# Patient Record
Sex: FEMALE | Race: WHITE | Employment: OTHER
[De-identification: names, ages, dates, MRNs, and addresses within clinical notes are randomized per-mention and may not be internally consistent; named-entity substitution may affect disease eponyms.]

---

## 2017-01-13 ENCOUNTER — TELEPHONE (OUTPATIENT)
Dept: ORTHOPEDIC SURGERY | Facility: CLINIC | Age: 42
End: 2017-01-13

## 2017-02-09 ENCOUNTER — OFFICE VISIT (OUTPATIENT)
Dept: ORTHOPEDIC SURGERY | Facility: CLINIC | Age: 42
End: 2017-02-09

## 2017-02-09 VITALS — BODY MASS INDEX: 41.48 KG/M2 | HEIGHT: 65 IN | WEIGHT: 249 LBS

## 2017-02-09 DIAGNOSIS — M25.511 CHRONIC RIGHT SHOULDER PAIN: Primary | ICD-10-CM

## 2017-02-09 DIAGNOSIS — M75.01 ADHESIVE CAPSULITIS OF RIGHT SHOULDER: ICD-10-CM

## 2017-02-09 DIAGNOSIS — G89.29 CHRONIC RIGHT SHOULDER PAIN: Primary | ICD-10-CM

## 2017-02-09 PROCEDURE — 99203 OFFICE O/P NEW LOW 30 MIN: CPT | Performed by: ORTHOPAEDIC SURGERY

## 2017-02-24 ENCOUNTER — HOSPITAL ENCOUNTER (OUTPATIENT)
Dept: PREADMISSION TESTING | Age: 42
Discharge: HOME | End: 2017-02-24
Payer: COMMERCIAL

## 2017-02-24 VITALS
HEART RATE: 63 BPM | SYSTOLIC BLOOD PRESSURE: 108 MMHG | BODY MASS INDEX: 41.48 KG/M2 | WEIGHT: 249 LBS | RESPIRATION RATE: 16 BRPM | DIASTOLIC BLOOD PRESSURE: 76 MMHG | TEMPERATURE: 97.3 F | OXYGEN SATURATION: 96 % | HEIGHT: 65 IN

## 2017-02-24 RX ORDER — IBUPROFEN 800 MG/1
800 TABLET ORAL EVERY 6 HOURS PRN
COMMUNITY
End: 2017-08-16 | Stop reason: SDUPTHER

## 2017-02-24 RX ORDER — SOTALOL HYDROCHLORIDE 120 MG/1
120 TABLET ORAL EVERY 12 HOURS
COMMUNITY
End: 2017-05-15

## 2017-02-25 ENCOUNTER — ANESTHESIA EVENT (OUTPATIENT)
Dept: OPERATING ROOM | Age: 42
End: 2017-02-25
Payer: COMMERCIAL

## 2017-02-25 RX ORDER — SODIUM CHLORIDE 0.9 % (FLUSH) 0.9 %
10 SYRINGE (ML) INJECTION EVERY 12 HOURS SCHEDULED
Status: CANCELLED | OUTPATIENT
Start: 2017-02-25

## 2017-02-25 RX ORDER — SODIUM CHLORIDE 0.9 % (FLUSH) 0.9 %
10 SYRINGE (ML) INJECTION PRN
Status: CANCELLED | OUTPATIENT
Start: 2017-02-25

## 2017-02-25 RX ORDER — LIDOCAINE HYDROCHLORIDE 10 MG/ML
1 INJECTION, SOLUTION EPIDURAL; INFILTRATION; INTRACAUDAL; PERINEURAL
Status: CANCELLED | OUTPATIENT
Start: 2017-02-25 | End: 2017-02-25

## 2017-02-25 RX ORDER — SODIUM CHLORIDE 9 MG/ML
INJECTION, SOLUTION INTRAVENOUS CONTINUOUS
Status: CANCELLED | OUTPATIENT
Start: 2017-02-25

## 2017-03-01 ENCOUNTER — ANESTHESIA (OUTPATIENT)
Dept: OPERATING ROOM | Age: 42
End: 2017-03-01
Payer: COMMERCIAL

## 2017-03-01 ENCOUNTER — HOSPITAL ENCOUNTER (OUTPATIENT)
Age: 42
Setting detail: OUTPATIENT SURGERY
Discharge: HOME | End: 2017-03-01
Attending: ORTHOPAEDIC SURGERY | Admitting: ORTHOPAEDIC SURGERY
Payer: COMMERCIAL

## 2017-03-01 VITALS
DIASTOLIC BLOOD PRESSURE: 84 MMHG | SYSTOLIC BLOOD PRESSURE: 136 MMHG | TEMPERATURE: 95.9 F | HEART RATE: 68 BPM | RESPIRATION RATE: 16 BRPM | OXYGEN SATURATION: 93 % | HEIGHT: 65 IN | WEIGHT: 249 LBS | BODY MASS INDEX: 41.48 KG/M2

## 2017-03-01 VITALS — SYSTOLIC BLOOD PRESSURE: 140 MMHG | OXYGEN SATURATION: 94 % | DIASTOLIC BLOOD PRESSURE: 78 MMHG

## 2017-03-01 LAB — GLUCOSE BLD-MCNC: 95 MG/DL (ref 65–105)

## 2017-03-01 PROCEDURE — 3700000000 HC ANESTHESIA ATTENDED CARE: Performed by: ORTHOPAEDIC SURGERY

## 2017-03-01 PROCEDURE — 3700000001 HC ADD 15 MINUTES (ANESTHESIA): Performed by: ORTHOPAEDIC SURGERY

## 2017-03-01 PROCEDURE — 3600000011 HC SURGERY LEVEL 1  ADDTL 15MIN: Performed by: ORTHOPAEDIC SURGERY

## 2017-03-01 PROCEDURE — 7100000000 HC PACU RECOVERY - FIRST 15 MIN: Performed by: ORTHOPAEDIC SURGERY

## 2017-03-01 PROCEDURE — 7100000001 HC PACU RECOVERY - ADDTL 15 MIN: Performed by: ORTHOPAEDIC SURGERY

## 2017-03-01 PROCEDURE — 6360000002 HC RX W HCPCS: Performed by: ANESTHESIOLOGY

## 2017-03-01 PROCEDURE — 6360000002 HC RX W HCPCS: Performed by: NURSE ANESTHETIST, CERTIFIED REGISTERED

## 2017-03-01 PROCEDURE — 2580000003 HC RX 258: Performed by: ANESTHESIOLOGY

## 2017-03-01 PROCEDURE — 82947 ASSAY GLUCOSE BLOOD QUANT: CPT

## 2017-03-01 PROCEDURE — 7100000030 HC ASPR PHASE II RECOVERY - FIRST 15 MIN: Performed by: ORTHOPAEDIC SURGERY

## 2017-03-01 PROCEDURE — 3600000001 HC SURGERY LEVEL 1  BASE: Performed by: ORTHOPAEDIC SURGERY

## 2017-03-01 RX ORDER — SODIUM CHLORIDE 9 MG/ML
INJECTION, SOLUTION INTRAVENOUS CONTINUOUS
Status: DISCONTINUED | OUTPATIENT
Start: 2017-03-01 | End: 2017-03-01 | Stop reason: HOSPADM

## 2017-03-01 RX ORDER — LIDOCAINE HYDROCHLORIDE 10 MG/ML
1 INJECTION, SOLUTION EPIDURAL; INFILTRATION; INTRACAUDAL; PERINEURAL
Status: DISCONTINUED | OUTPATIENT
Start: 2017-03-01 | End: 2017-03-01 | Stop reason: HOSPADM

## 2017-03-01 RX ORDER — SODIUM CHLORIDE 0.9 % (FLUSH) 0.9 %
10 SYRINGE (ML) INJECTION PRN
Status: DISCONTINUED | OUTPATIENT
Start: 2017-03-01 | End: 2017-03-01 | Stop reason: HOSPADM

## 2017-03-01 RX ORDER — OXYCODONE HYDROCHLORIDE AND ACETAMINOPHEN 5; 325 MG/1; MG/1
2 TABLET ORAL PRN
Status: DISCONTINUED | OUTPATIENT
Start: 2017-03-01 | End: 2017-03-01 | Stop reason: HOSPADM

## 2017-03-01 RX ORDER — PROPOFOL 10 MG/ML
INJECTION, EMULSION INTRAVENOUS PRN
Status: DISCONTINUED | OUTPATIENT
Start: 2017-03-01 | End: 2017-03-01 | Stop reason: SDUPTHER

## 2017-03-01 RX ORDER — HYDROCODONE BITARTRATE AND ACETAMINOPHEN 5; 325 MG/1; MG/1
1-2 TABLET ORAL EVERY 4 HOURS PRN
Qty: 40 TABLET | Refills: 0 | Status: SHIPPED | OUTPATIENT
Start: 2017-03-01 | End: 2017-03-08

## 2017-03-01 RX ORDER — SODIUM CHLORIDE 0.9 % (FLUSH) 0.9 %
10 SYRINGE (ML) INJECTION EVERY 12 HOURS SCHEDULED
Status: DISCONTINUED | OUTPATIENT
Start: 2017-03-01 | End: 2017-03-01 | Stop reason: HOSPADM

## 2017-03-01 RX ORDER — DIPHENHYDRAMINE HYDROCHLORIDE 50 MG/ML
12.5 INJECTION INTRAMUSCULAR; INTRAVENOUS
Status: DISCONTINUED | OUTPATIENT
Start: 2017-03-01 | End: 2017-03-01 | Stop reason: HOSPADM

## 2017-03-01 RX ORDER — OXYCODONE HYDROCHLORIDE AND ACETAMINOPHEN 5; 325 MG/1; MG/1
1 TABLET ORAL PRN
Status: DISCONTINUED | OUTPATIENT
Start: 2017-03-01 | End: 2017-03-01 | Stop reason: HOSPADM

## 2017-03-01 RX ORDER — LABETALOL HYDROCHLORIDE 5 MG/ML
5 INJECTION, SOLUTION INTRAVENOUS EVERY 10 MIN PRN
Status: DISCONTINUED | OUTPATIENT
Start: 2017-03-01 | End: 2017-03-01 | Stop reason: HOSPADM

## 2017-03-01 RX ORDER — DEXAMETHASONE SODIUM PHOSPHATE 10 MG/ML
10 INJECTION INTRAMUSCULAR; INTRAVENOUS ONCE
Status: COMPLETED | OUTPATIENT
Start: 2017-03-01 | End: 2017-03-01

## 2017-03-01 RX ORDER — DIPHENHYDRAMINE HYDROCHLORIDE 50 MG/ML
25 INJECTION INTRAMUSCULAR; INTRAVENOUS ONCE
Status: COMPLETED | OUTPATIENT
Start: 2017-03-01 | End: 2017-03-01

## 2017-03-01 RX ADMIN — HYDROMORPHONE HYDROCHLORIDE 0.5 MG: 1 INJECTION, SOLUTION INTRAMUSCULAR; INTRAVENOUS; SUBCUTANEOUS at 12:34

## 2017-03-01 RX ADMIN — DEXAMETHASONE SODIUM PHOSPHATE 10 MG: 10 INJECTION INTRAMUSCULAR; INTRAVENOUS at 09:42

## 2017-03-01 RX ADMIN — HYDROMORPHONE HYDROCHLORIDE 0.5 MG: 1 INJECTION, SOLUTION INTRAMUSCULAR; INTRAVENOUS; SUBCUTANEOUS at 12:43

## 2017-03-01 RX ADMIN — PROPOFOL 180 MG: 10 INJECTION, EMULSION INTRAVENOUS at 12:06

## 2017-03-01 RX ADMIN — SODIUM CHLORIDE: 9 INJECTION, SOLUTION INTRAVENOUS at 09:35

## 2017-03-01 RX ADMIN — HYDROMORPHONE HYDROCHLORIDE 1 MG: 1 INJECTION, SOLUTION INTRAMUSCULAR; INTRAVENOUS; SUBCUTANEOUS at 12:20

## 2017-03-01 RX ADMIN — HYDROMORPHONE HYDROCHLORIDE 1 MG: 1 INJECTION, SOLUTION INTRAMUSCULAR; INTRAVENOUS; SUBCUTANEOUS at 12:06

## 2017-03-01 RX ADMIN — DIPHENHYDRAMINE HYDROCHLORIDE 25 MG: 50 INJECTION, SOLUTION INTRAMUSCULAR; INTRAVENOUS at 09:39

## 2017-03-01 ASSESSMENT — PAIN DESCRIPTION - ORIENTATION
ORIENTATION: RIGHT
ORIENTATION: RIGHT

## 2017-03-01 ASSESSMENT — PAIN SCALES - GENERAL
PAINLEVEL_OUTOF10: 9
PAINLEVEL_OUTOF10: 10
PAINLEVEL_OUTOF10: 5
PAINLEVEL_OUTOF10: 10

## 2017-03-01 ASSESSMENT — PAIN DESCRIPTION - DESCRIPTORS
DESCRIPTORS: ACHING;CONSTANT
DESCRIPTORS: ACHING;CONSTANT
DESCRIPTORS: ACHING;BURNING;THROBBING

## 2017-03-01 ASSESSMENT — PAIN DESCRIPTION - PROGRESSION: CLINICAL_PROGRESSION: NOT CHANGED

## 2017-03-01 ASSESSMENT — PAIN DESCRIPTION - ONSET
ONSET: AWAKENED FROM SLEEP
ONSET: AWAKENED FROM SLEEP

## 2017-03-01 ASSESSMENT — PAIN DESCRIPTION - LOCATION
LOCATION: SHOULDER
LOCATION: SHOULDER

## 2017-03-01 ASSESSMENT — PAIN DESCRIPTION - PAIN TYPE
TYPE: SURGICAL PAIN
TYPE: SURGICAL PAIN

## 2017-03-01 ASSESSMENT — PAIN DESCRIPTION - FREQUENCY
FREQUENCY: CONTINUOUS
FREQUENCY: CONTINUOUS

## 2017-03-01 ASSESSMENT — PAIN - FUNCTIONAL ASSESSMENT: PAIN_FUNCTIONAL_ASSESSMENT: 0-10

## 2017-03-02 ENCOUNTER — HOSPITAL ENCOUNTER (OUTPATIENT)
Dept: PHYSICAL THERAPY | Age: 42
Setting detail: THERAPIES SERIES
Discharge: HOME | End: 2017-03-02
Payer: COMMERCIAL

## 2017-03-02 PROCEDURE — G8984 CARRY CURRENT STATUS: HCPCS

## 2017-03-02 PROCEDURE — 97162 PT EVAL MOD COMPLEX 30 MIN: CPT

## 2017-03-02 PROCEDURE — G8985 CARRY GOAL STATUS: HCPCS

## 2017-03-02 ASSESSMENT — PAIN DESCRIPTION - FREQUENCY: FREQUENCY: CONTINUOUS

## 2017-03-02 ASSESSMENT — PAIN DESCRIPTION - DIRECTION: RADIATING_TOWARDS: UPPER ARM

## 2017-03-02 ASSESSMENT — PAIN DESCRIPTION - LOCATION: LOCATION: SHOULDER

## 2017-03-02 ASSESSMENT — PAIN DESCRIPTION - ORIENTATION: ORIENTATION: RIGHT;ANTERIOR

## 2017-03-02 ASSESSMENT — PAIN DESCRIPTION - PAIN TYPE: TYPE: CHRONIC PAIN

## 2017-03-02 ASSESSMENT — PAIN SCALES - GENERAL: PAINLEVEL_OUTOF10: 9

## 2017-03-02 ASSESSMENT — PAIN DESCRIPTION - PROGRESSION: CLINICAL_PROGRESSION: GRADUALLY IMPROVING

## 2017-03-02 ASSESSMENT — PAIN DESCRIPTION - DESCRIPTORS: DESCRIPTORS: ACHING;CONSTANT

## 2017-03-13 ENCOUNTER — HOSPITAL ENCOUNTER (OUTPATIENT)
Dept: PHYSICAL THERAPY | Age: 42
Setting detail: THERAPIES SERIES
Discharge: HOME | End: 2017-03-13
Payer: COMMERCIAL

## 2017-03-13 PROCEDURE — 97110 THERAPEUTIC EXERCISES: CPT

## 2017-03-13 ASSESSMENT — PAIN DESCRIPTION - FREQUENCY: FREQUENCY: CONTINUOUS

## 2017-03-13 ASSESSMENT — PAIN DESCRIPTION - PAIN TYPE: TYPE: CHRONIC PAIN

## 2017-03-13 ASSESSMENT — PAIN DESCRIPTION - LOCATION: LOCATION: SHOULDER

## 2017-03-13 ASSESSMENT — PAIN DESCRIPTION - PROGRESSION: CLINICAL_PROGRESSION: GRADUALLY IMPROVING

## 2017-03-13 ASSESSMENT — PAIN SCALES - GENERAL: PAINLEVEL_OUTOF10: 8

## 2017-03-13 ASSESSMENT — PAIN DESCRIPTION - DESCRIPTORS: DESCRIPTORS: ACHING;CONSTANT

## 2017-03-13 ASSESSMENT — PAIN DESCRIPTION - ORIENTATION: ORIENTATION: RIGHT;ANTERIOR

## 2017-03-13 ASSESSMENT — PAIN DESCRIPTION - DIRECTION: RADIATING_TOWARDS: UPPER ARM

## 2017-03-14 ENCOUNTER — OFFICE VISIT (OUTPATIENT)
Dept: ORTHOPEDIC SURGERY | Age: 42
End: 2017-03-14
Payer: COMMERCIAL

## 2017-03-14 DIAGNOSIS — G89.29 CHRONIC RIGHT SHOULDER PAIN: Primary | ICD-10-CM

## 2017-03-14 DIAGNOSIS — M75.01 ADHESIVE CAPSULITIS OF RIGHT SHOULDER: ICD-10-CM

## 2017-03-14 DIAGNOSIS — M25.511 CHRONIC RIGHT SHOULDER PAIN: Primary | ICD-10-CM

## 2017-03-14 PROCEDURE — 20610 DRAIN/INJ JOINT/BURSA W/O US: CPT | Performed by: ORTHOPAEDIC SURGERY

## 2017-03-14 PROCEDURE — 99213 OFFICE O/P EST LOW 20 MIN: CPT | Performed by: ORTHOPAEDIC SURGERY

## 2017-03-14 RX ORDER — BUPIVACAINE HYDROCHLORIDE 5 MG/ML
3 INJECTION, SOLUTION PERINEURAL ONCE
Status: COMPLETED | OUTPATIENT
Start: 2017-03-14 | End: 2017-03-14

## 2017-03-14 RX ORDER — TRAMADOL HYDROCHLORIDE 50 MG/1
50-100 TABLET ORAL EVERY 6 HOURS PRN
Qty: 40 TABLET | Refills: 0 | Status: SHIPPED | OUTPATIENT
Start: 2017-03-14 | End: 2017-03-14 | Stop reason: CLARIF

## 2017-03-14 RX ORDER — LIDOCAINE HYDROCHLORIDE 10 MG/ML
3 INJECTION, SOLUTION EPIDURAL; INFILTRATION; INTRACAUDAL; PERINEURAL ONCE
Status: COMPLETED | OUTPATIENT
Start: 2017-03-14 | End: 2017-03-14

## 2017-03-14 RX ORDER — TRAMADOL HYDROCHLORIDE 50 MG/1
50-100 TABLET ORAL EVERY 6 HOURS PRN
Qty: 40 TABLET | Refills: 0 | Status: SHIPPED | OUTPATIENT
Start: 2017-03-14 | End: 2017-05-15

## 2017-03-14 RX ADMIN — LIDOCAINE HYDROCHLORIDE 3 ML: 10 INJECTION, SOLUTION EPIDURAL; INFILTRATION; INTRACAUDAL; PERINEURAL at 11:14

## 2017-03-14 RX ADMIN — BUPIVACAINE HYDROCHLORIDE 15 MG: 5 INJECTION, SOLUTION PERINEURAL at 11:13

## 2017-03-15 ENCOUNTER — APPOINTMENT (OUTPATIENT)
Dept: PHYSICAL THERAPY | Age: 42
End: 2017-03-15
Payer: COMMERCIAL

## 2017-03-17 ENCOUNTER — APPOINTMENT (OUTPATIENT)
Dept: PHYSICAL THERAPY | Age: 42
End: 2017-03-17
Payer: COMMERCIAL

## 2017-03-20 ENCOUNTER — TELEPHONE (OUTPATIENT)
Dept: ORTHOPEDIC SURGERY | Age: 42
End: 2017-03-20

## 2017-03-21 ASSESSMENT — PAIN DESCRIPTION - FREQUENCY: FREQUENCY: CONTINUOUS

## 2017-03-21 ASSESSMENT — PAIN DESCRIPTION - DIRECTION: RADIATING_TOWARDS: UPPER ARM

## 2017-03-21 ASSESSMENT — PAIN DESCRIPTION - LOCATION: LOCATION: SHOULDER

## 2017-03-21 ASSESSMENT — PAIN DESCRIPTION - DESCRIPTORS: DESCRIPTORS: ACHING;CONSTANT

## 2017-03-21 ASSESSMENT — PAIN SCALES - GENERAL: PAINLEVEL_OUTOF10: 8

## 2017-03-21 ASSESSMENT — PAIN DESCRIPTION - ORIENTATION: ORIENTATION: RIGHT;ANTERIOR

## 2017-03-21 ASSESSMENT — PAIN DESCRIPTION - PROGRESSION: CLINICAL_PROGRESSION: GRADUALLY IMPROVING

## 2017-03-21 ASSESSMENT — PAIN DESCRIPTION - PAIN TYPE: TYPE: CHRONIC PAIN

## 2017-03-22 ENCOUNTER — APPOINTMENT (OUTPATIENT)
Dept: PHYSICAL THERAPY | Age: 42
End: 2017-03-22
Payer: COMMERCIAL

## 2017-04-06 ENCOUNTER — OFFICE VISIT (OUTPATIENT)
Dept: ORTHOPEDIC SURGERY | Age: 42
End: 2017-04-06
Payer: COMMERCIAL

## 2017-04-06 DIAGNOSIS — M75.01 ADHESIVE CAPSULITIS OF RIGHT SHOULDER: Primary | ICD-10-CM

## 2017-04-06 DIAGNOSIS — G89.29 CHRONIC RIGHT SHOULDER PAIN: ICD-10-CM

## 2017-04-06 DIAGNOSIS — M25.511 CHRONIC RIGHT SHOULDER PAIN: ICD-10-CM

## 2017-04-06 DIAGNOSIS — M79.2 RADICULAR PAIN IN RIGHT ARM: ICD-10-CM

## 2017-04-06 DIAGNOSIS — M54.2 NECK PAIN: ICD-10-CM

## 2017-04-06 PROCEDURE — 99213 OFFICE O/P EST LOW 20 MIN: CPT | Performed by: ORTHOPAEDIC SURGERY

## 2017-04-17 ENCOUNTER — TELEPHONE (OUTPATIENT)
Dept: ORTHOPEDIC SURGERY | Age: 42
End: 2017-04-17

## 2017-04-20 DIAGNOSIS — F40.240 CLAUSTROPHOBIA: Primary | ICD-10-CM

## 2017-04-20 RX ORDER — DIAZEPAM 10 MG/1
10 TABLET ORAL SEE ADMIN INSTRUCTIONS
Qty: 1 TABLET | Refills: 0 | Status: SHIPPED | OUTPATIENT
Start: 2017-04-20 | End: 2017-05-15

## 2017-04-26 ENCOUNTER — HOSPITAL ENCOUNTER (OUTPATIENT)
Dept: MRI IMAGING | Age: 42
Discharge: HOME | End: 2017-04-26
Payer: COMMERCIAL

## 2017-04-26 DIAGNOSIS — G89.29 CHRONIC RIGHT SHOULDER PAIN: ICD-10-CM

## 2017-04-26 DIAGNOSIS — M25.511 CHRONIC RIGHT SHOULDER PAIN: ICD-10-CM

## 2017-04-26 DIAGNOSIS — M54.2 NECK PAIN: ICD-10-CM

## 2017-04-26 DIAGNOSIS — M79.2 RADICULAR PAIN IN RIGHT ARM: ICD-10-CM

## 2017-04-26 PROCEDURE — 72141 MRI NECK SPINE W/O DYE: CPT

## 2017-04-27 ENCOUNTER — OFFICE VISIT (OUTPATIENT)
Dept: ORTHOPEDIC SURGERY | Age: 42
End: 2017-04-27
Payer: COMMERCIAL

## 2017-04-27 DIAGNOSIS — M75.01 ADHESIVE CAPSULITIS OF RIGHT SHOULDER: ICD-10-CM

## 2017-04-27 DIAGNOSIS — M79.2 RADICULAR PAIN IN RIGHT ARM: Primary | ICD-10-CM

## 2017-04-27 DIAGNOSIS — M54.2 NECK PAIN: ICD-10-CM

## 2017-04-27 DIAGNOSIS — M25.511 CHRONIC RIGHT SHOULDER PAIN: ICD-10-CM

## 2017-04-27 DIAGNOSIS — G89.29 CHRONIC RIGHT SHOULDER PAIN: ICD-10-CM

## 2017-04-27 PROCEDURE — 99213 OFFICE O/P EST LOW 20 MIN: CPT | Performed by: ORTHOPAEDIC SURGERY

## 2017-05-03 ENCOUNTER — TELEPHONE (OUTPATIENT)
Dept: ORTHOPEDIC SURGERY | Age: 42
End: 2017-05-03

## 2017-05-03 DIAGNOSIS — M54.2 NECK PAIN: ICD-10-CM

## 2017-05-03 DIAGNOSIS — G89.29 CHRONIC RIGHT SHOULDER PAIN: ICD-10-CM

## 2017-05-03 DIAGNOSIS — M75.01 ADHESIVE CAPSULITIS OF RIGHT SHOULDER: ICD-10-CM

## 2017-05-03 DIAGNOSIS — M79.2 RADICULAR PAIN IN RIGHT ARM: Primary | ICD-10-CM

## 2017-05-03 DIAGNOSIS — M25.511 CHRONIC RIGHT SHOULDER PAIN: ICD-10-CM

## 2017-05-15 ENCOUNTER — HOSPITAL ENCOUNTER (OUTPATIENT)
Age: 42
Setting detail: OBSERVATION
LOS: 1 days | Discharge: HOME | End: 2017-05-17
Attending: EMERGENCY MEDICINE | Admitting: FAMILY MEDICINE
Payer: COMMERCIAL

## 2017-05-15 ENCOUNTER — APPOINTMENT (OUTPATIENT)
Dept: GENERAL RADIOLOGY | Age: 42
End: 2017-05-15
Payer: COMMERCIAL

## 2017-05-15 DIAGNOSIS — M79.605 PAIN IN BOTH LOWER EXTREMITIES: ICD-10-CM

## 2017-05-15 DIAGNOSIS — M79.89 LEG SWELLING: ICD-10-CM

## 2017-05-15 DIAGNOSIS — J44.1 COPD EXACERBATION (HCC): Primary | ICD-10-CM

## 2017-05-15 DIAGNOSIS — M79.604 PAIN IN BOTH LOWER EXTREMITIES: ICD-10-CM

## 2017-05-15 LAB
ABSOLUTE EOS #: 0.6 K/UL (ref 0–0.4)
ABSOLUTE LYMPH #: 3.8 K/UL (ref 1–4.8)
ABSOLUTE MONO #: 0.8 K/UL (ref 0.1–1.3)
ANION GAP SERPL CALCULATED.3IONS-SCNC: 14 MMOL/L (ref 9–17)
BASOPHILS # BLD: 1 %
BASOPHILS ABSOLUTE: 0.1 K/UL (ref 0–0.2)
BNP INTERPRETATION: ABNORMAL
BUN BLDV-MCNC: 13 MG/DL (ref 6–20)
BUN/CREAT BLD: ABNORMAL (ref 9–20)
CALCIUM SERPL-MCNC: 9.5 MG/DL (ref 8.6–10.4)
CHLORIDE BLD-SCNC: 99 MMOL/L (ref 98–107)
CO2: 26 MMOL/L (ref 20–31)
CREAT SERPL-MCNC: 0.55 MG/DL (ref 0.5–0.9)
DIFFERENTIAL TYPE: ABNORMAL
EOSINOPHILS RELATIVE PERCENT: 5 %
GFR AFRICAN AMERICAN: >60 ML/MIN
GFR NON-AFRICAN AMERICAN: >60 ML/MIN
GFR SERPL CREATININE-BSD FRML MDRD: ABNORMAL ML/MIN/{1.73_M2}
GFR SERPL CREATININE-BSD FRML MDRD: ABNORMAL ML/MIN/{1.73_M2}
GLUCOSE BLD-MCNC: 246 MG/DL (ref 70–99)
HCT VFR BLD CALC: 45.5 % (ref 36–46)
HEMOGLOBIN: 15.1 G/DL (ref 12–16)
LYMPHOCYTES # BLD: 33 %
MCH RBC QN AUTO: 33.4 PG (ref 26–34)
MCHC RBC AUTO-ENTMCNC: 33.3 G/DL (ref 31–37)
MCV RBC AUTO: 100.3 FL (ref 80–100)
MONOCYTES # BLD: 7 %
PDW BLD-RTO: 13.1 % (ref 11.5–14.9)
PLATELET # BLD: 208 K/UL (ref 150–450)
PLATELET ESTIMATE: ABNORMAL
PMV BLD AUTO: 9.8 FL (ref 6–12)
POTASSIUM SERPL-SCNC: 4.5 MMOL/L (ref 3.7–5.3)
PRO-BNP: 1093 PG/ML
RBC # BLD: 4.53 M/UL (ref 4–5.2)
RBC # BLD: ABNORMAL 10*6/UL
SEG NEUTROPHILS: 54 %
SEGMENTED NEUTROPHILS ABSOLUTE COUNT: 6.1 K/UL (ref 1.3–9.1)
SODIUM BLD-SCNC: 139 MMOL/L (ref 135–144)
TROPONIN INTERP: NORMAL
TROPONIN T: <0.03 NG/ML
WBC # BLD: 11.4 K/UL (ref 3.5–11)
WBC # BLD: ABNORMAL 10*3/UL

## 2017-05-15 PROCEDURE — 71020 XR CHEST STANDARD TWO VW: CPT

## 2017-05-15 PROCEDURE — 94664 DEMO&/EVAL PT USE INHALER: CPT

## 2017-05-15 PROCEDURE — 6360000002 HC RX W HCPCS: Performed by: EMERGENCY MEDICINE

## 2017-05-15 PROCEDURE — 6370000000 HC RX 637 (ALT 250 FOR IP): Performed by: EMERGENCY MEDICINE

## 2017-05-15 PROCEDURE — 94640 AIRWAY INHALATION TREATMENT: CPT

## 2017-05-15 PROCEDURE — 80048 BASIC METABOLIC PNL TOTAL CA: CPT

## 2017-05-15 PROCEDURE — 83880 ASSAY OF NATRIURETIC PEPTIDE: CPT

## 2017-05-15 PROCEDURE — 84484 ASSAY OF TROPONIN QUANT: CPT

## 2017-05-15 PROCEDURE — 36415 COLL VENOUS BLD VENIPUNCTURE: CPT

## 2017-05-15 PROCEDURE — 85025 COMPLETE CBC W/AUTO DIFF WBC: CPT

## 2017-05-15 PROCEDURE — 99285 EMERGENCY DEPT VISIT HI MDM: CPT

## 2017-05-15 PROCEDURE — 96375 TX/PRO/DX INJ NEW DRUG ADDON: CPT

## 2017-05-15 PROCEDURE — 96374 THER/PROPH/DIAG INJ IV PUSH: CPT

## 2017-05-15 RX ORDER — IPRATROPIUM BROMIDE AND ALBUTEROL SULFATE 2.5; .5 MG/3ML; MG/3ML
1 SOLUTION RESPIRATORY (INHALATION)
Status: DISCONTINUED | OUTPATIENT
Start: 2017-05-15 | End: 2017-05-17 | Stop reason: HOSPADM

## 2017-05-15 RX ORDER — OXYCODONE HYDROCHLORIDE AND ACETAMINOPHEN 5; 325 MG/1; MG/1
1 TABLET ORAL ONCE
Status: COMPLETED | OUTPATIENT
Start: 2017-05-15 | End: 2017-05-15

## 2017-05-15 RX ORDER — METHYLPREDNISOLONE SODIUM SUCCINATE 125 MG/2ML
125 INJECTION, POWDER, LYOPHILIZED, FOR SOLUTION INTRAMUSCULAR; INTRAVENOUS ONCE
Status: COMPLETED | OUTPATIENT
Start: 2017-05-15 | End: 2017-05-15

## 2017-05-15 RX ORDER — BENZONATATE 100 MG/1
200 CAPSULE ORAL ONCE
Status: COMPLETED | OUTPATIENT
Start: 2017-05-15 | End: 2017-05-15

## 2017-05-15 RX ORDER — ALBUTEROL SULFATE 90 UG/1
2 AEROSOL, METERED RESPIRATORY (INHALATION)
Status: DISCONTINUED | OUTPATIENT
Start: 2017-05-15 | End: 2017-05-15

## 2017-05-15 RX ORDER — ALBUTEROL SULFATE 2.5 MG/3ML
5 SOLUTION RESPIRATORY (INHALATION)
Status: DISCONTINUED | OUTPATIENT
Start: 2017-05-15 | End: 2017-05-15

## 2017-05-15 RX ORDER — PROMETHAZINE HYDROCHLORIDE 25 MG/ML
12.5 INJECTION, SOLUTION INTRAMUSCULAR; INTRAVENOUS ONCE
Status: DISCONTINUED | OUTPATIENT
Start: 2017-05-15 | End: 2017-05-15

## 2017-05-15 RX ADMIN — METHYLPREDNISOLONE SODIUM SUCCINATE 125 MG: 125 INJECTION, POWDER, FOR SOLUTION INTRAMUSCULAR; INTRAVENOUS at 21:49

## 2017-05-15 RX ADMIN — HYDROMORPHONE HYDROCHLORIDE 0.5 MG: 1 INJECTION, SOLUTION INTRAMUSCULAR; INTRAVENOUS; SUBCUTANEOUS at 23:57

## 2017-05-15 RX ADMIN — IPRATROPIUM BROMIDE AND ALBUTEROL SULFATE 1 AMPULE: .5; 3 SOLUTION RESPIRATORY (INHALATION) at 21:32

## 2017-05-15 RX ADMIN — BENZONATATE 200 MG: 100 CAPSULE ORAL at 21:49

## 2017-05-15 RX ADMIN — OXYCODONE HYDROCHLORIDE AND ACETAMINOPHEN 1 TABLET: 5; 325 TABLET ORAL at 21:49

## 2017-05-15 ASSESSMENT — PAIN DESCRIPTION - FREQUENCY
FREQUENCY: CONTINUOUS
FREQUENCY: CONTINUOUS

## 2017-05-15 ASSESSMENT — PAIN DESCRIPTION - DESCRIPTORS: DESCRIPTORS: SHARP;BURNING

## 2017-05-15 ASSESSMENT — PAIN DESCRIPTION - LOCATION
LOCATION: LEG;FOOT
LOCATION: LEG;FOOT

## 2017-05-15 ASSESSMENT — PAIN DESCRIPTION - PAIN TYPE
TYPE: CHRONIC PAIN
TYPE: ACUTE PAIN

## 2017-05-15 ASSESSMENT — PAIN SCALES - GENERAL
PAINLEVEL_OUTOF10: 10
PAINLEVEL_OUTOF10: 9
PAINLEVEL_OUTOF10: 9
PAINLEVEL_OUTOF10: 10

## 2017-05-15 ASSESSMENT — PAIN DESCRIPTION - ORIENTATION: ORIENTATION: LEFT

## 2017-05-16 ENCOUNTER — APPOINTMENT (OUTPATIENT)
Dept: MRI IMAGING | Age: 42
End: 2017-05-16
Payer: COMMERCIAL

## 2017-05-16 PROBLEM — Z91.199 NONCOMPLIANCE WITH THERAPEUTIC PLAN: Status: ACTIVE | Noted: 2017-05-16

## 2017-05-16 PROBLEM — R07.2 PRECORDIAL PAIN: Status: ACTIVE | Noted: 2017-05-16

## 2017-05-16 PROBLEM — M79.605 LEFT LEG PAIN: Status: ACTIVE | Noted: 2017-05-16

## 2017-05-16 PROBLEM — Z72.0 TOBACCO ABUSE: Status: ACTIVE | Noted: 2017-05-16

## 2017-05-16 LAB
ANION GAP SERPL CALCULATED.3IONS-SCNC: 17 MMOL/L (ref 9–17)
BUN BLDV-MCNC: 15 MG/DL (ref 6–20)
BUN/CREAT BLD: ABNORMAL (ref 9–20)
CALCIUM SERPL-MCNC: 9.1 MG/DL (ref 8.6–10.4)
CHLORIDE BLD-SCNC: 97 MMOL/L (ref 98–107)
CO2: 22 MMOL/L (ref 20–31)
CREAT SERPL-MCNC: 0.61 MG/DL (ref 0.5–0.9)
ESTIMATED AVERAGE GLUCOSE: 246 MG/DL
GFR AFRICAN AMERICAN: >60 ML/MIN
GFR NON-AFRICAN AMERICAN: >60 ML/MIN
GFR SERPL CREATININE-BSD FRML MDRD: ABNORMAL ML/MIN/{1.73_M2}
GFR SERPL CREATININE-BSD FRML MDRD: ABNORMAL ML/MIN/{1.73_M2}
GLUCOSE BLD-MCNC: 162 MG/DL (ref 65–105)
GLUCOSE BLD-MCNC: 305 MG/DL (ref 65–105)
GLUCOSE BLD-MCNC: 339 MG/DL (ref 65–105)
GLUCOSE BLD-MCNC: 351 MG/DL (ref 65–105)
GLUCOSE BLD-MCNC: 393 MG/DL (ref 65–105)
GLUCOSE BLD-MCNC: 430 MG/DL (ref 70–99)
GLUCOSE BLD-MCNC: 470 MG/DL (ref 65–105)
HBA1C MFR BLD: 10.2 % (ref 4–6)
POTASSIUM SERPL-SCNC: 4.7 MMOL/L (ref 3.7–5.3)
SODIUM BLD-SCNC: 136 MMOL/L (ref 135–144)
TROPONIN INTERP: NORMAL
TROPONIN T: <0.03 NG/ML

## 2017-05-16 PROCEDURE — 94640 AIRWAY INHALATION TREATMENT: CPT

## 2017-05-16 PROCEDURE — 6370000000 HC RX 637 (ALT 250 FOR IP): Performed by: FAMILY MEDICINE

## 2017-05-16 PROCEDURE — 36415 COLL VENOUS BLD VENIPUNCTURE: CPT

## 2017-05-16 PROCEDURE — G0378 HOSPITAL OBSERVATION PER HR: HCPCS

## 2017-05-16 PROCEDURE — 96375 TX/PRO/DX INJ NEW DRUG ADDON: CPT

## 2017-05-16 PROCEDURE — 6370000000 HC RX 637 (ALT 250 FOR IP): Performed by: EMERGENCY MEDICINE

## 2017-05-16 PROCEDURE — 96372 THER/PROPH/DIAG INJ SC/IM: CPT

## 2017-05-16 PROCEDURE — 93970 EXTREMITY STUDY: CPT

## 2017-05-16 PROCEDURE — 6360000002 HC RX W HCPCS: Performed by: FAMILY MEDICINE

## 2017-05-16 PROCEDURE — 96376 TX/PRO/DX INJ SAME DRUG ADON: CPT

## 2017-05-16 PROCEDURE — 82947 ASSAY GLUCOSE BLOOD QUANT: CPT

## 2017-05-16 PROCEDURE — 80048 BASIC METABOLIC PNL TOTAL CA: CPT

## 2017-05-16 PROCEDURE — 94761 N-INVAS EAR/PLS OXIMETRY MLT: CPT

## 2017-05-16 PROCEDURE — 72148 MRI LUMBAR SPINE W/O DYE: CPT

## 2017-05-16 PROCEDURE — 2580000003 HC RX 258: Performed by: FAMILY MEDICINE

## 2017-05-16 PROCEDURE — 93005 ELECTROCARDIOGRAM TRACING: CPT

## 2017-05-16 PROCEDURE — 83036 HEMOGLOBIN GLYCOSYLATED A1C: CPT

## 2017-05-16 PROCEDURE — 99222 1ST HOSP IP/OBS MODERATE 55: CPT | Performed by: FAMILY MEDICINE

## 2017-05-16 PROCEDURE — 93923 UPR/LXTR ART STDY 3+ LVLS: CPT

## 2017-05-16 RX ORDER — NICOTINE 21 MG/24HR
1 PATCH, TRANSDERMAL 24 HOURS TRANSDERMAL DAILY
Status: DISCONTINUED | OUTPATIENT
Start: 2017-05-16 | End: 2017-05-17 | Stop reason: HOSPADM

## 2017-05-16 RX ORDER — INSULIN GLARGINE 100 [IU]/ML
80 INJECTION, SOLUTION SUBCUTANEOUS 2 TIMES DAILY
Status: DISCONTINUED | OUTPATIENT
Start: 2017-05-16 | End: 2017-05-17 | Stop reason: HOSPADM

## 2017-05-16 RX ORDER — ATORVASTATIN CALCIUM 40 MG/1
40 TABLET, FILM COATED ORAL DAILY
Status: DISCONTINUED | OUTPATIENT
Start: 2017-05-16 | End: 2017-05-17 | Stop reason: HOSPADM

## 2017-05-16 RX ORDER — ACETAMINOPHEN 325 MG/1
650 TABLET ORAL EVERY 4 HOURS PRN
Status: DISCONTINUED | OUTPATIENT
Start: 2017-05-16 | End: 2017-05-17 | Stop reason: HOSPADM

## 2017-05-16 RX ORDER — MONTELUKAST SODIUM 10 MG/1
10 TABLET ORAL NIGHTLY
Status: DISCONTINUED | OUTPATIENT
Start: 2017-05-16 | End: 2017-05-17 | Stop reason: HOSPADM

## 2017-05-16 RX ORDER — SODIUM CHLORIDE 0.9 % (FLUSH) 0.9 %
10 SYRINGE (ML) INJECTION PRN
Status: DISCONTINUED | OUTPATIENT
Start: 2017-05-16 | End: 2017-05-17 | Stop reason: HOSPADM

## 2017-05-16 RX ORDER — SODIUM CHLORIDE 0.9 % (FLUSH) 0.9 %
10 SYRINGE (ML) INJECTION EVERY 12 HOURS SCHEDULED
Status: DISCONTINUED | OUTPATIENT
Start: 2017-05-16 | End: 2017-05-17 | Stop reason: HOSPADM

## 2017-05-16 RX ORDER — ALBUTEROL SULFATE 2.5 MG/3ML
2.5 SOLUTION RESPIRATORY (INHALATION) 4 TIMES DAILY
Status: DISCONTINUED | OUTPATIENT
Start: 2017-05-16 | End: 2017-05-17 | Stop reason: HOSPADM

## 2017-05-16 RX ORDER — DULOXETIN HYDROCHLORIDE 30 MG/1
60 CAPSULE, DELAYED RELEASE ORAL 2 TIMES DAILY
Status: DISCONTINUED | OUTPATIENT
Start: 2017-05-16 | End: 2017-05-17 | Stop reason: HOSPADM

## 2017-05-16 RX ORDER — DEXTROSE MONOHYDRATE 25 G/50ML
12.5 INJECTION, SOLUTION INTRAVENOUS PRN
Status: DISCONTINUED | OUTPATIENT
Start: 2017-05-16 | End: 2017-05-17 | Stop reason: HOSPADM

## 2017-05-16 RX ORDER — FLUTICASONE PROPIONATE 50 MCG
2 SPRAY, SUSPENSION (ML) NASAL DAILY
Status: DISCONTINUED | OUTPATIENT
Start: 2017-05-16 | End: 2017-05-17 | Stop reason: HOSPADM

## 2017-05-16 RX ORDER — NICOTINE POLACRILEX 4 MG
15 LOZENGE BUCCAL PRN
Status: DISCONTINUED | OUTPATIENT
Start: 2017-05-16 | End: 2017-05-17 | Stop reason: HOSPADM

## 2017-05-16 RX ORDER — CARVEDILOL 12.5 MG/1
12.5 TABLET ORAL 2 TIMES DAILY WITH MEALS
Status: DISCONTINUED | OUTPATIENT
Start: 2017-05-16 | End: 2017-05-17 | Stop reason: HOSPADM

## 2017-05-16 RX ORDER — DIAZEPAM 5 MG/ML
5 INJECTION, SOLUTION INTRAMUSCULAR; INTRAVENOUS ONCE
Status: COMPLETED | OUTPATIENT
Start: 2017-05-16 | End: 2017-05-16

## 2017-05-16 RX ORDER — DEXTROSE MONOHYDRATE 50 MG/ML
100 INJECTION, SOLUTION INTRAVENOUS PRN
Status: DISCONTINUED | OUTPATIENT
Start: 2017-05-16 | End: 2017-05-17 | Stop reason: HOSPADM

## 2017-05-16 RX ADMIN — INSULIN LISPRO 12 UNITS: 100 INJECTION, SOLUTION INTRAVENOUS; SUBCUTANEOUS at 12:08

## 2017-05-16 RX ADMIN — HYDROMORPHONE HYDROCHLORIDE 0.5 MG: 1 INJECTION, SOLUTION INTRAMUSCULAR; INTRAVENOUS; SUBCUTANEOUS at 02:07

## 2017-05-16 RX ADMIN — Medication 10 ML: at 02:07

## 2017-05-16 RX ADMIN — Medication 10 ML: at 08:15

## 2017-05-16 RX ADMIN — ENOXAPARIN SODIUM 120 MG: 120 INJECTION SUBCUTANEOUS at 02:13

## 2017-05-16 RX ADMIN — Medication 10 ML: at 20:53

## 2017-05-16 RX ADMIN — Medication 80 UNITS: at 09:59

## 2017-05-16 RX ADMIN — HYDROMORPHONE HYDROCHLORIDE 0.5 MG: 1 INJECTION, SOLUTION INTRAMUSCULAR; INTRAVENOUS; SUBCUTANEOUS at 08:14

## 2017-05-16 RX ADMIN — DULOXETINE HYDROCHLORIDE 60 MG: 30 CAPSULE, DELAYED RELEASE ORAL at 22:00

## 2017-05-16 RX ADMIN — Medication 80 UNITS: at 22:05

## 2017-05-16 RX ADMIN — INSULIN LISPRO 12 UNITS: 100 INJECTION, SOLUTION INTRAVENOUS; SUBCUTANEOUS at 17:05

## 2017-05-16 RX ADMIN — DIAZEPAM 5 MG: 5 INJECTION, SOLUTION INTRAMUSCULAR; INTRAVENOUS at 13:41

## 2017-05-16 RX ADMIN — Medication 10 ML: at 05:11

## 2017-05-16 RX ADMIN — CARVEDILOL 12.5 MG: 12.5 TABLET, FILM COATED ORAL at 22:04

## 2017-05-16 RX ADMIN — ALBUTEROL SULFATE 2.5 MG: 2.5 SOLUTION RESPIRATORY (INHALATION) at 07:47

## 2017-05-16 RX ADMIN — INSULIN LISPRO 2 UNITS: 100 INJECTION, SOLUTION INTRAVENOUS; SUBCUTANEOUS at 22:05

## 2017-05-16 RX ADMIN — MONTELUKAST SODIUM 10 MG: 10 TABLET, FILM COATED ORAL at 22:00

## 2017-05-16 RX ADMIN — Medication 10 ML: at 17:47

## 2017-05-16 RX ADMIN — IPRATROPIUM BROMIDE AND ALBUTEROL SULFATE 1 AMPULE: .5; 3 SOLUTION RESPIRATORY (INHALATION) at 17:52

## 2017-05-16 RX ADMIN — ENOXAPARIN SODIUM 120 MG: 120 INJECTION SUBCUTANEOUS at 08:17

## 2017-05-16 RX ADMIN — MONTELUKAST SODIUM 10 MG: 10 TABLET, FILM COATED ORAL at 02:21

## 2017-05-16 RX ADMIN — HYDROMORPHONE HYDROCHLORIDE 0.5 MG: 1 INJECTION, SOLUTION INTRAMUSCULAR; INTRAVENOUS; SUBCUTANEOUS at 11:14

## 2017-05-16 RX ADMIN — DULOXETINE HYDROCHLORIDE 60 MG: 30 CAPSULE, DELAYED RELEASE ORAL at 08:17

## 2017-05-16 RX ADMIN — HYDROMORPHONE HYDROCHLORIDE 0.5 MG: 1 INJECTION, SOLUTION INTRAMUSCULAR; INTRAVENOUS; SUBCUTANEOUS at 14:39

## 2017-05-16 RX ADMIN — Medication 80 UNITS: at 02:11

## 2017-05-16 RX ADMIN — ATORVASTATIN CALCIUM 40 MG: 40 TABLET, FILM COATED ORAL at 22:00

## 2017-05-16 RX ADMIN — HYDROMORPHONE HYDROCHLORIDE 0.5 MG: 1 INJECTION, SOLUTION INTRAMUSCULAR; INTRAVENOUS; SUBCUTANEOUS at 05:11

## 2017-05-16 RX ADMIN — CARVEDILOL 12.5 MG: 12.5 TABLET, FILM COATED ORAL at 09:57

## 2017-05-16 RX ADMIN — INSULIN LISPRO 18 UNITS: 100 INJECTION, SOLUTION INTRAVENOUS; SUBCUTANEOUS at 08:20

## 2017-05-16 RX ADMIN — HYDROMORPHONE HYDROCHLORIDE 0.5 MG: 1 INJECTION, SOLUTION INTRAMUSCULAR; INTRAVENOUS; SUBCUTANEOUS at 17:47

## 2017-05-16 RX ADMIN — ENOXAPARIN SODIUM 120 MG: 120 INJECTION SUBCUTANEOUS at 21:59

## 2017-05-16 RX ADMIN — INSULIN LISPRO 7 UNITS: 100 INJECTION, SOLUTION INTRAVENOUS; SUBCUTANEOUS at 02:12

## 2017-05-16 RX ADMIN — DULOXETINE HYDROCHLORIDE 60 MG: 30 CAPSULE, DELAYED RELEASE ORAL at 02:21

## 2017-05-16 RX ADMIN — Medication 10 ML: at 11:14

## 2017-05-16 RX ADMIN — HYDROMORPHONE HYDROCHLORIDE 0.5 MG: 1 INJECTION, SOLUTION INTRAMUSCULAR; INTRAVENOUS; SUBCUTANEOUS at 20:53

## 2017-05-16 RX ADMIN — Medication 10 ML: at 13:41

## 2017-05-16 RX ADMIN — Medication 10 ML: at 14:39

## 2017-05-16 ASSESSMENT — PAIN SCALES - GENERAL
PAINLEVEL_OUTOF10: 9
PAINLEVEL_OUTOF10: 10
PAINLEVEL_OUTOF10: 7
PAINLEVEL_OUTOF10: 10
PAINLEVEL_OUTOF10: 8
PAINLEVEL_OUTOF10: 10
PAINLEVEL_OUTOF10: 8
PAINLEVEL_OUTOF10: 10

## 2017-05-16 ASSESSMENT — ENCOUNTER SYMPTOMS
DIARRHEA: 0
APNEA: 0
NAUSEA: 0
BACK PAIN: 0
CHEST TIGHTNESS: 0
VOMITING: 0
BLOOD IN STOOL: 0
ABDOMINAL PAIN: 0
SHORTNESS OF BREATH: 1
COUGH: 1
RHINORRHEA: 0
SORE THROAT: 0

## 2017-05-16 ASSESSMENT — PAIN DESCRIPTION - ORIENTATION
ORIENTATION: LEFT

## 2017-05-16 ASSESSMENT — PAIN DESCRIPTION - PAIN TYPE
TYPE: CHRONIC PAIN

## 2017-05-16 ASSESSMENT — PAIN DESCRIPTION - FREQUENCY
FREQUENCY: INTERMITTENT
FREQUENCY: INTERMITTENT
FREQUENCY: CONTINUOUS
FREQUENCY: CONTINUOUS
FREQUENCY: INTERMITTENT

## 2017-05-16 ASSESSMENT — PAIN DESCRIPTION - ONSET
ONSET: ON-GOING

## 2017-05-16 ASSESSMENT — PAIN DESCRIPTION - LOCATION
LOCATION: LEG
LOCATION: FOOT;LEG
LOCATION: LEG;FOOT
LOCATION: FOOT;LEG

## 2017-05-16 ASSESSMENT — PAIN DESCRIPTION - DESCRIPTORS
DESCRIPTORS: BURNING;SHARP;SHOOTING
DESCRIPTORS: BURNING;SHARP;SHOOTING
DESCRIPTORS: BURNING;SHARP
DESCRIPTORS: BURNING;SHARP;SHOOTING
DESCRIPTORS: BURNING;SHARP
DESCRIPTORS: BURNING;SHARP;SHOOTING
DESCRIPTORS: BURNING;SHARP
DESCRIPTORS: BURNING;SHARP;SHOOTING
DESCRIPTORS: BURNING;SHARP

## 2017-05-16 ASSESSMENT — PAIN SCALES - WONG BAKER
WONGBAKER_NUMERICALRESPONSE: 0
WONGBAKER_NUMERICALRESPONSE: 0

## 2017-05-17 VITALS
BODY MASS INDEX: 42.5 KG/M2 | RESPIRATION RATE: 18 BRPM | TEMPERATURE: 98 F | DIASTOLIC BLOOD PRESSURE: 59 MMHG | WEIGHT: 255.07 LBS | HEART RATE: 62 BPM | SYSTOLIC BLOOD PRESSURE: 107 MMHG | HEIGHT: 65 IN | OXYGEN SATURATION: 91 %

## 2017-05-17 LAB
ANION GAP SERPL CALCULATED.3IONS-SCNC: 11 MMOL/L (ref 9–17)
BUN BLDV-MCNC: 17 MG/DL (ref 6–20)
BUN/CREAT BLD: ABNORMAL (ref 9–20)
CALCIUM SERPL-MCNC: 9.2 MG/DL (ref 8.6–10.4)
CHLORIDE BLD-SCNC: 99 MMOL/L (ref 98–107)
CO2: 29 MMOL/L (ref 20–31)
CREAT SERPL-MCNC: 0.58 MG/DL (ref 0.5–0.9)
EKG ATRIAL RATE: 84 BPM
EKG P AXIS: 55 DEGREES
EKG P-R INTERVAL: 144 MS
EKG Q-T INTERVAL: 408 MS
EKG QRS DURATION: 88 MS
EKG QTC CALCULATION (BAZETT): 482 MS
EKG R AXIS: 46 DEGREES
EKG T AXIS: 61 DEGREES
EKG VENTRICULAR RATE: 84 BPM
GFR AFRICAN AMERICAN: >60 ML/MIN
GFR NON-AFRICAN AMERICAN: >60 ML/MIN
GFR SERPL CREATININE-BSD FRML MDRD: ABNORMAL ML/MIN/{1.73_M2}
GFR SERPL CREATININE-BSD FRML MDRD: ABNORMAL ML/MIN/{1.73_M2}
GLUCOSE BLD-MCNC: 150 MG/DL (ref 70–99)
GLUCOSE BLD-MCNC: 81 MG/DL (ref 65–105)
GLUCOSE BLD-MCNC: 94 MG/DL (ref 65–105)
MAGNESIUM: 1.9 MG/DL (ref 1.6–2.6)
POTASSIUM SERPL-SCNC: 4.3 MMOL/L (ref 3.7–5.3)
SODIUM BLD-SCNC: 139 MMOL/L (ref 135–144)
TSH SERPL DL<=0.05 MIU/L-ACNC: 0.72 MIU/L (ref 0.3–5)

## 2017-05-17 PROCEDURE — 6370000000 HC RX 637 (ALT 250 FOR IP): Performed by: INTERNAL MEDICINE

## 2017-05-17 PROCEDURE — 6360000002 HC RX W HCPCS: Performed by: FAMILY MEDICINE

## 2017-05-17 PROCEDURE — 99217 PR OBSERVATION CARE DISCHARGE MANAGEMENT: CPT | Performed by: FAMILY MEDICINE

## 2017-05-17 PROCEDURE — 96376 TX/PRO/DX INJ SAME DRUG ADON: CPT

## 2017-05-17 PROCEDURE — 6370000000 HC RX 637 (ALT 250 FOR IP): Performed by: EMERGENCY MEDICINE

## 2017-05-17 PROCEDURE — 83735 ASSAY OF MAGNESIUM: CPT

## 2017-05-17 PROCEDURE — 82947 ASSAY GLUCOSE BLOOD QUANT: CPT

## 2017-05-17 PROCEDURE — 36415 COLL VENOUS BLD VENIPUNCTURE: CPT

## 2017-05-17 PROCEDURE — 6370000000 HC RX 637 (ALT 250 FOR IP): Performed by: FAMILY MEDICINE

## 2017-05-17 PROCEDURE — 80048 BASIC METABOLIC PNL TOTAL CA: CPT

## 2017-05-17 PROCEDURE — G0378 HOSPITAL OBSERVATION PER HR: HCPCS

## 2017-05-17 PROCEDURE — 84443 ASSAY THYROID STIM HORMONE: CPT

## 2017-05-17 PROCEDURE — 2580000003 HC RX 258: Performed by: FAMILY MEDICINE

## 2017-05-17 PROCEDURE — 94761 N-INVAS EAR/PLS OXIMETRY MLT: CPT

## 2017-05-17 PROCEDURE — 96372 THER/PROPH/DIAG INJ SC/IM: CPT

## 2017-05-17 PROCEDURE — 94640 AIRWAY INHALATION TREATMENT: CPT

## 2017-05-17 PROCEDURE — 93005 ELECTROCARDIOGRAM TRACING: CPT

## 2017-05-17 RX ADMIN — IPRATROPIUM BROMIDE AND ALBUTEROL SULFATE 1 AMPULE: .5; 3 SOLUTION RESPIRATORY (INHALATION) at 00:32

## 2017-05-17 RX ADMIN — Medication 10 ML: at 07:26

## 2017-05-17 RX ADMIN — Medication 10 ML: at 03:27

## 2017-05-17 RX ADMIN — HYDROMORPHONE HYDROCHLORIDE 0.5 MG: 1 INJECTION, SOLUTION INTRAMUSCULAR; INTRAVENOUS; SUBCUTANEOUS at 09:57

## 2017-05-17 RX ADMIN — ALBUTEROL SULFATE 2.5 MG: 2.5 SOLUTION RESPIRATORY (INHALATION) at 07:00

## 2017-05-17 RX ADMIN — HYDROMORPHONE HYDROCHLORIDE 0.5 MG: 1 INJECTION, SOLUTION INTRAMUSCULAR; INTRAVENOUS; SUBCUTANEOUS at 03:27

## 2017-05-17 RX ADMIN — ENOXAPARIN SODIUM 120 MG: 120 INJECTION SUBCUTANEOUS at 07:26

## 2017-05-17 RX ADMIN — Medication 10 ML: at 12:52

## 2017-05-17 RX ADMIN — CARVEDILOL 12.5 MG: 12.5 TABLET, FILM COATED ORAL at 09:57

## 2017-05-17 RX ADMIN — Medication 10 ML: at 00:22

## 2017-05-17 RX ADMIN — HYDROMORPHONE HYDROCHLORIDE 0.5 MG: 1 INJECTION, SOLUTION INTRAMUSCULAR; INTRAVENOUS; SUBCUTANEOUS at 06:39

## 2017-05-17 RX ADMIN — HYDROMORPHONE HYDROCHLORIDE 0.5 MG: 1 INJECTION, SOLUTION INTRAMUSCULAR; INTRAVENOUS; SUBCUTANEOUS at 00:22

## 2017-05-17 RX ADMIN — DULOXETINE HYDROCHLORIDE 60 MG: 30 CAPSULE, DELAYED RELEASE ORAL at 07:26

## 2017-05-17 RX ADMIN — Medication 10 ML: at 09:58

## 2017-05-17 RX ADMIN — Medication 10 ML: at 06:40

## 2017-05-17 RX ADMIN — HYDROMORPHONE HYDROCHLORIDE 0.5 MG: 1 INJECTION, SOLUTION INTRAMUSCULAR; INTRAVENOUS; SUBCUTANEOUS at 12:52

## 2017-05-17 ASSESSMENT — PAIN DESCRIPTION - DESCRIPTORS
DESCRIPTORS: BURNING;SHARP

## 2017-05-17 ASSESSMENT — PAIN DESCRIPTION - ORIENTATION
ORIENTATION: LEFT

## 2017-05-17 ASSESSMENT — PAIN SCALES - GENERAL
PAINLEVEL_OUTOF10: 9
PAINLEVEL_OUTOF10: 10
PAINLEVEL_OUTOF10: 9
PAINLEVEL_OUTOF10: 7
PAINLEVEL_OUTOF10: 9
PAINLEVEL_OUTOF10: 8
PAINLEVEL_OUTOF10: 10
PAINLEVEL_OUTOF10: 7
PAINLEVEL_OUTOF10: 9
PAINLEVEL_OUTOF10: 4

## 2017-05-17 ASSESSMENT — PAIN DESCRIPTION - FREQUENCY
FREQUENCY: INTERMITTENT

## 2017-05-17 ASSESSMENT — PAIN DESCRIPTION - PAIN TYPE
TYPE: CHRONIC PAIN

## 2017-05-17 ASSESSMENT — PAIN DESCRIPTION - LOCATION
LOCATION: FOOT;LEG

## 2017-05-17 ASSESSMENT — PAIN DESCRIPTION - ONSET
ONSET: ON-GOING

## 2017-05-18 ENCOUNTER — TELEPHONE (OUTPATIENT)
Dept: ORTHOPEDIC SURGERY | Age: 42
End: 2017-05-18

## 2017-05-18 LAB
EKG ATRIAL RATE: 97 BPM
EKG P AXIS: 59 DEGREES
EKG P-R INTERVAL: 142 MS
EKG Q-T INTERVAL: 380 MS
EKG QRS DURATION: 86 MS
EKG QTC CALCULATION (BAZETT): 482 MS
EKG R AXIS: 58 DEGREES
EKG T AXIS: 50 DEGREES
EKG VENTRICULAR RATE: 97 BPM

## 2017-05-27 ENCOUNTER — HOSPITAL ENCOUNTER (EMERGENCY)
Age: 42
Discharge: HOME | End: 2017-05-27
Attending: EMERGENCY MEDICINE
Payer: COMMERCIAL

## 2017-05-27 VITALS
SYSTOLIC BLOOD PRESSURE: 165 MMHG | WEIGHT: 255 LBS | DIASTOLIC BLOOD PRESSURE: 85 MMHG | BODY MASS INDEX: 43.54 KG/M2 | TEMPERATURE: 98.9 F | HEART RATE: 52 BPM | OXYGEN SATURATION: 97 % | HEIGHT: 64 IN | RESPIRATION RATE: 16 BRPM

## 2017-05-27 DIAGNOSIS — M79.605 PAIN OF LEFT LOWER EXTREMITY: Primary | ICD-10-CM

## 2017-05-27 PROCEDURE — 6370000000 HC RX 637 (ALT 250 FOR IP): Performed by: EMERGENCY MEDICINE

## 2017-05-27 PROCEDURE — 99283 EMERGENCY DEPT VISIT LOW MDM: CPT

## 2017-05-27 RX ORDER — HYDROCODONE BITARTRATE AND ACETAMINOPHEN 5; 325 MG/1; MG/1
1 TABLET ORAL ONCE
Status: COMPLETED | OUTPATIENT
Start: 2017-05-27 | End: 2017-05-27

## 2017-05-27 RX ADMIN — HYDROCODONE BITARTRATE AND ACETAMINOPHEN 1 TABLET: 5; 325 TABLET ORAL at 04:09

## 2017-05-27 ASSESSMENT — ENCOUNTER SYMPTOMS
GASTROINTESTINAL NEGATIVE: 1
COLOR CHANGE: 1
SHORTNESS OF BREATH: 0
EYES NEGATIVE: 1
ABDOMINAL PAIN: 0
COUGH: 0
RESPIRATORY NEGATIVE: 1

## 2017-05-27 ASSESSMENT — PAIN SCALES - GENERAL: PAINLEVEL_OUTOF10: 10

## 2017-10-01 ENCOUNTER — HOSPITAL ENCOUNTER (EMERGENCY)
Age: 42
Discharge: HOME | End: 2017-10-01
Attending: EMERGENCY MEDICINE
Payer: COMMERCIAL

## 2017-10-01 ENCOUNTER — APPOINTMENT (OUTPATIENT)
Dept: GENERAL RADIOLOGY | Age: 42
End: 2017-10-01
Payer: COMMERCIAL

## 2017-10-01 VITALS
BODY MASS INDEX: 40.97 KG/M2 | HEART RATE: 71 BPM | TEMPERATURE: 98.8 F | DIASTOLIC BLOOD PRESSURE: 82 MMHG | OXYGEN SATURATION: 92 % | WEIGHT: 240 LBS | SYSTOLIC BLOOD PRESSURE: 99 MMHG | RESPIRATION RATE: 16 BRPM | HEIGHT: 64 IN

## 2017-10-01 DIAGNOSIS — R51.9 HEADACHE, UNSPECIFIED HEADACHE TYPE: ICD-10-CM

## 2017-10-01 DIAGNOSIS — F17.200 SMOKING ADDICTION: ICD-10-CM

## 2017-10-01 DIAGNOSIS — J06.9 URI WITH COUGH AND CONGESTION: Primary | ICD-10-CM

## 2017-10-01 DIAGNOSIS — R06.2 WHEEZING: ICD-10-CM

## 2017-10-01 LAB
ABSOLUTE EOS #: 0.4 K/UL (ref 0–0.4)
ABSOLUTE LYMPH #: 2.5 K/UL (ref 1–4.8)
ABSOLUTE MONO #: 0.7 K/UL (ref 0.1–1.3)
ANION GAP SERPL CALCULATED.3IONS-SCNC: 12 MMOL/L (ref 9–17)
BASOPHILS # BLD: 1 %
BASOPHILS ABSOLUTE: 0.1 K/UL (ref 0–0.2)
BUN BLDV-MCNC: 9 MG/DL (ref 6–20)
BUN/CREAT BLD: ABNORMAL (ref 9–20)
CALCIUM SERPL-MCNC: 9.3 MG/DL (ref 8.6–10.4)
CHLORIDE BLD-SCNC: 101 MMOL/L (ref 98–107)
CO2: 28 MMOL/L (ref 20–31)
CREAT SERPL-MCNC: 0.41 MG/DL (ref 0.5–0.9)
D-DIMER QUANTITATIVE: 0.33 MG/L FEU
DIFFERENTIAL TYPE: NORMAL
EOSINOPHILS RELATIVE PERCENT: 6 %
GFR AFRICAN AMERICAN: >60 ML/MIN
GFR NON-AFRICAN AMERICAN: >60 ML/MIN
GFR SERPL CREATININE-BSD FRML MDRD: ABNORMAL ML/MIN/{1.73_M2}
GFR SERPL CREATININE-BSD FRML MDRD: ABNORMAL ML/MIN/{1.73_M2}
GLUCOSE BLD-MCNC: 134 MG/DL (ref 70–99)
HCT VFR BLD CALC: 46 % (ref 36–46)
HEMOGLOBIN: 15.4 G/DL (ref 12–16)
LYMPHOCYTES # BLD: 39 %
MCH RBC QN AUTO: 32.8 PG (ref 26–34)
MCHC RBC AUTO-ENTMCNC: 33.5 G/DL (ref 31–37)
MCV RBC AUTO: 97.9 FL (ref 80–100)
MONOCYTES # BLD: 11 %
PDW BLD-RTO: 12.8 % (ref 11.5–14.9)
PLATELET # BLD: 269 K/UL (ref 150–450)
PLATELET ESTIMATE: NORMAL
PMV BLD AUTO: 9.7 FL (ref 6–12)
POTASSIUM SERPL-SCNC: 4.1 MMOL/L (ref 3.7–5.3)
RBC # BLD: 4.7 M/UL (ref 4–5.2)
RBC # BLD: NORMAL 10*6/UL
SEG NEUTROPHILS: 43 %
SEGMENTED NEUTROPHILS ABSOLUTE COUNT: 2.9 K/UL (ref 1.3–9.1)
SODIUM BLD-SCNC: 141 MMOL/L (ref 135–144)
TROPONIN INTERP: NORMAL
TROPONIN INTERP: NORMAL
TROPONIN T: <0.03 NG/ML
TROPONIN T: <0.03 NG/ML
WBC # BLD: 6.6 K/UL (ref 3.5–11)
WBC # BLD: NORMAL 10*3/UL

## 2017-10-01 PROCEDURE — 85025 COMPLETE CBC W/AUTO DIFF WBC: CPT

## 2017-10-01 PROCEDURE — 71020 XR CHEST STANDARD TWO VW: CPT

## 2017-10-01 PROCEDURE — 85379 FIBRIN DEGRADATION QUANT: CPT

## 2017-10-01 PROCEDURE — 6360000002 HC RX W HCPCS: Performed by: EMERGENCY MEDICINE

## 2017-10-01 PROCEDURE — 94664 DEMO&/EVAL PT USE INHALER: CPT

## 2017-10-01 PROCEDURE — 93005 ELECTROCARDIOGRAM TRACING: CPT

## 2017-10-01 PROCEDURE — 96375 TX/PRO/DX INJ NEW DRUG ADDON: CPT

## 2017-10-01 PROCEDURE — 94640 AIRWAY INHALATION TREATMENT: CPT

## 2017-10-01 PROCEDURE — 94762 N-INVAS EAR/PLS OXIMTRY CONT: CPT

## 2017-10-01 PROCEDURE — 2580000003 HC RX 258: Performed by: EMERGENCY MEDICINE

## 2017-10-01 PROCEDURE — 96374 THER/PROPH/DIAG INJ IV PUSH: CPT

## 2017-10-01 PROCEDURE — 99285 EMERGENCY DEPT VISIT HI MDM: CPT

## 2017-10-01 PROCEDURE — 36415 COLL VENOUS BLD VENIPUNCTURE: CPT

## 2017-10-01 PROCEDURE — 84484 ASSAY OF TROPONIN QUANT: CPT

## 2017-10-01 PROCEDURE — 80048 BASIC METABOLIC PNL TOTAL CA: CPT

## 2017-10-01 RX ORDER — IPRATROPIUM BROMIDE AND ALBUTEROL SULFATE 2.5; .5 MG/3ML; MG/3ML
1 SOLUTION RESPIRATORY (INHALATION)
Status: DISCONTINUED | OUTPATIENT
Start: 2017-10-01 | End: 2017-10-01 | Stop reason: HOSPADM

## 2017-10-01 RX ORDER — ACETAMINOPHEN 325 MG/1
650 TABLET ORAL ONCE
Status: DISCONTINUED | OUTPATIENT
Start: 2017-10-01 | End: 2017-10-01 | Stop reason: HOSPADM

## 2017-10-01 RX ORDER — ALBUTEROL SULFATE 90 UG/1
2 AEROSOL, METERED RESPIRATORY (INHALATION)
Status: DISCONTINUED | OUTPATIENT
Start: 2017-10-01 | End: 2017-10-01

## 2017-10-01 RX ORDER — DIPHENHYDRAMINE HYDROCHLORIDE 50 MG/ML
25 INJECTION INTRAMUSCULAR; INTRAVENOUS ONCE
Status: COMPLETED | OUTPATIENT
Start: 2017-10-01 | End: 2017-10-01

## 2017-10-01 RX ORDER — MORPHINE SULFATE 4 MG/ML
4 INJECTION, SOLUTION INTRAMUSCULAR; INTRAVENOUS ONCE
Status: COMPLETED | OUTPATIENT
Start: 2017-10-01 | End: 2017-10-01

## 2017-10-01 RX ORDER — 0.9 % SODIUM CHLORIDE 0.9 %
1000 INTRAVENOUS SOLUTION INTRAVENOUS ONCE
Status: COMPLETED | OUTPATIENT
Start: 2017-10-01 | End: 2017-10-01

## 2017-10-01 RX ORDER — DEXAMETHASONE SODIUM PHOSPHATE 4 MG/ML
10 INJECTION, SOLUTION INTRA-ARTICULAR; INTRALESIONAL; INTRAMUSCULAR; INTRAVENOUS; SOFT TISSUE ONCE
Status: COMPLETED | OUTPATIENT
Start: 2017-10-01 | End: 2017-10-01

## 2017-10-01 RX ORDER — CLOPIDOGREL BISULFATE 75 MG/1
75 TABLET ORAL DAILY
COMMUNITY
End: 2018-07-06

## 2017-10-01 RX ORDER — PROMETHAZINE HYDROCHLORIDE 25 MG/1
25 TABLET ORAL ONCE
Status: COMPLETED | OUTPATIENT
Start: 2017-10-01 | End: 2017-10-01

## 2017-10-01 RX ORDER — CETIRIZINE HYDROCHLORIDE 10 MG/1
10 TABLET ORAL DAILY
Qty: 30 TABLET | Refills: 0 | Status: SHIPPED | OUTPATIENT
Start: 2017-10-01 | End: 2017-10-31

## 2017-10-01 RX ORDER — PREDNISONE 10 MG/1
TABLET ORAL
Qty: 20 TABLET | Refills: 0 | Status: SHIPPED | OUTPATIENT
Start: 2017-10-01 | End: 2017-10-11

## 2017-10-01 RX ORDER — ALBUTEROL SULFATE 2.5 MG/3ML
5 SOLUTION RESPIRATORY (INHALATION)
Status: DISCONTINUED | OUTPATIENT
Start: 2017-10-01 | End: 2017-10-01 | Stop reason: HOSPADM

## 2017-10-01 RX ADMIN — DEXAMETHASONE SODIUM PHOSPHATE 10 MG: 4 INJECTION, SOLUTION INTRAMUSCULAR; INTRAVENOUS at 15:41

## 2017-10-01 RX ADMIN — SODIUM CHLORIDE 1000 ML: 9 INJECTION, SOLUTION INTRAVENOUS at 15:33

## 2017-10-01 RX ADMIN — ALBUTEROL SULFATE 5 MG: 2.5 SOLUTION RESPIRATORY (INHALATION) at 15:16

## 2017-10-01 RX ADMIN — MORPHINE SULFATE 4 MG: 4 INJECTION, SOLUTION INTRAMUSCULAR; INTRAVENOUS at 16:45

## 2017-10-01 RX ADMIN — DIPHENHYDRAMINE HYDROCHLORIDE 25 MG: 50 INJECTION, SOLUTION INTRAMUSCULAR; INTRAVENOUS at 15:40

## 2017-10-01 RX ADMIN — PROMETHAZINE HYDROCHLORIDE 25 MG: 25 TABLET ORAL at 16:43

## 2017-10-01 ASSESSMENT — ENCOUNTER SYMPTOMS
DIARRHEA: 0
SHORTNESS OF BREATH: 1
EYE PAIN: 0
COUGH: 0
VOMITING: 0
BACK PAIN: 0
NAUSEA: 0
WHEEZING: 1
ABDOMINAL PAIN: 0
SORE THROAT: 0

## 2017-10-01 ASSESSMENT — PAIN DESCRIPTION - LOCATION
LOCATION_2: HEAD
LOCATION: CHEST
LOCATION_2: HEAD
LOCATION: CHEST

## 2017-10-01 ASSESSMENT — PAIN DESCRIPTION - PROGRESSION: CLINICAL_PROGRESSION_2: GRADUALLY WORSENING

## 2017-10-01 ASSESSMENT — PAIN DESCRIPTION - FREQUENCY: FREQUENCY: INTERMITTENT

## 2017-10-01 ASSESSMENT — PAIN DESCRIPTION - INTENSITY
RATING_2: 10
RATING_2: 10

## 2017-10-01 ASSESSMENT — PAIN DESCRIPTION - PAIN TYPE: TYPE: ACUTE PAIN

## 2017-10-01 ASSESSMENT — PAIN DESCRIPTION - ORIENTATION: ORIENTATION: LEFT

## 2017-10-01 ASSESSMENT — PAIN DESCRIPTION - DESCRIPTORS
DESCRIPTORS_2: ACHING;HEADACHE;POUNDING
DESCRIPTORS: SHARP;BURNING;PRESSURE

## 2017-10-01 ASSESSMENT — PAIN SCALES - GENERAL
PAINLEVEL_OUTOF10: 3
PAINLEVEL_OUTOF10: 10
PAINLEVEL_OUTOF10: 9

## 2017-10-01 ASSESSMENT — PAIN DESCRIPTION - ONSET: ONSET: AWAKENED FROM SLEEP

## 2017-10-01 NOTE — ED AVS SNAPSHOT
After Visit Summary  (Discharge Instructions)    Medication List for Home    Based on the information you provided to us as well as any changes during this visit, the following is your updated medication list.  Compare this with your prescription bottles at home. If you have any questions or concerns, contact your primary care physician's office. Daily Medication List (This medication list can be shared with any Healthcare provider who is helping you manage your medications)      There are NEW medications for you. START taking them after you leave the hospital     predniSONE 10 MG tablet   Commonly known as:  DELTASONE   Take 4 tablets by mouth once daily for 5 days         You told us you were taking these medications at home, but the amount or how often you take this medication has CHANGED     cetirizine 10 MG tablet   Commonly known as:  ZYRTEC   Take 1 tablet by mouth daily   What changed:    - medication strength  - how much to take  - when to take this         ASK your doctor about these medications if you have questions     atorvastatin 40 MG tablet   Commonly known as:  LIPITOR   Take 40 mg by mouth daily. bumetanide 1 MG tablet   Commonly known as:  BUMEX   Take 1 tablet by mouth daily Indications: if 3 lb wt gain       clopidogrel 75 MG tablet   Commonly known as:  PLAVIX   Take 75 mg by mouth daily Indications: Placed on it for 45 days since procedure on 9/8/17. COREG 12.5 MG tablet   Generic drug:  carvedilol   Take 12.5 mg by mouth 2 times daily (with meals).        DULoxetine 60 MG extended release capsule   Commonly known as:  CYMBALTA   Take 1 capsule by mouth daily       fluticasone 27.5 MCG/SPRAY nasal spray   Commonly known as:  VERAMYST   2 sprays by Nasal route daily as needed       glucose blood VI test strips strip   Commonly known as:  ASCENSIA AUTODISC VI;ONE TOUCH ULTRA TEST VI   Use with One Touch ultra meter; check TID, Dx I47.46 ibuprofen 800 MG tablet   Commonly known as:  ADVIL;MOTRIN   take 1 tablet by mouth three times a day       insulin lispro 100 UNIT/ML pen   Commonly known as:  HUMALOG KWIKPEN   FOR GLUCOSE 150-200 GIVE 3 UNITS, FOR GLUCOSE 201-250 6 UNITS, GLUCOSE 251-300 9 UNITS, GLUCOSE 301 OR OVER 12 UNITS. Insulin Pen Needle 31G X 8 MM Misc   Commonly known as:  B-D ULTRAFINE III SHORT PEN   1 each by Does not apply route 4 times daily       LANTUS SOLOSTAR 100 UNIT/ML injection pen   Generic drug:  insulin glargine   inject 60 units subcutaneously every morning and every evening       montelukast 10 MG tablet   Commonly known as:  SINGULAIR   Take 1 tablet by mouth nightly       MULTIVITAMIN GUMMIES WOMENS Chew   Take 2 each by mouth daily       OXYGEN   Inhale into the lungs Indications: On 3 liters per n/c during the night. * VENTOLIN  (90 Base) MCG/ACT inhaler   Generic drug:  albuterol sulfate HFA   inhale 2 puffs every 6 hours if needed for wheezing       * albuterol (2.5 MG/3ML) 0.083% nebulizer solution   Commonly known as:  PROVENTIL   inhale contents of 1 vial in nebulizer four times a day if needed       * Notice: This list has 2 medication(s) that are the same as other medications prescribed for you. Read the directions carefully, and ask your doctor or other care provider to review them with you. Where to Get Your Medications      You can get these medications from any pharmacy     Bring a paper prescription for each of these medications     cetirizine 10 MG tablet    predniSONE 10 MG tablet               Allergies as of 10/1/2017        Reactions    Latex Hives    Aspirin Anaphylaxis    Chantix [Varenicline] Anaphylaxis    Doxycycline Anaphylaxis    Dye [Iodides] Other (See Comments)    Seizures    Flexeril [Cyclobenzaprine] Anaphylaxis    Gabapentin Anaphylaxis    Losartan Shortness Of Breath    Morphine Itching    Makes patient want to \"scratch out her eyes\".   Can take if given with Patient Information     Patient Name AMOL Barillas 1975      Care Provided at:     Name Address Phone       [address] [facility] [place]  [address] [phone]            Your Visit    Here you will find information about your visit, including the reason for your visit. Please take this sheet with you when you visit your doctor or other health care provider in the future. It will help determine the best possible medical care for you at that time. If you have any questions once you leave the hospital, please call the department phone number listed below. Diagnoses this visit     Your diagnoses were URI WITH COUGH AND CONGESTION, SMOKING ADDICTION, HEADACHE, UNSPECIFIED HEADACHE TYPE, and WHEEZING. Visit Information     Date of Visit Department Dept Phone    10/1/2017 [facility] [phone]      You were seen by     You were seen by Jaleel Hills MD.       Follow-up Appointments    Below is a list of your follow-up and future appointments. This may not be a complete list as you may have made appointments directly with providers that we are not aware of or your providers may have made some for you. Please call your providers to confirm appointments. It is important to keep your appointments. Please bring your current insurance card, photo ID, co-pay, and all medication bottles to your appointment. If self-pay, payment is expected at the time of service. Follow-up Information     Follow up with Anamika Mcgowan MD. Call in 1 day. Specialty:  Family Medicine    Why:  Re-Evaluation    Contact information:    [address]  [address] [place] [address]  [phone]        Preventive Care        Date Due    Diabetic Foot Exam 1983    Cholesterol Screening 1983    HIV screening is recommended for all people regardless of risk factors  aged 15-65 years at least once (lifetime) who have never been HIV tested.  1988 Call the [address] [place] [address] at [place] NOW ([phone])    Smoking harms nonsmokers. When nonsmokers are around people who smoke, they absorb nicotine, carbon monoxide, and other ingredients of tobacco smoke. DO NOT SMOKE AROUND CHILDREN     Children exposed to secondhand smoke are at an increased risk of:  Sudden Infant Death Syndrome (SIDS), acute respiratory infections, inflammation of the middle ear, and severe asthma. Over a longer time, it causes heart disease and lung cancer. There is no safe level of exposure to secondhand smoke. MyChart Signup     Our records indicate that you have declined MyChart signup. View your information online  ? Review your current list of  medications, immunization, and allergies. ? Review your future test results online . ? Review your discharge instructions provided by your caregivers at discharge    Certain functionality such as prescription refills, scheduling appointments or sending messages to your provider are not activated if your provider does not use [employer] in his/her office    For questions regarding your [employer] account call [phone]. If you have a clinical question, please call your doctor's office. The information on all pages of the After Visit Summary has been reviewed with me, the patient and/or responsible adult, by my health care provider(s). I had the opportunity to ask questions regarding this information. I understand I should dispose of my armband safely at home to protect my health information. A complete copy of the After Visit Summary has been given to me, the patient and/or responsible adult.          Patient Signature/Responsible Adult: ___________________________________    Nurse Signature: ___________________________________  Resident/MLP Signature: ___________________________________  Attending Signature: ___________________________________    Date:____________Time:____________ you can treat most infections with home care. This may include drinking lots of fluids and taking over-the-counter pain medicine. You will probably feel better in 4 to 10 days. The doctor has checked you carefully, but problems can develop later. If you notice any problems or new symptoms, get medical treatment right away. Follow-up care is a key part of your treatment and safety. Be sure to make and go to all appointments, and call your doctor if you are having problems. It's also a good idea to know your test results and keep a list of the medicines you take. How can you care for yourself at home? · To prevent dehydration, drink plenty of fluids, enough so that your urine is light yellow or clear like water. Choose water and other caffeine-free clear liquids until you feel better. If you have kidney, heart, or liver disease and have to limit fluids, talk with your doctor before you increase the amount of fluids you drink. · Take an over-the-counter pain medicine, such as acetaminophen (Tylenol), ibuprofen (Advil, Motrin), or naproxen (Aleve). Read and follow all instructions on the label. · Before you use cough and cold medicines, check the label. These medicines may not be safe for young children or for people with certain health problems. · Be careful when taking over-the-counter cold or flu medicines and Tylenol at the same time. Many of these medicines have acetaminophen, which is Tylenol. Read the labels to make sure that you are not taking more than the recommended dose. Too much acetaminophen (Tylenol) can be harmful. · Get plenty of rest.  · Do not smoke or allow others to smoke around you. If you need help quitting, talk to your doctor about stop-smoking programs and medicines. These can increase your chances of quitting for good. When should you call for help? Call 911 anytime you think you may need emergency care. For example, call if:  · You have severe trouble breathing. Call your doctor now or seek immediate medical care if:  · You seem to be getting much sicker. · You have new or worse trouble breathing. · You have a new or higher fever. · You have a new rash. Watch closely for changes in your health, and be sure to contact your doctor if:  · You have a new symptom, such as a sore throat, an earache, or sinus pain. · You cough more deeply or more often, especially if you notice more mucus or a change in the color of your mucus. · You do not get better as expected. Where can you learn more? Go to https://[employer]ink[employer]iceweb.[email or web address] org and sign in to your in2nite account. Enter U291 in the [employer] box to learn more about \"Upper Respiratory Infection (Cold): Care Instructions. \"     If you do not have an account, please click on the \"Sign Up Now\" link. Current as of: March 25, 2017  Content Version: 11.3  © 3035-2638 [employer]. Care instructions adapted under license by [facility] ([facility]). If you have questions about a medical condition or this instruction, always ask your healthcare professional. [place] [address] any warranty or liability for your use of this information.

## 2017-10-01 NOTE — PROGRESS NOTES
· Bronchodilator assessment   [x]    Bronchodilator Assessment        Bronchodilator assessment at level  2  BRONCHODILATOR ASSESSMENT SCORE  Score 1 2 3 4   Breath Sounds   []  Clear [x]  Mild Wheezing with good aeration []  Moderate I/E wheezing with adequate aeration []  Poor Aeration or diffuse wheezing   Respiratory Rate [x]  Less than 20 []  20-25 []  Greater than 25  []  Greater than 35    Dyspnea []  No SOB  [x]  SOB with minimal activity []  Speaking in partial sentences []  Acute/ At rest   Peakflow (asthma) []  80 % or greater predicted/PB  []  Unable []  70% or greater predicted/PB  []  Unable []  51%-70% predicted/PB  []  Unable []  Less than 50% predicted/PB  []  Unable due to distress   FEV1 % Predicted []  Greater than 69%  []  Unable  []  Less than 50%-69%  []  Unable  []  Less than 35%-49%  []  Unable  []  Less than 35%  []  Unable due to distress

## 2017-10-01 NOTE — ED NOTES
Spoke with Dr. Leela Wyatt re: Pt's \"20\" for her headache and \"10\" for her chest pain and is nauseated.       Carlos Alvarado RN  10/01/17 9085

## 2017-10-01 NOTE — ED PROVIDER NOTES
vaginal discharge. Musculoskeletal: Negative for arthralgias and back pain. Skin: Negative for rash. Allergic/Immunologic: Negative for food allergies. Neurological: Positive for headaches. Negative for weakness and numbness. Hematological: Does not bruise/bleed easily. Psychiatric/Behavioral: Negative for self-injury and suicidal ideas. The patient is not nervous/anxious. PAST MEDICAL HISTORY   PMH:  has a past medical history of Asthma; Atrial fibrillation ([facility] [address] Bipolar 1 disorder ([facility] [address] Bulging disc; CAD (coronary artery disease); Cardiomyopathy ([facility] [address] Cardiomyopathy [facility]); CHF (congestive heart failure) ([facility] [address] Chronic otitis media of both ears; COPD (chronic obstructive pulmonary disease) ([facility] [address] Depression; Diarrhea; Dizziness; DVT (deep venous thrombosis) ([facility] [address] Endometriosis; Fainting; GERD (gastroesophageal reflux disease); GI bleeding; Helicobacter pylori (H. pylori); [place] (hard of hearing); Hyperlipidemia; Hypertension; Mastoiditis; Migraines; Morbid obesity ([facility] [address] Myocardial infarction ([facility] [address] Nausea; On home oxygen therapy; Ovarian cyst; Pulmonary hypertension; Pulmonary insufficiency; PVC (premature ventricular contraction); Tricuspid insufficiency; and Type II or unspecified type diabetes mellitus without mention of complication, not stated as uncontrolled. Surgical History:  has a past surgical history that includes Hysterectomy; Cholecystectomy; Appendectomy; Colonoscopy; Upper gastrointestinal endoscopy; Abdomen surgery; Abdominal adhesion surgery; Abdominal exploration surgery; Tympanostomy tube placement; Tonsillectomy; Foot surgery (Left); Abdominal hernia repair; hysteroscopy; Gastric fundoplication (0962); Abscess Drainage; Scaphoid fracture surgery (Left); other surgical history (Right, 5/29/14); Myringotomy Tympanostomy Tube Placement (Right, 06/05/2014); myringotomy (Right, 11/13/15); Hysterectomy; Cardiac catheterization (2014 & 2000); other surgical history (2009);  Breast surgery (Left, 2007); fracture surgery (Right); other surgical history (Right, 08/11/2016); ablation of dysrhythmic focus (2016); other surgical history; other surgical history; marvinulatn bobbyannalisa herron w anesthesia (Right, 3/1/2017); and ablation of dysrhythmic focus (09/08/2017). Social History:  reports that she has been smoking Cigarettes. She has a 11.50 pack-year smoking history. She has never used smokeless tobacco. She reports that she does not drink alcohol or use illicit drugs. Family History: Noncontributory at this time  Psychiatric History: Noncontributory at this time    Allergies:is allergic to latex; aspirin; chantix [varenicline]; doxycycline; dye [iodides]; flexeril [cyclobenzaprine]; gabapentin; losartan; morphine; nsaids; reglan [metoclopramide hcl]; shellfish-derived products; vancomycin; zofran; zyvox [linezolid]; bactrim [sulfamethoxazole-trimethoprim]; betadine [povidone iodine]; ceclor [cefaclor]; clindamycin/lincomycin; codeine; macrolides and ketolides; novolin r [insulin]; novolog [insulin aspart]; pcn [penicillins]; phenothiazines; tape [adhesive tape]; fentanyl; and sotalol. PHYSICAL EXAM     INITIAL VITALS: /72  Pulse 66  Temp 98.5 °F (36.9 °C) (Oral)   Resp 16  Ht 5' 4\" (1.626 m)  Wt 240 lb (108.9 kg)  SpO2 92%  BMI 41.2 kg/m2    Physical Exam   Constitutional: She is oriented to person, place, and time. She appears well-developed and well-nourished. HENT:   Head: Normocephalic and atraumatic. Right Ear: External ear normal.   Left Ear: External ear normal.   Nose: Nose normal.   Mouth/Throat: Oropharynx is clear and moist.   Eyes: Conjunctivae and EOM are normal. Pupils are equal, round, and reactive to light. Right eye exhibits no discharge. Left eye exhibits no discharge. Neck: Normal range of motion. Neck supple. No tracheal deviation present. Cardiovascular: Normal rate, regular rhythm, normal heart sounds and intact distal pulses.   Exam reveals no gallop and no friction rub. No murmur heard. Pulmonary/Chest: Effort normal and breath sounds normal. No respiratory distress. She has no wheezes. She has no rales. She exhibits no tenderness. No wheezing identified although patient has very poor effort for inspiration. Abdominal: Soft. Bowel sounds are normal. She exhibits no distension and no mass. There is no tenderness. There is no rebound and no guarding. Musculoskeletal: Normal range of motion. She exhibits no edema or tenderness. Neurological: She is alert and oriented to person, place, and time. She has normal reflexes. No cranial nerve deficit. Skin: No rash noted. She is not diaphoretic. Psychiatric: She has a normal mood and affect. Her behavior is normal. Thought content normal.   Nursing note and vitals reviewed. DIAGNOSTIC RESULTS     EKG: All EKG's are interpreted by the Emergency Department Physician who either signs or Co-signs this chart in the absence of a cardiologist.    EKG Interpretation    Interpreted by me    Rhythm: normal sinus   Rate: normal, 79  Axis: normal  Ectopy: none  Conduction: normal  ST Segments: no acute change  T Waves: no acute change  Q Waves: none    Clinical Impression: no acute changes and normal EKG    RADIOLOGY:   I directly visualized the following  images and reviewed the radiologist interpretations:  XR CHEST STANDARD (2 VW)   Final Result   Stable negative chest.                  LABS:  Labs Reviewed   BASIC METABOLIC PANEL - Abnormal; Notable for the following:        Result Value    Glucose 134 (*)     CREATININE 0.41 (*)     All other components within normal limits   CBC WITH AUTO DIFFERENTIAL   TROPONIN   TROPONIN   D-DIMER, QUANTITATIVE   PREVIOUS SPECIMEN         EMERGENCY DEPARTMENT COURSE:   Medical Decision Making:  Chest pain shortness of breath, likely upper respiratory infection, exacerbated by her smoking.   Although due to her complaint, we'll evaluate cardiac etiology was CBC BMP troponins EKG chest x-ray. The symptoms have been going on for several days, troponin is more reliable. Due to her symptoms also obtain d-dimer to stress 5 risk for pulmonary embolism. Patient in the department has frequent episodes of coughing that seem to exacerbate her complaints of chest pain shortness of breath and headache. We'll treat headache with migraine cocktail patient has a very extensive and challenging allergy profile, although states that she can tolerate Phenergan, will also add Decadron and Benadryl. This is all likely related to upper respiratory type infection that is likely viral in nature that may be exacerbating patient'sDeveloping COPD due to her smoking. Low suspicion for pulmonary embolism and his d-dimer is negative, ACS as EKG is normal, troponin is negative, or dissection. Discussed case with PCP who knows patient's well, agrees with assessment and plan for outpatient follow-up. She does recommend a course of oral steroids for likely mild COPD exacerbation. Recommends against antibiotics at this time. Second troponin is negative evaluations shows no evidence of any ACS, dissection or pulmonary embolism. Low suspicion for these conditions. Okay to discharge home, patient reports feeling much better, we'll give her dose of steroids and a decongestant, follow-up with PCP as requested. Patient instructed her for fevers vomiting worsening shortness of breath, hemoptysis, persistent wheezing or any other concerns. Patient reports having a refill for inhaler at home and will pick one up at the pharmacy. Patient acknowledges plan, voices her understanding of that and in agreement with it. CRITICAL CARE:   The patient admits to smoking. 3 minutes of time was spent discussing how this can worsen underlying breathing problems, COPD hypertension and heart disease or cause them to develop.       CONSULTS:  IP CONSULT TO PRIMARY CARE PROVIDER      FINAL IMPRESSION      1.

## 2017-10-04 LAB
EKG ATRIAL RATE: 79 BPM
EKG P AXIS: 4 DEGREES
EKG P-R INTERVAL: 136 MS
EKG Q-T INTERVAL: 400 MS
EKG QRS DURATION: 84 MS
EKG QTC CALCULATION (BAZETT): 458 MS
EKG R AXIS: 43 DEGREES
EKG T AXIS: 35 DEGREES
EKG VENTRICULAR RATE: 79 BPM

## 2017-10-20 ENCOUNTER — APPOINTMENT (OUTPATIENT)
Dept: GENERAL RADIOLOGY | Age: 42
End: 2017-10-20
Payer: COMMERCIAL

## 2017-10-20 ENCOUNTER — HOSPITAL ENCOUNTER (EMERGENCY)
Age: 42
Discharge: LEFT BEFORE DISCHARGE | End: 2017-10-20
Attending: EMERGENCY MEDICINE
Payer: COMMERCIAL

## 2017-10-20 VITALS
DIASTOLIC BLOOD PRESSURE: 103 MMHG | HEART RATE: 104 BPM | OXYGEN SATURATION: 97 % | BODY MASS INDEX: 39.99 KG/M2 | HEIGHT: 65 IN | RESPIRATION RATE: 16 BRPM | SYSTOLIC BLOOD PRESSURE: 157 MMHG | WEIGHT: 240 LBS | TEMPERATURE: 99 F

## 2017-10-20 DIAGNOSIS — Z53.20 LEFT BEFORE TREATMENT COMPLETED: ICD-10-CM

## 2017-10-20 DIAGNOSIS — Z76.5 DRUG-SEEKING BEHAVIOR: Primary | ICD-10-CM

## 2017-10-20 PROCEDURE — 99283 EMERGENCY DEPT VISIT LOW MDM: CPT

## 2017-10-20 RX ORDER — DIPHENHYDRAMINE HCL 25 MG
50 TABLET ORAL EVERY 6 HOURS PRN
Status: DISCONTINUED | OUTPATIENT
Start: 2017-10-20 | End: 2017-10-20 | Stop reason: HOSPADM

## 2017-10-20 RX ORDER — PROMETHAZINE HYDROCHLORIDE 25 MG/1
25 TABLET ORAL ONCE
Status: DISCONTINUED | OUTPATIENT
Start: 2017-10-20 | End: 2017-10-20 | Stop reason: HOSPADM

## 2017-10-20 ASSESSMENT — ENCOUNTER SYMPTOMS
ABDOMINAL PAIN: 0
SHORTNESS OF BREATH: 0
NAUSEA: 1
VOMITING: 0

## 2017-10-20 ASSESSMENT — PAIN DESCRIPTION - DESCRIPTORS: DESCRIPTORS: ACHING

## 2017-10-20 ASSESSMENT — PAIN DESCRIPTION - LOCATION: LOCATION: EAR;HEAD

## 2017-10-20 ASSESSMENT — PAIN DESCRIPTION - FREQUENCY: FREQUENCY: CONTINUOUS

## 2017-10-20 ASSESSMENT — PAIN SCALES - GENERAL: PAINLEVEL_OUTOF10: 10

## 2017-10-20 ASSESSMENT — PAIN DESCRIPTION - ORIENTATION: ORIENTATION: RIGHT;LEFT

## 2017-10-20 NOTE — ED NOTES
Pulled medications to administer to pt.  Pt was leaving the ED after a conversation with Dr. Chasidy Ambriz, RN  10/20/17 1010

## 2017-10-20 NOTE — ED NOTES
Pt to ED with c/o a HA and bilateral ear pain for two days. Pt rates the pain as 10/10. Pt has tried Tylenol, heating pads and ice packs with no relief.       Lyndsey Nicholson RN  10/20/17 8362

## 2017-10-21 NOTE — ED PROVIDER NOTES
[address] ED  eMERGENCY dEPARTMENT eNCOUnter   Attending Attestation     Pt Name: Leana Douglas  MRN: [id number]  Panchogfumm 1975  Date of evaluation: 10/21/17       Leana Douglas is a 38 y.o. female who presents with Headache; Otalgia; and Nausea      History:       Exam: Vitals:   Vitals:    10/20/17 1824   BP: (!) 157/103   Pulse: 104   Resp: 16   Temp: 99 °F (37.2 °C)   TempSrc: Oral   SpO2: 97%   Weight: 240 lb (108.9 kg)   Height: 5' 5\" (1.651 m)         I performed a history and physical examination of the patient and discussed management with the resident. I reviewed the residents note and agree with the documented findings and plan of care. Any areas of disagreement are noted on the chart. I was personally present for the key portions of any procedures. I have documented in the chart those procedures where I was not present during the key portions. I have personally reviewed all images and agree with the resident's interpretation. I have reviewed the emergency nurses triage note. I agree with the chief complaint, past medical history, past surgical history, allergies, medications, social and family history as documented unless otherwise noted below. Documentation of the HPI, Physical Exam and Medical Decision Making performed by medical students or scribes is based on my personal performance of the HPI, PE and MDM. For Phys Assistant/ Nurse Practitioner cases/documentation I have had a face to face evaluation of this patient and have completed at least one if not all key elements of the E/M (history, physical exam, and MDM). Additional findings are as noted. On my evaluation. Pt complaining of pain to her ear and recent uri. Pt is very agitated. She is demanding pain medications and antibiotics. I informed the pt that I would have to examine her first prior to medications. Pt's agitation became worse.   Difficult to exam because pt would not hold still, but tm's do not appear erythematous or bulging. No mastoid erythema or swelling. She did have tenderness, but was acutely tender to the entire right side of her head without visible deformity. I discussed my findings with the pt. She expressed appropriate understanding. Overall, I do not suspect bacterial source at this time. Pt appears well, no distress, nontoxic. Pt did request specific narcotic medications by name. Given her history and benign clinical exam I did not feel narcotic type pain medications were indicated. When I discuss the treatment plan with her and  told her I would like to treat her pain alternatively, she became very upset. She started shouting and directed multiple profanities at myself and staff. She stated that she needed a \"shot\" and if she wasn't going to get one she was leaving. Pt stood and walked out of the ED with a comfortable and easy gait in no apparent distress.           Gabbi Palomino MD  Attending Emergency  Physician              Gabbi Palomino MD  10/21/17 3456

## 2017-10-23 ENCOUNTER — HOSPITAL ENCOUNTER (OUTPATIENT)
Dept: GENERAL RADIOLOGY | Age: 42
Discharge: HOME | End: 2017-10-23
Payer: COMMERCIAL

## 2017-10-23 ENCOUNTER — HOSPITAL ENCOUNTER (OUTPATIENT)
Age: 42
Discharge: HOME | End: 2017-10-23
Payer: COMMERCIAL

## 2017-10-23 DIAGNOSIS — R05.9 COUGH: ICD-10-CM

## 2017-10-23 PROCEDURE — 70220 X-RAY EXAM OF SINUSES: CPT

## 2017-10-23 PROCEDURE — 71020 XR CHEST STANDARD TWO VW: CPT

## 2018-01-31 ENCOUNTER — HOSPITAL ENCOUNTER (OUTPATIENT)
Dept: MRI IMAGING | Age: 43
Discharge: HOME | End: 2018-02-02
Payer: COMMERCIAL

## 2018-01-31 DIAGNOSIS — M75.102 ROTATOR CUFF SYNDROME OF LEFT SHOULDER: ICD-10-CM

## 2018-01-31 DIAGNOSIS — M75.101 ROTATOR CUFF SYNDROME OF RIGHT SHOULDER: ICD-10-CM

## 2018-01-31 PROCEDURE — 73221 MRI JOINT UPR EXTREM W/O DYE: CPT

## 2018-02-16 ENCOUNTER — HOSPITAL ENCOUNTER (OUTPATIENT)
Age: 43
Discharge: HOME | End: 2018-02-16
Payer: COMMERCIAL

## 2018-02-24 ENCOUNTER — HOSPITAL ENCOUNTER (OUTPATIENT)
Dept: GENERAL RADIOLOGY | Age: 43
Discharge: HOME | End: 2018-02-26
Payer: COMMERCIAL

## 2018-02-24 ENCOUNTER — HOSPITAL ENCOUNTER (OUTPATIENT)
Age: 43
Discharge: HOME | End: 2018-02-24
Payer: COMMERCIAL

## 2018-02-24 ENCOUNTER — HOSPITAL ENCOUNTER (OUTPATIENT)
Age: 43
Discharge: HOME | End: 2018-02-26
Payer: COMMERCIAL

## 2018-02-24 DIAGNOSIS — M54.2 NECK PAIN: ICD-10-CM

## 2018-02-24 DIAGNOSIS — R06.02 SOB (SHORTNESS OF BREATH): ICD-10-CM

## 2018-02-24 LAB
ABSOLUTE EOS #: 0.4 K/UL (ref 0–0.4)
ABSOLUTE IMMATURE GRANULOCYTE: ABNORMAL K/UL (ref 0–0.3)
ABSOLUTE LYMPH #: 3.7 K/UL (ref 1–4.8)
ABSOLUTE MONO #: 0.9 K/UL (ref 0.1–1.3)
ALBUMIN SERPL-MCNC: 4.1 G/DL (ref 3.5–5.2)
ALBUMIN/GLOBULIN RATIO: ABNORMAL (ref 1–2.5)
ALP BLD-CCNC: 109 U/L (ref 35–104)
ALT SERPL-CCNC: 22 U/L (ref 5–33)
ANION GAP SERPL CALCULATED.3IONS-SCNC: 13 MMOL/L (ref 9–17)
AST SERPL-CCNC: 18 U/L
BASOPHILS # BLD: 1 % (ref 0–2)
BASOPHILS ABSOLUTE: 0.1 K/UL (ref 0–0.2)
BILIRUB SERPL-MCNC: 0.28 MG/DL (ref 0.3–1.2)
BUN BLDV-MCNC: 11 MG/DL (ref 6–20)
BUN/CREAT BLD: ABNORMAL (ref 9–20)
CALCIUM SERPL-MCNC: 9.8 MG/DL (ref 8.6–10.4)
CHLORIDE BLD-SCNC: 95 MMOL/L (ref 98–107)
CO2: 30 MMOL/L (ref 20–31)
CREAT SERPL-MCNC: 0.59 MG/DL (ref 0.5–0.9)
DIFFERENTIAL TYPE: ABNORMAL
EOSINOPHILS RELATIVE PERCENT: 3 % (ref 0–4)
GFR AFRICAN AMERICAN: >60 ML/MIN
GFR NON-AFRICAN AMERICAN: >60 ML/MIN
GFR SERPL CREATININE-BSD FRML MDRD: ABNORMAL ML/MIN/{1.73_M2}
GFR SERPL CREATININE-BSD FRML MDRD: ABNORMAL ML/MIN/{1.73_M2}
GLUCOSE BLD-MCNC: 226 MG/DL (ref 70–99)
HCT VFR BLD CALC: 44.2 % (ref 36–46)
HEMOGLOBIN: 15.2 G/DL (ref 12–16)
IMMATURE GRANULOCYTES: ABNORMAL %
LYMPHOCYTES # BLD: 31 % (ref 24–44)
MAGNESIUM, IONIZED: 0.4 MMOL/L (ref 0.45–0.6)
MCH RBC QN AUTO: 34.7 PG (ref 26–34)
MCHC RBC AUTO-ENTMCNC: 34.4 G/DL (ref 31–37)
MCV RBC AUTO: 100.8 FL (ref 80–100)
MONOCYTES # BLD: 7 % (ref 1–7)
NRBC AUTOMATED: ABNORMAL PER 100 WBC
PDW BLD-RTO: 12.6 % (ref 11.5–14.9)
PLATELET # BLD: 264 K/UL (ref 150–450)
PLATELET ESTIMATE: ABNORMAL
PMV BLD AUTO: 9 FL (ref 6–12)
POTASSIUM SERPL-SCNC: 4.1 MMOL/L (ref 3.7–5.3)
RBC # BLD: 4.38 M/UL (ref 4–5.2)
RBC # BLD: ABNORMAL 10*6/UL
SEG NEUTROPHILS: 58 % (ref 36–66)
SEGMENTED NEUTROPHILS ABSOLUTE COUNT: 6.9 K/UL (ref 1.3–9.1)
SODIUM BLD-SCNC: 138 MMOL/L (ref 135–144)
TOTAL PROTEIN: 7.5 G/DL (ref 6.4–8.3)
VITAMIN B-12: 464 PG/ML (ref 232–1245)
VITAMIN D 25-HYDROXY: 9 NG/ML (ref 30–100)
WBC # BLD: 11.9 K/UL (ref 3.5–11)
WBC # BLD: ABNORMAL 10*3/UL

## 2018-02-24 PROCEDURE — 82306 VITAMIN D 25 HYDROXY: CPT

## 2018-02-24 PROCEDURE — 36415 COLL VENOUS BLD VENIPUNCTURE: CPT

## 2018-02-24 PROCEDURE — 80053 COMPREHEN METABOLIC PANEL: CPT

## 2018-02-24 PROCEDURE — 72040 X-RAY EXAM NECK SPINE 2-3 VW: CPT

## 2018-02-24 PROCEDURE — 82607 VITAMIN B-12: CPT

## 2018-02-24 PROCEDURE — 85025 COMPLETE CBC W/AUTO DIFF WBC: CPT

## 2018-02-24 PROCEDURE — 83735 ASSAY OF MAGNESIUM: CPT

## 2018-02-24 PROCEDURE — 71046 X-RAY EXAM CHEST 2 VIEWS: CPT

## 2018-02-24 PROCEDURE — 83036 HEMOGLOBIN GLYCOSYLATED A1C: CPT

## 2018-02-25 LAB
ESTIMATED AVERAGE GLUCOSE: 255 MG/DL
HBA1C MFR BLD: 10.5 % (ref 4–6)

## 2018-03-29 ENCOUNTER — HOSPITAL ENCOUNTER (OUTPATIENT)
Dept: MAMMOGRAPHY | Age: 43
Discharge: HOME | End: 2018-03-31
Payer: COMMERCIAL

## 2018-03-29 ENCOUNTER — HOSPITAL ENCOUNTER (OUTPATIENT)
Dept: MRI IMAGING | Age: 43
Discharge: HOME | End: 2018-03-31
Payer: COMMERCIAL

## 2018-03-29 DIAGNOSIS — Z12.31 VISIT FOR SCREENING MAMMOGRAM: ICD-10-CM

## 2018-03-29 DIAGNOSIS — M54.16 RADICULOPATHY OF LUMBAR REGION: ICD-10-CM

## 2018-03-29 DIAGNOSIS — Z12.39 BREAST CANCER SCREENING: ICD-10-CM

## 2018-03-29 PROCEDURE — 77063 BREAST TOMOSYNTHESIS BI: CPT

## 2018-03-29 PROCEDURE — 72148 MRI LUMBAR SPINE W/O DYE: CPT

## 2018-08-20 PROBLEM — F32.1 CURRENT MODERATE EPISODE OF MAJOR DEPRESSIVE DISORDER WITHOUT PRIOR EPISODE (HCC): Chronic | Status: ACTIVE | Noted: 2018-08-20

## 2018-08-22 ENCOUNTER — APPOINTMENT (OUTPATIENT)
Dept: GENERAL RADIOLOGY | Age: 43
End: 2018-08-22
Payer: COMMERCIAL

## 2018-08-22 ENCOUNTER — HOSPITAL ENCOUNTER (EMERGENCY)
Age: 43
Discharge: HOME | End: 2018-08-22
Attending: EMERGENCY MEDICINE
Payer: COMMERCIAL

## 2018-08-22 ENCOUNTER — APPOINTMENT (OUTPATIENT)
Dept: CT IMAGING | Age: 43
End: 2018-08-22
Payer: COMMERCIAL

## 2018-08-22 VITALS
RESPIRATION RATE: 18 BRPM | WEIGHT: 242 LBS | DIASTOLIC BLOOD PRESSURE: 66 MMHG | SYSTOLIC BLOOD PRESSURE: 115 MMHG | BODY MASS INDEX: 44.53 KG/M2 | TEMPERATURE: 99.1 F | HEIGHT: 62 IN | OXYGEN SATURATION: 97 % | HEART RATE: 75 BPM

## 2018-08-22 DIAGNOSIS — R10.84 GENERALIZED ABDOMINAL PAIN: ICD-10-CM

## 2018-08-22 DIAGNOSIS — J40 BRONCHITIS: Primary | ICD-10-CM

## 2018-08-22 LAB
-: ABNORMAL
ABSOLUTE EOS #: 0.3 K/UL (ref 0–0.4)
ABSOLUTE IMMATURE GRANULOCYTE: ABNORMAL K/UL (ref 0–0.3)
ABSOLUTE LYMPH #: 4.7 K/UL (ref 1–4.8)
ABSOLUTE MONO #: 0.9 K/UL (ref 0.1–1.3)
ALBUMIN SERPL-MCNC: 4.5 G/DL (ref 3.5–5.2)
ALBUMIN/GLOBULIN RATIO: ABNORMAL (ref 1–2.5)
ALP BLD-CCNC: 101 U/L (ref 35–104)
ALT SERPL-CCNC: 17 U/L (ref 5–33)
AMORPHOUS: ABNORMAL
AMPHETAMINE SCREEN URINE: NEGATIVE
ANION GAP SERPL CALCULATED.3IONS-SCNC: 13 MMOL/L (ref 9–17)
AST SERPL-CCNC: 18 U/L
BACTERIA: ABNORMAL
BARBITURATE SCREEN URINE: NEGATIVE
BASOPHILS # BLD: 1 % (ref 0–2)
BASOPHILS ABSOLUTE: 0.1 K/UL (ref 0–0.2)
BENZODIAZEPINE SCREEN, URINE: POSITIVE
BILIRUB SERPL-MCNC: 0.36 MG/DL (ref 0.3–1.2)
BILIRUBIN URINE: ABNORMAL
BNP INTERPRETATION: NORMAL
BUN BLDV-MCNC: 12 MG/DL (ref 6–20)
BUN/CREAT BLD: ABNORMAL (ref 9–20)
BUPRENORPHINE URINE: ABNORMAL
CALCIUM SERPL-MCNC: 10.1 MG/DL (ref 8.6–10.4)
CANNABINOID SCREEN URINE: NEGATIVE
CASTS UA: ABNORMAL /LPF
CHLORIDE BLD-SCNC: 101 MMOL/L (ref 98–107)
CO2: 27 MMOL/L (ref 20–31)
COCAINE METABOLITE, URINE: NEGATIVE
COLOR: ABNORMAL
COMMENT UA: ABNORMAL
CREAT SERPL-MCNC: 0.63 MG/DL (ref 0.5–0.9)
CRYSTALS, UA: ABNORMAL /HPF
DIFFERENTIAL TYPE: ABNORMAL
EKG ATRIAL RATE: 88 BPM
EKG P AXIS: 50 DEGREES
EKG P-R INTERVAL: 154 MS
EKG Q-T INTERVAL: 360 MS
EKG QRS DURATION: 84 MS
EKG QTC CALCULATION (BAZETT): 435 MS
EKG R AXIS: 49 DEGREES
EKG T AXIS: 40 DEGREES
EKG VENTRICULAR RATE: 88 BPM
EOSINOPHILS RELATIVE PERCENT: 2 % (ref 0–4)
EPITHELIAL CELLS UA: ABNORMAL /HPF
GFR AFRICAN AMERICAN: >60 ML/MIN
GFR NON-AFRICAN AMERICAN: >60 ML/MIN
GFR SERPL CREATININE-BSD FRML MDRD: ABNORMAL ML/MIN/{1.73_M2}
GFR SERPL CREATININE-BSD FRML MDRD: ABNORMAL ML/MIN/{1.73_M2}
GLUCOSE BLD-MCNC: 169 MG/DL (ref 70–99)
GLUCOSE URINE: ABNORMAL
HCT VFR BLD CALC: 50.7 % (ref 36–46)
HEMOGLOBIN: 17.1 G/DL (ref 12–16)
IMMATURE GRANULOCYTES: ABNORMAL %
KETONES, URINE: ABNORMAL
LEUKOCYTE ESTERASE, URINE: NEGATIVE
LIPASE: 19 U/L (ref 13–60)
LYMPHOCYTES # BLD: 35 % (ref 24–44)
MCH RBC QN AUTO: 33.1 PG (ref 26–34)
MCHC RBC AUTO-ENTMCNC: 33.7 G/DL (ref 31–37)
MCV RBC AUTO: 98.1 FL (ref 80–100)
MDMA URINE: ABNORMAL
METHADONE SCREEN, URINE: NEGATIVE
METHAMPHETAMINE, URINE: ABNORMAL
MONOCYTES # BLD: 7 % (ref 1–7)
MUCUS: ABNORMAL
NITRITE, URINE: NEGATIVE
NRBC AUTOMATED: ABNORMAL PER 100 WBC
OPIATES, URINE: NEGATIVE
OTHER OBSERVATIONS UA: ABNORMAL
OXYCODONE SCREEN URINE: NEGATIVE
PDW BLD-RTO: 12.9 % (ref 11.5–14.9)
PH UA: 5.5 (ref 5–8)
PHENCYCLIDINE, URINE: NEGATIVE
PLATELET # BLD: 301 K/UL (ref 150–450)
PLATELET ESTIMATE: ABNORMAL
PMV BLD AUTO: 8.7 FL (ref 6–12)
POTASSIUM SERPL-SCNC: 3.9 MMOL/L (ref 3.7–5.3)
PRO-BNP: 151 PG/ML
PROPOXYPHENE, URINE: ABNORMAL
PROTEIN UA: ABNORMAL
RBC # BLD: 5.16 M/UL (ref 4–5.2)
RBC # BLD: ABNORMAL 10*6/UL
RBC UA: ABNORMAL /HPF
RENAL EPITHELIAL, UA: ABNORMAL /HPF
SEG NEUTROPHILS: 55 % (ref 36–66)
SEGMENTED NEUTROPHILS ABSOLUTE COUNT: 7.5 K/UL (ref 1.3–9.1)
SODIUM BLD-SCNC: 141 MMOL/L (ref 135–144)
SPECIFIC GRAVITY UA: 1.05 (ref 1–1.03)
TEST INFORMATION: ABNORMAL
TOTAL PROTEIN: 8.3 G/DL (ref 6.4–8.3)
TRICHOMONAS: ABNORMAL
TRICYCLIC ANTIDEPRESSANTS, UR: ABNORMAL
TROPONIN INTERP: NORMAL
TROPONIN INTERP: NORMAL
TROPONIN T: <0.03 NG/ML
TROPONIN T: <0.03 NG/ML
TURBIDITY: ABNORMAL
URINE HGB: NEGATIVE
UROBILINOGEN, URINE: NORMAL
WBC # BLD: 13.5 K/UL (ref 3.5–11)
WBC # BLD: ABNORMAL 10*3/UL
WBC UA: ABNORMAL /HPF
YEAST: ABNORMAL

## 2018-08-22 PROCEDURE — 36415 COLL VENOUS BLD VENIPUNCTURE: CPT

## 2018-08-22 PROCEDURE — 2580000003 HC RX 258: Performed by: EMERGENCY MEDICINE

## 2018-08-22 PROCEDURE — 80053 COMPREHEN METABOLIC PANEL: CPT

## 2018-08-22 PROCEDURE — 85025 COMPLETE CBC W/AUTO DIFF WBC: CPT

## 2018-08-22 PROCEDURE — 74022 RADEX COMPL AQT ABD SERIES: CPT

## 2018-08-22 PROCEDURE — 6370000000 HC RX 637 (ALT 250 FOR IP): Performed by: EMERGENCY MEDICINE

## 2018-08-22 PROCEDURE — 96375 TX/PRO/DX INJ NEW DRUG ADDON: CPT

## 2018-08-22 PROCEDURE — 96374 THER/PROPH/DIAG INJ IV PUSH: CPT

## 2018-08-22 PROCEDURE — 83880 ASSAY OF NATRIURETIC PEPTIDE: CPT

## 2018-08-22 PROCEDURE — 94640 AIRWAY INHALATION TREATMENT: CPT

## 2018-08-22 PROCEDURE — 93005 ELECTROCARDIOGRAM TRACING: CPT

## 2018-08-22 PROCEDURE — 94664 DEMO&/EVAL PT USE INHALER: CPT

## 2018-08-22 PROCEDURE — 99285 EMERGENCY DEPT VISIT HI MDM: CPT

## 2018-08-22 PROCEDURE — 87086 URINE CULTURE/COLONY COUNT: CPT

## 2018-08-22 PROCEDURE — 80307 DRUG TEST PRSMV CHEM ANLYZR: CPT

## 2018-08-22 PROCEDURE — 84484 ASSAY OF TROPONIN QUANT: CPT

## 2018-08-22 PROCEDURE — 6360000002 HC RX W HCPCS: Performed by: EMERGENCY MEDICINE

## 2018-08-22 PROCEDURE — 81001 URINALYSIS AUTO W/SCOPE: CPT

## 2018-08-22 PROCEDURE — 83690 ASSAY OF LIPASE: CPT

## 2018-08-22 PROCEDURE — 74176 CT ABD & PELVIS W/O CONTRAST: CPT

## 2018-08-22 RX ORDER — ALBUTEROL SULFATE 90 UG/1
2 AEROSOL, METERED RESPIRATORY (INHALATION)
Status: DISCONTINUED | OUTPATIENT
Start: 2018-08-22 | End: 2018-08-22 | Stop reason: HOSPADM

## 2018-08-22 RX ORDER — MORPHINE SULFATE 4 MG/ML
4 INJECTION, SOLUTION INTRAMUSCULAR; INTRAVENOUS ONCE
Status: COMPLETED | OUTPATIENT
Start: 2018-08-22 | End: 2018-08-22

## 2018-08-22 RX ORDER — BENZONATATE 100 MG/1
100 CAPSULE ORAL 3 TIMES DAILY PRN
Qty: 30 CAPSULE | Refills: 0 | Status: SHIPPED | OUTPATIENT
Start: 2018-08-22 | End: 2018-08-24 | Stop reason: SDUPTHER

## 2018-08-22 RX ORDER — PROMETHAZINE HYDROCHLORIDE 25 MG/ML
12.5 INJECTION, SOLUTION INTRAMUSCULAR; INTRAVENOUS ONCE
Status: COMPLETED | OUTPATIENT
Start: 2018-08-22 | End: 2018-08-22

## 2018-08-22 RX ORDER — HYDROCODONE BITARTRATE AND ACETAMINOPHEN 5; 325 MG/1; MG/1
1 TABLET ORAL ONCE
Status: COMPLETED | OUTPATIENT
Start: 2018-08-22 | End: 2018-08-22

## 2018-08-22 RX ORDER — IPRATROPIUM BROMIDE AND ALBUTEROL SULFATE 2.5; .5 MG/3ML; MG/3ML
1 SOLUTION RESPIRATORY (INHALATION)
Status: DISCONTINUED | OUTPATIENT
Start: 2018-08-22 | End: 2018-08-22 | Stop reason: HOSPADM

## 2018-08-22 RX ORDER — 0.9 % SODIUM CHLORIDE 0.9 %
1000 INTRAVENOUS SOLUTION INTRAVENOUS ONCE
Status: COMPLETED | OUTPATIENT
Start: 2018-08-22 | End: 2018-08-22

## 2018-08-22 RX ORDER — DIPHENHYDRAMINE HCL 25 MG
25 TABLET ORAL ONCE
Status: COMPLETED | OUTPATIENT
Start: 2018-08-22 | End: 2018-08-22

## 2018-08-22 RX ORDER — ALBUTEROL SULFATE 2.5 MG/3ML
5 SOLUTION RESPIRATORY (INHALATION)
Status: DISCONTINUED | OUTPATIENT
Start: 2018-08-22 | End: 2018-08-22 | Stop reason: HOSPADM

## 2018-08-22 RX ORDER — HYDROCODONE BITARTRATE AND ACETAMINOPHEN 5; 325 MG/1; MG/1
1 TABLET ORAL ONCE
Status: DISCONTINUED | OUTPATIENT
Start: 2018-08-22 | End: 2018-08-22

## 2018-08-22 RX ADMIN — SODIUM CHLORIDE 1000 ML: 9 INJECTION, SOLUTION INTRAVENOUS at 18:43

## 2018-08-22 RX ADMIN — PROMETHAZINE HYDROCHLORIDE 12.5 MG: 25 INJECTION, SOLUTION INTRAMUSCULAR; INTRAVENOUS at 18:09

## 2018-08-22 RX ADMIN — IPRATROPIUM BROMIDE AND ALBUTEROL SULFATE 1 AMPULE: .5; 3 SOLUTION RESPIRATORY (INHALATION) at 18:06

## 2018-08-22 RX ADMIN — HYDROCODONE BITARTRATE AND ACETAMINOPHEN 1 TABLET: 5; 325 TABLET ORAL at 20:42

## 2018-08-22 RX ADMIN — MORPHINE SULFATE 4 MG: 4 INJECTION INTRAVENOUS at 18:35

## 2018-08-22 RX ADMIN — DIPHENHYDRAMINE HCL 25 MG: 25 TABLET ORAL at 18:29

## 2018-08-22 RX ADMIN — IPRATROPIUM BROMIDE AND ALBUTEROL SULFATE 1 AMPULE: .5; 3 SOLUTION RESPIRATORY (INHALATION) at 21:06

## 2018-08-22 ASSESSMENT — ENCOUNTER SYMPTOMS
WHEEZING: 1
COUGH: 1
SORE THROAT: 0
NAUSEA: 0
EYE PAIN: 0
VOMITING: 0
DIARRHEA: 1
BACK PAIN: 0
ABDOMINAL PAIN: 1
SHORTNESS OF BREATH: 1

## 2018-08-22 ASSESSMENT — PAIN DESCRIPTION - PAIN TYPE
TYPE: ACUTE PAIN
TYPE: ACUTE PAIN

## 2018-08-22 ASSESSMENT — PAIN DESCRIPTION - LOCATION
LOCATION: ABDOMEN
LOCATION: CHEST;ABDOMEN

## 2018-08-22 ASSESSMENT — PAIN SCALES - GENERAL
PAINLEVEL_OUTOF10: 9
PAINLEVEL_OUTOF10: 9
PAINLEVEL_OUTOF10: 10

## 2018-08-22 NOTE — ED NOTES
Pt arrived in ED with c/o increased wheezing, left sided chest pain, and upper left side abdominal pain. Pt states she has had a cough and chest cold for about 1 week now and was placed on ATB, pt states they ATB is making her symptoms worse. Pt states she has also had the abdominal pain since the beginning of the month. Pt states she has chronic diarrhea, but it has worsened. Pt is alert and oriented x4.       Cheikh Magaña RN  08/22/18 0283

## 2018-08-22 NOTE — ED PROVIDER NOTES
history of Asthma; Atrial fibrillation ([facility] [address] Bipolar 1 disorder ([address] Bulging disc; CAD (coronary artery disease); Cardiomyopathy ([facility] [address] Cardiomyopathy [facility]); CHF (congestive heart failure) ([address] Chronic otitis media of both ears; COPD (chronic obstructive pulmonary disease) ([address] Depression; Diarrhea; Dizziness; DVT (deep venous thrombosis) ([address] Endometriosis; Fainting; GERD (gastroesophageal reflux disease); GI bleeding; Helicobacter pylori (H. pylori); [place] (hard of hearing); Hyperlipidemia; Hypertension; Mastoiditis; Migraines; Morbid obesity ([facility] [address] Myocardial infarction ([facility] [address] Nausea; On home oxygen therapy; Ovarian cyst; Pulmonary hypertension ([facility] [address] Pulmonary insufficiency; PVC (premature ventricular contraction); Tricuspid insufficiency; and Type II or unspecified type diabetes mellitus without mention of complication, not stated as uncontrolled. Surgical History:  has a past surgical history that includes Hysterectomy; Cholecystectomy; Appendectomy; Colonoscopy; Upper gastrointestinal endoscopy; Abdomen surgery; Abdominal adhesion surgery; Abdominal exploration surgery; Tympanostomy tube placement; Tonsillectomy; Foot surgery (Left); Abdominal hernia repair; hysteroscopy; Gastric fundoplication (8630); Abscess Drainage; Scaphoid fracture surgery (Left); other surgical history (Right, 5/29/14); Myringotomy Tympanostomy Tube Placement (Right, 06/05/2014); myringotomy (Right, 11/13/15); Hysterectomy; Cardiac catheterization (2014 & 2000); other surgical history (2009); Breast surgery (Left, 2007); fracture surgery (Right); other surgical history (Right, 08/11/2016); ablation of dysrhythmic focus (2016); other surgical history; other surgical history; pr manipulatn razr jt w anesthesia (Right, 3/1/2017); and ablation of dysrhythmic focus (09/08/2017). Social History:  reports that she has been smoking Cigarettes. She has a 11.50 pack-year smoking history.  She has never used smokeless tobacco. She is alert and oriented to person, place, and time. She has normal reflexes. No cranial nerve deficit. Skin: No rash noted. She is not diaphoretic. Psychiatric: She has a normal mood and affect. Her behavior is normal. Thought content normal.   Nursing note and vitals reviewed. DIAGNOSTIC RESULTS     EKG: All EKG's are interpreted by the Emergency Department Physician who either signs or Co-signs this chart in the absence of a cardiologist.    EKG Interpretation    Interpreted by emergency department physician    Rhythm: normal sinus   Rate: normal, 88  Axis: normal  Ectopy: none  Conduction: normal  ST Segments: normal  T Waves: normal  Q Waves: nonspecific    EKG  Impression: non-specific EKG        RADIOLOGY:   All plain film, CT, MRI, and formal ultrasound images (except ED bedside ultrasound) are read by the radiologist, see reports below, unless otherwise noted in the medical decision-making or here. CT ABDOMEN PELVIS WO CONTRAST   Preliminary Result   1. No acute findings within the abdomen or pelvis. No evidence of   obstructive uropathy. 2. Previous cholecystectomy, appendectomy and hysterectomy. XR Acute Abd Series Chest 1 VW   Final Result   No acute cardiopulmonary disease. Nonspecific abdominal bowel gas pattern. LABS:  Labs Reviewed   CBC WITH AUTO DIFFERENTIAL - Abnormal; Notable for the following:        Result Value    WBC 13.5 (*)     Hemoglobin 17.1 (*)     Hematocrit 50.7 (*)     All other components within normal limits   COMPREHENSIVE METABOLIC PANEL - Abnormal; Notable for the following:     Glucose 169 (*)     All other components within normal limits   URINE RT REFLEX TO CULTURE - Abnormal; Notable for the following:     Color, UA DARK YELLOW (*)     Turbidity UA CLOUDY (*)     Glucose, Ur 3+ (*)     Bilirubin Urine   (*)     Value: Presumptive positive. Unable to confirm due to unavailability of reagent.     Ketones, Urine TRACE (*)     Specific Gravity, UA 1.049 (*)     Protein, UA 2+ (*)     All other components within normal limits   URINE DRUG SCREEN - Abnormal; Notable for the following:     Benzodiazepine Screen, Urine POSITIVE (*)     All other components within normal limits   MICROSCOPIC URINALYSIS - Abnormal; Notable for the following:     Bacteria, UA FEW (*)     Mucus, UA 1+ (*)     Yeast, UA FEW (*)     All other components within normal limits   URINE CULTURE CLEAN CATCH   TROPONIN   TROPONIN   BRAIN NATRIURETIC PEPTIDE   LIPASE   ICTOTEST, URINE         EMERGENCY DEPARTMENT COURSE:   Medical Decision Making:  Bronchitis, on steroids, on azithromycin, states that she hasn't gotten better the last 2 days. She states cough is causing her to have abdominal pain which is epigastric, also having chest pain. Patient's is still smoking. On physical exam patient does have minor bibasilar wheezing, She appears well, she is not hypoxic. Patient has mild leukocytosis likely related to patient'sSteroid. Troponin is negative, low suspicion for ACS dissection or pulmonary embolism. No evidence of pneumonia on x-ray. Patient has no elevation of lipase, low suspicion for pancreatitis, murmurs no dysfunction of the kidneys or liver. Awaiting second troponin. Patient is now complaining of abdominal pain and reexamination the pain has moved to the right lower quadrant she is status post appendectomy however due to inconsistent symptoms, Evidence of elevated white cell count, we'll get CT scan of abdomen pelvis for acute processes for small bowel obstruction perforation. CT scan is nondiagnostic, low suspicion for urinary tract infection,Obstruction perforation masses. Patient is still symptomatic and believe that of bowel pain chest pain related to her persistent coughing which is being exacerbated by her smoking. She is counseled regarding smoking cessation. Also provide Tessalon Perles will avoid narcotics.     Okay discharge home, low suspicion for sepsis pneumonia. Patient instructed to follow-up with PCP or return to the nearest emergency department for uncontrolled fevers, vomiting, shortness of breath, chest pain, or any other concerns. Patient acknowledges plan, voices understanding of it and is in agreement with it. Pre-hypertension/Hypertension: Patient has been informed thatthey may have pre-hypertension or Hypertension based on a blood pressure reading in the Emergency Department. It was recommended patient calls the primary care provider listed on discharge instructions or a physician of patient's choice this week to arrange follow up for further evaluation of possible pre-hypertension or Hypertension. CRITICAL CARE:   The patient admits to smoking. 3 minutes of time was spent discussing how this can worsen underlying breathing problems, COPD hypertension and heart disease or cause them to develop. CONSULTS:  None      FINAL IMPRESSION      1. Bronchitis    2.  Generalized abdominal pain          DISPOSITION/PLAN:  DISPOSITION Decision To Discharge 08/22/2018 08:45:10 PM        PATIENT REFERRED TO:  Adalgisa Nesbitt, [address]  [address] [facility] [address]  [phone]    Call in 1 day  Re-Evaluation    [facility] ED  [facility] [address]  [phone]  Go to   If symptoms worsen, As needed      DISCHARGE MEDICATIONS:  New Prescriptions    BENZONATATE (TESSALON PERLES) 100 MG CAPSULE    Take 1 capsule by mouth 3 times daily as needed for Cough       (Please note that portions of this note were completed with a voice recognition program.  Efforts were made to edit the dictations but occasionally words are mis-transcribed.)    Janet Dong MD  Attending Emergency Physician            Janet Dong MD  08/22/18 [id number]

## 2018-08-24 LAB
CULTURE: NORMAL
Lab: NORMAL
SPECIMEN DESCRIPTION: NORMAL
STATUS: NORMAL

## 2018-09-06 ENCOUNTER — TELEPHONE (OUTPATIENT)
Dept: ORTHOPEDIC SURGERY | Age: 43
End: 2018-09-06

## 2018-09-06 NOTE — TELEPHONE ENCOUNTER
You have seen [facility] in the past for her right shoulder. She now is having problems with her left. Physical therapy (x4 weeks) is not helping that much with ROM or pain.  She would like to be seen to discuss possible a left shoulder surgery

## 2018-09-25 ENCOUNTER — OFFICE VISIT (OUTPATIENT)
Dept: ORTHOPEDIC SURGERY | Age: 43
End: 2018-09-25
Payer: COMMERCIAL

## 2018-09-25 VITALS — HEIGHT: 65 IN | WEIGHT: 240 LBS | BODY MASS INDEX: 39.99 KG/M2

## 2018-09-25 DIAGNOSIS — M75.02 ADHESIVE CAPSULITIS OF LEFT SHOULDER: Primary | ICD-10-CM

## 2018-09-25 PROBLEM — M75.01 ADHESIVE CAPSULITIS OF RIGHT SHOULDER: Status: RESOLVED | Noted: 2017-03-14 | Resolved: 2018-09-25

## 2018-09-25 PROCEDURE — G8427 DOCREV CUR MEDS BY ELIG CLIN: HCPCS | Performed by: ORTHOPAEDIC SURGERY

## 2018-09-25 PROCEDURE — 99213 OFFICE O/P EST LOW 20 MIN: CPT | Performed by: ORTHOPAEDIC SURGERY

## 2018-09-25 PROCEDURE — G8417 CALC BMI ABV UP PARAM F/U: HCPCS | Performed by: ORTHOPAEDIC SURGERY

## 2018-09-25 PROCEDURE — 4004F PT TOBACCO SCREEN RCVD TLK: CPT | Performed by: ORTHOPAEDIC SURGERY

## 2018-09-25 NOTE — PROGRESS NOTES
Orthopedic Shoulder Encounter Note     Chief complaint: Left shoulder pain    Trauma:  No; Date of Injury: Not applicable    HPI: Twin Oneal is a 37 y.o. old right-hand dominant female who presents for evaluation of left shoulder pain that's been ongoing for about 2 years now. Delon and worse over the course of the past 8-9 months. Pain is associated with a lack of mobility. She indicates that she has a difficult time brushing her hair reaching behind her back or moving her shoulder to extremes. She had a similar problem on the right side was diagnosed that time with a frozen shoulder by Dr. Jazmine Agee. She had a manipulation and physical therapy afterward and got better. At this time her pain in the left shoulder is primarily localized to the lateral aspect of the shoulder. It is constant but worse with movement. Its associated with weakness and the aforementioned stiffness. She has nighttime pain. She has completed 12 weeks of physical therapy without any change. She denies having any neck pain but does report occasional numbness and tingling in the ulnar 3 digits of the hand in the morning. Previous treatment:    NSAIDs: Ibuprofen    Physical Therapy:  Yes. Just completed 12 weeks of physical therapy about a week ago    Injections: None. She indicates that her primary care provider does not want her receiving cortisone injections due to her poorly controlled diabetes    Surgeries: None    Review of Systems:     Constitution: no fever or chills   Pain level: 8/10  Musculoskeletal: As noted in the HPI   Neurologic: numbness    Past Medical History  [facility]  has a past medical history of Asthma; Atrial fibrillation ([facility] [address] Bipolar 1 disorder ([address] Bulging disc; CAD (coronary artery disease); Cardiomyopathy ([facility] [address] Cardiomyopathy [facility]); CHF (congestive heart failure) ([facility] [address] Chronic otitis media of both ears; COPD (chronic obstructive pulmonary disease) ([address]  Depression; Diarrhea; Dizziness; DVT (deep venous thrombosis) ([facility] [address] Endometriosis; Fainting; GERD (gastroesophageal reflux disease); GI bleeding; Helicobacter pylori (H. pylori); [place] (hard of hearing); Hyperlipidemia; Hypertension; Mastoiditis; Migraines; Morbid obesity ([facility] [address] Myocardial infarction ([facility] [address] Nausea; On home oxygen therapy; Ovarian cyst; Pulmonary hypertension ([facility] [address] Pulmonary insufficiency; PVC (premature ventricular contraction); Tricuspid insufficiency; and Type II or unspecified type diabetes mellitus without mention of complication, not stated as uncontrolled. Past Surgical History  Alison Ray  has a past surgical history that includes Hysterectomy; Cholecystectomy; Appendectomy; Colonoscopy; Upper gastrointestinal endoscopy; Abdomen surgery; Abdominal adhesion surgery; Abdominal exploration surgery; Tympanostomy tube placement; Tonsillectomy; Foot surgery (Left); Abdominal hernia repair; hysteroscopy; Gastric fundoplication (4079); Abscess Drainage; Scaphoid fracture surgery (Left); other surgical history (Right, 5/29/14); Myringotomy Tympanostomy Tube Placement (Right, 06/05/2014); myringotomy (Right, 11/13/15); Hysterectomy; Cardiac catheterization (2014 & 2000); other surgical history (2009); Breast surgery (Left, 2007); fracture surgery (Right); other surgical history (Right, 08/11/2016); ablation of dysrhythmic focus (2016); other surgical history; other surgical history; pr manipulatn shldr jt w anesthesia (Right, 3/1/2017); and ablation of dysrhythmic focus (09/08/2017). Current Medications  Current Outpatient Prescriptions   Medication Sig Dispense Refill    blood glucose monitor strips Test blood sugars 5 times a day due to low blood sugars. Uncontrolled diabetes.  200 strip 11    albuterol (PROVENTIL) (2.5 MG/3ML) 0.083% nebulizer solution INHALE CONTENTS OF 1 VIAL IN NEBULIZER 4 TIMES DAILY AS NEEDED 360 vial 11    ipratropium-albuterol (DUONEB) 0.5-2.5 (3) MG/3ML SOLN nebulizer solution Inhale 3 mLs into the lungs every 6 hours as needed for Shortness of Breath 360 mL 3    insulin glargine (LANTUS SOLOSTAR) 100 UNIT/ML injection pen Inject 80 Units into the skin 3 times daily 45 mL 5    insulin lispro (HUMALOG KWIKPEN) 100 UNIT/ML pen FOR GLUCOSE 150-200 GIVE 6 UNITS, FOR GLUCOSE 201-250 9 UNITS, GLUCOSE 251-300 12 UNITS, GLUCOSE 301 OR OVER 15 UNITS. 15 pen 3    albuterol sulfate  (90 Base) MCG/ACT inhaler Inhale 2 puffs into the lungs every 6 hours as needed for Wheezing      promethazine (PHENERGAN) 25 MG tablet Take 25 mg by mouth every 6 hours as needed for Nausea      ibuprofen (ADVIL;MOTRIN) 800 MG tablet TAKE ONE TABLET BY MOUTH 3 TIMES DAILY 90 tablet 5    OXYGEN Inhale into the lungs Indications: On 3 liters per n/c during the night.  Multiple Vitamins-Minerals (MULTIVITAMIN GUMMIES WOMENS) CHEW Take 2 each by mouth daily       bumetanide (BUMEX) 1 MG tablet Take 1 tablet by mouth daily Indications: if 3 lb wt gain (Patient taking differently: Take 1 mg by mouth daily as needed (Takes for 3 days on and then 3 days off as directed.) ) 30 tablet 3    DULoxetine (CYMBALTA) 60 MG capsule Take 1 capsule by mouth daily (Patient taking differently: Take 60 mg by mouth 2 times daily ) 30 capsule 11    carvedilol (COREG) 12.5 MG tablet Take 12.5 mg by mouth 2 times daily (with meals).  atorvastatin (LIPITOR) 40 MG tablet Take 40 mg by mouth daily. No current facility-administered medications for this visit. Allergies  Allergies have been reviewed.   Andry Judd is allergic to latex; aspirin; avelox [moxifloxacin]; chantix [varenicline]; doxycycline; dye [iodides]; flexeril [cyclobenzaprine]; gabapentin; losartan; morphine; nsaids; reglan [metoclopramide hcl]; shellfish-derived products; sulfa antibiotics; vancomycin; zofran; zyvox [linezolid]; bactrim [sulfamethoxazole-trimethoprim]; betadine [povidone iodine]; ceclor [cefaclor]; clindamycin/lincomycin; codeine; macrolides and ketolides; novolin r [insulin]; novolog [insulin aspart]; pcn [penicillins]; phenothiazines; tape [adhesive tape]; fentanyl; and sotalol. Social History  Jessica Moreland  reports that she has been smoking Cigarettes. She has a 11.50 pack-year smoking history. She has never used smokeless tobacco. She reports that she does not drink alcohol or use drugs. Family History  Leila's family history includes Asthma in her father, maternal grandfather, maternal grandmother, and mother; Breast Cancer in her maternal aunt, maternal grandmother, and paternal aunt; Cancer in her maternal aunt, maternal grandmother, paternal grandmother, and sister; Cervical Cancer in her maternal grandmother and paternal grandmother; Diabetes in her father, maternal aunt, maternal grandfather, maternal grandmother, mother, paternal grandfather, and paternal grandmother; Heart Disease in her father, maternal aunt, maternal grandfather, maternal grandmother, maternal uncle, mother, paternal aunt, paternal grandfather, paternal grandmother, and paternal uncle; High Blood Pressure in her father, maternal aunt, maternal grandfather, maternal grandmother, maternal uncle, mother, paternal aunt, paternal grandfather, paternal grandmother, and paternal uncle; Liver Disease (age of onset: 61) in her father; Alexandra Bake in her maternal grandfather; Ulcerative Colitis in her father. Physical Exam:     Ht 5' 5\" (1.651 m)   Wt 240 lb (108.9 kg)   BMI 39.94 kg/m²    General Appearance: alert, well appearing, and in no distress  Mental Status: alert, oriented to person, place, and time  Gait: normal    Shoulder:    Skin: warm and dry, no rash or erythema; no swelling or obvious muscular atrophy  Vasculature: 2+ radial pulses bilaterally  Neuro: Sensation grossly intact to light touch diffusely  Tenderness: Tender to palpation over the anterior aspect of the left shoulder.     ROM:

## 2018-09-25 NOTE — LETTER
9/25/2018    Osito Mock, [address]  [address] [facility], [address] [place] [address]    RE: Concepcion Oscar    Dear Dr. Claudetta App,    Thank you for allowing me to participate in the care of Ms. Unger. I had the opportunity to evaluate the patient on 9/25/2018. Attached you will find my evaluation and recommendations. Thanks again for the confidence you have expressed in me by allowing my participation in the care of your patient. I will keep you apprised of further developments in the patients treatment course as it progresses. If I can be of further assistance in any fashion, please feel free to contact me at your convenience.     Sincerely,        Whit Krause  Shoulder and Elbow Surgery

## 2018-10-03 ENCOUNTER — HOSPITAL ENCOUNTER (OUTPATIENT)
Dept: PREADMISSION TESTING | Age: 43
Discharge: HOME | End: 2018-10-07
Payer: COMMERCIAL

## 2018-10-03 VITALS
BODY MASS INDEX: 44.9 KG/M2 | SYSTOLIC BLOOD PRESSURE: 115 MMHG | TEMPERATURE: 98.8 F | HEART RATE: 82 BPM | WEIGHT: 263 LBS | OXYGEN SATURATION: 97 % | RESPIRATION RATE: 16 BRPM | HEIGHT: 64 IN | DIASTOLIC BLOOD PRESSURE: 80 MMHG

## 2018-10-03 LAB
ABSOLUTE EOS #: 0.4 K/UL (ref 0–0.4)
ABSOLUTE IMMATURE GRANULOCYTE: ABNORMAL K/UL (ref 0–0.3)
ABSOLUTE LYMPH #: 3.2 K/UL (ref 1–4.8)
ABSOLUTE MONO #: 0.8 K/UL (ref 0.1–1.3)
ANION GAP SERPL CALCULATED.3IONS-SCNC: 12 MMOL/L (ref 9–17)
BASOPHILS # BLD: 1 % (ref 0–2)
BASOPHILS ABSOLUTE: 0.1 K/UL (ref 0–0.2)
BUN BLDV-MCNC: 11 MG/DL (ref 6–20)
BUN/CREAT BLD: ABNORMAL (ref 9–20)
CALCIUM SERPL-MCNC: 9.6 MG/DL (ref 8.6–10.4)
CHLORIDE BLD-SCNC: 99 MMOL/L (ref 98–107)
CO2: 28 MMOL/L (ref 20–31)
CREAT SERPL-MCNC: 0.57 MG/DL (ref 0.5–0.9)
DIFFERENTIAL TYPE: ABNORMAL
EOSINOPHILS RELATIVE PERCENT: 3 % (ref 0–4)
GFR AFRICAN AMERICAN: >60 ML/MIN
GFR NON-AFRICAN AMERICAN: >60 ML/MIN
GFR SERPL CREATININE-BSD FRML MDRD: ABNORMAL ML/MIN/{1.73_M2}
GFR SERPL CREATININE-BSD FRML MDRD: ABNORMAL ML/MIN/{1.73_M2}
GLUCOSE BLD-MCNC: 112 MG/DL (ref 70–99)
HCT VFR BLD CALC: 45.8 % (ref 36–46)
HEMOGLOBIN: 15.4 G/DL (ref 12–16)
IMMATURE GRANULOCYTES: ABNORMAL %
LYMPHOCYTES # BLD: 27 % (ref 24–44)
MCH RBC QN AUTO: 33.4 PG (ref 26–34)
MCHC RBC AUTO-ENTMCNC: 33.6 G/DL (ref 31–37)
MCV RBC AUTO: 99.6 FL (ref 80–100)
MONOCYTES # BLD: 7 % (ref 1–7)
NRBC AUTOMATED: ABNORMAL PER 100 WBC
PDW BLD-RTO: 12.8 % (ref 11.5–14.9)
PLATELET # BLD: 307 K/UL (ref 150–450)
PLATELET ESTIMATE: ABNORMAL
PMV BLD AUTO: 8.6 FL (ref 6–12)
POTASSIUM SERPL-SCNC: 4.2 MMOL/L (ref 3.7–5.3)
RBC # BLD: 4.6 M/UL (ref 4–5.2)
RBC # BLD: ABNORMAL 10*6/UL
SEG NEUTROPHILS: 62 % (ref 36–66)
SEGMENTED NEUTROPHILS ABSOLUTE COUNT: 7.3 K/UL (ref 1.3–9.1)
SODIUM BLD-SCNC: 139 MMOL/L (ref 135–144)
WBC # BLD: 11.7 K/UL (ref 3.5–11)
WBC # BLD: ABNORMAL 10*3/UL

## 2018-10-03 PROCEDURE — 36415 COLL VENOUS BLD VENIPUNCTURE: CPT

## 2018-10-03 PROCEDURE — 85025 COMPLETE CBC W/AUTO DIFF WBC: CPT

## 2018-10-03 PROCEDURE — 80048 BASIC METABOLIC PNL TOTAL CA: CPT

## 2018-10-03 RX ORDER — FOLIC ACID 0.8 MG
500 TABLET ORAL DAILY
COMMUNITY
End: 2019-08-05 | Stop reason: ALTCHOICE

## 2018-10-03 RX ORDER — TOPIRAMATE 25 MG/1
25 TABLET ORAL DAILY
COMMUNITY
End: 2020-03-24

## 2018-10-03 NOTE — H&P
Laterality Date    ABDOMEN SURGERY      abcess    ABDOMINAL ADHESION SURGERY      ABDOMINAL EXPLORATION SURGERY      x 4    ABDOMINAL HERNIA REPAIR      with mesh    ABLATION OF DYSRHYTHMIC FOCUS  2016    ABLATION OF DYSRHYTHMIC FOCUS  09/08/2017    Done at the [facility].  ABSCESS DRAINAGE      left hip and chest    APPENDECTOMY      BREAST SURGERY Left 2007    I & D    CARDIAC CATHETERIZATION  2014 & 2000     no stenting    CHOLECYSTECTOMY      COLONOSCOPY      FOOT SURGERY Left     bone fragment removed    FRACTURE SURGERY Right     closed reduction perc pinning ring & middle finger    GASTRIC FUNDOPLICATION  1460    HYSTERECTOMY      total    HYSTERECTOMY      HYSTEROSCOPY      tubal perfusion    MYRINGOTOMY Right 11/13/15    Right myringotomy with placement of  T-tube on the right side. Removal of plugged ventilating tube, right side.  MYRINGOTOMY AND TYMPANOSTOMY TUBE PLACEMENT Right 06/05/2014    OTHER SURGICAL HISTORY Right 5/29/14    removal ear tube rt ear    OTHER SURGICAL HISTORY  2009    LOOP recorder inserted and removed 3 mos.  later    OTHER SURGICAL HISTORY Right 08/11/2016    ear tube removal with patch    OTHER SURGICAL HISTORY      tubal perfusion, lysis of uterine adhesions    OTHER SURGICAL HISTORY      multiple PICC lines inserted and removed, it has affected circulation bilateral upper arms    NH MANIPULATN PAULO JT W ANESTHESIA Right 3/1/2017    SHOULDER MANIPULATION RIGHT [facility]ik [facility]din [facility] Left     foot    TONSILLECTOMY      TYMPANOSTOMY TUBE PLACEMENT      UPPER GASTROINTESTINAL ENDOSCOPY         FAMILY HISTORY       Family History   Problem Relation Age of Onset    Ulcerative Colitis Father     Liver Disease Father 61        hep c and b    Diabetes Father     Asthma Father     Heart Disease Father     High Blood Pressure Father     Breast Cancer Maternal Aunt     Cancer Maternal Aunt     Gastrointestinal tract: No abdominal pain, nausea, vomiting, diarrhea or constipation. Genitourinary:  No burning on micturition. No hesitancy, urgency, frequency or discoloration of urine. Locomotor:  See HPI. Neuropsychiatric:  No referable complaints. GENERAL PHYSICAL EXAM:     Vitals: /80   Pulse 82   Temp 98.8 °F (37.1 °C) (Oral)   Resp 16   Ht 5' 4\" (1.626 m)   Wt 263 lb (119.3 kg)   SpO2 97%   BMI 45.14 kg/m²  Body mass index is 45.14 kg/m². GENERAL APPEARANCE:   Shiv Stanley is 37 y.o.,  female, moderately obese, nourished, conscious, alert. Does not appear to be distress or pain at this time. SKIN:  Warm, dry, no cyanosis or jaundice. HEAD:  Normocephalic, atraumatic, no swelling or tenderness. EYES:  Pupils equal, reactive to light. EARS:  No discharge, no marked hearing loss. NOSE:  No rhinorrhea, epistaxis or septal deformity. THROAT:  Not congested. No ulceration bleeding or discharge. NECK:  No stiffness, trachea central.  No palpable masses or L.N.                 CHEST:  Symmetrical and equal on expansion. HEART:  At Fib, RRR at this time S1 > S2. No audible murmurs or gallops. LUNGS:  Equal on expansion, normal breath sounds. No adventitious sounds. ABDOMEN:  Obese. Soft on palpation. No localized tenderness. No guarding or rigidity. No palpable hepatosplenomegaly. LYMPHATICS:  No palpable cervical lymphadenopathy. LOCOMOTOR, BACK AND SPINE:  No tenderness or deformities. EXTREMITIES:  Symmetrical,+1 pretibial edema. left shoulder tenderness, limitation in the range of motion(Abduction to 40 Degrees). Santiagos sign negative. No discoloration or ulcerations.     NEUROLOGIC:  The patient is conscious, alert, oriented, No

## 2018-10-03 NOTE — PROGRESS NOTES
Patient here for pre-admission testing with 30 day event cardiac monitor in place. Patient relates that she has worn since 9/25/18 due to increased PVC's. Patient relates that she saw Dr. Damaris Rob (cardiologist) on 9/27/18 for cardiac clearance for upcoming surgery. Patient relates that Dr. Damaris Rob gave cardiac clearance and told patient that she could  take 30 day event monitor off before surgery and put back on the day after surgery. Copy of cardiac clearance requested for chart.

## 2018-10-04 PROBLEM — E66.813 CLASS 3 SEVERE OBESITY DUE TO EXCESS CALORIES WITHOUT SERIOUS COMORBIDITY WITH BODY MASS INDEX (BMI) OF 45.0 TO 49.9 IN ADULT (HCC): Status: ACTIVE | Noted: 2018-10-04

## 2018-10-04 PROBLEM — E66.01 CLASS 3 SEVERE OBESITY DUE TO EXCESS CALORIES WITHOUT SERIOUS COMORBIDITY WITH BODY MASS INDEX (BMI) OF 45.0 TO 49.9 IN ADULT (HCC): Status: ACTIVE | Noted: 2018-10-04

## 2018-10-12 ENCOUNTER — OFFICE VISIT (OUTPATIENT)
Dept: ORTHOPEDIC SURGERY | Age: 43
End: 2018-10-12
Payer: COMMERCIAL

## 2018-10-12 VITALS — HEIGHT: 64 IN | BODY MASS INDEX: 45.07 KG/M2 | WEIGHT: 264 LBS

## 2018-10-12 DIAGNOSIS — M75.02 ADHESIVE CAPSULITIS OF LEFT SHOULDER: Primary | ICD-10-CM

## 2018-10-12 PROCEDURE — 4004F PT TOBACCO SCREEN RCVD TLK: CPT | Performed by: ORTHOPAEDIC SURGERY

## 2018-10-12 PROCEDURE — G8427 DOCREV CUR MEDS BY ELIG CLIN: HCPCS | Performed by: ORTHOPAEDIC SURGERY

## 2018-10-12 PROCEDURE — G8417 CALC BMI ABV UP PARAM F/U: HCPCS | Performed by: ORTHOPAEDIC SURGERY

## 2018-10-12 PROCEDURE — G8484 FLU IMMUNIZE NO ADMIN: HCPCS | Performed by: ORTHOPAEDIC SURGERY

## 2018-10-12 PROCEDURE — 99212 OFFICE O/P EST SF 10 MIN: CPT | Performed by: ORTHOPAEDIC SURGERY

## 2018-10-12 RX ORDER — HYDROCODONE BITARTRATE AND ACETAMINOPHEN 5; 325 MG/1; MG/1
1 TABLET ORAL EVERY 4 HOURS
Qty: 42 TABLET | Refills: 0 | Status: SHIPPED | OUTPATIENT
Start: 2018-10-12 | End: 2018-11-12 | Stop reason: SDUPTHER

## 2018-10-16 ENCOUNTER — ANESTHESIA EVENT (OUTPATIENT)
Dept: OPERATING ROOM | Age: 43
End: 2018-10-16
Payer: COMMERCIAL

## 2018-10-16 ENCOUNTER — PREP FOR PROCEDURE (OUTPATIENT)
Dept: ORTHOPEDIC SURGERY | Age: 43
End: 2018-10-16

## 2018-10-16 RX ORDER — SODIUM CHLORIDE 0.9 % (FLUSH) 0.9 %
10 SYRINGE (ML) INJECTION PRN
Status: CANCELLED | OUTPATIENT
Start: 2018-10-16

## 2018-10-16 RX ORDER — SODIUM CHLORIDE 0.9 % (FLUSH) 0.9 %
10 SYRINGE (ML) INJECTION EVERY 12 HOURS SCHEDULED
Status: CANCELLED | OUTPATIENT
Start: 2018-10-16

## 2018-10-17 ENCOUNTER — ANESTHESIA (OUTPATIENT)
Dept: OPERATING ROOM | Age: 43
End: 2018-10-17
Payer: COMMERCIAL

## 2018-10-17 ENCOUNTER — HOSPITAL ENCOUNTER (OUTPATIENT)
Age: 43
Setting detail: OUTPATIENT SURGERY
Discharge: HOME | End: 2018-10-17
Attending: ORTHOPAEDIC SURGERY | Admitting: ORTHOPAEDIC SURGERY
Payer: COMMERCIAL

## 2018-10-17 VITALS — SYSTOLIC BLOOD PRESSURE: 107 MMHG | OXYGEN SATURATION: 89 % | DIASTOLIC BLOOD PRESSURE: 54 MMHG

## 2018-10-17 VITALS
BODY MASS INDEX: 44.9 KG/M2 | HEIGHT: 64 IN | DIASTOLIC BLOOD PRESSURE: 73 MMHG | SYSTOLIC BLOOD PRESSURE: 115 MMHG | TEMPERATURE: 97.9 F | RESPIRATION RATE: 20 BRPM | HEART RATE: 92 BPM | OXYGEN SATURATION: 93 % | WEIGHT: 263 LBS

## 2018-10-17 DIAGNOSIS — M75.02 ADHESIVE CAPSULITIS OF LEFT SHOULDER: ICD-10-CM

## 2018-10-17 DIAGNOSIS — M75.22 LEFT BICIPITAL TENOSYNOVITIS: Primary | ICD-10-CM

## 2018-10-17 LAB
GLUCOSE BLD-MCNC: 102 MG/DL (ref 65–105)
GLUCOSE BLD-MCNC: 105 MG/DL (ref 65–105)

## 2018-10-17 PROCEDURE — 29828 SHO ARTHRS SRG BICP TENODSIS: CPT | Performed by: ORTHOPAEDIC SURGERY

## 2018-10-17 PROCEDURE — 6370000000 HC RX 637 (ALT 250 FOR IP): Performed by: ANESTHESIOLOGY

## 2018-10-17 PROCEDURE — 2580000003 HC RX 258: Performed by: ANESTHESIOLOGY

## 2018-10-17 PROCEDURE — 2580000003 HC RX 258: Performed by: ORTHOPAEDIC SURGERY

## 2018-10-17 PROCEDURE — 6360000002 HC RX W HCPCS: Performed by: ORTHOPAEDIC SURGERY

## 2018-10-17 PROCEDURE — C1713 ANCHOR/SCREW BN/BN,TIS/BN: HCPCS | Performed by: ORTHOPAEDIC SURGERY

## 2018-10-17 PROCEDURE — 3600000004 HC SURGERY LEVEL 4 BASE: Performed by: ORTHOPAEDIC SURGERY

## 2018-10-17 PROCEDURE — 7100000000 HC PACU RECOVERY - FIRST 15 MIN: Performed by: ORTHOPAEDIC SURGERY

## 2018-10-17 PROCEDURE — 3700000000 HC ANESTHESIA ATTENDED CARE: Performed by: ORTHOPAEDIC SURGERY

## 2018-10-17 PROCEDURE — 6360000002 HC RX W HCPCS: Performed by: ANESTHESIOLOGY

## 2018-10-17 PROCEDURE — 2500000003 HC RX 250 WO HCPCS

## 2018-10-17 PROCEDURE — 6360000002 HC RX W HCPCS

## 2018-10-17 PROCEDURE — 94664 DEMO&/EVAL PT USE INHALER: CPT

## 2018-10-17 PROCEDURE — 82947 ASSAY GLUCOSE BLOOD QUANT: CPT

## 2018-10-17 PROCEDURE — L3650 SO 8 ABD RESTRAINT PRE OTS: HCPCS | Performed by: ORTHOPAEDIC SURGERY

## 2018-10-17 PROCEDURE — 3600000014 HC SURGERY LEVEL 4 ADDTL 15MIN: Performed by: ORTHOPAEDIC SURGERY

## 2018-10-17 PROCEDURE — 7100000030 HC ASPR PHASE II RECOVERY - FIRST 15 MIN: Performed by: ORTHOPAEDIC SURGERY

## 2018-10-17 PROCEDURE — 7100000031 HC ASPR PHASE II RECOVERY - ADDTL 15 MIN: Performed by: ORTHOPAEDIC SURGERY

## 2018-10-17 PROCEDURE — 7100000010 HC PHASE II RECOVERY - FIRST 15 MIN: Performed by: ORTHOPAEDIC SURGERY

## 2018-10-17 PROCEDURE — 2709999900 HC NON-CHARGEABLE SUPPLY: Performed by: ORTHOPAEDIC SURGERY

## 2018-10-17 PROCEDURE — 2720000010 HC SURG SUPPLY STERILE: Performed by: ORTHOPAEDIC SURGERY

## 2018-10-17 PROCEDURE — 3700000001 HC ADD 15 MINUTES (ANESTHESIA): Performed by: ORTHOPAEDIC SURGERY

## 2018-10-17 PROCEDURE — 2700000000 HC OXYGEN THERAPY PER DAY

## 2018-10-17 PROCEDURE — 7100000001 HC PACU RECOVERY - ADDTL 15 MIN: Performed by: ORTHOPAEDIC SURGERY

## 2018-10-17 PROCEDURE — 7100000011 HC PHASE II RECOVERY - ADDTL 15 MIN: Performed by: ORTHOPAEDIC SURGERY

## 2018-10-17 PROCEDURE — 94640 AIRWAY INHALATION TREATMENT: CPT

## 2018-10-17 DEVICE — ANCHOR SUT L24.5MM DIA4.75MM BIOCOMPOSITE SELF PUNCHING: Type: IMPLANTABLE DEVICE | Site: SHOULDER | Status: FUNCTIONAL

## 2018-10-17 RX ORDER — GLYCOPYRROLATE 1 MG/5 ML
SYRINGE (ML) INTRAVENOUS PRN
Status: DISCONTINUED | OUTPATIENT
Start: 2018-10-17 | End: 2018-10-17 | Stop reason: SDUPTHER

## 2018-10-17 RX ORDER — ROCURONIUM BROMIDE 10 MG/ML
INJECTION, SOLUTION INTRAVENOUS PRN
Status: DISCONTINUED | OUTPATIENT
Start: 2018-10-17 | End: 2018-10-17 | Stop reason: SDUPTHER

## 2018-10-17 RX ORDER — PROMETHAZINE HYDROCHLORIDE 25 MG/ML
6.25 INJECTION, SOLUTION INTRAMUSCULAR; INTRAVENOUS
Status: COMPLETED | OUTPATIENT
Start: 2018-10-17 | End: 2018-10-17

## 2018-10-17 RX ORDER — DIPHENHYDRAMINE HYDROCHLORIDE 50 MG/ML
12.5 INJECTION INTRAMUSCULAR; INTRAVENOUS
Status: DISCONTINUED | OUTPATIENT
Start: 2018-10-17 | End: 2018-10-17 | Stop reason: HOSPADM

## 2018-10-17 RX ORDER — CEFAZOLIN SODIUM 1 G/3ML
INJECTION, POWDER, FOR SOLUTION INTRAMUSCULAR; INTRAVENOUS
Status: DISCONTINUED
Start: 2018-10-17 | End: 2018-10-17 | Stop reason: HOSPADM

## 2018-10-17 RX ORDER — PROPOFOL 10 MG/ML
INJECTION, EMULSION INTRAVENOUS PRN
Status: DISCONTINUED | OUTPATIENT
Start: 2018-10-17 | End: 2018-10-17 | Stop reason: SDUPTHER

## 2018-10-17 RX ORDER — NEOSTIGMINE METHYLSULFATE 1 MG/ML
INJECTION, SOLUTION INTRAVENOUS PRN
Status: DISCONTINUED | OUTPATIENT
Start: 2018-10-17 | End: 2018-10-17 | Stop reason: SDUPTHER

## 2018-10-17 RX ORDER — SODIUM CHLORIDE 0.9 % (FLUSH) 0.9 %
10 SYRINGE (ML) INJECTION EVERY 12 HOURS SCHEDULED
Status: DISCONTINUED | OUTPATIENT
Start: 2018-10-17 | End: 2018-10-17 | Stop reason: HOSPADM

## 2018-10-17 RX ORDER — IPRATROPIUM BROMIDE AND ALBUTEROL SULFATE 2.5; .5 MG/3ML; MG/3ML
1 SOLUTION RESPIRATORY (INHALATION) ONCE
Status: COMPLETED | OUTPATIENT
Start: 2018-10-17 | End: 2018-10-17

## 2018-10-17 RX ORDER — MIDAZOLAM HYDROCHLORIDE 1 MG/ML
INJECTION INTRAMUSCULAR; INTRAVENOUS PRN
Status: DISCONTINUED | OUTPATIENT
Start: 2018-10-17 | End: 2018-10-17 | Stop reason: SDUPTHER

## 2018-10-17 RX ORDER — SUCCINYLCHOLINE CHLORIDE 20 MG/ML
INJECTION INTRAMUSCULAR; INTRAVENOUS PRN
Status: DISCONTINUED | OUTPATIENT
Start: 2018-10-17 | End: 2018-10-17 | Stop reason: SDUPTHER

## 2018-10-17 RX ORDER — SODIUM CHLORIDE, SODIUM LACTATE, POTASSIUM CHLORIDE, CALCIUM CHLORIDE 600; 310; 30; 20 MG/100ML; MG/100ML; MG/100ML; MG/100ML
INJECTION, SOLUTION INTRAVENOUS CONTINUOUS
Status: DISCONTINUED | OUTPATIENT
Start: 2018-10-17 | End: 2018-10-17 | Stop reason: HOSPADM

## 2018-10-17 RX ORDER — SODIUM CHLORIDE 0.9 % (FLUSH) 0.9 %
10 SYRINGE (ML) INJECTION PRN
Status: DISCONTINUED | OUTPATIENT
Start: 2018-10-17 | End: 2018-10-17 | Stop reason: HOSPADM

## 2018-10-17 RX ORDER — DIPHENHYDRAMINE HYDROCHLORIDE 50 MG/ML
INJECTION INTRAMUSCULAR; INTRAVENOUS PRN
Status: DISCONTINUED | OUTPATIENT
Start: 2018-10-17 | End: 2018-10-17 | Stop reason: SDUPTHER

## 2018-10-17 RX ORDER — FENTANYL CITRATE 50 UG/ML
25 INJECTION, SOLUTION INTRAMUSCULAR; INTRAVENOUS EVERY 5 MIN PRN
Status: DISCONTINUED | OUTPATIENT
Start: 2018-10-17 | End: 2018-10-17 | Stop reason: HOSPADM

## 2018-10-17 RX ORDER — LIDOCAINE HYDROCHLORIDE 10 MG/ML
INJECTION, SOLUTION EPIDURAL; INFILTRATION; INTRACAUDAL; PERINEURAL PRN
Status: DISCONTINUED | OUTPATIENT
Start: 2018-10-17 | End: 2018-10-17 | Stop reason: SDUPTHER

## 2018-10-17 RX ADMIN — SODIUM CHLORIDE, POTASSIUM CHLORIDE, SODIUM LACTATE AND CALCIUM CHLORIDE: 600; 310; 30; 20 INJECTION, SOLUTION INTRAVENOUS at 10:03

## 2018-10-17 RX ADMIN — PHENYLEPHRINE HYDROCHLORIDE 100 MCG: 10 INJECTION INTRAVENOUS at 12:11

## 2018-10-17 RX ADMIN — PROPOFOL 200 MG: 10 INJECTION, EMULSION INTRAVENOUS at 11:39

## 2018-10-17 RX ADMIN — HYDROMORPHONE HYDROCHLORIDE 0.5 MG: 1 INJECTION, SOLUTION INTRAMUSCULAR; INTRAVENOUS; SUBCUTANEOUS at 13:30

## 2018-10-17 RX ADMIN — NEOSTIGMINE METHYLSULFATE 2 MG: 1 INJECTION, SOLUTION INTRAVENOUS at 12:58

## 2018-10-17 RX ADMIN — Medication 0.2 MG: at 12:10

## 2018-10-17 RX ADMIN — SUCCINYLCHOLINE CHLORIDE 140 MG: 20 INJECTION INTRAMUSCULAR; INTRAVENOUS at 11:39

## 2018-10-17 RX ADMIN — Medication 2 G: at 11:50

## 2018-10-17 RX ADMIN — ROCURONIUM BROMIDE 20 MG: 10 INJECTION INTRAVENOUS at 11:56

## 2018-10-17 RX ADMIN — HYDROMORPHONE HYDROCHLORIDE 0.25 MG: 1 INJECTION, SOLUTION INTRAMUSCULAR; INTRAVENOUS; SUBCUTANEOUS at 13:12

## 2018-10-17 RX ADMIN — PHENYLEPHRINE HYDROCHLORIDE 100 MCG: 10 INJECTION INTRAVENOUS at 12:19

## 2018-10-17 RX ADMIN — Medication 0.4 MG: at 12:58

## 2018-10-17 RX ADMIN — HYDROMORPHONE HYDROCHLORIDE 0.25 MG: 1 INJECTION, SOLUTION INTRAMUSCULAR; INTRAVENOUS; SUBCUTANEOUS at 13:01

## 2018-10-17 RX ADMIN — HYDROMORPHONE HYDROCHLORIDE 0.5 MG: 1 INJECTION, SOLUTION INTRAMUSCULAR; INTRAVENOUS; SUBCUTANEOUS at 13:54

## 2018-10-17 RX ADMIN — HYDROMORPHONE HYDROCHLORIDE 0.25 MG: 1 INJECTION, SOLUTION INTRAMUSCULAR; INTRAVENOUS; SUBCUTANEOUS at 13:08

## 2018-10-17 RX ADMIN — MIDAZOLAM 2 MG: 1 INJECTION INTRAMUSCULAR; INTRAVENOUS at 11:33

## 2018-10-17 RX ADMIN — PHENYLEPHRINE HYDROCHLORIDE 100 MCG: 10 INJECTION INTRAVENOUS at 12:06

## 2018-10-17 RX ADMIN — PHENYLEPHRINE HYDROCHLORIDE 200 MCG: 10 INJECTION INTRAVENOUS at 12:36

## 2018-10-17 RX ADMIN — PHENYLEPHRINE HYDROCHLORIDE 200 MCG: 10 INJECTION INTRAVENOUS at 12:26

## 2018-10-17 RX ADMIN — PHENYLEPHRINE HYDROCHLORIDE 100 MCG: 10 INJECTION INTRAVENOUS at 12:09

## 2018-10-17 RX ADMIN — HYDROMORPHONE HYDROCHLORIDE 0.5 MG: 1 INJECTION, SOLUTION INTRAMUSCULAR; INTRAVENOUS; SUBCUTANEOUS at 13:36

## 2018-10-17 RX ADMIN — LIDOCAINE HYDROCHLORIDE 50 MG: 10 INJECTION, SOLUTION EPIDURAL; INFILTRATION; INTRACAUDAL; PERINEURAL at 11:39

## 2018-10-17 RX ADMIN — PROMETHAZINE HYDROCHLORIDE 6.25 MG: 25 INJECTION INTRAMUSCULAR; INTRAVENOUS at 14:37

## 2018-10-17 RX ADMIN — IPRATROPIUM BROMIDE AND ALBUTEROL SULFATE 1 AMPULE: .5; 3 SOLUTION RESPIRATORY (INHALATION) at 13:18

## 2018-10-17 RX ADMIN — DIPHENHYDRAMINE HYDROCHLORIDE 12.5 MG: 50 INJECTION, SOLUTION INTRAMUSCULAR; INTRAVENOUS at 12:15

## 2018-10-17 RX ADMIN — HYDROMORPHONE HYDROCHLORIDE 1 MG: 1 INJECTION, SOLUTION INTRAMUSCULAR; INTRAVENOUS; SUBCUTANEOUS at 11:59

## 2018-10-17 ASSESSMENT — PULMONARY FUNCTION TESTS
PIF_VALUE: 22
PIF_VALUE: 1
PIF_VALUE: 23
PIF_VALUE: 22
PIF_VALUE: 23
PIF_VALUE: 22
PIF_VALUE: 1
PIF_VALUE: 5
PIF_VALUE: 15
PIF_VALUE: 21
PIF_VALUE: 22
PIF_VALUE: 24
PIF_VALUE: 22
PIF_VALUE: 24
PIF_VALUE: 20
PIF_VALUE: 24
PIF_VALUE: 21
PIF_VALUE: 2
PIF_VALUE: 24
PIF_VALUE: 23
PIF_VALUE: 24
PIF_VALUE: 22
PIF_VALUE: 1
PIF_VALUE: 1
PIF_VALUE: 22
PIF_VALUE: 0
PIF_VALUE: 22
PIF_VALUE: 24
PIF_VALUE: 20
PIF_VALUE: 22
PIF_VALUE: 22
PIF_VALUE: 17
PIF_VALUE: 1
PIF_VALUE: 23
PIF_VALUE: 2
PIF_VALUE: 35
PIF_VALUE: 24
PIF_VALUE: 21
PIF_VALUE: 11
PIF_VALUE: 2
PIF_VALUE: 22
PIF_VALUE: 1
PIF_VALUE: 22
PIF_VALUE: 0
PIF_VALUE: 22
PIF_VALUE: 24
PIF_VALUE: 22
PIF_VALUE: 25
PIF_VALUE: 22
PIF_VALUE: 22
PIF_VALUE: 23
PIF_VALUE: 22
PIF_VALUE: 0
PIF_VALUE: 23
PIF_VALUE: 1
PIF_VALUE: 22
PIF_VALUE: 24
PIF_VALUE: 24
PIF_VALUE: 22
PIF_VALUE: 16
PIF_VALUE: 33
PIF_VALUE: 23
PIF_VALUE: 1
PIF_VALUE: 1
PIF_VALUE: 22
PIF_VALUE: 2
PIF_VALUE: 4
PIF_VALUE: 19
PIF_VALUE: 27
PIF_VALUE: 24
PIF_VALUE: 22
PIF_VALUE: 23
PIF_VALUE: 15
PIF_VALUE: 34
PIF_VALUE: 24
PIF_VALUE: 23
PIF_VALUE: 2
PIF_VALUE: 22
PIF_VALUE: 23
PIF_VALUE: 21
PIF_VALUE: 16
PIF_VALUE: 23
PIF_VALUE: 24
PIF_VALUE: 33
PIF_VALUE: 22
PIF_VALUE: 4
PIF_VALUE: 19
PIF_VALUE: 1
PIF_VALUE: 24
PIF_VALUE: 22
PIF_VALUE: 22
PIF_VALUE: 17
PIF_VALUE: 15
PIF_VALUE: 1

## 2018-10-17 ASSESSMENT — PAIN SCALES - GENERAL
PAINLEVEL_OUTOF10: 8
PAINLEVEL_OUTOF10: 9
PAINLEVEL_OUTOF10: 10
PAINLEVEL_OUTOF10: 8

## 2018-10-17 ASSESSMENT — PAIN DESCRIPTION - LOCATION
LOCATION: SHOULDER
LOCATION: SHOULDER

## 2018-10-17 ASSESSMENT — PAIN - FUNCTIONAL ASSESSMENT: PAIN_FUNCTIONAL_ASSESSMENT: 0-10

## 2018-10-17 ASSESSMENT — PAIN DESCRIPTION - ORIENTATION
ORIENTATION: LEFT
ORIENTATION: LEFT

## 2018-10-17 ASSESSMENT — PAIN DESCRIPTION - PAIN TYPE: TYPE: SURGICAL PAIN

## 2018-10-17 NOTE — H&P
Swelling    Vancomycin Anaphylaxis    Zofran Anaphylaxis    Zyvox [Linezolid] Anaphylaxis    Bactrim [Sulfamethoxazole-Trimethoprim] Hives    Betadine [Povidone Iodine] Hives    Ceclor [Cefaclor] Hives    Clindamycin/Lincomycin Hives    Codeine Itching and Rash    Macrolides And Ketolides Hives       Pt states she can take Azithromycin    Novolin R [Insulin] Hives    Novolog [Insulin Aspart] Hives    Phenothiazines Swelling    Tape Melissa Brule Tape] Rash       OK to use paper tape and tegaderm per patient    Compazine [Prochlorperazine] Other (See Comments)       Agitation, anger, \"makes me jerk\"    Fentanyl Itching       Pt states doesn't work and makes patient itch    Tamiflu [Oseltamivir Phosphate] Hives    Sotalol Other (See Comments)       Pt verbalized that she developed a prolonged QT and had an asthma attack with medication.                 Current Outpatient Prescriptions on File Prior to Encounter   Medication Sig Dispense Refill    ipratropium-albuterol (DUONEB) 0.5-2.5 (3) MG/3ML SOLN nebulizer solution Inhale 3 mLs into the lungs every 6 hours as needed for Shortness of Breath (Patient taking differently: Inhale 1 vial into the lungs every 6 hours as needed for Shortness of Breath Patient takes three times a day) 360 mL 3    insulin glargine (LANTUS SOLOSTAR) 100 UNIT/ML injection pen Inject 80 Units into the skin 3 times daily (Patient taking differently: Inject 60 Units into the skin 2 times daily ) 45 mL 5    insulin lispro (HUMALOG KWIKPEN) 100 UNIT/ML pen FOR GLUCOSE 150-200 GIVE 6 UNITS, FOR GLUCOSE 201-250 9 UNITS, GLUCOSE 251-300 12 UNITS, GLUCOSE 301 OR OVER 15 UNITS.  15 pen 3    albuterol sulfate  (90 Base) MCG/ACT inhaler Inhale 2 puffs into the lungs every 6 hours as needed for Wheezing        promethazine (PHENERGAN) 25 MG tablet Take 25 mg by mouth every 6 hours as needed for Nausea        ibuprofen (ADVIL;MOTRIN) 800 MG tablet TAKE ONE TABLET BY MOUTH 3 TIMES       GENERAL APPEARANCE:   Caroline Johnson is 37 y.o.,  female, moderately obese, nourished, conscious, alert. Does not appear to be distress or pain at this time. SKIN:  Warm, dry, no cyanosis or jaundice. HEAD:  Normocephalic, atraumatic, no swelling or tenderness. EYES:  Pupils equal, reactive to light. EARS:  No discharge, no marked hearing loss. NOSE:  No rhinorrhea, epistaxis or septal deformity. THROAT:  Not congested. No ulceration bleeding or discharge. NECK:  No stiffness, trachea central.  No palpable masses or L.N.                 CHEST:  Symmetrical and equal on expansion. HEART:  At Fib, RRR at this time S1 > S2. No audible murmurs or gallops. LUNGS:  Equal on expansion, normal breath sounds. No adventitious sounds. ABDOMEN:  Obese. Soft on palpation. No localized tenderness. No guarding or rigidity. No palpable hepatosplenomegaly. LYMPHATICS:  No palpable cervical lymphadenopathy.      LOCOMOTOR, BACK AND SPINE:  No tenderness or deformities. EXTREMITIES:  Symmetrical,+1 pretibial edema. left shoulder tenderness, limitation in the range of motion(Abduction to 40 Degrees). Santiagos sign negative. No discoloration or ulcerations.     NEUROLOGIC:  The patient is conscious, alert, oriented, No apparent focal sensory or motor deficits.                                                                                      PROVISIONAL DIAGNOSES / SURGERY:       Left shoulder adhesive capsulitis,   capsular release.          Patient Active Problem List     Diagnosis Date Noted    Adhesive capsulitis of left shoulder 09/25/2018    Current moderate episode of major depressive disorder without prior episode ([address] 08/20/2018    Current moderate episode of major depressive disorder without prior episode ([address]

## 2018-10-17 NOTE — ANESTHESIA PRE PROCEDURE
Noncompliance with therapeutic plan Z91.11    Precordial pain R07.2    Tobacco abuse Z72.0    Leg swelling M79.89    Current moderate episode of major depressive disorder without prior episode (HCC) F32.1    Current moderate episode of major depressive disorder without prior episode (HCC) F32.1    Adhesive capsulitis of left shoulder M75.02    Class 3 severe obesity due to excess calories without serious comorbidity with body mass index (BMI) of 45.0 to 49.9 in adult ([address] E66.01, Z68.42       Past Medical History:        Diagnosis Date    Asthma     Atrial fibrillation ([address]     placed on event monitor 9/25/18 for PVC's & A-fib    Bipolar 1 disorder ([address]     Bulging disc     CAD (coronary artery disease)     Cardiomyopathy ([address]     [facility]     Chronic otitis media of both ears     rt>lt    COPD (chronic obstructive pulmonary disease) ([facility]     Depression     Diarrhea     Diarrhea     Dizziness     DVT (deep venous thrombosis) ([facility])     after PICC line right arm    Endometriosis     Fainting     GERD (gastroesophageal reflux disease)     hx of    GI bleeding     Helicobacter pylori (H. pylori)     [place] (hard of hearing)     both ears, no hearing aids    Hyperlipidemia     Hypertension     Mastoiditis     Migraines     Morbid obesity ([address]     Myocardial infarction ([address]     2005    Nausea     Neuropathy     On home oxygen therapy     3 L at night    Ovarian cyst     Passed out ([address]     hx of- negative tilt table    Pulmonary hypertension (HCC)     Pulmonary insufficiency     PVC (premature ventricular contraction)     Tricuspid insufficiency     Type II or unspecified type diabetes mellitus without mention of complication, not stated as uncontrolled        Past Surgical History:        Procedure Laterality Date    ABDOMEN SURGERY      abcess    ABDOMINAL ADHESION SURGERY      ABDOMINAL EXPLORATION SURGERY      x 4    ABDOMINAL HERNIA

## 2018-10-30 ENCOUNTER — OFFICE VISIT (OUTPATIENT)
Dept: ORTHOPEDIC SURGERY | Age: 43
End: 2018-10-30

## 2018-10-30 VITALS — HEIGHT: 64 IN | WEIGHT: 240 LBS | BODY MASS INDEX: 40.97 KG/M2

## 2018-10-30 DIAGNOSIS — M75.22 LEFT BICIPITAL TENOSYNOVITIS: Primary | ICD-10-CM

## 2018-10-30 PROCEDURE — 99024 POSTOP FOLLOW-UP VISIT: CPT | Performed by: ORTHOPAEDIC SURGERY

## 2018-11-02 ENCOUNTER — TELEPHONE (OUTPATIENT)
Dept: ORTHOPEDIC SURGERY | Age: 43
End: 2018-11-02

## 2018-11-02 NOTE — TELEPHONE ENCOUNTER
L shoulder scope, biceps surgery 2 weeks ago. Seen 10-30-18. Did something positional during night that woke her up, extreme pain and soreness, stiffness. Nothing red or warm. Circulation good. Sling is on, used ice and Tylenol. Just \"scared\" her and is worried now. Not looking for pain meds, just advice. Supposed to start outpatient P.T. today 2:30pm.    Advice?

## 2018-11-12 ENCOUNTER — HOSPITAL ENCOUNTER (EMERGENCY)
Age: 43
Discharge: HOME | End: 2018-11-13
Attending: EMERGENCY MEDICINE
Payer: COMMERCIAL

## 2018-11-12 ENCOUNTER — APPOINTMENT (OUTPATIENT)
Dept: GENERAL RADIOLOGY | Age: 43
End: 2018-11-12
Payer: COMMERCIAL

## 2018-11-12 VITALS
RESPIRATION RATE: 16 BRPM | BODY MASS INDEX: 40.97 KG/M2 | SYSTOLIC BLOOD PRESSURE: 157 MMHG | TEMPERATURE: 99 F | DIASTOLIC BLOOD PRESSURE: 102 MMHG | WEIGHT: 240 LBS | HEART RATE: 100 BPM | HEIGHT: 64 IN | OXYGEN SATURATION: 95 %

## 2018-11-12 DIAGNOSIS — M25.512 ACUTE PAIN OF LEFT SHOULDER: Primary | ICD-10-CM

## 2018-11-12 DIAGNOSIS — M75.02 ADHESIVE CAPSULITIS OF LEFT SHOULDER: ICD-10-CM

## 2018-11-12 PROCEDURE — 6370000000 HC RX 637 (ALT 250 FOR IP): Performed by: PHYSICIAN ASSISTANT

## 2018-11-12 PROCEDURE — 99283 EMERGENCY DEPT VISIT LOW MDM: CPT

## 2018-11-12 PROCEDURE — 73030 X-RAY EXAM OF SHOULDER: CPT

## 2018-11-12 RX ORDER — ACETAMINOPHEN AND CODEINE PHOSPHATE 300; 30 MG/1; MG/1
1 TABLET ORAL 3 TIMES DAILY PRN
Qty: 6 TABLET | Refills: 0 | Status: SHIPPED | OUTPATIENT
Start: 2018-11-12 | End: 2018-11-14

## 2018-11-12 RX ORDER — ACETAMINOPHEN AND CODEINE PHOSPHATE 300; 30 MG/1; MG/1
2 TABLET ORAL ONCE
Status: COMPLETED | OUTPATIENT
Start: 2018-11-12 | End: 2018-11-12

## 2018-11-12 RX ADMIN — ACETAMINOPHEN AND CODEINE PHOSPHATE 2 TABLET: 300; 30 TABLET ORAL at 23:51

## 2018-11-12 ASSESSMENT — PAIN DESCRIPTION - LOCATION: LOCATION: SHOULDER

## 2018-11-12 ASSESSMENT — PAIN SCALES - GENERAL
PAINLEVEL_OUTOF10: 10
PAINLEVEL_OUTOF10: 10

## 2018-11-12 ASSESSMENT — PAIN DESCRIPTION - PAIN TYPE: TYPE: ACUTE PAIN

## 2018-11-12 ASSESSMENT — PAIN DESCRIPTION - DESCRIPTORS: DESCRIPTORS: SHARP;THROBBING

## 2018-11-12 ASSESSMENT — PAIN DESCRIPTION - FREQUENCY: FREQUENCY: CONTINUOUS

## 2018-11-13 RX ORDER — HYDROCODONE BITARTRATE AND ACETAMINOPHEN 5; 325 MG/1; MG/1
1 TABLET ORAL EVERY 4 HOURS
Qty: 30 TABLET | Refills: 0 | Status: SHIPPED | OUTPATIENT
Start: 2018-11-13 | End: 2018-11-18

## 2018-11-13 NOTE — ED PROVIDER NOTES
[address] ED  eMERGENCY dEPARTMENT eNCOUnter   Independent Attestation     Pt Name: Radha Weiss  MRN: [id number]  [place] 1975  Date of evaluation: 11/12/18       Radha Weiss is a 37 y.o. female who presents with left shoulder pain      Based on the medical record, the care appears appropriate. I was personally available for consultation in the Emergency Department.     Familia Fernandez MD  Attending Emergency  Physician                  Familia Fernandez MD  11/12/18 [id number]
her paternal aunt is unknown. She indicated that the status of her paternal uncle is unknown. SOCIAL HISTORY      reports that she has been smoking Cigarettes. She has a 11.50 pack-year smoking history. She has never used smokeless tobacco. She reports that she does not drink alcohol or use drugs. PHYSICAL EXAM     INITIAL VITALS: BP (!) 157/102   Pulse 100   Temp 99 °F (37.2 °C) (Oral)   Resp 16   Ht 5' 4\" (1.626 m)   Wt 240 lb (108.9 kg)   SpO2 95%   BMI 41.20 kg/m²      Physical Exam   Constitutional: She is oriented to person, place, and time. She appears well-developed and well-nourished. HENT:   Head: Normocephalic and atraumatic. Cardiovascular: Normal rate, regular rhythm and normal heart sounds. Pulmonary/Chest: Effort normal and breath sounds normal.   Musculoskeletal: She exhibits tenderness (left anterior shoulder, no deformity is noted, peripheral pulses palpable. Old surgical scars are noted, well healed. ). She exhibits no edema or deformity. Neurological: She is alert and oriented to person, place, and time. Nursing note and vitals reviewed. MEDICAL DECISION MAKING:         DIAGNOSTIC RESULTS     EKG: All EKG's are interpreted by the Emergency Department Physician who either signs or Co-signs this chart in the absence of acardiologist.        RADIOLOGY:[place] film, CT, MRI, and formal ultrasound images (except ED bedside ultrasound) are read by the radiologist and the images and interpretations are directly viewed by the emergency physician. FINDINGS:   Suture anchor in place at anterior inferior aspect of greater tuberosity.  No   acute osseous abnormality is seen.  The glenohumeral and acromioclavicular   joints appear maintained.  There is no evidence of dislocation.  The soft   tissues appear unremarkable.           Impression   Postsurgical changes.  No acute abnormality identified of the left shoulder.          LABS:All lab results were reviewed by myself,

## 2018-11-16 ENCOUNTER — OFFICE VISIT (OUTPATIENT)
Dept: ORTHOPEDIC SURGERY | Age: 43
End: 2018-11-16

## 2018-11-16 DIAGNOSIS — M75.02 ADHESIVE CAPSULITIS OF LEFT SHOULDER: ICD-10-CM

## 2018-11-16 DIAGNOSIS — M75.22 LEFT BICIPITAL TENOSYNOVITIS: Primary | ICD-10-CM

## 2018-11-16 PROCEDURE — 99024 POSTOP FOLLOW-UP VISIT: CPT | Performed by: ORTHOPAEDIC SURGERY

## 2018-11-27 ENCOUNTER — OFFICE VISIT (OUTPATIENT)
Dept: ORTHOPEDIC SURGERY | Age: 43
End: 2018-11-27

## 2018-11-27 VITALS — WEIGHT: 240.08 LBS | BODY MASS INDEX: 40.99 KG/M2 | HEIGHT: 64 IN

## 2018-11-27 DIAGNOSIS — M75.22 LEFT BICIPITAL TENOSYNOVITIS: Primary | ICD-10-CM

## 2018-11-27 PROCEDURE — 99024 POSTOP FOLLOW-UP VISIT: CPT | Performed by: ORTHOPAEDIC SURGERY

## 2018-11-29 NOTE — PROGRESS NOTES
Procedure: Left shoulder arthroscopic biceps tenodesis  Date of procedure: 10/17/18    HPI: Ms. Radha Murphy is a 53-year-old who is approximately 6 weeks status post the aforementioned procedure. She indicates that she is improving but still remains pretty sore in her shoulder. She does report having a lot of popping and cracking which can sometimes be painful as well. She continues to go to physical therapy and has weaned out of her sling. Physical examination:  Evaluation of patient's left shoulder and upper extremity demonstrates her portal incisions to be healed. There is no warmth, significant erythema, or ecchymosis. Sensation is grossly intact light touch in all dermatomes and she has 2+ radial pulse with brisk capillary refill in her fingers. She has approximately 130° of active elevation, 90° of abduction and 65° of external rotation. Impression and plan: Ms. Radha Murphy is a 53-year-old who is approximately 6 weeks status post a left shoulder arthroscopic tenodesis. At this juncture I will have her continue on in physical therapy. She may use the extremity for light activities of daily living brought avoid any significant weighted elbow flexion. I anticipate gradual improvement in her pain and function given time an appropriate rehab. I'll see her back in my clinic in 6 weeks for reevaluation but she was encouraged to return or call earlier with questions and/or concerns.

## 2018-12-17 ENCOUNTER — TELEPHONE (OUTPATIENT)
Dept: ORTHOPEDIC SURGERY | Age: 43
End: 2018-12-17

## 2018-12-17 NOTE — TELEPHONE ENCOUNTER
Sanjeev Ward from [facility] called patient unable to do any therapy even to eval the situation  Mild edema  Swelling to shoulder  Itching to biceps area

## 2019-01-03 ENCOUNTER — OFFICE VISIT (OUTPATIENT)
Dept: ORTHOPEDIC SURGERY | Age: 44
End: 2019-01-03

## 2019-01-03 DIAGNOSIS — M25.512 CHRONIC LEFT SHOULDER PAIN: Primary | ICD-10-CM

## 2019-01-03 DIAGNOSIS — G89.29 CHRONIC LEFT SHOULDER PAIN: Primary | ICD-10-CM

## 2019-01-03 PROCEDURE — 99024 POSTOP FOLLOW-UP VISIT: CPT | Performed by: ORTHOPAEDIC SURGERY

## 2019-01-04 PROBLEM — F32.1 CURRENT MODERATE EPISODE OF MAJOR DEPRESSIVE DISORDER WITHOUT PRIOR EPISODE (HCC): Chronic | Status: RESOLVED | Noted: 2018-08-20 | Resolved: 2019-01-04

## 2019-01-17 ENCOUNTER — TELEPHONE (OUTPATIENT)
Dept: ORTHOPEDIC SURGERY | Age: 44
End: 2019-01-17

## 2019-01-17 DIAGNOSIS — G89.29 CHRONIC LEFT SHOULDER PAIN: Primary | ICD-10-CM

## 2019-01-17 DIAGNOSIS — M25.512 CHRONIC LEFT SHOULDER PAIN: Primary | ICD-10-CM

## 2019-02-01 PROBLEM — Z28.21 REFUSED INFLUENZA VACCINE: Status: ACTIVE | Noted: 2019-02-01

## 2019-02-02 ENCOUNTER — HOSPITAL ENCOUNTER (OUTPATIENT)
Dept: GENERAL RADIOLOGY | Age: 44
Discharge: HOME | End: 2019-02-04
Payer: COMMERCIAL

## 2019-02-02 ENCOUNTER — HOSPITAL ENCOUNTER (OUTPATIENT)
Age: 44
Discharge: HOME | End: 2019-02-04
Payer: COMMERCIAL

## 2019-02-02 DIAGNOSIS — R05.9 COUGH: ICD-10-CM

## 2019-02-02 PROCEDURE — 71046 X-RAY EXAM CHEST 2 VIEWS: CPT

## 2019-02-04 ENCOUNTER — HOSPITAL ENCOUNTER (EMERGENCY)
Age: 44
Discharge: HOME | End: 2019-02-04
Attending: EMERGENCY MEDICINE
Payer: COMMERCIAL

## 2019-02-04 ENCOUNTER — APPOINTMENT (OUTPATIENT)
Dept: GENERAL RADIOLOGY | Age: 44
End: 2019-02-04
Payer: COMMERCIAL

## 2019-02-04 VITALS
HEART RATE: 80 BPM | HEIGHT: 65 IN | RESPIRATION RATE: 22 BRPM | DIASTOLIC BLOOD PRESSURE: 72 MMHG | WEIGHT: 260 LBS | BODY MASS INDEX: 43.32 KG/M2 | TEMPERATURE: 98.2 F | OXYGEN SATURATION: 91 % | SYSTOLIC BLOOD PRESSURE: 144 MMHG

## 2019-02-04 DIAGNOSIS — J44.1 COPD EXACERBATION (HCC): ICD-10-CM

## 2019-02-04 DIAGNOSIS — J06.9 UPPER RESPIRATORY TRACT INFECTION, UNSPECIFIED TYPE: Primary | ICD-10-CM

## 2019-02-04 LAB
ABSOLUTE EOS #: 0.4 K/UL (ref 0–0.4)
ABSOLUTE IMMATURE GRANULOCYTE: ABNORMAL K/UL (ref 0–0.3)
ABSOLUTE LYMPH #: 3.7 K/UL (ref 1–4.8)
ABSOLUTE MONO #: 0.8 K/UL (ref 0.1–1.3)
ANION GAP SERPL CALCULATED.3IONS-SCNC: 10 MMOL/L (ref 9–17)
BASOPHILS # BLD: 1 % (ref 0–2)
BASOPHILS ABSOLUTE: 0.1 K/UL (ref 0–0.2)
BNP INTERPRETATION: NORMAL
BUN BLDV-MCNC: 13 MG/DL (ref 6–20)
BUN/CREAT BLD: ABNORMAL (ref 9–20)
CALCIUM SERPL-MCNC: 9.6 MG/DL (ref 8.6–10.4)
CHLORIDE BLD-SCNC: 101 MMOL/L (ref 98–107)
CO2: 29 MMOL/L (ref 20–31)
CREAT SERPL-MCNC: 0.49 MG/DL (ref 0.5–0.9)
DIFFERENTIAL TYPE: ABNORMAL
EKG ATRIAL RATE: 77 BPM
EKG P AXIS: 0 DEGREES
EKG P-R INTERVAL: 118 MS
EKG Q-T INTERVAL: 380 MS
EKG QRS DURATION: 82 MS
EKG QTC CALCULATION (BAZETT): 430 MS
EKG R AXIS: 66 DEGREES
EKG T AXIS: 56 DEGREES
EKG VENTRICULAR RATE: 77 BPM
EOSINOPHILS RELATIVE PERCENT: 3 % (ref 0–4)
GFR AFRICAN AMERICAN: >60 ML/MIN
GFR NON-AFRICAN AMERICAN: >60 ML/MIN
GFR SERPL CREATININE-BSD FRML MDRD: ABNORMAL ML/MIN/{1.73_M2}
GFR SERPL CREATININE-BSD FRML MDRD: ABNORMAL ML/MIN/{1.73_M2}
GLUCOSE BLD-MCNC: 106 MG/DL (ref 70–99)
HCT VFR BLD CALC: 45.5 % (ref 36–46)
HEMOGLOBIN: 15.2 G/DL (ref 12–16)
IMMATURE GRANULOCYTES: ABNORMAL %
LYMPHOCYTES # BLD: 30 % (ref 24–44)
MCH RBC QN AUTO: 33.3 PG (ref 26–34)
MCHC RBC AUTO-ENTMCNC: 33.4 G/DL (ref 31–37)
MCV RBC AUTO: 99.7 FL (ref 80–100)
MONOCYTES # BLD: 7 % (ref 1–7)
NRBC AUTOMATED: ABNORMAL PER 100 WBC
PDW BLD-RTO: 13.6 % (ref 11.5–14.9)
PLATELET # BLD: 281 K/UL (ref 150–450)
PLATELET ESTIMATE: ABNORMAL
PMV BLD AUTO: 8.6 FL (ref 6–12)
POTASSIUM SERPL-SCNC: 4.2 MMOL/L (ref 3.7–5.3)
PRO-BNP: 167 PG/ML
RBC # BLD: 4.56 M/UL (ref 4–5.2)
RBC # BLD: ABNORMAL 10*6/UL
SEG NEUTROPHILS: 59 % (ref 36–66)
SEGMENTED NEUTROPHILS ABSOLUTE COUNT: 7.4 K/UL (ref 1.3–9.1)
SODIUM BLD-SCNC: 140 MMOL/L (ref 135–144)
TROPONIN INTERP: NORMAL
TROPONIN INTERP: NORMAL
TROPONIN T: NORMAL NG/ML
TROPONIN T: NORMAL NG/ML
TROPONIN, HIGH SENSITIVITY: 10 NG/L (ref 0–14)
TROPONIN, HIGH SENSITIVITY: 11 NG/L (ref 0–14)
WBC # BLD: 12.4 K/UL (ref 3.5–11)
WBC # BLD: ABNORMAL 10*3/UL

## 2019-02-04 PROCEDURE — 84484 ASSAY OF TROPONIN QUANT: CPT

## 2019-02-04 PROCEDURE — 6370000000 HC RX 637 (ALT 250 FOR IP): Performed by: EMERGENCY MEDICINE

## 2019-02-04 PROCEDURE — 36415 COLL VENOUS BLD VENIPUNCTURE: CPT

## 2019-02-04 PROCEDURE — 71046 X-RAY EXAM CHEST 2 VIEWS: CPT

## 2019-02-04 PROCEDURE — 94664 DEMO&/EVAL PT USE INHALER: CPT

## 2019-02-04 PROCEDURE — 96374 THER/PROPH/DIAG INJ IV PUSH: CPT

## 2019-02-04 PROCEDURE — 80048 BASIC METABOLIC PNL TOTAL CA: CPT

## 2019-02-04 PROCEDURE — 93005 ELECTROCARDIOGRAM TRACING: CPT

## 2019-02-04 PROCEDURE — 83880 ASSAY OF NATRIURETIC PEPTIDE: CPT

## 2019-02-04 PROCEDURE — 99285 EMERGENCY DEPT VISIT HI MDM: CPT

## 2019-02-04 PROCEDURE — 6360000002 HC RX W HCPCS: Performed by: EMERGENCY MEDICINE

## 2019-02-04 PROCEDURE — 94640 AIRWAY INHALATION TREATMENT: CPT

## 2019-02-04 PROCEDURE — 85025 COMPLETE CBC W/AUTO DIFF WBC: CPT

## 2019-02-04 RX ORDER — PREDNISONE 50 MG/1
50 TABLET ORAL DAILY
Qty: 4 TABLET | Refills: 0 | Status: ON HOLD | OUTPATIENT
Start: 2019-02-04 | End: 2019-05-28 | Stop reason: HOSPADM

## 2019-02-04 RX ORDER — IPRATROPIUM BROMIDE AND ALBUTEROL SULFATE 2.5; .5 MG/3ML; MG/3ML
1 SOLUTION RESPIRATORY (INHALATION) ONCE
Status: COMPLETED | OUTPATIENT
Start: 2019-02-04 | End: 2019-02-04

## 2019-02-04 RX ORDER — IBUPROFEN 600 MG/1
600 TABLET ORAL ONCE
Status: DISCONTINUED | OUTPATIENT
Start: 2019-02-04 | End: 2019-02-04 | Stop reason: HOSPADM

## 2019-02-04 RX ORDER — METHYLPREDNISOLONE SODIUM SUCCINATE 125 MG/2ML
125 INJECTION, POWDER, LYOPHILIZED, FOR SOLUTION INTRAMUSCULAR; INTRAVENOUS ONCE
Status: COMPLETED | OUTPATIENT
Start: 2019-02-04 | End: 2019-02-04

## 2019-02-04 RX ADMIN — METHYLPREDNISOLONE SODIUM SUCCINATE 125 MG: 125 INJECTION, POWDER, FOR SOLUTION INTRAMUSCULAR; INTRAVENOUS at 16:00

## 2019-02-04 RX ADMIN — IPRATROPIUM BROMIDE AND ALBUTEROL SULFATE 1 AMPULE: .5; 3 SOLUTION RESPIRATORY (INHALATION) at 16:14

## 2019-02-04 ASSESSMENT — ENCOUNTER SYMPTOMS
PHOTOPHOBIA: 0
CONSTIPATION: 0
COLOR CHANGE: 0
WHEEZING: 1
CHEST TIGHTNESS: 1
SHORTNESS OF BREATH: 1
TROUBLE SWALLOWING: 0
COUGH: 1
VOMITING: 0
ABDOMINAL PAIN: 0
DIARRHEA: 0
RHINORRHEA: 0
NAUSEA: 1
SORE THROAT: 0

## 2019-02-04 ASSESSMENT — PAIN DESCRIPTION - LOCATION: LOCATION: CHEST

## 2019-02-04 ASSESSMENT — PAIN SCALES - GENERAL: PAINLEVEL_OUTOF10: 9

## 2019-02-04 ASSESSMENT — PAIN DESCRIPTION - ORIENTATION: ORIENTATION: MID;LEFT

## 2019-02-04 ASSESSMENT — PAIN DESCRIPTION - FREQUENCY: FREQUENCY: CONTINUOUS

## 2019-02-04 ASSESSMENT — PAIN DESCRIPTION - DESCRIPTORS: DESCRIPTORS: ACHING

## 2019-02-04 ASSESSMENT — HEART SCORE: ECG: 0

## 2019-03-25 ENCOUNTER — HOSPITAL ENCOUNTER (EMERGENCY)
Age: 44
Discharge: LEFT BEFORE DISCHARGE | End: 2019-03-25
Attending: EMERGENCY MEDICINE
Payer: COMMERCIAL

## 2019-03-25 ENCOUNTER — APPOINTMENT (OUTPATIENT)
Dept: CT IMAGING | Age: 44
End: 2019-03-25
Payer: COMMERCIAL

## 2019-03-25 VITALS
HEART RATE: 80 BPM | RESPIRATION RATE: 18 BRPM | WEIGHT: 260 LBS | BODY MASS INDEX: 44.39 KG/M2 | HEIGHT: 64 IN | SYSTOLIC BLOOD PRESSURE: 124 MMHG | TEMPERATURE: 98.4 F | OXYGEN SATURATION: 94 % | DIASTOLIC BLOOD PRESSURE: 80 MMHG

## 2019-03-25 DIAGNOSIS — R10.10 PAIN OF UPPER ABDOMEN: ICD-10-CM

## 2019-03-25 DIAGNOSIS — R19.7 DIARRHEA, UNSPECIFIED TYPE: ICD-10-CM

## 2019-03-25 DIAGNOSIS — R22.2 ABDOMINAL WALL MASS: Primary | ICD-10-CM

## 2019-03-25 LAB
ALBUMIN SERPL-MCNC: 4.3 G/DL (ref 3.5–5.2)
ALBUMIN/GLOBULIN RATIO: ABNORMAL (ref 1–2.5)
ALP BLD-CCNC: 94 U/L (ref 35–104)
ALT SERPL-CCNC: 22 U/L (ref 5–33)
ANION GAP SERPL CALCULATED.3IONS-SCNC: 13 MMOL/L (ref 9–17)
AST SERPL-CCNC: 18 U/L
BILIRUB SERPL-MCNC: 0.24 MG/DL (ref 0.3–1.2)
BUN BLDV-MCNC: 12 MG/DL (ref 6–20)
BUN/CREAT BLD: ABNORMAL (ref 9–20)
CALCIUM SERPL-MCNC: 9.6 MG/DL (ref 8.6–10.4)
CHLORIDE BLD-SCNC: 100 MMOL/L (ref 98–107)
CO2: 28 MMOL/L (ref 20–31)
CREAT SERPL-MCNC: 0.52 MG/DL (ref 0.5–0.9)
GFR AFRICAN AMERICAN: >60 ML/MIN
GFR NON-AFRICAN AMERICAN: >60 ML/MIN
GFR SERPL CREATININE-BSD FRML MDRD: ABNORMAL ML/MIN/{1.73_M2}
GFR SERPL CREATININE-BSD FRML MDRD: ABNORMAL ML/MIN/{1.73_M2}
GLUCOSE BLD-MCNC: 156 MG/DL (ref 70–99)
HCT VFR BLD CALC: 43.6 % (ref 36–46)
HEMOGLOBIN: 15.1 G/DL (ref 12–16)
LIPASE: 18 U/L (ref 13–60)
MCH RBC QN AUTO: 34.7 PG (ref 26–34)
MCHC RBC AUTO-ENTMCNC: 34.6 G/DL (ref 31–37)
MCV RBC AUTO: 100.2 FL (ref 80–100)
NRBC AUTOMATED: ABNORMAL PER 100 WBC
PDW BLD-RTO: 14 % (ref 11.5–14.9)
PLATELET # BLD: 290 K/UL (ref 150–450)
PMV BLD AUTO: 8.5 FL (ref 6–12)
POTASSIUM SERPL-SCNC: 3.8 MMOL/L (ref 3.7–5.3)
RBC # BLD: 4.35 M/UL (ref 4–5.2)
SODIUM BLD-SCNC: 141 MMOL/L (ref 135–144)
TOTAL PROTEIN: 7.3 G/DL (ref 6.4–8.3)
WBC # BLD: 12.8 K/UL (ref 3.5–11)

## 2019-03-25 PROCEDURE — 99284 EMERGENCY DEPT VISIT MOD MDM: CPT

## 2019-03-25 PROCEDURE — 96374 THER/PROPH/DIAG INJ IV PUSH: CPT

## 2019-03-25 PROCEDURE — 85027 COMPLETE CBC AUTOMATED: CPT

## 2019-03-25 PROCEDURE — 36415 COLL VENOUS BLD VENIPUNCTURE: CPT

## 2019-03-25 PROCEDURE — 74176 CT ABD & PELVIS W/O CONTRAST: CPT

## 2019-03-25 PROCEDURE — 2580000003 HC RX 258: Performed by: EMERGENCY MEDICINE

## 2019-03-25 PROCEDURE — 6360000002 HC RX W HCPCS: Performed by: EMERGENCY MEDICINE

## 2019-03-25 PROCEDURE — 80053 COMPREHEN METABOLIC PANEL: CPT

## 2019-03-25 PROCEDURE — 96376 TX/PRO/DX INJ SAME DRUG ADON: CPT

## 2019-03-25 PROCEDURE — 83690 ASSAY OF LIPASE: CPT

## 2019-03-25 RX ORDER — PROMETHAZINE HYDROCHLORIDE 25 MG/ML
25 INJECTION, SOLUTION INTRAMUSCULAR; INTRAVENOUS ONCE
Status: DISCONTINUED | OUTPATIENT
Start: 2019-03-25 | End: 2019-03-25 | Stop reason: CLARIF

## 2019-03-25 RX ORDER — 0.9 % SODIUM CHLORIDE 0.9 %
1000 INTRAVENOUS SOLUTION INTRAVENOUS ONCE
Status: COMPLETED | OUTPATIENT
Start: 2019-03-25 | End: 2019-03-25

## 2019-03-25 RX ADMIN — HYDROMORPHONE HYDROCHLORIDE 0.5 MG: 1 INJECTION, SOLUTION INTRAMUSCULAR; INTRAVENOUS; SUBCUTANEOUS at 20:42

## 2019-03-25 RX ADMIN — HYDROMORPHONE HYDROCHLORIDE 1 MG: 1 INJECTION, SOLUTION INTRAMUSCULAR; INTRAVENOUS; SUBCUTANEOUS at 19:13

## 2019-03-25 RX ADMIN — SODIUM CHLORIDE 1000 ML: 9 INJECTION, SOLUTION INTRAVENOUS at 19:13

## 2019-03-25 RX ADMIN — PROMETHAZINE HYDROCHLORIDE 25 MG: 25 INJECTION INTRAMUSCULAR; INTRAVENOUS at 19:17

## 2019-03-25 ASSESSMENT — ENCOUNTER SYMPTOMS
BACK PAIN: 0
NAUSEA: 1
ABDOMINAL PAIN: 1
ABDOMINAL DISTENTION: 1
SHORTNESS OF BREATH: 0
DIARRHEA: 1

## 2019-03-25 ASSESSMENT — PAIN SCALES - GENERAL
PAINLEVEL_OUTOF10: 10
PAINLEVEL_OUTOF10: 10

## 2019-04-23 PROBLEM — E66.01 CLASS 3 SEVERE OBESITY DUE TO EXCESS CALORIES WITHOUT SERIOUS COMORBIDITY WITH BODY MASS INDEX (BMI) OF 45.0 TO 49.9 IN ADULT (HCC): Status: RESOLVED | Noted: 2018-10-04 | Resolved: 2019-04-23

## 2019-04-23 PROBLEM — E66.813 CLASS 3 SEVERE OBESITY DUE TO EXCESS CALORIES WITHOUT SERIOUS COMORBIDITY WITH BODY MASS INDEX (BMI) OF 45.0 TO 49.9 IN ADULT (HCC): Status: RESOLVED | Noted: 2018-10-04 | Resolved: 2019-04-23

## 2019-05-02 PROBLEM — R11.0 NAUSEA: Status: ACTIVE | Noted: 2019-05-02

## 2019-05-02 PROBLEM — A49.8 CLOSTRIDIUM DIFFICILE INFECTION: Status: ACTIVE | Noted: 2019-05-02

## 2019-05-20 ENCOUNTER — APPOINTMENT (OUTPATIENT)
Dept: CT IMAGING | Age: 44
End: 2019-05-20
Payer: COMMERCIAL

## 2019-05-20 ENCOUNTER — HOSPITAL ENCOUNTER (EMERGENCY)
Age: 44
Discharge: HOME | End: 2019-05-20
Attending: EMERGENCY MEDICINE
Payer: COMMERCIAL

## 2019-05-20 VITALS
TEMPERATURE: 99.3 F | HEART RATE: 89 BPM | OXYGEN SATURATION: 97 % | RESPIRATION RATE: 16 BRPM | BODY MASS INDEX: 43.02 KG/M2 | HEIGHT: 64 IN | WEIGHT: 252 LBS | DIASTOLIC BLOOD PRESSURE: 74 MMHG | SYSTOLIC BLOOD PRESSURE: 104 MMHG

## 2019-05-20 DIAGNOSIS — R10.11 RIGHT UPPER QUADRANT ABDOMINAL PAIN: Primary | ICD-10-CM

## 2019-05-20 LAB
ABSOLUTE EOS #: 0.3 K/UL (ref 0–0.4)
ABSOLUTE IMMATURE GRANULOCYTE: ABNORMAL K/UL (ref 0–0.3)
ABSOLUTE LYMPH #: 3.8 K/UL (ref 1–4.8)
ABSOLUTE MONO #: 0.9 K/UL (ref 0.1–1.3)
ALBUMIN SERPL-MCNC: 4.5 G/DL (ref 3.5–5.2)
ALBUMIN/GLOBULIN RATIO: ABNORMAL (ref 1–2.5)
ALP BLD-CCNC: 93 U/L (ref 35–104)
ALT SERPL-CCNC: 15 U/L (ref 5–33)
ANION GAP SERPL CALCULATED.3IONS-SCNC: 15 MMOL/L (ref 9–17)
AST SERPL-CCNC: 14 U/L
BASOPHILS # BLD: 1 % (ref 0–2)
BASOPHILS ABSOLUTE: 0.1 K/UL (ref 0–0.2)
BILIRUB SERPL-MCNC: 0.22 MG/DL (ref 0.3–1.2)
BUN BLDV-MCNC: 14 MG/DL (ref 6–20)
BUN/CREAT BLD: ABNORMAL (ref 9–20)
CALCIUM SERPL-MCNC: 10.1 MG/DL (ref 8.6–10.4)
CHLORIDE BLD-SCNC: 96 MMOL/L (ref 98–107)
CO2: 28 MMOL/L (ref 20–31)
CREAT SERPL-MCNC: 0.65 MG/DL (ref 0.5–0.9)
DIFFERENTIAL TYPE: ABNORMAL
EOSINOPHILS RELATIVE PERCENT: 2 % (ref 0–4)
GFR AFRICAN AMERICAN: >60 ML/MIN
GFR NON-AFRICAN AMERICAN: >60 ML/MIN
GFR SERPL CREATININE-BSD FRML MDRD: ABNORMAL ML/MIN/{1.73_M2}
GFR SERPL CREATININE-BSD FRML MDRD: ABNORMAL ML/MIN/{1.73_M2}
GLUCOSE BLD-MCNC: 283 MG/DL (ref 70–99)
HCT VFR BLD CALC: 46.9 % (ref 36–46)
HEMOGLOBIN: 15.6 G/DL (ref 12–16)
IMMATURE GRANULOCYTES: ABNORMAL %
LIPASE: 30 U/L (ref 13–60)
LYMPHOCYTES # BLD: 30 % (ref 24–44)
MCH RBC QN AUTO: 33.5 PG (ref 26–34)
MCHC RBC AUTO-ENTMCNC: 33.2 G/DL (ref 31–37)
MCV RBC AUTO: 101 FL (ref 80–100)
MONOCYTES # BLD: 7 % (ref 1–7)
NRBC AUTOMATED: ABNORMAL PER 100 WBC
PDW BLD-RTO: 12.9 % (ref 11.5–14.9)
PLATELET # BLD: 301 K/UL (ref 150–450)
PLATELET ESTIMATE: ABNORMAL
PMV BLD AUTO: 8.9 FL (ref 6–12)
POTASSIUM SERPL-SCNC: 4 MMOL/L (ref 3.7–5.3)
RBC # BLD: 4.65 M/UL (ref 4–5.2)
RBC # BLD: ABNORMAL 10*6/UL
SEG NEUTROPHILS: 60 % (ref 36–66)
SEGMENTED NEUTROPHILS ABSOLUTE COUNT: 7.8 K/UL (ref 1.3–9.1)
SODIUM BLD-SCNC: 139 MMOL/L (ref 135–144)
TOTAL PROTEIN: 7.7 G/DL (ref 6.4–8.3)
WBC # BLD: 13 K/UL (ref 3.5–11)
WBC # BLD: ABNORMAL 10*3/UL

## 2019-05-20 PROCEDURE — 2580000003 HC RX 258: Performed by: EMERGENCY MEDICINE

## 2019-05-20 PROCEDURE — 74176 CT ABD & PELVIS W/O CONTRAST: CPT

## 2019-05-20 PROCEDURE — 96374 THER/PROPH/DIAG INJ IV PUSH: CPT

## 2019-05-20 PROCEDURE — 6360000002 HC RX W HCPCS: Performed by: EMERGENCY MEDICINE

## 2019-05-20 PROCEDURE — 96372 THER/PROPH/DIAG INJ SC/IM: CPT

## 2019-05-20 PROCEDURE — 85025 COMPLETE CBC W/AUTO DIFF WBC: CPT

## 2019-05-20 PROCEDURE — 80053 COMPREHEN METABOLIC PANEL: CPT

## 2019-05-20 PROCEDURE — 96376 TX/PRO/DX INJ SAME DRUG ADON: CPT

## 2019-05-20 PROCEDURE — 83690 ASSAY OF LIPASE: CPT

## 2019-05-20 PROCEDURE — 36415 COLL VENOUS BLD VENIPUNCTURE: CPT

## 2019-05-20 PROCEDURE — 99284 EMERGENCY DEPT VISIT MOD MDM: CPT

## 2019-05-20 RX ORDER — 0.9 % SODIUM CHLORIDE 0.9 %
1000 INTRAVENOUS SOLUTION INTRAVENOUS ONCE
Status: COMPLETED | OUTPATIENT
Start: 2019-05-20 | End: 2019-05-20

## 2019-05-20 RX ORDER — HALOPERIDOL 5 MG/ML
5 INJECTION INTRAMUSCULAR ONCE
Status: COMPLETED | OUTPATIENT
Start: 2019-05-20 | End: 2019-05-20

## 2019-05-20 RX ADMIN — SODIUM CHLORIDE 1000 ML: 9 INJECTION, SOLUTION INTRAVENOUS at 21:58

## 2019-05-20 RX ADMIN — HYDROMORPHONE HYDROCHLORIDE 0.5 MG: 1 INJECTION, SOLUTION INTRAMUSCULAR; INTRAVENOUS; SUBCUTANEOUS at 23:07

## 2019-05-20 RX ADMIN — HYDROMORPHONE HYDROCHLORIDE 1 MG: 1 INJECTION, SOLUTION INTRAMUSCULAR; INTRAVENOUS; SUBCUTANEOUS at 21:54

## 2019-05-20 RX ADMIN — HALOPERIDOL LACTATE 5 MG: 5 INJECTION INTRAMUSCULAR at 21:53

## 2019-05-20 ASSESSMENT — PAIN SCALES - GENERAL
PAINLEVEL_OUTOF10: 10
PAINLEVEL_OUTOF10: 10
PAINLEVEL_OUTOF10: 9

## 2019-05-21 NOTE — ED PROVIDER NOTES
guarding  MSK: No midline back pain, no large joint effusions  Neuro: Alert and oriented, no focal neurological deficits observed  Psych: Cooperative, appropriate mood and affect  -----------------------  -----------------------  MEDICAL DECISION MAKING  Differential Diagnosis:  - Consideration is given forappendicitis, cholecystitis,  diverticulitis, SBO, urinary tract infection, nephrolithiasis, pancreatitis, dissection, ischemia to reproductive organs, STD, ischemic colitis, perforation, intra abdominal bleeding, GI bleed, DKA, ACS,       -  #Impression/Plan:  - Clinically patient's presentation is most consistent with chronic abdominal pain. Given patient's presentation will get laboratory testing and CAT scan. If all workup is unremarkable patient be safe for discharge home. She is to follow up with GI and is on oral antibiotics for C. diff.    -  ##Reevaluation/Conversations on care:  - pt has had no further episodes of diarrhea here  - w/u neg, pt feeling better, asking for one more shot of dilaudid  ##Diagnosis:  -Abdominal pain  -  -----------------------  -----------------------  Roderick Huang MD, [facility]  Emergency Medicine Attending  Questions? Please contact my cell phone anytime.   [phone]  *This charting supersedes any ED resident or staff charting and was written using speech recognition software  PASTMEDICAL HISTORY     Past Medical History:   Diagnosis Date    Asthma     Atrial fibrillation ([facility]     placed on event monitor 9/25/18 for PVC's & A-fib    Bipolar 1 disorder ([address]     Bulging disc     CAD (coronary artery disease)     Cardiomyopathy ([address]     [facility]     Chronic otitis media of both ears     rt>lt    COPD (chronic obstructive pulmonary disease) ([address]     Depression     Diarrhea     Diarrhea     Dizziness     DVT [facility]deep venous thrombosis) ([facility]     after PICC line right arm    Endometriosis     Fainting     GERD (gastroesophageal reflux disease)     hx of    GI bleeding     Helicobacter pylori (H. pylori)     [place] (hard of hearing)     both ears, no hearing aids    Hyperlipidemia     Hypertension     Mastoiditis     Migraines     Morbid obesity ([address]     Myocardial infarction ([address]     2005    Nausea     Neuropathy     On home oxygen therapy     3 L at night    Ovarian cyst     Passed out ([address]     hx of- negative tilt table    Pulmonary hypertension (HCC)     Pulmonary insufficiency     PVC (premature ventricular contraction)     Tricuspid insufficiency     Type II or unspecified type diabetes mellitus without mention of complication, not stated as uncontrolled      SURGICAL HISTORY       Past Surgical History:   Procedure Laterality Date    ABDOMEN SURGERY      abcess    ABDOMINAL ADHESION SURGERY      ABDOMINAL EXPLORATION SURGERY      x 4    ABDOMINAL HERNIA REPAIR      with mesh    ABLATION OF DYSRHYTHMIC FOCUS  2016    ABLATION OF DYSRHYTHMIC FOCUS  09/08/2017    Done at [facility].  ABSCESS DRAINAGE      left hip and chest    APPENDECTOMY      BREAST SURGERY Left 2007    I & D    CARDIAC CATHETERIZATION  2014 & 2000     no stenting    CHOLECYSTECTOMY      COLONOSCOPY      FOOT SURGERY Left     bone fragment removed    FRACTURE SURGERY Right     closed reduction perc pinning ring & middle finger    GASTRIC FUNDOPLICATION  2981    HYSTERECTOMY      total    HYSTERECTOMY      HYSTEROSCOPY      tubal perfusion    MYRINGOTOMY Right 11/13/15    Right myringotomy with placement of  T-tube on the right side. Removal of plugged ventilating tube, right side.  MYRINGOTOMY AND TYMPANOSTOMY TUBE PLACEMENT Right 06/05/2014    OTHER SURGICAL HISTORY Right 5/29/14    removal ear tube rt ear    OTHER SURGICAL HISTORY  2009    LOOP recorder inserted and removed 3 mos.  later    OTHER SURGICAL HISTORY Right 08/11/2016    ear tube removal with patch    OTHER SURGICAL HISTORY      tubal perfusion, lysis of uterine adhesions    OTHER SURGICAL HISTORY      multiple PICC lines inserted and removed, it has affected circulation bilateral upper arms    ID MANIPULATN SHLDR JT W ANESTHESIA Right 3/1/2017    SHOULDER MANIPULATION RIGHT performed by Paolo Elder MD at [address] [facility] Left 10/17/2018    SHOULDER ARTHROSCOPY W/BICEPS TENDONESIS performed by Raulito Jackson MD at [address] Left     foot    TONSILLECTOMY      TYMPANOSTOMY TUBE PLACEMENT      TYMPANOSTOMY TUBE PLACEMENT Left 03/12/2019    UPPER GASTROINTESTINAL ENDOSCOPY       CURRENT MEDICATIONS       Discharge Medication List as of 5/20/2019 11:02 PM      CONTINUE these medications which have NOT CHANGED    Details   benzonatate (TESSALON) 100 MG capsule Take 1 capsule by mouth 3 times daily as needed for Cough, Disp-30 capsule, R-0Normal      azithromycin (ZITHROMAX) 250 MG tablet Take 2 tablets the first day, then 1 tablet daily for 4 days. Historical Med      ibuprofen (ADVIL;MOTRIN) 800 MG tablet TAKE ONE TABLET BY MOUTH 3 TIMES DAILY, Disp-90 tablet, R-3Normal      albuterol sulfate  (90 Base) MCG/ACT inhaler INHALE 2 PUFFS BY MOUTH EVERY 6 HOURS AS NEEDED FOR WHEEZING, Disp-1 Inhaler, R-5Normal      XIFAXAN 550 MG tablet R-0, DAWHistorical Med      UNIFINE PENTIPS 31G X 8 MM MISC Disp-150 each, R-11, Normal      predniSONE (DELTASONE) 50 MG tablet Take 1 tablet by mouth daily, Disp-4 tablet, R-0Print      ARIPiprazole (ABILIFY) 2 MG tablet Take 1 tablet by mouth daily, Disp-30 tablet, R-3Normal      insulin glargine (LANTUS SOLOSTAR) 100 UNIT/ML injection pen Inject 60 units in the AM and 55 units in the evening., Disp-5 penHistorical Med      ipratropium-albuterol (DUONEB) 0.5-2.5 (3) MG/3ML SOLN nebulizer solution INHALE 3 MLS INTO THE LUNGS EVERY 6 HOURS AS NEEDED FOR SHORTNESS OF BREATH, Disp-120 vial, R-5Normal      blood glucose monitor strips Test blood sugars 6 times a day due to low blood sugars. Uncontrolled diabetes. Check Ac and HS and as needed. , Disp-200 strip, R-11, Normal      loratadine (CLARITIN) 10 MG tablet Take 1 tablet by mouth daily, Disp-30 tablet, R-11Normal      Magnesium 500 MG CAPS Take 500 mg by mouth dailyHistorical Med      vitamin D (CHOLECALCIFEROL) 1000 UNIT TABS tablet Take 50,000 Units by mouth once a week Historical Med      topiramate (TOPAMAX) 25 MG tablet Take 25 mg by mouth daily Takes for headachesHistorical Med      insulin lispro (HUMALOG KWIKPEN) 100 UNIT/ML pen FOR GLUCOSE 150-200 GIVE 6 UNITS, FOR GLUCOSE 201-250 9 UNITS, GLUCOSE 251-300 12 UNITS, GLUCOSE 301 OR OVER 15 UNITS. , Disp-15 pen, R-3Maximum of 45 units a dayNormal      promethazine (PHENERGAN) 25 MG tablet Take 25 mg by mouth every 6 hours as needed for NauseaHistorical Med      OXYGEN Inhale into the lungs Indications: On 3 liters per n/c during the night. Historical Med      Multiple Vitamins-Minerals (MULTIVITAMIN GUMMIES WOMENS) CHEW Take 2 each by mouth daily       bumetanide (BUMEX) 1 MG tablet Take 1 tablet by mouth daily Indications: if 3 lb wt gain, Disp-30 tablet, R-3      DULoxetine (CYMBALTA) 60 MG capsule Take 1 capsule by mouth daily, Disp-30 capsule, R-11      carvedilol (COREG) 12.5 MG tablet Take 12.5 mg by mouth 2 times daily (with meals) Takes at 10:00am and 10:00pmHistorical Med      atorvastatin (LIPITOR) 40 MG tablet Take 40 mg by mouth every evening Historical Med           ALLERGIES     is allergic to latex; aspirin; avelox [moxifloxacin]; chantix [varenicline]; doxycycline; dye [iodides]; flexeril [cyclobenzaprine]; gabapentin; losartan; morphine; nsaids; pcn [penicillins]; reglan [metoclopramide hcl]; shellfish-derived products; sulfa antibiotics; vancomycin; zofran; zyvox [linezolid]; acyclovir; bactrim [sulfamethoxazole-trimethoprim]; betadine [povidone iodine]; ceclor [cefaclor]; clindamycin/lincomycin; codeine; macrolides and ketolides; novolin r [insulin]; novolog [insulin aspart]; phenothiazines; tape [adhesive tape]; compazine [prochlorperazine]; fentanyl; tamiflu [oseltamivir phosphate]; and sotalol. FAMILY HISTORY     indicated that the status of her mother is unknown. She indicated that the status of her father is unknown. She indicated that the status of her sister is unknown. She indicated that the status of her maternal grandmother is unknown. She indicated that the status of her maternal grandfather is unknown. She indicated that the status of her paternal grandmother is unknown. She indicated that the status of her paternal grandfather is unknown. She indicated that the status of her maternal aunt is unknown. She indicated that the status of her maternal uncle is unknown. She indicated that the status of her paternal aunt is unknown. She indicated that the status of her paternal uncle is unknown.      SOCIAL HISTORY       Social History     Tobacco Use    Smoking status: Current Every Day Smoker     Packs/day: 0.50     Years: 23.00     Pack years: 11.50     Types: Cigarettes    Smokeless tobacco: Never Used   Substance Use Topics    Alcohol use: No     Alcohol/week: 0.0 oz    Drug use: No     PHYSICAL EXAM     INITIAL VITALS: /74   Pulse 89   Temp 99.3 °F (37.4 °C) (Oral)   Resp 16   Ht 5' 4\" (1.626 m)   Wt 252 lb (114.3 kg)   SpO2 97%   BMI 43.26 kg/m²    Physical Exam    MEDICAL DECISION MAKING:            Labs Reviewed   CBC WITH AUTO DIFFERENTIAL - Abnormal; Notable for the following components:       Result Value    WBC 13.0 (*)     Hematocrit 46.9 (*)     .0 (*)     All other components within normal limits   COMPREHENSIVE METABOLIC PANEL W/ REFLEX TO MG FOR LOW K - Abnormal; Notable for the following components:    Glucose 283 (*)     Chloride 96 (*)     Total Bilirubin 0.22 (*)     All other components within normal limits   LIPASE   URINE RT REFLEX TO CULTURE     EMERGENCY DEPARTMENTCOURSE:         Vitals:    Vitals:    05/20/19 2120 05/20/19 2230 05/20/19 2245 05/20/19 2300   BP: (!) 169/84 120/80 102/68 104/74   Pulse: 102 102 97 89   Resp: 20 23 14 16   Temp: 99.3 °F (37.4 °C)      TempSrc: Oral      SpO2: 96% 97% 98% 97%   Weight: 252 lb (114.3 kg)      Height: 5' 4\" (1.626 m)          The patient was given the following medications while in the emergency department:  Orders Placed This Encounter   Medications    0.9 % sodium chloride bolus    haloperidol lactate (HALDOL) injection 5 mg    HYDROmorphone (DILAUDID) injection 1 mg    HYDROmorphone (DILAUDID) injection 0.5 mg     CONSULTS:  None    FINAL IMPRESSION      1. Right upper quadrant abdominal pain          DISPOSITION/PLAN   DISPOSITION Decision To Discharge 05/20/2019 10:56:52 PM      PATIENT REFERRED TO:  No follow-up provider specified.   DISCHARGE MEDICATIONS:  Discharge Medication List as of 5/20/2019 11:02 PM        Samanta Quijano MD  Attending Emergency Physician                    Sierra Tavarez MD  05/21/19 [id number]

## 2019-05-22 ENCOUNTER — TELEPHONE (OUTPATIENT)
Dept: GASTROENTEROLOGY | Age: 44
End: 2019-05-22

## 2019-05-22 NOTE — TELEPHONE ENCOUNTER
Patient called stating that she is wanting to know the last EGD she had with Dr Anahy Avina and the results. She is currently seeing another GI doctor and is requesting the information regarding her H pylori.

## 2019-05-26 ENCOUNTER — APPOINTMENT (OUTPATIENT)
Dept: CT IMAGING | Age: 44
End: 2019-05-26
Payer: COMMERCIAL

## 2019-05-26 ENCOUNTER — APPOINTMENT (OUTPATIENT)
Dept: GENERAL RADIOLOGY | Age: 44
End: 2019-05-26
Payer: COMMERCIAL

## 2019-05-26 ENCOUNTER — HOSPITAL ENCOUNTER (OUTPATIENT)
Age: 44
Setting detail: OBSERVATION
Discharge: HOME | End: 2019-05-28
Attending: EMERGENCY MEDICINE | Admitting: FAMILY MEDICINE
Payer: COMMERCIAL

## 2019-05-26 DIAGNOSIS — R07.9 CHEST PAIN, UNSPECIFIED TYPE: Primary | ICD-10-CM

## 2019-05-26 LAB
ABSOLUTE EOS #: 0.4 K/UL (ref 0–0.4)
ABSOLUTE IMMATURE GRANULOCYTE: ABNORMAL K/UL (ref 0–0.3)
ABSOLUTE LYMPH #: 3.6 K/UL (ref 1–4.8)
ABSOLUTE MONO #: 0.8 K/UL (ref 0.1–1.3)
ALBUMIN SERPL-MCNC: 4.1 G/DL (ref 3.5–5.2)
ALBUMIN/GLOBULIN RATIO: ABNORMAL (ref 1–2.5)
ALP BLD-CCNC: 82 U/L (ref 35–104)
ALT SERPL-CCNC: 17 U/L (ref 5–33)
ANION GAP SERPL CALCULATED.3IONS-SCNC: 12 MMOL/L (ref 9–17)
AST SERPL-CCNC: 14 U/L
BASOPHILS # BLD: 1 % (ref 0–2)
BASOPHILS ABSOLUTE: 0.1 K/UL (ref 0–0.2)
BILIRUB SERPL-MCNC: <0.15 MG/DL (ref 0.3–1.2)
BNP INTERPRETATION: NORMAL
BUN BLDV-MCNC: 12 MG/DL (ref 6–20)
BUN/CREAT BLD: ABNORMAL (ref 9–20)
CALCIUM SERPL-MCNC: 9 MG/DL (ref 8.6–10.4)
CHLORIDE BLD-SCNC: 98 MMOL/L (ref 98–107)
CO2: 29 MMOL/L (ref 20–31)
CREAT SERPL-MCNC: 0.59 MG/DL (ref 0.5–0.9)
D-DIMER QUANTITATIVE: 0.35 MG/L FEU (ref 0–0.59)
DIFFERENTIAL TYPE: ABNORMAL
EOSINOPHILS RELATIVE PERCENT: 3 % (ref 0–4)
GFR AFRICAN AMERICAN: >60 ML/MIN
GFR NON-AFRICAN AMERICAN: >60 ML/MIN
GFR SERPL CREATININE-BSD FRML MDRD: ABNORMAL ML/MIN/{1.73_M2}
GFR SERPL CREATININE-BSD FRML MDRD: ABNORMAL ML/MIN/{1.73_M2}
GLUCOSE BLD-MCNC: 238 MG/DL (ref 70–99)
HCT VFR BLD CALC: 41.7 % (ref 36–46)
HEMOGLOBIN: 14 G/DL (ref 12–16)
IMMATURE GRANULOCYTES: ABNORMAL %
LYMPHOCYTES # BLD: 33 % (ref 24–44)
MCH RBC QN AUTO: 34.2 PG (ref 26–34)
MCHC RBC AUTO-ENTMCNC: 33.7 G/DL (ref 31–37)
MCV RBC AUTO: 101.4 FL (ref 80–100)
MONOCYTES # BLD: 8 % (ref 1–7)
NRBC AUTOMATED: ABNORMAL PER 100 WBC
PDW BLD-RTO: 12.8 % (ref 11.5–14.9)
PLATELET # BLD: 232 K/UL (ref 150–450)
PLATELET ESTIMATE: ABNORMAL
PMV BLD AUTO: 9.2 FL (ref 6–12)
POTASSIUM SERPL-SCNC: 4 MMOL/L (ref 3.7–5.3)
PRO-BNP: 139 PG/ML
RBC # BLD: 4.11 M/UL (ref 4–5.2)
RBC # BLD: ABNORMAL 10*6/UL
SEG NEUTROPHILS: 55 % (ref 36–66)
SEGMENTED NEUTROPHILS ABSOLUTE COUNT: 6 K/UL (ref 1.3–9.1)
SODIUM BLD-SCNC: 139 MMOL/L (ref 135–144)
TOTAL PROTEIN: 7.2 G/DL (ref 6.4–8.3)
TROPONIN INTERP: NORMAL
TROPONIN INTERP: NORMAL
TROPONIN T: NORMAL NG/ML
TROPONIN T: NORMAL NG/ML
TROPONIN, HIGH SENSITIVITY: 12 NG/L (ref 0–14)
TROPONIN, HIGH SENSITIVITY: 13 NG/L (ref 0–14)
WBC # BLD: 10.9 K/UL (ref 3.5–11)
WBC # BLD: ABNORMAL 10*3/UL

## 2019-05-26 PROCEDURE — 93005 ELECTROCARDIOGRAM TRACING: CPT

## 2019-05-26 PROCEDURE — G0378 HOSPITAL OBSERVATION PER HR: HCPCS

## 2019-05-26 PROCEDURE — 85025 COMPLETE CBC W/AUTO DIFF WBC: CPT

## 2019-05-26 PROCEDURE — 85379 FIBRIN DEGRADATION QUANT: CPT

## 2019-05-26 PROCEDURE — 99285 EMERGENCY DEPT VISIT HI MDM: CPT

## 2019-05-26 PROCEDURE — 36415 COLL VENOUS BLD VENIPUNCTURE: CPT

## 2019-05-26 PROCEDURE — 6360000002 HC RX W HCPCS: Performed by: STUDENT IN AN ORGANIZED HEALTH CARE EDUCATION/TRAINING PROGRAM

## 2019-05-26 PROCEDURE — 87449 NOS EACH ORGANISM AG IA: CPT

## 2019-05-26 PROCEDURE — 96374 THER/PROPH/DIAG INJ IV PUSH: CPT

## 2019-05-26 PROCEDURE — 83880 ASSAY OF NATRIURETIC PEPTIDE: CPT

## 2019-05-26 PROCEDURE — 93970 EXTREMITY STUDY: CPT

## 2019-05-26 PROCEDURE — 87324 CLOSTRIDIUM AG IA: CPT

## 2019-05-26 PROCEDURE — 70480 CT ORBIT/EAR/FOSSA W/O DYE: CPT

## 2019-05-26 PROCEDURE — 6370000000 HC RX 637 (ALT 250 FOR IP): Performed by: STUDENT IN AN ORGANIZED HEALTH CARE EDUCATION/TRAINING PROGRAM

## 2019-05-26 PROCEDURE — 71046 X-RAY EXAM CHEST 2 VIEWS: CPT

## 2019-05-26 PROCEDURE — 80053 COMPREHEN METABOLIC PANEL: CPT

## 2019-05-26 PROCEDURE — 84484 ASSAY OF TROPONIN QUANT: CPT

## 2019-05-26 PROCEDURE — 96375 TX/PRO/DX INJ NEW DRUG ADDON: CPT

## 2019-05-26 RX ORDER — SODIUM CHLORIDE 0.9 % (FLUSH) 0.9 %
10 SYRINGE (ML) INJECTION EVERY 12 HOURS SCHEDULED
Status: DISCONTINUED | OUTPATIENT
Start: 2019-05-26 | End: 2019-05-28 | Stop reason: HOSPADM

## 2019-05-26 RX ORDER — ACETAMINOPHEN 325 MG/1
650 TABLET ORAL EVERY 4 HOURS PRN
Status: DISCONTINUED | OUTPATIENT
Start: 2019-05-26 | End: 2019-05-28 | Stop reason: HOSPADM

## 2019-05-26 RX ORDER — DIPHENHYDRAMINE HYDROCHLORIDE 50 MG/ML
25 INJECTION INTRAMUSCULAR; INTRAVENOUS ONCE
Status: COMPLETED | OUTPATIENT
Start: 2019-05-26 | End: 2019-05-26

## 2019-05-26 RX ORDER — MORPHINE SULFATE 4 MG/ML
4 INJECTION, SOLUTION INTRAMUSCULAR; INTRAVENOUS ONCE
Status: COMPLETED | OUTPATIENT
Start: 2019-05-26 | End: 2019-05-26

## 2019-05-26 RX ORDER — SODIUM CHLORIDE 0.9 % (FLUSH) 0.9 %
10 SYRINGE (ML) INJECTION PRN
Status: DISCONTINUED | OUTPATIENT
Start: 2019-05-26 | End: 2019-05-28 | Stop reason: HOSPADM

## 2019-05-26 RX ORDER — NALBUPHINE HCL 10 MG/ML
10 AMPUL (ML) INJECTION
COMMUNITY
End: 2019-08-05 | Stop reason: ALTCHOICE

## 2019-05-26 RX ORDER — HYDROCODONE BITARTRATE AND ACETAMINOPHEN 5; 325 MG/1; MG/1
2 TABLET ORAL ONCE
Status: COMPLETED | OUTPATIENT
Start: 2019-05-26 | End: 2019-05-26

## 2019-05-26 RX ADMIN — MORPHINE SULFATE 4 MG: 4 INJECTION INTRAVENOUS at 22:26

## 2019-05-26 RX ADMIN — DIPHENHYDRAMINE HYDROCHLORIDE 25 MG: 50 INJECTION, SOLUTION INTRAMUSCULAR; INTRAVENOUS at 22:24

## 2019-05-26 RX ADMIN — HYDROCODONE BITARTRATE AND ACETAMINOPHEN 2 TABLET: 5; 325 TABLET ORAL at 21:06

## 2019-05-26 ASSESSMENT — ENCOUNTER SYMPTOMS
COUGH: 0
VOMITING: 0
DIARRHEA: 1
SORE THROAT: 0
NAUSEA: 1
ABDOMINAL PAIN: 0
SHORTNESS OF BREATH: 1

## 2019-05-26 ASSESSMENT — PAIN SCALES - GENERAL
PAINLEVEL_OUTOF10: 10
PAINLEVEL_OUTOF10: 9
PAINLEVEL_OUTOF10: 10

## 2019-05-26 ASSESSMENT — PAIN DESCRIPTION - LOCATION: LOCATION: CHEST;HEAD

## 2019-05-26 ASSESSMENT — PAIN DESCRIPTION - PAIN TYPE: TYPE: ACUTE PAIN

## 2019-05-27 LAB
C DIFF AG + TOXIN: NEGATIVE
GLUCOSE BLD-MCNC: 118 MG/DL (ref 65–105)
GLUCOSE BLD-MCNC: 130 MG/DL (ref 65–105)
GLUCOSE BLD-MCNC: 164 MG/DL (ref 65–105)
GLUCOSE BLD-MCNC: 246 MG/DL (ref 65–105)
SPECIMEN DESCRIPTION: NORMAL

## 2019-05-27 PROCEDURE — 99220 PR INITIAL OBSERVATION CARE/DAY 70 MINUTES: CPT | Performed by: FAMILY MEDICINE

## 2019-05-27 PROCEDURE — 6360000002 HC RX W HCPCS: Performed by: FAMILY MEDICINE

## 2019-05-27 PROCEDURE — 2700000000 HC OXYGEN THERAPY PER DAY

## 2019-05-27 PROCEDURE — G0378 HOSPITAL OBSERVATION PER HR: HCPCS

## 2019-05-27 PROCEDURE — 6370000000 HC RX 637 (ALT 250 FOR IP): Performed by: FAMILY MEDICINE

## 2019-05-27 PROCEDURE — 94640 AIRWAY INHALATION TREATMENT: CPT

## 2019-05-27 PROCEDURE — 96372 THER/PROPH/DIAG INJ SC/IM: CPT

## 2019-05-27 PROCEDURE — 2580000003 HC RX 258: Performed by: FAMILY MEDICINE

## 2019-05-27 PROCEDURE — 87338 HPYLORI STOOL AG IA: CPT

## 2019-05-27 PROCEDURE — 82947 ASSAY GLUCOSE BLOOD QUANT: CPT

## 2019-05-27 RX ORDER — ALBUTEROL SULFATE 90 UG/1
2 AEROSOL, METERED RESPIRATORY (INHALATION) EVERY 6 HOURS PRN
Status: DISCONTINUED | OUTPATIENT
Start: 2019-05-27 | End: 2019-05-28 | Stop reason: HOSPADM

## 2019-05-27 RX ORDER — FOLIC ACID 0.8 MG
500 TABLET ORAL DAILY
Status: DISCONTINUED | OUTPATIENT
Start: 2019-05-27 | End: 2019-05-27 | Stop reason: CLARIF

## 2019-05-27 RX ORDER — PROMETHAZINE HYDROCHLORIDE 25 MG/1
25 TABLET ORAL EVERY 6 HOURS PRN
Status: DISCONTINUED | OUTPATIENT
Start: 2019-05-27 | End: 2019-05-28 | Stop reason: HOSPADM

## 2019-05-27 RX ORDER — NICOTINE POLACRILEX 4 MG
15 LOZENGE BUCCAL PRN
Status: DISCONTINUED | OUTPATIENT
Start: 2019-05-27 | End: 2019-05-28 | Stop reason: HOSPADM

## 2019-05-27 RX ORDER — DEXTROSE MONOHYDRATE 50 MG/ML
100 INJECTION, SOLUTION INTRAVENOUS PRN
Status: DISCONTINUED | OUTPATIENT
Start: 2019-05-27 | End: 2019-05-28 | Stop reason: HOSPADM

## 2019-05-27 RX ORDER — ATORVASTATIN CALCIUM 40 MG/1
40 TABLET, FILM COATED ORAL EVERY EVENING
Status: DISCONTINUED | OUTPATIENT
Start: 2019-05-27 | End: 2019-05-28 | Stop reason: HOSPADM

## 2019-05-27 RX ORDER — HYDROCODONE BITARTRATE AND ACETAMINOPHEN 5; 325 MG/1; MG/1
2 TABLET ORAL EVERY 6 HOURS PRN
Status: DISCONTINUED | OUTPATIENT
Start: 2019-05-27 | End: 2019-05-28 | Stop reason: HOSPADM

## 2019-05-27 RX ORDER — IBUPROFEN 800 MG/1
800 TABLET ORAL EVERY 8 HOURS PRN
Status: DISCONTINUED | OUTPATIENT
Start: 2019-05-27 | End: 2019-05-28 | Stop reason: HOSPADM

## 2019-05-27 RX ORDER — DEXTROSE MONOHYDRATE 25 G/50ML
12.5 INJECTION, SOLUTION INTRAVENOUS PRN
Status: DISCONTINUED | OUTPATIENT
Start: 2019-05-27 | End: 2019-05-28 | Stop reason: HOSPADM

## 2019-05-27 RX ORDER — INSULIN GLARGINE 100 [IU]/ML
60 INJECTION, SOLUTION SUBCUTANEOUS 2 TIMES DAILY WITH MEALS
Status: DISCONTINUED | OUTPATIENT
Start: 2019-05-27 | End: 2019-05-28 | Stop reason: HOSPADM

## 2019-05-27 RX ORDER — CARVEDILOL 12.5 MG/1
12.5 TABLET ORAL 2 TIMES DAILY WITH MEALS
Status: DISCONTINUED | OUTPATIENT
Start: 2019-05-27 | End: 2019-05-28 | Stop reason: HOSPADM

## 2019-05-27 RX ORDER — ARIPIPRAZOLE 2 MG/1
2 TABLET ORAL DAILY
Status: DISCONTINUED | OUTPATIENT
Start: 2019-05-27 | End: 2019-05-28 | Stop reason: HOSPADM

## 2019-05-27 RX ORDER — IPRATROPIUM BROMIDE AND ALBUTEROL SULFATE 2.5; .5 MG/3ML; MG/3ML
1 SOLUTION RESPIRATORY (INHALATION) EVERY 6 HOURS PRN
Status: DISCONTINUED | OUTPATIENT
Start: 2019-05-27 | End: 2019-05-28 | Stop reason: HOSPADM

## 2019-05-27 RX ORDER — NALBUPHINE HCL 10 MG/ML
10 AMPUL (ML) INJECTION EVERY 4 HOURS PRN
Status: DISCONTINUED | OUTPATIENT
Start: 2019-05-27 | End: 2019-05-28

## 2019-05-27 RX ORDER — BUMETANIDE 1 MG/1
1 TABLET ORAL DAILY PRN
Status: DISCONTINUED | OUTPATIENT
Start: 2019-05-27 | End: 2019-05-28 | Stop reason: HOSPADM

## 2019-05-27 RX ORDER — DULOXETIN HYDROCHLORIDE 60 MG/1
60 CAPSULE, DELAYED RELEASE ORAL 2 TIMES DAILY
Status: DISCONTINUED | OUTPATIENT
Start: 2019-05-27 | End: 2019-05-28 | Stop reason: HOSPADM

## 2019-05-27 RX ORDER — CETIRIZINE HYDROCHLORIDE 10 MG/1
10 TABLET ORAL DAILY
Status: DISCONTINUED | OUTPATIENT
Start: 2019-05-27 | End: 2019-05-28 | Stop reason: HOSPADM

## 2019-05-27 RX ORDER — M-VIT,TX,IRON,MINS/CALC/FOLIC 27MG-0.4MG
1 TABLET ORAL DAILY
Status: DISCONTINUED | OUTPATIENT
Start: 2019-05-27 | End: 2019-05-28 | Stop reason: HOSPADM

## 2019-05-27 RX ORDER — INSULIN GLARGINE 100 [IU]/ML
60 INJECTION, SOLUTION SUBCUTANEOUS 2 TIMES DAILY
Status: DISCONTINUED | OUTPATIENT
Start: 2019-05-27 | End: 2019-05-27

## 2019-05-27 RX ORDER — TOPIRAMATE 25 MG/1
25 TABLET ORAL DAILY
Status: DISCONTINUED | OUTPATIENT
Start: 2019-05-27 | End: 2019-05-28 | Stop reason: HOSPADM

## 2019-05-27 RX ADMIN — ATORVASTATIN CALCIUM 40 MG: 40 TABLET, FILM COATED ORAL at 20:03

## 2019-05-27 RX ADMIN — TOPIRAMATE 25 MG: 25 TABLET, FILM COATED ORAL at 20:24

## 2019-05-27 RX ADMIN — INSULIN LISPRO 6 UNITS: 100 INJECTION, SOLUTION INTRAVENOUS; SUBCUTANEOUS at 17:50

## 2019-05-27 RX ADMIN — INSULIN GLARGINE 60 UNITS: 100 INJECTION, SOLUTION SUBCUTANEOUS at 17:49

## 2019-05-27 RX ADMIN — HYDROCODONE BITARTRATE AND ACETAMINOPHEN 2 TABLET: 5; 325 TABLET ORAL at 01:35

## 2019-05-27 RX ADMIN — INSULIN GLARGINE 60 UNITS: 100 INJECTION, SOLUTION SUBCUTANEOUS at 10:26

## 2019-05-27 RX ADMIN — NALBUPHINE HYDROCHLORIDE 10 MG: 10 INJECTION, SOLUTION INTRAMUSCULAR; INTRAVENOUS; SUBCUTANEOUS at 15:45

## 2019-05-27 RX ADMIN — NALBUPHINE HYDROCHLORIDE 10 MG: 10 INJECTION, SOLUTION INTRAMUSCULAR; INTRAVENOUS; SUBCUTANEOUS at 11:20

## 2019-05-27 RX ADMIN — Medication 10 ML: at 11:23

## 2019-05-27 RX ADMIN — NALBUPHINE HYDROCHLORIDE 10 MG: 10 INJECTION, SOLUTION INTRAMUSCULAR; INTRAVENOUS; SUBCUTANEOUS at 20:00

## 2019-05-27 RX ADMIN — DULOXETINE HYDROCHLORIDE 60 MG: 60 CAPSULE, DELAYED RELEASE ORAL at 09:26

## 2019-05-27 RX ADMIN — CARVEDILOL 12.5 MG: 12.5 TABLET, FILM COATED ORAL at 09:25

## 2019-05-27 RX ADMIN — Medication 10 ML: at 00:13

## 2019-05-27 RX ADMIN — ARIPIPRAZOLE 2 MG: 2 TABLET ORAL at 11:21

## 2019-05-27 RX ADMIN — MAGNESIUM OXIDE TAB 400 MG (241.3 MG ELEMENTAL MG) 400 MG: 400 (241.3 MG) TAB at 09:26

## 2019-05-27 RX ADMIN — MULTIPLE VITAMINS W/ MINERALS TAB 1 TABLET: TAB at 09:25

## 2019-05-27 RX ADMIN — CARVEDILOL 12.5 MG: 12.5 TABLET, FILM COATED ORAL at 20:04

## 2019-05-27 RX ADMIN — IPRATROPIUM BROMIDE AND ALBUTEROL SULFATE 3 ML: .5; 3 SOLUTION RESPIRATORY (INHALATION) at 20:41

## 2019-05-27 RX ADMIN — DULOXETINE HYDROCHLORIDE 60 MG: 60 CAPSULE, DELAYED RELEASE ORAL at 20:02

## 2019-05-27 RX ADMIN — NALBUPHINE HYDROCHLORIDE 10 MG: 10 INJECTION, SOLUTION INTRAMUSCULAR; INTRAVENOUS; SUBCUTANEOUS at 07:07

## 2019-05-27 RX ADMIN — INSULIN LISPRO 3 UNITS: 100 INJECTION, SOLUTION INTRAVENOUS; SUBCUTANEOUS at 12:57

## 2019-05-27 RX ADMIN — FIDAXOMICIN 200 MG: 200 TABLET, FILM COATED ORAL at 12:31

## 2019-05-27 RX ADMIN — INSULIN LISPRO 2 UNITS: 100 INJECTION, SOLUTION INTRAVENOUS; SUBCUTANEOUS at 22:04

## 2019-05-27 RX ADMIN — Medication 10 ML: at 20:03

## 2019-05-27 RX ADMIN — FIDAXOMICIN 200 MG: 200 TABLET, FILM COATED ORAL at 20:01

## 2019-05-27 ASSESSMENT — PAIN SCALES - GENERAL
PAINLEVEL_OUTOF10: 10
PAINLEVEL_OUTOF10: 5
PAINLEVEL_OUTOF10: 10
PAINLEVEL_OUTOF10: 8
PAINLEVEL_OUTOF10: 10
PAINLEVEL_OUTOF10: 8
PAINLEVEL_OUTOF10: 8

## 2019-05-27 ASSESSMENT — PAIN DESCRIPTION - PAIN TYPE
TYPE: ACUTE PAIN
TYPE: ACUTE PAIN

## 2019-05-27 ASSESSMENT — PAIN DESCRIPTION - LOCATION
LOCATION: CHEST;HEAD
LOCATION: CHEST;HEAD

## 2019-05-27 NOTE — PROGRESS NOTES
Pt arrived to floor via stretcher from ED and ambulated to bed per self. Vitals taken, telemetry applied. Admission and assessment complete. No distress noted. See doc flowsheet and admission navigator for details. POC and education initiated and reviewed with patient. Call light within reach, and pt educated on its use. Bed in lowest position, and locked. Side rails up x 2. Denied further questions or needs at this time. Will continue to monitor.

## 2019-05-27 NOTE — PLAN OF CARE
Nutrition Problem: Altered GI function  Intervention: Food and/or Nutrient Delivery: Modify current diet  Nutritional Goals: PO intakes ar greater than 75% at meals

## 2019-05-27 NOTE — ED PROVIDER NOTES
DAVID). Additional findings are as noted.     Dhara Stephens MD  Attending Emergency  Physician              Dhara Stephens MD  05/27/19 [id number]

## 2019-05-27 NOTE — PROGRESS NOTES
Nutrition Assessment    Type and Reason for Visit: Positive Nutrition Screen(Unplanned weight loss)    Nutrition Recommendations: Discontinue diabetic diet and start General diet. Nutrition Assessment: Patient is nutritionally compromise due to weight flucuations and decreased p.o intakes. Patient has CHF and states that weight flucuates between 250 lbs-284 lbs. Patient is at risk for further compromise due to c.diff related diarrhea and decreased p.o intakes. Patient states that she will need a fecal transplant. Food allergies and preferences clarifed and appropriate notation made in Epic and  programs. General allergy started due to patient is allergic to artifical sweeteners and manages her own diabetic diet well. Will monitor p.o intake. Malnutrition Assessment:  · Malnutrition Status: At risk for malnutrition  · Context: Acute illness or injury  · Findings of the 6 clinical characteristics of malnutrition (Minimum of 2 out of 6 clinical characteristics is required to make the diagnosis of moderate or severe Protein Calorie Malnutrition based on AND/ASPEN Guidelines):  1. Energy Intake-Less than or equal to 75% of estimated energy requirement, Greater than or equal to 7 days    2. Weight Loss-Unable to assess,    3. Fat Loss-No significant subcutaneous fat loss,    4. Muscle Loss-No significant muscle mass loss,    5. Fluid Accumulation-Mild fluid accumulation, Extremities  6.  Strength-Not measured    Nutrition Risk Level:  Moderate    Nutrient Needs:  · Estimated Daily Total Kcal: 9936-7900 kcal based on 12-14 kcal/ kg of admission weight   · Estimated Daily Protein (g): 65-71 gm of protein based on 1.2-1.3 gm/kg of ideal weight     Nutrition Diagnosis:   · Problem: Altered GI function  · Etiology: related to Alteration in GI function(c.diff)     Signs and symptoms:  as evidenced by Diarrhea, GI abnormality    Objective Information:  · Nutrition-Focused Physical Findings: c.diff related diarrhea for 3 weeks.  +1 RLE, +2 pitting LLE  · Current Nutrition Therapies:  · Oral Diet Orders: Carb Control 4 Carbs/Meal   · Oral Diet intake: 51-75%  · Anthropometric Measures:  · Ht: 5' 4\" (162.6 cm)   · Current Body Wt: 277 lb (125.6 kg)  · Admission Body Wt: 277 lb (125.6 kg)  · Usual Body Wt: (250-285 lb)  · Ideal Body Wt: 120 lb (54.4 kg), % Ideal Body 231% based on admission weight   · BMI Classification: BMI > or equal to 40.0 Obese Class III    Nutrition Interventions:   Modify current diet  Continued Inpatient Monitoring    Nutrition Evaluation:   · Evaluation: Goals set   · Goals: PO intakes ar greater than 75% at meals    · Monitoring: Meal Intake, Supplement Intake, Weight, Pertinent Labs, Monitor Bowel Function, Diet Tolerance, Diarrhea, Skin Integrity, I&O      eMrlin SANCHEZ, R.D, L.D,  Clinical Dietitian  Cell # [phone]  Office # [phone]

## 2019-05-27 NOTE — ED PROVIDER NOTES
[facility]  Emergency Department Encounter  EmergencyMedicine Resident     Pt Gonsalo Lopez  MRN: [id number]  [place] 1975  Date of evaluation: 5/26/19  PCP:  Sandra Cotton MD    CHIEF COMPLAINT       Chief Complaint   Patient presents with    Chest Pain     left sided, intermittent    Shortness of Breath    Leg Swelling     bilateral feet swellin, left foot worse    Other     positive for C-Diff, have been on antibiotics since 4/29/19    Hyperglycemia     pt reports BS in the 500's       HISTORY OF PRESENT ILLNESS  (Location/Symptom, Timing/Onset, Context/Setting, Quality, Duration, Modifying Factors, Severity.)      Molly Haddad is a 53 y.o. female who presents with lower extremity swelling, worse on the left; chest pain; shortness of breath; right otalgia; fever. Patient reports right-sided otalgia for the past few days as well as a fever, with temperature measured at home at 102.5°F.  She has a history of mastoiditis and is concerned that it is recurring. This morning, she awoke with bilateral lower extremity edema, worse in the left foot, associated with left calf pain with walking and left foot pain. While sitting in Confucian[employer] is morning, she developed sharp, intermittent left-sided chest pain associated with shortness of breath, nausea, lightheadedness, and diaphoresis. The symptoms are not ongoing but did recur a few times today. Patient has chronic abdominal pain, nausea, and loose stools on a background of C. diff which she is being treated for. No other symptoms. Numerous comorbidities as listed below. Patient reports compliance with all of her medications. Patient's cardiologist is Dr. Amira Brown. Patient reports she hasn't had a stress test or cardiac catheterization in \"a long time. \"    PAST MEDICAL / SURGICAL / SOCIAL / FAMILY HISTORY      has a past medical history of Asthma, Atrial fibrillation ([address] Bipolar 1 disorder ([address] Bulging disc, CAD (coronary artery disease), Cardiomyopathy ([address] CHF (congestive heart failure) (HCC), Chronic otitis media of both ears, COPD (chronic obstructive pulmonary disease) ([address] Depression, Diarrhea, Diarrhea, Dizziness, DVT (deep venous thrombosis) ([address] Endometriosis, Fainting, GERD (gastroesophageal reflux disease), GI bleeding, Helicobacter pylori (H. pylori), [place] (hard of hearing), Hyperlipidemia, Hypertension, Mastoiditis, Migraines, Morbid obesity ([address] Myocardial infarction ([address] Nausea, Neuropathy, On home oxygen therapy, Ovarian cyst, Passed out [facility]), Pulmonary hypertension ([address] Pulmonary insufficiency, PVC (premature ventricular contraction), Tricuspid insufficiency, and Type II or unspecified type diabetes mellitus without mention of complication, not stated as uncontrolled. has a past surgical history that includes Hysterectomy; Cholecystectomy; Appendectomy; Colonoscopy; Upper gastrointestinal endoscopy; Abdomen surgery; Abdominal adhesion surgery; Abdominal exploration surgery; Tympanostomy tube placement; Tonsillectomy; Foot surgery (Left); Abdominal hernia repair; hysteroscopy; Gastric fundoplication (0502); Abscess Drainage; Scaphoid fracture surgery (Left); other surgical history (Right, 5/29/14); Myringotomy Tympanostomy Tube Placement (Right, 06/05/2014); myringotomy (Right, 11/13/15); Hysterectomy; Cardiac catheterization (2014 & 2000); other surgical history (2009); Breast surgery (Left, 2007); fracture surgery (Right); other surgical history (Right, 08/11/2016); ablation of dysrhythmic focus (2016); other surgical history; other surgical history; pr manipulatn shldr jt w anesthesia (Right, 3/1/2017); ablation of dysrhythmic focus (09/08/2017); pr shoulder scope bone shaving (Left, 10/17/2018); and Tympanostomy tube placement (Left, 03/12/2019).     Social History     Socioeconomic History    Marital status: Single     Spouse name: Not on file    Number of children: Not on file    Years of education: Not on file    Highest education level: Not on file   Occupational History     Employer: NONE   Social Needs    Financial resource strain: Not on file    Food insecurity:     Worry: Not on file     Inability: Not on file    Transportation needs:     Medical: Not on file     Non-medical: Not on file   Tobacco Use    Smoking status: Current Every Day Smoker     Packs/day: 0.50     Years: 23.00     Pack years: 11.50     Types: Cigarettes    Smokeless tobacco: Never Used   Substance and Sexual Activity    Alcohol use: No     Alcohol/week: 0.0 oz    Drug use: No    Sexual activity: Not on file   Lifestyle    Physical activity:     Days per week: Not on file     Minutes per session: Not on file    Stress: Not on file   Relationships    Social connections:     Talks on phone: Not on file     Gets together: Not on file     Attends Judaism service: Not on file     Active member of club or organization: Not on file     Attends meetings of clubs or organizations: Not on file     Relationship status: Not on file    Intimate partner violence:     Fear of current or ex partner: Not on file     Emotionally abused: Not on file     Physically abused: Not on file     Forced sexual activity: Not on file   Other Topics Concern    Not on file   Social History Narrative    Not on file       Family History   Problem Relation Age of Onset    Ulcerative Colitis Father     Liver Disease Father 61        hep c and b    Diabetes Father     Asthma Father     Heart Disease Father     High Blood Pressure Father     Breast Cancer Maternal Aunt     Cancer Maternal Aunt     Diabetes Maternal Aunt     Heart Disease Maternal Aunt     High Blood Pressure Maternal Aunt     Breast Cancer Paternal Aunt     Heart Disease Paternal Aunt     High Blood Pressure Paternal Aunt     Breast Cancer Maternal Grandmother     Cervical Cancer Maternal Grandmother     Cancer Maternal Grandmother     Diabetes Maternal Grandmother     Asthma Maternal Grandmother     Heart Disease Maternal Grandmother     High Blood Pressure Maternal Grandmother     Lung Cancer Maternal Grandfather     Diabetes Maternal Grandfather     Asthma Maternal Grandfather     Heart Disease Maternal Grandfather     High Blood Pressure Maternal Grandfather     Cervical Cancer Paternal Grandmother     Cancer Paternal Grandmother     Diabetes Paternal Grandmother     Heart Disease Paternal Grandmother     High Blood Pressure Paternal Grandmother     Diabetes Mother     Asthma Mother     Heart Disease Mother     High Blood Pressure Mother     Cancer Sister     Heart Disease Maternal Uncle     High Blood Pressure Maternal Uncle     Heart Disease Paternal Uncle     High Blood Pressure Paternal Uncle     Diabetes Paternal Grandfather     Heart Disease Paternal Grandfather     High Blood Pressure Paternal Grandfather        Allergies:  Latex; Aspirin; Avelox [moxifloxacin]; Chantix [varenicline]; Doxycycline; Dye [iodides]; Flexeril [cyclobenzaprine]; Gabapentin; Losartan; Morphine; Nsaids; Pcn [penicillins]; Reglan [metoclopramide hcl]; Shellfish-derived products; Sulfa antibiotics; Vancomycin; Zofran; Zyvox [linezolid]; Acyclovir; Bactrim [sulfamethoxazole-trimethoprim]; Betadine [povidone iodine]; Ceclor [cefaclor]; Clindamycin/lincomycin; Codeine; Macrolides and ketolides; Novolin r [insulin]; Novolog [insulin aspart]; Phenothiazines; Tape [adhesive tape]; Compazine [prochlorperazine]; Fentanyl; Tamiflu [oseltamivir phosphate]; and Sotalol    Home Medications:  Prior to Admission medications    Medication Sig Start Date End Date Taking?  Authorizing Provider   nalbuphine (NUBAIN) 10 MG/ML injection Inject 10 mg into the muscle every 4-6 hours as needed for Pain   Yes Historical Provider, MD   ibuprofen (ADVIL;MOTRIN) 800 MG tablet TAKE ONE TABLET BY MOUTH 3 TIMES DAILY 4/29/19  Yes Donna Russo, APRN - CNP   albuterol sulfate HFA 60 mg by mouth 2 times daily  10/2/15  Yes Zully Diehl MD   carvedilol (COREG) 12.5 MG tablet Take 12.5 mg by mouth 2 times daily (with meals) Takes at 10:00am and 10:00pm   Yes Historical Provider, MD   atorvastatin (LIPITOR) 40 MG tablet Take 40 mg by mouth every evening    Yes Historical Provider, MD   azithromycin (ZITHROMAX) 250 MG tablet Take 2 tablets the first day, then 1 tablet daily for 4 days. 4/27/19   Historical Provider, MD GARCIA PENTIPS 31G X 8 MM MISC USE 4 TIMES DAILY AS DIRECTED 3/28/19   Zully Diehl MD   predniSONE (DELTASONE) 50 MG tablet Take 1 tablet by mouth daily 2/4/19   Sandip Jackson MD   blood glucose monitor strips Test blood sugars 6 times a day due to low blood sugars. Uncontrolled diabetes. Check Ac and HS and as needed. 10/4/18   Donna Russo, APRN - CNP       REVIEW OF SYSTEMS    (2-9 systems for level 4, 10 or more for level 5)      Review of Systems   Constitutional: Positive for diaphoresis and fever. Negative for chills and fatigue. HENT: Positive for ear pain. Negative for congestion and sore throat. Eyes: Negative for visual disturbance. Respiratory: Positive for shortness of breath. Negative for cough. Cardiovascular: Positive for chest pain and leg swelling. Gastrointestinal: Positive for diarrhea and nausea. Negative for abdominal pain and vomiting. Genitourinary: Negative for dysuria, flank pain and hematuria. Musculoskeletal: Negative for arthralgias, gait problem, neck pain and neck stiffness. Lower extremity pain and swelling; Left leg pain   Skin: Negative for rash and wound. Neurological: Positive for headaches. Negative for syncope, weakness, light-headedness and numbness.        PHYSICAL EXAM   (up to 7 for level 4, 8 or more for level 5)      INITIAL VITALS:   /81   Pulse 104   Temp 99 °F (37.2 °C) (Oral)   Resp 16   Ht 5' 4\" (1.626 m)   Wt 277 lb 5.4 oz (125.8 kg)   SpO2 91%   BMI 47.61 kg/m² Physical Exam   Gen. Appearance: patient appears well, nondistressed. HEENT: head atraumatic, normocephalic. Pupils equal and reactive to light. Right ear with no evidence of otitis externa. No evidence of otitis media. There is a tube in place and the TM. No erythema over the mastoid process. There is tenderness with palpation over the mastoid process and with manipulation of the ear. Oropharynx clear and moist.  Neck: Supple, normal range of motion. No lymphadenopathy. Pulmonary: Lungs clear to auscultation bilaterally. Equal air entry right left. Cardiovascular:  Heart sounds normal, no murmurs. Peripheral pulses strong, regular, equal.   Abdomen: Soft, nontender, nondistended. Bowel sounds normal. No palpable masses. Neurology: GCS 15. Oriented to person place and time. Normal tone and power in all 4 extremities. No sensory deficits. Skin: Warm, dry, well perfused  Extremities: Very mild lower extremity edema to shins. Worse edema left foot than right. No skin changes. Normal range of motion toes and ankles. Significant tenderness with light palpation of left lower leg and foot diffusely with no point tenderness. Dorsalis pedis pulses strong, regular, equal.  Sensation intact bilaterally. Normal capillary refill.       DIFFERENTIAL  DIAGNOSIS     PLAN (LABS / IMAGING / EKG):  Orders Placed This Encounter   Procedures    C DIFF TOXIN/ANTIGEN    XR CHEST STANDARD (2 VW)    VL DUP LOWER EXTREMITY VENOUS BILATERAL    CT IAC POSTERIOR FOSSA WO CONTRAST    CBC Auto Differential    Comprehensive Metabolic Panel    Troponin    D-Dimer, Quantitative    Brain Natriuretic Peptide    Diet NPO, After Midnight    Vital signs per unit routine    Notify physician    Notify physician    Up with assistance    Full Code    Inpatient consult to Primary Care Provider    Inpatient consult to Cardiology    C diff contact isolation    POC Glucose Fingerstick    PATIENT STATUS (FROM ED OR OR/PROCEDURAL) Observation       MEDICATIONS ORDERED:  Orders Placed This Encounter   Medications    HYDROcodone-acetaminophen (NORCO) 5-325 MG per tablet 2 tablet    sodium chloride flush 0.9 % injection 10 mL    sodium chloride flush 0.9 % injection 10 mL    acetaminophen (TYLENOL) tablet 650 mg    morphine sulfate (PF) injection 4 mg    diphenhydrAMINE (BENADRYL) injection 25 mg       DDX: DVT, pulmonary embolism, ACS, pneumonia, viral syndrome, otitis media, mastoiditis    DIAGNOSTIC RESULTS / EMERGENCY DEPARTMENT COURSE / MDM     LABS:  Results for orders placed or performed during the hospital encounter of 05/26/19   CBC Auto Differential   Result Value Ref Range    WBC 10.9 3.5 - 11.0 k/uL    RBC 4.11 4.0 - 5.2 m/uL    Hemoglobin 14.0 12.0 - 16.0 g/dL    Hematocrit 41.7 36 - 46 %    .4 (H) 80 - 100 fL    MCH 34.2 (H) 26 - 34 pg    MCHC 33.7 31 - 37 g/dL    RDW 12.8 11.5 - 14.9 %    Platelets 669 014 - 767 k/uL    MPV 9.2 6.0 - 12.0 fL    NRBC Automated NOT REPORTED per 100 WBC    Differential Type NOT REPORTED     Seg Neutrophils 55 36 - 66 %    Lymphocytes 33 24 - 44 %    Monocytes 8 (H) 1 - 7 %    Eosinophils % 3 0 - 4 %    Basophils 1 0 - 2 %    Immature Granulocytes NOT REPORTED 0 %    Segs Absolute 6.00 1.3 - 9.1 k/uL    Absolute Lymph # 3.60 1.0 - 4.8 k/uL    Absolute Mono # 0.80 0.1 - 1.3 k/uL    Absolute Eos # 0.40 0.0 - 0.4 k/uL    Basophils # 0.10 0.0 - 0.2 k/uL    Absolute Immature Granulocyte NOT REPORTED 0.00 - 0.30 k/uL    WBC Morphology NOT REPORTED     RBC Morphology NOT REPORTED     Platelet Estimate NOT REPORTED    Comprehensive Metabolic Panel   Result Value Ref Range    Glucose 238 (H) 70 - 99 mg/dL    BUN 12 6 - 20 mg/dL    CREATININE 0.59 0.50 - 0.90 mg/dL    Bun/Cre Ratio NOT REPORTED 9 - 20    Calcium 9.0 8.6 - 10.4 mg/dL    Sodium 139 135 - 144 mmol/L    Potassium 4.0 3.7 - 5.3 mmol/L    Chloride 98 98 - 107 mmol/L    CO2 29 20 - 31 mmol/L    Anion Gap 12 9 - 17 mmol/L Alkaline Phosphatase 82 35 - 104 U/L    ALT 17 5 - 33 U/L    AST 14 <32 U/L    Total Bilirubin <0.15 (L) 0.3 - 1.2 mg/dL    Total Protein 7.2 6.4 - 8.3 g/dL    Alb 4.1 3.5 - 5.2 g/dL    Albumin/Globulin Ratio NOT REPORTED 1.0 - 2.5    GFR Non-African American >60 >60 mL/min    GFR African American >60 >60 mL/min    GFR Comment          GFR Staging NOT REPORTED    Troponin   Result Value Ref Range    Troponin, High Sensitivity 12 0 - 14 ng/L    Troponin T NOT REPORTED <0.03 ng/mL    Troponin Interp NOT REPORTED    Troponin   Result Value Ref Range    Troponin, High Sensitivity 13 0 - 14 ng/L    Troponin T NOT REPORTED <0.03 ng/mL    Troponin Interp NOT REPORTED    D-Dimer, Quantitative   Result Value Ref Range    D-Dimer, Quant 0.35 0.00 - 0.59 mg/L FEU   Brain Natriuretic Peptide   Result Value Ref Range    Pro- <300 pg/mL    BNP Interpretation Pro-BNP Reference Range:    POC Glucose Fingerstick   Result Value Ref Range    POC Glucose 118 (H) 65 - 105 mg/dL       IMPRESSION: 53year old patient presents with numerous complaints, including otalgia, chest pain, shortness of breath, and lower extremity pain. Patient is afebrile, hemodynamically stable, and in no acute distress. Appropriately interactive and cooperative with interview and examination. Normal respiratory effort, lungs clear bilaterally, heart sounds normal, abdomen benign, neurologically normal.  There is no observable abnormality on examination of the tympanic membranes, however she does have pain over the mastoid process; with history of fever measured at home, history of mastoiditis in the past, and patient's concern for recurrent mastoiditis, will obtain CT scan to evaluate for this. Given lower extremity edema and pain in, and a sharp chest pain and shortness of breath, will obtain d-dimer to risk stratify for pulmonary embolism. We'll also obtain cardiac workup given her classic chest pain symptoms.   Bilateral lower extremity Doppler to rule out DVT. There is no evidence for fracture and no history of traumatic injury. She is neurovascularly intact. Plan for CBC, CMP, serial troponins, d-dimer, EKG, chest x-ray, bilateral lower extremity Dopplers, CT temporal bone. Reassess. HEART Risk Score for Chest Pain Patients                       Patient Score  History   Highly suspicious2            Moderately suspicious. ..1     = 1    Slightly or non suspicious. 0      ECG   Significant STD. ...2        Nonspecific repolarization1      = 0   Normal (no change from previous). .0      Age   >642      > 45 - <65. 1     = 1   < 46. ..0      Risk Factors  >2 risk factors.2     I - 2 risk factors. .1     = 2  No risk factors. ...0     Troponin   >3times normal limit. ..2      >1 time - <3 times normal limit. 1   = 0   Normal trop. Pippa Hortencia 0     -----------------------------------------------------------------------------------------      TOTAL RISK SCORE = 4          RISK % = 20.3        Risk Factors: DM, smoker (current or recent < mo), HTN, HLP, FHx CAD, obesity,   dsv add-ons   SLE, CKDz, HIV, cocaine abuse    Score 0 - 3 =  2.5% MACE over next 6 wks = Discharge home  Score 4 - 6 =  20.3% MACE over next 6 wks = Obs admit  Score 7 - 10 = 72.7% MACE over next 6 wks = Early invasive Rx        RADIOLOGY:  CT IAC POSTERIOR FOSSA WO CONTRAST (Final result)   Result time 05/26/19 21:52:46   Final result by Lisset Killian MD (05/26/19 21:52:46)                Impression:    1. Right tympanostomy tube in place.  No right middle ear and right mastoid  effusion. 2. Stable chronic trapped fluid in posterior left mastoid air cells.             Narrative:    EXAMINATION:  CT OF THE INTERNAL AUDITORY CANAL WITHOUT CONTRAST 5/26/2019 9:07 pm:    TECHNIQUE:  CT of the internal auditory canal was performed without contrast was  performed without the administration of intravenous contrast. Multiplanar  reformatted images are provided for review. Dose modulation, iterative  reconstruction, and/or weight based adjustment of the mA/kV was utilized to  reduce the radiation dose to as low as reasonably achievable. COMPARISON:  03/14/2016    HISTORY:  ORDERING SYSTEM PROVIDED HISTORY: eval for R mastoiditis  TECHNOLOGIST PROVIDED HISTORY:  eval for R mastoiditis  Ordering Physician Provided Reason for Exam: Evaluate for R mastoiditis. S/P  tube placement x1 month prior.- Patient c/o Right ear and pain with  swallowing. Acuity: Acute  Type of Exam: Initial  Relevant Medical/Surgical History: Surgical hx - Tympanostomy tube placement  (left ) and Tympanostomy tube (Right). FINDINGS:  RIGHT TEMPORAL BONE:  The external auditory canal is clear without evidence  of bony erosion.  A tympanostomy tube is in place.  The tympanic membrane is  unremarkable.  The scutum is intact.  The middle ear cavity is clear.  The  ossicular chain is intact.  The mastoid air cells are clear.  The inner ear  structures appear unremarkable.  Normal mineralization of the otic capsule. The internal auditory canal and vestibular aqueduct appear unremarkable.  The  carotid canal is normal in appearance.  The jugular bulb is unremarkable. LEFT TEMPORAL BONE: The external auditory canal is clear without evidence of  bony erosion.  The scutum is intact.  The middle ear cavity is clear.  The  ossicular chain is intact.  The majority of the mastoid air cells are well  aerated. Vicie Pastel is stable chronic trapped fluid in posterior left mastoid air  cells.  The inner ear structures appear unremarkable.  Normal mineralization  of the otic capsule.  The internal auditory canal and vestibular aqueduct  appear unremarkable.  The carotid canal is normal in appearance.  The jugular  bulb is unremarkable. BRAIN: The visualized portion of the intracranial contents appear  unremarkable.     ORBITS: The visualized portion of the orbits demonstrate no acute abnormality. SINUSES: The visualized paranasal sinuses are clear.                    XR CHEST STANDARD (2 VW) (Final result)   Result time 05/26/19 20:40:35   Final result by Mary Chavez MD (05/26/19 20:40:35)                Impression:    1. No acute findings in the chest.  2. Pulmonary vascular congestion and mild cardiomegaly. Narrative:    EXAMINATION:  TWO XRAY VIEWS OF THE CHEST    5/26/2019 8:15 pm    COMPARISON:  Chest radiograph 02/04/2019    HISTORY:  ORDERING SYSTEM PROVIDED HISTORY: chest pain, SOB  TECHNOLOGIST PROVIDED HISTORY:  chest pain, SOB  Ordering Physician Provided Reason for Exam: chest pain, sob  Acuity: Acute  Type of Exam: Initial    FINDINGS:  Clear lungs.  Diffuse interstitial prominence with indistinct pulmonary  vasculature but no definite interlobular septal thickening.  No findings of  pneumothorax or pleural effusion.  Normal mediastinal contour.  Mildly  prominent hilar and cardiac contours.  No obvious acute fracture.  Joints  maintain anatomic alignment.                      EKG  EKG Interpretation    Interpreted by me    Rhythm: Sinus tachycardia  Rate: 107  Axis: normal  Ectopy: none  Conduction: normal  ST Segments: no acute change  T Waves: no acute change  Q Waves: none    Clinical Impression: no acute changes     All EKG's are interpreted by the Emergency Department Physician who either signs or Co-signs this chart in the absence of a cardiologist.    EMERGENCY DEPARTMENT COURSE:  Laboratory investigations unremarkable. EKG unremarkable. Chest x-ray unremarkable. CT had with chronic finding; no evidence of acute mastoiditis. Patient is repeatedly complaining of pain in her lower extremities. Analgesia provided. Doppler study unremarkable for DVT. Patient's heart score is 4.   Given the patient has not had a thorough cardiology evaluation recently, plan for admission for cardiology evaluation. Case discussed with Dr. Antonio Etienne, on call for patient's PCP. She requests cardiology consultation upon admission. Patient admitted. PROCEDURES:  None    CONSULTS:  IP CONSULT TO PRIMARY CARE PROVIDER  IP CONSULT TO CARDIOLOGY    CRITICAL CARE:  None    FINAL IMPRESSION      1. Chest pain, unspecified type      2. Lower extremity edema  3. Lower extremity pain  4.  Otalgia    DISPOSITION / PLAN     DISPOSITION Admitted 05/26/2019 09:55:31 PM      PATIENT REFERRED TO:  Marci Guajardo, [address]  [address] [facility]  [address] [facility] [phone]            DISCHARGE MEDICATIONS:  Current Discharge Medication List          Bert Bajwa MD  Emergency Medicine Resident    (Please note that portions of thisnote were completed with a voice recognition program.  Efforts were made to edit the dictations but occasionally words are mis-transcribed.)        Bert Bajwa MD  05/27/19 1149

## 2019-05-27 NOTE — H&P
Family Medicine Admit Note    PCP: Sandra Cotton MD    Date of Admission: 5/26/2019    Date of Service: Pt seen/examined on 5/27/19 and Placed in Observation. Chief Complaint:  Chest pain      History Of Present Illness: The patient is a 53 y.o. female who presents to [facility] with complaints of intermittent left sided chest pain associated with SOB, leg swelling and hyperglycemia. She also reports a right earache with a fever for the past few days. SHe has significant PMH of CHF, DM2 and mastoiditis. The chest pain is sharp without radiation. The chest pain began yesterday. She also reports having C. Diff and being on antibiotics since 4/29/19. She has ongoing abdominal pain, nausea & loose stools. She denies any melena or hematochezia. No cough or cold sx's. She is sleeping soundly.     Past Medical History:        Diagnosis Date    Asthma     Atrial fibrillation ([address]     placed on event monitor 9/25/18 for PVC's & A-fib    Bipolar 1 disorder ([address]     Bulging disc     CAD (coronary artery disease)     Cardiomyopathy ([address]     CHF (congestive heart failure) (HCC)     Chronic otitis media of both ears     rt>lt    COPD (chronic obstructive pulmonary disease) (HCC)     Depression     Diarrhea     Diarrhea     Dizziness     DVT (deep venous thrombosis) (HCC)     after PICC line right arm    Endometriosis     Fainting     GERD (gastroesophageal reflux disease)     hx of    GI bleeding     Helicobacter pylori (H. pylori)     [place] (hard of hearing)     both ears, no hearing aids    Hyperlipidemia     Hypertension     Mastoiditis     Migraines     Morbid obesity ([address]     Myocardial infarction ([address]     2005    Nausea     Neuropathy     On home oxygen therapy     3 L at night    Ovarian cyst     Passed out ([address]     hx of- negative tilt table    Pulmonary hypertension (HCC)     Pulmonary insufficiency     PVC (premature ventricular contraction)     Tricuspid 03/12/2019    UPPER GASTROINTESTINAL ENDOSCOPY         Medications Prior to Admission:    Prior to Admission medications    Medication Sig Start Date End Date Taking? Authorizing Provider   nalbuphine (NUBAIN) 10 MG/ML injection Inject 10 mg into the muscle every 4-6 hours as needed for Pain   Yes Historical Provider, MD   ibuprofen (ADVIL;MOTRIN) 800 MG tablet TAKE ONE TABLET BY MOUTH 3 TIMES DAILY 4/29/19  Yes MARYANNE Hathaway CNP   albuterol sulfate  (90 Base) MCG/ACT inhaler INHALE 2 PUFFS BY MOUTH EVERY 6 HOURS AS NEEDED FOR WHEEZING 4/23/19  Yes MARYANNE Hathaway CNP   ARIPiprazole (ABILIFY) 2 MG tablet Take 1 tablet by mouth daily 1/4/19  Yes MARYANNE Hathaway CNP   insulin glargine (LANTUS SOLOSTAR) 100 UNIT/ML injection pen Inject 60 units in the AM and 60 units in the evening. 1/4/19  Yes MARYANNE Hathaway CNP   ipratropium-albuterol (DUONEB) 0.5-2.5 (3) MG/3ML SOLN nebulizer solution INHALE 3 MLS INTO THE LUNGS EVERY 6 HOURS AS NEEDED FOR SHORTNESS OF BREATH 12/3/18  Yes MARYANNE Hathaway CNP   loratadine (CLARITIN) 10 MG tablet Take 1 tablet by mouth daily 10/4/18  Yes MARYANNE Hathaway CNP   Magnesium 500 MG CAPS Take 500 mg by mouth daily   Yes Historical Provider, MD   vitamin D (CHOLECALCIFEROL) 1000 UNIT TABS tablet Take 50,000 Units by mouth once a week    Yes Historical Provider, MD   topiramate (TOPAMAX) 25 MG tablet Take 25 mg by mouth daily Takes for headaches   Yes Historical Provider, MD   insulin lispro (HUMALOG KWIKPEN) 100 UNIT/ML pen FOR GLUCOSE 150-200 GIVE 6 UNITS, FOR GLUCOSE 201-250 9 UNITS, GLUCOSE 251-300 12 UNITS, GLUCOSE 301 OR OVER 15 UNITS. 8/20/18  Yes MARYANNE Hathaway CNP   promethazine (PHENERGAN) 25 MG tablet Take 25 mg by mouth every 6 hours as needed for Nausea   Yes Historical Provider, MD   OXYGEN Inhale into the lungs Indications: On 3 liters per n/c during the night.    Yes Historical Provider, MD Multiple Vitamins-Minerals (MULTIVITAMIN GUMMIES WOMENS) CHEW Take 2 each by mouth daily    Yes Historical Provider, MD   bumetanide (BUMEX) 1 MG tablet Take 1 tablet by mouth daily Indications: if 3 lb wt gain  Patient taking differently: Take 1 mg by mouth daily as needed (Takes for 3 days on and then 3 days off as directed.)  7/21/16  Yes Ericka Beal MD   DULoxetine (CYMBALTA) 60 MG capsule Take 1 capsule by mouth daily  Patient taking differently: Take 60 mg by mouth 2 times daily  10/2/15  Yes Ericka Beal MD   carvedilol (COREG) 12.5 MG tablet Take 12.5 mg by mouth 2 times daily (with meals) Takes at 10:00am and 10:00pm   Yes Historical Provider, MD   atorvastatin (LIPITOR) 40 MG tablet Take 40 mg by mouth every evening    Yes Historical Provider, MD   azithromycin (ZITHROMAX) 250 MG tablet Take 2 tablets the first day, then 1 tablet daily for 4 days. 4/27/19   Historical Provider, MD GARCIA PENTIPS 31G X 8 MM MISC USE 4 TIMES DAILY AS DIRECTED 3/28/19   Ericka Beal MD   predniSONE (DELTASONE) 50 MG tablet Take 1 tablet by mouth daily 2/4/19   Brenda Palacio MD   blood glucose monitor strips Test blood sugars 6 times a day due to low blood sugars. Uncontrolled diabetes. Check Ac and HS and as needed. 10/4/18   Donna Russo, APRN - CNP       Allergies:  Latex; Aspirin; Avelox [moxifloxacin]; Chantix [varenicline]; Doxycycline; Dye [iodides]; Flexeril [cyclobenzaprine]; Gabapentin; Losartan; Morphine; Nsaids; Pcn [penicillins]; Reglan [metoclopramide hcl]; Shellfish-derived products; Sulfa antibiotics; Vancomycin; Zofran; Zyvox [linezolid]; Acyclovir; Bactrim [sulfamethoxazole-trimethoprim]; Betadine [povidone iodine]; Ceclor [cefaclor]; Clindamycin/lincomycin; Codeine; Macrolides and ketolides; Novolin r [insulin]; Novolog [insulin aspart]; Phenothiazines; Tape [adhesive tape]; Compazine [prochlorperazine]; Fentanyl;  Tamiflu [oseltamivir phosphate]; and Sotalol    Social History:  The patient currently lives at home    TOBACCO:   reports that she has been smoking cigarettes. She has a 11.50 pack-year smoking history. She has never used smokeless tobacco.  ETOH:   reports that she does not drink alcohol.     Review of Systems - General ROS: negative  Psychological ROS: negative  Ophthalmic ROS: negative  ENT ROS: negative  Endocrine ROS: positive for - hyperglycemia  Respiratory ROS: positive for - COPD and sleep apnea  Cardiovascular ROS: positive for - chest pain  Gastrointestinal ROS: positive for - abdominal pain, diarrhea and nausea/vomiting  Musculoskeletal ROS: negative  Neurological ROS: negative      Family History:          Problem Relation Age of Onset    Ulcerative Colitis Father     Liver Disease Father 61        hep c and b    Diabetes Father     Asthma Father     Heart Disease Father     High Blood Pressure Father     Breast Cancer Maternal Aunt     Cancer Maternal Aunt     Diabetes Maternal Aunt     Heart Disease Maternal Aunt     High Blood Pressure Maternal Aunt     Breast Cancer Paternal Aunt     Heart Disease Paternal Aunt     High Blood Pressure Paternal Aunt     Breast Cancer Maternal Grandmother     Cervical Cancer Maternal Grandmother     Cancer Maternal Grandmother     Diabetes Maternal Grandmother     Asthma Maternal Grandmother     Heart Disease Maternal Grandmother     High Blood Pressure Maternal Grandmother     Lung Cancer Maternal Grandfather     Diabetes Maternal Grandfather     Asthma Maternal Grandfather     Heart Disease Maternal Grandfather     High Blood Pressure Maternal Grandfather     Cervical Cancer Paternal Grandmother     Cancer Paternal Grandmother     Diabetes Paternal Grandmother     Heart Disease Paternal Grandmother     High Blood Pressure Paternal Grandmother     Diabetes Mother     Asthma Mother     Heart Disease Mother     High Blood Pressure Mother     Cancer Sister     Heart Disease Maternal and patient ALERT and TOLERATING PO    HYPOGLYCEMIA TREATMENT: blood glucose less than 70 mg/dL and patient NOT ALERT or NPO    Telemetry monitoring    Full Code    Inpatient consult to Primary Care Provider    Inpatient consult to Cardiology    C diff contact isolation    POC Glucose Fingerstick    POC Glucose Fingerstick    POCT Glucose    PATIENT STATUS (DIRECT) Observation    PATIENT STATUS (FROM ED OR OR/PROCEDURAL) Observation         DVT Prophylaxis:   Diet: Diet NPO, After Midnight  Code Status: Full Code      Dispo - admitted       @[facility]@

## 2019-05-27 NOTE — PLAN OF CARE
Problem: Nutrition  Goal: Optimal nutrition therapy  5/27/2019 1634 by Osmani Parekh RN  Outcome: Met This Shift  5/27/2019 1631 by Floyd Tran RD, LD  Outcome: Ongoing     Problem: Falls - Risk of:  Goal: Will remain free from falls  Description  Will remain free from falls  Outcome: Ongoing     Problem: Falls - Risk of:  Goal: Absence of physical injury  Description  Absence of physical injury  Outcome: Ongoing

## 2019-05-27 NOTE — CONSULTS
5/29/14); Myringotomy Tympanostomy Tube Placement (Right, 06/05/2014); myringotomy (Right, 11/13/15); Hysterectomy; Cardiac catheterization (2014 & 2000); other surgical history (2009); Breast surgery (Left, 2007); fracture surgery (Right); other surgical history (Right, 08/11/2016); ablation of dysrhythmic focus (2016); other surgical history; other surgical history; pr manipulatn shldr jt w anesthesia (Right, 3/1/2017); ablation of dysrhythmic focus (09/08/2017); pr shoulder scope bone shaving (Left, 10/17/2018); and Tympanostomy tube placement (Left, 03/12/2019). Allergies: Allergies   Allergen Reactions    Latex Hives    Aspirin Anaphylaxis    Avelox [Moxifloxacin] Anaphylaxis    Chantix [Varenicline] Swelling    Doxycycline Anaphylaxis    Dye [Iodides] Other (See Comments)     Seizures    Flexeril [Cyclobenzaprine] Anaphylaxis    Gabapentin Anaphylaxis    Losartan Shortness Of Breath    Morphine Itching     Makes patient want to \"scratch out her eyes\".   Can take if given with benadryl    Nsaids Anaphylaxis and Hives     Pt states she can take ibuprofen    Pcn [Penicillins] Anaphylaxis and Hives    Reglan [Metoclopramide Hcl] Swelling     Agitation, anger    Shellfish-Derived Products Anaphylaxis    Sulfa Antibiotics Hives, Shortness Of Breath, Itching and Swelling    Vancomycin Anaphylaxis    Zofran Anaphylaxis    Zyvox [Linezolid] Anaphylaxis    Acyclovir Swelling and Rash    Bactrim [Sulfamethoxazole-Trimethoprim] Hives    Betadine [Povidone Iodine] Hives    Ceclor [Cefaclor] Hives    Clindamycin/Lincomycin Hives    Codeine Itching and Rash    Macrolides And Ketolides Hives     Pt states she can take Azithromycin    Novolin R [Insulin] Hives    Novolog [Insulin Aspart] Hives    Phenothiazines Swelling    Tape [Adhesive Tape] Rash     OK to use paper tape and tegaderm per patient    Banana     Compazine [Prochlorperazine] Other (See Comments)     Agitation, anger, \"makes me Historical Provider, MD   insulin lispro (HUMALOG KWIKPEN) 100 UNIT/ML pen FOR GLUCOSE 150-200 GIVE 6 UNITS, FOR GLUCOSE 201-250 9 UNITS, GLUCOSE 251-300 12 UNITS, GLUCOSE 301 OR OVER 15 UNITS. 8/20/18  Yes MARYANNE Hathaway - CNP   promethazine (PHENERGAN) 25 MG tablet Take 25 mg by mouth every 6 hours as needed for Nausea   Yes Historical Provider, MD   OXYGEN Inhale into the lungs Indications: On 3 liters per n/c during the night. Yes Historical Provider, MD   Multiple Vitamins-Minerals (MULTIVITAMIN GUMMIES WOMENS) CHEW Take 2 each by mouth daily    Yes Historical Provider, MD   bumetanide (BUMEX) 1 MG tablet Take 1 tablet by mouth daily Indications: if 3 lb wt gain  Patient taking differently: Take 1 mg by mouth daily as needed (Takes for 3 days on and then 3 days off as directed.)  7/21/16  Yes Lawrence Berumen MD   DULoxetine (CYMBALTA) 60 MG capsule Take 1 capsule by mouth daily  Patient taking differently: Take 60 mg by mouth 2 times daily  10/2/15  Yes Lawrence Berumen MD   carvedilol (COREG) 12.5 MG tablet Take 12.5 mg by mouth 2 times daily (with meals) Takes at 10:00am and 10:00pm   Yes Historical Provider, MD   atorvastatin (LIPITOR) 40 MG tablet Take 40 mg by mouth every evening    Yes Historical Provider, MD   azithromycin (ZITHROMAX) 250 MG tablet Take 2 tablets the first day, then 1 tablet daily for 4 days. 4/27/19   Historical ProviderMD GARCIA PENTIPS 31G X 8 MM MISC USE 4 TIMES DAILY AS DIRECTED 3/28/19   Lawrence Berumen MD   predniSONE (DELTASONE) 50 MG tablet Take 1 tablet by mouth daily 2/4/19   Bebe Hong MD   blood glucose monitor strips Test blood sugars 6 times a day due to low blood sugars. Uncontrolled diabetes. Check Ac and HS and as needed.  10/4/18   MARYANNE Liu - CNP        Hospital Meds:    Current Facility-Administered Medications   Medication Dose Route Frequency Provider Last Rate Last Dose    HYDROcodone-acetaminophen ([place]) 5-325 MG per tablet 2 tablet  2 tablet Oral Q6H PRN Fred Clemente MD   2 tablet at 05/27/19 0135    nalbuphine (NUBAIN) injection 10 mg  10 mg Intramuscular Q4H PRN Fred Clemenet MD   10 mg at 05/27/19 1120    carvedilol (COREG) tablet 12.5 mg  12.5 mg Oral BID  Fred Clemente MD   12.5 mg at 05/27/19 0925    atorvastatin (LIPITOR) tablet 40 mg  40 mg Oral QPM Fred Clemente MD        DULoxetine (CYMBALTA) extended release capsule 60 mg  60 mg Oral BID Fred Clemente MD   60 mg at 05/27/19 0926    bumetanide (BUMEX) tablet 1 mg  1 mg Oral Daily PRN Fred Clemente MD        therapeutic multivitamin-minerals 1 tablet  1 tablet Oral Daily Fred Clemente MD   1 tablet at 05/27/19 0925    promethazine (PHENERGAN) tablet 25 mg  25 mg Oral Q6H PRN Fred Clemente MD        vitamin D (CHOLECALCIFEROL) tablet 50,000 Units  50,000 Units Oral Weekly Fred Clemente MD        topiramate (TOPAMAX) tablet 25 mg  25 mg Oral Daily Fred Clemente MD        cetirizine (ZYRTEC) tablet 10 mg  10 mg Oral Daily Fred Clemente MD        ipratropium-albuterol (DUONEB) nebulizer solution 3 mL  1 vial Inhalation Q6H PRN Fred Clemente MD        ARIPiprazole (ABILIFY) tablet 2 mg  2 mg Oral Daily Fred Clemente MD   2 mg at 05/27/19 1121    insulin glargine (LANTUS) injection vial 60 Units  60 Units Subcutaneous BID Fred Clemente MD   60 Units at 05/27/19 1026    albuterol sulfate  (90 Base) MCG/ACT inhaler 2 puff  2 puff Inhalation Q6H PRN Fred Clemente MD        ibuprofen (ADVIL;MOTRIN) tablet 800 mg  800 mg Oral Q8H PRN Fred Clemente MD        insulin lispro (HUMALOG) injection vial 0-18 Units  0-18 Units Subcutaneous TID WC Fred Clemente MD        insulin lispro (HUMALOG) injection vial 0-9 Units  0-9 Units Subcutaneous Nightly Fred Clemente MD        glucose (GLUTOSE) 40 % oral gel 15 g  15 g Oral PRN Fred Clemente MD        dextrose 50 % solution 12.5 g  12.5 g organizations: Not on file     Relationship status: Not on file    Intimate partner violence:     Fear of current or ex partner: Not on file     Emotionally abused: Not on file     Physically abused: Not on file     Forced sexual activity: Not on file   Other Topics Concern    Not on file   Social History Narrative    Not on file      Family History:   Family History   Problem Relation Age of Onset    Ulcerative Colitis Father     Liver Disease Father 61        hep c and b    Diabetes Father     Asthma Father     Heart Disease Father     High Blood Pressure Father     Breast Cancer Maternal Aunt     Cancer Maternal Aunt     Diabetes Maternal Aunt     Heart Disease Maternal Aunt     High Blood Pressure Maternal Aunt     Breast Cancer Paternal Aunt     Heart Disease Paternal Aunt     High Blood Pressure Paternal Aunt     Breast Cancer Maternal Grandmother     Cervical Cancer Maternal Grandmother     Cancer Maternal Grandmother     Diabetes Maternal Grandmother     Asthma Maternal Grandmother     Heart Disease Maternal Grandmother     High Blood Pressure Maternal Grandmother     Lung Cancer Maternal Grandfather     Diabetes Maternal Grandfather     Asthma Maternal Grandfather     Heart Disease Maternal Grandfather     High Blood Pressure Maternal Grandfather     Cervical Cancer Paternal Grandmother     Cancer Paternal Grandmother     Diabetes Paternal Grandmother     Heart Disease Paternal Grandmother     High Blood Pressure Paternal Grandmother     Diabetes Mother     Asthma Mother     Heart Disease Mother     High Blood Pressure Mother     Cancer Sister     Heart Disease Maternal Uncle     High Blood Pressure Maternal Uncle     Heart Disease Paternal Uncle     High Blood Pressure Paternal Uncle     Diabetes Paternal Grandfather     Heart Disease Paternal Grandfather     High Blood Pressure Paternal Grandfather        Review of Systems:   · Constitutional: No Fevers/Chills, No recent weight gain weight loss, No fatigue. · Eyes: No visual changes or diplopia. · ENT: No Headaches, hearing loss or vertigo. · Cardiovascular: Per HPI  · Respiratory: Shortness of breath  · Gastrointestinal: No Nausea, Vomiting, Diarrhea, or Constipation  · Genitourinary: No dysuria,hematuria. · Musculoskeletal: Leg swelling   · Integumentary: No rash or pruritis. · Neurological: No headache, No Hx CVA/TIA  · Psychiatric: No anxiety, or depression. · Endocrine: No temperature intolerance. · Hematologic/Lymphatic: No abnormal bruising or bleeding     Physical Exam   Vital Signs: /73   Pulse 87   Temp 98.3 °F (36.8 °C) (Oral)   Resp 17   Ht 5' 4\" (1.626 m)   Wt 277 lb 5.4 oz (125.8 kg)   SpO2 97%   BMI 47.61 kg/m²  O2 Flow Rate (L/min): 3 L/min     Admission Weight: 250 lb (113.4 kg)     General appearance: Alert oriented and cooperative. In no acute distress    Skin:  Warm and Dry to touch    Head: Normocephalic, without obvious abnormality, atraumatic    Eyes: Conjunctivae unremarkable, EOM's intact. Neck: No JVD, No carotid bruit. Neck supple, trachea midline    Lungs: Clear to ausculation bilaterally, no use of accessory muscles. Heart: Normal first and second heart sound    Abdomen: Soft, non-tender. Bowel sounds normal.    Extremities: No edema    Neurologic: Oriented to time, person and place, affect appropriate. No focal/major motor defects noted. Psychiatric:  Appropriate mood, memory and judgment      Pertinent Testing:         ECHO/FRANCI:  Normal ejection fraction        EKG: Sinus rhythm          Labs:      CBC:   Recent Labs     05/26/19 2002   WBC 10.9   HGB 14.0   HCT 41.7   .4*        BMP:   Recent Labs     05/26/19 2002      K 4.0   CL 98   CO2 29   BUN 12   CREATININE 0.59     PT/INR: No results for input(s): PROTIME, INR in the last 72 hours. APTT: No results for input(s): APTT in the last 72 hours.   MAG: No results for input(s): MG in the last 72 hours. D Dimer:   Recent Labs     05/26/19 2002   DDIMER 0.35     Troponin T   Recent Labs     05/26/19 2002 05/26/19  2215   TROPONINT NOT REPORTED NOT REPORTED      Pro-BNP   Recent Labs     05/26/19 2002   PROBNP 139       Impression   1. Recurrent episodes of chest pain and shortness of breath multiple risks for coronary artery disease no recent stress test.  Previous cardiac cath showed normal coronaries several years back  2. history of frequent premature ventricular contractions status post epicardial ablation in [facility]     Recommendations  Continue present medication. Procedures echocardiogram and Lexiscan stress test in the morning. If negative patient can be discharged              Electronically signed by Noel Reed MD on 5/27/2019 at 11:46 AM       This note was created with the assistance of a speech-recognition program.  Although the intention is to generate a document that actually reflects the content of the visit, no guarantees can be provided that every mistake has been identified and corrected by editing.     Eddi Ontiveros MD [facility] [place]

## 2019-05-27 NOTE — FLOWSHEET NOTE
05/26/19 7608   Provider Notification   Reason for Communication New orders   Provider Name Dr. Viridiana Weaver   Provider Notification Physician   Method of Communication Secure Message   Response See orders   Notification Time [id number]   Dr. Marichuy Velazquez contacted regarding admission orders. Home medications, pain medication, cardiology group for consult. Order given for Norco.  See orders.

## 2019-05-27 NOTE — ED NOTES
Pt has a nose bleed from right nostril, dr. Shonna Bruner notified. Bleeding minimal and has stopped.  no further orders received     Carroll Flores, RAVINDER  05/26/19 7286

## 2019-05-28 ENCOUNTER — APPOINTMENT (OUTPATIENT)
Dept: NUCLEAR MEDICINE | Age: 44
End: 2019-05-28
Payer: COMMERCIAL

## 2019-05-28 VITALS
RESPIRATION RATE: 16 BRPM | BODY MASS INDEX: 47.35 KG/M2 | HEART RATE: 80 BPM | WEIGHT: 277.34 LBS | SYSTOLIC BLOOD PRESSURE: 138 MMHG | DIASTOLIC BLOOD PRESSURE: 76 MMHG | TEMPERATURE: 98.3 F | HEIGHT: 64 IN | OXYGEN SATURATION: 98 %

## 2019-05-28 LAB
DIRECT EXAM: POSITIVE
GLUCOSE BLD-MCNC: 150 MG/DL (ref 65–105)
GLUCOSE BLD-MCNC: 179 MG/DL (ref 65–105)
GLUCOSE BLD-MCNC: 225 MG/DL (ref 65–105)
GLUCOSE BLD-MCNC: 99 MG/DL (ref 65–105)
LV EF: 44 %
LV EF: 55 %
LVEF MODALITY: NORMAL
LVEF MODALITY: NORMAL
Lab: ABNORMAL
SPECIMEN DESCRIPTION: ABNORMAL

## 2019-05-28 PROCEDURE — 6360000004 HC RX CONTRAST MEDICATION: Performed by: INTERNAL MEDICINE

## 2019-05-28 PROCEDURE — 94760 N-INVAS EAR/PLS OXIMETRY 1: CPT

## 2019-05-28 PROCEDURE — 6370000000 HC RX 637 (ALT 250 FOR IP): Performed by: FAMILY MEDICINE

## 2019-05-28 PROCEDURE — 96375 TX/PRO/DX INJ NEW DRUG ADDON: CPT

## 2019-05-28 PROCEDURE — 96376 TX/PRO/DX INJ SAME DRUG ADON: CPT

## 2019-05-28 PROCEDURE — G0378 HOSPITAL OBSERVATION PER HR: HCPCS

## 2019-05-28 PROCEDURE — A9500 TC99M SESTAMIBI: HCPCS | Performed by: INTERNAL MEDICINE

## 2019-05-28 PROCEDURE — 78452 HT MUSCLE IMAGE SPECT MULT: CPT

## 2019-05-28 PROCEDURE — 96372 THER/PROPH/DIAG INJ SC/IM: CPT

## 2019-05-28 PROCEDURE — 3430000000 HC RX DIAGNOSTIC RADIOPHARMACEUTICAL: Performed by: INTERNAL MEDICINE

## 2019-05-28 PROCEDURE — 2700000000 HC OXYGEN THERAPY PER DAY

## 2019-05-28 PROCEDURE — C8929 TTE W OR WO FOL WCON,DOPPLER: HCPCS

## 2019-05-28 PROCEDURE — 6360000002 HC RX W HCPCS: Performed by: FAMILY MEDICINE

## 2019-05-28 PROCEDURE — 2580000003 HC RX 258: Performed by: INTERNAL MEDICINE

## 2019-05-28 PROCEDURE — 93017 CV STRESS TEST TRACING ONLY: CPT

## 2019-05-28 PROCEDURE — 6360000002 HC RX W HCPCS: Performed by: INTERNAL MEDICINE

## 2019-05-28 PROCEDURE — 99226 PR SBSQ OBSERVATION CARE/DAY 35 MINUTES: CPT | Performed by: FAMILY MEDICINE

## 2019-05-28 PROCEDURE — 94640 AIRWAY INHALATION TREATMENT: CPT

## 2019-05-28 RX ORDER — NALBUPHINE HCL 10 MG/ML
10 AMPUL (ML) INJECTION EVERY 4 HOURS PRN
Status: DISCONTINUED | OUTPATIENT
Start: 2019-05-28 | End: 2019-05-28 | Stop reason: HOSPADM

## 2019-05-28 RX ORDER — ALBUTEROL SULFATE 90 UG/1
2 AEROSOL, METERED RESPIRATORY (INHALATION) PRN
Status: ACTIVE | OUTPATIENT
Start: 2019-05-28 | End: 2019-05-28

## 2019-05-28 RX ORDER — NITROGLYCERIN 0.4 MG/1
0.4 TABLET SUBLINGUAL EVERY 5 MIN PRN
Status: ACTIVE | OUTPATIENT
Start: 2019-05-28 | End: 2019-05-28

## 2019-05-28 RX ORDER — AMINOPHYLLINE DIHYDRATE 25 MG/ML
50 INJECTION, SOLUTION INTRAVENOUS PRN
Status: ACTIVE | OUTPATIENT
Start: 2019-05-28 | End: 2019-05-28

## 2019-05-28 RX ORDER — PROMETHAZINE HYDROCHLORIDE 25 MG/ML
25 INJECTION, SOLUTION INTRAMUSCULAR; INTRAVENOUS EVERY 8 HOURS PRN
Status: DISCONTINUED | OUTPATIENT
Start: 2019-05-28 | End: 2019-05-28 | Stop reason: HOSPADM

## 2019-05-28 RX ORDER — ATROPINE SULFATE 0.1 MG/ML
0.5 INJECTION INTRAVENOUS EVERY 5 MIN PRN
Status: ACTIVE | OUTPATIENT
Start: 2019-05-28 | End: 2019-05-28

## 2019-05-28 RX ORDER — SODIUM CHLORIDE 9 MG/ML
500 INJECTION, SOLUTION INTRAVENOUS CONTINUOUS PRN
Status: ACTIVE | OUTPATIENT
Start: 2019-05-28 | End: 2019-05-28

## 2019-05-28 RX ORDER — SODIUM CHLORIDE 0.9 % (FLUSH) 0.9 %
10 SYRINGE (ML) INJECTION PRN
Status: DISCONTINUED | OUTPATIENT
Start: 2019-05-28 | End: 2019-05-28

## 2019-05-28 RX ADMIN — Medication 10 ML: at 08:56

## 2019-05-28 RX ADMIN — Medication 10 ML: at 09:34

## 2019-05-28 RX ADMIN — CARVEDILOL 12.5 MG: 12.5 TABLET, FILM COATED ORAL at 10:39

## 2019-05-28 RX ADMIN — NALBUPHINE HYDROCHLORIDE 10 MG: 10 INJECTION, SOLUTION INTRAMUSCULAR; INTRAVENOUS; SUBCUTANEOUS at 10:03

## 2019-05-28 RX ADMIN — NALBUPHINE HYDROCHLORIDE 10 MG: 10 INJECTION, SOLUTION INTRAMUSCULAR; INTRAVENOUS; SUBCUTANEOUS at 18:07

## 2019-05-28 RX ADMIN — INSULIN GLARGINE 60 UNITS: 100 INJECTION, SOLUTION SUBCUTANEOUS at 18:06

## 2019-05-28 RX ADMIN — NALBUPHINE HYDROCHLORIDE 10 MG: 10 INJECTION, SOLUTION INTRAMUSCULAR; INTRAVENOUS; SUBCUTANEOUS at 00:29

## 2019-05-28 RX ADMIN — AMINOPHYLLINE 50 MG: 25 INJECTION, SOLUTION INTRAVENOUS at 09:37

## 2019-05-28 RX ADMIN — TETRAKIS(2-METHOXYISOBUTYLISOCYANIDE)COPPER(I) TETRAFLUOROBORATE 10.8 MILLICURIE: 1 INJECTION, POWDER, LYOPHILIZED, FOR SOLUTION INTRAVENOUS at 07:16

## 2019-05-28 RX ADMIN — Medication 10 ML: at 07:16

## 2019-05-28 RX ADMIN — IPRATROPIUM BROMIDE AND ALBUTEROL SULFATE 3 ML: .5; 3 SOLUTION RESPIRATORY (INHALATION) at 11:32

## 2019-05-28 RX ADMIN — REGADENOSON 0.4 MG: 0.08 INJECTION, SOLUTION INTRAVENOUS at 09:34

## 2019-05-28 RX ADMIN — INSULIN LISPRO 6 UNITS: 100 INJECTION, SOLUTION INTRAVENOUS; SUBCUTANEOUS at 18:05

## 2019-05-28 RX ADMIN — NALBUPHINE HYDROCHLORIDE 10 MG: 10 INJECTION, SOLUTION INTRAMUSCULAR; INTRAVENOUS; SUBCUTANEOUS at 04:45

## 2019-05-28 RX ADMIN — NALBUPHINE HYDROCHLORIDE 10 MG: 10 INJECTION, SOLUTION INTRAMUSCULAR; INTRAVENOUS; SUBCUTANEOUS at 13:58

## 2019-05-28 RX ADMIN — PERFLUTREN 2.2 MG: 6.52 INJECTION, SUSPENSION INTRAVENOUS at 13:37

## 2019-05-28 RX ADMIN — TETRAKIS(2-METHOXYISOBUTYLISOCYANIDE)COPPER(I) TETRAFLUOROBORATE 33.1 MILLICURIE: 1 INJECTION, POWDER, LYOPHILIZED, FOR SOLUTION INTRAVENOUS at 09:36

## 2019-05-28 RX ADMIN — IPRATROPIUM BROMIDE AND ALBUTEROL SULFATE 3 ML: .5; 3 SOLUTION RESPIRATORY (INHALATION) at 04:57

## 2019-05-28 RX ADMIN — PROMETHAZINE HYDROCHLORIDE 25 MG: 25 TABLET ORAL at 10:37

## 2019-05-28 ASSESSMENT — PAIN SCALES - GENERAL
PAINLEVEL_OUTOF10: 9
PAINLEVEL_OUTOF10: 8
PAINLEVEL_OUTOF10: 9
PAINLEVEL_OUTOF10: 0
PAINLEVEL_OUTOF10: 8
PAINLEVEL_OUTOF10: 9
PAINLEVEL_OUTOF10: 9

## 2019-05-28 NOTE — PROGRESS NOTES
bp 117/82, hr 98, PATIENT COUGHING THE WHOLE TIME AFTER LEXISCAN GIVEN, CAFFEINE GIVEN AND DR GREY FRIEDMAN AT THE BEDSIDE, 50MG AMINOPHYLLINE IV GIVEN, C/O CHEST PAIN AT A 9 DURING THE LEXISCAN, AFTER IV AMINOPHYLLINE IT IS NOW BACK DOWN TO A 5.

## 2019-05-28 NOTE — PROGRESS NOTES
FAMILY MEDICINE  - PROGRESS NOTE    Date:  5/28/2019  Nisha Del Rosario  [id number]      Chief Complaint   Patient presents with    Chest Pain     left sided, intermittent    Shortness of Breath    Leg Swelling     bilateral feet swellin, left foot worse    Other     positive for C-Diff, have been on antibiotics since 4/29/19    Hyperglycemia     pt reports BS in the 500's         Interval History:  not changed, she will have a stress test today. Her C. Diff came back negative and her H. Pylori came back positive.        Subjective  Constitutional: negative  Eyes: negative  Ears, nose, mouth, throat, and face: negative  Respiratory: positive for asthma  Cardiovascular: positive for chest pain  Gastrointestinal: positive for abdominal pain, diarrhea and reflux symptoms  Musculoskeletal:positive for myalgias  Neurological: negative  Behavioral/Psych: positive for tobacco use and obesity  Endocrine: positive for diabetic symptoms including hyperglycemia:    Objective:    /66   Pulse 75   Temp 99.3 °F (37.4 °C) (Oral)   Resp 20   Ht 5' 4\" (1.626 m)   Wt 277 lb 5.4 oz (125.8 kg)   SpO2 98%   BMI 47.61 kg/m²   General appearance - alert, well appearing, and in no distress and overweight  Mental status - alert, oriented to person, place, and time  Eyes - pupils equal and reactive, extraocular eye movements intact  Ears - hearing grossly normal bilaterally  Nose - normal and patent, no erythema, discharge or polyps  Mouth - mucous membranes moist, pharynx normal without lesions  Neck - supple, no significant adenopathy  Lymphatics - no palpable lymphadenopathy, no hepatosplenomegaly  Chest - clear to auscultation, no wheezes, rales or rhonchi, symmetric air entry  Heart - normal rate, regular rhythm, normal S1, S2, no murmurs, rubs, clicks or gallops  Abdomen - soft, nontender, nondistended, no masses or organomegaly  Breasts - not examined  Back exam - full range of motion, no tenderness, palpable spasm or pain on motion  Neurological - alert, oriented, normal speech, no focal findings or movement disorder noted  Musculoskeletal - no joint tenderness, deformity or swelling  Extremities - peripheral pulses normal, no pedal edema, no clubbing or cyanosis  Skin - normal coloration and turgor, no rashes, no suspicious skin lesions noted    Data:   Medications:   Current Facility-Administered Medications   Medication Dose Route Frequency Provider Last Rate Last Dose    enoxaparin (LOVENOX) injection 40 mg  40 mg Subcutaneous Daily Mason Torre MD        HYDROcodone-acetaminophen [facility]) 5-325 MG per tablet 2 tablet  2 tablet Oral Q6H PRN Mason Torre MD   2 tablet at 05/27/19 0135    nalbuphine (NUBAIN) injection 10 mg  10 mg Intramuscular Q4H PRN Mason Torre MD   10 mg at 05/28/19 0445    carvedilol (COREG) tablet 12.5 mg  12.5 mg Oral BID WC Mason Torre MD   12.5 mg at 05/27/19 2004    atorvastatin (LIPITOR) tablet 40 mg  40 mg Oral QPM Mason Torre MD   40 mg at 05/27/19 2003    DULoxetine (CYMBALTA) extended release capsule 60 mg  60 mg Oral BID Mason Torre MD   60 mg at 05/27/19 2002    bumetanide (BUMEX) tablet 1 mg  1 mg Oral Daily PRN Mason Torre MD        therapeutic multivitamin-minerals 1 tablet  1 tablet Oral Daily Mason Torre MD   1 tablet at 05/27/19 0925    promethazine (PHENERGAN) tablet 25 mg  25 mg Oral Q6H PRN Mason Torre MD        vitamin D (CHOLECALCIFEROL) tablet 50,000 Units  50,000 Units Oral Weekly Mason Torre MD        topiramate (TOPAMAX) tablet 25 mg  25 mg Oral Daily Mason Torre MD   25 mg at 05/27/19 2024    cetirizine (ZYRTEC) tablet 10 mg  10 mg Oral Daily Mason Torre MD        ipratropium-albuterol (DUONEB) nebulizer solution 3 mL  1 vial Inhalation Q6H PRN Mason Torre MD   3 mL at 05/28/19 0457    ARIPiprazole (ABILIFY) tablet 2 mg  2 mg Oral Daily Mason Torre MD   2 mg at 05/27/19 1121    albuterol sulfate  (90 Base) MCG/ACT inhaler 2 puff  2 puff Inhalation Q6H PRN Mason Torre MD        ibuprofen (ADVIL;MOTRIN) tablet 800 mg  800 mg Oral Q8H PRN Mason Torre MD        insulin lispro (HUMALOG) injection vial 0-18 Units  0-18 Units Subcutaneous TID  Mason Torre MD   6 Units at 05/27/19 1750    insulin lispro (HUMALOG) injection vial 0-9 Units  0-9 Units Subcutaneous Nightly Mason Torre MD   2 Units at 05/27/19 2204    glucose (GLUTOSE) 40 % oral gel 15 g  15 g Oral PRN Mason Torre MD        dextrose 50 % solution 12.5 g  12.5 g Intravenous PRN Mason Torre MD        glucagon (rDNA) injection 1 mg  1 mg Intramuscular PRN Mason Torre MD        dextrose 5 % solution  100 mL/hr Intravenous PRN Mason Torre MD        magnesium oxide (MAG-OX) tablet 400 mg  400 mg Oral Daily Mason Torre MD   400 mg at 05/27/19 9091    Fidaxomicin (DIFICID) tablet 200 mg  200 mg Oral BID Mason Torre MD   200 mg at 05/27/19 2001    regadenoson (LEXISCAN) injection 0.4 mg  0.4 mg Intravenous ONCE PRN Sunny Avila MD        insulin glargine (LANTUS) injection vial 60 Units  60 Units Subcutaneous BID  Mason Torre MD   60 Units at 05/27/19 1749    sodium chloride flush 0.9 % injection 10 mL  10 mL Intravenous 2 times per day Mason Torre MD   10 mL at 05/27/19 2003    sodium chloride flush 0.9 % injection 10 mL  10 mL Intravenous PRN Mason Torre MD        acetaminophen (TYLENOL) tablet 650 mg  650 mg Oral Q4H PRN Mason Torre MD         No intake or output data in the 24 hours ending 05/28/19 0541  Recent Results (from the past 24 hour(s))   POC Glucose Fingerstick    Collection Time: 05/27/19  7:29 AM   Result Value Ref Range    POC Glucose 130 (H) 65 - 105 mg/dL   POC Glucose Fingerstick    Collection Time: 05/27/19 11:43 AM   Result Value Ref Range    POC Glucose 164 (H) 65 - 105 mg/dL   POC Glucose Fingerstick    Collection Time: 05/27/19  5:17 PM   Result Value Ref Range    POC Glucose 246 (H) 65 - 105 mg/dL   POC Glucose Fingerstick    Collection Time: 05/27/19  8:52 PM   Result Value Ref Range    POC Glucose 179 (H) 65 - 105 mg/dL     -----------------------------------------------------------------  RAD:  EKG:  Micro:     Assessment & Plan:    Patient Active Problem List:     Uncontrolled type 2 diabetes mellitus without complication, with long-term current use of insulin ([facility])     COPD (chronic obstructive pulmonary disease) ([facility])     Asthma exacerbation     KRISTIN (obstructive sleep apnea)     Tobacco abuse     Chronic right shoulder pain     Neck pain     Radicular pain in right arm     Left leg pain     Noncompliance with therapeutic plan     Precordial pain     Tobacco abuse     Current moderate episode of major depressive disorder without prior episode ([facility])     Adhesive capsulitis of left shoulder     Class 3 severe obesity due to excess calories with serious comorbidity and body mass index (BMI) of 45.0 to 49.9 in adult [facility])     Left bicipital tenosynovitis     Refused influenza vaccine     Nausea     Clostridium difficile infection     Chest pain           Plan:  Chest pain - stress test today, further plans per Cardiology. C. Diff toxin negative. H. Pylori antigen positive. Continue current treatments. If stress test negative, she can be D/C'd home and see GI as an outpt. Complete orders per chart.     See orders   Disposition:    Electronically signed by Ayla Bui MD on 5/28/2019 at 5:41 AM

## 2019-05-28 NOTE — CARE COORDINATION
ONGOING DISCHARGE PLAN:    Spoke with patient regarding discharge plan and patient confirms that plan is still to go home with no needs. Will nancy a cab voucher for discharge. Had stress test today, was negative. Will continue to follow for additional discharge needs.     Electronically signed by Tavares Gao RN on 5/28/2019 at 2:50 PM

## 2019-05-28 NOTE — FLOWSHEET NOTE
05/28/19 1716   Encounter Summary   Services provided to: Patient not available  (Sleeping)   Referral/Consult From: Howie

## 2019-05-28 NOTE — PROGRESS NOTES
[facility])    Progress Note    2019 1:26 PM      Subjective:  Ms. Mar Aguilera  Has atypical chest pain. Stress showed no ischemia and EF 44%. Had prior cath showing normal coronaries           LABS:     CBC: Recent Labs     19   WBC 10.9   HGB 14.0   HCT 41.7   .4*        BMP: Recent Labs     19      K 4.0   CL 98   CO2 29   BUN 12   CREATININE 0.59     PT/INR: No results for input(s): PROTIME, INR in the last 72 hours. APTT: No results for input(s): APTT in the last 72 hours. MAG: No results for input(s): MG in the last 72 hours. D Dimer:   Recent Labs     19   DDIMER 0.35     Troponin T   Recent Labs     19  2215   TROPONINT NOT REPORTED NOT REPORTED     Pro-BNP No results for input(s): BNP in the last 72 hours. Pulse Ox:  SpO2  Av.5 %  Min: 92 %  Max: 99 %  Supplemental O2: O2 Flow Rate (L/min): 3 L/min     Current Meds:   0.9 % sodium chloride infusion Continuous PRN   albuterol sulfate  (90 Base) MCG/ACT inhaler 2 puff PRN   atropine injection 0.5 mg Q5 Min PRN   nitroGLYCERIN (NITROSTAT) SL tablet 0.4 mg Q5 Min PRN   aminophylline injection 50 mg PRN   promethazine (PHENERGAN) injection 25 mg Q8H PRN   nalbuphine (NUBAIN) injection 10 mg Q4H PRN   perflutren lipid microspheres (DEFINITY) injection 2.2 mg ONCE PRN   enoxaparin (LOVENOX) injection 30 mg BID   HYDROcodone-acetaminophen (NORCO) 5-325 MG per tablet 2 tablet Q6H PRN   carvedilol (COREG) tablet 12.5 mg BID WC   atorvastatin (LIPITOR) tablet 40 mg QPM   DULoxetine (CYMBALTA) extended release capsule 60 mg BID   bumetanide (BUMEX) tablet 1 mg Daily PRN   therapeutic multivitamin-minerals 1 tablet Daily   promethazine (PHENERGAN) tablet 25 mg Q6H PRN   vitamin D (CHOLECALCIFEROL) tablet 50,000 Units Weekly   topiramate (TOPAMAX) tablet 25 mg Daily   cetirizine (ZYRTEC) tablet 10 mg Daily   ipratropium-albuterol (DUONEB) nebulizer excess calories with serious comorbidity and body mass index (BMI) of 45.0 to 49.9 in adult [facility])  Resolved Problems:    * No resolved hospital problems. *      ASSESSMENT / PLAN    1. CP - stress test negative  2. NICM - EF 44%, await echo  3.  Prior ablation for PVC at [facility]    If echo shows EF stable, no further cardiac testing planned    Electronically signed by Ronaldo Dunn MD on 5/28/2019 at 1:26 PM

## 2019-05-30 LAB
EKG ATRIAL RATE: 90 BPM
EKG P AXIS: 57 DEGREES
EKG P-R INTERVAL: 140 MS
EKG Q-T INTERVAL: 366 MS
EKG QRS DURATION: 80 MS
EKG QTC CALCULATION (BAZETT): 447 MS
EKG R AXIS: 59 DEGREES
EKG T AXIS: 53 DEGREES
EKG VENTRICULAR RATE: 90 BPM

## 2019-05-31 PROBLEM — R07.9 CHEST PAIN: Status: RESOLVED | Noted: 2019-05-26 | Resolved: 2019-05-31

## 2019-06-03 NOTE — DISCHARGE SUMMARY
[facility] Discharge Summary      Patient ID: Tejas Ibanez    MRN: [id number]     Acct:  [de-identified]       Patient's PCP: Hallie Becerra MD    Admit Date: 5/26/2019     Discharge Date: 5/28/2019      Admitting Physician: Chandrika Uribe MD    Discharge Physician: Chandrika Uribe MD     Discharge Diagnoses:    Primary Problem  Chest pain    Active Hospital Problems    Diagnosis Date Noted    Class 3 severe obesity due to excess calories with serious comorbidity and body mass index (BMI) of 45.0 to 49.9 in adult [facility]) [E66.01, Z68.42] 10/04/2018    KRISTIN (obstructive sleep apnea) [G47.33] 07/24/2014    COPD (chronic obstructive pulmonary disease) ([address] [J44.9] 04/30/2014    Uncontrolled type 2 diabetes mellitus without complication, with long-term current use of insulin ([address] [E11.65, Z79.4] 04/30/2014     Past Medical History:   Diagnosis Date    Asthma     Atrial fibrillation ([address]     placed on event monitor 9/25/18 for PVC's & A-fib    Bipolar 1 disorder ([address]     Bulging disc     CAD (coronary artery disease)     Cardiomyopathy ([address]     CHF (congestive heart failure) (HCC)     Chronic otitis media of both ears     rt>lt    COPD (chronic obstructive pulmonary disease) (HCC)     Depression     Diarrhea     Diarrhea     Dizziness     [facility]deep venous thrombosis) ([facility])     after PICC line right arm    Endometriosis     Fainting     GERD (gastroesophageal reflux disease)     hx of    GI bleeding     Helicobacter pylori (H. pylori)     [place] hearing[place]     both ears, no hearing aids    Hyperlipidemia     Hypertension     Mastoiditis     Migraines     Morbid obesity ([address]     Myocardial infarction ([address]     2005    Nausea     Neuropathy     On home oxygen therapy     3 L at night    Ovarian cyst     Passed out ([address]     hx of- negative tilt table    Pulmonary hypertension ([address]     Pulmonary insufficiency     PVC (premature ventricular contraction)     Tricuspid insufficiency     Type II or unspecified type diabetes mellitus without mention of complication, not stated as uncontrolled      The patient was seen and examined on day of discharge and this discharge summary is in conjunction with any daily progress note from day of discharge. Code Status:  Prior    Hospital Course: uncomplicated    Consults:  cardiology    Significant Diagnostic Studies: as above, and as follows: see chart    Treatments: as above    Disposition: home    Discharged Condition: Stable    Follow Up:  Zully Diehl MD in one week    Discharge Medications:    Christiano Smart   Home Medication Instructions DDL:[id number]    Printed on:06/03/19 4855   Medication Information                      albuterol sulfate  (90 Base) MCG/ACT inhaler  INHALE 2 PUFFS BY MOUTH EVERY 6 HOURS AS NEEDED FOR WHEEZING             ARIPiprazole (ABILIFY) 2 MG tablet  Take 1 tablet by mouth daily             atorvastatin (LIPITOR) 40 MG tablet  Take 40 mg by mouth every evening              blood glucose monitor strips  Test blood sugars 6 times a day due to low blood sugars. Uncontrolled diabetes. Check Ac and HS and as needed. bumetanide (BUMEX) 1 MG tablet  Take 1 tablet by mouth daily Indications: if 3 lb wt gain             carvedilol (COREG) 12.5 MG tablet  Take 12.5 mg by mouth 2 times daily (with meals) Takes at 10:00am and 10:00pm             Fidaxomicin (DIFICID) 200 MG TABS tablet  Take 200 mg by mouth 2 times daily Indications: Colon Inflammation due to Clostridium Bacteria Overgrowth             Fidaxomicin (DIFICID) 200 MG TABS tablet  1 tablet             ibuprofen (ADVIL;MOTRIN) 800 MG tablet  TAKE ONE TABLET BY MOUTH 3 TIMES DAILY             insulin glargine (LANTUS SOLOSTAR) 100 UNIT/ML injection pen  Inject 60 units in the AM and 60 units in the evening.              insulin lispro (HUMALOG KWIKPEN) 100 UNIT/ML pen  FOR GLUCOSE 150-200 GIVE 6 UNITS, FOR GLUCOSE 201-250 9 UNITS, GLUCOSE 251-300 12 UNITS, GLUCOSE 301 OR OVER 15 UNITS. ipratropium-albuterol (DUONEB) 0.5-2.5 (3) MG/3ML SOLN nebulizer solution  INHALE 3 MLS INTO THE LUNGS EVERY 6 HOURS AS NEEDED FOR SHORTNESS OF BREATH             loratadine (CLARITIN) 10 MG tablet  Take 1 tablet by mouth daily             Magnesium 500 MG CAPS  Take 500 mg by mouth daily             Multiple Vitamins-Minerals (MULTIVITAMIN GUMMIES WOMENS) CHEW  Take 2 each by mouth daily              nalbuphine (NUBAIN) 10 MG/ML injection  Inject 10 mg into the muscle every 4-6 hours as needed for Pain             OXYGEN  Inhale into the lungs Indications: On 3 liters per n/c during the night. promethazine (PHENERGAN) 25 MG tablet  Take 25 mg by mouth every 6 hours as needed for Nausea             topiramate (TOPAMAX) 25 MG tablet  Take 25 mg by mouth daily Takes for headaches             UNIFINE PENTIPS 31G X 8 MM MISC  USE 4 TIMES DAILY AS DIRECTED             vitamin D (CHOLECALCIFEROL) 1000 UNIT TABS tablet  Take 50,000 Units by mouth once a week                   Activity: activity as tolerated    Diet: diabetic diet    Time Spent on discharge is more than 30 minutes in the examination, evaluation, counseling and review of medications and discharge plan.       Electronically signed by Cornelius Troy MD on 6/3/2019 at 1:57 PM

## 2019-06-04 ENCOUNTER — OFFICE VISIT (OUTPATIENT)
Dept: ORTHOPEDIC SURGERY | Age: 44
End: 2019-06-04
Payer: COMMERCIAL

## 2019-06-04 VITALS — WEIGHT: 252 LBS | HEIGHT: 64 IN | BODY MASS INDEX: 43.02 KG/M2

## 2019-06-04 DIAGNOSIS — S93.402A SPRAIN OF LEFT ANKLE, UNSPECIFIED LIGAMENT, INITIAL ENCOUNTER: Primary | ICD-10-CM

## 2019-06-04 PROCEDURE — G8417 CALC BMI ABV UP PARAM F/U: HCPCS | Performed by: ORTHOPAEDIC SURGERY

## 2019-06-04 PROCEDURE — G8427 DOCREV CUR MEDS BY ELIG CLIN: HCPCS | Performed by: ORTHOPAEDIC SURGERY

## 2019-06-04 PROCEDURE — 4004F PT TOBACCO SCREEN RCVD TLK: CPT | Performed by: ORTHOPAEDIC SURGERY

## 2019-06-04 PROCEDURE — 99213 OFFICE O/P EST LOW 20 MIN: CPT | Performed by: ORTHOPAEDIC SURGERY

## 2019-06-04 NOTE — PROGRESS NOTES
type diabetes mellitus without mention of complication, not stated as uncontrolled. Past Surgical History  [facility]  has a past surgical history that includes Hysterectomy; Cholecystectomy; Appendectomy; Colonoscopy; Upper gastrointestinal endoscopy; Abdomen surgery; Abdominal adhesion surgery; Abdominal exploration surgery; Tympanostomy tube placement; Tonsillectomy; Foot surgery (Left); Abdominal hernia repair; hysteroscopy; Gastric fundoplication (4361); Abscess Drainage; Scaphoid fracture surgery (Left); other surgical history (Right, 5/29/14); Myringotomy Tympanostomy Tube Placement (Right, 06/05/2014); myringotomy (Right, 11/13/15); Hysterectomy; Cardiac catheterization (2014 & 2000); other surgical history (2009); Breast surgery (Left, 2007); fracture surgery (Right); other surgical history (Right, 08/11/2016); ablation of dysrhythmic focus (2016); other surgical history; other surgical history; pr manipulatn shldr jt w anesthesia (Right, 3/1/2017); ablation of dysrhythmic focus (09/08/2017); pr shoulder scope bone shaving (Left, 10/17/2018); and Tympanostomy tube placement (Left, 03/12/2019).     Current Medications  Current Outpatient Medications   Medication Sig Dispense Refill    DULoxetine (CYMBALTA) 60 MG extended release capsule Take 1 capsule by mouth 2 times daily 60 capsule 11    Fidaxomicin (DIFICID) 200 MG TABS tablet Take 200 mg by mouth 2 times daily Indications: Colon Inflammation due to Clostridium Bacteria Overgrowth      Fidaxomicin (DIFICID) 200 MG TABS tablet 1 tablet      nalbuphine (NUBAIN) 10 MG/ML injection Inject 10 mg into the muscle every 4-6 hours as needed for Pain      ibuprofen (ADVIL;MOTRIN) 800 MG tablet TAKE ONE TABLET BY MOUTH 3 TIMES DAILY 90 tablet 3    albuterol sulfate  (90 Base) MCG/ACT inhaler INHALE 2 PUFFS BY MOUTH EVERY 6 HOURS AS NEEDED FOR WHEEZING 1 Inhaler 5    UNIFINE PENTIPS 31G X 8 MM MISC USE 4 TIMES DAILY AS DIRECTED 150 each 11    ARIPiprazole (ABILIFY) 2 MG tablet Take 1 tablet by mouth daily 30 tablet 3    insulin glargine (LANTUS SOLOSTAR) 100 UNIT/ML injection pen Inject 60 units in the AM and 60 units in the evening. 5 pen     ipratropium-albuterol (DUONEB) 0.5-2.5 (3) MG/3ML SOLN nebulizer solution INHALE 3 MLS INTO THE LUNGS EVERY 6 HOURS AS NEEDED FOR SHORTNESS OF BREATH 120 vial 5    blood glucose monitor strips Test blood sugars 6 times a day due to low blood sugars. Uncontrolled diabetes. Check Ac and HS and as needed. 200 strip 11    loratadine (CLARITIN) 10 MG tablet Take 1 tablet by mouth daily 30 tablet 11    Magnesium 500 MG CAPS Take 500 mg by mouth daily      vitamin D (CHOLECALCIFEROL) 1000 UNIT TABS tablet Take 50,000 Units by mouth once a week       topiramate (TOPAMAX) 25 MG tablet Take 25 mg by mouth daily Takes for headaches      insulin lispro (HUMALOG KWIKPEN) 100 UNIT/ML pen FOR GLUCOSE 150-200 GIVE 6 UNITS, FOR GLUCOSE 201-250 9 UNITS, GLUCOSE 251-300 12 UNITS, GLUCOSE 301 OR OVER 15 UNITS. 15 pen 3    promethazine (PHENERGAN) 25 MG tablet Take 25 mg by mouth every 6 hours as needed for Nausea      OXYGEN Inhale into the lungs Indications: On 3 liters per n/c during the night.  Multiple Vitamins-Minerals (MULTIVITAMIN GUMMIES WOMENS) CHEW Take 2 each by mouth daily       bumetanide (BUMEX) 1 MG tablet Take 1 tablet by mouth daily Indications: if 3 lb wt gain (Patient taking differently: Take 1 mg by mouth daily as needed (Takes for 3 days on and then 3 days off as directed.) ) 30 tablet 3    carvedilol (COREG) 12.5 MG tablet Take 12.5 mg by mouth 2 times daily (with meals) Takes at 10:00am and 10:00pm      atorvastatin (LIPITOR) 40 MG tablet Take 40 mg by mouth every evening        No current facility-administered medications for this visit. Allergies  Allergies have been reviewed.   Thuy Barrycatarino is allergic to latex; aspirin; avelox [moxifloxacin]; chantix [varenicline]; doxycycline; dye [iodides]; flexeril [cyclobenzaprine]; gabapentin; losartan; morphine; nsaids; pcn [penicillins]; reglan [metoclopramide hcl]; shellfish-derived products; sulfa antibiotics; vancomycin; zofran; zyvox [linezolid]; acyclovir; bactrim [sulfamethoxazole-trimethoprim]; betadine [povidone iodine]; ceclor [cefaclor]; clindamycin/lincomycin; codeine; macrolides and ketolides; novolin r [insulin]; novolog [insulin aspart]; phenothiazines; tape [adhesive tape]; banana; compazine [prochlorperazine]; fentanyl; kiwi extract; tamiflu [oseltamivir phosphate]; and sotalol. Social History  [facility]  reports that she has been smoking cigarettes. She has a 11.50 pack-year smoking history. She has never used smokeless tobacco. She reports that she does not drink alcohol or use drugs. Family History  Leila's family history includes Asthma in her father, maternal grandfather, maternal grandmother, and mother; Breast Cancer in her maternal aunt, maternal grandmother, and paternal aunt; Cancer in her maternal aunt, maternal grandmother, paternal grandmother, and sister; Cervical Cancer in her maternal grandmother and paternal grandmother; Diabetes in her father, maternal aunt, maternal grandfather, maternal grandmother, mother, paternal grandfather, and paternal grandmother; Heart Disease in her father, maternal aunt, maternal grandfather, maternal grandmother, maternal uncle, mother, paternal aunt, paternal grandfather, paternal grandmother, and paternal uncle; High Blood Pressure in her father, maternal aunt, maternal grandfather, maternal grandmother, maternal uncle, mother, paternal aunt, paternal grandfather, paternal grandmother, and paternal uncle; Liver Disease (age of onset: 61) in her father; Synetta Emmanuel in her maternal grandfather; Ulcerative Colitis in her father. Review of Systems   History obtained from the patient.    REVIEW OF SYSTEMS:   Constitution: negative for fever, chills, weight loss or malaise Musculoskeletal: As noted in the HPI   Neurologic: As noted in the HPI    Physical Exam  Ht 5' 4\" (1.626 m)   Wt 252 lb (114.3 kg)   BMI 43.26 kg/m²    General Appearance: alert, well appearing, and in no distress  Mental Status: alert, oriented to person, place, and time  Evaluation of patient's left foot and ankle demonstrates intact skin with moderate swelling over the anterolateral and lateral aspect of her ankle this is associated with mild ecchymosis. She is exquisitely tender to palpation over the distal fibula as well as the calcaneofibular and anterior talofibular ligaments. Her pain limits my ability to adequately assess a drawer test at this time. 2+ pedal pulses with brisk capillary refill in her toes. She can actively dorsiflex and plantarflex all of her toes. Incision is grossly intact light touch in all dermatomes. Imaging Studies  Nonweightbearing x-rays of the left ankle obtained on 5/31/19 in an outside facility were independently reviewed demonstrating normal alignment of the mortise. Possible avulsion fracture off the tip of the distal fibula. Assessment and Plan  Robyn Traylor is a 53 y.o. old female with acute onset left ankle pain that is likely due to a sprain but she may have an associated fracture involving the distal fibula. I had a discussion with the patient today with regards to this. I'd recommend proceeding conservatively at this time but I would like her to be seen and evaluated further by my partner Dr. Edwin Dobson. In the meantime she was placed in a fracture boot. She may weight-bear as tolerated while wearing the boot. She was encouraged to ice and elevate to help with pain and swelling. She may use over-the-counter anti-inflammatories as needed as well. Referral to Dr. Edwin Dobson was made.

## 2019-06-12 ENCOUNTER — OFFICE VISIT (OUTPATIENT)
Dept: ORTHOPEDIC SURGERY | Age: 44
End: 2019-06-12
Payer: COMMERCIAL

## 2019-06-12 VITALS — HEIGHT: 64 IN | WEIGHT: 251.99 LBS | BODY MASS INDEX: 43.02 KG/M2

## 2019-06-12 DIAGNOSIS — S82.892A CLOSED AVULSION FRACTURE OF LEFT ANKLE, INITIAL ENCOUNTER: Primary | ICD-10-CM

## 2019-06-12 DIAGNOSIS — F17.200 TOBACCO DEPENDENCY: ICD-10-CM

## 2019-06-12 PROCEDURE — 4004F PT TOBACCO SCREEN RCVD TLK: CPT | Performed by: ORTHOPAEDIC SURGERY

## 2019-06-12 PROCEDURE — G8427 DOCREV CUR MEDS BY ELIG CLIN: HCPCS | Performed by: ORTHOPAEDIC SURGERY

## 2019-06-12 PROCEDURE — 99213 OFFICE O/P EST LOW 20 MIN: CPT | Performed by: ORTHOPAEDIC SURGERY

## 2019-06-12 PROCEDURE — 73630 X-RAY EXAM OF FOOT: CPT | Performed by: ORTHOPAEDIC SURGERY

## 2019-06-12 PROCEDURE — 73610 X-RAY EXAM OF ANKLE: CPT | Performed by: ORTHOPAEDIC SURGERY

## 2019-06-12 PROCEDURE — G8417 CALC BMI ABV UP PARAM F/U: HCPCS | Performed by: ORTHOPAEDIC SURGERY

## 2019-06-12 ASSESSMENT — ENCOUNTER SYMPTOMS
EYE PAIN: 0
SHORTNESS OF BREATH: 0
ABDOMINAL PAIN: 0

## 2019-06-12 NOTE — LETTER
[facility] [address]  [phone]        6/12/2019     Patient: Brandie Current  YOB: 1975    Dear Gricel Mcclendon MD,     CC:  Fabi Shukla MD    I had the pleasure of seeing one of your patients, Branide Current recently in the office. Below are the relevant portions of my assessment and plan of care. ASSESSMENT AND PLAN:     She has a left distal fibula avulsion fracture, sustained on 6/7/2019. Notably, she has a complex past medical history including uncontrolled insulin-dependent type 2 diabetes, COPD, major depressive disorder, obstructive sleep apnea, history of C. difficile infection, severe obesity, and she reports she smokes 1/2 pack cigarettes per day. I had a long discussion today with the patient about the likely diagnosis and its natural history, physical exam and imaging findings, as well as treatment options in detail. We discussed rest/activity modification, swelling control, NSAIDs/Acetaminophen/topical anesthetics, orthotics/shoewear modification, bracing/immobilization, injections, and physical therapy. The patient wishes to proceed with the recommendations as above. Orders/referrals were placed as below at today's visit. The patient was provided the following, for stability and pain control, to use when ambulatory:  over the counter lace up ankle brace. The patient was cautioned to avoid pain provoking activity. The patient will use the following medication(s):  OTC NSAIDs PRN. All questions were answered and the patient agrees with the above plan. The patient will return to clinic in 4 weeks. At her next visit, I will consider referring her to physical therapy, depending on how she is doing. Thank you for allowing me to participate in the care of this patient.   I will keep you updated on this patient's follow up and I look forward to serving you and your patients again in the future. Please don't hesitate to contact me at my mobile number [phone].         Jessica Wick MD  Orthopedic Surgery, Foot and Ankle

## 2019-06-12 NOTE — PROGRESS NOTES
[facility]  [address] [address]  Dept: [phone]  Review of Systems    Name: Hugo Fierro   : 1975    Date: 2019     Review of Systems   Constitutional: Negative for fever. HENT: Negative for tinnitus. Eyes: Negative for pain. Respiratory: Negative for shortness of breath. Cardiovascular: Negative for chest pain. Gastrointestinal: Negative for abdominal pain. Genitourinary: Negative for dysuria. Skin: Negative for rash. Neurological: Negative for headaches. Hematological: Does not bruise/bleed easily.      Musculoskeletal: See HPI for pertinent positives

## 2019-06-12 NOTE — PROGRESS NOTES
[facility]  [address] [address]  Dept: [phone]    Ambulatory Orthopedic Consult      CHIEF COMPLAINT:    Chief Complaint   Patient presents with    New Patient     L Ankle       HISTORY OF PRESENT ILLNESS:      The patient is a 53 y.o. female who is being seen for consultation and evaluation of an injury that occurred on 6/7/2019, secondary to an inversion injury. The patient reports she felt immediate pain and then had difficulty/pain with weight bearing. The pain is localized to the left lateral malleolus. Prior to being seen here today, the patient was seen in the emergency department, and then seen by my partner, Dr. Wilfrido Dinh, who is referred her here today. The patient reports an associated swelling. The pain is worse with activity and better with rest. The pain has improved since initially sustaining the injury. The patient denies calf pain, chest pain, and shortness of breath. REVIEW OF SYSTEMS:  Review of Systems   Constitutional: Negative for fever. HENT: Negative for tinnitus. Eyes: Negative for pain. Respiratory: Negative for shortness of breath. Cardiovascular: Negative for chest pain. Gastrointestinal: Negative for abdominal pain. Genitourinary: Negative for dysuria. Skin: Negative for rash. Neurological: Negative for headaches. Hematological: Does not bruise/bleed easily.      Musculoskeletal: See HPI for pertinent positives     Past Medical History:    Past Medical History:   Diagnosis Date    Asthma     Atrial fibrillation ([address]     placed on event monitor 9/25/18 for PVC's & A-fib    Bipolar 1 disorder ([address]     Bulging disc     CAD (coronary artery disease)     Cardiomyopathy ([address]     CHF (congestive heart failure) (HCC)     Chronic otitis media of both ears     rt>lt    COPD (chronic obstructive pulmonary disease) (HCC)     Depression     Diarrhea     Diarrhea     Dizziness     DVT (deep venous thrombosis) (HCC)     after PICC line right arm    Endometriosis     Fainting     GERD (gastroesophageal reflux disease)     hx of    GI bleeding     Helicobacter pylori (H. pylori)     [place] (hard of hearing)     both ears, no hearing aids    Hyperlipidemia     Hypertension     Mastoiditis     Migraines     Morbid obesity ([address]     Myocardial infarction ([address]     2005    Nausea     Neuropathy     On home oxygen therapy     3 L at night    Ovarian cyst     Passed out ([address]     hx of- negative tilt table    Pulmonary hypertension (HCC)     Pulmonary insufficiency     PVC (premature ventricular contraction)     Tricuspid insufficiency     Type II or unspecified type diabetes mellitus without mention of complication, not stated as uncontrolled        Past Surgical History:    Past Surgical History:   Procedure Laterality Date    ABDOMEN SURGERY      abcess    ABDOMINAL ADHESION SURGERY      ABDOMINAL EXPLORATION SURGERY      x 4    ABDOMINAL HERNIA REPAIR      with mesh    ABLATION OF DYSRHYTHMIC FOCUS  2016    ABLATION OF DYSRHYTHMIC FOCUS  09/08/2017    Done at the [facility].  ABSCESS DRAINAGE      left hip and chest    APPENDECTOMY      BREAST SURGERY Left 2007    I & D    CARDIAC CATHETERIZATION  2014 & 2000     no stenting    CHOLECYSTECTOMY      COLONOSCOPY      FOOT SURGERY Left     bone fragment removed    FRACTURE SURGERY Right     closed reduction perc pinning ring & middle finger    GASTRIC FUNDOPLICATION  2272    HYSTERECTOMY      total    HYSTERECTOMY      HYSTEROSCOPY      tubal perfusion    MYRINGOTOMY Right 11/13/15    Right myringotomy with placement of  T-tube on the right side. Removal of plugged ventilating tube, right side.     MYRINGOTOMY AND TYMPANOSTOMY TUBE PLACEMENT Right 06/05/2014    OTHER SURGICAL HISTORY Right 5/29/14    removal ear tube rt ear    OTHER SURGICAL HISTORY  2009    LOOP recorder inserted and removed 3 mos. later    OTHER SURGICAL HISTORY Right 08/11/2016    ear tube removal with patch    OTHER SURGICAL HISTORY      tubal perfusion, lysis of uterine adhesions    OTHER SURGICAL HISTORY      multiple PICC lines inserted and removed, it has affected circulation bilateral upper arms    GA LAVERNEULATGURPREET DENNIS W ANESTHESIA Right 3/1/2017    SHOULDER MANIPULATION RIGHT performed by Thomas Urena MD at [address] [facility] Left 10/17/2018    SHOULDER ARTHROSCOPY W/BICEPS TENDONESIS performed by Jovany Cochran MD at [address] Left     foot    TONSILLECTOMY      TYMPANOSTOMY TUBE PLACEMENT      TYMPANOSTOMY TUBE PLACEMENT Left 03/12/2019    UPPER GASTROINTESTINAL ENDOSCOPY         Current Medications:   Current Outpatient Medications   Medication Sig Dispense Refill    DULoxetine (CYMBALTA) 60 MG extended release capsule Take 1 capsule by mouth 2 times daily 60 capsule 11    Fidaxomicin (DIFICID) 200 MG TABS tablet Take 200 mg by mouth 2 times daily Indications: Colon Inflammation due to Clostridium Bacteria Overgrowth      Fidaxomicin (DIFICID) 200 MG TABS tablet 1 tablet      nalbuphine (NUBAIN) 10 MG/ML injection Inject 10 mg into the muscle every 4-6 hours as needed for Pain      ibuprofen (ADVIL;MOTRIN) 800 MG tablet TAKE ONE TABLET BY MOUTH 3 TIMES DAILY 90 tablet 3    albuterol sulfate  (90 Base) MCG/ACT inhaler INHALE 2 PUFFS BY MOUTH EVERY 6 HOURS AS NEEDED FOR WHEEZING 1 Inhaler 5    UNIFINE PENTIPS 31G X 8 MM MISC USE 4 TIMES DAILY AS DIRECTED 150 each 11    ARIPiprazole (ABILIFY) 2 MG tablet Take 1 tablet by mouth daily 30 tablet 3    insulin glargine (LANTUS SOLOSTAR) 100 UNIT/ML injection pen Inject 60 units in the AM and 60 units in the evening.  5 pen     ipratropium-albuterol (DUONEB) 0.5-2.5 (3) MG/3ML SOLN nebulizer solution INHALE 3 MLS INTO THE LUNGS EVERY 6 HOURS AS NEEDED FOR SHORTNESS OF BREATH 120 vial 5    blood glucose monitor strips Test blood sugars 6 times a day due to low blood sugars. Uncontrolled diabetes. Check Ac and HS and as needed. 200 strip 11    loratadine (CLARITIN) 10 MG tablet Take 1 tablet by mouth daily 30 tablet 11    Magnesium 500 MG CAPS Take 500 mg by mouth daily      vitamin D (CHOLECALCIFEROL) 1000 UNIT TABS tablet Take 50,000 Units by mouth once a week       topiramate (TOPAMAX) 25 MG tablet Take 25 mg by mouth daily Takes for headaches      insulin lispro (HUMALOG KWIKPEN) 100 UNIT/ML pen FOR GLUCOSE 150-200 GIVE 6 UNITS, FOR GLUCOSE 201-250 9 UNITS, GLUCOSE 251-300 12 UNITS, GLUCOSE 301 OR OVER 15 UNITS. 15 pen 3    promethazine (PHENERGAN) 25 MG tablet Take 25 mg by mouth every 6 hours as needed for Nausea      OXYGEN Inhale into the lungs Indications: On 3 liters per n/c during the night.  Multiple Vitamins-Minerals (MULTIVITAMIN GUMMIES WOMENS) CHEW Take 2 each by mouth daily       bumetanide (BUMEX) 1 MG tablet Take 1 tablet by mouth daily Indications: if 3 lb wt gain (Patient taking differently: Take 1 mg by mouth daily as needed (Takes for 3 days on and then 3 days off as directed.) ) 30 tablet 3    carvedilol (COREG) 12.5 MG tablet Take 12.5 mg by mouth 2 times daily (with meals) Takes at 10:00am and 10:00pm      atorvastatin (LIPITOR) 40 MG tablet Take 40 mg by mouth every evening        No current facility-administered medications for this visit. Allergies:    Latex; Aspirin; Avelox [moxifloxacin]; Chantix [varenicline]; Doxycycline; Dye [iodides]; Flexeril [cyclobenzaprine]; Gabapentin; Losartan; Morphine; Nsaids; Pcn [penicillins]; Reglan [metoclopramide hcl]; Shellfish-derived products; Sulfa antibiotics; Vancomycin; Zofran; Zyvox [linezolid]; Acyclovir; Bactrim [sulfamethoxazole-trimethoprim]; Betadine [povidone iodine]; Ceclor [cefaclor]; Clindamycin/lincomycin; Codeine; Macrolides and ketolides; Novolin r [insulin]; Novolog [insulin aspart];  Phenothiazines; Tape [adhesive tape]; Banana; Compazine [prochlorperazine]; Fentanyl; Kiwi extract;  Tamiflu [oseltamivir phosphate]; and Sotalol    Family History:  Family History   Problem Relation Age of Onset    Ulcerative Colitis Father     Liver Disease Father 61        hep c and b    Diabetes Father     Asthma Father     Heart Disease Father     High Blood Pressure Father     Breast Cancer Maternal Aunt     Cancer Maternal Aunt     Diabetes Maternal Aunt     Heart Disease Maternal Aunt     High Blood Pressure Maternal Aunt     Breast Cancer Paternal Aunt     Heart Disease Paternal Aunt     High Blood Pressure Paternal Aunt     Breast Cancer Maternal Grandmother     Cervical Cancer Maternal Grandmother     Cancer Maternal Grandmother     Diabetes Maternal Grandmother     Asthma Maternal Grandmother     Heart Disease Maternal Grandmother     High Blood Pressure Maternal Grandmother     Lung Cancer Maternal Grandfather     Diabetes Maternal Grandfather     Asthma Maternal Grandfather     Heart Disease Maternal Grandfather     High Blood Pressure Maternal Grandfather     Cervical Cancer Paternal Grandmother     Cancer Paternal Grandmother     Diabetes Paternal Grandmother     Heart Disease Paternal Grandmother     High Blood Pressure Paternal Grandmother     Diabetes Mother     Asthma Mother     Heart Disease Mother     High Blood Pressure Mother     Cancer Sister     Heart Disease Maternal Uncle     High Blood Pressure Maternal Uncle     Heart Disease Paternal Uncle     High Blood Pressure Paternal Uncle     Diabetes Paternal Grandfather     Heart Disease Paternal Grandfather     High Blood Pressure Paternal Grandfather        Social History:   Social History     Socioeconomic History    Marital status: Single     Spouse name: Not on file    Number of children: Not on file    Years of education: Not on file    Highest education level: Not on file   Occupational History     Employer: NONE   Social Needs    Financial resource strain: Not on file    Food insecurity:     Worry: Not on file     Inability: Not on file    Transportation needs:     Medical: Not on file     Non-medical: Not on file   Tobacco Use    Smoking status: Current Every Day Smoker     Packs/day: 0.50     Years: 23.00     Pack years: 11.50     Types: Cigarettes    Smokeless tobacco: Never Used   Substance and Sexual Activity    Alcohol use: No     Alcohol/week: 0.0 oz    Drug use: No    Sexual activity: Not on file   Lifestyle    Physical activity:     Days per week: Not on file     Minutes per session: Not on file    Stress: Not on file   Relationships    Social connections:     Talks on phone: Not on file     Gets together: Not on file     Attends [employer] service: Not on file     Active member of club or organization: Not on file     Attends meetings of clubs or organizations: Not on file     Relationship status: Not on file    Intimate partner violence:     Fear of current or ex partner: Not on file     Emotionally abused: Not on file     Physically abused: Not on file     Forced sexual activity: Not on file   Other Topics Concern    Not on file   Social History Narrative    Not on file     She reports that she does not work     OBJECTIVE:  Ht 5' 4.02\" (1.626 m)   Wt 251 lb 15.8 oz (114.3 kg)   BMI 43.23 kg/m²    Physical Exam  Psych: alert and oriented to person, time, and place  Cardio:  well perfused extremities  Resp:  normal respiratory effort  Skin:  no cyanosis  Hem/lymph:  no lymphedema  Neuro:  sensation to light touch intact throughout all nerve distributions in the foot   Musculoskeletal:    MUSCULOSKELETAL (affected lower extremity):  Vascular: Toes warm and well perfused, compartments soft/compressible, mild swelling of ankle/foot. Skin:  Intact over foot/ankle, without rash/lesions/AV malformations.    Motion: Able to wiggle toes  -Range of motion not tested due to pain  -No tenderness at knee or proximal leg   -Tenderness to palpation: Lateral malleolus   -No significant tenderness at the medial malleolus      RADIOLOGY:   6/12/2019 FINDINGS:  Three weightbearing views (AP, Mortise, and Lateral) of the left ankle and three weightbearing views (AP, Oblique, Lateral) of the left foot were obtained in the office today and reviewed, revealing distal fibula avulsion fracture, with chronic calcification distal to the medial malleolus, Achilles tendon enthesophyte. IMPRESSION:  Distal fibula avulsion fracture. Electronically signed by Hugo Marte MD on 6/23/2019 at 10:37 AM        ASSESSMENT AND PLAN:  Nayla Jhaveri was seen today for New Patient (L Ankle)  The primary encounter diagnosis was Closed avulsion fracture of left ankle, initial encounter. A diagnosis of Tobacco dependency was also pertinent to this visit. Body mass index is 43.23 kg/m². She has a left distal fibula avulsion fracture, sustained on 6/7/2019. Notably, she has a complex past medical history including uncontrolled insulin-dependent type 2 diabetes, COPD, major depressive disorder, obstructive sleep apnea, history of C. difficile infection, severe obesity, and she reports she smokes 1/2 pack cigarettes per day. I had a long discussion today with the patient about the likely diagnosis and its natural history, physical exam and imaging findings, as well as treatment options in detail. We discussed rest/activity modification, swelling control, NSAIDs/Acetaminophen/topical anesthetics, orthotics/shoewear modification, bracing/immobilization, injections, and physical therapy. The patient wishes to proceed with the recommendations as above. Orders/referrals were placed as below at today's visit. The patient was provided the following, for stability and pain control, to use when ambulatory:  over the counter lace up ankle brace. The patient was cautioned to avoid pain provoking activity.  The patient will use the following medication(s):  OTC NSAIDs PRN. All questions were answered and the patient agrees with the above plan. The patient will return to clinic in 4 weeks. At her next visit, I will consider referring her to physical therapy, depending on how she is doing. Return in about 1 month (around 7/10/2019). No orders of the defined types were placed in this encounter. Orders Placed This Encounter   Procedures    XR ANKLE LEFT (MIN 3 VIEWS)     WEIGHT BEARING 3 VIEWS:  AP, MORTISE, LATERAL  Please include entire foot     Order Specific Question:   Reason for exam:     Answer:   eval alignment    XR FOOT LEFT (MIN 3 VIEWS)     WEIGHTBEARING 3 views: AP, Oblique, Lateral     Order Specific Question:   Reason for exam:     Answer:   eval alignment         Nicole Sales MD  Orthopedic Surgery, Foot and Ankle        Please excuse any typos/errors, as this note was created with the assistance of voice recognition software. While intending to generate a document that actually reflects the content of the visit, the document can still have some errors including those of syntax and sound-a-like substitutions which may escape proof reading. In such instances, actual meaning can be extrapolated by context.

## 2019-06-23 ASSESSMENT — ENCOUNTER SYMPTOMS
ABDOMINAL PAIN: 0
EYE PAIN: 0
SHORTNESS OF BREATH: 0

## 2019-06-25 PROBLEM — S82.892A CLOSED FRACTURE OF LEFT ANKLE: Status: ACTIVE | Noted: 2019-06-25

## 2019-06-25 PROBLEM — E66.01 CLASS 3 SEVERE OBESITY DUE TO EXCESS CALORIES WITH SERIOUS COMORBIDITY AND BODY MASS INDEX (BMI) OF 45.0 TO 49.9 IN ADULT (HCC): Status: RESOLVED | Noted: 2018-10-04 | Resolved: 2019-06-25

## 2019-06-25 PROBLEM — E66.813 CLASS 3 SEVERE OBESITY DUE TO EXCESS CALORIES WITH SERIOUS COMORBIDITY AND BODY MASS INDEX (BMI) OF 45.0 TO 49.9 IN ADULT (HCC): Status: RESOLVED | Noted: 2018-10-04 | Resolved: 2019-06-25

## 2019-07-09 ENCOUNTER — ANESTHESIA EVENT (OUTPATIENT)
Dept: OPERATING ROOM | Age: 44
End: 2019-07-09
Payer: COMMERCIAL

## 2019-07-10 ENCOUNTER — ANESTHESIA (OUTPATIENT)
Dept: OPERATING ROOM | Age: 44
End: 2019-07-10
Payer: COMMERCIAL

## 2019-07-10 ENCOUNTER — HOSPITAL ENCOUNTER (OUTPATIENT)
Age: 44
Setting detail: OUTPATIENT SURGERY
Discharge: HOME | End: 2019-07-10
Attending: INTERNAL MEDICINE | Admitting: INTERNAL MEDICINE
Payer: COMMERCIAL

## 2019-07-10 VITALS — SYSTOLIC BLOOD PRESSURE: 165 MMHG | OXYGEN SATURATION: 97 % | DIASTOLIC BLOOD PRESSURE: 88 MMHG

## 2019-07-10 VITALS
BODY MASS INDEX: 45.93 KG/M2 | SYSTOLIC BLOOD PRESSURE: 117 MMHG | HEART RATE: 76 BPM | HEIGHT: 64 IN | OXYGEN SATURATION: 96 % | RESPIRATION RATE: 14 BRPM | DIASTOLIC BLOOD PRESSURE: 61 MMHG | TEMPERATURE: 97.3 F | WEIGHT: 269 LBS

## 2019-07-10 LAB
DIRECT EXAM: POSITIVE
GLUCOSE BLD-MCNC: 164 MG/DL (ref 65–105)
Lab: ABNORMAL
SPECIMEN DESCRIPTION: ABNORMAL

## 2019-07-10 PROCEDURE — 7100000010 HC PHASE II RECOVERY - FIRST 15 MIN: Performed by: INTERNAL MEDICINE

## 2019-07-10 PROCEDURE — 82947 ASSAY GLUCOSE BLOOD QUANT: CPT

## 2019-07-10 PROCEDURE — 2580000003 HC RX 258: Performed by: NURSE ANESTHETIST, CERTIFIED REGISTERED

## 2019-07-10 PROCEDURE — 7100000011 HC PHASE II RECOVERY - ADDTL 15 MIN: Performed by: INTERNAL MEDICINE

## 2019-07-10 PROCEDURE — 3700000001 HC ADD 15 MINUTES (ANESTHESIA): Performed by: INTERNAL MEDICINE

## 2019-07-10 PROCEDURE — 3609012400 HC EGD TRANSORAL BIOPSY SINGLE/MULTIPLE: Performed by: INTERNAL MEDICINE

## 2019-07-10 PROCEDURE — 87077 CULTURE AEROBIC IDENTIFY: CPT

## 2019-07-10 PROCEDURE — 3700000000 HC ANESTHESIA ATTENDED CARE: Performed by: INTERNAL MEDICINE

## 2019-07-10 PROCEDURE — 2580000003 HC RX 258: Performed by: ANESTHESIOLOGY

## 2019-07-10 PROCEDURE — 2709999900 HC NON-CHARGEABLE SUPPLY: Performed by: INTERNAL MEDICINE

## 2019-07-10 PROCEDURE — 6360000002 HC RX W HCPCS: Performed by: NURSE ANESTHETIST, CERTIFIED REGISTERED

## 2019-07-10 PROCEDURE — 2500000003 HC RX 250 WO HCPCS: Performed by: NURSE ANESTHETIST, CERTIFIED REGISTERED

## 2019-07-10 PROCEDURE — 88305 TISSUE EXAM BY PATHOLOGIST: CPT

## 2019-07-10 RX ORDER — LIDOCAINE HYDROCHLORIDE 20 MG/ML
INJECTION, SOLUTION INFILTRATION; PERINEURAL PRN
Status: DISCONTINUED | OUTPATIENT
Start: 2019-07-10 | End: 2019-07-10 | Stop reason: SDUPTHER

## 2019-07-10 RX ORDER — SODIUM CHLORIDE, SODIUM LACTATE, POTASSIUM CHLORIDE, CALCIUM CHLORIDE 600; 310; 30; 20 MG/100ML; MG/100ML; MG/100ML; MG/100ML
INJECTION, SOLUTION INTRAVENOUS CONTINUOUS PRN
Status: DISCONTINUED | OUTPATIENT
Start: 2019-07-10 | End: 2019-07-10 | Stop reason: SDUPTHER

## 2019-07-10 RX ORDER — SODIUM CHLORIDE 0.9 % (FLUSH) 0.9 %
10 SYRINGE (ML) INJECTION EVERY 12 HOURS SCHEDULED
Status: DISCONTINUED | OUTPATIENT
Start: 2019-07-10 | End: 2019-07-10 | Stop reason: HOSPADM

## 2019-07-10 RX ORDER — LIDOCAINE HYDROCHLORIDE 10 MG/ML
1 INJECTION, SOLUTION EPIDURAL; INFILTRATION; INTRACAUDAL; PERINEURAL
Status: DISCONTINUED | OUTPATIENT
Start: 2019-07-11 | End: 2019-07-10 | Stop reason: HOSPADM

## 2019-07-10 RX ORDER — SODIUM CHLORIDE 9 MG/ML
INJECTION, SOLUTION INTRAVENOUS CONTINUOUS
Status: DISCONTINUED | OUTPATIENT
Start: 2019-07-11 | End: 2019-07-10

## 2019-07-10 RX ORDER — MIDAZOLAM HYDROCHLORIDE 1 MG/ML
INJECTION INTRAMUSCULAR; INTRAVENOUS PRN
Status: DISCONTINUED | OUTPATIENT
Start: 2019-07-10 | End: 2019-07-10 | Stop reason: SDUPTHER

## 2019-07-10 RX ORDER — PROPOFOL 10 MG/ML
INJECTION, EMULSION INTRAVENOUS PRN
Status: DISCONTINUED | OUTPATIENT
Start: 2019-07-10 | End: 2019-07-10 | Stop reason: SDUPTHER

## 2019-07-10 RX ORDER — SODIUM CHLORIDE, SODIUM LACTATE, POTASSIUM CHLORIDE, CALCIUM CHLORIDE 600; 310; 30; 20 MG/100ML; MG/100ML; MG/100ML; MG/100ML
INJECTION, SOLUTION INTRAVENOUS CONTINUOUS
Status: DISCONTINUED | OUTPATIENT
Start: 2019-07-11 | End: 2019-07-10 | Stop reason: HOSPADM

## 2019-07-10 RX ORDER — SODIUM CHLORIDE 0.9 % (FLUSH) 0.9 %
10 SYRINGE (ML) INJECTION PRN
Status: DISCONTINUED | OUTPATIENT
Start: 2019-07-10 | End: 2019-07-10 | Stop reason: HOSPADM

## 2019-07-10 RX ADMIN — MIDAZOLAM 2 MG: 1 INJECTION INTRAMUSCULAR; INTRAVENOUS at 10:08

## 2019-07-10 RX ADMIN — PROPOFOL 40 MG: 10 INJECTION, EMULSION INTRAVENOUS at 10:18

## 2019-07-10 RX ADMIN — SODIUM CHLORIDE, POTASSIUM CHLORIDE, SODIUM LACTATE AND CALCIUM CHLORIDE: 600; 310; 30; 20 INJECTION, SOLUTION INTRAVENOUS at 09:09

## 2019-07-10 RX ADMIN — SODIUM CHLORIDE, POTASSIUM CHLORIDE, SODIUM LACTATE AND CALCIUM CHLORIDE: 600; 310; 30; 20 INJECTION, SOLUTION INTRAVENOUS at 10:08

## 2019-07-10 RX ADMIN — PROPOFOL 100 MG: 10 INJECTION, EMULSION INTRAVENOUS at 10:14

## 2019-07-10 RX ADMIN — LIDOCAINE HYDROCHLORIDE 80 MG: 20 INJECTION, SOLUTION INFILTRATION; PERINEURAL at 10:14

## 2019-07-10 ASSESSMENT — PULMONARY FUNCTION TESTS
PIF_VALUE: 1

## 2019-07-10 ASSESSMENT — PAIN SCALES - GENERAL: PAINLEVEL_OUTOF10: 8

## 2019-07-10 ASSESSMENT — PAIN DESCRIPTION - PAIN TYPE: TYPE: CHRONIC PAIN

## 2019-07-10 ASSESSMENT — PAIN DESCRIPTION - DESCRIPTORS
DESCRIPTORS: ACHING
DESCRIPTORS: ACHING;CRAMPING

## 2019-07-10 ASSESSMENT — PAIN DESCRIPTION - LOCATION: LOCATION: ABDOMEN

## 2019-07-10 ASSESSMENT — PAIN - FUNCTIONAL ASSESSMENT: PAIN_FUNCTIONAL_ASSESSMENT: 0-10

## 2019-07-10 NOTE — PROGRESS NOTES
1030 Patient arrived to recovery room and states she wants to be discharged immediatley. Patient states she is leaving now and began to remove all monitors. RN notified patient she needed to remain in recovery for her safety. Patient states \"I am awake and leaving now\", and \"will leave naked if I don't get my clothes right now\". Patient continues to remove all monitors and IV tape. Writer notified charge nurse. Charge nurse at bedside discussing safety with patient. Anaesthesia paged to patient's bedside. Patient alert, oriented, skin pink, no signs of respiratory distress or difficulty breathing. Dr. Crump Hint at bedside to assess patient. She states patient is stable, verbal order received to discharge patient. No discharge order or medication reconciliation from Dr. Nura Martinez at this time. Dr. Nura Martinez called, no answer at this time. 18 Dr. Lorena Tan updated on patient situation. He states she is okay to be discharged and that he will write for her discharge. Instructions discussed with patient and family at bedside. Writer notified patient that she has to call the office to clarify home medications and to verify if there are any additional instructions to follow. Patient states she is okay if we call her if Dr. Nura Martinez makes any changes to her meds or instructions. 1100 Patient discharged by wheelchair in no apparent distress. 46 Dr. Nura Martinez called recovery room, states she can resume all of her home medications. There are no additional instructions.

## 2019-07-10 NOTE — H&P
History and Physical GI Update    Pt Name: James Arceo  MRN: [id number]  YOB: 1975  Date of evaluation: 7/10/2019      [x] I have reviewed the Office Note found in [facility] dated 6/25/19  by Dr. Jamil Galarza  which meets the criteria for an Interval History and Physical note and is attached below. [x] I have examined  James Arceo and there are no changes to the patient or plans for a EGD. by Dr Omkar Yun  for EVALUATION OF ABDOMINAL PAIN, NAUSEA . THE PATIENT HAS DIABETES , BLOOD SUGAR TODAY , SHE DOES HAVE A HISTORY OF ATRIAL FIBRILLATION S/P ABLATION, SHE HAS ASTHMA/COPD USES HOME O2 . SAO2 TODAY WAS 93% ON ROOM AIR. SHE HAS HISTORY OF C. DIFF. AND WAS TRETED. SHE STILL HAS WATERY STOOLS WITH DIARRHEA. SHE HAS NOT HAD A STOOL CULTURE. HAS  history of ulcers,WAS POSITIVE FOR H.PYLORI. NO  hiatal hernia, HAS  acid reflux/GERD, or IBS. Previous EGD: Yes.  11/2011. Biopsies were taken. Patient today denies  fever, chills, night sweats, pain or unexplained weight loss. Vital signs: Ht 5' 4\" (1.626 m)   Wt 269 lb (122 kg)   BMI 46.17 kg/m²     Allergies:  Latex; Aspirin; Avelox [moxifloxacin]; Chantix [varenicline]; Doxycycline; Dye [iodides]; Flexeril [cyclobenzaprine]; Gabapentin; Losartan; Morphine; Nsaids; Pcn [penicillins]; Reglan [metoclopramide hcl]; Shellfish-derived products; Sulfa antibiotics; Vancomycin; Zofran; Zyvox [linezolid]; Acyclovir; Bactrim [sulfamethoxazole-trimethoprim]; Betadine [povidone iodine]; Ceclor [cefaclor]; Clindamycin/lincomycin; Codeine; Macrolides and ketolides; Novolin r [insulin]; Novolog [insulin aspart]; Phenothiazines; Tape [adhesive tape]; Banana; Compazine [prochlorperazine]; Fentanyl; Kiwi extract; Tamiflu [oseltamivir phosphate]; and Sotalol    Medications:   No current facility-administered medications on file prior to encounter.       Current Outpatient Medications on File Prior to Encounter   Medication Sig Dispense Refill    nalbuphine (NUBAIN) 10 MG/ML injection Inject 10 mg into the muscle every 4-6 hours as needed for Pain      ibuprofen (ADVIL;MOTRIN) 800 MG tablet TAKE ONE TABLET BY MOUTH 3 TIMES DAILY 90 tablet 3    albuterol sulfate  (90 Base) MCG/ACT inhaler INHALE 2 PUFFS BY MOUTH EVERY 6 HOURS AS NEEDED FOR WHEEZING 1 Inhaler 5    UNIFINE PENTIPS 31G X 8 MM MISC USE 4 TIMES DAILY AS DIRECTED 150 each 11    insulin glargine (LANTUS SOLOSTAR) 100 UNIT/ML injection pen Inject 60 units in the AM and 60 units in the evening. 5 pen     ipratropium-albuterol (DUONEB) 0.5-2.5 (3) MG/3ML SOLN nebulizer solution INHALE 3 MLS INTO THE LUNGS EVERY 6 HOURS AS NEEDED FOR SHORTNESS OF BREATH 120 vial 5    blood glucose monitor strips Test blood sugars 6 times a day due to low blood sugars. Uncontrolled diabetes. Check Ac and HS and as needed. 200 strip 11    loratadine (CLARITIN) 10 MG tablet Take 1 tablet by mouth daily 30 tablet 11    Magnesium 500 MG CAPS Take 500 mg by mouth daily      vitamin D (CHOLECALCIFEROL) 1000 UNIT TABS tablet Take 50,000 Units by mouth once a week       topiramate (TOPAMAX) 25 MG tablet Take 25 mg by mouth daily Takes for headaches      insulin lispro (HUMALOG KWIKPEN) 100 UNIT/ML pen FOR GLUCOSE 150-200 GIVE 6 UNITS, FOR GLUCOSE 201-250 9 UNITS, GLUCOSE 251-300 12 UNITS, GLUCOSE 301 OR OVER 15 UNITS. 15 pen 3    promethazine (PHENERGAN) 25 MG tablet Take 25 mg by mouth every 6 hours as needed for Nausea      OXYGEN Inhale into the lungs Indications: On 3 liters per n/c during the night.       Multiple Vitamins-Minerals (MULTIVITAMIN GUMMIES WOMENS) CHEW Take 2 each by mouth daily       bumetanide (BUMEX) 1 MG tablet Take 1 tablet by mouth daily Indications: if 3 lb wt gain (Patient taking differently: Take 1 mg by mouth daily as needed (Takes for 3 days on and then 3 days off as directed.) ) 30 tablet 3    carvedilol (COREG) 12.5 MG tablet Take 12.5 mg by mouth 2 times daily (with meals) Takes at 10:00am and 10:00pm      atorvastatin (LIPITOR) 40 MG tablet Take 40 mg by mouth every evening               Prior to Admission medications    Medication Sig Start Date End Date Taking? Authorizing Provider   DULoxetine (CYMBALTA) 60 MG extended release capsule Take 1 capsule by mouth 2 times daily 6/25/19   MARYANNE Hathaway CNP   nalbuphine (NUBAIN) 10 MG/ML injection Inject 10 mg into the muscle every 4-6 hours as needed for Pain    Historical Provider, MD   ibuprofen (ADVIL;MOTRIN) 800 MG tablet TAKE ONE TABLET BY MOUTH 3 TIMES DAILY 4/29/19   MARYANNE Hathaway CNP   albuterol sulfate  (90 Base) MCG/ACT inhaler INHALE 2 PUFFS BY MOUTH EVERY 6 HOURS AS NEEDED FOR WHEEZING 4/23/19   MARYANNE Hathaway CNP   UNIFINE PENTIPS 31G X 8 MM MISC USE 4 TIMES DAILY AS DIRECTED 3/28/19   Adolfo Lane MD   insulin glargine (LANTUS SOLOSTAR) 100 UNIT/ML injection pen Inject 60 units in the AM and 60 units in the evening. 1/4/19   MARYANNE Hathaway CNP   ipratropium-albuterol (DUONEB) 0.5-2.5 (3) MG/3ML SOLN nebulizer solution INHALE 3 MLS INTO THE LUNGS EVERY 6 HOURS AS NEEDED FOR SHORTNESS OF BREATH 12/3/18   MARYANNE Hathaway CNP   blood glucose monitor strips Test blood sugars 6 times a day due to low blood sugars. Uncontrolled diabetes. Check Ac and HS and as needed.  10/4/18   MARYANNE Hathaway CNP   loratadine (CLARITIN) 10 MG tablet Take 1 tablet by mouth daily 10/4/18   MARYANNE Hathaway CNP   Magnesium 500 MG CAPS Take 500 mg by mouth daily    Historical Provider, MD   vitamin D (CHOLECALCIFEROL) 1000 UNIT TABS tablet Take 50,000 Units by mouth once a week     Historical Provider, MD   topiramate (TOPAMAX) 25 MG tablet Take 25 mg by mouth daily Takes for headaches    Historical Provider, MD   insulin lispro (HUMALOG KWIKPEN) 100 UNIT/ML pen FOR GLUCOSE 150-200 GIVE 6 UNITS, FOR GLUCOSE 201-250 9 UNITS, GLUCOSE 251-300 12 UNITS, GLUCOSE 301 Boot on. Neurological: She is alert and oriented to person, place, and time. Skin: Skin is warm and dry. She is not diaphoretic. Psychiatric: She has a normal mood and affect. Her behavior is normal. Judgment and thought content normal.   Nursing note and vitals reviewed. Assessment:     Diagnosis Orders   1. Closed fracture of left ankle, initial encounter  oxyCODONE-acetaminophen (PERCOCET) 5-325 MG per tablet   2. Diarrhea, unspecified type      3. Current moderate episode of major depressive disorder without prior episode (HCC)  DULoxetine (CYMBALTA) 60 MG extended release capsule   4. Nausea  trimethobenzamide (TIGAN) injection 200 mg   5. Generalized anxiety disorder      6. Class 3 severe obesity due to excess calories with serious comorbidity and body mass index (BMI) of 40.0 to 44.9 in [facility])                          Plan:   Encounter Medications         Orders Placed This Encounter   Medications    oxyCODONE-acetaminophen (PERCOCET) 5-325 MG per tablet       Sig: Take 1 tablet by mouth every 8 hours as needed for Pain for up to 5 days. Intended supply: 5 days. Take lowest dose possible to manage pain       Dispense:  15 tablet       Refill:  0       Reduce doses taken as pain becomes manageable    trimethobenzamide (TIGAN) injection 200 mg         Stop Abilify due to hand tremors. Follow up with GI - EGD (C-Diff and H-Pylori). Follow up with Ortho. Donato Rodriguez MA                Cosigned by:  Kobe Rm MD at 6/26/2019  5:17 PM   ·   Office Visit on 6/25/2019   ·     ·   Revision History   ·     ·   Detailed Report   ·     ·   Note shared with patient   Progress Notes Info     Author Note Status Last Update User   Elina Chris APRN - CNP Attested Kobe Rm MD   Last Update Date/Time: 6/26/2019  5:17 PM

## 2019-07-11 LAB — SURGICAL PATHOLOGY REPORT: NORMAL

## 2019-07-17 ENCOUNTER — APPOINTMENT (OUTPATIENT)
Dept: GENERAL RADIOLOGY | Age: 44
End: 2019-07-17
Payer: COMMERCIAL

## 2019-07-17 ENCOUNTER — HOSPITAL ENCOUNTER (EMERGENCY)
Age: 44
Discharge: HOME | End: 2019-07-17
Attending: EMERGENCY MEDICINE
Payer: COMMERCIAL

## 2019-07-17 ENCOUNTER — APPOINTMENT (OUTPATIENT)
Dept: CT IMAGING | Age: 44
End: 2019-07-17
Payer: COMMERCIAL

## 2019-07-17 VITALS
RESPIRATION RATE: 16 BRPM | HEART RATE: 86 BPM | OXYGEN SATURATION: 96 % | BODY MASS INDEX: 44.05 KG/M2 | WEIGHT: 258 LBS | HEIGHT: 64 IN | SYSTOLIC BLOOD PRESSURE: 158 MMHG | DIASTOLIC BLOOD PRESSURE: 89 MMHG | TEMPERATURE: 98.6 F

## 2019-07-17 DIAGNOSIS — R11.2 NAUSEA VOMITING AND DIARRHEA: Primary | ICD-10-CM

## 2019-07-17 DIAGNOSIS — R19.7 NAUSEA VOMITING AND DIARRHEA: Primary | ICD-10-CM

## 2019-07-17 LAB
-: ABNORMAL
ABSOLUTE EOS #: 0.4 K/UL (ref 0–0.4)
ABSOLUTE IMMATURE GRANULOCYTE: ABNORMAL K/UL (ref 0–0.3)
ABSOLUTE LYMPH #: 3.9 K/UL (ref 1–4.8)
ABSOLUTE MONO #: 0.8 K/UL (ref 0.1–1.3)
ALBUMIN SERPL-MCNC: 4.3 G/DL (ref 3.5–5.2)
ALBUMIN/GLOBULIN RATIO: ABNORMAL (ref 1–2.5)
ALP BLD-CCNC: 87 U/L (ref 35–104)
ALT SERPL-CCNC: 13 U/L (ref 5–33)
AMORPHOUS: ABNORMAL
ANION GAP SERPL CALCULATED.3IONS-SCNC: 14 MMOL/L (ref 9–17)
AST SERPL-CCNC: 15 U/L
BACTERIA: ABNORMAL
BASOPHILS # BLD: 1 % (ref 0–2)
BASOPHILS ABSOLUTE: 0.1 K/UL (ref 0–0.2)
BILIRUB SERPL-MCNC: 0.29 MG/DL (ref 0.3–1.2)
BILIRUBIN URINE: NEGATIVE
BNP INTERPRETATION: NORMAL
BUN BLDV-MCNC: 12 MG/DL (ref 6–20)
BUN/CREAT BLD: ABNORMAL (ref 9–20)
CALCIUM SERPL-MCNC: 10.1 MG/DL (ref 8.6–10.4)
CASTS UA: ABNORMAL /LPF
CHLORIDE BLD-SCNC: 101 MMOL/L (ref 98–107)
CO2: 27 MMOL/L (ref 20–31)
COLOR: YELLOW
COMMENT UA: ABNORMAL
CREAT SERPL-MCNC: 0.5 MG/DL (ref 0.5–0.9)
CRYSTALS, UA: ABNORMAL /HPF
D-DIMER QUANTITATIVE: 0.32 MG/L FEU (ref 0–0.59)
DIFFERENTIAL TYPE: ABNORMAL
EOSINOPHILS RELATIVE PERCENT: 3 % (ref 0–4)
EPITHELIAL CELLS UA: ABNORMAL /HPF
GFR AFRICAN AMERICAN: >60 ML/MIN
GFR NON-AFRICAN AMERICAN: >60 ML/MIN
GFR SERPL CREATININE-BSD FRML MDRD: ABNORMAL ML/MIN/{1.73_M2}
GFR SERPL CREATININE-BSD FRML MDRD: ABNORMAL ML/MIN/{1.73_M2}
GLUCOSE BLD-MCNC: 67 MG/DL (ref 65–105)
GLUCOSE BLD-MCNC: 82 MG/DL (ref 65–105)
GLUCOSE BLD-MCNC: 99 MG/DL (ref 70–99)
GLUCOSE URINE: ABNORMAL
HCG QUALITATIVE: NEGATIVE
HCT VFR BLD CALC: 45.5 % (ref 36–46)
HEMOGLOBIN: 14.8 G/DL (ref 12–16)
IMMATURE GRANULOCYTES: ABNORMAL %
KETONES, URINE: NEGATIVE
LEUKOCYTE ESTERASE, URINE: NEGATIVE
LIPASE: 14 U/L (ref 13–60)
LYMPHOCYTES # BLD: 25 % (ref 24–44)
MCH RBC QN AUTO: 33.1 PG (ref 26–34)
MCHC RBC AUTO-ENTMCNC: 32.6 G/DL (ref 31–37)
MCV RBC AUTO: 101.7 FL (ref 80–100)
MONOCYTES # BLD: 5 % (ref 1–7)
MUCUS: ABNORMAL
NITRITE, URINE: NEGATIVE
NRBC AUTOMATED: ABNORMAL PER 100 WBC
OTHER OBSERVATIONS UA: ABNORMAL
PDW BLD-RTO: 12.8 % (ref 11.5–14.9)
PH UA: 7 (ref 5–8)
PLATELET # BLD: 299 K/UL (ref 150–450)
PLATELET ESTIMATE: ABNORMAL
PMV BLD AUTO: 8.7 FL (ref 6–12)
POTASSIUM SERPL-SCNC: 4 MMOL/L (ref 3.7–5.3)
PRO-BNP: 146 PG/ML
PROTEIN UA: ABNORMAL
RBC # BLD: 4.47 M/UL (ref 4–5.2)
RBC # BLD: ABNORMAL 10*6/UL
RBC UA: ABNORMAL /HPF
RENAL EPITHELIAL, UA: ABNORMAL /HPF
SEG NEUTROPHILS: 66 % (ref 36–66)
SEGMENTED NEUTROPHILS ABSOLUTE COUNT: 10.3 K/UL (ref 1.3–9.1)
SODIUM BLD-SCNC: 142 MMOL/L (ref 135–144)
SPECIFIC GRAVITY UA: 1.02 (ref 1–1.03)
TOTAL PROTEIN: 7.7 G/DL (ref 6.4–8.3)
TRICHOMONAS: ABNORMAL
TROPONIN INTERP: NORMAL
TROPONIN T: NORMAL NG/ML
TROPONIN, HIGH SENSITIVITY: 13 NG/L (ref 0–14)
TURBIDITY: ABNORMAL
URINE HGB: NEGATIVE
UROBILINOGEN, URINE: NORMAL
WBC # BLD: 15.4 K/UL (ref 3.5–11)
WBC # BLD: ABNORMAL 10*3/UL
WBC UA: ABNORMAL /HPF
YEAST: ABNORMAL

## 2019-07-17 PROCEDURE — 36415 COLL VENOUS BLD VENIPUNCTURE: CPT

## 2019-07-17 PROCEDURE — 71046 X-RAY EXAM CHEST 2 VIEWS: CPT

## 2019-07-17 PROCEDURE — 96374 THER/PROPH/DIAG INJ IV PUSH: CPT

## 2019-07-17 PROCEDURE — 74176 CT ABD & PELVIS W/O CONTRAST: CPT

## 2019-07-17 PROCEDURE — 93005 ELECTROCARDIOGRAM TRACING: CPT | Performed by: EMERGENCY MEDICINE

## 2019-07-17 PROCEDURE — 96375 TX/PRO/DX INJ NEW DRUG ADDON: CPT

## 2019-07-17 PROCEDURE — 85025 COMPLETE CBC W/AUTO DIFF WBC: CPT

## 2019-07-17 PROCEDURE — 81001 URINALYSIS AUTO W/SCOPE: CPT

## 2019-07-17 PROCEDURE — 85379 FIBRIN DEGRADATION QUANT: CPT

## 2019-07-17 PROCEDURE — 83690 ASSAY OF LIPASE: CPT

## 2019-07-17 PROCEDURE — 84703 CHORIONIC GONADOTROPIN ASSAY: CPT

## 2019-07-17 PROCEDURE — 80053 COMPREHEN METABOLIC PANEL: CPT

## 2019-07-17 PROCEDURE — 84484 ASSAY OF TROPONIN QUANT: CPT

## 2019-07-17 PROCEDURE — 87086 URINE CULTURE/COLONY COUNT: CPT

## 2019-07-17 PROCEDURE — 82947 ASSAY GLUCOSE BLOOD QUANT: CPT

## 2019-07-17 PROCEDURE — 83880 ASSAY OF NATRIURETIC PEPTIDE: CPT

## 2019-07-17 PROCEDURE — 2580000003 HC RX 258: Performed by: EMERGENCY MEDICINE

## 2019-07-17 PROCEDURE — 99284 EMERGENCY DEPT VISIT MOD MDM: CPT

## 2019-07-17 PROCEDURE — 6360000002 HC RX W HCPCS: Performed by: EMERGENCY MEDICINE

## 2019-07-17 RX ORDER — 0.9 % SODIUM CHLORIDE 0.9 %
1000 INTRAVENOUS SOLUTION INTRAVENOUS ONCE
Status: COMPLETED | OUTPATIENT
Start: 2019-07-17 | End: 2019-07-17

## 2019-07-17 RX ORDER — NALBUPHINE HCL 10 MG/ML
10 AMPUL (ML) INJECTION ONCE
Status: COMPLETED | OUTPATIENT
Start: 2019-07-17 | End: 2019-07-17

## 2019-07-17 RX ORDER — PROMETHAZINE HYDROCHLORIDE 25 MG/ML
6.25 INJECTION, SOLUTION INTRAMUSCULAR; INTRAVENOUS ONCE
Status: COMPLETED | OUTPATIENT
Start: 2019-07-17 | End: 2019-07-17

## 2019-07-17 RX ORDER — FENTANYL CITRATE 50 UG/ML
25 INJECTION, SOLUTION INTRAMUSCULAR; INTRAVENOUS ONCE
Status: DISCONTINUED | OUTPATIENT
Start: 2019-07-17 | End: 2019-07-17

## 2019-07-17 RX ADMIN — SODIUM CHLORIDE 1000 ML: 9 INJECTION, SOLUTION INTRAVENOUS at 18:46

## 2019-07-17 RX ADMIN — PROMETHAZINE HYDROCHLORIDE 6.25 MG: 25 INJECTION INTRAMUSCULAR; INTRAVENOUS at 19:23

## 2019-07-17 RX ADMIN — NALBUPHINE HYDROCHLORIDE 10 MG: 10 INJECTION, SOLUTION INTRAMUSCULAR; INTRAVENOUS; SUBCUTANEOUS at 19:23

## 2019-07-17 ASSESSMENT — ENCOUNTER SYMPTOMS
CONSTIPATION: 0
NAUSEA: 1
EYE REDNESS: 0
COUGH: 0
SORE THROAT: 0
VOMITING: 1
CHEST TIGHTNESS: 0
BACK PAIN: 0
EYE PAIN: 0
DIARRHEA: 1
ABDOMINAL PAIN: 1

## 2019-07-17 ASSESSMENT — PAIN SCALES - GENERAL
PAINLEVEL_OUTOF10: 10
PAINLEVEL_OUTOF10: 9

## 2019-07-17 ASSESSMENT — PAIN DESCRIPTION - LOCATION: LOCATION: ABDOMEN

## 2019-07-17 ASSESSMENT — PAIN DESCRIPTION - PAIN TYPE: TYPE: CHRONIC PAIN

## 2019-07-18 LAB
CULTURE: NORMAL
EKG ATRIAL RATE: 81 BPM
EKG P AXIS: 3 DEGREES
EKG P-R INTERVAL: 132 MS
EKG Q-T INTERVAL: 390 MS
EKG QRS DURATION: 84 MS
EKG QTC CALCULATION (BAZETT): 453 MS
EKG R AXIS: 55 DEGREES
EKG T AXIS: 41 DEGREES
EKG VENTRICULAR RATE: 81 BPM
Lab: NORMAL
SPECIMEN DESCRIPTION: NORMAL

## 2019-07-18 PROCEDURE — 93010 ELECTROCARDIOGRAM REPORT: CPT | Performed by: INTERNAL MEDICINE

## 2019-07-18 NOTE — ED PROVIDER NOTES
that the status of her sister is unknown. She indicated that the status of her maternal grandmother is unknown. She indicated that the status of her maternal grandfather is unknown. She indicated that the status of her paternal grandmother is unknown. She indicated that the status of her paternal grandfather is unknown. She indicated that the status of her maternal aunt is unknown. She indicated that the status of her maternal uncle is unknown. She indicated that the status of her paternal aunt is unknown. She indicated that the status of her paternal uncle is unknown. SOCIALHISTORY      reports that she has been smoking cigarettes. She has a 11.50 pack-year smoking history. She has never used smokeless tobacco. She reports that she does not drink alcohol or use drugs. PHYSICAL EXAM     INITIAL VITALS: BP (!) 158/89   Pulse 86   Temp 98.6 °F (37 °C) (Oral)   Resp 16   Ht 5' 4\" (1.626 m)   Wt 258 lb (117 kg)   SpO2 96%   BMI 44.29 kg/m²    Physical Exam   Constitutional: She is oriented to person, place, and time. HENT:   Head: Normocephalic and atraumatic. Right Ear: External ear normal.   Left Ear: External ear normal.   Mouth/Throat: Oropharynx is clear and moist.   Eyes: Pupils are equal, round, and reactive to light. Conjunctivae and EOM are normal. Left eye exhibits no discharge. Neck: Normal range of motion. Neck supple. No JVD present. No tracheal deviation present. Cardiovascular: Normal rate, regular rhythm and normal heart sounds. Pulmonary/Chest: Effort normal and breath sounds normal. No stridor. No respiratory distress. Abdominal: Soft. Bowel sounds are normal.   Diffuse abdominal tenderness with no peritoneal signs. Musculoskeletal: Normal range of motion. She exhibits no tenderness or deformity. Neurological: She is alert and oriented to person, place, and time. No cranial nerve deficit. Coordination normal.   Skin: Skin is warm and dry.    Nursing note and vitals below, unless otherwise noted in MDM or here. XR CHEST STANDARD (2 VW)   Preliminary Result   No acute intrathoracic process. Unchanged mild cardiomegaly. CT ABDOMEN PELVIS WO CONTRAST Additional Contrast? None   Final Result   Limited exam without contrast      No renal calculus or hydronephrosis. No hydroureter. No calcification in   the bladder      Mild nonspecific small bowel distention is noted with a few air-fluid levels. A nonspecific enteritis is favored. There is no focal inflammatory change in   there is no pneumatosis           LABS: All lab results were reviewed by myself, and all abnormals are listed below. Labs Reviewed   CBC WITH AUTO DIFFERENTIAL - Abnormal; Notable for the following components:       Result Value    WBC 15.4 (*)     .7 (*)     Segs Absolute 10.30 (*)     All other components within normal limits   COMPREHENSIVE METABOLIC PANEL - Abnormal; Notable for the following components:     Total Bilirubin 0.29 (*)     All other components within normal limits   URINALYSIS - Abnormal; Notable for the following components:    Turbidity UA CLOUDY (*)     Glucose, Ur 1+ (*)     Protein, UA TRACE (*)     All other components within normal limits   MICROSCOPIC URINALYSIS - Abnormal; Notable for the following components:    Bacteria, UA MANY (*)     Mucus, UA 2+ (*)     All other components within normal limits   URINE CULTURE CLEAN CATCH   BRAIN NATRIURETIC PEPTIDE   D-DIMER, QUANTITATIVE   HCG, SERUM, QUALITATIVE   TROPONIN   LIPASE   TROPONIN   POC GLUCOSE FINGERSTICK   POC GLUCOSE FINGERSTICK     EMERGENCY DEPARTMENT COURSE:   Vitals:    Vitals:    07/17/19 1649   BP: (!) 158/89   Pulse: 86   Resp: 16   Temp: 98.6 °F (37 °C)   TempSrc: Oral   SpO2: 96%   Weight: 258 lb (117 kg)   Height: 5' 4\" (1.626 m)       The patient was given the following medications while in the emergency department:  Orders Placed This Encounter   Medications    0.9 % sodium chloride bolus   

## 2019-07-19 DIAGNOSIS — R10.9 LEFT SIDED ABDOMINAL PAIN OF UNKNOWN CAUSE: Primary | ICD-10-CM

## 2019-07-19 PROBLEM — E78.2 MIXED HYPERLIPIDEMIA: Status: ACTIVE | Noted: 2018-09-24

## 2019-07-19 PROBLEM — B37.9 CANDIDA INFECTION: Status: ACTIVE | Noted: 2018-02-23

## 2019-07-19 PROBLEM — R07.9 CHEST PAIN: Status: ACTIVE | Noted: 2017-06-13

## 2019-07-19 PROBLEM — I82.409 DVT (DEEP VENOUS THROMBOSIS) (HCC): Status: ACTIVE | Noted: 2018-09-21

## 2019-07-19 PROBLEM — F31.9 BIPOLAR DISORDER (HCC): Status: ACTIVE | Noted: 2019-07-19

## 2019-07-19 PROBLEM — M79.89 LEG SWELLING: Status: ACTIVE | Noted: 2018-09-21

## 2019-07-19 PROBLEM — G43.909 MIGRAINES: Status: ACTIVE | Noted: 2018-09-21

## 2019-07-19 PROBLEM — J44.1 ACUTE EXACERBATION OF CHRONIC OBSTRUCTIVE PULMONARY DISEASE (HCC): Status: ACTIVE | Noted: 2018-03-21

## 2019-07-19 PROBLEM — F17.219 CIGARETTE NICOTINE DEPENDENCE WITH NICOTINE-INDUCED DISORDER: Status: ACTIVE | Noted: 2019-07-19

## 2019-07-19 PROBLEM — K21.9 GERD (GASTROESOPHAGEAL REFLUX DISEASE): Status: ACTIVE | Noted: 2018-09-21

## 2019-07-19 PROBLEM — I36.1 NONRHEUMATIC TRICUSPID (VALVE) INSUFFICIENCY: Status: ACTIVE | Noted: 2019-07-19

## 2019-07-19 PROBLEM — E78.5 HYPERLIPIDEMIA: Status: ACTIVE | Noted: 2019-07-19

## 2019-07-19 PROBLEM — F20.9 SCHIZOPHRENIA (HCC): Status: ACTIVE | Noted: 2019-07-19

## 2019-07-19 PROBLEM — Z12.31 ENCOUNTER FOR SCREENING MAMMOGRAM FOR MALIGNANT NEOPLASM OF BREAST: Status: ACTIVE | Noted: 2018-03-07

## 2019-07-19 PROBLEM — I27.20 PULMONARY HTN (HCC): Status: ACTIVE | Noted: 2019-07-19

## 2019-07-19 PROBLEM — I49.3 PVC'S (PREMATURE VENTRICULAR CONTRACTIONS): Status: ACTIVE | Noted: 2019-07-19

## 2019-07-19 PROBLEM — E83.42 HYPOMAGNESEMIA: Status: ACTIVE | Noted: 2019-07-19

## 2019-07-19 PROBLEM — R55 NEUROCARDIOGENIC SYNCOPE: Status: ACTIVE | Noted: 2019-07-19

## 2019-07-19 PROBLEM — R05.8 PRODUCTIVE COUGH: Status: ACTIVE | Noted: 2018-02-23

## 2019-07-19 PROBLEM — N83.209 OVARIAN CYST: Status: ACTIVE | Noted: 2019-07-19

## 2019-07-19 PROBLEM — E66.3 OVERWEIGHT: Status: ACTIVE | Noted: 2019-07-19

## 2019-07-19 PROBLEM — I49.1 ATRIAL PREMATURE DEPOLARIZATION: Status: ACTIVE | Noted: 2019-07-19

## 2019-07-19 PROBLEM — N80.9 ENDOMETRIOSIS: Status: ACTIVE | Noted: 2019-07-19

## 2019-07-19 PROBLEM — R05.3 COUGH, PERSISTENT: Status: ACTIVE | Noted: 2018-03-21

## 2019-07-19 PROBLEM — Z51.81 ENCOUNTER FOR MONITORING SOTALOL THERAPY: Status: ACTIVE | Noted: 2017-02-20

## 2019-07-19 PROBLEM — Z01.810 PRE-OPERATIVE CARDIOVASCULAR EXAMINATION: Status: ACTIVE | Noted: 2018-09-27

## 2019-07-19 PROBLEM — R06.02 SHORTNESS OF BREATH: Status: ACTIVE | Noted: 2018-02-23

## 2019-07-19 PROBLEM — E66.01 MORBID OBESITY (HCC): Status: ACTIVE | Noted: 2018-03-07

## 2019-07-19 PROBLEM — I49.3 VENTRICULAR PREMATURE DEPOLARIZATION: Status: ACTIVE | Noted: 2019-07-19

## 2019-07-19 PROBLEM — I10 HTN (HYPERTENSION): Status: ACTIVE | Noted: 2019-07-19

## 2019-07-19 PROBLEM — Z79.899 ENCOUNTER FOR MONITORING SOTALOL THERAPY: Status: ACTIVE | Noted: 2017-02-20

## 2019-07-22 PROBLEM — E66.3 OVERWEIGHT: Status: RESOLVED | Noted: 2019-07-19 | Resolved: 2019-07-22

## 2019-07-22 PROBLEM — E66.813 CLASS 3 SEVERE OBESITY DUE TO EXCESS CALORIES WITH SERIOUS COMORBIDITY AND BODY MASS INDEX (BMI) OF 40.0 TO 44.9 IN ADULT (HCC): Status: RESOLVED | Noted: 2018-10-04 | Resolved: 2019-07-22

## 2019-07-22 PROBLEM — E66.01 MORBID OBESITY (HCC): Status: RESOLVED | Noted: 2018-03-07 | Resolved: 2019-07-22

## 2019-07-22 PROBLEM — E78.5 HYPERLIPIDEMIA: Status: RESOLVED | Noted: 2019-07-19 | Resolved: 2019-07-22

## 2019-07-22 PROBLEM — R07.9 CHEST PAIN: Status: RESOLVED | Noted: 2017-06-13 | Resolved: 2019-07-22

## 2019-07-22 PROBLEM — E66.01 CLASS 3 SEVERE OBESITY DUE TO EXCESS CALORIES WITH SERIOUS COMORBIDITY AND BODY MASS INDEX (BMI) OF 40.0 TO 44.9 IN ADULT (HCC): Status: RESOLVED | Noted: 2018-10-04 | Resolved: 2019-07-22

## 2019-07-24 ENCOUNTER — OFFICE VISIT (OUTPATIENT)
Dept: ORTHOPEDIC SURGERY | Age: 44
End: 2019-07-24
Payer: COMMERCIAL

## 2019-07-24 VITALS
HEART RATE: 80 BPM | BODY MASS INDEX: 45.5 KG/M2 | HEIGHT: 64 IN | WEIGHT: 266.54 LBS | SYSTOLIC BLOOD PRESSURE: 128 MMHG | DIASTOLIC BLOOD PRESSURE: 79 MMHG

## 2019-07-24 DIAGNOSIS — S82.892D CLOSED AVULSION FRACTURE OF LEFT ANKLE WITH ROUTINE HEALING, SUBSEQUENT ENCOUNTER: Primary | ICD-10-CM

## 2019-07-24 PROCEDURE — 99213 OFFICE O/P EST LOW 20 MIN: CPT | Performed by: ORTHOPAEDIC SURGERY

## 2019-07-24 PROCEDURE — 4004F PT TOBACCO SCREEN RCVD TLK: CPT | Performed by: ORTHOPAEDIC SURGERY

## 2019-07-24 PROCEDURE — G8417 CALC BMI ABV UP PARAM F/U: HCPCS | Performed by: ORTHOPAEDIC SURGERY

## 2019-07-24 PROCEDURE — G8427 DOCREV CUR MEDS BY ELIG CLIN: HCPCS | Performed by: ORTHOPAEDIC SURGERY

## 2019-07-24 ASSESSMENT — ENCOUNTER SYMPTOMS
EYE PAIN: 0
ABDOMINAL PAIN: 0
SHORTNESS OF BREATH: 0

## 2019-07-24 NOTE — PROGRESS NOTES
Smoker     Packs/day: 0.50     Years: 23.00     Pack years: 11.50     Types: Cigarettes    Smokeless tobacco: Never Used   Substance and Sexual Activity    Alcohol use: No     Alcohol/week: 0.0 standard drinks    Drug use: No    Sexual activity: Not on file   Lifestyle    Physical activity:     Days per week: Not on file     Minutes per session: Not on file    Stress: Not on file   Relationships    Social connections:     Talks on phone: Not on file     Gets together: Not on file     Attends Anabaptism service: Not on file     Active member of club or organization: Not on file     Attends meetings of clubs or organizations: Not on file     Relationship status: Not on file    Intimate partner violence:     Fear of current or ex partner: Not on file     Emotionally abused: Not on file     Physically abused: Not on file     Forced sexual activity: Not on file   Other Topics Concern    Not on file   Social History Narrative    Not on file     She reports that she does not work     OBJECTIVE:  /79   Pulse 80   Ht 5' 4.02\" (1.626 m)   Wt 266 lb 8.6 oz (120.9 kg)   BMI 45.73 kg/m²    Physical Exam  Psych: alert and oriented to person, time, and place  Cardio:  well perfused extremities  Resp:  normal respiratory effort  Skin:  no cyanosis  Hem/lymph:  no lymphedema  Neuro:  sensation to light touch intact throughout all nerve distributions in the foot   Musculoskeletal:    MUSCULOSKELETAL (affected lower extremity):  Vascular: Toes warm and well perfused, compartments soft/compressible, mild swelling of ankle/foot. Skin:  Intact over foot/ankle, without rash/lesions/AV malformations. Motion: Able to wiggle toes  -Range of motion not tested due to pain  -No tenderness at knee or proximal leg   -Tenderness to palpation: Lateral malleolus   -No significant tenderness at the medial malleolus      RADIOLOGY:   7/24/2019 No new radiology images today. Prior images reviewed for reference.       FINDINGS:

## 2019-08-05 ENCOUNTER — OFFICE VISIT (OUTPATIENT)
Dept: INFECTIOUS DISEASES | Age: 44
End: 2019-08-05
Payer: COMMERCIAL

## 2019-08-05 VITALS
BODY MASS INDEX: 44.9 KG/M2 | TEMPERATURE: 98.3 F | SYSTOLIC BLOOD PRESSURE: 124 MMHG | DIASTOLIC BLOOD PRESSURE: 79 MMHG | HEIGHT: 64 IN | WEIGHT: 263 LBS | HEART RATE: 82 BPM

## 2019-08-05 DIAGNOSIS — A04.8 H. PYLORI INFECTION: Primary | ICD-10-CM

## 2019-08-05 PROCEDURE — G8427 DOCREV CUR MEDS BY ELIG CLIN: HCPCS | Performed by: INTERNAL MEDICINE

## 2019-08-05 PROCEDURE — 4004F PT TOBACCO SCREEN RCVD TLK: CPT | Performed by: INTERNAL MEDICINE

## 2019-08-05 PROCEDURE — G8417 CALC BMI ABV UP PARAM F/U: HCPCS | Performed by: INTERNAL MEDICINE

## 2019-08-05 PROCEDURE — 99204 OFFICE O/P NEW MOD 45 MIN: CPT | Performed by: INTERNAL MEDICINE

## 2019-08-05 RX ORDER — METRONIDAZOLE 500 MG/1
500 TABLET ORAL 3 TIMES DAILY
Qty: 42 TABLET | Refills: 0 | Status: SHIPPED | OUTPATIENT
Start: 2019-08-05 | End: 2019-08-19

## 2019-08-05 RX ORDER — PANTOPRAZOLE SODIUM 40 MG/1
40 TABLET, DELAYED RELEASE ORAL
Qty: 28 TABLET | Refills: 0 | Status: SHIPPED | OUTPATIENT
Start: 2019-08-05 | End: 2020-02-25

## 2019-08-05 RX ORDER — ARIPIPRAZOLE 2 MG/1
2 TABLET ORAL DAILY
COMMUNITY
End: 2020-03-24

## 2019-08-05 NOTE — PROGRESS NOTES
[facility]   Office Consult Note  Today's Date and Time: 8/5/2019, 2:08 PM    Impression:     1. H. pylori infection         Recommendations   · I had a lengthy discussion with her about the treatment options for the Helicobacter pylori. · At this point in time the options would be not to treat the H. pylori versus having her take a regimen that she may have issues tolerating due to the underlying nausea and GI upset. · The patient is allergic to penicillin and cannot take an amoxicillin based regimen chart typically the past for this. · We did explore the options and the best treatment option for her at this point in time would be the clarithromycin, metronidazole, and a PPI. · The metronidazole which she previously had a difficult time tolerating would also help with the potential complication with C. difficile infection  · The patient expressed understanding and really other than this regimen we have no other options    I have ordered the following medications/ labs:  No orders of the defined types were placed in this encounter. Orders Placed This Encounter   Medications    clarithromycin (BIAXIN XL) 500 MG extended release tablet     Sig: Take 1 tablet by mouth 2 times daily for 14 days     Dispense:  28 tablet     Refill:  0    metroNIDAZOLE (FLAGYL) 500 MG tablet     Sig: Take 1 tablet by mouth 3 times daily for 14 days     Dispense:  42 tablet     Refill:  0    pantoprazole (PROTONIX) 40 MG tablet     Sig: Take 1 tablet by mouth 2 times daily (before meals) for 14 days     Dispense:  28 tablet     Refill:  0       Chief complaint/reason for consultation:     Chief Complaint   Patient presents with    Frequent Infections     NEW PATIENT        History of Present Illness:   Mari Huang is a 53y.o.-year-old female who is seen at there request of Lina Dutton MD for Helicobacter pylori infection and a history of C. difficile infection.   Lina Ocasio has a history of coronary glucose monitor strips Test blood sugars 6 times a day due to low blood sugars. Uncontrolled diabetes. Check Ac and HS and as needed. 200 strip 11     No current facility-administered medications for this visit.        Social History:     Social History     Socioeconomic History    Marital status: Single     Spouse name: Not on file    Number of children: Not on file    Years of education: Not on file    Highest education level: Not on file   Occupational History     Employer: NONE   Social Needs    Financial resource strain: Not on file    Food insecurity:     Worry: Not on file     Inability: Not on file    Transportation needs:     Medical: Not on file     Non-medical: Not on file   Tobacco Use    Smoking status: Current Every Day Smoker     Packs/day: 0.50     Years: 23.00     Pack years: 11.50     Types: Cigarettes    Smokeless tobacco: Never Used   Substance and Sexual Activity    Alcohol use: No     Alcohol/week: 0.0 standard drinks    Drug use: No    Sexual activity: Not on file   Lifestyle    Physical activity:     Days per week: Not on file     Minutes per session: Not on file    Stress: Not on file   Relationships    Social connections:     Talks on phone: Not on file     Gets together: Not on file     Attends Protestant service: Not on file     Active member of club or organization: Not on file     Attends meetings of clubs or organizations: Not on file     Relationship status: Not on file    Intimate partner violence:     Fear of current or ex partner: Not on file     Emotionally abused: Not on file     Physically abused: Not on file     Forced sexual activity: Not on file   Other Topics Concern    Not on file   Social History Narrative    Not on file     Family History:     Family History   Problem Relation Age of Onset    Ulcerative Colitis Father     Liver Disease Father 61        hep c and b    Diabetes Father     Asthma Father     Heart Disease Father     High Blood Pressure [oseltamivir phosphate]; and Sotalol     Review of Systems:   Review of Systems   Constitutional: Negative. Respiratory: Negative. Cardiovascular: Negative. Gastrointestinal: Positive for abdominal pain, diarrhea and nausea. Genitourinary: Negative. Musculoskeletal: Negative. Skin: Negative. Neurological: Negative. Psychiatric/Behavioral: Negative. Physical Examination :   /79 (Site: Left Lower Arm)   Pulse 82   Temp 98.3 °F (36.8 °C)   Ht 5' 4\" (1.626 m)   Wt 263 lb (119.3 kg)   BMI 45.14 kg/m²     Physical Exam   Constitutional: She is oriented to person, place, and time. She appears well-developed and well-nourished. HENT:   Head: Normocephalic and atraumatic. Neck: Normal range of motion. Neck supple. Cardiovascular: Regular rhythm and normal heart sounds. Pulmonary/Chest: Effort normal and breath sounds normal.   Abdominal: Soft. Bowel sounds are normal.   Neurological: She is alert and oriented to person, place, and time. Skin: Skin is warm and dry.        Medical Decision Making:   I haveindependently reviewed the following labs:  CBC with Differential:  Lab Results   Component Value Date    WBC 15.4 07/17/2019    WBC 10.9 05/26/2019    HGB 14.8 07/17/2019    HGB 14.0 05/26/2019    HCT 45.5 07/17/2019    HCT 41.7 05/26/2019     07/17/2019     05/26/2019     03/20/2012     11/26/2011    LYMPHOPCT 25 07/17/2019    LYMPHOPCT 33 05/26/2019    MONOPCT 5 07/17/2019    MONOPCT 8 05/26/2019     BMP:  Lab Results   Component Value Date     07/17/2019     05/26/2019    K 4.0 07/17/2019    K 4.0 05/26/2019     07/17/2019    CL 98 05/26/2019    CO2 27 07/17/2019    CO2 29 05/26/2019    BUN 12 07/17/2019    BUN 12 05/26/2019    CREATININE 0.50 07/17/2019    CREATININE 0.59 05/26/2019    MG 1.9 05/17/2017    MG 1.7 10/21/2016     Hepatic Function Panel:   Lab Results   Component Value Date    PROT 7.7 07/17/2019    PROT 7.2 05/26/2019    LABALBU 4.3 07/17/2019    LABALBU 4.1 05/26/2019    LABALBU 4.0 11/26/2011    BILIDIR <0.20 11/12/2014    BILIDIR <0.08 11/03/2014    IBILI NOT REPORTED 11/12/2014    IBILI CANNOT BE CALCULATED 11/03/2014    BILITOT 0.29 07/17/2019    BILITOT <0.15 05/26/2019    ALKPHOS 87 07/17/2019    ALKPHOS 82 05/26/2019    ALT 13 07/17/2019    ALT 17 05/26/2019    AST 15 07/17/2019    AST 14 05/26/2019     Lab Results   Component Value Date    CRP 1.69 (H) 11/26/2011     No results found for: Kirk Hutchins      Thank you for allowing us to participate in the care of this patient. Pleasecall with questions. Eslette See MD  [employer] messaging: [phone]    This note is created with the assistance of a speech recognition program.  While intending to generate a document that actually reflects the content ofthe visit, the document can still have some errors including those of syntax and sound a like substitutions which may escape proof reading. It such instances, actual meaning can be extrapolated by contextual diversion.

## 2019-08-06 ENCOUNTER — TELEPHONE (OUTPATIENT)
Dept: INFECTIOUS DISEASES | Age: 44
End: 2019-08-06

## 2019-08-15 RX ORDER — FLUCONAZOLE 100 MG/1
200 TABLET ORAL DAILY
Qty: 20 TABLET | Refills: 0 | OUTPATIENT
Start: 2019-08-15 | End: 2019-08-25

## 2019-08-18 PROBLEM — Z12.31 ENCOUNTER FOR SCREENING MAMMOGRAM FOR MALIGNANT NEOPLASM OF BREAST: Status: RESOLVED | Noted: 2018-03-07 | Resolved: 2019-08-18

## 2019-08-18 PROBLEM — Z01.810 PRE-OPERATIVE CARDIOVASCULAR EXAMINATION: Status: RESOLVED | Noted: 2018-09-27 | Resolved: 2019-08-18

## 2019-08-19 ASSESSMENT — ENCOUNTER SYMPTOMS
DIARRHEA: 1
RESPIRATORY NEGATIVE: 1
ABDOMINAL PAIN: 1
NAUSEA: 1

## 2019-08-22 PROBLEM — R05.3 COUGH, PERSISTENT: Status: RESOLVED | Noted: 2018-03-21 | Resolved: 2019-08-22

## 2019-09-25 DIAGNOSIS — M25.531 WRIST PAIN, RIGHT: Primary | ICD-10-CM

## 2019-09-26 ENCOUNTER — OFFICE VISIT (OUTPATIENT)
Dept: ORTHOPEDIC SURGERY | Age: 44
End: 2019-09-26
Payer: COMMERCIAL

## 2019-09-26 VITALS — BODY MASS INDEX: 42.17 KG/M2 | WEIGHT: 247 LBS | HEIGHT: 64 IN

## 2019-09-26 DIAGNOSIS — M25.431 PAIN AND SWELLING OF RIGHT WRIST: ICD-10-CM

## 2019-09-26 DIAGNOSIS — M25.531 PAIN AND SWELLING OF RIGHT WRIST: ICD-10-CM

## 2019-09-26 DIAGNOSIS — M25.531 RIGHT WRIST PAIN: ICD-10-CM

## 2019-09-26 DIAGNOSIS — G56.21 CUBITAL TUNNEL SYNDROME ON RIGHT: Primary | ICD-10-CM

## 2019-09-26 PROCEDURE — G8427 DOCREV CUR MEDS BY ELIG CLIN: HCPCS | Performed by: ORTHOPAEDIC SURGERY

## 2019-09-26 PROCEDURE — G8417 CALC BMI ABV UP PARAM F/U: HCPCS | Performed by: ORTHOPAEDIC SURGERY

## 2019-09-26 PROCEDURE — 99213 OFFICE O/P EST LOW 20 MIN: CPT | Performed by: ORTHOPAEDIC SURGERY

## 2019-09-26 PROCEDURE — 4004F PT TOBACCO SCREEN RCVD TLK: CPT | Performed by: ORTHOPAEDIC SURGERY

## 2019-09-29 NOTE — PROGRESS NOTES
Mastoiditis, Mastoiditis, acute, Migraines, Morbid obesity ([address] Myocardial infarction ([address] Nausea, Neuropathy, On home oxygen therapy, Ovarian cyst, Passed out [facility]), Pulmonary hypertension ([address] Pulmonary insufficiency, PVC (premature ventricular contraction), Tricuspid insufficiency, and Type II or unspecified type diabetes mellitus without mention of complication, not stated as uncontrolled. Past Surgical History  [facility]  has a past surgical history that includes Hysterectomy; Cholecystectomy; Appendectomy; Colonoscopy; Upper gastrointestinal endoscopy; Abdomen surgery; Abdominal adhesion surgery; Abdominal exploration surgery; Tympanostomy tube placement; Tonsillectomy; Foot surgery (Left); Abdominal hernia repair; hysteroscopy; Gastric fundoplication (0758); Abscess Drainage; Scaphoid fracture surgery (Left); other surgical history (Right, 5/29/14); Myringotomy Tympanostomy Tube Placement (Right, 06/05/2014); myringotomy (Right, 11/13/15); Hysterectomy; Cardiac catheterization (2014 & 2000); other surgical history (2009); Breast surgery (Left, 2007); fracture surgery (Right); other surgical history (Right, 08/11/2016); ablation of dysrhythmic focus (2016); other surgical history; other surgical history; pr manipulatn razr jt w anesthesia (Right, 3/1/2017); ablation of dysrhythmic focus (09/08/2017); pr shoulder scope bone shaving (Left, 10/17/2018); Tympanostomy tube placement (Left, 03/12/2019); Upper gastrointestinal endoscopy (07/10/2019); and Upper gastrointestinal endoscopy (N/A, 7/10/2019). Current Medications  Current Outpatient Medications   Medication Sig Dispense Refill    blood glucose test strips (ONE TOUCH ULTRA TEST) strip USE TO TEST BLOOD SUGAR 6 TIMES DAILY DUE TO LOW BLOOD SUGARS, UNCONTROLLED DIABETES.  CHECK BEFORE MEALS, AT BEDTIME, AND AS NEEDED 200 each 11    LANTUS SOLOSTAR 100 UNIT/ML injection pen INJECT 80 UNITS SUBCUTANEOUSLY 3 TIMES DAILY 45 mL 11    insulin lispro this.

## 2019-10-06 ENCOUNTER — APPOINTMENT (OUTPATIENT)
Dept: GENERAL RADIOLOGY | Age: 44
End: 2019-10-06
Payer: COMMERCIAL

## 2019-10-06 ENCOUNTER — HOSPITAL ENCOUNTER (EMERGENCY)
Age: 44
Discharge: HOME | End: 2019-10-06
Attending: EMERGENCY MEDICINE
Payer: COMMERCIAL

## 2019-10-06 VITALS
HEART RATE: 80 BPM | HEIGHT: 64 IN | DIASTOLIC BLOOD PRESSURE: 83 MMHG | BODY MASS INDEX: 40.8 KG/M2 | TEMPERATURE: 98.9 F | SYSTOLIC BLOOD PRESSURE: 161 MMHG | WEIGHT: 239 LBS | RESPIRATION RATE: 18 BRPM | OXYGEN SATURATION: 95 %

## 2019-10-06 DIAGNOSIS — R07.89 ATYPICAL CHEST PAIN: Primary | ICD-10-CM

## 2019-10-06 DIAGNOSIS — R25.2 BILATERAL LEG CRAMPS: ICD-10-CM

## 2019-10-06 LAB
ABSOLUTE EOS #: 0.6 K/UL (ref 0–0.4)
ABSOLUTE IMMATURE GRANULOCYTE: ABNORMAL K/UL (ref 0–0.3)
ABSOLUTE LYMPH #: 3.8 K/UL (ref 1–4.8)
ABSOLUTE MONO #: 0.6 K/UL (ref 0.1–1.3)
ANION GAP SERPL CALCULATED.3IONS-SCNC: 11 MMOL/L (ref 9–17)
BASOPHILS # BLD: 1 % (ref 0–2)
BASOPHILS ABSOLUTE: 0.1 K/UL (ref 0–0.2)
BNP INTERPRETATION: NORMAL
BUN BLDV-MCNC: 12 MG/DL (ref 6–20)
BUN/CREAT BLD: NORMAL (ref 9–20)
CALCIUM SERPL-MCNC: 9.7 MG/DL (ref 8.6–10.4)
CHLORIDE BLD-SCNC: 102 MMOL/L (ref 98–107)
CO2: 28 MMOL/L (ref 20–31)
CREAT SERPL-MCNC: 0.74 MG/DL (ref 0.5–0.9)
D-DIMER QUANTITATIVE: 0.39 MG/L FEU (ref 0–0.59)
DIFFERENTIAL TYPE: ABNORMAL
EOSINOPHILS RELATIVE PERCENT: 6 % (ref 0–4)
GFR AFRICAN AMERICAN: >60 ML/MIN
GFR NON-AFRICAN AMERICAN: >60 ML/MIN
GFR SERPL CREATININE-BSD FRML MDRD: NORMAL ML/MIN/{1.73_M2}
GFR SERPL CREATININE-BSD FRML MDRD: NORMAL ML/MIN/{1.73_M2}
GLUCOSE BLD-MCNC: 95 MG/DL (ref 70–99)
HCT VFR BLD CALC: 43.9 % (ref 36–46)
HEMOGLOBIN: 14.6 G/DL (ref 12–16)
IMMATURE GRANULOCYTES: ABNORMAL %
LYMPHOCYTES # BLD: 35 % (ref 24–44)
MCH RBC QN AUTO: 33.8 PG (ref 26–34)
MCHC RBC AUTO-ENTMCNC: 33.2 G/DL (ref 31–37)
MCV RBC AUTO: 101.7 FL (ref 80–100)
MONOCYTES # BLD: 5 % (ref 1–7)
NRBC AUTOMATED: ABNORMAL PER 100 WBC
PDW BLD-RTO: 13.4 % (ref 11.5–14.9)
PLATELET # BLD: 302 K/UL (ref 150–450)
PLATELET ESTIMATE: ABNORMAL
PMV BLD AUTO: 8.4 FL (ref 6–12)
POTASSIUM SERPL-SCNC: 3.8 MMOL/L (ref 3.7–5.3)
PRO-BNP: 248 PG/ML
RBC # BLD: 4.32 M/UL (ref 4–5.2)
RBC # BLD: ABNORMAL 10*6/UL
SEG NEUTROPHILS: 53 % (ref 36–66)
SEGMENTED NEUTROPHILS ABSOLUTE COUNT: 5.9 K/UL (ref 1.3–9.1)
SODIUM BLD-SCNC: 141 MMOL/L (ref 135–144)
TROPONIN INTERP: NORMAL
TROPONIN INTERP: NORMAL
TROPONIN T: NORMAL NG/ML
TROPONIN T: NORMAL NG/ML
TROPONIN, HIGH SENSITIVITY: 12 NG/L (ref 0–14)
TROPONIN, HIGH SENSITIVITY: 12 NG/L (ref 0–14)
WBC # BLD: 11 K/UL (ref 3.5–11)
WBC # BLD: ABNORMAL 10*3/UL

## 2019-10-06 PROCEDURE — 36415 COLL VENOUS BLD VENIPUNCTURE: CPT

## 2019-10-06 PROCEDURE — 6360000002 HC RX W HCPCS: Performed by: EMERGENCY MEDICINE

## 2019-10-06 PROCEDURE — 85379 FIBRIN DEGRADATION QUANT: CPT

## 2019-10-06 PROCEDURE — 96374 THER/PROPH/DIAG INJ IV PUSH: CPT

## 2019-10-06 PROCEDURE — 83880 ASSAY OF NATRIURETIC PEPTIDE: CPT

## 2019-10-06 PROCEDURE — 85025 COMPLETE CBC W/AUTO DIFF WBC: CPT

## 2019-10-06 PROCEDURE — 99285 EMERGENCY DEPT VISIT HI MDM: CPT

## 2019-10-06 PROCEDURE — 71046 X-RAY EXAM CHEST 2 VIEWS: CPT

## 2019-10-06 PROCEDURE — 80048 BASIC METABOLIC PNL TOTAL CA: CPT

## 2019-10-06 PROCEDURE — 93970 EXTREMITY STUDY: CPT

## 2019-10-06 PROCEDURE — 84484 ASSAY OF TROPONIN QUANT: CPT

## 2019-10-06 PROCEDURE — 93005 ELECTROCARDIOGRAM TRACING: CPT | Performed by: EMERGENCY MEDICINE

## 2019-10-06 RX ADMIN — HYDROMORPHONE HYDROCHLORIDE 0.5 MG: 1 INJECTION, SOLUTION INTRAMUSCULAR; INTRAVENOUS; SUBCUTANEOUS at 21:06

## 2019-10-06 ASSESSMENT — ENCOUNTER SYMPTOMS
ABDOMINAL PAIN: 0
SHORTNESS OF BREATH: 1
EYE DISCHARGE: 0
CONSTIPATION: 0
WHEEZING: 0
VOMITING: 0
NAUSEA: 0
DIARRHEA: 0
SORE THROAT: 0
COUGH: 0

## 2019-10-06 ASSESSMENT — PAIN DESCRIPTION - LOCATION: LOCATION: CHEST;LEG

## 2019-10-06 ASSESSMENT — PAIN DESCRIPTION - PAIN TYPE: TYPE: ACUTE PAIN

## 2019-10-06 ASSESSMENT — PAIN DESCRIPTION - ORIENTATION: ORIENTATION: RIGHT;LEFT

## 2019-10-06 ASSESSMENT — PAIN SCALES - GENERAL: PAINLEVEL_OUTOF10: 10

## 2019-10-08 LAB
EKG ATRIAL RATE: 73 BPM
EKG P AXIS: 0 DEGREES
EKG P-R INTERVAL: 126 MS
EKG Q-T INTERVAL: 388 MS
EKG QRS DURATION: 84 MS
EKG QTC CALCULATION (BAZETT): 427 MS
EKG R AXIS: 56 DEGREES
EKG T AXIS: 43 DEGREES
EKG VENTRICULAR RATE: 73 BPM

## 2019-10-08 PROCEDURE — 93010 ELECTROCARDIOGRAM REPORT: CPT | Performed by: INTERNAL MEDICINE

## 2019-10-10 ENCOUNTER — TELEPHONE (OUTPATIENT)
Dept: ORTHOPEDIC SURGERY | Age: 44
End: 2019-10-10

## 2019-10-27 ENCOUNTER — HOSPITAL ENCOUNTER (EMERGENCY)
Age: 44
Discharge: HOME | End: 2019-10-27
Attending: EMERGENCY MEDICINE
Payer: COMMERCIAL

## 2019-10-27 ENCOUNTER — APPOINTMENT (OUTPATIENT)
Dept: GENERAL RADIOLOGY | Age: 44
End: 2019-10-27
Payer: COMMERCIAL

## 2019-10-27 VITALS
DIASTOLIC BLOOD PRESSURE: 84 MMHG | BODY MASS INDEX: 41.66 KG/M2 | TEMPERATURE: 99.6 F | WEIGHT: 244 LBS | RESPIRATION RATE: 18 BRPM | OXYGEN SATURATION: 98 % | SYSTOLIC BLOOD PRESSURE: 152 MMHG | HEIGHT: 64 IN | HEART RATE: 87 BPM

## 2019-10-27 DIAGNOSIS — R07.9 CHEST PAIN, UNSPECIFIED TYPE: Primary | ICD-10-CM

## 2019-10-27 DIAGNOSIS — R51.9 ACUTE NONINTRACTABLE HEADACHE, UNSPECIFIED HEADACHE TYPE: ICD-10-CM

## 2019-10-27 DIAGNOSIS — J06.9 VIRAL URI WITH COUGH: ICD-10-CM

## 2019-10-27 LAB
ABSOLUTE EOS #: 0.5 K/UL (ref 0–0.4)
ABSOLUTE IMMATURE GRANULOCYTE: ABNORMAL K/UL (ref 0–0.3)
ABSOLUTE LYMPH #: 2.9 K/UL (ref 1–4.8)
ABSOLUTE MONO #: 0.8 K/UL (ref 0.1–1.3)
ANION GAP SERPL CALCULATED.3IONS-SCNC: 13 MMOL/L (ref 9–17)
BASOPHILS # BLD: 1 % (ref 0–2)
BASOPHILS ABSOLUTE: 0.1 K/UL (ref 0–0.2)
BUN BLDV-MCNC: 10 MG/DL (ref 6–20)
BUN/CREAT BLD: ABNORMAL (ref 9–20)
CALCIUM SERPL-MCNC: 9.8 MG/DL (ref 8.6–10.4)
CHLORIDE BLD-SCNC: 98 MMOL/L (ref 98–107)
CHP ED QC CHECK: NORMAL
CO2: 28 MMOL/L (ref 20–31)
CREAT SERPL-MCNC: 0.59 MG/DL (ref 0.5–0.9)
D-DIMER QUANTITATIVE: 0.31 MG/L FEU (ref 0–0.59)
DIFFERENTIAL TYPE: ABNORMAL
EOSINOPHILS RELATIVE PERCENT: 4 % (ref 0–4)
GFR AFRICAN AMERICAN: >60 ML/MIN
GFR NON-AFRICAN AMERICAN: >60 ML/MIN
GFR SERPL CREATININE-BSD FRML MDRD: ABNORMAL ML/MIN/{1.73_M2}
GFR SERPL CREATININE-BSD FRML MDRD: ABNORMAL ML/MIN/{1.73_M2}
GLUCOSE BLD-MCNC: 120 MG/DL
GLUCOSE BLD-MCNC: 120 MG/DL (ref 65–105)
GLUCOSE BLD-MCNC: 156 MG/DL (ref 70–99)
GLUCOSE BLD-MCNC: 44 MG/DL (ref 65–105)
HCT VFR BLD CALC: 42.8 % (ref 36–46)
HEMOGLOBIN: 14.3 G/DL (ref 12–16)
IMMATURE GRANULOCYTES: ABNORMAL %
LYMPHOCYTES # BLD: 25 % (ref 24–44)
MCH RBC QN AUTO: 33.3 PG (ref 26–34)
MCHC RBC AUTO-ENTMCNC: 33.3 G/DL (ref 31–37)
MCV RBC AUTO: 99.9 FL (ref 80–100)
MONOCYTES # BLD: 7 % (ref 1–7)
NRBC AUTOMATED: ABNORMAL PER 100 WBC
PDW BLD-RTO: 13.5 % (ref 11.5–14.9)
PLATELET # BLD: 298 K/UL (ref 150–450)
PLATELET ESTIMATE: ABNORMAL
PMV BLD AUTO: 8.8 FL (ref 6–12)
POTASSIUM SERPL-SCNC: 3.6 MMOL/L (ref 3.7–5.3)
RBC # BLD: 4.28 M/UL (ref 4–5.2)
RBC # BLD: ABNORMAL 10*6/UL
SEG NEUTROPHILS: 63 % (ref 36–66)
SEGMENTED NEUTROPHILS ABSOLUTE COUNT: 7.4 K/UL (ref 1.3–9.1)
SODIUM BLD-SCNC: 139 MMOL/L (ref 135–144)
TROPONIN INTERP: NORMAL
TROPONIN T: NORMAL NG/ML
TROPONIN, HIGH SENSITIVITY: 10 NG/L (ref 0–14)
WBC # BLD: 11.7 K/UL (ref 3.5–11)
WBC # BLD: ABNORMAL 10*3/UL

## 2019-10-27 PROCEDURE — 85025 COMPLETE CBC W/AUTO DIFF WBC: CPT

## 2019-10-27 PROCEDURE — 36415 COLL VENOUS BLD VENIPUNCTURE: CPT

## 2019-10-27 PROCEDURE — 71046 X-RAY EXAM CHEST 2 VIEWS: CPT

## 2019-10-27 PROCEDURE — 96372 THER/PROPH/DIAG INJ SC/IM: CPT

## 2019-10-27 PROCEDURE — 85379 FIBRIN DEGRADATION QUANT: CPT

## 2019-10-27 PROCEDURE — 6360000002 HC RX W HCPCS: Performed by: STUDENT IN AN ORGANIZED HEALTH CARE EDUCATION/TRAINING PROGRAM

## 2019-10-27 PROCEDURE — 82947 ASSAY GLUCOSE BLOOD QUANT: CPT

## 2019-10-27 PROCEDURE — 99285 EMERGENCY DEPT VISIT HI MDM: CPT

## 2019-10-27 PROCEDURE — 80048 BASIC METABOLIC PNL TOTAL CA: CPT

## 2019-10-27 PROCEDURE — 84484 ASSAY OF TROPONIN QUANT: CPT

## 2019-10-27 PROCEDURE — 94640 AIRWAY INHALATION TREATMENT: CPT

## 2019-10-27 RX ORDER — ALBUTEROL SULFATE 90 UG/1
2 AEROSOL, METERED RESPIRATORY (INHALATION)
Status: DISCONTINUED | OUTPATIENT
Start: 2019-10-27 | End: 2019-10-27 | Stop reason: HOSPADM

## 2019-10-27 RX ORDER — PREDNISONE 10 MG/1
TABLET ORAL
Qty: 20 TABLET | Refills: 0 | Status: SHIPPED | OUTPATIENT
Start: 2019-10-27 | End: 2019-11-06

## 2019-10-27 RX ORDER — PROMETHAZINE HYDROCHLORIDE 25 MG/ML
12.5 INJECTION, SOLUTION INTRAMUSCULAR; INTRAVENOUS ONCE
Status: COMPLETED | OUTPATIENT
Start: 2019-10-27 | End: 2019-10-27

## 2019-10-27 RX ORDER — IPRATROPIUM BROMIDE AND ALBUTEROL SULFATE 2.5; .5 MG/3ML; MG/3ML
1 SOLUTION RESPIRATORY (INHALATION)
Status: DISCONTINUED | OUTPATIENT
Start: 2019-10-27 | End: 2019-10-27 | Stop reason: HOSPADM

## 2019-10-27 RX ORDER — ALBUTEROL SULFATE 2.5 MG/3ML
5 SOLUTION RESPIRATORY (INHALATION)
Status: DISCONTINUED | OUTPATIENT
Start: 2019-10-27 | End: 2019-10-27 | Stop reason: HOSPADM

## 2019-10-27 RX ORDER — HALOPERIDOL 5 MG/ML
5 INJECTION INTRAMUSCULAR ONCE
Status: COMPLETED | OUTPATIENT
Start: 2019-10-27 | End: 2019-10-27

## 2019-10-27 RX ORDER — BUTALBITAL, ACETAMINOPHEN AND CAFFEINE 50; 325; 40 MG/1; MG/1; MG/1
1 TABLET ORAL ONCE
Status: DISCONTINUED | OUTPATIENT
Start: 2019-10-27 | End: 2019-10-27 | Stop reason: HOSPADM

## 2019-10-27 RX ADMIN — ALBUTEROL SULFATE 5 MG: 2.5 SOLUTION RESPIRATORY (INHALATION) at 17:30

## 2019-10-27 RX ADMIN — PROMETHAZINE HYDROCHLORIDE 12.5 MG: 25 INJECTION INTRAMUSCULAR; INTRAVENOUS at 17:59

## 2019-10-27 RX ADMIN — HALOPERIDOL LACTATE 5 MG: 5 INJECTION INTRAMUSCULAR at 18:00

## 2019-10-27 RX ADMIN — IPRATROPIUM BROMIDE 0.5 MG: 0.5 SOLUTION RESPIRATORY (INHALATION) at 17:30

## 2019-10-27 ASSESSMENT — PAIN DESCRIPTION - LOCATION: LOCATION: HEAD

## 2019-10-27 ASSESSMENT — ENCOUNTER SYMPTOMS
SORE THROAT: 0
VOMITING: 0
SHORTNESS OF BREATH: 1
PHOTOPHOBIA: 0
DIARRHEA: 0
TROUBLE SWALLOWING: 0
NAUSEA: 1
CONSTIPATION: 0
WHEEZING: 0
COUGH: 1
ABDOMINAL PAIN: 0
BACK PAIN: 0

## 2019-10-27 ASSESSMENT — HEART SCORE: ECG: 0

## 2019-10-27 ASSESSMENT — PAIN SCALES - GENERAL: PAINLEVEL_OUTOF10: 10

## 2019-10-27 ASSESSMENT — PAIN DESCRIPTION - PAIN TYPE: TYPE: ACUTE PAIN

## 2019-10-28 PROBLEM — R11.0 NAUSEA: Status: RESOLVED | Noted: 2019-05-02 | Resolved: 2019-10-28

## 2019-10-28 PROBLEM — B37.9 CANDIDA INFECTION: Status: RESOLVED | Noted: 2018-02-23 | Resolved: 2019-10-28

## 2019-11-21 DIAGNOSIS — M25.572 LEFT ANKLE PAIN, UNSPECIFIED CHRONICITY: Primary | ICD-10-CM

## 2019-11-27 ENCOUNTER — OFFICE VISIT (OUTPATIENT)
Dept: ORTHOPEDIC SURGERY | Age: 44
End: 2019-11-27
Payer: COMMERCIAL

## 2019-11-27 VITALS
WEIGHT: 245 LBS | BODY MASS INDEX: 41.83 KG/M2 | SYSTOLIC BLOOD PRESSURE: 132 MMHG | HEIGHT: 64 IN | DIASTOLIC BLOOD PRESSURE: 78 MMHG

## 2019-11-27 DIAGNOSIS — M25.572 LEFT ANKLE PAIN, UNSPECIFIED CHRONICITY: Primary | ICD-10-CM

## 2019-11-27 DIAGNOSIS — F17.200 TOBACCO DEPENDENCY: ICD-10-CM

## 2019-11-27 DIAGNOSIS — S82.892D CLOSED AVULSION FRACTURE OF LEFT ANKLE WITH ROUTINE HEALING, SUBSEQUENT ENCOUNTER: Primary | ICD-10-CM

## 2019-11-27 DIAGNOSIS — M54.17 RADICULOPATHY, LUMBOSACRAL REGION: ICD-10-CM

## 2019-11-27 PROCEDURE — G8427 DOCREV CUR MEDS BY ELIG CLIN: HCPCS | Performed by: ORTHOPAEDIC SURGERY

## 2019-11-27 PROCEDURE — 99213 OFFICE O/P EST LOW 20 MIN: CPT | Performed by: ORTHOPAEDIC SURGERY

## 2019-11-27 PROCEDURE — G8417 CALC BMI ABV UP PARAM F/U: HCPCS | Performed by: ORTHOPAEDIC SURGERY

## 2019-11-27 PROCEDURE — G8484 FLU IMMUNIZE NO ADMIN: HCPCS | Performed by: ORTHOPAEDIC SURGERY

## 2019-11-27 PROCEDURE — 4004F PT TOBACCO SCREEN RCVD TLK: CPT | Performed by: ORTHOPAEDIC SURGERY

## 2019-11-27 ASSESSMENT — ENCOUNTER SYMPTOMS
ABDOMINAL PAIN: 0
SHORTNESS OF BREATH: 0
EYE PAIN: 0

## 2020-01-08 ENCOUNTER — HOSPITAL ENCOUNTER (EMERGENCY)
Age: 45
Discharge: HOME | End: 2020-01-08
Attending: EMERGENCY MEDICINE
Payer: COMMERCIAL

## 2020-01-08 VITALS
DIASTOLIC BLOOD PRESSURE: 95 MMHG | OXYGEN SATURATION: 96 % | HEART RATE: 89 BPM | SYSTOLIC BLOOD PRESSURE: 160 MMHG | HEIGHT: 64 IN | WEIGHT: 256 LBS | BODY MASS INDEX: 43.71 KG/M2 | RESPIRATION RATE: 15 BRPM | TEMPERATURE: 97.9 F

## 2020-01-08 PROCEDURE — 99282 EMERGENCY DEPT VISIT SF MDM: CPT

## 2020-01-08 ASSESSMENT — PAIN DESCRIPTION - LOCATION: LOCATION: ARM

## 2020-01-08 ASSESSMENT — PAIN DESCRIPTION - ORIENTATION: ORIENTATION: RIGHT

## 2020-01-08 ASSESSMENT — PAIN SCALES - GENERAL: PAINLEVEL_OUTOF10: 8

## 2020-01-08 ASSESSMENT — ENCOUNTER SYMPTOMS
CHEST TIGHTNESS: 0
NAUSEA: 0
COUGH: 0
SHORTNESS OF BREATH: 0
BACK PAIN: 0
CONSTIPATION: 0
SINUS PRESSURE: 0
ABDOMINAL PAIN: 0
VOMITING: 0
EYE PAIN: 0
DIARRHEA: 0
SINUS PAIN: 0

## 2020-01-08 ASSESSMENT — PAIN DESCRIPTION - FREQUENCY: FREQUENCY: CONTINUOUS

## 2020-01-08 ASSESSMENT — PAIN DESCRIPTION - PAIN TYPE: TYPE: ACUTE PAIN

## 2020-01-08 ASSESSMENT — PAIN DESCRIPTION - DESCRIPTORS: DESCRIPTORS: TENDER

## 2020-01-09 NOTE — ED PROVIDER NOTES
persistent, Current moderate episode of major depressive disorder without prior episode ([address] Depression, Diabetes mellitus type 2, controlled ([address] Diarrhea, Diarrhea, Dizziness, DVT (deep venous thrombosis) ([address] Encounter for monitoring sotalol therapy, Encounter for screening mammogram for malignant neoplasm of breast, Endometriosis, Fainting, GERD (gastroesophageal reflux disease), GI bleeding, H. pylori infection, Helicobacter pylori (H. pylori), [place] (hard of hearing), HTN (hypertension), Hyperlipidemia, Hypertension, Left bicipital tenosynovitis, Left leg pain, Left sided abdominal pain of unknown cause, Leg swelling, Mastoiditis, Mastoiditis, acute, Migraines, Morbid obesity ([address] Myocardial infarction ([address] Nausea, Neuropathy, On home oxygen therapy, Ovarian cyst, Passed out, Pulmonary hypertension ([address] Pulmonary insufficiency, PVC (premature ventricular contraction), Tricuspid insufficiency, and Type II or unspecified type diabetes mellitus without mention of complication, not stated as uncontrolled. Past Surgical History:  has a past surgical history that includes Hysterectomy; Cholecystectomy; Appendectomy; Colonoscopy; Upper gastrointestinal endoscopy; Abdomen surgery; Abdominal adhesion surgery; Abdominal exploration surgery; Tympanostomy tube placement; Tonsillectomy; Foot surgery (Left); Abdominal hernia repair; hysteroscopy; Gastric fundoplication (8126); Abscess Drainage; Scaphoid fracture surgery (Left); other surgical history (Right, 5/29/14); Myringotomy Tympanostomy Tube Placement (Right, 06/05/2014); myringotomy (Right, 11/13/15); Hysterectomy; Cardiac catheterization (2014 & 2000); other surgical history (2009);  Breast surgery (Left, 2007); fracture surgery (Right); other surgical history (Right, 08/11/2016); ablation of dysrhythmic focus (2016); other surgical history; other surgical history; pr manipulatn logan jt w anesthesia (Right, 3/1/2017); ablation of dysrhythmic focus for 14 days  Ladonna Demarco MD   DULoxetine (CYMBALTA) 60 MG extended release capsule Take 1 capsule by mouth 2 times daily  MARYANNE Hathaway CNP   ibuprofen (ADVIL;MOTRIN) 800 MG tablet TAKE ONE TABLET BY MOUTH 3 TIMES DAILY  MARYANNE Hathaway CNP   ipratropium-albuterol (DUONEB) 0.5-2.5 (3) MG/3ML SOLN nebulizer solution INHALE 3 MLS INTO THE LUNGS EVERY 6 HOURS AS NEEDED FOR SHORTNESS OF BREATH  MARYANNE Hathaway CNP   loratadine (CLARITIN) 10 MG tablet Take 1 tablet by mouth daily  MARYANNE Hathaway CNP   vitamin D (CHOLECALCIFEROL) 1000 UNIT TABS tablet Take 50,000 Units by mouth once a week   Historical Provider, MD   topiramate (TOPAMAX) 25 MG tablet Take 25 mg by mouth daily Takes for headaches  Historical Provider, MD   promethazine (PHENERGAN) 25 MG tablet Take 25 mg by mouth every 6 hours as needed for Nausea  Historical Provider, MD   OXYGEN Inhale into the lungs Indications: On 3 liters per n/c during the night. Historical Provider, MD   Multiple Vitamins-Minerals (MULTIVITAMIN GUMMIES WOMENS) CHEW Take 2 each by mouth daily   Historical Provider, MD   bumetanide (BUMEX) 1 MG tablet Take 1 tablet by mouth daily Indications: if 3 lb wt gain  Patient taking differently: Take 1 mg by mouth daily as needed (Takes for 3 days on and then 3 days off as directed.)   Chao Narvaez MD   carvedilol (COREG) 12.5 MG tablet Take 12.5 mg by mouth 2 times daily (with meals) Takes at 10:00am and 10:00pm  Historical Provider, MD   atorvastatin (LIPITOR) 40 MG tablet Take 40 mg by mouth every evening   Historical Provider, MD     Please note that medications prescribed at discharge will auto-populate into this medication list when note is refreshed. Please look at prescription date andprescriber to clarify.     Family History:family history includes Asthma in her father, maternal grandfather, maternal grandmother, and mother; Breast Cancer in her maternal aunt, maternal grandmother, and paternal aunt; Cancer in her maternal aunt, maternal grandmother, paternal grandmother, and sister; Cervical Cancer in her maternal grandmother and paternal grandmother; Diabetes in her father, maternal aunt, maternal grandfather, maternal grandmother, mother, paternal grandfather, and paternal grandmother; Heart Disease in her father, maternal aunt, maternal grandfather, maternal grandmother, maternal uncle, mother, paternal aunt, paternal grandfather, paternal grandmother, and paternal uncle; High Blood Pressure in her father, maternal aunt, maternal grandfather, maternal grandmother, maternal uncle, mother, paternal aunt, paternal grandfather, paternal grandmother, and paternal uncle; Liver Disease (age of onset: 61) in her father; Hershal Valles in her maternal grandfather; Ulcerative Colitis in her father. Social History: She reports that she has been smoking cigarettes. She has a 11.50 pack-year smoking history. She has never used smokeless tobacco. She reports that she does not drink alcohol or use drugs. She has no history on file for sexual activity. REVIEW OF SYSTEMS    (2-9 systems for level 4, 10 or more for level 5)      Review of Systems   Constitutional: Negative for chills and fever. HENT: Negative for congestion, sinus pressure and sinus pain. Eyes: Negative for pain and visual disturbance. Respiratory: Negative for cough, chest tightness and shortness of breath. Cardiovascular: Negative for chest pain and palpitations. Gastrointestinal: Negative for abdominal pain, constipation, diarrhea, nausea and vomiting. Genitourinary: Negative for dysuria and frequency. Musculoskeletal: Positive for myalgias. Negative for arthralgias, back pain and joint swelling. Skin: Positive for wound. Negative for rash. Neurological: Negative for dizziness, light-headedness and headaches.        PHYSICAL EXAM   (up to 7 for level 4, 8 or more for level 5)      Initial

## 2020-03-05 ENCOUNTER — HOSPITAL ENCOUNTER (OUTPATIENT)
Dept: GENERAL RADIOLOGY | Age: 45
Discharge: HOME | End: 2020-03-07
Payer: COMMERCIAL

## 2020-03-05 ENCOUNTER — HOSPITAL ENCOUNTER (OUTPATIENT)
Age: 45
Discharge: HOME | End: 2020-03-07
Payer: COMMERCIAL

## 2020-03-05 PROCEDURE — 71046 X-RAY EXAM CHEST 2 VIEWS: CPT

## 2020-03-05 PROCEDURE — 70220 X-RAY EXAM OF SINUSES: CPT

## 2020-03-24 ENCOUNTER — APPOINTMENT (OUTPATIENT)
Dept: GENERAL RADIOLOGY | Age: 45
End: 2020-03-24
Payer: COMMERCIAL

## 2020-03-24 ENCOUNTER — HOSPITAL ENCOUNTER (EMERGENCY)
Age: 45
Discharge: HOME | End: 2020-03-24
Attending: EMERGENCY MEDICINE
Payer: COMMERCIAL

## 2020-03-24 VITALS
WEIGHT: 259 LBS | RESPIRATION RATE: 20 BRPM | OXYGEN SATURATION: 98 % | BODY MASS INDEX: 44.22 KG/M2 | SYSTOLIC BLOOD PRESSURE: 179 MMHG | DIASTOLIC BLOOD PRESSURE: 90 MMHG | HEART RATE: 87 BPM | HEIGHT: 64 IN | TEMPERATURE: 99.6 F

## 2020-03-24 LAB
ABSOLUTE EOS #: 0.4 K/UL (ref 0–0.4)
ABSOLUTE IMMATURE GRANULOCYTE: ABNORMAL K/UL (ref 0–0.3)
ABSOLUTE LYMPH #: 3.6 K/UL (ref 1–4.8)
ABSOLUTE MONO #: 0.9 K/UL (ref 0.1–1.3)
ALBUMIN SERPL-MCNC: 4 G/DL (ref 3.5–5.2)
ALBUMIN/GLOBULIN RATIO: ABNORMAL (ref 1–2.5)
ALP BLD-CCNC: 86 U/L (ref 35–104)
ALT SERPL-CCNC: 17 U/L (ref 5–33)
ANION GAP SERPL CALCULATED.3IONS-SCNC: 14 MMOL/L (ref 9–17)
AST SERPL-CCNC: 21 U/L
BASOPHILS # BLD: 1 % (ref 0–2)
BASOPHILS ABSOLUTE: 0.1 K/UL (ref 0–0.2)
BILIRUB SERPL-MCNC: 0.28 MG/DL (ref 0.3–1.2)
BNP INTERPRETATION: NORMAL
BUN BLDV-MCNC: 12 MG/DL (ref 6–20)
BUN/CREAT BLD: ABNORMAL (ref 9–20)
C-REACTIVE PROTEIN: 16.7 MG/L (ref 0–5)
CALCIUM SERPL-MCNC: 9.4 MG/DL (ref 8.6–10.4)
CHLORIDE BLD-SCNC: 98 MMOL/L (ref 98–107)
CO2: 28 MMOL/L (ref 20–31)
CREAT SERPL-MCNC: 0.52 MG/DL (ref 0.5–0.9)
DIFFERENTIAL TYPE: ABNORMAL
EOSINOPHILS RELATIVE PERCENT: 3 % (ref 0–4)
GFR AFRICAN AMERICAN: >60 ML/MIN
GFR NON-AFRICAN AMERICAN: >60 ML/MIN
GFR SERPL CREATININE-BSD FRML MDRD: ABNORMAL ML/MIN/{1.73_M2}
GFR SERPL CREATININE-BSD FRML MDRD: ABNORMAL ML/MIN/{1.73_M2}
GLUCOSE BLD-MCNC: 116 MG/DL (ref 70–99)
HCT VFR BLD CALC: 41.9 % (ref 36–46)
HEMOGLOBIN: 14.1 G/DL (ref 12–16)
IMMATURE GRANULOCYTES: ABNORMAL %
INR BLD: 0.8
LACTATE DEHYDROGENASE: 226 U/L (ref 135–214)
LYMPHOCYTES # BLD: 25 % (ref 24–44)
MCH RBC QN AUTO: 33.9 PG (ref 26–34)
MCHC RBC AUTO-ENTMCNC: 33.6 G/DL (ref 31–37)
MCV RBC AUTO: 100.9 FL (ref 80–100)
MONOCYTES # BLD: 7 % (ref 1–7)
NRBC AUTOMATED: ABNORMAL PER 100 WBC
PARTIAL THROMBOPLASTIN TIME: 24.9 SEC (ref 24–36)
PDW BLD-RTO: 13.8 % (ref 11.5–14.9)
PLATELET # BLD: 300 K/UL (ref 150–450)
PLATELET ESTIMATE: ABNORMAL
PMV BLD AUTO: 8.3 FL (ref 6–12)
POTASSIUM SERPL-SCNC: 4.2 MMOL/L (ref 3.7–5.3)
PRO-BNP: 106 PG/ML
PROTHROMBIN TIME: 11.1 SEC (ref 11.8–14.6)
RBC # BLD: 4.15 M/UL (ref 4–5.2)
RBC # BLD: ABNORMAL 10*6/UL
SEG NEUTROPHILS: 64 % (ref 36–66)
SEGMENTED NEUTROPHILS ABSOLUTE COUNT: 9.3 K/UL (ref 1.3–9.1)
SODIUM BLD-SCNC: 140 MMOL/L (ref 135–144)
TOTAL PROTEIN: 7 G/DL (ref 6.4–8.3)
TROPONIN INTERP: NORMAL
TROPONIN INTERP: NORMAL
TROPONIN T: NORMAL NG/ML
TROPONIN T: NORMAL NG/ML
TROPONIN, HIGH SENSITIVITY: 12 NG/L (ref 0–14)
TROPONIN, HIGH SENSITIVITY: 12 NG/L (ref 0–14)
WBC # BLD: 14.4 K/UL (ref 3.5–11)
WBC # BLD: ABNORMAL 10*3/UL

## 2020-03-24 PROCEDURE — 84484 ASSAY OF TROPONIN QUANT: CPT

## 2020-03-24 PROCEDURE — 93005 ELECTROCARDIOGRAM TRACING: CPT

## 2020-03-24 PROCEDURE — 71045 X-RAY EXAM CHEST 1 VIEW: CPT

## 2020-03-24 PROCEDURE — 83615 LACTATE (LD) (LDH) ENZYME: CPT

## 2020-03-24 PROCEDURE — 96375 TX/PRO/DX INJ NEW DRUG ADDON: CPT

## 2020-03-24 PROCEDURE — 85730 THROMBOPLASTIN TIME PARTIAL: CPT

## 2020-03-24 PROCEDURE — 6360000002 HC RX W HCPCS: Performed by: EMERGENCY MEDICINE

## 2020-03-24 PROCEDURE — 85025 COMPLETE CBC W/AUTO DIFF WBC: CPT

## 2020-03-24 PROCEDURE — 2580000003 HC RX 258: Performed by: EMERGENCY MEDICINE

## 2020-03-24 PROCEDURE — 96365 THER/PROPH/DIAG IV INF INIT: CPT

## 2020-03-24 PROCEDURE — 6370000000 HC RX 637 (ALT 250 FOR IP): Performed by: EMERGENCY MEDICINE

## 2020-03-24 PROCEDURE — 36415 COLL VENOUS BLD VENIPUNCTURE: CPT

## 2020-03-24 PROCEDURE — 80053 COMPREHEN METABOLIC PANEL: CPT

## 2020-03-24 PROCEDURE — 83880 ASSAY OF NATRIURETIC PEPTIDE: CPT

## 2020-03-24 PROCEDURE — 99284 EMERGENCY DEPT VISIT MOD MDM: CPT

## 2020-03-24 PROCEDURE — 86140 C-REACTIVE PROTEIN: CPT

## 2020-03-24 PROCEDURE — 85610 PROTHROMBIN TIME: CPT

## 2020-03-24 RX ORDER — ALBUTEROL SULFATE 90 UG/1
2 AEROSOL, METERED RESPIRATORY (INHALATION) EVERY 4 HOURS PRN
Status: DISCONTINUED | OUTPATIENT
Start: 2020-03-24 | End: 2020-03-24 | Stop reason: HOSPADM

## 2020-03-24 RX ORDER — ALBUTEROL SULFATE 90 UG/1
2 AEROSOL, METERED RESPIRATORY (INHALATION) EVERY 6 HOURS PRN
COMMUNITY
End: 2020-05-07

## 2020-03-24 RX ORDER — DEXTROMETHORPHAN POLISTIREX 30 MG/5ML
30 SUSPENSION ORAL ONCE
Status: COMPLETED | OUTPATIENT
Start: 2020-03-24 | End: 2020-03-24

## 2020-03-24 RX ORDER — ACETAMINOPHEN AND CODEINE PHOSPHATE 300; 60 MG/1; MG/1
2 TABLET ORAL DAILY PRN
COMMUNITY
End: 2021-12-14

## 2020-03-24 RX ORDER — IBUPROFEN 800 MG/1
800 TABLET ORAL EVERY 8 HOURS PRN
COMMUNITY
End: 2020-06-30 | Stop reason: ALTCHOICE

## 2020-03-24 RX ORDER — LEVOCETIRIZINE DIHYDROCHLORIDE 5 MG/1
5 TABLET, FILM COATED ORAL NIGHTLY
COMMUNITY
End: 2021-12-14

## 2020-03-24 RX ADMIN — MAGNESIUM SULFATE HEPTAHYDRATE 2 G: 500 INJECTION, SOLUTION INTRAMUSCULAR; INTRAVENOUS at 15:16

## 2020-03-24 RX ADMIN — HYDROMORPHONE HYDROCHLORIDE 1 MG: 1 INJECTION, SOLUTION INTRAMUSCULAR; INTRAVENOUS; SUBCUTANEOUS at 15:31

## 2020-03-24 RX ADMIN — DEXTROMETHORPHAN POLISTIREX 30 MG: 30 SUSPENSION ORAL at 15:37

## 2020-03-24 ASSESSMENT — ENCOUNTER SYMPTOMS
CONSTIPATION: 0
RHINORRHEA: 1
SORE THROAT: 0
NAUSEA: 0
CHEST TIGHTNESS: 0
BACK PAIN: 0
ABDOMINAL PAIN: 0
VOMITING: 0
DIARRHEA: 0
COUGH: 1
SHORTNESS OF BREATH: 1
EYE PAIN: 0

## 2020-03-24 ASSESSMENT — PAIN DESCRIPTION - LOCATION: LOCATION: BACK

## 2020-03-24 ASSESSMENT — PAIN SCALES - GENERAL
PAINLEVEL_OUTOF10: 10
PAINLEVEL_OUTOF10: 10

## 2020-03-24 ASSESSMENT — PAIN DESCRIPTION - ORIENTATION: ORIENTATION: UPPER

## 2020-03-24 ASSESSMENT — PAIN DESCRIPTION - PAIN TYPE: TYPE: ACUTE PAIN

## 2020-03-24 NOTE — ED PROVIDER NOTES
grandfather, paternal grandmother, and paternal uncle; High Blood Pressure in her father, maternal aunt, maternal grandfather, maternal grandmother, maternal uncle, mother, paternal aunt, paternal grandfather, paternal grandmother, and paternal uncle; Liver Disease (age of onset: 61) in her father; Kayla Ricardo in her maternal grandfather; Ulcerative Colitis in her father. Social History: She reports that she has been smoking cigarettes. She has a 11.50 pack-year smoking history. She has never used smokeless tobacco. She reports that she does not drink alcohol or use drugs. She has no history on file for sexual activity. REVIEW OF SYSTEMS    (2-9 systems for level 4, 10 or more for level 5)      Review of Systems   Constitutional: Positive for fatigue and fever. Negative for chills. HENT: Positive for congestion and rhinorrhea. Negative for sore throat and tinnitus. Eyes: Negative for pain and visual disturbance. Respiratory: Positive for cough and shortness of breath. Negative for chest tightness. Cardiovascular: Positive for chest pain. Negative for palpitations. Gastrointestinal: Negative for abdominal pain, constipation, diarrhea, nausea and vomiting. Genitourinary: Negative for dysuria and frequency. Musculoskeletal: Negative for arthralgias, back pain, joint swelling and myalgias. Skin: Negative for rash and wound. Neurological: Negative for dizziness, light-headedness and headaches. PHYSICAL EXAM   (up to 7 for level 4, 8 or more for level 5)      Initial Vitals   ED Triage Vitals [03/24/20 1417]   BP Temp Temp Source Pulse Resp SpO2 Height Weight   (!) 179/90 99.6 °F (37.6 °C) Oral 87 20 98 % 5' 4\" (1.626 m) 259 lb (117.5 kg)       Physical Exam  Vitals signs and nursing note reviewed. Constitutional:       General: She is not in acute distress. Appearance: Normal appearance. HENT:      Head: Normocephalic and atraumatic.       Nose: Nose normal. No congestion or other components within normal limits   C-REACTIVE PROTEIN - Abnormal; Notable for the following components:    CRP 16.7 (*)     All other components within normal limits   APTT   TROPONIN   TROPONIN   BRAIN NATRIURETIC PEPTIDE       RADIOLOGY: All plain film, CT, MRI, and formal ultrasound images (except ED bedside ultrasound) are read by the radiologist, see reports below, unless otherwise noted in ED Course, MDM or here. Xr Chest Portable    Result Date: 3/24/2020  EXAMINATION: ONE XRAY VIEW OF THE CHEST 3/24/2020 3:05 pm COMPARISON: 03/05/2020, 1447 hours HISTORY: ORDERING SYSTEM PROVIDED HISTORY: chest pain, cough, concern for COVID TECHNOLOGIST PROVIDED HISTORY: chest pain, cough, concern for COVID Reason for Exam: Cough and congestion x 2 weeks Acuity: Acute Type of Exam: Initial Additional signs and symptoms: Cough and congestion x 3weeks 58-year-old female with cough, congestion, and chest pain; concern for COVID FINDINGS: AP portable upright view of the chest. Mild cardiomegaly. Trachea midline. No pneumothorax. No free air. No acute focal airspace consolidation or pleural effusions. Visualized osseous structures remain unchanged. Suture anchor overlying the left humeral head. 1. Mild cardiomegaly. 2. No acute focal airspace consolidation or pleural effusions. BEDSIDE ULTRASOUND:  Not clinically indicated at this time.       ED COURSE      ED Medication Orders (From admission, onward)    Start Ordered     Status Ordering Provider    03/24/20 1530 03/24/20 1524  HYDROmorphone (DILAUDID) injection 1 mg  ONCE      Last MAR action:  Given - by Kori Cunha on 03/24/20 at [facility]    03/24/20 1530 03/24/20 1525  dextromethorphan (DELSYM) 30 MG/5ML extended release liquid 30 mg  ONCE      Last MAR action:  Given - by CRYSTAL GREENWOOD on 03/24/20 at 1537 [facility]    03/24/20 1445 03/24/20 1438  magnesium sulfate 2 g in dextrose 5 % 100 mL IVPB  ONCE      Last MAR action:  Stopped

## 2020-03-24 NOTE — ED NOTES
Patient refused discharge teaching and discharge paperwork stating \"unless it's a prescription or it tells me what is wrong then I don't want it. \" Writer attempted again to educate patient, patient refused. Pt discharged in stable condition. Pt ambulates to door with steady gait and without assistance.       Mercy Sr RN  03/24/20 [id number]

## 2020-03-25 ENCOUNTER — CARE COORDINATION (OUTPATIENT)
Dept: CARE COORDINATION | Age: 45
End: 2020-03-25

## 2020-03-25 NOTE — CARE COORDINATION
Mastoiditis, acute    Steroid-induced hyperglycemia         COVID-19 Screening Initial Follow-up Note    Patient contacted regarding [id number] diagnosis\". Care Transition Nurse/ Ambulatory Care Manager contacted the patient by telephone to perform post discharge assessment. Verified name and  with patient as identifiers. Provided introduction to self, and explanation of the CTN/ACM role, and reason for call due to risk factors for infection and/or exposure to COVID-19. Symptoms reviewed with patient who verbalized the following symptoms: fever, cough, shortness of breath, chills. -does reports, Posey Canavan has not checked her temperature today, but has had the chills\". -patient using her Nebulizer machine      Due to contiued symptoms, advised patient to call Dr. Stapleton Lunch office and ask for further advisement on what to do or how to treat encounter was routed to provider for escalation. Patient has following risk factors of: COPD    CTN/ACM reviewed discharge instructions, medical action plan and red flags such as increased shortness of breath, increasing fever and signs of decompensation with patient who verbalized understanding. Discussed exposure protocols and quarantine with CDC Guidelines What to do if you are sick with coronavirus disease 2019 Patient who was given an opportunity for questions and concerns.  -Informs, 'she is staying in her home and away from her fiance that she lives with\". The patient agrees to contact the [facility] exposure line [phone], local health department [place] Department of Health: ([phone]) and PCP office for questions related to their healthcare. CTN/ACM provided contact information for future reference. Reviewed and educated patient on any new and changed medications related to discharge diagnosis     Plan for follow-up call in 3-5 days based on severity of symptoms and risk factors    From CDC: Are you at higher risk for severe illness?     [facility] your hands often.  Avoid close contact (6 feet, which is about two arm lengths) with people who are sick.  Put distance between yourself and other people if COVID-19 is spreading in your community.  Clean and disinfect frequently touched surfaces.  Avoid all cruise travel and non-essential air travel.  Call your healthcare professional if you have concerns about COVID-19 and your underlying condition or if you are sick.     For more information on steps you can take to protect yourself, see CDC's How to Protect Yourself

## 2020-03-27 ENCOUNTER — CARE COORDINATION (OUTPATIENT)
Dept: CARE COORDINATION | Age: 45
End: 2020-03-27

## 2020-03-27 NOTE — CARE COORDINATION
COVID-19 Screening Follow-up Note    Patient contacted regarding COVID-19 risk and screening. Care Transition Nurse/ Ambulatory Care Manager contacted the patient by telephone to perform follow-up assessment. Verified name and  with patient as identifiers.  Patient has following risk factors of: asthma    Patient Active Problem List   Diagnosis    Diabetes mellitus type 2, controlled ([address]    COPD (chronic obstructive pulmonary disease) ([facility])    Asthma exacerbation    KRISTIN (obstructive sleep apnea)    Chronic right shoulder pain    Neck pain    Radicular pain in right arm    Left leg pain    Noncompliance with therapeutic plan    Precordial pain    Tobacco abuse    Current moderate episode of major depressive disorder without prior episode ([facility])    Adhesive capsulitis of left shoulder    Class 3 severe obesity due to excess calories with serious comorbidity and body mass index (BMI) of 40.0 to 44.9 in adult ([facility])    Left bicipital tenosynovitis    Refused influenza vaccine    Clostridium difficile infection    Closed fracture of left ankle    Atrial premature depolarization    Ventricular premature depolarization    Cardiomyopathy ([address]    Cigarette nicotine dependence with nicotine-induced disorder    Productive cough    DVT (deep venous thrombosis) ([facility])    Endometriosis    GERD (gastroesophageal reflux disease)    HTN (hypertension)    Hypomagnesemia    Migraines    Mixed hyperlipidemia    Neurocardiogenic syncope    Nonrheumatic tricuspid (valve) insufficiency    Tricuspid valve disorders, non-rheumatic    Ovarian cyst    Encounter for monitoring sotalol therapy    Pulmonary HTN ([address]    PVC's (premature ventricular contractions)    Schizophrenia ([facility])    Shortness of breath    Acute exacerbation of chronic obstructive pulmonary disease ([facility])    Bipolar disorder ([address]    Left sided abdominal pain of unknown cause    Leg swelling    Mastoiditis, acute    Steroid-induced hyperglycemia       Symptoms reviewed with patient who verbalized the following symptoms: fatigue, pain or aching joints, cough and no new/worsening symptoms       Due to no new or worsening symptoms encounter was not routed to provider for escalation.    -Informs, \"her symptoms are the same\". -Unsure if, Debborah Lennox is running a fever or not as she can not find her themometer\". -Encouraged patient to drink plenty of fluids (increase fluid intake); patient verbalizes understanding  -Patient states, \"the pharmacy is to deliver the cough medicine today, and she plans to take this as directed\". -Patient, Jose Rashid just been staying in her bedroom, not being around her fiance; practicing social distancing\"     Education provided regarding infection prevention, and signs and symptoms of COVID-19 and when to seek medical attention with patient who verbalized understanding. Discussed exposure protocols and quarantine from [address] you at higher risk for severe illness 2019 and given an opportunity for questions and concerns. The patient agrees to contact the COVID-19 hotline [phone] or PCP office for questions related to their healthcare. CTN/ACM provided contact information for future reference. From CDC: Are you at higher risk for severe illness?  Wash your hands often.  Avoid close contact (6 feet, which is about two arm lengths) with people who are sick.  Put distance between yourself and other people if COVID-19 is spreading in your community.  Clean and disinfect frequently touched surfaces.  Avoid all cruise travel and non-essential air travel.  Call your healthcare professional if you have concerns about COVID-19 and your underlying condition or if you are sick.     For more information on steps you can take to protect yourself, see CDC's How to [place] for follow-up call in 3-5 days based on severity of symptoms and risk factors

## 2020-03-28 LAB
EKG ATRIAL RATE: 76 BPM
EKG P-R INTERVAL: 120 MS
EKG Q-T INTERVAL: 398 MS
EKG QRS DURATION: 84 MS
EKG QTC CALCULATION (BAZETT): 447 MS
EKG R AXIS: 51 DEGREES
EKG T AXIS: 23 DEGREES
EKG VENTRICULAR RATE: 76 BPM

## 2020-03-31 ENCOUNTER — CARE COORDINATION (OUTPATIENT)
Dept: CARE COORDINATION | Age: 45
End: 2020-03-31

## 2020-03-31 NOTE — CARE COORDINATION
-Attempted to reach patient today by phone.   -Care Transitions COVID 19 protocol follow up attempt  -left vm message and requested phone call back. Care Coordination Plan of Care:    This nurse Care Coordinator will attempt to reach out again to patient later this week

## 2020-04-03 ENCOUNTER — CARE COORDINATION (OUTPATIENT)
Dept: CARE COORDINATION | Age: 45
End: 2020-04-03

## 2020-04-03 NOTE — CARE COORDINATION
Will follow back up with patient next week.
for severe illness?  Wash your hands often.  Avoid close contact (6 feet, which is about two arm lengths) with people who are sick.  Put distance between yourself and other people if COVID-19 is spreading in your community.  Clean and disinfect frequently touched surfaces.  Avoid all cruise travel and non-essential air travel.  Call your healthcare professional if you have concerns about COVID-19 and your underlying condition or if you are sick. For more information on steps you can take to protect yourself, see CDC's How to [place]ita for follow-up call in 3-5 days based on severity of symptoms and risk factors  -will forward to Dr. Delores Kee for update.

## 2020-04-06 ENCOUNTER — CARE COORDINATION (OUTPATIENT)
Dept: CARE COORDINATION | Age: 45
End: 2020-04-06

## 2020-04-06 NOTE — CARE COORDINATION
COVID-19 Screening Follow-up Note    Patient contacted regarding COVID-19 risk and screening. Care Transition Nurse/ Ambulatory Care Manager contacted the patient by telephone to perform follow-up assessment. Verified name and  with patient as identifiers.  Patient has following risk factors of:     Patient Active Problem List    Diagnosis Date Noted    Atrial premature depolarization 2019    Ventricular premature depolarization 2019    Cigarette nicotine dependence with nicotine-induced disorder 2019    Endometriosis 2019    HTN (hypertension) 2019    Hypomagnesemia 2019    Neurocardiogenic syncope 2019    Nonrheumatic tricuspid (valve) insufficiency 2019    Ovarian cyst 2019    Pulmonary HTN ([address] 2019    PVC's (premature ventricular contractions) 2019    Schizophrenia ([address] 2019    Bipolar disorder ([address] 2019    Closed fracture of left ankle 2019    Clostridium difficile infection 2019    Refused influenza vaccine 2019    Left bicipital tenosynovitis 10/17/2018    Class 3 severe obesity due to excess calories with serious comorbidity and body mass index (BMI) of 40.0 to 44.9 in adult [facility]) 10/04/2018    Adhesive capsulitis of left shoulder 2018    Mixed hyperlipidemia 2018    DVT (deep venous thrombosis) ([facility]) 2018    GERD (gastroesophageal reflux disease) 2018    Migraines 2018    Leg swelling 2018    Current moderate episode of major depressive disorder without prior episode ([address] 2018    Acute exacerbation of chronic obstructive pulmonary disease ([address] 2018    Productive cough 2018    Shortness of breath 2018    Left leg pain 2017    Noncompliance with therapeutic plan 2017    Precordial pain 2017    Tobacco abuse 2017    Neck pain 2017    Radicular pain in right arm 2017    Chronic right shoulder pain 03/14/2017    Encounter for monitoring sotalol therapy 02/20/2017    [facility]omy[facility] 11/09/2016    Left sided abdominal pain of unknown cause 07/19/2016    Asthma exacerbation 07/24/2014    KRISTIN (obstructive sleep apnea) 07/24/2014    Steroid-induced hyperglycemia 07/24/2014    Diabetes mellitus type 2, controlled ([facility] 04/30/2014    COPD (chronic obstructive pulmonary disease) ([facility] 04/30/2014    Mastoiditis, acute 04/30/2014    Tricuspid valve disorders, non-rheumatic 10/21/2010       Symptoms reviewed with patient who verbalized the following symptoms: cough and shortness of breath   -patient monitoring shortness of breath; and will go to the ER if would get severe. -Patient informs, \"that she is getting better, cough is improving slowly\". -Shortness of breath is still worse than normal; but not getting any worse\"  -patient has not seen her pulmonologist for awhile; recommend patient to call her pulmonologist and report symptoms  -patient confirms she is using albuterol inhaler and Duoneb per nebulizer as directed  -Patient has had recent surgery; has abscess; talked to the orthopedic doctor; and it is her body rejecting the incisions. -patient to notify surgeon of increase redness/drainage, s/s of infection. Due to no new or worsening symptoms encounter was routed to provider for escalation. (Routed to update PCP, patient is planning to contact pulmonology)     Education provided regarding infection prevention, and signs and symptoms of COVID-19 and when to seek medical attention with patient who verbalized understanding. Discussed exposure protocols and quarantine from [address] you at higher risk for severe illness 2019 and given an opportunity for questions and concerns. The patient agrees to contact the COVID-19 hotline [phone] or PCP office for questions related to their healthcare.  CTN/ACM provided contact information for future

## 2020-04-08 ENCOUNTER — CARE COORDINATION (OUTPATIENT)
Dept: CARE COORDINATION | Age: 45
End: 2020-04-08

## 2020-04-08 NOTE — CARE COORDINATION
Covid 19 Screening Follow up Call:   Patient currently reports that the following symptoms have improved:  shortness of breath, cough, body aches.   -Patient does still have Tylenol to take.  -patient is out of cough medicine; did feel like that help  -Using Nyquil now at night  -Reports, \"using promethazine\". -Recommend patient call her pulmonologist today.   -patient states, \"normally that she would have been admitted by now if she had these symptoms, and her pulmonologist would be consulted\". -patient discusses that she has tubes in her ears\". She feels, Gabriella Sevilla will call her   No further outreach scheduled with this CTN/ACM. Episode of Care resolved. Patient has this CTN/ACM contact information if future needs arise. Care Transition Nurse/ Ambulatory Care Manager contacted the patient by telephone to perform follow-up assessment. Verified name and  with patient as identifiers.  Patient has following risk factors of:  Patient Active Problem List   Diagnosis    Diabetes mellitus type 2, controlled ([address]    COPD (chronic obstructive pulmonary disease) (HCC)    Asthma exacerbation    KRISTIN (obstructive sleep apnea)    Chronic right shoulder pain    Neck pain    Radicular pain in right arm    Left leg pain    Noncompliance with therapeutic plan    Precordial pain    Tobacco abuse    Current moderate episode of major depressive disorder without prior episode (HCC)    Adhesive capsulitis of left shoulder    Class 3 severe obesity due to excess calories with serious comorbidity and body mass index (BMI) of 40.0 to 44.9 in adult [facility])    Left bicipital tenosynovitis    Refused influenza vaccine    Clostridium difficile infection    Closed fracture of left ankle    Atrial premature depolarization    Ventricular premature depolarization    Cardiomyopathy [address]    Cigarette nicotine dependence with nicotine-induced disorder    Productive cough    DVT (deep venous thrombosis) ([address]    Endometriosis    GERD (gastroesophageal reflux disease)    HTN (hypertension)    Hypomagnesemia    Migraines    Mixed hyperlipidemia    Neurocardiogenic syncope    Nonrheumatic tricuspid (valve) insufficiency    Tricuspid valve disorders, non-rheumatic    Ovarian cyst    Encounter for monitoring sotalol therapy    Pulmonary HTN (HCC)    PVC's (premature ventricular contractions)    Schizophrenia (HCC)    Shortness of breath    Acute exacerbation of chronic obstructive pulmonary disease (HCC)    Bipolar disorder ([address]    Left sided abdominal pain of unknown cause    Leg swelling    Mastoiditis, acute    Steroid-induced hyperglycemia       Spoke to patient today:   Patient currently reports that the following symptoms have improved, but are still present  shortness of breath, cough, body aches.   -Patient does still have Tylenol to take for fever/body aches.  -patient is out of cough medicine; did feel like that helped; reports, \"Dr. Santo León did not want to continue to prescribe\". -Using Nyquil now at night  -Recommend patient call her pulmonologist today. States, Gabriella Sevilla was planning on doing this:.  -patient states, \"normally that she would have been admitted by now if she had these symptoms, and her pulmonologist would be consulted\". -patient discusses that she has tubes in her ears\". She feels, Gabriella Sevilla will call her ENT as well because she feels her ears are plugging up and starting to hurt\". Symptoms reviewed with patient who verbalized the following symptoms: cough, congestion, shortness of breath; ongoing symptoms.   -Patient to report to the ED if would have increased shortness of breath.      Due to no new or worsening symptoms encounter was not routed to provider for escalation.    -Patient will call pulmonologist.     Education provided regarding infection prevention, and signs and symptoms of COVID-19 and when to seek medical attention with patient who verbalized understanding. Discussed exposure protocols and quarantine from [address] you at higher risk for severe illness 2019 and given an opportunity for questions and concerns. The patient agrees to contact the COVID-19 hotline [phone] or PCP office for questions related to their healthcare. CTN/ACM provided contact information for future reference. From CDC: Are you at higher risk for severe illness?  Wash your hands often.  Avoid close contact (6 feet, which is about two arm lengths) with people who are sick.  Put distance between yourself and other people if COVID-19 is spreading in your community.  Clean and disinfect frequently touched surfaces.  Avoid all cruise travel and non-essential air travel.  Call your healthcare professional if you have concerns about COVID-19 and your underlying condition or if you are sick.     For more information on steps you can take to protect yourself, see CDC's How to [address] [facility] for follow-up call in 5-7 days based on severity of symptoms and risk factors

## 2020-04-13 ENCOUNTER — HOSPITAL ENCOUNTER (OUTPATIENT)
Age: 45
Setting detail: SPECIMEN
Discharge: HOME | End: 2020-04-13
Payer: COMMERCIAL

## 2020-04-13 PROCEDURE — U0002 COVID-19 LAB TEST NON-CDC: HCPCS

## 2020-04-14 LAB
SARS-COV-2, PCR: NORMAL
SARS-COV-2: NOT DETECTED
SOURCE: NORMAL

## 2020-04-15 ENCOUNTER — CARE COORDINATION (OUTPATIENT)
Dept: CARE COORDINATION | Age: 45
End: 2020-04-15

## 2020-04-15 NOTE — CARE COORDINATION
Covid 19 monitoring follow up attempt, left vm message and requested phone call back. Care Transitions plan of care:   -will await phone call back from patient, and if no return call, will attempt to reach back out to patient later this week    Did review Dr. Ashlee Price recommendations:   Her blood sugars will be higher because of the prednisone. She does not need to take extra medicine for it. They will come down when she stops the prednisone.

## 2020-04-16 ENCOUNTER — CARE COORDINATION (OUTPATIENT)
Dept: CARE COORDINATION | Age: 45
End: 2020-04-16

## 2020-04-16 NOTE — CARE COORDINATION
really hard time. -patient informs, Katherin Nicholson was getting counseling through the Hospice agency that was caring for her mom, but with Covid 19 she has not been able to continue these either\". -patient does inform this nurse care manager that she has a history of feeling depressed; and has been suicidal before. Patient asssures this nurse care manager that she does not have a suicidal plan currently. Reviewed Suicidal hotline with patient [phone].   -patient informs, Katherin Nicholson has just been so withdrawn with everything going on, and having to quarantine due to possible Covid 19; it has been a lot\". -Patient does report, Katherin Nicholson would reach out to suicide prevention line before she would ever try to actually hurt herself. Yeast infection under breast:   -patient reporting yeast infection under her breast that is not improving.   -patient calling Dr. Carlos Nicholson office today to inform and see if there can be anything else called in since Clotrimazole cream not working,. Patient plan today:   1)patient to call Dr. Carlos Nicholson office to inform of increase in depression. Request medication adjustment  2)patient to call pulmonology and follow up on getting scheduled for Bronchoscopy and PFT  -  Care Transition Nurse/ Ambulatory Care Manager contacted the patient by telephone to perform follow-up assessment. Verified name and  with patient as identifiers.  Patient has following risk factors of:  Patient Active Problem List    Diagnosis Date Noted    Atrial premature depolarization 2019    Ventricular premature depolarization 2019    Cigarette nicotine dependence with nicotine-induced disorder 2019    Endometriosis 2019    HTN (hypertension) 2019    Hypomagnesemia 2019    Neurocardiogenic syncope 2019    Nonrheumatic tricuspid (valve) insufficiency 2019    Ovarian cyst 2019    Pulmonary HTN ([facility] 2019    PVC's (premature ventricular

## 2020-04-20 ENCOUNTER — CARE COORDINATION (OUTPATIENT)
Dept: CARE COORDINATION | Age: 45
End: 2020-04-20

## 2020-05-08 ENCOUNTER — APPOINTMENT (OUTPATIENT)
Dept: GENERAL RADIOLOGY | Age: 45
End: 2020-05-08
Payer: COMMERCIAL

## 2020-05-08 ENCOUNTER — HOSPITAL ENCOUNTER (EMERGENCY)
Age: 45
Discharge: HOME | End: 2020-05-09
Attending: EMERGENCY MEDICINE
Payer: COMMERCIAL

## 2020-05-08 LAB
ABSOLUTE EOS #: 0.5 K/UL (ref 0–0.4)
ABSOLUTE IMMATURE GRANULOCYTE: ABNORMAL K/UL (ref 0–0.3)
ABSOLUTE LYMPH #: 3.7 K/UL (ref 1–4.8)
ABSOLUTE MONO #: 0.9 K/UL (ref 0.1–1.3)
ALBUMIN SERPL-MCNC: 4 G/DL (ref 3.5–5.2)
ALBUMIN/GLOBULIN RATIO: ABNORMAL (ref 1–2.5)
ALP BLD-CCNC: 85 U/L (ref 35–104)
ALT SERPL-CCNC: 16 U/L (ref 5–33)
ANION GAP SERPL CALCULATED.3IONS-SCNC: 13 MMOL/L (ref 9–17)
AST SERPL-CCNC: 13 U/L
BASOPHILS # BLD: 1 % (ref 0–2)
BASOPHILS ABSOLUTE: 0.1 K/UL (ref 0–0.2)
BILIRUB SERPL-MCNC: <0.15 MG/DL (ref 0.3–1.2)
BUN BLDV-MCNC: 11 MG/DL (ref 6–20)
BUN/CREAT BLD: ABNORMAL (ref 9–20)
C-REACTIVE PROTEIN: 7.8 MG/L (ref 0–5)
CALCIUM SERPL-MCNC: 9.4 MG/DL (ref 8.6–10.4)
CHLORIDE BLD-SCNC: 100 MMOL/L (ref 98–107)
CO2: 30 MMOL/L (ref 20–31)
CREAT SERPL-MCNC: 0.67 MG/DL (ref 0.5–0.9)
DIFFERENTIAL TYPE: ABNORMAL
EOSINOPHILS RELATIVE PERCENT: 4 % (ref 0–4)
GFR AFRICAN AMERICAN: >60 ML/MIN
GFR NON-AFRICAN AMERICAN: >60 ML/MIN
GFR SERPL CREATININE-BSD FRML MDRD: ABNORMAL ML/MIN/{1.73_M2}
GFR SERPL CREATININE-BSD FRML MDRD: ABNORMAL ML/MIN/{1.73_M2}
GLUCOSE BLD-MCNC: 152 MG/DL (ref 70–99)
HCT VFR BLD CALC: 40.5 % (ref 36–46)
HEMOGLOBIN: 13.8 G/DL (ref 12–16)
IMMATURE GRANULOCYTES: ABNORMAL %
LYMPHOCYTES # BLD: 27 % (ref 24–44)
MCH RBC QN AUTO: 33.9 PG (ref 26–34)
MCHC RBC AUTO-ENTMCNC: 33.9 G/DL (ref 31–37)
MCV RBC AUTO: 99.8 FL (ref 80–100)
MONOCYTES # BLD: 7 % (ref 1–7)
NRBC AUTOMATED: ABNORMAL PER 100 WBC
PDW BLD-RTO: 13.3 % (ref 11.5–14.9)
PLATELET # BLD: 284 K/UL (ref 150–450)
PLATELET ESTIMATE: ABNORMAL
PMV BLD AUTO: 8.3 FL (ref 6–12)
POTASSIUM SERPL-SCNC: 3.9 MMOL/L (ref 3.7–5.3)
RBC # BLD: 4.06 M/UL (ref 4–5.2)
RBC # BLD: ABNORMAL 10*6/UL
SEDIMENTATION RATE, ERYTHROCYTE: 34 MM (ref 0–20)
SEG NEUTROPHILS: 61 % (ref 36–66)
SEGMENTED NEUTROPHILS ABSOLUTE COUNT: 8.5 K/UL (ref 1.3–9.1)
SODIUM BLD-SCNC: 143 MMOL/L (ref 135–144)
TOTAL PROTEIN: 7.1 G/DL (ref 6.4–8.3)
WBC # BLD: 13.8 K/UL (ref 3.5–11)
WBC # BLD: ABNORMAL 10*3/UL

## 2020-05-08 PROCEDURE — 99283 EMERGENCY DEPT VISIT LOW MDM: CPT

## 2020-05-08 PROCEDURE — 73110 X-RAY EXAM OF WRIST: CPT

## 2020-05-08 PROCEDURE — 87040 BLOOD CULTURE FOR BACTERIA: CPT

## 2020-05-08 PROCEDURE — 85025 COMPLETE CBC W/AUTO DIFF WBC: CPT

## 2020-05-08 PROCEDURE — 85651 RBC SED RATE NONAUTOMATED: CPT

## 2020-05-08 PROCEDURE — 96366 THER/PROPH/DIAG IV INF ADDON: CPT

## 2020-05-08 PROCEDURE — 2500000003 HC RX 250 WO HCPCS: Performed by: EMERGENCY MEDICINE

## 2020-05-08 PROCEDURE — 36415 COLL VENOUS BLD VENIPUNCTURE: CPT

## 2020-05-08 PROCEDURE — 71045 X-RAY EXAM CHEST 1 VIEW: CPT

## 2020-05-08 PROCEDURE — 96365 THER/PROPH/DIAG IV INF INIT: CPT

## 2020-05-08 PROCEDURE — 80053 COMPREHEN METABOLIC PANEL: CPT

## 2020-05-08 PROCEDURE — 2580000003 HC RX 258: Performed by: EMERGENCY MEDICINE

## 2020-05-08 PROCEDURE — 86140 C-REACTIVE PROTEIN: CPT

## 2020-05-08 RX ORDER — OXYCODONE HYDROCHLORIDE AND ACETAMINOPHEN 5; 325 MG/1; MG/1
1 TABLET ORAL ONCE
Status: COMPLETED | OUTPATIENT
Start: 2020-05-09 | End: 2020-05-09

## 2020-05-08 RX ORDER — ACETAMINOPHEN 500 MG
1000 TABLET ORAL ONCE
Status: DISCONTINUED | OUTPATIENT
Start: 2020-05-08 | End: 2020-05-09 | Stop reason: HOSPADM

## 2020-05-08 RX ORDER — 0.9 % SODIUM CHLORIDE 0.9 %
1000 INTRAVENOUS SOLUTION INTRAVENOUS ONCE
Status: COMPLETED | OUTPATIENT
Start: 2020-05-08 | End: 2020-05-09

## 2020-05-08 RX ADMIN — SODIUM CHLORIDE 1000 ML: 9 INJECTION, SOLUTION INTRAVENOUS at 22:26

## 2020-05-08 RX ADMIN — AZTREONAM 1 G: 1 INJECTION, POWDER, LYOPHILIZED, FOR SOLUTION INTRAMUSCULAR; INTRAVENOUS at 22:27

## 2020-05-08 ASSESSMENT — PAIN DESCRIPTION - PAIN TYPE: TYPE: ACUTE PAIN

## 2020-05-08 ASSESSMENT — PAIN SCALES - GENERAL
PAINLEVEL_OUTOF10: 10
PAINLEVEL_OUTOF10: 10

## 2020-05-08 ASSESSMENT — PAIN DESCRIPTION - ORIENTATION: ORIENTATION: LEFT

## 2020-05-09 VITALS
TEMPERATURE: 98.3 F | BODY MASS INDEX: 44.29 KG/M2 | RESPIRATION RATE: 16 BRPM | OXYGEN SATURATION: 97 % | HEART RATE: 70 BPM | SYSTOLIC BLOOD PRESSURE: 135 MMHG | WEIGHT: 258 LBS | DIASTOLIC BLOOD PRESSURE: 86 MMHG

## 2020-05-09 PROCEDURE — 6370000000 HC RX 637 (ALT 250 FOR IP): Performed by: EMERGENCY MEDICINE

## 2020-05-09 RX ORDER — DIPHENHYDRAMINE HCL 25 MG
25 TABLET ORAL ONCE
Status: COMPLETED | OUTPATIENT
Start: 2020-05-09 | End: 2020-05-09

## 2020-05-09 RX ORDER — CLINDAMYCIN HYDROCHLORIDE 150 MG/1
300 CAPSULE ORAL ONCE
Status: COMPLETED | OUTPATIENT
Start: 2020-05-09 | End: 2020-05-09

## 2020-05-09 RX ADMIN — OXYCODONE HYDROCHLORIDE AND ACETAMINOPHEN 1 TABLET: 5; 325 TABLET ORAL at 00:33

## 2020-05-09 RX ADMIN — DIPHENHYDRAMINE HCL 25 MG: 25 TABLET ORAL at 01:25

## 2020-05-09 RX ADMIN — CLINDAMYCIN HYDROCHLORIDE 300 MG: 150 CAPSULE ORAL at 00:39

## 2020-05-09 ASSESSMENT — PAIN SCALES - GENERAL: PAINLEVEL_OUTOF10: 10

## 2020-05-09 NOTE — ED PROVIDER NOTES
findings of the mild numbness to the surgeon. Given patient multiple allergies she is agreeable to try oral clindamycin here and be monitored for any allergic reaction symptoms. She has no anaphylaxis to Clinda. Therefore I believe that this is reasonable. Patient is comfortable with this plan. Nurse Dieter Parker was present for the conversation with the patient. - will monitor for at least an hour  -0120 informed by RN that patient is now feeling itchy. Given the fact that she will not be able to tolerate the clindamycin at home will transfer to outside hospital for IV antibiotics. -[id number] patient now refusing to be transferred. Wants to leave [address] I informed her I cannot prescribe her any antibiotics as there are none that she can take outpatient that would appropriately cover her possible infection as she is allergic to all of them. .  Patient understands that she could get worse and to call her orthopedic surgeon in the morning. Patient/family seen and examined again and is currently electing to sign out [address] They are currently alert and oriented ×3, of sound judgment and decision-making capacity. Extensive conversation regarding the risks of leaving before completion of testing and treatment were explained. Also discussed the concerns including infection, permanent disability, or death from incomplete workup and treatment. Patient/family is able to repeat these and explained their meaning in front of me and other medical staff. Despite discussing further options including admission to the hospital they are electing to leave [address]  -----------------------  -----------------------  Cassandra Valle MD, [facility] [address]  Emergency Medicine Attending  Questions? Please contact my cell phone anytime.   [phone]  *This charting supersedes any ED resident or staff charting and was written using speech recognition software    ## The patient was evaluated during the chloride bolus    aztreonam (AZACTAM) 1 g IVPB minibag     Order Specific Question:   Has the patient tolerated cephalosporin products in the past?     Answer:   No    oxyCODONE-acetaminophen (PERCOCET) 5-325 MG per tablet 1 tablet    clindamycin (CLEOCIN) capsule 300 mg    diphenhydrAMINE (BENADRYL) tablet 25 mg     CONSULTS:  None    FINAL IMPRESSION      1. Cellulitis of right upper extremity          DISPOSITION/PLAN   DISPOSITION Dovray 05/09/2020 01:36:16 AM      PATIENT REFERRED TO:  No follow-up provider specified.   DISCHARGE MEDICATIONS:  New Prescriptions    No medications on file     Nate Denney MD  Attending Emergency Physician                    Chary Johnson MD  05/09/20 1649

## 2020-05-11 ENCOUNTER — CARE COORDINATION (OUTPATIENT)
Dept: CARE COORDINATION | Age: 45
End: 2020-05-11

## 2020-05-11 NOTE — CARE COORDINATION
Patient contacted regarding recent discharge and COVID-19 risk   Care Transition Nurse/ Ambulatory Care Manager contacted the patient by telephone to perform post discharge assessment. Verified name and  with patient as identifiers. Patient has following risk factors of: COPD and diabetes. CTN/ACM reviewed discharge instructions, medical action plan and red flags related to discharge diagnosis. Reviewed and educated them on any new and changed medications related to discharge diagnosis. Advised obtaining a 90-day supply of all daily and as-needed medications. Education provided regarding infection prevention, and signs and symptoms of COVID-19 and when to seek medical attention with patient who verbalized understanding. Discussed exposure protocols and quarantine from [address] you at higher risk for severe illness  and given an opportunity for questions and concerns. The patient agrees to contact the COVID-19 hotline [phone] or PCP office for questions related to their healthcare. CTN/ACM provided contact information for future reference. From CDC: Are you at higher risk for severe illness?  Wash your hands often.  Avoid close contact (6 feet, which is about two arm lengths) with people who are sick.  Put distance between yourself and other people if COVID-19 is spreading in your community.  Clean and disinfect frequently touched surfaces.  Avoid all cruise travel and non-essential air travel.  Call your healthcare professional if you have concerns about COVID-19 and your underlying condition or if you are sick. For more information on steps you can take to protect yourself, see CDC's How to [address] [facility] for follow-up call in 7-14 days based on severity of symptoms and risk factors.     Patient/family/caregiver given information for [employer] and agrees to enroll no  Patient's preferred e-mail:  N/A  Patient's preferred phone number: N/A  Based on Loop alert triggers, patient will be contacted by nurse care manager for worsening symptoms. Advance Care Planning  People with COVID-19 may have no symptoms, mild symptoms, such as fever, cough, and shortness of breath or they may have more severe illness, developing severe and fatal pneumonia. As a result, Advance Care Planning with attention to naming a health care decision maker (someone you trust to make healthcare decisions for you if you could not speak for yourself) and sharing other health care preferences is important BEFORE a possible health crisis. Please contact your Primary Care Provider to discuss Advance Care Planning. Preventing the Spread of Coronavirus Disease 2019 in Homes and Residential Communities  For the most recent information go to [email or web address]    Prevention steps for People with confirmed or suspected COVID-19 (including persons under investigation) who do not need to be hospitalized  and   People with confirmed COVID-19 who were hospitalized and determined to be medically stable to go home    Your healthcare provider and public health staff will evaluate whether you can be cared for at home. If it is determined that you do not need to be hospitalized and can be isolated at home, you will be monitored by staff from your local or state health department. You should follow the prevention steps below until a healthcare provider or local or state health department says you can return to your normal activities. Stay home except to get medical care  People who are mildly ill with COVID-19 are able to isolate at home during their illness. You should restrict activities outside your home, except for getting medical care. Do not go to work, school, or public areas. Avoid using public transportation, ride-sharing, or taxis.   Separate yourself from other people and animals in your home  People: As much as possible, you should seconds, especially after blowing your nose, coughing, or sneezing; going to the bathroom; and before eating or preparing food. If soap and water are not readily available, use an alcohol-based hand  with at least 60% alcohol, covering all surfaces of your hands and rubbing them together until they feel dry. Soap and water are the best option if hands are visibly dirty. Avoid touching your eyes, nose, and mouth with unwashed hands. Avoid sharing personal household items  You should not share dishes, drinking glasses, cups, eating utensils, towels, or bedding with other people or pets in your home. After using these items, they should be washed thoroughly with soap and water. Clean all high-touch surfaces everyday  High touch surfaces include counters, tabletops, doorknobs, bathroom fixtures, toilets, phones, keyboards, tablets, and bedside tables. Also, clean any surfaces that may have blood, stool, or body fluids on them. Use a household cleaning spray or wipe, according to the label instructions. Labels contain instructions for safe and effective use of the cleaning product including precautions you should take when applying the product, such as wearing gloves and making sure you have good ventilation during use of the product. Monitor your symptoms  Seek prompt medical attention if your illness is worsening (e.g., difficulty breathing). Before seeking care, call your healthcare provider and tell them that you have, or are being evaluated for, COVID-19. Put on a facemask before you enter the facility. These steps will help the healthcare providers office to keep other people in the office or waiting room from getting infected or exposed. Ask your healthcare provider to call the local or [facility] health department.  Persons who are placed under active monitoring or facilitated self-monitoring should follow instructions provided by their local health department or occupational health professionals, as

## 2020-05-15 LAB
CULTURE: NORMAL
CULTURE: NORMAL
Lab: NORMAL
Lab: NORMAL
SPECIMEN DESCRIPTION: NORMAL
SPECIMEN DESCRIPTION: NORMAL

## 2020-05-22 ENCOUNTER — CARE COORDINATION (OUTPATIENT)
Dept: CARE COORDINATION | Age: 45
End: 2020-05-22

## 2020-06-30 ENCOUNTER — HOSPITAL ENCOUNTER (OUTPATIENT)
Dept: PREADMISSION TESTING | Age: 45
Discharge: HOME | End: 2020-07-04
Payer: COMMERCIAL

## 2020-06-30 VITALS
HEIGHT: 64 IN | DIASTOLIC BLOOD PRESSURE: 67 MMHG | OXYGEN SATURATION: 95 % | SYSTOLIC BLOOD PRESSURE: 113 MMHG | RESPIRATION RATE: 20 BRPM | BODY MASS INDEX: 45.24 KG/M2 | WEIGHT: 265 LBS | HEART RATE: 92 BPM | TEMPERATURE: 97.1 F

## 2020-06-30 LAB
-: NORMAL
ABSOLUTE EOS #: 0.4 K/UL (ref 0–0.4)
ABSOLUTE IMMATURE GRANULOCYTE: ABNORMAL K/UL (ref 0–0.3)
ABSOLUTE LYMPH #: 3.3 K/UL (ref 1–4.8)
ABSOLUTE MONO #: 1 K/UL (ref 0.1–1.3)
BASOPHILS # BLD: 1 % (ref 0–2)
BASOPHILS ABSOLUTE: 0.1 K/UL (ref 0–0.2)
DIFFERENTIAL TYPE: ABNORMAL
EOSINOPHILS RELATIVE PERCENT: 3 % (ref 0–4)
HCT VFR BLD CALC: 45.8 % (ref 36–46)
HEMOGLOBIN: 15.2 G/DL (ref 12–16)
IMMATURE GRANULOCYTES: ABNORMAL %
LYMPHOCYTES # BLD: 21 % (ref 24–44)
MCH RBC QN AUTO: 34.3 PG (ref 26–34)
MCHC RBC AUTO-ENTMCNC: 33.2 G/DL (ref 31–37)
MCV RBC AUTO: 103.3 FL (ref 80–100)
MONOCYTES # BLD: 6 % (ref 1–7)
NRBC AUTOMATED: ABNORMAL PER 100 WBC
PDW BLD-RTO: 13.6 % (ref 11.5–14.9)
PLATELET # BLD: 284 K/UL (ref 150–450)
PLATELET ESTIMATE: ABNORMAL
PMV BLD AUTO: 8.7 FL (ref 6–12)
RBC # BLD: 4.43 M/UL (ref 4–5.2)
RBC # BLD: ABNORMAL 10*6/UL
REASON FOR REJECTION: NORMAL
SEG NEUTROPHILS: 69 % (ref 36–66)
SEGMENTED NEUTROPHILS ABSOLUTE COUNT: 11.2 K/UL (ref 1.3–9.1)
WBC # BLD: 16 K/UL (ref 3.5–11)
WBC # BLD: ABNORMAL 10*3/UL
ZZ NTE CLEAN UP: ORDERED TEST: NORMAL
ZZ NTE WITH NAME CLEAN UP: SPECIMEN SOURCE: NORMAL

## 2020-06-30 PROCEDURE — 85025 COMPLETE CBC W/AUTO DIFF WBC: CPT

## 2020-06-30 PROCEDURE — 36415 COLL VENOUS BLD VENIPUNCTURE: CPT

## 2020-06-30 RX ORDER — INSULIN GLARGINE 100 [IU]/ML
80 INJECTION, SOLUTION SUBCUTANEOUS 2 TIMES DAILY
COMMUNITY
End: 2021-12-14

## 2020-06-30 ASSESSMENT — ENCOUNTER SYMPTOMS
TROUBLE SWALLOWING: 0
SINUS PAIN: 1
VOICE CHANGE: 1
DIARRHEA: 1
CONSTIPATION: 0
CHEST TIGHTNESS: 1
WHEEZING: 0
NAUSEA: 0
SINUS PRESSURE: 1
VOMITING: 0
BACK PAIN: 1
ABDOMINAL PAIN: 0
COUGH: 1
SHORTNESS OF BREATH: 1
RHINORRHEA: 1

## 2020-06-30 NOTE — H&P
HISTORY and [id number]       NAME:  Camron Ortiz  MRN: [id number]   YOB: 1975   Date: 6/30/2020   Age: 50 y.o. Gender: female       COMPLAINT AND PRESENT HISTORY:     Camron Ortiz is 50 y.o. , female, Preadmission Testing for left myringotomy tube removal and reinsertion. Patient reports she has had ear tubes bilaterally since age 4. She has had ear tubes replaced between 16 and 17 times. Most recently, she has had her tube in the left ear x3 years, tube in the right ear x1 year. Pt reports she has hearing loss, and is supposed to be getting fitted for hearing aids. She has tinnitus laterally. She has not been on any antibiotics recently. She is currently on oral steroids, prednisone taper per PCP, for seasonal allergies. She has nasal drainage and has itchy throat. Pt reports intense itching of the right ear internally. She has itchy eyes bilaterally. She has \"raspy cough\" from nasal drainage. Patient has a history of asthma, seasonal allergies. Patient has significant medical history, see below. Most recent EKG from 3/24/2020 normal sinus rhythm. She has a history of CHF, cardiomyopathy, CAD, atrial fibrillation, COPD, GERD, endometriosis, C. difficile, H. pylori, hyperlipidemia, MI. She denies any chest pain at present. She has shortness of breath on exertion, wheezing which she relates to her COPD. No fever or chills. She has a chronic cough which she reports is from nasal drainage, productive at times clear sputum. No urinary complaints at present. She has chronic arthralgias and myalgias. She will have labs and COVID testing prior to procedure.      PAST MEDICAL HISTORY     Past Medical History:   Diagnosis Date    Acute exacerbation of chronic obstructive pulmonary disease ([address] 3/21/2018    Adhesive capsulitis of left shoulder 9/25/2018    Asthma     Asthma exacerbation 7/24/2014    Atrial fibrillation ([address]     placed on event monitor 9/25/18 for PVC's & A-fib    Atrial premature depolarization 7/19/2019    Bipolar 1 disorder ([facility])     Bipolar disorder ([address] 7/19/2019    Bulging disc     CAD (coronary artery disease)     Candida infection 2/23/2018    Cardiomyopathy ([address]     Chest pain 6/13/2017    CHF (congestive heart failure) ([facility])     Chronic otitis media of both ears     rt>lt    Chronic right shoulder pain 3/14/2017    Cigarette nicotine dependence with nicotine-induced disorder 7/19/2019    Class 3 severe obesity due to excess calories with serious comorbidity and body mass index (BMI) of 40.0 to 44.9 in adult [facility]) 10/4/2018    Closed fracture of left ankle 6/25/2019    Clostridium difficile infection     COPD (chronic obstructive pulmonary disease) ([facility])     Cough, persistent 3/21/2018    Current moderate episode of major depressive disorder without prior episode ([address] 8/20/2018    Depression     Diabetes mellitus type 2, controlled ([address] 4/30/2014    Diarrhea     Diarrhea     Dizziness     DVT (deep venous thrombosis) ([facility])     after PICC line right arm    Encounter for monitoring sotalol therapy 2/20/2017    Encounter for screening mammogram for malignant neoplasm of breast 3/7/2018    Endometriosis     Fainting     GERD (gastroesophageal reflux disease)     hx of    GI bleeding     H. pylori infection     Helicobacter pylori (H. pylori)     [place] (hard of hearing)     both ears, no hearing aids    HTN (hypertension) 7/19/2019    Hyperlipidemia     Hypertension     Left bicipital tenosynovitis 10/17/2018    Left leg pain 5/16/2017    Left sided abdominal pain of unknown cause 7/19/2016    Leg swelling 9/21/2018    Mastoiditis     Mastoiditis, acute 4/30/2014    Migraines     Morbid obesity ([address]     Myocardial infarction ([address]     2005    Nausea     Neuropathy     On home oxygen therapy     3 L at night    Ovarian cyst     Passed out     hx of- negative tilt table    Pulmonary hypertension ([address]     Pulmonary insufficiency     PVC (premature ventricular contraction)     Seasonal allergies     Tricuspid insufficiency     Type II or unspecified type diabetes mellitus without mention of complication, not stated as uncontrolled          SURGICAL HISTORY       Past Surgical History:   Procedure Laterality Date    ABDOMEN SURGERY      abcess    ABDOMINAL ADHESION SURGERY      ABDOMINAL EXPLORATION SURGERY      x 4    ABDOMINAL HERNIA REPAIR      with mesh    ABLATION OF DYSRHYTHMIC FOCUS  2016    ABLATION OF DYSRHYTHMIC FOCUS  09/08/2017    [facility].  ABSCESS DRAINAGE      left hip and chest    APPENDECTOMY      BREAST SURGERY Left 2007    I & D    CARDIAC CATHETERIZATION  2014 & 2000     no stenting    CARPAL TUNNEL RELEASE Right 12/19/2019    Ulnar nerve decompression, right elbow. Release of 1st and 2nd extensor compartments, right forearm.  CARPAL TUNNEL RELEASE  05/04/2020    Carpal tunnel release, left hand    CHOLECYSTECTOMY      COLONOSCOPY      FOOT SURGERY Left     bone fragment removed    FRACTURE SURGERY Right     closed reduction perc pinning ring & middle finger    GASTRIC FUNDOPLICATION  5252    HYSTERECTOMY      total    HYSTERECTOMY      HYSTEROSCOPY      tubal perfusion    MYRINGOTOMY Right 11/13/15    Right myringotomy with placement of  T-tube on the right side. Removal of plugged ventilating tube, right side.  MYRINGOTOMY AND TYMPANOSTOMY TUBE PLACEMENT Right 06/05/2014    OTHER SURGICAL HISTORY Right 5/29/14    removal ear tube rt ear    OTHER SURGICAL HISTORY  2009    LOOP recorder inserted and removed 3 mos.  later    OTHER SURGICAL HISTORY Right 08/11/2016    ear tube removal with patch    OTHER SURGICAL HISTORY      tubal perfusion, lysis of uterine adhesions    OTHER SURGICAL HISTORY      multiple PICC lines inserted and removed, it has affected circulation bilateral upper arms    [place] ANESTHESIA Right 3/1/2017    SHOULDER MANIPULATION RIGHT performed by Chinmay Sr MD at [address] [facility] Left 10/17/2018    SHOULDER ARTHROSCOPY W/BICEPS TENDONESIS performed by Lory Kaufman MD at [address] [facility] Left     foot    TONSILLECTOMY      TYMPANOSTOMY TUBE PLACEMENT      TYMPANOSTOMY TUBE PLACEMENT Left 03/12/2019    ULNAR TUNNEL RELEASE Right     UPPER GASTROINTESTINAL ENDOSCOPY      UPPER GASTROINTESTINAL ENDOSCOPY  07/10/2019    UPPER GASTROINTESTINAL ENDOSCOPY N/A 7/10/2019    EGD BIOPSY performed by Papa Wilde MD at [place] [id number] History     Socioeconomic History    Marital status: Single     Spouse name: None    Number of children: None    Years of education: None    Highest education level: None   Occupational History     Employer: NONE   Social Needs    Financial resource strain: None    Food insecurity     Worry: None     Inability: None    Transportation needs     Medical: None     Non-medical: None   Tobacco Use    Smoking status: Current Every Day Smoker     Packs/day: 0.50     Years: 23.00     Pack years: 11.50     Types: Cigarettes    Smokeless tobacco: Never Used   Substance and Sexual Activity    Alcohol use: No     Alcohol/week: 0.0 standard drinks    Drug use: No    Sexual activity: None   Lifestyle    Physical activity     Days per week: None     Minutes per session: None    Stress: None   Relationships    Social connections     Talks on phone: None     Gets together: None     Attends [employer] service: None     Active member of club or organization: None     Attends meetings of clubs or organizations: None     Relationship status: None    Intimate partner violence     Fear of current or ex partner: None     Emotionally abused: None     Physically abused: None     Forced sexual activity: None   Other Topics Concern    None   Social History Narrative    None REVIEW OF SYSTEMS      Allergies   Allergen Reactions    Latex Hives    Aspirin Anaphylaxis    Avelox [Moxifloxacin] Anaphylaxis    Chantix [Varenicline] Swelling    Doxycycline Anaphylaxis    Dye [Iodides] Other (See Comments)     Seizures    Flexeril [Cyclobenzaprine] Anaphylaxis    Gabapentin Anaphylaxis    Losartan Shortness Of Breath    Morphine Itching     Makes patient want to \"scratch out her eyes\". Can take if given with benadryl    Nsaids Anaphylaxis and Hives     Pt states she can take ibuprofen    Pcn [Penicillins] Anaphylaxis and Hives    Reglan [Metoclopramide Hcl] Swelling     Agitation, anger    Shellfish-Derived Products Anaphylaxis    Sulfa Antibiotics Hives, Shortness Of Breath, Itching and Swelling    Vancomycin Anaphylaxis    Zofran Anaphylaxis    Zyvox [Linezolid] Anaphylaxis    Acyclovir Swelling and Rash    Bactrim [Sulfamethoxazole-Trimethoprim] Hives    Betadine [Povidone Iodine] Hives    Ceclor [Cefaclor] Hives    Clindamycin/Lincomycin Hives    Codeine Itching and Rash    Macrolides And Ketolides Hives     Pt states she can take Azithromycin    Novolin R [Insulin] Hives    Novolog [Insulin Aspart] Hives    Phenothiazines Swelling    Tape [Adhesive Tape] Rash     OK to use paper tape and tegaderm per patient    Banana     Compazine [Prochlorperazine] Other (See Comments)     Agitation, anger, \"makes me jerk\"    Fentanyl Itching     Pt states doesn't work and makes patient itch    Kiwi Extract     Tamiflu [Oseltamivir Phosphate] Hives    Sotalol Other (See Comments)     Pt verbalized that she developed a prolonged QT and had an asthma attack with medication.         Current Outpatient Medications on File Prior to Encounter   Medication Sig Dispense Refill    insulin glargine (LANTUS) 100 UNIT/ML injection vial Inject 80 Units into the skin 2 times daily      predniSONE (DELTASONE) 10 MG tablet 4 daily for 3 days, 3 daily for 3 days, 2 daily for 3 days, 1 daily for 3 days. 30 tablet 0    VENTOLIN  (90 Base) MCG/ACT inhaler INHALE 2 PUFFS INTO LUNGS EVERY 6 HOURS AS NEEDED FOR WHEEZING 18 g 11    ARIPiprazole (ABILIFY) 5 MG tablet Take 1 tablet by mouth daily 30 tablet 3    levocetirizine (XYZAL) 5 MG tablet Take 5 mg by mouth nightly      acetaminophen-codeine (TYLENOL/CODEINE #4) 300-60 MG per tablet Take 2 tablets by mouth daily as needed for Pain.  clotrimazole (LOTRIMIN) 1 % cream APPLY TOPICALLY TO AFFECTED AREA(S) 2 TIMES DAILY. 45 g 11    insulin lispro (HUMALOG KWIKPEN) 100 UNIT/ML pen INJECT SUBCUTANEOUSLY PER SLIDING SCALE, 150-200 INJECT 3U, 201-250 INJECT 6U, 251-300 INJECT 9U, >300 INJECT 12U, MAX 30U/DAY 48 mL 11    ipratropium-albuterol (DUONEB) 0.5-2.5 (3) MG/3ML SOLN nebulizer solution INHALE 3 MLS INTO THE LUNGS EVERY 6 HOURS AS NEEDED FOR SHORTNESS OF BREATH (Patient taking differently: Take 1 vial by nebulization 3 times daily ) 120 vial 5    OXYGEN Inhale into the lungs Indications: On 3 liters per n/c during the night.  Multiple Vitamins-Minerals (MULTIVITAMIN GUMMIES WOMENS) CHEW Take 2 each by mouth daily       atorvastatin (LIPITOR) 20 MG tablet Take 20 mg by mouth every evening       UNIFINE PENTIPS 31G X 8 MM MISC USE 4 TIMES DAILY AS DIRECTED 150 each 11    DULoxetine (CYMBALTA) 60 MG extended release capsule TAKE 1 CAPSULE BY MOUTH 2 TIMES DAILY 60 capsule 11    blood glucose test strips (ONE TOUCH ULTRA TEST) strip USE TO TEST BLOOD SUGAR 6 TIMES DAILY DUE TO LOW BLOOD SUGARS, UNCONTROLLED DIABETES. CHECK BEFORE MEALS, AT BEDTIME, AND AS NEEDED 200 each 11    carvedilol (COREG) 12.5 MG tablet Take 12.5 mg by mouth 2 times daily (with meals) Takes at 10:00am and 10:00pm       No current facility-administered medications on file prior to encounter. Review of Systems   Constitutional: Negative for chills, fatigue and fever.    HENT: Positive for dental problem, ear discharge, ear pain, postnasal drip, rhinorrhea, sinus pressure, sinus pain, sneezing and voice change. Negative for trouble swallowing. Respiratory: Positive for cough, chest tightness and shortness of breath. Negative for wheezing. Cardiovascular: Positive for leg swelling. Negative for chest pain and palpitations. Gastrointestinal: Positive for diarrhea. Negative for abdominal pain, constipation, nausea and vomiting. Genitourinary: Negative for difficulty urinating, flank pain, frequency and urgency. Musculoskeletal: Positive for back pain and myalgias. Neurological: Positive for weakness and numbness. Negative for dizziness and headaches. GENERAL PHYSICAL EXAM     Vitals: /67   Pulse 92   Temp 97.1 °F (36.2 °C) (Infrared)   Resp 20   Ht 5' 4\" (1.626 m)   Wt 265 lb (120.2 kg)   SpO2 95%   BMI 45.49 kg/m²  Body mass index is 45.49 kg/m². GENERAL APPEARANCE:   Renzo Marcelo is 50 y.o.,  female, severely obese, nourished, conscious, alert. Does not appear to be distress or pain at this time. Physical Exam   Constitutional: She is oriented to person, place, and time. Vital signs are normal. She is cooperative. No distress. HENT:   Head: Normocephalic and atraumatic. Right Ear: There is drainage, swelling and tenderness. Tympanic membrane is scarred and erythematous. Tympanic membrane mobility is abnormal. A middle ear effusion is present. Decreased hearing is noted. Left Ear: There is drainage, swelling and tenderness. Tympanic membrane is scarred and erythematous. A middle ear effusion is present. Decreased hearing is noted. Nose: Mucosal edema and rhinorrhea present. Mouth/Throat: Uvula is midline, oropharynx is clear and moist and mucous membranes are normal. Abnormal dentition. Dental caries present. Bilateral ears with blue myringotomy tubes in place. Mild amount of light brown cerumen bilat. Eyes: Pupils are equal, round, and reactive to light.  Conjunctivae, EOM and lids are normal. No scleral icterus. Neck: Trachea normal. Neck supple. Neck circumference large in diameter. Cardiovascular: Normal rate, regular rhythm and normal heart sounds. No extrasystoles are present. Exam reveals no gallop. No murmur heard. Heart tones distant. Pulmonary/Chest: Effort normal. No respiratory distress. She has decreased breath sounds in the right lower field and the left lower field. She has no wheezes. She has no rhonchi. She has no rales. Abdominal: Soft. Normal appearance and bowel sounds are normal. She exhibits no distension. There is no abdominal tenderness. There is no guarding. BUSHRA organomegaly due to body habitus. Musculoskeletal:      Comments: Antalgic gait. BLE with 1-2+ pitting edema. Neurological: She is alert and oriented to person, place, and time. No cranial nerve deficit or sensory deficit. Gait abnormal.   Skin: Skin is warm, dry and intact. Psychiatric: Her mood appears anxious.                      PROVISIONAL DIAGNOSES / SURGERY:      CHRONIC OTITIS LEFT EAR  LEFT MYRINGOTOMY TUBE INSERTION    MARYANNE Chiang CNP on 6/30/2020 at 4:08 PM

## 2020-06-30 NOTE — H&P (VIEW-ONLY)
HISTORY and [address]       NAME:  Gena Perkins  MRN: [id number]   YOB: 1975   Date: 6/30/2020   Age: 50 y.o. Gender: female       COMPLAINT AND PRESENT HISTORY:     Gena Perkins is 50 y.o. , female, Preadmission Testing for left myringotomy tube removal and reinsertion. Patient reports she has had ear tubes bilaterally since age 4. She has had ear tubes replaced between 16 and 17 times. Most recently, she has had her tube in the left ear x3 years, tube in the right ear x1 year. Pt reports she has hearing loss, and is supposed to be getting fitted for hearing aids. She has tinnitus laterally. She has not been on any antibiotics recently. She is currently on oral steroids, prednisone taper per PCP, for seasonal allergies. She has nasal drainage and has itchy throat. Pt reports intense itching of the right ear internally. She has itchy eyes bilaterally. She has \"raspy cough\" from nasal drainage. Patient has a history of asthma, seasonal allergies. Patient has significant medical history, see below. Most recent EKG from 3/24/2020 normal sinus rhythm. She has a history of CHF, cardiomyopathy, CAD, atrial fibrillation, COPD, GERD, endometriosis, C. difficile, H. pylori, hyperlipidemia, MI. She denies any chest pain at present. She has shortness of breath on exertion, wheezing which she relates to her COPD. No fever or chills. She has a chronic cough which she reports is from nasal drainage, productive at times clear sputum. No urinary complaints at present. She has chronic arthralgias and myalgias. She will have labs and COVID testing prior to procedure.      PAST MEDICAL HISTORY     Past Medical History:   Diagnosis Date    Acute exacerbation of chronic obstructive pulmonary disease ([address] 3/21/2018    Adhesive capsulitis of left shoulder 9/25/2018    Asthma     Asthma exacerbation 7/24/2014    Atrial fibrillation ([address]     placed on event monitor 9/25/18 for PVC's & A-fib    Atrial premature depolarization 7/19/2019    [facility]ipolar 1 disorder ([facility])     Bipolar disorder ([address] 7/19/2019    Bulging disc     CAD (coronary artery disease)     Candida infection 2/23/2018    Cardiomyopathy ([address]     Chest pain 6/13/2017    CHF (congestive heart failure) ([facility])     Chronic otitis media of both ears     rt>lt    Chronic right shoulder pain 3/14/2017    Cigarette nicotine dependence with nicotine-induced disorder 7/19/2019    Class 3 severe obesity due to excess calories with serious comorbidity and body mass index (BMI) of 40.0 to 44.9 in adult [facility]) 10/4/2018    Closed fracture of left ankle 6/25/2019    Clostridium difficile infection     COPD (chronic obstructive pulmonary disease) ([facility]     Cough, persistent 3/21/2018    Current moderate episode of major depressive disorder without prior episode ([address] 8/20/2018    Depression     Diabetes mellitus type 2, controlled ([address] 4/30/2014    Diarrhea     Diarrhea     Dizziness     DVT (deep venous thrombosis) ([facility])     after PICC line right arm    Encounter for monitoring sotalol therapy 2/20/2017    Encounter for screening mammogram for malignant neoplasm of breast 3/7/2018    Endometriosis     Fainting     GERD (gastroesophageal reflux disease)     hx of    GI bleeding     H. pylori infection     Helicobacter pylori (H. pylori)     [place] (hard of hearing)     both ears, no hearing aids    HTN (hypertension) 7/19/2019    Hyperlipidemia     Hypertension     Left bicipital tenosynovitis 10/17/2018    Left leg pain 5/16/2017    Left sided abdominal pain of unknown cause 7/19/2016    Leg swelling 9/21/2018    Mastoiditis     Mastoiditis, acute 4/30/2014    Migraines     Morbid obesity ([address]     Myocardial infarction ([address]     2005    Nausea     Neuropathy     On home oxygen therapy     3 L at night    Ovarian cyst     Passed out     hx of- negative tilt table    Pulmonary hypertension ([address]     Pulmonary insufficiency     PVC (premature ventricular contraction)     Seasonal allergies     Tricuspid insufficiency     Type II or unspecified type diabetes mellitus without mention of complication, not stated as uncontrolled          SURGICAL HISTORY       Past Surgical History:   Procedure Laterality Date    ABDOMEN SURGERY      abcess    ABDOMINAL ADHESION SURGERY      ABDOMINAL EXPLORATION SURGERY      x 4    ABDOMINAL HERNIA REPAIR      with mesh    ABLATION OF DYSRHYTHMIC FOCUS  2016    ABLATION OF DYSRHYTHMIC FOCUS  09/08/2017    Done at [facility].  ABSCESS DRAINAGE      left hip and chest    APPENDECTOMY      BREAST SURGERY Left 2007    I & D    CARDIAC CATHETERIZATION  2014 & 2000     no stenting    CARPAL TUNNEL RELEASE Right 12/19/2019    Ulnar nerve decompression, right elbow. Release of 1st and 2nd extensor compartments, right forearm.  CARPAL TUNNEL RELEASE  05/04/2020    Carpal tunnel release, left hand    CHOLECYSTECTOMY      COLONOSCOPY      FOOT SURGERY Left     bone fragment removed    FRACTURE SURGERY Right     closed reduction perc pinning ring & middle finger    GASTRIC FUNDOPLICATION  3794    HYSTERECTOMY      total    HYSTERECTOMY      HYSTEROSCOPY      tubal perfusion    MYRINGOTOMY Right 11/13/15    Right myringotomy with placement of  T-tube on the right side. Removal of plugged ventilating tube, right side.  MYRINGOTOMY AND TYMPANOSTOMY TUBE PLACEMENT Right 06/05/2014    OTHER SURGICAL HISTORY Right 5/29/14    removal ear tube rt ear    OTHER SURGICAL HISTORY  2009    LOOP recorder inserted and removed 3 mos.  later    OTHER SURGICAL HISTORY Right 08/11/2016    ear tube removal with patch    OTHER SURGICAL HISTORY      tubal perfusion, lysis of uterine adhesions    OTHER SURGICAL HISTORY      multiple PICC lines inserted and removed, it has affected circulation bilateral upper arms    MI OLYA BATES Amy Caprice ANESTHESIA Right 3/1/2017    SHOULDER MANIPULATION RIGHT performed by Ronnie Duque MD at [address] [facility] Left 10/17/2018    SHOULDER ARTHROSCOPY W/BICEPS TENDONESIS performed by Omkar Summers MD at [facility] Left     foot    TONSILLECTOMY      TYMPANOSTOMY TUBE PLACEMENT      TYMPANOSTOMY TUBE PLACEMENT Left 03/12/2019    ULNAR TUNNEL RELEASE Right     UPPER GASTROINTESTINAL ENDOSCOPY      UPPER GASTROINTESTINAL ENDOSCOPY  07/10/2019    UPPER GASTROINTESTINAL ENDOSCOPY N/A 7/10/2019    EGD BIOPSY performed by Tiffany Wyman MD at [place] [address] History     Socioeconomic History    Marital status: Single     Spouse name: None    Number of children: None    Years of education: None    Highest education level: None   Occupational History     Employer: NONE   Social Needs    Financial resource strain: None    Food insecurity     Worry: None     Inability: None    Transportation needs     Medical: None     Non-medical: None   Tobacco Use    Smoking status: Current Every Day Smoker     Packs/day: 0.50     Years: 23.00     Pack years: 11.50     Types: Cigarettes    Smokeless tobacco: Never Used   Substance and Sexual Activity    Alcohol use: No     Alcohol/week: 0.0 standard drinks    Drug use: No    Sexual activity: None   Lifestyle    Physical activity     Days per week: None     Minutes per session: None    Stress: None   Relationships    Social connections     Talks on phone: None     Gets together: None     Attends Hoahaoism service: None     Active member of club or organization: None     Attends meetings of clubs or organizations: None     Relationship status: None    Intimate partner violence     Fear of current or ex partner: None     Emotionally abused: None     Physically abused: None     Forced sexual activity: None   Other Topics Concern    None   Social History Narrative    None 1 daily for 3 days. 30 tablet 0    VENTOLIN  (90 Base) MCG/ACT inhaler INHALE 2 PUFFS INTO LUNGS EVERY 6 HOURS AS NEEDED FOR WHEEZING 18 g 11    ARIPiprazole (ABILIFY) 5 MG tablet Take 1 tablet by mouth daily 30 tablet 3    levocetirizine (XYZAL) 5 MG tablet Take 5 mg by mouth nightly      acetaminophen-codeine (TYLENOL/CODEINE #4) 300-60 MG per tablet Take 2 tablets by mouth daily as needed for Pain.  clotrimazole (LOTRIMIN) 1 % cream APPLY TOPICALLY TO AFFECTED AREA(S) 2 TIMES DAILY. 45 g 11    insulin lispro (HUMALOG KWIKPEN) 100 UNIT/ML pen INJECT SUBCUTANEOUSLY PER SLIDING SCALE, 150-200 INJECT 3U, 201-250 INJECT 6U, 251-300 INJECT 9U, >300 INJECT 12U, MAX 30U/DAY 48 mL 11    ipratropium-albuterol (DUONEB) 0.5-2.5 (3) MG/3ML SOLN nebulizer solution INHALE 3 MLS INTO THE LUNGS EVERY 6 HOURS AS NEEDED FOR SHORTNESS OF BREATH (Patient taking differently: Take 1 vial by nebulization 3 times daily ) 120 vial 5    OXYGEN Inhale into the lungs Indications: On 3 liters per n/c during the night.  Multiple Vitamins-Minerals (MULTIVITAMIN GUMMIES WOMENS) CHEW Take 2 each by mouth daily       atorvastatin (LIPITOR) 20 MG tablet Take 20 mg by mouth every evening       UNIFINE PENTIPS 31G X 8 MM MISC USE 4 TIMES DAILY AS DIRECTED 150 each 11    DULoxetine (CYMBALTA) 60 MG extended release capsule TAKE 1 CAPSULE BY MOUTH 2 TIMES DAILY 60 capsule 11    blood glucose test strips (ONE TOUCH ULTRA TEST) strip USE TO TEST BLOOD SUGAR 6 TIMES DAILY DUE TO LOW BLOOD SUGARS, UNCONTROLLED DIABETES. CHECK BEFORE MEALS, AT BEDTIME, AND AS NEEDED 200 each 11    carvedilol (COREG) 12.5 MG tablet Take 12.5 mg by mouth 2 times daily (with meals) Takes at 10:00am and 10:00pm       No current facility-administered medications on file prior to encounter. Review of Systems   Constitutional: Negative for chills, fatigue and fever.    HENT: Positive for dental problem, ear discharge, ear pain, postnasal drip, rhinorrhea, sinus pressure, sinus pain, sneezing and voice change. Negative for trouble swallowing. Respiratory: Positive for cough, chest tightness and shortness of breath. Negative for wheezing. Cardiovascular: Positive for leg swelling. Negative for chest pain and palpitations. Gastrointestinal: Positive for diarrhea. Negative for abdominal pain, constipation, nausea and vomiting. Genitourinary: Negative for difficulty urinating, flank pain, frequency and urgency. Musculoskeletal: Positive for back pain and myalgias. Neurological: Positive for weakness and numbness. Negative for dizziness and headaches. GENERAL PHYSICAL EXAM     Vitals: /67   Pulse 92   Temp 97.1 °F (36.2 °C) (Infrared)   Resp 20   Ht 5' 4\" (1.626 m)   Wt 265 lb (120.2 kg)   SpO2 95%   BMI 45.49 kg/m²  Body mass index is 45.49 kg/m². GENERAL APPEARANCE:   Shaq Mendiola is 50 y.o.,  female, severely obese, nourished, conscious, alert. Does not appear to be distress or pain at this time. Physical Exam   Constitutional: She is oriented to person, place, and time. Vital signs are normal. She is cooperative. No distress. HENT:   Head: Normocephalic and atraumatic. Right Ear: There is drainage, swelling and tenderness. Tympanic membrane is scarred and erythematous. Tympanic membrane mobility is abnormal. A middle ear effusion is present. Decreased hearing is noted. Left Ear: There is drainage, swelling and tenderness. Tympanic membrane is scarred and erythematous. A middle ear effusion is present. Decreased hearing is noted. Nose: Mucosal edema and rhinorrhea present. Mouth/Throat: Uvula is midline, oropharynx is clear and moist and mucous membranes are normal. Abnormal dentition. Dental caries present. Bilateral ears with blue myringotomy tubes in place. Mild amount of light brown cerumen bilat. Eyes: Pupils are equal, round, and reactive to light.  Conjunctivae, EOM and lids are normal. No scleral icterus. Neck: Trachea normal. Neck supple. Neck circumference large in diameter. Cardiovascular: Normal rate, regular rhythm and normal heart sounds. No extrasystoles are present. Exam reveals no gallop. No murmur heard. Heart tones distant. Pulmonary/Chest: Effort normal. No respiratory distress. She has decreased breath sounds in the right lower field and the left lower field. She has no wheezes. She has no rhonchi. She has no rales. Abdominal: Soft. Normal appearance and bowel sounds are normal. She exhibits no distension. There is no abdominal tenderness. There is no guarding. BUSHRA organomegaly due to body habitus. Musculoskeletal:      Comments: Antalgic gait. BLE with 1-2+ pitting edema. Neurological: She is alert and oriented to person, place, and time. No cranial nerve deficit or sensory deficit. Gait abnormal.   Skin: Skin is warm, dry and intact. Psychiatric: Her mood appears anxious.                      PROVISIONAL DIAGNOSES / SURGERY:      CHRONIC OTITIS LEFT EAR  LEFT MYRINGOTOMY TUBE INSERTION    MARYANNE Edge CNP on 6/30/2020 at 4:08 PM

## 2020-07-01 ENCOUNTER — ANESTHESIA EVENT (OUTPATIENT)
Dept: OPERATING ROOM | Age: 45
End: 2020-07-01
Payer: COMMERCIAL

## 2020-07-01 RX ORDER — SODIUM CHLORIDE 0.9 % (FLUSH) 0.9 %
10 SYRINGE (ML) INJECTION EVERY 12 HOURS SCHEDULED
Status: CANCELLED | OUTPATIENT
Start: 2020-07-01

## 2020-07-01 RX ORDER — SODIUM CHLORIDE 0.9 % (FLUSH) 0.9 %
10 SYRINGE (ML) INJECTION PRN
Status: CANCELLED | OUTPATIENT
Start: 2020-07-01

## 2020-07-01 RX ORDER — LIDOCAINE HYDROCHLORIDE 10 MG/ML
1 INJECTION, SOLUTION EPIDURAL; INFILTRATION; INTRACAUDAL; PERINEURAL
Status: CANCELLED | OUTPATIENT
Start: 2020-07-01 | End: 2020-07-01

## 2020-07-01 RX ORDER — SODIUM CHLORIDE, SODIUM LACTATE, POTASSIUM CHLORIDE, CALCIUM CHLORIDE 600; 310; 30; 20 MG/100ML; MG/100ML; MG/100ML; MG/100ML
INJECTION, SOLUTION INTRAVENOUS CONTINUOUS
Status: CANCELLED | OUTPATIENT
Start: 2020-07-01

## 2020-07-10 ENCOUNTER — HOSPITAL ENCOUNTER (OUTPATIENT)
Dept: PREADMISSION TESTING | Age: 45
Setting detail: SPECIMEN
Discharge: HOME | End: 2020-07-14
Payer: COMMERCIAL

## 2020-07-10 PROCEDURE — U0004 COV-19 TEST NON-CDC HGH THRU: HCPCS

## 2020-07-12 LAB
SARS-COV-2, PCR: NOT DETECTED
SARS-COV-2, RAPID: NORMAL
SARS-COV-2: NORMAL
SOURCE: NORMAL

## 2020-07-13 ENCOUNTER — TELEPHONE (OUTPATIENT)
Dept: PRIMARY CARE CLINIC | Age: 45
End: 2020-07-13

## 2020-07-14 ENCOUNTER — ANESTHESIA (OUTPATIENT)
Dept: OPERATING ROOM | Age: 45
End: 2020-07-14
Payer: COMMERCIAL

## 2020-07-14 ENCOUNTER — HOSPITAL ENCOUNTER (OUTPATIENT)
Age: 45
Setting detail: OUTPATIENT SURGERY
Discharge: HOME | End: 2020-07-14
Attending: OTOLARYNGOLOGY | Admitting: OTOLARYNGOLOGY
Payer: COMMERCIAL

## 2020-07-14 VITALS
HEIGHT: 64 IN | BODY MASS INDEX: 45.24 KG/M2 | WEIGHT: 265 LBS | TEMPERATURE: 97.3 F | HEART RATE: 86 BPM | SYSTOLIC BLOOD PRESSURE: 106 MMHG | DIASTOLIC BLOOD PRESSURE: 67 MMHG | OXYGEN SATURATION: 95 % | RESPIRATION RATE: 29 BRPM

## 2020-07-14 VITALS — DIASTOLIC BLOOD PRESSURE: 61 MMHG | OXYGEN SATURATION: 96 % | SYSTOLIC BLOOD PRESSURE: 123 MMHG | TEMPERATURE: 97.3 F

## 2020-07-14 LAB
GLUCOSE BLD-MCNC: 142 MG/DL (ref 65–105)
GLUCOSE BLD-MCNC: 143 MG/DL (ref 65–105)

## 2020-07-14 PROCEDURE — 6360000002 HC RX W HCPCS: Performed by: NURSE ANESTHETIST, CERTIFIED REGISTERED

## 2020-07-14 PROCEDURE — 2500000003 HC RX 250 WO HCPCS: Performed by: NURSE ANESTHETIST, CERTIFIED REGISTERED

## 2020-07-14 PROCEDURE — 82947 ASSAY GLUCOSE BLOOD QUANT: CPT

## 2020-07-14 PROCEDURE — 7100000001 HC PACU RECOVERY - ADDTL 15 MIN: Performed by: OTOLARYNGOLOGY

## 2020-07-14 PROCEDURE — 6370000000 HC RX 637 (ALT 250 FOR IP): Performed by: OTOLARYNGOLOGY

## 2020-07-14 PROCEDURE — 3700000001 HC ADD 15 MINUTES (ANESTHESIA): Performed by: OTOLARYNGOLOGY

## 2020-07-14 PROCEDURE — 3700000000 HC ANESTHESIA ATTENDED CARE: Performed by: OTOLARYNGOLOGY

## 2020-07-14 PROCEDURE — 2709999900 HC NON-CHARGEABLE SUPPLY: Performed by: OTOLARYNGOLOGY

## 2020-07-14 PROCEDURE — 6360000002 HC RX W HCPCS: Performed by: ANESTHESIOLOGY

## 2020-07-14 PROCEDURE — 3600000002 HC SURGERY LEVEL 2 BASE: Performed by: OTOLARYNGOLOGY

## 2020-07-14 PROCEDURE — 3600000012 HC SURGERY LEVEL 2 ADDTL 15MIN: Performed by: OTOLARYNGOLOGY

## 2020-07-14 PROCEDURE — 2780000010 HC IMPLANT OTHER: Performed by: OTOLARYNGOLOGY

## 2020-07-14 PROCEDURE — 7100000000 HC PACU RECOVERY - FIRST 15 MIN: Performed by: OTOLARYNGOLOGY

## 2020-07-14 PROCEDURE — 2580000003 HC RX 258: Performed by: ANESTHESIOLOGY

## 2020-07-14 DEVICE — VENT TUBE 1026040 5PK MOD GOODE T 1.27
Type: IMPLANTABLE DEVICE | Site: EAR | Status: FUNCTIONAL
Brand: GOODE T-TUBE®

## 2020-07-14 RX ORDER — MEPERIDINE HYDROCHLORIDE 25 MG/ML
12.5 INJECTION INTRAMUSCULAR; INTRAVENOUS; SUBCUTANEOUS EVERY 5 MIN PRN
Status: DISCONTINUED | OUTPATIENT
Start: 2020-07-14 | End: 2020-07-14 | Stop reason: HOSPADM

## 2020-07-14 RX ORDER — LIDOCAINE HYDROCHLORIDE 10 MG/ML
1 INJECTION, SOLUTION EPIDURAL; INFILTRATION; INTRACAUDAL; PERINEURAL
Status: DISCONTINUED | OUTPATIENT
Start: 2020-07-14 | End: 2020-07-14 | Stop reason: HOSPADM

## 2020-07-14 RX ORDER — SODIUM CHLORIDE 0.9 % (FLUSH) 0.9 %
10 SYRINGE (ML) INJECTION PRN
Status: DISCONTINUED | OUTPATIENT
Start: 2020-07-14 | End: 2020-07-14 | Stop reason: HOSPADM

## 2020-07-14 RX ORDER — SODIUM CHLORIDE 0.9 % (FLUSH) 0.9 %
10 SYRINGE (ML) INJECTION EVERY 12 HOURS SCHEDULED
Status: DISCONTINUED | OUTPATIENT
Start: 2020-07-14 | End: 2020-07-14 | Stop reason: HOSPADM

## 2020-07-14 RX ORDER — DIPHENHYDRAMINE HYDROCHLORIDE 50 MG/ML
12.5 INJECTION INTRAMUSCULAR; INTRAVENOUS
Status: DISCONTINUED | OUTPATIENT
Start: 2020-07-14 | End: 2020-07-14 | Stop reason: HOSPADM

## 2020-07-14 RX ORDER — PROPOFOL 10 MG/ML
INJECTION, EMULSION INTRAVENOUS PRN
Status: DISCONTINUED | OUTPATIENT
Start: 2020-07-14 | End: 2020-07-14 | Stop reason: SDUPTHER

## 2020-07-14 RX ORDER — ACETAMINOPHEN 500 MG
500 TABLET ORAL EVERY 6 HOURS PRN
COMMUNITY
End: 2021-12-14

## 2020-07-14 RX ORDER — LIDOCAINE HYDROCHLORIDE 10 MG/ML
INJECTION, SOLUTION EPIDURAL; INFILTRATION; INTRACAUDAL; PERINEURAL PRN
Status: DISCONTINUED | OUTPATIENT
Start: 2020-07-14 | End: 2020-07-14 | Stop reason: SDUPTHER

## 2020-07-14 RX ORDER — FENTANYL CITRATE 50 UG/ML
INJECTION, SOLUTION INTRAMUSCULAR; INTRAVENOUS PRN
Status: DISCONTINUED | OUTPATIENT
Start: 2020-07-14 | End: 2020-07-14 | Stop reason: SDUPTHER

## 2020-07-14 RX ORDER — MIDAZOLAM HYDROCHLORIDE 1 MG/ML
INJECTION INTRAMUSCULAR; INTRAVENOUS PRN
Status: DISCONTINUED | OUTPATIENT
Start: 2020-07-14 | End: 2020-07-14 | Stop reason: SDUPTHER

## 2020-07-14 RX ORDER — LABETALOL 20 MG/4 ML (5 MG/ML) INTRAVENOUS SYRINGE
5 EVERY 10 MIN PRN
Status: DISCONTINUED | OUTPATIENT
Start: 2020-07-14 | End: 2020-07-14 | Stop reason: HOSPADM

## 2020-07-14 RX ORDER — SODIUM CHLORIDE, SODIUM LACTATE, POTASSIUM CHLORIDE, CALCIUM CHLORIDE 600; 310; 30; 20 MG/100ML; MG/100ML; MG/100ML; MG/100ML
125 INJECTION, SOLUTION INTRAVENOUS CONTINUOUS
Status: DISCONTINUED | OUTPATIENT
Start: 2020-07-14 | End: 2020-07-14 | Stop reason: HOSPADM

## 2020-07-14 RX ORDER — DEXAMETHASONE SODIUM PHOSPHATE 4 MG/ML
INJECTION, SOLUTION INTRA-ARTICULAR; INTRALESIONAL; INTRAMUSCULAR; INTRAVENOUS; SOFT TISSUE PRN
Status: DISCONTINUED | OUTPATIENT
Start: 2020-07-14 | End: 2020-07-14 | Stop reason: SDUPTHER

## 2020-07-14 RX ORDER — SULFACETAMIDE SODIUM AND PREDNISOLONE SODIUM PHOSPHATE 100; 2.3 MG/ML; MG/ML
SOLUTION/ DROPS OPHTHALMIC PRN
Status: DISCONTINUED | OUTPATIENT
Start: 2020-07-14 | End: 2020-07-14 | Stop reason: ALTCHOICE

## 2020-07-14 RX ADMIN — MIDAZOLAM 2 MG: 1 INJECTION INTRAMUSCULAR; INTRAVENOUS at 12:38

## 2020-07-14 RX ADMIN — FENTANYL CITRATE 50 MCG: 50 INJECTION, SOLUTION INTRAMUSCULAR; INTRAVENOUS at 12:43

## 2020-07-14 RX ADMIN — DEXAMETHASONE SODIUM PHOSPHATE 4 MG: 4 INJECTION, SOLUTION INTRA-ARTICULAR; INTRALESIONAL; INTRAMUSCULAR; INTRAVENOUS; SOFT TISSUE at 12:43

## 2020-07-14 RX ADMIN — HYDROMORPHONE HYDROCHLORIDE 0.5 MG: 1 INJECTION, SOLUTION INTRAMUSCULAR; INTRAVENOUS; SUBCUTANEOUS at 13:15

## 2020-07-14 RX ADMIN — SODIUM CHLORIDE, POTASSIUM CHLORIDE, SODIUM LACTATE AND CALCIUM CHLORIDE 125 ML/HR: 600; 310; 30; 20 INJECTION, SOLUTION INTRAVENOUS at 10:15

## 2020-07-14 RX ADMIN — HYDROMORPHONE HYDROCHLORIDE 0.5 MG: 1 INJECTION, SOLUTION INTRAMUSCULAR; INTRAVENOUS; SUBCUTANEOUS at 13:32

## 2020-07-14 RX ADMIN — LIDOCAINE HYDROCHLORIDE 50 MG: 10 INJECTION, SOLUTION EPIDURAL; INFILTRATION; INTRACAUDAL; PERINEURAL at 12:43

## 2020-07-14 RX ADMIN — PROPOFOL 200 MG: 10 INJECTION, EMULSION INTRAVENOUS at 12:43

## 2020-07-14 ASSESSMENT — PULMONARY FUNCTION TESTS
PIF_VALUE: 4
PIF_VALUE: 1
PIF_VALUE: 18
PIF_VALUE: 1
PIF_VALUE: 1
PIF_VALUE: 3
PIF_VALUE: 1
PIF_VALUE: 3
PIF_VALUE: 18
PIF_VALUE: 16
PIF_VALUE: 1
PIF_VALUE: 18
PIF_VALUE: 4
PIF_VALUE: 1
PIF_VALUE: 18
PIF_VALUE: 18
PIF_VALUE: 16
PIF_VALUE: 18
PIF_VALUE: 3
PIF_VALUE: 22
PIF_VALUE: 1
PIF_VALUE: 16
PIF_VALUE: 18

## 2020-07-14 ASSESSMENT — PAIN DESCRIPTION - PAIN TYPE: TYPE: SURGICAL PAIN

## 2020-07-14 ASSESSMENT — COPD QUESTIONNAIRES: CAT_SEVERITY: NO INTERVAL CHANGE

## 2020-07-14 ASSESSMENT — PAIN DESCRIPTION - FREQUENCY: FREQUENCY: CONTINUOUS

## 2020-07-14 ASSESSMENT — LIFESTYLE VARIABLES: SMOKING_STATUS: 0

## 2020-07-14 ASSESSMENT — ENCOUNTER SYMPTOMS
STRIDOR: 0
SHORTNESS OF BREATH: 1

## 2020-07-14 ASSESSMENT — PAIN - FUNCTIONAL ASSESSMENT: PAIN_FUNCTIONAL_ASSESSMENT: 0-10

## 2020-07-14 ASSESSMENT — PAIN DESCRIPTION - ORIENTATION: ORIENTATION: LEFT

## 2020-07-14 ASSESSMENT — PAIN SCALES - GENERAL
PAINLEVEL_OUTOF10: 10
PAINLEVEL_OUTOF10: 10
PAINLEVEL_OUTOF10: 4
PAINLEVEL_OUTOF10: 8

## 2020-07-14 ASSESSMENT — PAIN DESCRIPTION - DESCRIPTORS: DESCRIPTORS: PRESSURE;SHARP

## 2020-07-14 ASSESSMENT — PAIN DESCRIPTION - LOCATION: LOCATION: EAR

## 2020-07-14 NOTE — BRIEF OP NOTE
Brief Postoperative Note      Patient: Mony New  YOB: 1975  MRN: [id number]    Date of Procedure: 7/14/2020    Pre-Op Diagnosis: CHRONIC OTITIS LEFT    Post-Op Diagnosis: Same       Procedure(s): MYRINGOTOMY & T TUBE INSERTION, left    Surgeon(s):  John Ventura MD    Anesthesia: General    Estimated Blood Loss (mL): None    Complications: None    Specimens: None    Implants:  Implant Name Type Inv. Item Serial No.  Lot No. LRB No. Used Action   TUBE EAR MOD ALEXANDRA ANDRY 1. 27MM Ear TUBE EAR MOD ALEXANDRA ANDRY 1. 27MM  [employer]  Left 1 Implanted             Findings: chronic otitis , left    Electronically signed by John Ventura MD on 7/14/2020 at 1:11 PM

## 2020-07-14 NOTE — ANESTHESIA POSTPROCEDURE EVALUATION
Department of Anesthesiology  Postprocedure Note    Patient: Sean Hsu  MRN: [id number]  YOB: 1975  Date of evaluation: 7/14/2020  Time:  2:17 PM     Procedure Summary     Date:  07/14/20 Room / Location:  [address] [place] [address] Poppy Small 06 / [facility]: SUNIL ANAYA    Anesthesia Start:  5642 Anesthesia Stop:  0360    Procedure:  MYRINGOTOMY TUBE INSERTION (Left Ear) Diagnosis:  (CHRONIC OTITIS LEFT)    Surgeon:  Osito Hernandez MD Responsible Provider:  Demetrice Prieto MD    Anesthesia Type:  general ASA Status:  3          Anesthesia Type: general    Mayra Phase I: Mayra Score: 9    Mayra Phase II:      Last vitals: Reviewed and per EMR flowsheets.        Anesthesia Post Evaluation    Comments: POST- ANESTHESIA EVALUATION       Pt Name: Sean Hsu  MRN: [id number]  YOB: 1975  Date of evaluation: 7/14/2020  Time:  2:17 PM      /67   Pulse 86   Temp 97.3 °F (36.3 °C) (Infrared)   Resp 29   Ht 5' 4\" (1.626 m)   Wt 265 lb (120.2 kg)   SpO2 95%   BMI 45.49 kg/m²      Consciousness Level  Awake  Cardiopulmonary Status  Stable  Pain Adequately Treated YES  Nausea / Vomiting  NO  Adequate Hydration  YES  Anesthesia Related Complications NONE      Electronically signed by Carolyn De Leon MD on 7/14/2020 at 2:17 PM

## 2020-07-14 NOTE — ANESTHESIA PRE PROCEDURE
vaccine Z28.21    Clostridium difficile infection A49.8    Closed fracture of left ankle S82.892A    Atrial premature depolarization I49.1    Ventricular premature depolarization I49.3    Cardiomyopathy ([facility]) I42.9    Cigarette nicotine dependence with nicotine-induced disorder F17.219    Productive cough R05    DVT (deep venous thrombosis) ([facility]) I82.409    Endometriosis N80.9    GERD (gastroesophageal reflux disease) K21.9    HTN (hypertension) I10    Hypomagnesemia E83.42    Migraines G43.909    Mixed hyperlipidemia E78.2    Neurocardiogenic syncope R55    Nonrheumatic tricuspid (valve) insufficiency I36.1    Tricuspid valve disorders, non-rheumatic I36.9    Ovarian cyst N83.209    Encounter for monitoring sotalol therapy Z51.81, Z79.899    Pulmonary HTN ([facility]) I27.20    PVC's (premature ventricular contractions) I49.3    Schizophrenia ([facility]) F20.9    Shortness of breath R06.02    Acute exacerbation of chronic obstructive pulmonary disease ([facility]) J44.1    Bipolar disorder ([facility]) F31.9    Left sided abdominal pain of unknown cause R10.9    Leg swelling M79.89    Mastoiditis, acute H70.009    Steroid-induced hyperglycemia R73.9, T38.0X5A       Past Medical History:        Diagnosis Date    Acute exacerbation of chronic obstructive pulmonary disease ([facility]) 3/21/2018    Adhesive capsulitis of left shoulder 9/25/2018    Asthma     Asthma exacerbation 7/24/2014    Atrial fibrillation ([facility])     placed on event monitor 9/25/18 for PVC's & A-fib    Atrial premature depolarization 7/19/2019    Bipolar 1 disorder ([address]     Bipolar disorder ([address] 7/19/2019    Bulging disc     CAD (coronary artery disease)     Candida infection 2/23/2018    Cardiomyopathy ([address]     Chest pain 6/13/2017    CHF (congestive heart failure) ([facility])     Chronic otitis media of both ears     rt>lt    Chronic right shoulder pain 3/14/2017    Cigarette nicotine dependence with nicotine-induced disorder 7/19/2019  Class 3 severe obesity due to excess calories with serious comorbidity and body mass index (BMI) of 40.0 to 44.9 in adult [facility]) 10/4/2018    Closed fracture of left ankle 6/25/2019    Clostridium difficile infection     COPD (chronic obstructive pulmonary disease) (HCC)     Cough, persistent 3/21/2018    Current moderate episode of major depressive disorder without prior episode ([address] 8/20/2018    Depression     Diabetes mellitus type 2, controlled ([address] 4/30/2014    Diarrhea     Diarrhea     Dizziness     DVT (deep venous thrombosis) (HCC)     after PICC line right arm    Encounter for monitoring sotalol therapy 2/20/2017    Encounter for screening mammogram for malignant neoplasm of breast 3/7/2018    Endometriosis     Fainting     GERD (gastroesophageal reflux disease)     hx of    GI bleeding     H. pylori infection     Helicobacter pylori (H. pylori)     [place] (hard of hearing)     both ears, no hearing aids    HTN (hypertension) 7/19/2019    Hyperlipidemia     Hypertension     Left bicipital tenosynovitis 10/17/2018    Left leg pain 5/16/2017    Left sided abdominal pain of unknown cause 7/19/2016    Leg swelling 9/21/2018    Mastoiditis     Mastoiditis, acute 4/30/2014    Migraines     Morbid obesity ([address]     Myocardial infarction ([address]     2005    Nausea     Neuropathy     On home oxygen therapy     3 L at night    Ovarian cyst     Passed out     hx of- negative tilt table    Pulmonary hypertension (HCC)     Pulmonary insufficiency     PVC (premature ventricular contraction)     Seasonal allergies     Tricuspid insufficiency     Type II or unspecified type diabetes mellitus without mention of complication, not stated as uncontrolled        Past Surgical History:        Procedure Laterality Date    ABDOMEN SURGERY      abcess    ABDOMINAL ADHESION SURGERY      ABDOMINAL EXPLORATION SURGERY      x 4    ABDOMINAL HERNIA REPAIR      with mesh    ABLATION OF DYSRHYTHMIC FOCUS  2016    ABLATION OF DYSRHYTHMIC FOCUS  09/08/2017    Done at the [facility].  ABSCESS DRAINAGE      left hip and chest    APPENDECTOMY      BREAST SURGERY Left 2007    I & D    CARDIAC CATHETERIZATION  2014 & 2000     no stenting    CARPAL TUNNEL RELEASE Right 12/19/2019    Ulnar nerve decompression, right elbow. Release of 1st and 2nd extensor compartments, right forearm.  CARPAL TUNNEL RELEASE  05/04/2020    Carpal tunnel release, left hand    CHOLECYSTECTOMY      COLONOSCOPY      FOOT SURGERY Left     bone fragment removed    FRACTURE SURGERY Right     closed reduction perc pinning ring & middle finger    GASTRIC FUNDOPLICATION  2773    HYSTERECTOMY      total    HYSTERECTOMY      HYSTEROSCOPY      tubal perfusion    MYRINGOTOMY Right 11/13/15    Right myringotomy with placement of  T-tube on the right side. Removal of plugged ventilating tube, right side.  MYRINGOTOMY AND TYMPANOSTOMY TUBE PLACEMENT Right 06/05/2014    OTHER SURGICAL HISTORY Right 5/29/14    removal ear tube rt ear    OTHER SURGICAL HISTORY  2009    LOOP recorder inserted and removed 3 mos.  later    OTHER SURGICAL HISTORY Right 08/11/2016    ear tube removal with patch    OTHER SURGICAL HISTORY      tubal perfusion, lysis of uterine adhesions    OTHER SURGICAL HISTORY      multiple PICC lines inserted and removed, it has affected circulation bilateral upper arms    WA MANIPULATN PAULO JT W ANESTHESIA Right 3/1/2017    SHOULDER MANIPULATION RIGHT performed by Claudia Moreira MD at [address] [facility] Left 10/17/2018    SHOULDER ARTHROSCOPY W/BICEPS TENDONESIS performed by Merced Jean MD at [facility] Left     foot    TONSILLECTOMY      TYMPANOSTOMY TUBE PLACEMENT      TYMPANOSTOMY TUBE PLACEMENT Left 03/12/2019    ULNAR TUNNEL RELEASE Right     UPPER GASTROINTESTINAL ENDOSCOPY      UPPER GASTROINTESTINAL ENDOSCOPY  07/10/2019    UPPER hepatitis, liver disease, no renal disease and bowel prep       Endo/Other:    (+) DiabetesType II DM, , no malignancy/cancer. (-) hypothyroidism, hyperthyroidism, blood dyscrasia, arthritis, no electrolyte abnormalities, no malignancy/cancer               Abdominal:           Vascular: negative vascular ROS. - PVD, DVT and PE. Anesthesia Plan      general     ASA 3       Induction: intravenous. MIPS: Postoperative opioids intended and Prophylactic antiemetics administered. Anesthetic plan and risks discussed with patient. Plan discussed with CRNA. The patient was counseled at length about the risks of dominick Covid-19 during their perioperative period and any recovery window from their procedure. The patient was made aware that dominick Covid-19  may worsen their prognosis for recovering from their procedure  and lend to a higher morbidity and/or mortality risk. All material risks, benefits, and reasonable alternatives including postponing the procedure were discussed. The patient DOES wish to proceed with the procedure at this time.         Wilver Johnson MD   7/14/2020

## 2020-07-17 ENCOUNTER — APPOINTMENT (OUTPATIENT)
Dept: CT IMAGING | Age: 45
End: 2020-07-17
Payer: COMMERCIAL

## 2020-07-17 ENCOUNTER — HOSPITAL ENCOUNTER (EMERGENCY)
Age: 45
Discharge: HOME | End: 2020-07-17
Attending: EMERGENCY MEDICINE
Payer: COMMERCIAL

## 2020-07-17 VITALS
WEIGHT: 270 LBS | DIASTOLIC BLOOD PRESSURE: 94 MMHG | HEIGHT: 64 IN | OXYGEN SATURATION: 98 % | SYSTOLIC BLOOD PRESSURE: 137 MMHG | HEART RATE: 83 BPM | TEMPERATURE: 96.7 F | RESPIRATION RATE: 18 BRPM | BODY MASS INDEX: 46.1 KG/M2

## 2020-07-17 LAB — GLUCOSE BLD-MCNC: 89 MG/DL (ref 65–105)

## 2020-07-17 PROCEDURE — 6370000000 HC RX 637 (ALT 250 FOR IP): Performed by: EMERGENCY MEDICINE

## 2020-07-17 PROCEDURE — 70480 CT ORBIT/EAR/FOSSA W/O DYE: CPT

## 2020-07-17 PROCEDURE — 82947 ASSAY GLUCOSE BLOOD QUANT: CPT

## 2020-07-17 PROCEDURE — 99284 EMERGENCY DEPT VISIT MOD MDM: CPT

## 2020-07-17 RX ORDER — OXYCODONE HYDROCHLORIDE AND ACETAMINOPHEN 5; 325 MG/1; MG/1
1 TABLET ORAL EVERY 4 HOURS PRN
Qty: 12 TABLET | Refills: 0 | Status: SHIPPED | OUTPATIENT
Start: 2020-07-17 | End: 2020-07-20

## 2020-07-17 RX ORDER — CLINDAMYCIN HYDROCHLORIDE 300 MG/1
300 CAPSULE ORAL 4 TIMES DAILY
Qty: 28 CAPSULE | Refills: 0 | Status: SHIPPED | OUTPATIENT
Start: 2020-07-17 | End: 2020-08-07 | Stop reason: SDUPTHER

## 2020-07-17 RX ORDER — METHYLPREDNISOLONE 4 MG/1
TABLET ORAL
Qty: 1 KIT | Refills: 0 | Status: SHIPPED | OUTPATIENT
Start: 2020-07-17 | End: 2020-07-23

## 2020-07-17 RX ORDER — AZITHROMYCIN 500 MG/1
500 TABLET, FILM COATED ORAL DAILY
Qty: 3 TABLET | Refills: 0 | Status: SHIPPED | OUTPATIENT
Start: 2020-07-17 | End: 2020-07-24

## 2020-07-17 RX ORDER — OXYCODONE HYDROCHLORIDE AND ACETAMINOPHEN 5; 325 MG/1; MG/1
2 TABLET ORAL ONCE
Status: COMPLETED | OUTPATIENT
Start: 2020-07-17 | End: 2020-07-17

## 2020-07-17 RX ADMIN — OXYCODONE AND ACETAMINOPHEN 2 TABLET: 5; 325 TABLET ORAL at 16:04

## 2020-07-17 ASSESSMENT — PAIN DESCRIPTION - ORIENTATION: ORIENTATION: LEFT

## 2020-07-17 ASSESSMENT — PAIN DESCRIPTION - LOCATION: LOCATION: EAR

## 2020-07-17 ASSESSMENT — PAIN SCALES - GENERAL
PAINLEVEL_OUTOF10: 10
PAINLEVEL_OUTOF10: 10

## 2020-07-17 ASSESSMENT — ENCOUNTER SYMPTOMS
NAUSEA: 0
SHORTNESS OF BREATH: 0
COUGH: 0

## 2020-07-17 NOTE — ED NOTES
Mode of arrival (squad #, walk in, police, etc) : walk in        Chief complaint(s): left ear pain s/p surgery        Arrival Note (brief scenario, treatment PTA, etc). : pt had tube replaced in left ear on Tuesday by Dr. Toni Robledo. Pt is having significant pain to left ear. Pt called dr. Joshua Mcdaniel office who instructed her to come to the ER.        C= \"Have you ever felt that you should Cut down on your drinking? \"  No  A= \"Have people Annoyed you by criticizing your drinking? \"  No  G= \"Have you ever felt bad or Guilty about your drinking? \"  No  E= \"Have you ever had a drink as an Eye-opener first thing in the morning to steady your nerves or to help a hangover? \"  No      Deferred []      Reason for deferring: N/A    *If yes to two or more: probable alcohol abuse. Sergio Soriano RN  07/17/20 2056

## 2020-07-17 NOTE — ED PROVIDER NOTES
[address]  eMERGENCY dEPARTMENT eNCOUnter      Pt Name: Willam Gibson  MRN: [id number]  [place] 1975  Date of evaluation: 7/17/20    CHIEF COMPLAINT       Chief Complaint   Patient presents with    Post-op Problem     left ear     HISTORY OF PRESENT ILLNESS   HPI 50 y.o. female comes emergency department for evaluation of postoperative ear pain. Pain is rated as severe in intensity, constant in course, located in the left ear. She took Tylenol for at home with no relief of her symptoms. On 14 July, the patient had myringotomy tube insertion in the left ear for chronic otitis. She reports that she has had this done frequently, and that she had pain postoperatively that has not gone away. No fevers. REVIEW OF SYSTEMS     Review of Systems   HENT: Positive for ear pain. Negative for ear discharge. Respiratory: Negative for cough and shortness of breath. Cardiovascular: Negative for chest pain. Gastrointestinal: Negative for nausea.      PAST MEDICAL HISTORY     Past Medical History:   Diagnosis Date    Acute exacerbation of chronic obstructive pulmonary disease ([address] 3/21/2018    Adhesive capsulitis of left shoulder 9/25/2018    Asthma     Asthma exacerbation 7/24/2014    Atrial fibrillation ([address]     placed on event monitor 9/25/18 for PVC's & A-fib    Atrial premature depolarization 7/19/2019    Bipolar 1 disorder ([address]     Bipolar disorder ([address] 7/19/2019    Bulging disc     CAD (coronary artery disease)     Candida infection 2/23/2018    Cardiomyopathy ([address]     Chest pain 6/13/2017    CHF (congestive heart failure) (HCC)     Chronic otitis media of both ears     rt>lt    Chronic right shoulder pain 3/14/2017    Cigarette nicotine dependence with nicotine-induced disorder 7/19/2019    Class 3 severe obesity due to excess calories with serious comorbidity and body mass index (BMI) of 40.0 to 44.9 in adult [facility]) 10/4/2018    Closed fracture of left ankle 6/25/2019    Clostridium difficile infection     COPD (chronic obstructive pulmonary disease) (HCC)     Cough, persistent 3/21/2018    Current moderate episode of major depressive disorder without prior episode ([place]) 8/20/2018    Depression     Diabetes mellitus type 2, controlled ([place]) 4/30/2014    Diarrhea     Diarrhea     Dizziness     DVT (deep venous thrombosis) (HCC)     after PICC line right arm    Encounter for monitoring sotalol therapy 2/20/2017    Encounter for screening mammogram for malignant neoplasm of breast 3/7/2018    Endometriosis     Fainting     GERD (gastroesophageal reflux disease)     hx of    GI bleeding     H. pylori infection     Helicobacter pylori (H. pylori)     [place] (hard of hearing)     both ears, no hearing aids    HTN (hypertension) 7/19/2019    Hyperlipidemia     Hypertension     Left bicipital tenosynovitis 10/17/2018    Left leg pain 5/16/2017    Left sided abdominal pain of unknown cause 7/19/2016    Leg swelling 9/21/2018    Mastoiditis     Mastoiditis, acute 4/30/2014    Migraines     Morbid obesity ([place])     Myocardial infarction ([place])     2005    Nausea     Neuropathy     On home oxygen therapy     3 L at night    Ovarian cyst     Passed out     hx of- negative tilt table    Pulmonary hypertension (HCC)     Pulmonary insufficiency     PVC (premature ventricular contraction)     Seasonal allergies     Tricuspid insufficiency     Type II or unspecified type diabetes mellitus without mention of complication, not stated as uncontrolled        SURGICAL HISTORY       Past Surgical History:   Procedure Laterality Date    ABDOMEN SURGERY      abcess    ABDOMINAL ADHESION SURGERY      ABDOMINAL EXPLORATION SURGERY      x 4    ABDOMINAL HERNIA REPAIR      with mesh    ABLATION OF DYSRHYTHMIC FOCUS  2016    ABLATION OF DYSRHYTHMIC FOCUS  09/08/2017    Done at [facility].      ABSCESS DRAINAGE      left hip and chest    APPENDECTOMY      Keisha Reddy MD at [address] [place] [address]       Discharge Medication List as of 7/17/2020  6:47 PM      CONTINUE these medications which have NOT CHANGED    Details   acetaminophen (TYLENOL) 500 MG tablet Take 500 mg by mouth every 6 hours as needed for PainHistorical Med      !! albuterol sulfate HFA (PROAIR HFA) 108 (90 Base) MCG/ACT inhaler Inhale 2 puffs into the lungs every 6 hours as needed for Wheezing, Disp-3 Inhaler,R-11Normal      insulin glargine (LANTUS) 100 UNIT/ML injection vial Inject 80 Units into the skin 2 times dailyHistorical Med      !! VENTOLIN  (90 Base) MCG/ACT inhaler INHALE 2 PUFFS INTO LUNGS EVERY 6 HOURS AS NEEDED FOR WHEEZING, Disp-18 g, R-11Normal      UNIFINE PENTIPS 31G X 8 MM MISC Disp-150 each, R-11, Normal      DULoxetine (CYMBALTA) 60 MG extended release capsule TAKE 1 CAPSULE BY MOUTH 2 TIMES DAILY, Disp-60 capsule, R-11Normal      ARIPiprazole (ABILIFY) 5 MG tablet Take 1 tablet by mouth daily, Disp-30 tablet, R-3Normal      levocetirizine (XYZAL) 5 MG tablet Take 5 mg by mouth nightlyHistorical Med      acetaminophen-codeine (TYLENOL/CODEINE #4) 300-60 MG per tablet Take 2 tablets by mouth daily as needed for Pain. Historical Med      clotrimazole (LOTRIMIN) 1 % cream APPLY TOPICALLY TO AFFECTED AREA(S) 2 TIMES DAILY. , Disp-45 g, R-11, Normal      blood glucose test strips (ONE TOUCH ULTRA TEST) strip Disp-200 each, R-11, NormalUSE TO TEST BLOOD SUGAR 6 TIMES DAILY DUE TO LOW BLOOD SUGARS, UNCONTROLLED DIABETES.  CHECK BEFORE MEALS, AT BEDTIME, AND AS NEEDED      insulin lispro (HUMALOG KWIKPEN) 100 UNIT/ML pen INJECT SUBCUTANEOUSLY PER SLIDING SCALE, 150-200 INJECT 3U, 201-250 INJECT 6U, 251-300 INJECT 9U, >300 INJECT 12U, MAX 30U/DAY, Disp-48 mL, R-11Normal      ipratropium-albuterol (DUONEB) 0.5-2.5 (3) MG/3ML SOLN nebulizer solution INHALE 3 MLS INTO THE LUNGS EVERY 6 HOURS AS NEEDED FOR SHORTNESS OF BREATH, Disp-120 vial, R-5Normal      OXYGEN Inhale into She has a 11.50 pack-year smoking history. She has never used smokeless tobacco. She reports that she does not drink alcohol or use drugs. PHYSICAL EXAM     INITIAL VITALS: BP (!) 137/94   Pulse 83   Temp 96.7 °F (35.9 °C) (Temporal)   Resp 18   Ht 5' 4\" (1.626 m)   Wt 270 lb (122.5 kg)   SpO2 98%   BMI 46.35 kg/m²   General: NAD  Head: Normocephalic, atraumatic  Eye: Pupils equal round reactive to light, no   conjunctivitis  ENT: Right TM is clear, left TM there is a myringotomy tube, there is erythema around the eardrum, there is no drainage at this time, there is no external ear canal erythema. There is no erythema over the mastoid. There is no edema over the mastoid where the pinna of the ear. Heart: Regular rate and rhythm no murmurs  Lungs: Clear to auscultation bilaterally, no respiratory distress  Abdomen: Soft, nontender,   Neurologic: Patient is alert and oriented x3, fluent speech. MEDICAL DECISION MAKING:     MDM  This is a 49-year-old female presenting with postoperative ear pain after myringotomy tube insertion. There is no evidence of any acute mastoiditis at this time. Will consult with her ENT for recommendations of pain management. Emergency Department course:  4:01 PM EDT  D/w Dr. Angela Chun; he recommends a CT temporal bone. 6:30 PM  Ct scan shows a possible cholesteatoma, and also some partial opacification of the left mastoid air cells. I d/w Dr. Angela Chun. He recommends pain control, steroids, antibiotics, and close follow up in clinic on Monday. D/w pt the results, treatment plan, warning precautions for prompt ED return and importance of close OP FU, she verbalizes understanding and agrees with the treatment plan. DIAGNOSTIC RESULTS   RADIOLOGY:All plain film, CT, MRI, and formal ultrasound images (except ED bedside ultrasound) are read by the radiologist and the images and interpretations are directly viewed by the emergency physician.      CT IAC POSTERIOR FOSSA WO CONTRAST   Final Result   1. Stable right temporal bone CT demonstrating thickening of the tympanic   membrane. Right tympanostomy tube in place. 2. Thickening of the left tympanic membrane. Left tympanostomy tube in place. 3. Soft tissue density in the left lateral epitympanic space as well as   surrounding the short process of the incus and stapes. This could represent   trapped fluid/inflammatory tissue, however a developing cholesteatoma is also   possible. No ossicular chain or scutum erosions. 4. Chronic partial opacification of left mastoid air cells. LABS: All lab results were reviewed by myself, and all abnormals are listed below. Labs Reviewed   POC GLUCOSE FINGERSTICK       EMERGENCY DEPARTMENT COURSE:   Vitals:    Vitals:    07/17/20 1433   BP: (!) 137/94   Pulse: 83   Resp: 18   Temp: 96.7 °F (35.9 °C)   TempSrc: Temporal   SpO2: 98%   Weight: 270 lb (122.5 kg)   Height: 5' 4\" (1.626 m)       The patient was given the following medications while in the emergency department:  Orders Placed This Encounter   Medications    oxyCODONE-acetaminophen (PERCOCET) 5-325 MG per tablet 2 tablet    methylPREDNISolone (MEDROL, FREDDY,) 4 MG tablet     Sig: Take by mouth. Dispense:  1 kit     Refill:  0    oxyCODONE-acetaminophen (PERCOCET) 5-325 MG per tablet     Sig: Take 1 tablet by mouth every 4 hours as needed for Pain for up to 3 days. Intended supply: 3 days. Take lowest dose possible to manage pain     Dispense:  12 tablet     Refill:  0    clindamycin (CLEOCIN) 300 MG capsule     Sig: Take 1 capsule by mouth 4 times daily for 7 days     Dispense:  28 capsule     Refill:  0    azithromycin (ZITHROMAX) 500 MG tablet     Sig: Take 1 tablet by mouth daily for 7 days     Dispense:  3 tablet     Refill:  0     -------------------------  CRITICAL CARE:   CONSULTS: IP CONSULT TO OTOLARYNGOLOGY  PROCEDURES: Procedures     FINAL IMPRESSION      1.  Mastoiditis of left side DISPOSITION/PLAN   DISPOSITION Decision To Discharge 07/17/2020 06:32:55 PM      PATIENT REFERRED TO:  Coryb Wills MD  [address] [facility]  [address] [facility] [address]  [phone]    In 2 days      [facility] ED  [facility]  [address] [address]  [phone]    If symptoms worsen      DISCHARGE MEDICATIONS:  Discharge Medication List as of 7/17/2020  6:47 PM      START taking these medications    Details   methylPREDNISolone (MEDROL, FREDDY,) 4 MG tablet Take by mouth., Disp-1 kit,R-0Print      oxyCODONE-acetaminophen (PERCOCET) 5-325 MG per tablet Take 1 tablet by mouth every 4 hours as needed for Pain for up to 3 days. Intended supply: 3 days.  Take lowest dose possible to manage pain, Disp-12 tablet,R-0Print      clindamycin (CLEOCIN) 300 MG capsule Take 1 capsule by mouth 4 times daily for 7 days, Disp-28 capsule,R-0Print      azithromycin (ZITHROMAX) 500 MG tablet Take 1 tablet by mouth daily for 7 days, Disp-3 tablet,R-0Print               Carmina Waggoner MD  Attending Emergency Physician                      Carmina Waggoner MD  07/19/20 0644

## 2020-08-04 NOTE — OP NOTE
[address]                 [address] [facility], [address]                                OPERATIVE REPORT    PATIENT NAME: Jared Kang                    :        1975  MED REC NO:   [id number]                              ROOM:  ACCOUNT NO:   [de-identified]                           ADMIT DATE: 2020  PROVIDER:     Samson Verdin    DATE OF PROCEDURE:  2020    PREOPERATIVE DIAGNOSIS:  Chronic left otitis. POSTOPERATIVE DIAGNOSIS:  Chronic left otitis. OPERATION PERFORMED:  Left myringotomy with placement of T-tube. SURGEON:  Samson Verdin MD    ANESTHESIA:  General.    BLOOD LOSS:  None. COMPLICATIONS:  None. SPECIMEN:  None. INDICATIONS:  This 47-year-old lady has plugged up feeling and decreased  hearing on the left side since the tube got plugged up. She does not  tolerate having this done under local anesthesia and now comes in for  the above procedure. OPERATIVE PROCEDURE:  General anesthesia was administered through a  mask. The right ear was examined under microscope and the T-tube is in  place and patent. On the left side, the T-tube has extruded and was  completely removed. The tympanic membrane is thickened and retracted. Myringotomy performed in the inferior quadrant and serous fluid  suctioned out. A modified T-tube was placed. Ciprodex otic drops used. The patient tolerated the procedure well and was sent to recovery room  in satisfactory condition.         Adelaida Lopez    D: 2020 11:39:34       T: 2020 12:59:30     [email or web address]  Job#: [id number]     Doc#: [id number]    CC:

## 2020-09-29 ENCOUNTER — APPOINTMENT (OUTPATIENT)
Dept: GENERAL RADIOLOGY | Age: 45
End: 2020-09-29
Payer: COMMERCIAL

## 2020-09-29 ENCOUNTER — HOSPITAL ENCOUNTER (EMERGENCY)
Age: 45
Discharge: HOME | End: 2020-09-29
Attending: EMERGENCY MEDICINE
Payer: COMMERCIAL

## 2020-09-29 VITALS
OXYGEN SATURATION: 96 % | WEIGHT: 240 LBS | TEMPERATURE: 99 F | SYSTOLIC BLOOD PRESSURE: 123 MMHG | BODY MASS INDEX: 40.97 KG/M2 | HEIGHT: 64 IN | RESPIRATION RATE: 16 BRPM | DIASTOLIC BLOOD PRESSURE: 50 MMHG | HEART RATE: 71 BPM

## 2020-09-29 LAB
ABSOLUTE EOS #: 0.4 K/UL (ref 0–0.4)
ABSOLUTE IMMATURE GRANULOCYTE: ABNORMAL K/UL (ref 0–0.3)
ABSOLUTE LYMPH #: 3.7 K/UL (ref 1–4.8)
ABSOLUTE MONO #: 0.9 K/UL (ref 0.1–1.3)
ALBUMIN SERPL-MCNC: 4.2 G/DL (ref 3.5–5.2)
ALBUMIN/GLOBULIN RATIO: ABNORMAL (ref 1–2.5)
ALP BLD-CCNC: 83 U/L (ref 35–104)
ALT SERPL-CCNC: 15 U/L (ref 5–33)
ANION GAP SERPL CALCULATED.3IONS-SCNC: 13 MMOL/L (ref 9–17)
AST SERPL-CCNC: 16 U/L
BASOPHILS # BLD: 1 % (ref 0–2)
BASOPHILS ABSOLUTE: 0.1 K/UL (ref 0–0.2)
BILIRUB SERPL-MCNC: 0.42 MG/DL (ref 0.3–1.2)
BUN BLDV-MCNC: 12 MG/DL (ref 6–20)
BUN/CREAT BLD: ABNORMAL (ref 9–20)
CALCIUM SERPL-MCNC: 9.5 MG/DL (ref 8.6–10.4)
CHLORIDE BLD-SCNC: 101 MMOL/L (ref 98–107)
CO2: 26 MMOL/L (ref 20–31)
CREAT SERPL-MCNC: 0.54 MG/DL (ref 0.5–0.9)
DIFFERENTIAL TYPE: ABNORMAL
EOSINOPHILS RELATIVE PERCENT: 3 % (ref 0–4)
GFR AFRICAN AMERICAN: >60 ML/MIN
GFR NON-AFRICAN AMERICAN: >60 ML/MIN
GFR SERPL CREATININE-BSD FRML MDRD: ABNORMAL ML/MIN/{1.73_M2}
GFR SERPL CREATININE-BSD FRML MDRD: ABNORMAL ML/MIN/{1.73_M2}
GLUCOSE BLD-MCNC: 103 MG/DL (ref 70–99)
HCT VFR BLD CALC: 44.2 % (ref 36–46)
HEMOGLOBIN: 15.1 G/DL (ref 12–16)
IMMATURE GRANULOCYTES: ABNORMAL %
INR BLD: 0.9
LYMPHOCYTES # BLD: 28 % (ref 24–44)
MCH RBC QN AUTO: 34.4 PG (ref 26–34)
MCHC RBC AUTO-ENTMCNC: 34.1 G/DL (ref 31–37)
MCV RBC AUTO: 101 FL (ref 80–100)
MONOCYTES # BLD: 7 % (ref 1–7)
NRBC AUTOMATED: ABNORMAL PER 100 WBC
PDW BLD-RTO: 13.9 % (ref 11.5–14.9)
PLATELET # BLD: 301 K/UL (ref 150–450)
PLATELET ESTIMATE: ABNORMAL
PMV BLD AUTO: 8.8 FL (ref 6–12)
POTASSIUM SERPL-SCNC: 4.3 MMOL/L (ref 3.7–5.3)
PROTHROMBIN TIME: 12.3 SEC (ref 11.8–14.6)
RBC # BLD: 4.37 M/UL (ref 4–5.2)
RBC # BLD: ABNORMAL 10*6/UL
SEG NEUTROPHILS: 61 % (ref 36–66)
SEGMENTED NEUTROPHILS ABSOLUTE COUNT: 8.1 K/UL (ref 1.3–9.1)
SODIUM BLD-SCNC: 140 MMOL/L (ref 135–144)
TOTAL PROTEIN: 7.4 G/DL (ref 6.4–8.3)
WBC # BLD: 13.2 K/UL (ref 3.5–11)
WBC # BLD: ABNORMAL 10*3/UL

## 2020-09-29 PROCEDURE — 6360000002 HC RX W HCPCS: Performed by: EMERGENCY MEDICINE

## 2020-09-29 PROCEDURE — 71045 X-RAY EXAM CHEST 1 VIEW: CPT

## 2020-09-29 PROCEDURE — 85610 PROTHROMBIN TIME: CPT

## 2020-09-29 PROCEDURE — 36415 COLL VENOUS BLD VENIPUNCTURE: CPT

## 2020-09-29 PROCEDURE — 2580000003 HC RX 258: Performed by: EMERGENCY MEDICINE

## 2020-09-29 PROCEDURE — 6370000000 HC RX 637 (ALT 250 FOR IP): Performed by: EMERGENCY MEDICINE

## 2020-09-29 PROCEDURE — 96374 THER/PROPH/DIAG INJ IV PUSH: CPT

## 2020-09-29 PROCEDURE — 85025 COMPLETE CBC W/AUTO DIFF WBC: CPT

## 2020-09-29 PROCEDURE — 80053 COMPREHEN METABOLIC PANEL: CPT

## 2020-09-29 PROCEDURE — 94664 DEMO&/EVAL PT USE INHALER: CPT

## 2020-09-29 PROCEDURE — 94640 AIRWAY INHALATION TREATMENT: CPT

## 2020-09-29 PROCEDURE — 99285 EMERGENCY DEPT VISIT HI MDM: CPT

## 2020-09-29 PROCEDURE — 94761 N-INVAS EAR/PLS OXIMETRY MLT: CPT

## 2020-09-29 RX ORDER — GUAIFENESIN/DEXTROMETHORPHAN 100-10MG/5
5 SYRUP ORAL 3 TIMES DAILY PRN
Qty: 120 ML | Refills: 0 | Status: SHIPPED | OUTPATIENT
Start: 2020-09-29 | End: 2020-10-09

## 2020-09-29 RX ORDER — IPRATROPIUM BROMIDE AND ALBUTEROL SULFATE 2.5; .5 MG/3ML; MG/3ML
1 SOLUTION RESPIRATORY (INHALATION) ONCE
Status: COMPLETED | OUTPATIENT
Start: 2020-09-29 | End: 2020-09-29

## 2020-09-29 RX ORDER — METHYLPREDNISOLONE SODIUM SUCCINATE 125 MG/2ML
125 INJECTION, POWDER, LYOPHILIZED, FOR SOLUTION INTRAMUSCULAR; INTRAVENOUS ONCE
Status: COMPLETED | OUTPATIENT
Start: 2020-09-29 | End: 2020-09-29

## 2020-09-29 RX ORDER — 0.9 % SODIUM CHLORIDE 0.9 %
500 INTRAVENOUS SOLUTION INTRAVENOUS ONCE
Status: COMPLETED | OUTPATIENT
Start: 2020-09-29 | End: 2020-09-29

## 2020-09-29 RX ORDER — IPRATROPIUM BROMIDE AND ALBUTEROL SULFATE 2.5; .5 MG/3ML; MG/3ML
1 SOLUTION RESPIRATORY (INHALATION)
Status: DISCONTINUED | OUTPATIENT
Start: 2020-09-29 | End: 2020-09-29

## 2020-09-29 RX ORDER — PREDNISONE 10 MG/1
TABLET ORAL
Qty: 20 TABLET | Refills: 0 | Status: SHIPPED | OUTPATIENT
Start: 2020-09-29 | End: 2020-10-09

## 2020-09-29 RX ORDER — HYDROCODONE BITARTRATE AND ACETAMINOPHEN 5; 325 MG/1; MG/1
2 TABLET ORAL ONCE
Status: COMPLETED | OUTPATIENT
Start: 2020-09-29 | End: 2020-09-29

## 2020-09-29 RX ADMIN — HYDROCODONE BITARTRATE AND ACETAMINOPHEN 2 TABLET: 5; 325 TABLET ORAL at 18:42

## 2020-09-29 RX ADMIN — IPRATROPIUM BROMIDE AND ALBUTEROL SULFATE 1 AMPULE: .5; 3 SOLUTION RESPIRATORY (INHALATION) at 18:11

## 2020-09-29 RX ADMIN — SODIUM CHLORIDE 500 ML: 9 INJECTION, SOLUTION INTRAVENOUS at 18:27

## 2020-09-29 RX ADMIN — IPRATROPIUM BROMIDE AND ALBUTEROL SULFATE 1 AMPULE: .5; 3 SOLUTION RESPIRATORY (INHALATION) at 18:20

## 2020-09-29 RX ADMIN — METHYLPREDNISOLONE SODIUM SUCCINATE 125 MG: 125 INJECTION, POWDER, FOR SOLUTION INTRAMUSCULAR; INTRAVENOUS at 18:27

## 2020-09-29 ASSESSMENT — PAIN SCALES - GENERAL
PAINLEVEL_OUTOF10: 9
PAINLEVEL_OUTOF10: 8

## 2020-09-29 ASSESSMENT — ENCOUNTER SYMPTOMS
WHEEZING: 1
COUGH: 1
VOMITING: 0
NAUSEA: 1
SHORTNESS OF BREATH: 1
SORE THROAT: 0

## 2020-09-29 NOTE — ED PROVIDER NOTES
[address]  eMERGENCY dEPARTMENT eNCOUnter      Pt Name: Isai Guzman  MRN: [id number]  [place] 1975  Date of evaluation: 9/29/20    CHIEF COMPLAINT       Chief Complaint   Patient presents with    Nausea    Hemoptysis    Headache    Dizziness     HISTORY OF PRESENT ILLNESS   HPI 50 y.o. female presents with c/o cough, SOB, wheezing, hoarse voice, headache and body aches. Symptoms present for the last 2 weeks. She has been using her home breathing treatments and took a course of steroids . Symptoms associated with subjecdtive fever and chills. Symptoms rated as severe in severity, constant in duration, and progressively worsening in course. Symptoms also associated with hemoptysis. REVIEW OF SYSTEMS       Review of Systems   Constitutional: Positive for chills, fatigue and fever (subjective). HENT: Negative for congestion and sore throat. Eyes: Negative for visual disturbance. Respiratory: Positive for cough, shortness of breath and wheezing. Cardiovascular: Negative for chest pain and leg swelling. Gastrointestinal: Positive for nausea. Negative for vomiting. Genitourinary: Negative for dysuria. Musculoskeletal: Positive for myalgias. Skin: Negative for rash. Neurological: Positive for headaches. Hematological: Does not bruise/bleed easily.        PAST MEDICAL HISTORY     Past Medical History:   Diagnosis Date    Acute exacerbation of chronic obstructive pulmonary disease ([address] 3/21/2018    Adhesive capsulitis of left shoulder 9/25/2018    Asthma     Asthma exacerbation 7/24/2014    Atrial fibrillation ([address]     placed on event monitor 9/25/18 for PVC's & A-fib    Atrial premature depolarization 7/19/2019    Bipolar 1 disorder ([address]     Bipolar disorder ([address] 7/19/2019    Bulging disc     CAD (coronary artery disease)     Candida infection 2/23/2018    Cardiomyopathy ([address]     Chest pain 6/13/2017    CHF (congestive heart failure) (HCC)     Chronic otitis media of both ears     rt>lt    Chronic right shoulder pain 3/14/2017    Cigarette nicotine dependence with nicotine-induced disorder 7/19/2019    Class 3 severe obesity due to excess calories with serious comorbidity and body mass index (BMI) of 40.0 to 44.9 in adult [facility]) 10/4/2018    Closed fracture of left ankle 6/25/2019    Clostridium difficile infection     COPD (chronic obstructive pulmonary disease) (HCC)     Cough, persistent 3/21/2018    Current moderate episode of major depressive disorder without prior episode ([address] 8/20/2018    Depression     Diabetes mellitus type 2, controlled ([address] 4/30/2014    Diarrhea     Diarrhea     Dizziness     DVT (deep venous thrombosis) ([facility])     after PICC line right arm    Encounter for monitoring sotalol therapy 2/20/2017    Encounter for screening mammogram for malignant neoplasm of breast 3/7/2018    Endometriosis     Fainting     GERD (gastroesophageal reflux disease)     hx of    GI bleeding     H. pylori infection     Helicobacter pylori (H. pylori)     [place] (hard of hearing)     both ears, no hearing aids    HTN (hypertension) 7/19/2019    Hyperlipidemia     Hypertension     Left bicipital tenosynovitis 10/17/2018    Left leg pain 5/16/2017    Left sided abdominal pain of unknown cause 7/19/2016    Leg swelling 9/21/2018    Mastoiditis     Mastoiditis, acute 4/30/2014    Migraines     Morbid obesity ([address]     Myocardial infarction ([address]     2005    Nausea     Neuropathy     On home oxygen therapy     3 L at night    Ovarian cyst     Passed out     hx of- negative tilt table    Pulmonary hypertension (HCC)     Pulmonary insufficiency     PVC (premature ventricular contraction)     Seasonal allergies     Tricuspid insufficiency     Type II or unspecified type diabetes mellitus without mention of complication, not stated as uncontrolled        SURGICAL HISTORY       Past Surgical History:   Procedure Laterality Date SCAPHOID FRACTURE SURGERY Left     foot    TONSILLECTOMY      TYMPANOSTOMY TUBE PLACEMENT      TYMPANOSTOMY TUBE PLACEMENT Left 03/12/2019    ULNAR TUNNEL RELEASE Right     UPPER GASTROINTESTINAL ENDOSCOPY      UPPER GASTROINTESTINAL ENDOSCOPY  07/10/2019    UPPER GASTROINTESTINAL ENDOSCOPY N/A 7/10/2019    EGD BIOPSY performed by Bisi Collado MD at [address] [facility]       Discharge Medication List as of 9/29/2020  7:25 PM      CONTINUE these medications which have NOT CHANGED    Details   insulin lispro, 1 Unit Dial, (HUMALOG KWIKPEN) 100 UNIT/ML SOPN INJECT SUBCUTANEOUSLY PER SLIDING SCALE, 150-200 INJECT 3U, 201-250 INJECT 6U, 251-300 INJECT 9U, >300 INJECT 12U, MAX 30U/DAY, Disp-5 pen,R-11Normal      blood glucose test strips (ONETOUCH ULTRA) strip USE TO TEST BLOOD SUGAR 6 TIMES DAILY DUE TO LOW BLOOD SUGARS, UNCONTROLLED DIABETES.  CHECK BEFORE MEALS, AT BEDTIME, AND AS NEEDED, Disp-200 each,R-1Normal      ARIPiprazole (ABILIFY) 5 MG tablet TAKE 1 TABLET BY MOUTH DAILY, Disp-30 tablet,R-11Normal      LANTUS SOLOSTAR 100 UNIT/ML injection pen INJECT 80 UNITS SUBCUTANEOUSLY 3 TIMES DAILY, Disp-5 pen,R-11PT STATES 80 UNITS BIDNormal      acetaminophen (TYLENOL) 500 MG tablet Take 500 mg by mouth every 6 hours as needed for PainHistorical Med      !! albuterol sulfate HFA (PROAIR HFA) 108 (90 Base) MCG/ACT inhaler Inhale 2 puffs into the lungs every 6 hours as needed for Wheezing, Disp-3 Inhaler,R-11Normal      insulin glargine (LANTUS) 100 UNIT/ML injection vial Inject 80 Units into the skin 2 times dailyHistorical Med      !! VENTOLIN  (90 Base) MCG/ACT inhaler INHALE 2 PUFFS INTO LUNGS EVERY 6 HOURS AS NEEDED FOR WHEEZING, Disp-18 g, R-11Normal      UNIFINE PENTIPS 31G X 8 MM MISC Disp-150 each, R-11, Normal      DULoxetine (CYMBALTA) 60 MG extended release capsule TAKE 1 CAPSULE BY MOUTH 2 TIMES DAILY, Disp-60 capsule, R-11Normal      levocetirizine (XYZAL) 5 MG tablet Take 5 mg by mouth nightlyHistorical Med      acetaminophen-codeine (TYLENOL/CODEINE #4) 300-60 MG per tablet Take 2 tablets by mouth daily as needed for Pain. Historical Med      clotrimazole (LOTRIMIN) 1 % cream APPLY TOPICALLY TO AFFECTED AREA(S) 2 TIMES DAILY. , Disp-45 g, R-11, Normal      ipratropium-albuterol (DUONEB) 0.5-2.5 (3) MG/3ML SOLN nebulizer solution INHALE 3 MLS INTO THE LUNGS EVERY 6 HOURS AS NEEDED FOR SHORTNESS OF BREATH, Disp-120 vial, R-5Normal      OXYGEN Inhale into the lungs Indications: On 3 liters per n/c during the night. Historical Med      Multiple Vitamins-Minerals (MULTIVITAMIN GUMMIES WOMENS) CHEW Take 2 each by mouth daily       carvedilol (COREG) 12.5 MG tablet Take 12.5 mg by mouth 2 times daily (with meals) Takes at 10:00am and 10:00pmHistorical Med      atorvastatin (LIPITOR) 20 MG tablet Take 20 mg by mouth every evening Historical Med       !! - Potential duplicate medications found. Please discuss with provider. ALLERGIES     is allergic to latex; aspirin; avelox [moxifloxacin]; chantix [varenicline]; doxycycline; dye [iodides]; flexeril [cyclobenzaprine]; gabapentin; losartan; morphine; nsaids; pcn [penicillins]; reglan [metoclopramide hcl]; shellfish-derived products; sulfa antibiotics; vancomycin; zofran; zyvox [linezolid]; acyclovir; bactrim [sulfamethoxazole-trimethoprim]; betadine [povidone iodine]; ceclor [cefaclor]; clindamycin/lincomycin; codeine; macrolides and ketolides; novolin r [insulin]; novolog [insulin aspart]; phenothiazines; tape [adhesive tape]; banana; compazine [prochlorperazine]; fentanyl; kiwi extract; tamiflu [oseltamivir phosphate]; and sotalol. FAMILY HISTORY     She indicated that the status of her mother is unknown. She indicated that the status of her father is unknown. She indicated that the status of her sister is unknown. She indicated that the status of her maternal grandmother is unknown.  She indicated that the status of her maternal grandfather is unknown. She indicated that the status of her paternal grandmother is unknown. She indicated that the status of her paternal grandfather is unknown. She indicated that the status of her maternal aunt is unknown. She indicated that the status of her maternal uncle is unknown. She indicated that the status of her paternal aunt is unknown. She indicated that the status of her paternal uncle is unknown. SOCIAL HISTORY      reports that she has been smoking cigarettes. She has a 11.50 pack-year smoking history. She has never used smokeless tobacco. She reports that she does not drink alcohol or use drugs. PHYSICAL EXAM     INITIAL VITALS: BP (!) 123/50   Pulse 71   Temp 99 °F (37.2 °C) (Oral)   Resp 16   Ht 5' 4\" (1.626 m)   Wt 240 lb (108.9 kg)   SpO2 96%   BMI 41.20 kg/m²   Gen: NAD  Head: Normocephalic, atraumatic  Eye: Pupils equal round reactive to light, no conjunctivitis  Neck :No JVD  Heart: Regular rate and rhythm no murmurs  Lungs: Expiratory wheezing bilaterally, no respiratory distress  Chest wall: No crepitus, no tenderness palpation  Abdomen: Soft, nontender, nondistended, with no peritoneal signs  Neurologic: Patient is alert and oriented x3, motor and sensation is intact in all 4 extremities, fluent speech  Extremities: Full range of motion, no cyanosis, no edema, no signs of trauma, no tenderness to palpation    MEDICAL DECISION MAKING:     MDM  49 yo F presenting with signs of COPD exacerbation. Possible covid. No respiratory difficulty at this time. She does have wheezing. Continuing steroids. Treating with a duoneb. Will get cxr and check laboratory studies. Analgesic for body aches. Emergency Department course:  Pt reassessed and she is feeling much better. CXR shows no pneumonia. Laboratory studies unremarkable. Continuing her on steroids. Treating with nebs and cough medication.   D/w pt the results, treatment plan, warning precautions for prompt ED return and importance of close OP FU, she verbalizes understanding and agrees with the treatment plan. DIAGNOSTIC RESULTS   RADIOLOGY:All plain film, CT, MRI, and formal ultrasound images (except ED bedside ultrasound) are read by the radiologist and the images and interpretations are directly viewed by the emergency physician. XR CHEST PORTABLE   Final Result   No acute cardiopulmonary process             LABS: All lab results were reviewed by myself, and all abnormals are listed below.   Labs Reviewed   CBC WITH AUTO DIFFERENTIAL - Abnormal; Notable for the following components:       Result Value    WBC 13.2 (*)     .0 (*)     MCH 34.4 (*)     All other components within normal limits   COMPREHENSIVE METABOLIC PANEL - Abnormal; Notable for the following components:    Glucose 103 (*)     All other components within normal limits   PROTIME-INR       EMERGENCY DEPARTMENT COURSE:   Vitals:    Vitals:    09/29/20 1811 09/29/20 1820 09/29/20 1828 09/29/20 1933   BP:    (!) 123/50   Pulse:   84 71   Resp: 18 26 23 16   Temp:       TempSrc:       SpO2: 96% 98% 92% 96%   Weight:       Height:           The patient was given the following medications while in the emergency department:  Orders Placed This Encounter   Medications    DISCONTD: ipratropium-albuterol (DUONEB) nebulizer solution 1 ampule    0.9 % sodium chloride bolus    ipratropium-albuterol (DUONEB) nebulizer solution 1 ampule    HYDROcodone-acetaminophen (NORCO) 5-325 MG per tablet 2 tablet    methylPREDNISolone sodium (SOLU-MEDROL) injection 125 mg    ipratropium-albuterol (DUONEB) nebulizer solution 1 ampule    predniSONE (DELTASONE) 10 MG tablet     Sig: Take 4 tablets by mouth once daily for 5 days     Dispense:  20 tablet     Refill:  0    guaiFENesin-dextromethorphan (ROBITUSSIN DM) 100-10 MG/5ML syrup     Sig: Take 5 mLs by mouth 3 times daily as needed for Cough     Dispense:  120 mL     Refill:  0 -------------------------  CRITICAL CARE:   CONSULTS: None  PROCEDURES: Procedures     FINAL IMPRESSION      1. COPD exacerbation [facility])          DISPOSITION/PLAN   DISPOSITION Decision To Discharge 09/29/2020 07:24:22 PM      PATIENT REFERRED TO:  Stephany Murcia, [address] [facility] [address] [phone]    In 2 days      [address]  [address]  [address] [facility]  [phone]    If symptoms worsen      DISCHARGE MEDICATIONS:  Discharge Medication List as of 9/29/2020  7:25 PM      START taking these medications    Details   predniSONE (DELTASONE) 10 MG tablet Take 4 tablets by mouth once daily for 5 days, Disp-20 tablet,R-0Print               Antonietta Aleman MD  Attending Emergency Physician                      Antonietta Aleman MD  09/29/20 [id number]

## 2020-11-16 PROBLEM — E11.40 DIABETIC NEUROPATHY (HCC): Status: ACTIVE | Noted: 2019-10-15

## 2020-11-16 PROBLEM — Z20.822 CLOSE EXPOSURE TO 2019 NOVEL CORONAVIRUS: Status: ACTIVE | Noted: 2020-11-16

## 2020-11-16 PROBLEM — N60.19 FIBROCYSTIC BREAST CHANGES: Status: ACTIVE | Noted: 2020-11-16

## 2020-11-16 PROBLEM — F40.240 CLAUSTROPHOBIA: Status: ACTIVE | Noted: 2020-11-16

## 2020-11-16 PROBLEM — Z79.4 LONG TERM CURRENT USE OF INSULIN (HCC): Status: ACTIVE | Noted: 2020-11-16

## 2020-11-16 PROBLEM — M77.9 ENTHESOPATHY: Status: ACTIVE | Noted: 2020-11-16

## 2020-11-16 PROBLEM — L03.113 CELLULITIS OF RIGHT UPPER LIMB: Status: ACTIVE | Noted: 2020-11-16

## 2020-11-16 PROBLEM — R25.1 TREMOR: Status: ACTIVE | Noted: 2019-10-15

## 2020-11-16 PROBLEM — M75.102 ROTATOR CUFF SYNDROME OF LEFT SHOULDER: Status: ACTIVE | Noted: 2020-11-16

## 2020-11-16 PROBLEM — B96.81 HELICOBACTER PYLORI GASTRITIS: Status: ACTIVE | Noted: 2019-10-15

## 2020-11-16 PROBLEM — N60.22 SCLEROSING ADENOSIS OF LEFT BREAST: Status: ACTIVE | Noted: 2020-11-16

## 2020-11-16 PROBLEM — I50.9 CONGESTIVE HEART FAILURE (HCC): Status: ACTIVE | Noted: 2019-10-15

## 2020-11-16 PROBLEM — G56.20 LESION OF ULNAR NERVE: Status: ACTIVE | Noted: 2020-11-16

## 2020-11-16 PROBLEM — J06.9 ACUTE UPPER RESPIRATORY INFECTION: Status: ACTIVE | Noted: 2020-11-16

## 2020-11-16 PROBLEM — G56.00 CARPAL TUNNEL SYNDROME: Status: ACTIVE | Noted: 2019-10-15

## 2020-11-16 PROBLEM — R00.0 TACHYCARDIA: Status: ACTIVE | Noted: 2019-10-15

## 2020-11-16 PROBLEM — K29.70 HELICOBACTER PYLORI GASTRITIS: Status: ACTIVE | Noted: 2019-10-15

## 2020-11-16 PROBLEM — M75.101 RUPTURE OF RIGHT ROTATOR CUFF: Status: ACTIVE | Noted: 2020-11-16

## 2021-02-10 ENCOUNTER — APPOINTMENT (OUTPATIENT)
Dept: GENERAL RADIOLOGY | Age: 46
End: 2021-02-10
Payer: COMMERCIAL

## 2021-02-10 ENCOUNTER — HOSPITAL ENCOUNTER (EMERGENCY)
Age: 46
Discharge: HOME | End: 2021-02-10
Attending: EMERGENCY MEDICINE
Payer: COMMERCIAL

## 2021-02-10 VITALS
HEART RATE: 95 BPM | TEMPERATURE: 99.3 F | BODY MASS INDEX: 40.97 KG/M2 | OXYGEN SATURATION: 97 % | WEIGHT: 240 LBS | SYSTOLIC BLOOD PRESSURE: 208 MMHG | RESPIRATION RATE: 22 BRPM | HEIGHT: 64 IN | DIASTOLIC BLOOD PRESSURE: 90 MMHG

## 2021-02-10 DIAGNOSIS — J40 BRONCHITIS: ICD-10-CM

## 2021-02-10 DIAGNOSIS — J44.1 COPD EXACERBATION (HCC): Primary | ICD-10-CM

## 2021-02-10 LAB — GLUCOSE BLD-MCNC: 234 MG/DL (ref 65–105)

## 2021-02-10 PROCEDURE — 82947 ASSAY GLUCOSE BLOOD QUANT: CPT

## 2021-02-10 PROCEDURE — 99284 EMERGENCY DEPT VISIT MOD MDM: CPT

## 2021-02-10 PROCEDURE — 71045 X-RAY EXAM CHEST 1 VIEW: CPT

## 2021-02-10 RX ORDER — PREDNISONE 50 MG/1
50 TABLET ORAL DAILY
Qty: 5 TABLET | Refills: 0 | Status: SHIPPED | OUTPATIENT
Start: 2021-02-10 | End: 2021-02-15

## 2021-02-10 RX ORDER — AZITHROMYCIN 250 MG/1
TABLET, FILM COATED ORAL
Qty: 1 PACKET | Refills: 0 | Status: SHIPPED | OUTPATIENT
Start: 2021-02-10 | End: 2021-02-20

## 2021-02-10 ASSESSMENT — ENCOUNTER SYMPTOMS
DIARRHEA: 1
BACK PAIN: 0
WHEEZING: 1
SHORTNESS OF BREATH: 0
CONSTIPATION: 0
COUGH: 1
BLOOD IN STOOL: 0
COLOR CHANGE: 0
VOMITING: 0
TROUBLE SWALLOWING: 0
ABDOMINAL PAIN: 0
NAUSEA: 1
SORE THROAT: 1

## 2021-02-10 ASSESSMENT — PAIN SCALES - GENERAL: PAINLEVEL_OUTOF10: 8

## 2021-02-10 NOTE — ED NOTES
Pt arrives to ED c/o sore throat and shortness of breath. Patient states that this has been ongoing for the last couple of days. Patient states that she also started coughing up a green sputum.  Patient states that that she had a virtual visit with her PCP and was told to come to the ED for a chest x ray and to have her SpO2 checked,      Brit Polk RN  02/10/21 1227

## 2021-02-10 NOTE — ED PROVIDER NOTES
[address]  EMERGENCY DEPARTMENT ENCOUNTER    Pt Name: Yazan Caldera  MRN: [id number]  YOB: 1975  Date of evaluation:2/10/21  PCP: Sera Pandey MD    CHIEF COMPLAINT       Chief Complaint   Patient presents with    Pharyngitis    Cough    Shortness of Breath    Nausea    Diarrhea       HISTORY OF PRESENT ILLNESS    Yazan Caldera is a 50 y.o. female who presents with a chief complaint of cough, sore throat, wheezing, nausea and diarrhea. Patient states she has been sick since Friday. States she has asthma and COPD and she coughs at baseline however has been coughing worse than normally since then. She is producing green sputum which is abnormal for her. Denies any actual chest pain or difficulty breathing. She does still smoke cigarettes as well. States she talk to her PCP via virtual appointment and was told to come to the ER \"for a pulse ox check and a chest x-ray. \"  She has been tested multiple times for Covid and has never tested positive. No other sick contacts that she knows of. No fevers that she knows of. Symptoms are acute. Symptoms are moderate. Nothing make symptoms better or worse. Patient has no other complaints at this time. REVIEW OF SYSTEMS       Review of Systems   Constitutional: Negative for chills, fatigue and fever. HENT: Positive for sore throat. Negative for congestion, ear pain and trouble swallowing. Eyes: Negative for visual disturbance. Respiratory: Positive for cough and wheezing. Negative for shortness of breath. Cardiovascular: Negative for chest pain, palpitations and leg swelling. Gastrointestinal: Positive for diarrhea and nausea. Negative for abdominal pain, blood in stool, constipation and vomiting. Genitourinary: Negative for dysuria and flank pain. Musculoskeletal: Negative for arthralgias, back pain, myalgias and neck pain. Skin: Negative for color change, rash and wound.    Neurological: Negative for dizziness, weakness, light-headedness, numbness and headaches. Psychiatric/Behavioral: Negative for confusion. All other systems reviewed and are negative. Negative in 10 essential Systems except as mentioned above and in the HPI.         PAST MEDICAL HISTORY     Past Medical History:   Diagnosis Date    Acute exacerbation of chronic obstructive pulmonary disease ([address] 3/21/2018    Adhesive capsulitis of left shoulder 9/25/2018    Asthma     Asthma exacerbation 7/24/2014    Atrial fibrillation ([address]     placed on event monitor 9/25/18 for PVC's & A-fib    Atrial premature depolarization 7/19/2019    Bipolar 1 disorder ([address]     Bipolar disorder ([address] 7/19/2019    Bulging disc     CAD (coronary artery disease)     Candida infection 2/23/2018    Cardiomyopathy ([address]     Chest pain 6/13/2017    CH[facility]congestive heart failure) ([facility])     Chronic otitis media of both ears     rt>lt    Chronic right shoulder pain 3/14/2017    Cigarette nicotine dependence with nicotine-induced disorder 7/19/2019    Class 3 severe obesity due to excess calories with serious comorbidity and body mass index (BMI) of 40.0 to 44.9 in adult [facility]) 10/4/2018    Closed fracture of left ankle 6/25/2019    Clostridium difficile infection     COPD (chronic obstructive pulmonary disease) ([facility])     Cough, persistent 3/21/2018    Current moderate episode of major depressive disorder without prior episode ([address] 8/20/2018    Depression     Diabetes mellitus type 2, controlled ([address] 4/30/2014    Diarrhea     Diarrhea     Dizziness     DVT (deep venous thrombosis) ([facility])     after PICC line right arm    Encounter for monitoring sotalol therapy 2/20/2017    Encounter for screening mammogram for malignant neoplasm of breast 3/7/2018    Endometriosis     Fainting     GERD (gastroesophageal reflux disease)     hx of    GI bleeding     H. pylori infection     Helicobacter pylori (H. pylori)     [place] (hard of hearing)     both ears, no hearing aids    HTN (hypertension) 7/19/2019    Hyperlipidemia     Hypertension     Left bicipital tenosynovitis 10/17/2018    Left leg pain 5/16/2017    Left sided abdominal pain of unknown cause 7/19/2016    Leg swelling 9/21/2018    Mastoiditis     Mastoiditis, acute 4/30/2014    Migraines     Morbid obesity (HCC)     Myocardial infarction ([facility]     2005    Nausea     Neuropathy     On home oxygen therapy     3 L at night    Ovarian cyst     Passed out     hx of- negative tilt table    Pulmonary hypertension (HCC)     Pulmonary insufficiency     PVC (premature ventricular contraction)     Seasonal allergies     Tricuspid insufficiency     Type II or unspecified type diabetes mellitus without mention of complication, not stated as uncontrolled          SURGICAL HISTORY      has a past surgical history that includes Hysterectomy; Cholecystectomy; Appendectomy; Colonoscopy; Upper gastrointestinal endoscopy; Abdomen surgery; Abdominal adhesion surgery; Abdominal exploration surgery; Tympanostomy tube placement; Tonsillectomy; Foot surgery (Left); Abdominal hernia repair; hysteroscopy; Gastric fundoplication (3615); Abscess Drainage; Scaphoid fracture surgery (Left); other surgical history (Right, 5/29/14); Myringotomy Tympanostomy Tube Placement (Right, 06/05/2014); myringotomy (Right, 11/13/15); Hysterectomy; Cardiac catheterization (2014 & 2000); other surgical history (2009); Breast surgery (Left, 2007); fracture surgery (Right); other surgical history (Right, 08/11/2016); ablation of dysrhythmic focus (2016); other surgical history; other surgical history; pr manipulatn shldr jt w anesthesia (Right, 3/1/2017); ablation of dysrhythmic focus (09/08/2017); pr shoulder scope bone shaving (Left, 10/17/2018); Tympanostomy tube placement (Left, 03/12/2019); Upper gastrointestinal endoscopy (07/10/2019); Upper gastrointestinal endoscopy (N/A, 7/10/2019);  Carpal tunnel release (Right, 12/19/2019); Carpal tunnel release (05/04/2020); Ulnar tunnel release (Right); myringotomy (Left, 7/14/2020); and other surgical history (10/19/2020). CURRENT MEDICATIONS       Previous Medications    ACETAMINOPHEN (TYLENOL) 500 MG TABLET    Take 500 mg by mouth every 6 hours as needed for Pain    ACETAMINOPHEN-CODEINE (TYLENOL/CODEINE #4) 300-60 MG PER TABLET    Take 2 tablets by mouth daily as needed for Pain. ARIPIPRAZOLE (ABILIFY) 5 MG TABLET    TAKE 1 TABLET BY MOUTH DAILY    ATORVASTATIN (LIPITOR) 20 MG TABLET    Take 20 mg by mouth every evening     BUMETANIDE (BUMEX) 2 MG TABLET    Take 2 mg by mouth daily    CARVEDILOL (COREG) 12.5 MG TABLET    Take 12.5 mg by mouth 2 times daily (with meals) Takes at 10:00am and 10:00pm    CLOTRIMAZOLE (LOTRIMIN) 1 % CREAM    APPLY TOPICALLY TO AFFECTED AREA(S) 2 TIMES DAILY. DULOXETINE (CYMBALTA) 60 MG EXTENDED RELEASE CAPSULE    TAKE 1 CAPSULE BY MOUTH 2 TIMES DAILY    INSULIN DEGLUDEC (TRESIBA FLEXTOUCH) 200 UNIT/ML SOPN    Inject 80 Units into the skin 2 times daily    INSULIN GLARGINE (LANTUS SOLOSTAR) 100 UNIT/ML INJECTION PEN    Inject 80 Units into the skin 2 times daily    INSULIN GLARGINE (LANTUS) 100 UNIT/ML INJECTION VIAL    Inject 80 Units into the skin 2 times daily    INSULIN LISPRO, 1 UNIT DIAL, (HUMALOG KWIKPEN) 100 UNIT/ML SOPN    INJECT SUBCUTANEOUSLY PER SLIDING SCALE, 150-200 INJECT 3U, 201-250 INJECT 6U, 251-300 INJECT 9U, >300 INJECT 12U, MAX 30U/DAY    IPRATROPIUM-ALBUTEROL (DUONEB) 0.5-2.5 (3) MG/3ML SOLN NEBULIZER SOLUTION    INHALE 3 MLS (ONE VIAL) WITH NEBULIZER EVERY 6 HOURS AS NEEDED FOR SHORTNESS OF BREATH    LEVOCETIRIZINE (XYZAL) 5 MG TABLET    Take 5 mg by mouth nightly    MULTIPLE VITAMINS-MINERALS (MULTIVITAMIN GUMMIES WOMENS) CHEW    Take 2 each by mouth daily     ONETOUCH ULTRA STRIP    USE TO TEST BLOOD SUGAR 6 TIMES DAILY DUE TO LOW BLOOD SUGARS, UNCONTROLLED DIABETES.  CHECK BEFORE MEALS, AT BEDTIME, AND AS NEEDED OXYGEN    Inhale into the lungs Indications: On 3 liters per n/c during the night. TOPIRAMATE (TOPAMAX) 25 MG TABLET        UNIFINE PENTIPS 31G X 8 MM MISC    USE 4 TIMES DAILY AS DIRECTED    VENTOLIN  (90 BASE) MCG/ACT INHALER    INHALE 2 PUFFS INTO LUNGS EVERY 6 HOURS AS NEEDED FOR WHEEZING       ALLERGIES     is allergic to latex; aspirin; avelox [moxifloxacin]; chantix [varenicline]; doxycycline; dye [iodides]; flexeril [cyclobenzaprine]; gabapentin; losartan; morphine; nsaids; pcn [penicillins]; reglan [metoclopramide hcl]; shellfish-derived products; sulfa antibiotics; vancomycin; zofran; zyvox [linezolid]; acyclovir; bactrim [sulfamethoxazole-trimethoprim]; betadine [povidone iodine]; ceclor [cefaclor]; clindamycin/lincomycin; codeine; macrolides and ketolides; novolin r [insulin]; novolog [insulin aspart]; phenothiazines; tape [adhesive tape]; banana; compazine [prochlorperazine]; fentanyl; kiwi extract; tamiflu [oseltamivir phosphate]; and sotalol. FAMILY HISTORY     She indicated that the status of her mother is unknown. She indicated that the status of her father is unknown. She indicated that the status of her sister is unknown. She indicated that the status of her maternal grandmother is unknown. She indicated that the status of her maternal grandfather is unknown. She indicated that the status of her paternal grandmother is unknown. She indicated that the status of her paternal grandfather is unknown. She indicated that the status of her maternal aunt is unknown. She indicated that the status of her maternal uncle is unknown. She indicated that the status of her paternal aunt is unknown. She indicated that the status of her paternal uncle is unknown.     family history includes Asthma in her father, maternal grandfather, maternal grandmother, and mother; Breast Cancer in her maternal aunt, maternal grandmother, and paternal aunt;  Cancer in her maternal aunt, maternal grandmother, paternal No respiratory distress. Breath sounds: Normal breath sounds. Abdominal:      General: Bowel sounds are normal. There is no distension. Palpations: Abdomen is soft. Tenderness: There is no abdominal tenderness. Musculoskeletal:         General: No tenderness. Lymphadenopathy:      Cervical: No cervical adenopathy. Skin:     General: Skin is warm and dry. Findings: No rash. Neurological:      Mental Status: She is alert and oriented to person, place, and time. Psychiatric:         Judgment: Judgment normal.           DIFFERENTIAL DIAGNOSIS/MDM:   49-year-old female with history of COPD and asthma presents with symptoms as above. Most significantly she is having a worsening cough, with production of green sputum. Currently she is afebrile, nontoxic, normal vital signs other than hypertension. She is not in any acute distress. Patient initially was refusing to wear a mask because she felt claustrophobic however after further conversation she agreed to wear a mask while in the department. She is not in any respiratory distress. Her lung exam is actually unremarkable. She has no wheezing, crackles from what I can appreciate. Differential diagnosis includes viral syndrome, pneumonia, asthma versus COPD exacerbation. COVID-19 is a possibility. Overall she looks very well. We will get a chest x-ray, treat symptomatically. The patient admits to smoking. 3 minutes of time was spent discussing how this can worsen underlying breathing problems, COPD hypertension and heart disease or cause them to develop. DIAGNOSTIC RESULTS     EKG: All EKG's are interpreted by the Emergency Department Physician who either signs or Co-signs this chart in the absence of a cardiologist.        RADIOLOGY:   I directly visualized the following  images and reviewed the radiologist interpretations:  XR CHEST PORTABLE   Final Result   No acute cardiopulmonary pathology.                  ED COPD exacerbation ([facility]    2. Bronchitis            DISPOSITION/PLAN   DISPOSITION      Discharged home    PATIENT REFERRED TO:  Verna Gisela, [address]  [address]  [facility] [address]  [phone]    Schedule an appointment as soon as possible for a visit       [address]  [facility] [address] [phone]    As needed, If symptoms worsen      DISCHARGE MEDICATIONS:  New Prescriptions    AZITHROMYCIN (ZITHROMAX) 250 MG TABLET    Take 2 tablets (500 mg) on Day 1, followed by 1 tablet (250 mg) once daily on Days 2 through 5.     PREDNISONE (DELTASONE) 50 MG TABLET    Take 1 tablet by mouth daily for 5 days       (Please note that portions ofthis note were completed with a voice recognition program.  Efforts were made to edit the dictations but occasionally words are mis-transcribed.)    Darell Medina DO  Attending Emergency Physician          Darell Medina DO  02/10/21 [id number]

## 2021-02-10 NOTE — ED NOTES
Pt discharged in stable condition with Rx's and discharge instructions, including instructions on pulse oximeter and follow up with PCP. Pt ambulates to door with steady gait and without assistance.       Rolanda Flores RN  02/10/21 [id number]

## 2021-02-17 ENCOUNTER — HOSPITAL ENCOUNTER (OUTPATIENT)
Age: 46
Discharge: HOME | End: 2021-02-17
Payer: COMMERCIAL

## 2021-02-17 LAB
MAGNESIUM, IONIZED: 0.4 MMOL/L (ref 0.45–0.6)
VITAMIN B-12: 476 PG/ML (ref 232–1245)
VITAMIN D 25-HYDROXY: 17.6 NG/ML (ref 30–100)

## 2021-02-17 PROCEDURE — 80307 DRUG TEST PRSMV CHEM ANLYZR: CPT

## 2021-02-17 PROCEDURE — 36415 COLL VENOUS BLD VENIPUNCTURE: CPT

## 2021-02-17 PROCEDURE — G0480 DRUG TEST DEF 1-7 CLASSES: HCPCS

## 2021-02-17 PROCEDURE — 83735 ASSAY OF MAGNESIUM: CPT

## 2021-02-17 PROCEDURE — 82306 VITAMIN D 25 HYDROXY: CPT

## 2021-02-17 PROCEDURE — 82607 VITAMIN B-12: CPT

## 2021-02-19 LAB
ALCOHOL URINE: NEGATIVE MG/DL
AMPHETAMINE SCREEN URINE: NEGATIVE NG/ML
BARBITURATE SCREEN URINE: NEGATIVE NG/ML
BENZODIAZEPINE SCREEN, URINE: NEGATIVE NG/ML
CANNABINOID SCREEN URINE: NEGATIVE NG/ML
COCAINE(METAB.)SCREEN, URINE: NEGATIVE NG/ML
CREATININE URINE: 260.4 MG/DL (ref 20–400)
Lab: NORMAL
METHADONE SCREEN, URINE: NEGATIVE NG/ML
OPIATES, URINE: POSITIVE NG/ML
PHENCYCLIDINE, URINE: NEGATIVE NG/ML
PROPOXYPHENE, URINE: NEGATIVE NG/ML

## 2021-02-21 LAB
CODEINE, URINE: <20 NG/ML
HYDROCODONE, URINE CONFIRMATION: <20 NG/ML
HYDROMORPHONE, URINE CONFIRMATION: <20 NG/ML
MORPHINE, URINE CONFIRMATION: <20 NG/ML
NORHYDROCODONE, URINE: <20 NG/ML
NOROXYCODONE, URINE: >4000 NG/ML
NOROXYMORPHONE, URINE: 994 NG/ML
OPIATE, 6-AM URINE: <10 NG/ML
OXYCODONE, URINE CONFIRMATION: >4000 NG/ML
OXYMORPHONE, URINE CONFIRMATION: 144 NG/ML

## 2021-04-24 ENCOUNTER — NURSE TRIAGE (OUTPATIENT)
Dept: OTHER | Facility: CLINIC | Age: 46
End: 2021-04-24

## 2021-04-24 NOTE — TELEPHONE ENCOUNTER
Call received on [address]. Brief description of triage: See below    Triage indicates for patient to call PCP or telemedicine provider now. PCP office is closed currently. Pt provided with [employer] and coupon code [id number] resource. Care advice provided. Recommended using local department of health website for testing locations and up to date information. Caller verbalizes understanding, denies any other questions or concerns; instructed to call back for any new or worsening symptoms. Reason for Disposition   [1] Symptoms of COVID-19 (e.g., cough, fever, SOB, or others) AND [2] within 14 days of EXPOSURE (close contact) with diagnosed or suspected COVID-19 patient   [1] HIGH RISK patient (e.g., age > 59 years, diabetes, heart or lung disease, weak immune system) AND [2] new or worsening symptoms    Answer Assessment - Initial Assessment Questions  1. COVID-19 CLOSE CONTACT: \"Who is the person with the confirmed or suspected COVID-19 infection that you were exposed to? \"      Niece and nephew    2. PLACE of CONTACT: \"Where were you when you were exposed to COVID-19? \" (e.g., home, school, medical waiting room; which city?)      Niece- Car; [employer] [address]    3. TYPE of CONTACT: \"How much contact was there? \" (e.g., sitting next to, live in same house, work in same office, same building)      Niece- sitting next to; Nephew- was in the patient's home    4. DURATION of CONTACT: \"How long were you in contact with the COVID-19 patient? \" (e.g., a few seconds, passed by person, a few minutes, 15 minutes or longer, live with the patient)      Niece and nephew- 15 minutes or longer    5. MASK: \"Were you wearing a mask? \" \"Was the other person wearing a mask? \" Note: wearing a mask reduces the risk of an otherwise close contact. Niece- both parties wearing a mask; Nephew- Neither party wearing a mask    6. DATE of CONTACT: \"When did you have contact with a COVID-19 patient? \" (e.g., how many days ago)      4/18/2021- niece; 4/20/2021- nephew    7. COMMUNITY SPREAD: \"Are there lots of cases of COVID-19 (community spread) where you live? \" (See public health department website, if unsure)        Unsure    8. SYMPTOMS: \"Do you have any symptoms? \" (e.g., fever, cough, breathing difficulty, loss of taste or smell)      Cough, stuffy nose    9. PREGNANCY OR POSTPARTUM: \"Is there any chance you are pregnant? \" \"When was your last menstrual period? \" \"Did you deliver in the last 2 weeks? \"      Denies; Hysterectomy    10. HIGH RISK: \"Do you have any heart or lung problems? \" \"Do you have a weak immune system? \" (e.g., heart failure, COPD, asthma, HIV positive, chemotherapy, renal failure, diabetes mellitus, sickle cell anemia, obesity)        CHF, Cardiomyopathy, DM, COPD, Asthma    11. TRAVEL: \"Have you traveled out of the country recently? \" If so, \"When and where? \" Also ask about out-of-state travel, since the [facility] has identified some high-risk cities for community spread in the [address] [place] [address] [address]. Note: Travel becomes less relevant if there is widespread community transmission where the patient lives.         Denies    Protocols used: CORONAVIRUS (COVID-19) EXPOSURE-ADULT-AH, CORONAVIRUS (COVID-19) DIAGNOSED OR SUSPECTED-ADULT-AH

## 2021-09-10 ENCOUNTER — HOSPITAL ENCOUNTER (EMERGENCY)
Age: 46
Discharge: HOME | End: 2021-09-10
Attending: EMERGENCY MEDICINE
Payer: COMMERCIAL

## 2021-09-10 ENCOUNTER — APPOINTMENT (OUTPATIENT)
Dept: CT IMAGING | Age: 46
End: 2021-09-10
Payer: COMMERCIAL

## 2021-09-10 VITALS
DIASTOLIC BLOOD PRESSURE: 78 MMHG | HEART RATE: 75 BPM | TEMPERATURE: 98.5 F | SYSTOLIC BLOOD PRESSURE: 170 MMHG | BODY MASS INDEX: 42.17 KG/M2 | HEIGHT: 64 IN | OXYGEN SATURATION: 97 % | RESPIRATION RATE: 16 BRPM | WEIGHT: 247 LBS

## 2021-09-10 DIAGNOSIS — H92.01 RIGHT EAR PAIN: Primary | ICD-10-CM

## 2021-09-10 PROCEDURE — 99283 EMERGENCY DEPT VISIT LOW MDM: CPT

## 2021-09-10 PROCEDURE — 70480 CT ORBIT/EAR/FOSSA W/O DYE: CPT

## 2021-09-10 RX ORDER — AZITHROMYCIN 250 MG/1
TABLET, FILM COATED ORAL
Qty: 1 PACKET | Refills: 0 | Status: SHIPPED | OUTPATIENT
Start: 2021-09-10 | End: 2021-09-14

## 2021-09-10 ASSESSMENT — PAIN DESCRIPTION - LOCATION: LOCATION: EAR

## 2021-09-10 ASSESSMENT — PAIN SCALES - GENERAL: PAINLEVEL_OUTOF10: 9

## 2021-09-10 ASSESSMENT — PAIN DESCRIPTION - ORIENTATION: ORIENTATION: RIGHT

## 2021-09-10 NOTE — ED PROVIDER NOTES
[address]  eMERGENCY dEPARTMENT eNCOUnter      Pt Name: Saran Iglesias  MRN: [id number]  [place] 1975  Date of evaluation: 9/10/2021  Provider: Dean Richardson PA-C    CHIEF COMPLAINT       Chief Complaint   Patient presents with    Otalgia    Headache           HISTORY OF PRESENT ILLNESS  (Location/Symptom, Timing/Onset, Context/Setting, Quality, Duration, Modifying Factors, Severity.)   Saran Iglesias is a 48 y.o. female who presents to the emergency department for evaluation of right ear pain. Pt states she has been having right ear pain, headache x several weeks. Pt does follow with Dr. Jennifer Berger and called him. He wanted her to start ciprodex drops. Pt has been taking those for 2 weeks. States it makes her pain worse. Pt did call Dr. Jennifer Berger office today and they recommend she come to the ED. Pt denies dizziness, chills, sore throat,congestion, cough, cp, sob, nausea, emesis, abd pain. Pt has had intermittent fevers. Reports hx of mastoiditis. No other complaints. Nursing Notes were reviewed. REVIEW OF SYSTEMS    (2-9 systems for level 4, 10 or more for level 5)     Review of Systems   Right ear pain  Headache      Except as noted above the remainder of the review of systems was reviewed and negative.        PAST MEDICAL HISTORY     Past Medical History:   Diagnosis Date    Acute exacerbation of chronic obstructive pulmonary disease ([address] 3/21/2018    Adhesive capsulitis of left shoulder 9/25/2018    Asthma     Asthma exacerbation 7/24/2014    Atrial fibrillation ([address]     placed on event monitor 9/25/18 for PVC's & A-fib    Atrial premature depolarization 7/19/2019    Bipolar 1 disorder ([address]     Bipolar disorder ([address] 7/19/2019    Bulging disc     CAD (coronary artery disease)     Candida infection 2/23/2018    Cardiomyopathy ([address]     Chest pain 6/13/2017    CHF (congestive heart failure) (HCC)     Chronic otitis media of both ears     rt>lt    Chronic right shoulder pain 3/14/2017    Cigarette nicotine dependence with nicotine-induced disorder 7/19/2019    Class 3 severe obesity due to excess calories with serious comorbidity and body mass index (BMI) of 40.0 to 44.9 in adult [facility]) 10/4/2018    Closed fracture of left ankle 6/25/2019    Clostridium difficile infection     COPD (chronic obstructive pulmonary disease) (HCC)     Cough, persistent 3/21/2018    Current moderate episode of major depressive disorder without prior episode ([address] 8/20/2018    Depression     Diabetes mellitus type 2, controlled ([address] 4/30/2014    Diarrhea     Diarrhea     Dizziness     DVT (deep venous thrombosis) (HCC)     after PICC line right arm    Encounter for monitoring sotalol therapy 2/20/2017    Encounter for screening mammogram for malignant neoplasm of breast 3/7/2018    Endometriosis     Fainting     GERD (gastroesophageal reflux disease)     hx of    GI bleeding     H. pylori infection     Helicobacter pylori (H. pylori)     [place] (hard of hearing)     both ears, no hearing aids    HTN (hypertension) 7/19/2019    Hyperlipidemia     Hypertension     Left bicipital tenosynovitis 10/17/2018    Left leg pain 5/16/2017    Left sided abdominal pain of unknown cause 7/19/2016    Leg swelling 9/21/2018    Mastoiditis     Mastoiditis, acute 4/30/2014    Migraines     Morbid obesity ([address]     Myocardial infarction ([address]     2005    Nausea     Neuropathy     On home oxygen therapy     3 L at night    Ovarian cyst     Passed out     hx of- negative tilt table    Pulmonary hypertension (HCC)     Pulmonary insufficiency     PVC (premature ventricular contraction)     Seasonal allergies     Tricuspid insufficiency     Type II or unspecified type diabetes mellitus without mention of complication, not stated as uncontrolled      None otherwise stated in nurses notes    SURGICAL HISTORY       Past Surgical History:   Procedure Laterality Date    ABDOMEN SURGERY      abcess    ABDOMINAL ADHESION SURGERY      ABDOMINAL EXPLORATION SURGERY      x 4    ABDOMINAL HERNIA REPAIR      with mesh    ABLATION OF DYSRHYTHMIC FOCUS  2016    ABLATION OF DYSRHYTHMIC FOCUS  09/08/2017    [facility] at [facility]  ABSCESS DRAINAGE      left hip and chest    APPENDECTOMY      BREAST SURGERY Left 2007    I & D    CARDIAC CATHETERIZATION  2014 & 2000     no stenting    CARPAL TUNNEL RELEASE Right 12/19/2019    Ulnar nerve decompression, right elbow. Release of 1st and 2nd extensor compartments, right forearm.  CARPAL TUNNEL RELEASE  05/04/2020    Carpal tunnel release, left hand    CHOLECYSTECTOMY      COLONOSCOPY      FOOT SURGERY Left     bone fragment removed    FRACTURE SURGERY Right     closed reduction perc pinning ring & middle finger    GASTRIC FUNDOPLICATION  9858    HYSTERECTOMY      total    HYSTERECTOMY      HYSTEROSCOPY      tubal perfusion    MYRINGOTOMY Right 11/13/2015    Right myringotomy with placement of  T-tube on the right side. Removal of plugged ventilating tube, right side.  MYRINGOTOMY Left 07/14/2020    MYRINGOTOMY TUBE INSERTION performed by Kareem Long MD at [facility] Right 06/05/2014    OTHER SURGICAL HISTORY Right 05/29/2014    removal ear tube rt ear    OTHER SURGICAL HISTORY  2009    LOOP recorder inserted and removed 3 mos.  later    OTHER SURGICAL HISTORY Right 08/11/2016    ear tube removal with patch    OTHER SURGICAL HISTORY      tubal perfusion, lysis of uterine adhesions    OTHER SURGICAL HISTORY      multiple PICC lines inserted and removed, it has affected circulation bilateral upper arms    OTHER SURGICAL HISTORY  10/19/2020    Revisiion to subcutaneous transposition of unlar nerve, right elbow    OTHER SURGICAL HISTORY Right 06/14/2021    REVISION TO SUBMUSCULAR TRANSPOSITION OF RIGHT ULNAR NERVE    BECKY BATES JT W ANESTHESIA Right 03/01/2017 SHOULDER MANIPULATION RIGHT performed by Kurtis Lyn MD at [address] [facility] Left 10/17/2018    SHOULDER ARTHROSCOPY W/BICEPS TENDONESIS performed by Patti Da Silva MD at [facility] Left     foot    TONSILLECTOMY      TYMPANOSTOMY TUBE PLACEMENT      TYMPANOSTOMY TUBE PLACEMENT Left 03/12/2019    ULNAR TUNNEL RELEASE Right     UPPER GASTROINTESTINAL ENDOSCOPY      UPPER GASTROINTESTINAL ENDOSCOPY  07/10/2019    UPPER GASTROINTESTINAL ENDOSCOPY N/A 07/10/2019    EGD BIOPSY performed by Malick Traylor MD at [address] [place] [address]     None otherwise stated in nurses notes    CURRENT MEDICATIONS       Discharge Medication List as of 9/10/2021  2:48 PM      CONTINUE these medications which have NOT CHANGED    Details   clotrimazole (LOTRIMIN) 1 % cream APPLY TOPICALLY TO AFFECTED AREA(S) 2 TIMES DAILY. , Disp-45 g, R-11, Normal      ARIPiprazole (ABILIFY) 5 MG tablet TAKE 1 TABLET BY MOUTH DAILY, Disp-30 tablet, R-11Normal      CLEARLAX 17 GM/SCOOP powder TAKE 17 G BY MOUTH 2 TIMES DAILY, Disp-1530 g, R-11Normal      UNIFINE PENTIPS 31G X 8 MM MISC Disp-100 each, R-3, Normal      DULoxetine (CYMBALTA) 60 MG extended release capsule TAKE 1 CAPSULE BY MOUTH 2 TIMES DAILY, Disp-60 capsule, R-11Normal      ibuprofen (ADVIL;MOTRIN) 800 MG tablet TAKE 1 TABLET BY MOUTH EVERY 8 HOURS AS NEEDED FOR PAIN TAKE WITH FOOD, Disp-90 tablet, R-11PLEASE ADDRESS THIS REQUEST WITH DR. NAHUM Bojorquez      Insulin Degludec (TRESIBA FLEXTOUCH) 200 UNIT/ML SOPN Inject 80 Units into the skin 2 times daily, Disp-10 pen, R-11Normal      insulin glargine (LANTUS SOLOSTAR) 100 UNIT/ML injection pen Inject 80 Units into the skin 2 times daily, Disp-20 pen, R-11Normal      ipratropium-albuterol (DUONEB) 0.5-2.5 (3) MG/3ML SOLN nebulizer solution INHALE 3 MLS (ONE VIAL) WITH NEBULIZER EVERY 6 HOURS AS NEEDED FOR SHORTNESS OF BREATH, Disp-360 vial, R-11Normal      topiramate (TOPAMAX) 25 MG tablet Historical Med      bumetanide (BUMEX) 2 MG tablet Take 2 mg by mouth dailyHistorical Med      ONETOUCH ULTRA strip USE TO TEST BLOOD SUGAR 6 TIMES DAILY DUE TO LOW BLOOD SUGARS, UNCONTROLLED DIABETES. CHECK BEFORE MEALS, AT BEDTIME, AND AS NEEDED, Disp-200 each,R-11Normal      insulin lispro, 1 Unit Dial, (HUMALOG KWIKPEN) 100 UNIT/ML SOPN INJECT SUBCUTANEOUSLY PER SLIDING SCALE, 150-200 INJECT 3U, 201-250 INJECT 6U, 251-300 INJECT 9U, >300 INJECT 12U, MAX 30U/DAY, Disp-5 pen,R-11Normal      acetaminophen (TYLENOL) 500 MG tablet Take 500 mg by mouth every 6 hours as needed for PainHistorical Med      insulin glargine (LANTUS) 100 UNIT/ML injection vial Inject 80 Units into the skin 2 times dailyHistorical Med      VENTOLIN  (90 Base) MCG/ACT inhaler INHALE 2 PUFFS INTO LUNGS EVERY 6 HOURS AS NEEDED FOR WHEEZING, Disp-18 g, R-11Normal      levocetirizine (XYZAL) 5 MG tablet Take 5 mg by mouth nightlyHistorical Med      acetaminophen-codeine (TYLENOL/CODEINE #4) 300-60 MG per tablet Take 2 tablets by mouth daily as needed for Pain. Historical Med      OXYGEN Inhale into the lungs Indications: On 3 liters per n/c during the night. Historical Med      Multiple Vitamins-Minerals (MULTIVITAMIN GUMMIES WOMENS) CHEW Take 2 each by mouth daily       carvedilol (COREG) 12.5 MG tablet Take 12.5 mg by mouth 2 times daily (with meals) Takes at 10:00am and 10:00pmHistorical Med      atorvastatin (LIPITOR) 20 MG tablet Take 20 mg by mouth every evening Historical Med             ALLERGIES     Latex, Aspirin, Avelox [moxifloxacin], Chantix [varenicline], Doxycycline, Dye [iodides], Flexeril [cyclobenzaprine], Gabapentin, Losartan, Morphine, Nsaids, Pcn [penicillins], Reglan [metoclopramide hcl], Shellfish-derived products, Sulfa antibiotics, Vancomycin, Zofran, Zyvox [linezolid], Acyclovir, Bactrim [sulfamethoxazole-trimethoprim], Betadine [povidone iodine], Ceclor [cefaclor], Clindamycin/lincomycin, Codeine, Macrolides and ketolides, Novolin r [insulin], Novolog [insulin aspart], Phenothiazines, Tape [adhesive tape], Banana, Compazine [prochlorperazine], Fentanyl, Kiwi extract, Tamiflu [oseltamivir phosphate], and Sotalol    FAMILY HISTORY           Problem Relation Age of Onset    Ulcerative Colitis Father     Liver Disease Father 61        hep c and b    Diabetes Father     Asthma Father     Heart Disease Father     High Blood Pressure Father     Breast Cancer Maternal Aunt     Cancer Maternal Aunt     Diabetes Maternal Aunt     Heart Disease Maternal Aunt     High Blood Pressure Maternal Aunt     Breast Cancer Paternal Aunt     Heart Disease Paternal Aunt     High Blood Pressure Paternal Aunt     Breast Cancer Maternal Grandmother     Cervical Cancer Maternal Grandmother     Cancer Maternal Grandmother     Diabetes Maternal Grandmother     Asthma Maternal Grandmother     Heart Disease Maternal Grandmother     High Blood Pressure Maternal Grandmother     Lung Cancer Maternal Grandfather     Diabetes Maternal Grandfather     Asthma Maternal Grandfather     Heart Disease Maternal Grandfather     High Blood Pressure Maternal Grandfather     Cervical Cancer Paternal Grandmother     Cancer Paternal Grandmother     Diabetes Paternal Grandmother     Heart Disease Paternal Grandmother     High Blood Pressure Paternal Grandmother     Diabetes Mother     Asthma Mother     Heart Disease Mother     High Blood Pressure Mother     Cancer Sister     Heart Disease Maternal Uncle     High Blood Pressure Maternal Uncle     Heart Disease Paternal Uncle     High Blood Pressure Paternal Uncle     Diabetes Paternal Grandfather     Heart Disease Paternal Grandfather     High Blood Pressure Paternal Grandfather      Family Status   Relation Name Status    Father  (Not Specified)    MAunt  (Not Specified)    PAunt  (Not Specified)    MGM  (Not Specified)    MGF  (Not Specified)    PGM  (Not Specified)    DIAGNOSTIC RESULTS     EKG: All EKG's are interpreted by the Emergency Department Physician who either signs or Co-signs this chart in the absence of a cardiologist.        RADIOLOGY:   All plain film, CT, MRI, and formal ultrasound images (except ED bedside ultrasound) are read by the radiologist, see reports below, unless otherwise noted in MDM or here. CT IAC POSTERIOR FOSSA WO CONTRAST   Final Result   No evidence for mastoid or tympanic effusion no abnormal mass. No evidence   for erosive changes. Stable appearance to mild thickening of the tympanic   membrane on the right stable appearance to bilateral tympanoplasty tubes             No results found. LABS:  Labs Reviewed - No data to display    All other labs were within normal range or not returned as of this dictation. EMERGENCY DEPARTMENT COURSE and DIFFERENTIAL DIAGNOSIS/MDM:   Vitals:    Vitals:    09/10/21 1304   BP: (!) 170/78   Pulse: 75   Resp: 16   Temp: 98.5 °F (36.9 °C)   TempSrc: Oral   SpO2: 97%   Weight: 247 lb (112 kg)   Height: 5' 4\" (1.626 m)         Patient instructed to return to the emergency room if symptoms worsen, return, or any other concern right away which is agreed by the patient    ED MEDS:  Orders Placed This Encounter   Medications    azithromycin (ZITHROMAX Z-FREDDY) 250 MG tablet     Sig: Take 2 tablets (500 mg) on Day 1, and then take 1 tablet (250 mg) on days 2 through 5. Dispense:  1 packet     Refill:  0         CONSULTS:  None    PROCEDURES:  None      FINAL IMPRESSION      1.  Right ear pain          DISPOSITION/PLAN   DISPOSITION Decision To Discharge    PATIENT REFERRED TO:  Lonny Goodwin, [address]  [address]  [address] [facility] [address]  [facility] ED  [place] [address] [address]  [address]  [phone]    Call         DISCHARGE MEDICATIONS:  Discharge Medication List as of 9/10/2021  2:48 PM      START taking these medications    Details   azithromycin (ZITHROMAX Z-FREDDY) 250 MG tablet Take 2 tablets (500 mg) on Day 1, and then take 1 tablet (250 mg) on days 2 through 5., Disp-1 packet, R-0Print               Summation      Patient Course:  Right sided ear pain. Hx of mastoiditis. She is on ciprodex. Exam is unremarkable. Some pain over mastoid. Will get ct scan. Ct scan is unremarkable. Will start pt on abx. Due to allergies she states zpak is normally what they give her. Recommend follow up with dr. [place]. Discussed results and plan with the pt. They expressed appropriate understanding. Pt given close follow up, supportive care instructions and strict return instructions at the bedside. ED Medications administered this visit:  Medications - No data to display    New Prescriptions from this visit:    Discharge Medication List as of 9/10/2021  2:48 PM      START taking these medications    Details   azithromycin (ZITHROMAX Z-FREDDY) 250 MG tablet Take 2 tablets (500 mg) on Day 1, and then take 1 tablet (250 mg) on days 2 through 5., Disp-1 packet, R-0Print             Follow-up:  Marianela Claire MD  [facility]  [address] [place] [address]  [address]  [address]  [phone]    Call           Final Impression:   1.  Right ear pain               (Please note that portions of this note were completed with a voice recognition program )        TREVER Owens PA-C  09/10/21 1500

## 2021-09-10 NOTE — ED PROVIDER NOTES
eMERGENCY dEPARTMENT eNCOUnter   Independent Attestation     Pt Name: Anna Hoyos  MRN: [id number]  [place] 1975  Date of evaluation: 9/10/21     Anna Hoyos is a 48 y.o. female with CC: Otalgia and Headache      Based on the medical record the care appears appropriate. I was personally available for consultation in the Emergency Department.     Doris Altamirano DO  Attending Emergency Physician                  Doris Altamirano DO  09/10/21 [id number]

## 2021-09-10 NOTE — ED TRIAGE NOTES
Patient to ed with c/o right ear pain with a headache. Patient states history of mastoiditis and feels the same.

## 2021-10-08 ENCOUNTER — HOSPITAL ENCOUNTER (EMERGENCY)
Age: 46
Discharge: HOME | End: 2021-10-08
Attending: EMERGENCY MEDICINE
Payer: COMMERCIAL

## 2021-10-08 ENCOUNTER — APPOINTMENT (OUTPATIENT)
Dept: GENERAL RADIOLOGY | Age: 46
End: 2021-10-08
Payer: COMMERCIAL

## 2021-10-08 VITALS
DIASTOLIC BLOOD PRESSURE: 89 MMHG | WEIGHT: 248 LBS | OXYGEN SATURATION: 94 % | HEIGHT: 64 IN | SYSTOLIC BLOOD PRESSURE: 137 MMHG | TEMPERATURE: 98.6 F | HEART RATE: 98 BPM | BODY MASS INDEX: 42.34 KG/M2 | RESPIRATION RATE: 18 BRPM

## 2021-10-08 DIAGNOSIS — J04.0 LARYNGITIS: Primary | ICD-10-CM

## 2021-10-08 LAB
DIRECT EXAM: NORMAL
INFLUENZA A: NEGATIVE
INFLUENZA B: NEGATIVE
Lab: NORMAL
SARS-COV-2 RNA, RT PCR: NOT DETECTED
SOURCE: NORMAL
SPECIMEN DESCRIPTION: NORMAL
SPECIMEN DESCRIPTION: NORMAL

## 2021-10-08 PROCEDURE — 99283 EMERGENCY DEPT VISIT LOW MDM: CPT

## 2021-10-08 PROCEDURE — 71045 X-RAY EXAM CHEST 1 VIEW: CPT

## 2021-10-08 PROCEDURE — 87880 STREP A ASSAY W/OPTIC: CPT

## 2021-10-08 PROCEDURE — 87636 SARSCOV2 & INF A&B AMP PRB: CPT

## 2021-10-08 RX ORDER — PREDNISONE 20 MG/1
40 TABLET ORAL DAILY
Qty: 10 TABLET | Refills: 0 | Status: SHIPPED | OUTPATIENT
Start: 2021-10-08 | End: 2021-10-13

## 2021-10-08 RX ORDER — BENZONATATE 100 MG/1
100-200 CAPSULE ORAL 3 TIMES DAILY PRN
Qty: 20 CAPSULE | Refills: 0 | Status: SHIPPED | OUTPATIENT
Start: 2021-10-08 | End: 2021-12-14

## 2021-10-08 RX ORDER — PROMETHAZINE HYDROCHLORIDE 25 MG/1
25 TABLET ORAL ONCE
Status: DISCONTINUED | OUTPATIENT
Start: 2021-10-08 | End: 2021-10-08 | Stop reason: HOSPADM

## 2021-10-08 NOTE — ED PROVIDER NOTES
eMERGENCY dEPARTMENT eNCOUnter   Independent Attestation     Pt Name: Vika Murry  MRN: [id number]  [place] 1975  Date of evaluation: 10/8/21     Vika Murry is a 48 y.o. female with CC: Fever and Pharyngitis      Based on the medical record the care appears appropriate. I was personally available for consultation in the Emergency Department.     Holland Smith MD  Attending Emergency Physician                   Holland Smith MD  10/08/21 7694

## 2021-10-08 NOTE — ED PROVIDER NOTES
[address]  eMERGENCY dEPARTMENT eNCOUnter      Pt Name: Mukund Mar  MRN: [id number]  [place] 1975  Date of evaluation: 10/8/2021  Provider: Vidhya Carson PA-C    CHIEF COMPLAINT       Chief Complaint   Patient presents with    Fever    Pharyngitis           HISTORY OF PRESENT ILLNESS  (Location/Symptom, Timing/Onset, Context/Setting, Quality, Duration, Modifying Factors, Severity.)   Mukund Mar is a 48 y.o. female who presents to the emergency department for evaluation of fever and sore throat, cough, nausea, diarrhea x 3 days. Pt states she has a hoarse voice. Reports wheezing. Denies headache, congestion, sob, chest pain, abd pain, emesis, loss of smell or taste. Pt is not vaccinated for covid. Pt states she is on a zpak for right ear infection. Started that yesterday. No other complaints. Nursing Notes were reviewed. REVIEW OF SYSTEMS    (2-9 systems for level 4, 10 or more for level 5)     Review of Systems   Sore throat  Cough  Fever  Hoarse voice  Nausea  Diarrhea      Except as noted above the remainder of the review of systems was reviewed and negative.        PAST MEDICAL HISTORY     Past Medical History:   Diagnosis Date    Acute exacerbation of chronic obstructive pulmonary disease ([address] 3/21/2018    Adhesive capsulitis of left shoulder 9/25/2018    Asthma     Asthma exacerbation 7/24/2014    Atrial fibrillation ([address]     placed on event monitor 9/25/18 for PVC's & A-fib    Atrial premature depolarization 7/19/2019    Bipolar 1 disorder ([address]     Bipolar disorder ([address] 7/19/2019    Bulging disc     CAD (coronary artery disease)     Candida infection 2/23/2018    Cardiomyopathy ([address]     Chest pain 6/13/2017    CHF (congestive heart failure) (HCC)     Chronic otitis media of both ears     rt>lt    Chronic right shoulder pain 3/14/2017    Cigarette nicotine dependence with nicotine-induced disorder 7/19/2019    Class 3 severe obesity due to excess ABLATION OF DYSRHYTHMIC FOCUS  2016    ABLATION OF DYSRHYTHMIC FOCUS  09/08/2017    Done at the [facility].  ABSCESS DRAINAGE      left hip and chest    APPENDECTOMY      BREAST SURGERY Left 2007    I & D    CARDIAC CATHETERIZATION  2014 & 2000     no stenting    CARPAL TUNNEL RELEASE Right 12/19/2019    Ulnar nerve decompression, right elbow. Release of 1st and 2nd extensor compartments, right forearm.  CARPAL TUNNEL RELEASE  05/04/2020    Carpal tunnel release, left hand    CHOLECYSTECTOMY      COLONOSCOPY      FOOT SURGERY Left     bone fragment removed    FRACTURE SURGERY Right     closed reduction perc pinning ring & middle finger    GASTRIC FUNDOPLICATION  1694    HYSTERECTOMY      total    HYSTERECTOMY      HYSTEROSCOPY      tubal perfusion    MYRINGOTOMY Right 11/13/2015    Right myringotomy with placement of  T-tube on the right side. Removal of plugged ventilating tube, right side.  MYRINGOTOMY Left 07/14/2020    MYRINGOTOMY TUBE INSERTION performed by Kay Hassan MD at [facility] Right 06/05/2014    OTHER SURGICAL HISTORY Right 05/29/2014    removal ear tube rt ear    OTHER SURGICAL HISTORY  2009    LOOP recorder inserted and removed 3 mos.  later    OTHER SURGICAL HISTORY Right 08/11/2016    ear tube removal with patch    OTHER SURGICAL HISTORY      tubal perfusion, lysis of uterine adhesions    OTHER SURGICAL HISTORY      multiple PICC lines inserted and removed, it has affected circulation bilateral upper arms    OTHER SURGICAL HISTORY  10/19/2020    Revisiion to subcutaneous transposition of unlar nerve, right elbow    OTHER SURGICAL HISTORY Right 06/14/2021    REVISION TO SUBMUSCULAR TRANSPOSITION OF RIGHT ULNAR NERVE    BECKY BATES JT W ANESTHESIA Right 03/01/2017    SHOULDER MANIPULATION RIGHT performed by Brooklynn Zhang MD at [address] Left 10/17/2018    SHOULDER ARTHROSCOPY W/BICEPS TENDONESIS performed by Roopa Edmonds MD at [facility] Left     foot    TONSILLECTOMY      TYMPANOSTOMY TUBE PLACEMENT      TYMPANOSTOMY TUBE PLACEMENT Left 03/12/2019    ULNAR TUNNEL RELEASE Right     UPPER GASTROINTESTINAL ENDOSCOPY      UPPER GASTROINTESTINAL ENDOSCOPY  07/10/2019    UPPER GASTROINTESTINAL ENDOSCOPY N/A 07/10/2019    EGD BIOPSY performed by Ginger Paiz MD at [address] [place] [address]     None otherwise stated in nurses notes    CURRENT MEDICATIONS       Discharge Medication List as of 10/8/2021  3:41 PM      CONTINUE these medications which have NOT CHANGED    Details   !! blood glucose test strips (ASCENSIA AUTODISC VI;ONE TOUCH ULTRA TEST VI) strip Historical Med      azithromycin (ZITHROMAX) 250 MG tablet Historical Med      HM BACITRACIN ZINC 500 UNIT/GM ointment DAWHistorical Med      ergocalciferol (ERGOCALCIFEROL) 1.25 MG (42272 UT) capsule Vitamin D2 1,250 mcg (50,000 unit) capsule   take 1 capsule by mouth every weekHistorical Med      magnesium oxide (MAG-OX) 400 MG tablet Historical Med      oxyCODONE-acetaminophen (PERCOCET) 5-325 MG per tablet Historical Med      HM SALINE NASAL SPRAY 0.65 % nasal spray DAWHistorical Med      promethazine (PHENERGAN) 25 MG tablet Take 25 mg by mouth every 6 hours as needed for NauseaHistorical Med      QUEtiapine (SEROQUEL) 50 MG tablet Take 1 tablet by mouth nightly, Disp-30 tablet, R-11Normal      clotrimazole (LOTRIMIN) 1 % cream APPLY TOPICALLY TO AFFECTED AREA(S) 2 TIMES DAILY. , Disp-45 g, R-11, Normal      CLEARLAX 17 GM/SCOOP powder TAKE 17 G BY MOUTH 2 TIMES DAILY, Disp-1530 g, R-11Normal      UNIFINE PENTIPS 31G X 8 MM MISC Disp-100 each, R-3, Normal      DULoxetine (CYMBALTA) 60 MG extended release capsule TAKE 1 CAPSULE BY MOUTH 2 TIMES DAILY, Disp-60 capsule, R-11Normal      ibuprofen (ADVIL;MOTRIN) 800 MG tablet TAKE 1 TABLET BY MOUTH EVERY 8 HOURS AS NEEDED FOR PAIN TAKE WITH FOOD, Disp-90 tablet, R-11PLEASE ADDRESS THIS REQUEST WITH DR. NAHUM Bojorquez      Insulin Degludec (TRESIBA FLEXTOUCH) 200 UNIT/ML SOPN Inject 80 Units into the skin 2 times daily, Disp-10 pen, R-11Normal      insulin glargine (LANTUS SOLOSTAR) 100 UNIT/ML injection pen Inject 80 Units into the skin 2 times daily, Disp-20 pen, R-11Normal      ipratropium-albuterol (DUONEB) 0.5-2.5 (3) MG/3ML SOLN nebulizer solution INHALE 3 MLS (ONE VIAL) WITH NEBULIZER EVERY 6 HOURS AS NEEDED FOR SHORTNESS OF BREATH, Disp-360 vial, R-11Normal      topiramate (TOPAMAX) 25 MG tablet Historical Med      bumetanide (BUMEX) 2 MG tablet Take 2 mg by mouth dailyHistorical Med      !! ONETOUCH ULTRA strip USE TO TEST BLOOD SUGAR 6 TIMES DAILY DUE TO LOW BLOOD SUGARS, UNCONTROLLED DIABETES. CHECK BEFORE MEALS, AT BEDTIME, AND AS NEEDED, Disp-200 each,R-11Normal      insulin lispro, 1 Unit Dial, (HUMALOG KWIKPEN) 100 UNIT/ML SOPN INJECT SUBCUTANEOUSLY PER SLIDING SCALE, 150-200 INJECT 3U, 201-250 INJECT 6U, 251-300 INJECT 9U, >300 INJECT 12U, MAX 30U/DAY, Disp-5 pen,R-11Normal      acetaminophen (TYLENOL) 500 MG tablet Take 500 mg by mouth every 6 hours as needed for PainHistorical Med      insulin glargine (LANTUS) 100 UNIT/ML injection vial Inject 80 Units into the skin 2 times dailyHistorical Med      VENTOLIN  (90 Base) MCG/ACT inhaler INHALE 2 PUFFS INTO LUNGS EVERY 6 HOURS AS NEEDED FOR WHEEZING, Disp-18 g, R-11Normal      levocetirizine (XYZAL) 5 MG tablet Take 5 mg by mouth nightlyHistorical Med      acetaminophen-codeine (TYLENOL/CODEINE #4) 300-60 MG per tablet Take 2 tablets by mouth daily as needed for Pain. Historical Med      OXYGEN Inhale into the lungs Indications: On 3 liters per n/c during the night. Historical Med      Multiple Vitamins-Minerals (MULTIVITAMIN GUMMIES WOMENS) CHEW Take 2 each by mouth daily       carvedilol (COREG) 12.5 MG tablet Take 12.5 mg by mouth 2 times daily (with meals) Takes at 10:00am and 10:00pmHistorical Med atorvastatin (LIPITOR) 20 MG tablet Take 20 mg by mouth every evening Historical Med       !! - Potential duplicate medications found. Please discuss with provider.           ALLERGIES     Latex, Aspirin, Avelox [moxifloxacin], Chantix [varenicline], Doxycycline, Dye [iodides], Flexeril [cyclobenzaprine], Gabapentin, Losartan, Morphine, Nsaids, Pcn [penicillins], Reglan [metoclopramide hcl], Shellfish-derived products, Sulfa antibiotics, Vancomycin, Zofran, Zyvox [linezolid], Acyclovir, Bactrim [sulfamethoxazole-trimethoprim], Betadine [povidone iodine], Ceclor [cefaclor], Clindamycin/lincomycin, Codeine, Macrolides and ketolides, Novolin r [insulin], Novolog [insulin aspart], Phenothiazines, Tape [adhesive tape], Banana, Compazine [prochlorperazine], Fentanyl, Kiwi extract, Tamiflu [oseltamivir phosphate], and Sotalol    FAMILY HISTORY           Problem Relation Age of Onset    Ulcerative Colitis Father     Liver Disease Father 61        hep c and b    Diabetes Father     Asthma Father     Heart Disease Father     High Blood Pressure Father     Breast Cancer Maternal Aunt     Cancer Maternal Aunt     Diabetes Maternal Aunt     Heart Disease Maternal Aunt     High Blood Pressure Maternal Aunt     Breast Cancer Paternal Aunt     Heart Disease Paternal Aunt     High Blood Pressure Paternal Aunt     Breast Cancer Maternal Grandmother     Cervical Cancer Maternal Grandmother     Cancer Maternal Grandmother     Diabetes Maternal Grandmother     Asthma Maternal Grandmother     Heart Disease Maternal Grandmother     High Blood Pressure Maternal Grandmother     Lung Cancer Maternal Grandfather     Diabetes Maternal Grandfather     Asthma Maternal Grandfather     Heart Disease Maternal Grandfather     High Blood Pressure Maternal Grandfather     Cervical Cancer Paternal Grandmother     Cancer Paternal Grandmother     Diabetes Paternal Grandmother     Heart Disease Paternal Grandmother     High Blood Pressure Paternal Grandmother     Diabetes Mother     Asthma Mother     Heart Disease Mother     High Blood Pressure Mother     Cancer Sister     Heart Disease Maternal Uncle     High Blood Pressure Maternal Uncle     Heart Disease Paternal Uncle     High Blood Pressure Paternal Uncle     Diabetes Paternal Grandfather     Heart Disease Paternal Grandfather     High Blood Pressure Paternal Grandfather      Family Status   Relation Name Status    Father  (Not Specified)    MAunt  (Not Specified)    PAunt  (Not Specified)    MGM  (Not Specified)    MGF  (Not Specified)    PGM  (Not Specified)    Mother  (Not Specified)    Sister  (Not Specified)    MUnc  (Not Specified)    PUnc  (Not Specified)    PGF  (Not Specified)      None otherwise stated in nurses notes    SOCIAL HISTORY      reports that she has been smoking cigarettes. She has a 11.50 pack-year smoking history. She has never used smokeless tobacco. She reports that she does not drink alcohol and does not use drugs. lives at home with others     PHYSICAL EXAM    (up to 7 for level 4, 8 or more for level 5)     ED Triage Vitals [10/08/21 1416]   BP Temp Temp Source Pulse Resp SpO2 Height Weight   137/89 98.6 °F (37 °C) Oral 98 18 94 % 5' 4\" (1.626 m) 248 lb (112.5 kg)       Physical Exam   Nursing note and vitals reviewed. Constitutional: Oriented to person, place, and time and well-developed, well-nourished. Head: Normocephalic and atraumatic. Ear: External ears normal.   Nose: Nose normal and midline. Eyes: Conjunctivae and EOM are normal. Pupils are equal, round, and reactive to light. Neck: Normal range of motion. Neck supple. Throat: Posterior pharynx is erythematous with symmetric, mild tonsillar swelling. No tonsillar No exudates. Uvula midline. Airway patent. No swelling. Cardiovascular: Normal rate, regular rhythm, normal heart sounds and intact distal pulses.     Pulmonary/Chest: Effort normal and breath sounds normal. No respiratory distress. No wheezes. No rales. No chest tenderness. Abdominal: Soft. No distension and no mass. There is no tenderness. There is no rebound and no guarding. Musculoskeletal: Normal range of motion. Neurological: Alert and oriented to person, place, and time. GCS score is 15. Skin: Skin is warm and dry. No rash noted. No erythema. No pallor. Psychiatric: Mood, memory, affect and judgment normal.           DIAGNOSTIC RESULTS     EKG: All EKG's are interpreted by the Emergency Department Physician who either signs or Co-signs this chart in the absence of a cardiologist.        RADIOLOGY:   All plain film, CT, MRI, and formal ultrasound images (except ED bedside ultrasound) are read by the radiologist, see reports below, unless otherwise noted in MDM or here. XR CHEST PORTABLE   Final Result   No acute process. CT IAC POSTERIOR FOSSA WO CONTRAST    Result Date: 9/10/2021  EXAMINATION: CT OF THE INTERNAL AUDITORY CANAL WITHOUT CONTRAST 9/10/2021 1:41 pm: TECHNIQUE: CT of the internal auditory canal was performed without contrast was performed without the administration of intravenous contrast. Multiplanar reformatted images are provided for review. Dose modulation, iterative reconstruction, and/or weight based adjustment of the mA/kV was utilized to reduce the radiation dose to as low as reasonably achievable. COMPARISON: 07/17/2020 HISTORY: ORDERING SYSTEM PROVIDED HISTORY: PAIN TECHNOLOGIST PROVIDED HISTORY: ear pain. hx of mastoiditis Decision Support Exception - unselect if not a suspected or confirmed emergency medical condition->Emergency Medical Condition (MA) Is the patient pregnant?->No Reason for Exam: ear pain. hx of mastoiditis Acuity: Acute Type of Exam: Initial FINDINGS: RIGHT TEMPORAL BONE:  The external auditory canal is clear without evidence of bony erosion. The scutum is intact. The middle ear cavity is clear. The ossicular chain is intact.   The mastoid air cells are clear. The inner ear structures appear unremarkable. Normal mineralization of the otic capsule. The internal auditory canal and vestibular aqueduct appear unremarkable. The carotid canal is normal in appearance. The jugular bulb is unremarkable. Stable appearance to the tympanic membrane and  tympanostomy tube LEFT TEMPORAL BONE: The external auditory canal is clear without evidence of bony erosion. The scutum is intact. The middle ear cavity is clear. The ossicular chain is intact. The mastoid air cells are clear. The inner ear structures appear unremarkable. Normal mineralization of the otic capsule. The internal auditory canal and vestibular aqueduct appear unremarkable. The carotid canal is normal in appearance. The jugular bulb is unremarkable. Stable appearance to the tympanic membrane and what  tympanostomy tube BRAIN: The visualized portion of the intracranial contents appear unremarkable. ORBITS: The visualized portion of the orbits demonstrate no acute abnormality. SINUSES: The paranasal sinuses are clear. Resolution mucosal thickening of the left maxillary sinus when compared to the prior study     No evidence for mastoid or tympanic effusion no abnormal mass. No evidence for erosive changes. Stable appearance to mild thickening of the tympanic membrane on the right stable appearance to bilateral tympanoplasty tubes             LABS:  Labs Reviewed   COVID-19 & INFLUENZA COMBO   STREP SCREEN GROUP A THROAT       All other labs were within normal range or not returned as of this dictation.     EMERGENCY DEPARTMENT COURSE and DIFFERENTIAL DIAGNOSIS/MDM:   Vitals:    Vitals:    10/08/21 1416   BP: 137/89   Pulse: 98   Resp: 18   Temp: 98.6 °F (37 °C)   TempSrc: Oral   SpO2: 94%   Weight: 248 lb (112.5 kg)   Height: 5' 4\" (1.626 m)         Patient instructed to return to the emergency room if symptoms worsen, return, or any other concern right away which is agreed by the patient    ED MEDS:  Orders Placed This Encounter   Medications    promethazine (PHENERGAN) tablet 25 mg    predniSONE (DELTASONE) 20 MG tablet     Sig: Take 2 tablets by mouth daily for 5 days     Dispense:  10 tablet     Refill:  0    benzonatate (TESSALON) 100 MG capsule     Sig: Take 1-2 capsules by mouth 3 times daily as needed for Cough     Dispense:  20 capsule     Refill:  0         CONSULTS:  None    PROCEDURES:  None      FINAL IMPRESSION      1. Laryngitis          DISPOSITION/PLAN   DISPOSITION Decision To Discharge    PATIENT REFERRED TO:  Héctor Lebron, [address] [place] [address]  [address] [facility]  [address]  [address] [facility]  [phone]          DISCHARGE MEDICATIONS:  Discharge Medication List as of 10/8/2021  3:41 PM      START taking these medications    Details   predniSONE (DELTASONE) 20 MG tablet Take 2 tablets by mouth daily for 5 days, Disp-10 tablet, R-0Print               Summation      Patient Course:   URI symptoms, sore throat, cough, hoarse voice. Chest xray is unremarkable. covid and flu are negative. Strep is negative. Suspect laryngitis. Will start on prednisone, tessalon perles for cough. Rest, increase fluids. Follow up with PCP. Discussed results and plan with the pt. They expressed appropriate understanding. Pt given close follow up, supportive care instructions and strict return instructions at the bedside.       ED Medications administered this visit:    Medications   promethazine (PHENERGAN) tablet 25 mg (has no administration in time range)       New Prescriptions from this visit:    Discharge Medication List as of 10/8/2021  3:41 PM      START taking these medications    Details   predniSONE (DELTASONE) 20 MG tablet Take 2 tablets by mouth daily for 5 days, Disp-10 tablet, R-0Print             Follow-up:  Héctor Lebron MD  [facility] [address] [facility] [address] [facility] [address]  [phone]            Final Impression:   1.  Laryngitis               (Please note that portions of this note were completed with a voice recognition program )        Evangelina Figueroa. Hailey Berger, PA-C  10/08/21 9830

## 2021-10-31 ENCOUNTER — APPOINTMENT (OUTPATIENT)
Dept: GENERAL RADIOLOGY | Age: 46
End: 2021-10-31
Payer: COMMERCIAL

## 2021-10-31 ENCOUNTER — HOSPITAL ENCOUNTER (EMERGENCY)
Age: 46
Discharge: HOME | End: 2021-10-31
Attending: EMERGENCY MEDICINE
Payer: COMMERCIAL

## 2021-10-31 VITALS
RESPIRATION RATE: 20 BRPM | DIASTOLIC BLOOD PRESSURE: 75 MMHG | TEMPERATURE: 97.9 F | WEIGHT: 239 LBS | BODY MASS INDEX: 40.8 KG/M2 | HEART RATE: 86 BPM | HEIGHT: 64 IN | SYSTOLIC BLOOD PRESSURE: 158 MMHG | OXYGEN SATURATION: 97 %

## 2021-10-31 DIAGNOSIS — J40 BRONCHITIS: Primary | ICD-10-CM

## 2021-10-31 LAB
INFLUENZA A: NEGATIVE
INFLUENZA B: NEGATIVE
SARS-COV-2 RNA, RT PCR: NOT DETECTED
SOURCE: NORMAL
SPECIMEN DESCRIPTION: NORMAL

## 2021-10-31 PROCEDURE — 99284 EMERGENCY DEPT VISIT MOD MDM: CPT

## 2021-10-31 PROCEDURE — 87636 SARSCOV2 & INF A&B AMP PRB: CPT

## 2021-10-31 PROCEDURE — 94760 N-INVAS EAR/PLS OXIMETRY 1: CPT

## 2021-10-31 PROCEDURE — 71045 X-RAY EXAM CHEST 1 VIEW: CPT

## 2021-10-31 PROCEDURE — 94664 DEMO&/EVAL PT USE INHALER: CPT

## 2021-10-31 PROCEDURE — 94640 AIRWAY INHALATION TREATMENT: CPT

## 2021-10-31 PROCEDURE — 6370000000 HC RX 637 (ALT 250 FOR IP): Performed by: EMERGENCY MEDICINE

## 2021-10-31 RX ORDER — IPRATROPIUM BROMIDE AND ALBUTEROL SULFATE 2.5; .5 MG/3ML; MG/3ML
1 SOLUTION RESPIRATORY (INHALATION) ONCE
Status: COMPLETED | OUTPATIENT
Start: 2021-10-31 | End: 2021-10-31

## 2021-10-31 RX ORDER — BENZONATATE 100 MG/1
100 CAPSULE ORAL 3 TIMES DAILY PRN
Qty: 21 CAPSULE | Refills: 0 | Status: SHIPPED | OUTPATIENT
Start: 2021-10-31 | End: 2021-11-07

## 2021-10-31 RX ORDER — ACETAMINOPHEN 500 MG
1000 TABLET ORAL ONCE
Status: DISCONTINUED | OUTPATIENT
Start: 2021-10-31 | End: 2021-10-31 | Stop reason: HOSPADM

## 2021-10-31 RX ADMIN — IPRATROPIUM BROMIDE AND ALBUTEROL SULFATE 1 AMPULE: .5; 3 SOLUTION RESPIRATORY (INHALATION) at 17:27

## 2021-10-31 ASSESSMENT — ENCOUNTER SYMPTOMS
COLOR CHANGE: 0
SORE THROAT: 0
BACK PAIN: 0
CONSTIPATION: 0
NAUSEA: 0
COUGH: 1
SHORTNESS OF BREATH: 1
VOMITING: 0
BLOOD IN STOOL: 0
TROUBLE SWALLOWING: 0
DIARRHEA: 0
ABDOMINAL PAIN: 0

## 2021-10-31 ASSESSMENT — PAIN SCALES - GENERAL: PAINLEVEL_OUTOF10: 10

## 2021-10-31 NOTE — ED PROVIDER NOTES
[address]  EMERGENCY DEPARTMENT ENCOUNTER    Pt Name: Remberto Velázquez  MRN: [id number]  YOB: 1975  Date of evaluation:10/31/21  PCP: Kellie Barrios MD    CHIEF COMPLAINT       Chief Complaint   Patient presents with    Fever    Headache    Shortness of Breath    Cough       HISTORY OF PRESENT ILLNESS    Remberto Velázquez is a 48 y.o. female who presents with a chief complaint of a cough. Patient states she is had a productive cough for the past 10 days. Has a history of COPD however the symptoms are worse than her baseline. She states that she called her doctor and she was put on 10 days of a azithromycin which she is still taking. She was also put on oral steroids which she has finished. States today she has a headache and had a fever earlier today of 102 Fahrenheit. Denies any chest pain. She is feeling short of breath mostly when she coughs. She has not had Covid. She did not get the Covid vaccine because she states that she was told by her doctor not to because she has only allergies. No GI symptoms. She does continue to smoke cigarettes. Symptoms are acute. Symptoms are moderate. Nothing else make symptoms better or worse. Patient has no other complaints at this time. REVIEW OF SYSTEMS       Review of Systems   Constitutional: Positive for fever. Negative for chills and fatigue. HENT: Negative for congestion, ear pain, sore throat and trouble swallowing. Eyes: Negative for visual disturbance. Respiratory: Positive for cough and shortness of breath. Cardiovascular: Negative for chest pain, palpitations and leg swelling. Gastrointestinal: Negative for abdominal pain, blood in stool, constipation, diarrhea, nausea and vomiting. Genitourinary: Negative for dysuria and flank pain. Musculoskeletal: Negative for arthralgias, back pain, myalgias and neck pain. Skin: Negative for color change, rash and wound. Neurological: Positive for headaches.  Negative for dizziness, weakness, light-headedness and numbness. Psychiatric/Behavioral: Negative for confusion. All other systems reviewed and are negative. Negative in 10 essential Systems except as mentioned above and in the HPI.         PAST MEDICAL HISTORY     Past Medical History:   Diagnosis Date    Acute exacerbation of chronic obstructive pulmonary disease ([address] 3/21/2018    Adhesive capsulitis of left shoulder 9/25/2018    Asthma     Asthma exacerbation 7/24/2014    Atrial fibrillation ([address]     placed on event monitor 9/25/18 for PVC's & A-fib    Atrial premature depolarization 7/19/2019    Bipolar 1 disorder ([address]     Bipolar disorder ([address] 7/19/2019    Bulging disc     CAD (coronary artery disease)     Candida infection 2/23/2018    Cardiomyopathy ([address]     Chest pain 6/13/2017    CH[facility]congestive heart failure) [facility]     Chronic otitis media of both ears     rt>lt    Chronic right shoulder pain 3/14/2017    Cigarette nicotine dependence with nicotine-induced disorder 7/19/2019    Class 3 severe obesity due to excess calories with serious comorbidity and body mass index (BMI) of 40.0 to 44.9 in adult [facility]) 10/4/2018    Closed fracture of left ankle 6/25/2019    Clostridium difficile infection     COPD (chronic obstructive pulmonary disease) ([facility]     Cough, persistent 3/21/2018    Current moderate episode of major depressive disorder without prior episode ([address] 8/20/2018    Depression     Diabetes mellitus type 2, controlled ([address] 4/30/2014    Diarrhea     Diarrhea     Dizziness     DVT (deep venous thrombosis) ([facility])     after PICC line right arm    Encounter for monitoring sotalol therapy 2/20/2017    Encounter for screening mammogram for malignant neoplasm of breast 3/7/2018    Endometriosis     Fainting     GERD (gastroesophageal reflux disease)     hx of    GI bleeding     H. pylori infection     Helicobacter pylori (H. pylori)     Circle (hard of hearing)     both (Right, 12/19/2019); Carpal tunnel release (05/04/2020); Ulnar tunnel release (Right); myringotomy (Left, 07/14/2020); other surgical history (10/19/2020); and other surgical history (Right, 06/14/2021). CURRENT MEDICATIONS       Previous Medications    ACETAMINOPHEN (TYLENOL) 500 MG TABLET    Take 500 mg by mouth every 6 hours as needed for Pain    ACETAMINOPHEN-CODEINE (TYLENOL/CODEINE #4) 300-60 MG PER TABLET    Take 2 tablets by mouth daily as needed for Pain. ATORVASTATIN (LIPITOR) 20 MG TABLET    Take 20 mg by mouth every evening     AZITHROMYCIN (ZITHROMAX) 250 MG TABLET    Take 2 tabs on day 1 followed by 250mg on days 2 - 10    BENZONATATE (TESSALON) 100 MG CAPSULE    Take 1-2 capsules by mouth 3 times daily as needed for Cough    BLOOD GLUCOSE TEST STRIPS (ASCENSIA AUTODISC VI;ONE TOUCH ULTRA TEST VI) STRIP    OneTouch Ultra Test strips    BUMETANIDE (BUMEX) 2 MG TABLET    Take 2 mg by mouth daily    CARVEDILOL (COREG) 12.5 MG TABLET    Take 12.5 mg by mouth 2 times daily (with meals) Takes at 10:00am and 10:00pm    CLEARLAX 17 GM/SCOOP POWDER    TAKE 17 G BY MOUTH 2 TIMES DAILY    CLOTRIMAZOLE (LOTRIMIN) 1 % CREAM    APPLY TOPICALLY TO AFFECTED AREA(S) 2 TIMES DAILY.     DULOXETINE (CYMBALTA) 60 MG EXTENDED RELEASE CAPSULE    TAKE 1 CAPSULE BY MOUTH 2 TIMES DAILY    ERGOCALCIFEROL (ERGOCALCIFEROL) 1.25 MG (42420 UT) CAPSULE    Vitamin D2 1,250 mcg (50,000 unit) capsule   take 1 capsule by mouth every week    HM BACITRACIN ZINC 500 UNIT/GM OINTMENT        HM SALINE NASAL SPRAY 0.65 % NASAL SPRAY        IBUPROFEN (ADVIL;MOTRIN) 800 MG TABLET    TAKE 1 TABLET BY MOUTH EVERY 8 HOURS AS NEEDED FOR PAIN TAKE WITH FOOD    INSULIN DEGLUDEC (TRESIBA FLEXTOUCH) 200 UNIT/ML SOPN    Inject 80 Units into the skin 2 times daily    INSULIN GLARGINE (LANTUS SOLOSTAR) 100 UNIT/ML INJECTION PEN    Inject 80 Units into the skin 2 times daily    INSULIN GLARGINE (LANTUS) 100 UNIT/ML INJECTION VIAL    Inject 80 Units [oseltamivir phosphate], and sotalol. FAMILY HISTORY     She indicated that the status of her mother is unknown. She indicated that the status of her father is unknown. She indicated that the status of her sister is unknown. She indicated that the status of her maternal grandmother is unknown. She indicated that the status of her maternal grandfather is unknown. She indicated that the status of her paternal grandmother is unknown. She indicated that the status of her paternal grandfather is unknown. She indicated that the status of her maternal aunt is unknown. She indicated that the status of her maternal uncle is unknown. She indicated that the status of her paternal aunt is unknown. She indicated that the status of her paternal uncle is unknown.     family history includes Asthma in her father, maternal grandfather, maternal grandmother, and mother; Breast Cancer in her maternal aunt, maternal grandmother, and paternal aunt; Cancer in her maternal aunt, maternal grandmother, paternal grandmother, and sister; Cervical Cancer in her maternal grandmother and paternal grandmother; Diabetes in her father, maternal aunt, maternal grandfather, maternal grandmother, mother, paternal grandfather, and paternal grandmother; Heart Disease in her father, maternal aunt, maternal grandfather, maternal grandmother, maternal uncle, mother, paternal aunt, paternal grandfather, paternal grandmother, and paternal uncle; High Blood Pressure in her father, maternal aunt, maternal grandfather, maternal grandmother, maternal uncle, mother, paternal aunt, paternal grandfather, paternal grandmother, and paternal uncle; Liver Disease (age of onset: 61) in her father; Montey Jaylon in her maternal grandfather; Ulcerative Colitis in her father. SOCIAL HISTORY      reports that she has been smoking cigarettes. She has a 11.50 pack-year smoking history.  She has never used smokeless tobacco. She reports that she does not drink alcohol and does not use drugs. PHYSICAL EXAM     INITIAL VITALS:  height is 5' 4\" (1.626 m) and weight is 239 lb (108.4 kg). Her oral temperature is 97.9 °F (36.6 °C). Her blood pressure is 158/75 (abnormal) and her pulse is 86. Her respiration is 20 and oxygen saturation is 97%. Physical Exam  Vitals and nursing note reviewed. Constitutional:       General: She is not in acute distress. Appearance: She is obese. HENT:      Head: Normocephalic and atraumatic. Eyes:      Conjunctiva/sclera: Conjunctivae normal.      Pupils: Pupils are equal, round, and reactive to light. Cardiovascular:      Rate and Rhythm: Normal rate and regular rhythm. Heart sounds: Normal heart sounds. No murmur heard. Pulmonary:      Effort: Pulmonary effort is normal. No respiratory distress. Breath sounds: Decreased breath sounds and wheezing present. Abdominal:      General: Bowel sounds are normal. There is no distension. Palpations: Abdomen is soft. Tenderness: There is no abdominal tenderness. Musculoskeletal:         General: No tenderness. Cervical back: Neck supple. Lymphadenopathy:      Cervical: No cervical adenopathy. Skin:     General: Skin is warm and dry. Findings: No rash. Neurological:      Mental Status: She is alert and oriented to person, place, and time. Psychiatric:         Judgment: Judgment normal.           DIFFERENTIAL DIAGNOSIS/MDM:   47-year-old female presents with 10 days of cough, shortness of breath, headache and reported fever at home. She is afebrile here, nontoxic, normal vital signs. Oxygen saturation is 96% on room air. She is coughing and almost forcing herself to wheeze when I'm talking to her however seems very comfortable when I first entered the room. We'll get a chest x-ray, test for COVID-19. The patient admits to smoking.   3 minutes of time was spent discussing how this can worsen underlying breathing problems, COPD hypertension and heart disease or cause them to develop. DIAGNOSTIC RESULTS     EKG: All EKG's are interpreted by the Emergency Department Physician who either signs or Co-signs this chart in the absence of a cardiologist.        RADIOLOGY:   I directly visualized the following  images and reviewed the radiologist interpretations:  XR CHEST PORTABLE   Final Result   No acute cardiopulmonary disease. ED BEDSIDE ULTRASOUND:      LABS:  Labs Reviewed   COVID-19 & INFLUENZA COMBO         EMERGENCY DEPARTMENT COURSE:   Vitals:    Vitals:    10/31/21 1609 10/31/21 1727   BP: (!) 158/75    Pulse: 86    Resp: 16 20   Temp: 97.9 °F (36.6 °C)    TempSrc: Oral    SpO2: 96% 97%   Weight: 239 lb (108.4 kg)    Height: 5' 4\" (1.626 m)      X-ray is unremarkable. No evidence of pneumonia. Influenza and Covid screen are negative. Patient likely with a bronchitis and is already being appropriately treated by her PCP with antibiotics secondary to her other comorbidities. She was given smoking cessation education. We gave patient a breathing treatment here. We will add Tessalon Perles to see if this helps patient's cough and symptoms. Advised her to continue taking her antibiotics at home, return if any symptoms worsen. She is agreeable to plan will be discharged home today. CRITICALCARE:      CONSULTS:  None      PROCEDURES:      FINAL IMPRESSION      1.  Bronchitis            DISPOSITION/PLAN   DISPOSITION Decision To Discharge 10/31/2021 05:27:01 PM          PATIENT REFERRED TO:  Shena Fagan, [address]  [address] [facility]  [phone]    Schedule an appointment as soon as possible for a visit       [facility] ED  [place] [id number]  [phone]    As needed, If symptoms worsen      DISCHARGE MEDICATIONS:  New Prescriptions    BENZONATATE (TESSALON PERLES) 100 MG CAPSULE    Take 1 capsule by mouth 3 times daily as needed for Cough       The care is provided during an unprecedented national emergency due to the novel coronavirus, COVID-19.     (Please note that portions ofthis note were completed with a voice recognition program.  Efforts were made to edit the dictations but occasionally words are mis-transcribed.)    Marie Peralta,   Attending Emergency Physician          Marie Peralta,   10/31/21 [address] [facility], DO  10/31/21 [id number]

## 2021-10-31 NOTE — ED TRIAGE NOTES
Mode of arrival (squad #, walk in, police, etc) : walk in        Chief complaint(s): headache, cough, fever        Arrival Note (brief scenario, treatment PTA, etc). : Pt states she has been having SOB and cough, on Zithromax for 10 days from PCP. Pt now endorses headache and fever at home as well. Pt denies any exposure to Covid or vaccination. Pt is A&Ox4, in no acute distress, respirations even and unlabored, ambulatory with steady gait. C= \"Have you ever felt that you should Cut down on your drinking? \"  No  A= \"Have people Annoyed you by criticizing your drinking? \"  No  G= \"Have you ever felt bad or Guilty about your drinking? \"  No  E= \"Have you ever had a drink as an Eye-opener first thing in the morning to steady your nerves or to help a hangover? \"  No      Deferred []      Reason for deferring: N/A    *If yes to two or more: probable alcohol abuse. *

## 2021-10-31 NOTE — ED NOTES
Patient refused discharge papers, prescription, discharge instructions, and ordered Tylenol. Ambulated out of ED alert and oriented, skin pink, warm, dry. RN encouraged to return for worsening or new symptoms. During ambulation out of ED states \"i'm never coming back here\".       Vicki Esparza RN  10/31/21 [id number]

## 2021-10-31 NOTE — ED NOTES
Sitting up in bed, call light in reach, denies needs. Swab collected and sent to lab.      Ranjan Strong RN  10/31/21 2213

## 2021-11-11 ENCOUNTER — HOSPITAL ENCOUNTER (EMERGENCY)
Age: 46
Discharge: HOME | End: 2021-11-12
Attending: EMERGENCY MEDICINE
Payer: COMMERCIAL

## 2021-11-11 ENCOUNTER — APPOINTMENT (OUTPATIENT)
Dept: CT IMAGING | Age: 46
End: 2021-11-11
Payer: COMMERCIAL

## 2021-11-11 DIAGNOSIS — R10.32 LEFT LOWER QUADRANT ABDOMINAL PAIN: Primary | ICD-10-CM

## 2021-11-11 DIAGNOSIS — K64.9 HEMORRHOIDS, UNSPECIFIED HEMORRHOID TYPE: ICD-10-CM

## 2021-11-11 LAB
ABSOLUTE EOS #: 0.5 K/UL (ref 0–0.4)
ABSOLUTE IMMATURE GRANULOCYTE: ABNORMAL K/UL (ref 0–0.3)
ABSOLUTE LYMPH #: 4 K/UL (ref 1–4.8)
ABSOLUTE MONO #: 0.9 K/UL (ref 0.1–1.3)
ALBUMIN SERPL-MCNC: 4.4 G/DL (ref 3.5–5.2)
ALBUMIN/GLOBULIN RATIO: ABNORMAL (ref 1–2.5)
ALP BLD-CCNC: 93 U/L (ref 35–104)
ALT SERPL-CCNC: 16 U/L (ref 5–33)
ANION GAP SERPL CALCULATED.3IONS-SCNC: 11 MMOL/L (ref 9–17)
AST SERPL-CCNC: 16 U/L
BASOPHILS # BLD: 1 % (ref 0–2)
BASOPHILS ABSOLUTE: 0.1 K/UL (ref 0–0.2)
BILIRUB SERPL-MCNC: 0.32 MG/DL (ref 0.3–1.2)
BILIRUBIN URINE: NEGATIVE
BUN BLDV-MCNC: 16 MG/DL (ref 6–20)
BUN/CREAT BLD: ABNORMAL (ref 9–20)
CALCIUM SERPL-MCNC: 10.3 MG/DL (ref 8.6–10.4)
CHLORIDE BLD-SCNC: 103 MMOL/L (ref 98–107)
CO2: 29 MMOL/L (ref 20–31)
COLOR: YELLOW
COMMENT UA: NORMAL
CREAT SERPL-MCNC: 0.89 MG/DL (ref 0.5–0.9)
DIFFERENTIAL TYPE: ABNORMAL
EOSINOPHILS RELATIVE PERCENT: 3 % (ref 0–4)
GFR AFRICAN AMERICAN: >60 ML/MIN
GFR NON-AFRICAN AMERICAN: >60 ML/MIN
GFR SERPL CREATININE-BSD FRML MDRD: ABNORMAL ML/MIN/{1.73_M2}
GFR SERPL CREATININE-BSD FRML MDRD: ABNORMAL ML/MIN/{1.73_M2}
GLUCOSE BLD-MCNC: 118 MG/DL (ref 70–99)
GLUCOSE URINE: NEGATIVE
HCT VFR BLD CALC: 44.5 % (ref 36–46)
HEMOGLOBIN: 14.8 G/DL (ref 12–16)
IMMATURE GRANULOCYTES: ABNORMAL %
KETONES, URINE: NEGATIVE
LEUKOCYTE ESTERASE, URINE: NEGATIVE
LIPASE: 10 U/L (ref 13–60)
LYMPHOCYTES # BLD: 26 % (ref 24–44)
MCH RBC QN AUTO: 33.8 PG (ref 26–34)
MCHC RBC AUTO-ENTMCNC: 33.3 G/DL (ref 31–37)
MCV RBC AUTO: 101.6 FL (ref 80–100)
MONOCYTES # BLD: 6 % (ref 1–7)
NITRITE, URINE: NEGATIVE
NRBC AUTOMATED: ABNORMAL PER 100 WBC
PDW BLD-RTO: 13.4 % (ref 11.5–14.9)
PH UA: 5.5 (ref 5–8)
PLATELET # BLD: 324 K/UL (ref 150–450)
PLATELET ESTIMATE: ABNORMAL
PMV BLD AUTO: 7.7 FL (ref 6–12)
POTASSIUM SERPL-SCNC: 4.2 MMOL/L (ref 3.7–5.3)
PROTEIN UA: NEGATIVE
RBC # BLD: 4.38 M/UL (ref 4–5.2)
RBC # BLD: ABNORMAL 10*6/UL
SEG NEUTROPHILS: 64 % (ref 36–66)
SEGMENTED NEUTROPHILS ABSOLUTE COUNT: 10.1 K/UL (ref 1.3–9.1)
SODIUM BLD-SCNC: 143 MMOL/L (ref 135–144)
SPECIFIC GRAVITY UA: 1.02 (ref 1–1.03)
TOTAL PROTEIN: 7.9 G/DL (ref 6.4–8.3)
TURBIDITY: CLEAR
URINE HGB: NEGATIVE
UROBILINOGEN, URINE: NORMAL
WBC # BLD: 15.7 K/UL (ref 3.5–11)
WBC # BLD: ABNORMAL 10*3/UL

## 2021-11-11 PROCEDURE — 83690 ASSAY OF LIPASE: CPT

## 2021-11-11 PROCEDURE — 96374 THER/PROPH/DIAG INJ IV PUSH: CPT

## 2021-11-11 PROCEDURE — 81003 URINALYSIS AUTO W/O SCOPE: CPT

## 2021-11-11 PROCEDURE — 99283 EMERGENCY DEPT VISIT LOW MDM: CPT

## 2021-11-11 PROCEDURE — 74176 CT ABD & PELVIS W/O CONTRAST: CPT

## 2021-11-11 PROCEDURE — 80053 COMPREHEN METABOLIC PANEL: CPT

## 2021-11-11 PROCEDURE — 36415 COLL VENOUS BLD VENIPUNCTURE: CPT

## 2021-11-11 PROCEDURE — 6370000000 HC RX 637 (ALT 250 FOR IP): Performed by: EMERGENCY MEDICINE

## 2021-11-11 PROCEDURE — 85025 COMPLETE CBC W/AUTO DIFF WBC: CPT

## 2021-11-11 PROCEDURE — 6360000002 HC RX W HCPCS: Performed by: EMERGENCY MEDICINE

## 2021-11-11 PROCEDURE — 2580000003 HC RX 258: Performed by: EMERGENCY MEDICINE

## 2021-11-11 RX ORDER — IBUPROFEN 600 MG/1
600 TABLET ORAL ONCE
Status: DISCONTINUED | OUTPATIENT
Start: 2021-11-11 | End: 2021-11-12 | Stop reason: HOSPADM

## 2021-11-11 RX ORDER — 0.9 % SODIUM CHLORIDE 0.9 %
1000 INTRAVENOUS SOLUTION INTRAVENOUS ONCE
Status: COMPLETED | OUTPATIENT
Start: 2021-11-11 | End: 2021-11-11

## 2021-11-11 RX ORDER — MORPHINE SULFATE 4 MG/ML
4 INJECTION, SOLUTION INTRAMUSCULAR; INTRAVENOUS ONCE
Status: COMPLETED | OUTPATIENT
Start: 2021-11-11 | End: 2021-11-11

## 2021-11-11 RX ORDER — DIPHENHYDRAMINE HCL 25 MG
50 TABLET ORAL ONCE
Status: COMPLETED | OUTPATIENT
Start: 2021-11-11 | End: 2021-11-11

## 2021-11-11 RX ADMIN — MORPHINE SULFATE 4 MG: 4 INJECTION INTRAVENOUS at 22:28

## 2021-11-11 RX ADMIN — SODIUM CHLORIDE 1000 ML: 9 INJECTION, SOLUTION INTRAVENOUS at 22:27

## 2021-11-11 RX ADMIN — DIPHENHYDRAMINE HCL 50 MG: 25 TABLET ORAL at 22:27

## 2021-11-11 ASSESSMENT — ENCOUNTER SYMPTOMS
ABDOMINAL PAIN: 1
BACK PAIN: 0
EYE PAIN: 0
SHORTNESS OF BREATH: 0
COLOR CHANGE: 0

## 2021-11-11 ASSESSMENT — PAIN SCALES - GENERAL
PAINLEVEL_OUTOF10: 9
PAINLEVEL_OUTOF10: 10
PAINLEVEL_OUTOF10: 9
PAINLEVEL_OUTOF10: 10

## 2021-11-12 VITALS
OXYGEN SATURATION: 98 % | WEIGHT: 246 LBS | RESPIRATION RATE: 17 BRPM | SYSTOLIC BLOOD PRESSURE: 156 MMHG | TEMPERATURE: 96.9 F | BODY MASS INDEX: 42 KG/M2 | HEIGHT: 64 IN | HEART RATE: 87 BPM | DIASTOLIC BLOOD PRESSURE: 91 MMHG

## 2021-11-12 NOTE — ED PROVIDER NOTES
EMERGENCY DEPARTMENT ENCOUNTER    Pt Name: Farzana Kapdaia  MRN: [id number]  [place] 1975  Date of evaluation: 11/11/21  CHIEF COMPLAINT       Chief Complaint   Patient presents with    Abdominal Pain     HISTORY OF PRESENT ILLNESS   64-year-old female presents for complaint of left lower quadrant abdominal pain. Patient reports the pain center earlier today, states that pain is sharp and stabbing in nature, states since it started nothing is been making it better or worse. Patient states that she did have a bowel movement earlier, states that she noticed she had some blood on the toilet paper that was bright red when she wiped. Patient denies any associated nausea or vomiting, denies any fevers, denies any associated chest pain or shortness of breath. Patient denies associated vaginal bleeding vaginal discharge, hematuria or dysuria. Patient reports he took a home Percocet without relief of pain. The history is provided by the patient. REVIEW OF SYSTEMS     Review of Systems   Constitutional: Negative for fever. HENT: Negative for congestion and ear pain. Eyes: Negative for pain. Respiratory: Negative for shortness of breath. Cardiovascular: Negative for chest pain, palpitations and leg swelling. Gastrointestinal: Positive for abdominal pain. Genitourinary: Negative for dysuria and flank pain. Musculoskeletal: Negative for back pain. Skin: Negative for color change. Neurological: Negative for numbness and headaches. Psychiatric/Behavioral: Negative for confusion. All other systems reviewed and are negative.     PASTMEDICAL HISTORY     Past Medical History:   Diagnosis Date    Acute exacerbation of chronic obstructive pulmonary disease ([address] 3/21/2018    Adhesive capsulitis of left shoulder 9/25/2018    Asthma     Asthma exacerbation 7/24/2014    Atrial fibrillation ([address]     placed on event monitor 9/25/18 for PVC's & A-fib    Atrial premature depolarization 7/19/2019    Bipolar 1 disorder ([address]     Bipolar disorder ([address] 7/19/2019    Bulging disc     CAD (coronary artery disease)     Candida infection 2/23/2018    Cardiomyopathy ([address]     Chest pain 6/13/2017    CHF (congestive heart failure) ([facility])     Chronic otitis media of both ears     rt>lt    Chronic right shoulder pain 3/14/2017    Cigarette nicotine dependence with nicotine-induced disorder 7/19/2019    Class 3 severe obesity due to excess calories with serious comorbidity and body mass index (BMI) of 40.0 to 44.9 in adult [facility]) 10/4/2018    Closed fracture of left ankle 6/25/2019    Clostridium difficile infection     COPD (chronic obstructive pulmonary disease) (HCC)     Cough, persistent 3/21/2018    Current moderate episode of major depressive disorder without prior episode ([address] 8/20/2018    Depression     Diabetes mellitus type 2, controlled ([address] 4/30/2014    Diarrhea     Diarrhea     Dizziness     DVT (deep venous thrombosis) ([facility])     after PICC line right arm    Encounter for monitoring sotalol therapy 2/20/2017    Encounter for screening mammogram for malignant neoplasm of breast 3/7/2018    Endometriosis     Fainting     GERD (gastroesophageal reflux disease)     hx of    GI bleeding     H. pylori infection     Helicobacter pylori (H. pylori)     [place] (hard of hearing)     both ears, no hearing aids    HTN (hypertension) 7/19/2019    Hyperlipidemia     Hypertension     Left bicipital tenosynovitis 10/17/2018    Left leg pain 5/16/2017    Left sided abdominal pain of unknown cause 7/19/2016    Leg swelling 9/21/2018    Mastoiditis     Mastoiditis, acute 4/30/2014    Migraines     Morbid obesity ([address]     Myocardial infarction ([address]     2005    Nausea     Neuropathy     On home oxygen therapy     3 L at night    Ovarian cyst     Passed out     hx of- negative tilt table    Pulmonary hypertension (HCC)     Pulmonary insufficiency     PVC (premature ventricular contraction)     Seasonal allergies     Tricuspid insufficiency     Type II or unspecified type diabetes mellitus without mention of complication, not stated as uncontrolled      Past Problem List  Patient Active Problem List   Diagnosis Code    Diabetes mellitus type 2, controlled ([facility] E11.9    COPD (chronic obstructive pulmonary disease) ([facility]) J44.9    Asthma J45.909    Acute bronchitis J20.9    KRISTIN (obstructive sleep apnea) G47.33    Adult body mass index 40 and over [id number]    Chronic right shoulder pain M25.511, G89.29    Neck pain M54.2    Radicular pain in right arm M79.2    Left leg pain M79.605    Noncompliance with therapeutic plan Z91.11    Precordial pain R07.2    Tobacco abuse Z72.0    Current moderate episode of major depressive disorder without prior episode ([facility]) F32.1    Adhesive capsulitis of left shoulder M75.02    Morbid obesity ([facility]) E66.01    Left bicipital tenosynovitis M75.22    Refused influenza vaccine Z28.21    Clostridium difficile infection A49.8    Closed fracture of left ankle S82.892A    Atrial premature depolarization I49.1    Ventricular premature depolarization I49.3    Cardiomyopathy ([facility]) I42.9    Cigarette nicotine dependence with nicotine-induced disorder F17.219    Productive cough R05.8    DVT (deep venous thrombosis) ([facility]) I82.409    Endometriosis N80.9    GERD (gastroesophageal reflux disease) K21.9    HTN (hypertension) I10    Hypomagnesemia E83.42    Migraines G43.909    Mixed hyperlipidemia E78.2    Neurocardiogenic syncope R55    Nonrheumatic tricuspid (valve) insufficiency I36.1    Tricuspid valve disorders, non-rheumatic I36.9    Ovarian cyst N83.209    Encounter for monitoring sotalol therapy Z51.81, Z79.899    Pulmonary HTN ([facility]) I27.20    PVC's (premature ventricular contractions) I49.3    Schizophrenia ([facility]) F20.9    Shortness of breath R06.02    Acute exacerbation of chronic obstructive pulmonary disease ([facility] J44.1    Bipolar disorder ([facility] F31.9    Left sided abdominal pain of unknown cause R10.9    Leg swelling M79.89    Mastoiditis, acute H70.009    Steroid-induced hyperglycemia R73.9, T38.0X5A    Sclerosing adenosis of left breast N60.22    Tachycardia R00.0    Tremor R25.1    Rupture of right rotator cuff M75.101    Rotator cuff syndrome of left shoulder M75.102    Long term current use of insulin ([facility] Z79.4    Lesion of ulnar nerve G56.20    Carpal tunnel syndrome G56.00    Acute upper respiratory infection J06.9    Cellulitis of right upper limb [address] F40.240    Close exposure to 2019 novel coronavirus Z20.822    Congestive heart failure (HCC) I50.9    Diabetic neuropathy ([facility]) E11.40    Enthesopathy M77.9    Fibrocystic breast changes N50.33    Helicobacter pylori gastritis K29.70, B96.81     SURGICAL HISTORY       Past Surgical History:   Procedure Laterality Date    ABDOMEN SURGERY      abcess    ABDOMINAL ADHESION SURGERY      ABDOMINAL EXPLORATION SURGERY      x 4    ABDOMINAL HERNIA REPAIR      with mesh    ABLATION OF DYSRHYTHMIC FOCUS  2016    ABLATION OF DYSRHYTHMIC FOCUS  09/08/2017    Done at the [facility].  ABSCESS DRAINAGE      left hip and chest    APPENDECTOMY      BREAST SURGERY Left 2007    I & D    CARDIAC CATHETERIZATION  2014 & 2000     no stenting    CARPAL TUNNEL RELEASE Right 12/19/2019    Ulnar nerve decompression, right elbow. Release of 1st and 2nd extensor compartments, right forearm.     CARPAL TUNNEL RELEASE  05/04/2020    Carpal tunnel release, left hand    CHOLECYSTECTOMY      COLONOSCOPY      FOOT SURGERY Left     bone fragment removed    FRACTURE SURGERY Right     closed reduction perc pinning ring & middle finger    GASTRIC FUNDOPLICATION  8839    HYSTERECTOMY      total    HYSTERECTOMY      HYSTEROSCOPY      tubal perfusion    MYRINGOTOMY Right 11/13/2015    Right myringotomy with placement of  T-tube on the right side. Removal of plugged ventilating tube, right side.  MYRINGOTOMY Left 07/14/2020    MYRINGOTOMY TUBE INSERTION performed by Cecelia Schneider [facility] at [facility] Right 06/05/2014    OTHER SURGICAL HISTORY Right 05/29/2014    removal ear tube rt ear    OTHER SURGICAL HISTORY  2009    LOOP recorder inserted and removed 3 mos.  later    OTHER SURGICAL HISTORY Right 08/11/2016    ear tube removal with patch    OTHER SURGICAL HISTORY      tubal perfusion, lysis of uterine adhesions    OTHER SURGICAL HISTORY      multiple PICC lines inserted and removed, it has affected circulation bilateral upper arms    OTHER SURGICAL HISTORY  10/19/2020    Revisiion to subcutaneous transposition of unlar nerve, right elbow    OTHER SURGICAL HISTORY Right 06/14/2021    REVISION TO SUBMUSCULAR TRANSPOSITION OF RIGHT ULNAR NERVE    IA MANIPULATGURPREET SHLDR JT W ANESTHESIA Right 03/01/2017    SHOULDER MANIPULATION RIGHT performed by Carmela Woods MD at [facility] Left 10/17/2018    SHOULDER ARTHROSCOPY W/BICEPS TENDONESIS performed by Lory Mendoza [facility] at [facility] Left     foot    TONSILLECTOMY      TYMPANOSTOMY TUBE PLACEMENT      TYMPANOSTOMY TUBE PLACEMENT Left 03/12/2019    ULNAR TUNNEL RELEASE Right     UPPER GASTROINTESTINAL ENDOSCOPY      UPPER GASTROINTESTINAL ENDOSCOPY  07/10/2019    UPPER GASTROINTESTINAL ENDOSCOPY N/A 07/10/2019    EGD BIOPSY [facility] Gino White MD at [facility]       Discharge Medication List as of 11/11/2021 11:40 PM      CONTINUE these medications which have NOT CHANGED    Details   ARIPiprazole (ABILIFY) 5 MG tablet Historical Med      Insulin Degludec (TRESIBA FLEXTOUCH) 200 UNIT/ML SOPN Inject 80 Units into the skin 2 times daily, Disp-10 pen, R-11Normal      QUEtiapine (SEROQUEL) 50 MG tablet Take 1 tablet by mouth nightly, Disp-30 tablet, R-11Normal insulin lispro, 1 Unit Dial, (HUMALOG KWIKPEN) 100 UNIT/ML SOPN INJECT SUBCUTANEOUSLY PER SLIDING SCALE, 150-200 INJECT 3U, 201-250 INJECT 6U, 251-300 INJECT 9U, >300 INJECT 12U, MAX 30U/DAY, Disp-5 pen, R-11Normal      albuterol sulfate HFA (VENTOLIN HFA) 108 (90 Base) MCG/ACT inhaler INHALE 2 PUFFS INTO LUNGS EVERY 6 HOURS AS NEEDED FOR WHEEZING, Disp-18 g, R-11Normal      ipratropium-albuterol (DUONEB) 0.5-2.5 (3) MG/3ML SOLN nebulizer solution INHALE 3 MLS (ONE VIAL) WITH NEBULIZER EVERY 6 HOURS AS NEEDED FOR SHORTNESS OF BREATH, Disp-360 each, R-3Normal      Insulin Pen Needle (UNIFINE PENTIPS) 31G X 8 MM MISC Disp-100 each, R-3, NormalUSE 4 TIMES DAILY AS DIRECTED      clotrimazole (LOTRIMIN) 1 % cream Apply topically 2 times daily. , Disp-45 g, R-11, Normal      !! blood glucose test strips (ASCENSIA AUTODISC VI;ONE TOUCH ULTRA TEST VI) strip Disp-100 each, R-5, Adjust SigOneTouch Ultra Test strips.  Check blood sugars 4x daily      !! blood glucose test strips (ONETOUCH ULTRA) strip USE TO TEST BLOOD SUGAR BEFORE MEALS, AT BEDTIME, AND AS NEEDED (6 TIMES DAILY), Disp-200 strip, R-11Normal      azithromycin (ZITHROMAX) 250 MG tablet Take 2 tabs on day 1 followed by 250mg on days 2 - 10, Disp-11 tablet, R-0Normal      benzonatate (TESSALON) 100 MG capsule Take 1-2 capsules by mouth 3 times daily as needed for Cough, Disp-20 capsule, R-0Print      HM BACITRACIN ZINC 500 UNIT/GM ointment DAWHistorical Med      ergocalciferol (ERGOCALCIFEROL) 1.25 MG (88757 UT) capsule Vitamin D2 1,250 mcg (50,000 unit) capsule   take 1 capsule by mouth every weekHistorical Med      magnesium oxide (MAG-OX) 400 MG tablet Historical Med      oxyCODONE-acetaminophen (PERCOCET) 5-325 MG per tablet Historical Med      HM SALINE NASAL SPRAY 0.65 % nasal spray DAWHistorical Med      promethazine (PHENERGAN) 25 MG tablet Take 25 mg by mouth every 6 hours as needed for NauseaHistorical Med      CLEARLAX 17 GM/SCOOP powder TAKE 17 G BY MOUTH 2 TIMES DAILY, Disp-1530 g, R-11Normal      DULoxetine (CYMBALTA) 60 MG extended release capsule TAKE 1 CAPSULE BY MOUTH 2 TIMES DAILY, Disp-60 capsule, R-11Normal      ibuprofen (ADVIL;MOTRIN) 800 MG tablet TAKE 1 TABLET BY MOUTH EVERY 8 HOURS AS NEEDED FOR PAIN TAKE WITH FOOD, Disp-90 tablet, R-11PLEASE ADDRESS THIS REQUEST WITH DR. NAHUM Bojorquez      topiramate (TOPAMAX) 25 MG tablet Historical Med      bumetanide (BUMEX) 2 MG tablet Take 2 mg by mouth dailyHistorical Med      acetaminophen (TYLENOL) 500 MG tablet Take 500 mg by mouth every 6 hours as needed for PainHistorical Med      insulin glargine (LANTUS) 100 UNIT/ML injection vial Inject 80 Units into the skin 2 times dailyHistorical Med      levocetirizine (XYZAL) 5 MG tablet Take 5 mg by mouth nightlyHistorical Med      acetaminophen-codeine (TYLENOL/CODEINE #4) 300-60 MG per tablet Take 2 tablets by mouth daily as needed for Pain. Historical Med      OXYGEN Inhale into the lungs Indications: On 3 liters per n/c during the night. Historical Med      Multiple Vitamins-Minerals (MULTIVITAMIN GUMMIES WOMENS) CHEW Take 2 each by mouth daily       carvedilol (COREG) 12.5 MG tablet Take 12.5 mg by mouth 2 times daily (with meals) Takes at 10:00am and 10:00pmHistorical Med      atorvastatin (LIPITOR) 20 MG tablet Take 20 mg by mouth every evening Historical Med       !! - Potential duplicate medications found. Please discuss with provider.         ALLERGIES     is allergic to latex, aspirin, avelox [moxifloxacin], chantix [varenicline], doxycycline, dye [iodides], flexeril [cyclobenzaprine], gabapentin, losartan, morphine, nsaids, pcn [penicillins], reglan [metoclopramide hcl], shellfish-derived products, sulfa antibiotics, vancomycin, zofran, zyvox [linezolid], acyclovir, bactrim [sulfamethoxazole-trimethoprim], betadine [povidone iodine], ceclor [cefaclor], clindamycin/lincomycin, codeine, macrolides and ketolides, novolin r [insulin], novolog [insulin aspart], phenothiazines, tape [adhesive tape], banana, compazine [prochlorperazine], fentanyl, kiwi extract, tamiflu [oseltamivir phosphate], and sotalol. FAMILY HISTORY     She indicated that the status of her mother is unknown. She indicated that the status of her father is unknown. She indicated that the status of her sister is unknown. She indicated that the status of her maternal grandmother is unknown. She indicated that the status of her maternal grandfather is unknown. She indicated that the status of her paternal grandmother is unknown. She indicated that the status of her paternal grandfather is unknown. She indicated that the status of her maternal aunt is unknown. She indicated that the status of her maternal uncle is unknown. She indicated that the status of her paternal aunt is unknown. She indicated that the status of her paternal uncle is unknown. SOCIAL HISTORY       Social History     Tobacco Use    Smoking status: Current Every Day Smoker     Packs/day: 0.50     Years: 23.00     Pack years: 11.50     Types: Cigarettes    Smokeless tobacco: Never Used   Vaping Use    Vaping Use: Never used   Substance Use Topics    Alcohol use: No     Alcohol/week: 0.0 standard drinks    Drug use: No     PHYSICAL EXAM     INITIAL VITALS: BP (!) 156/91   Pulse 87   Temp 96.9 °F (36.1 °C) (Temporal)   Resp 17   Ht 5' 4\" (1.626 m)   Wt 246 lb (111.6 kg)   SpO2 98%   BMI 42.23 kg/m²    Physical Exam  Vitals and nursing note reviewed. Constitutional:       General: She is not in acute distress. Appearance: Normal appearance. She is not toxic-appearing. HENT:      Head: Normocephalic and atraumatic. Nose: Nose normal.      Mouth/Throat:      Mouth: Mucous membranes are moist.      Pharynx: Oropharynx is clear. Eyes:      Extraocular Movements: Extraocular movements intact.       Conjunctiva/sclera: Conjunctivae normal.   Cardiovascular:      Rate and Rhythm: Normal rate and regular rhythm. Pulses: Normal pulses. Heart sounds: Normal heart sounds. Pulmonary:      Effort: Pulmonary effort is normal.      Breath sounds: Normal breath sounds. Abdominal:      General: Bowel sounds are normal. There is no distension. Palpations: Abdomen is soft. Tenderness: There is abdominal tenderness in the left lower quadrant. Genitourinary:     Rectum: External hemorrhoid present. No tenderness. Comments: Non thrombosed hemorrhoid  Musculoskeletal:         General: Normal range of motion. Cervical back: Normal range of motion. Skin:     General: Skin is warm and dry. Capillary Refill: Capillary refill takes less than 2 seconds. Neurological:      General: No focal deficit present. Mental Status: She is alert. Psychiatric:         Mood and Affect: Mood normal.         MEDICAL DECISION MAKIN-year-old female presents for left lower quadrant abdominal pain. On initial exam patient in no acute distress, vitals are stable, did have tenderness in left lower quadrant, abdomen is soft, will check labs and CT, patient reports allergy to contrast dye will do CT without contrast    Labs reviewed patient noted mild elevation white blood cell count of 15, remaining other labs unremarkable, CT was reviewed showing nonspecific dilation of the transverse colon, no other acute abnormalities are noted    Patient was reevaluated reports that her pain is improved      Patient was noted to have a hemorrhoid suspect this is likely cause of her reported blood on the tissue paper when she wiped    Discussed results with patient, discussed need for follow-up with her primary care physician, will also provide information for GI follow-up, discussed return precautions, patient voiced understanding is comfortable with plan and discharge home    Patient/Guardian was informed of their diagnosis and told to follow up with PCP & GI in 1-3 days.  Patient demonstrates understanding and agreement with the plan. They were given the opportunity to ask questions and those questions were answered to the best of our ability with the available information. Patient/Guardian told to return to the ED for any new, worsening, changing or persistent symptoms. This dictation was prepared using [employer] voice recognition software. CRITICAL CARE:       PROCEDURES:    Procedures    DIAGNOSTIC RESULTS   EKG:All EKG's are interpreted by the Emergency Department Physician who either signs or Co-signs this chart in the absence of a cardiologist.        RADIOLOGY:All plain film, CT, MRI, and formal ultrasound images (except ED bedside ultrasound) are read by the radiologist, see reports below, unless otherwisenoted in MDM or here. CT ABDOMEN PELVIS WO CONTRAST Additional Contrast? None   Preliminary Result   Mild nonspecific dilation of the transverse colon measuring up to 7.5 cm. Otherwise no acute abdominal or pelvic abnormality on this unenhanced study. LABS: All lab results were reviewed by myself, and all abnormals are listed below.   Labs Reviewed   CBC WITH AUTO DIFFERENTIAL - Abnormal; Notable for the following components:       Result Value    WBC 15.7 (*)     .6 (*)     Segs Absolute 10.10 (*)     Absolute Eos # 0.50 (*)     All other components within normal limits   COMPREHENSIVE METABOLIC PANEL W/ REFLEX TO MG FOR LOW K - Abnormal; Notable for the following components:    Glucose 118 (*)     All other components within normal limits   LIPASE - Abnormal; Notable for the following components:    Lipase 10 (*)     All other components within normal limits   URINALYSIS       EMERGENCY DEPARTMENTCOURSE:         Vitals:    Vitals:    11/11/21 1805 11/11/21 2353   BP: (!) 165/88 (!) 156/91   Pulse: 85 87   Resp: 16 17   Temp: 96.9 °F (36.1 °C)    TempSrc: Temporal    SpO2: 97% 98%   Weight: 246 lb (111.6 kg)    Height: 5' 4\" (1.626 m)        The patient was given the following medications while in the emergency department:  Orders Placed This Encounter   Medications    0.9 % sodium chloride bolus    morphine sulfate (PF) injection 4 mg    diphenhydrAMINE (BENADRYL) tablet 50 mg    DISCONTD: ibuprofen (ADVIL;MOTRIN) tablet 600 mg     CONSULTS:  None    FINAL IMPRESSION      1. Left lower quadrant abdominal pain    2. Hemorrhoids, unspecified hemorrhoid type          DISPOSITION/PLAN   DISPOSITION Decision To Discharge 11/11/2021 11:40:26 PM      PATIENT REFERRED TO:  Leslie Farfan, [address] [facility]  [address] [address]  [phone]    Schedule an appointment as soon as possible for a visit       [address] [address]en [address] ED  [facility]  [address] [place] [address]  [phone]    As needed, If symptoms worsen    DISCHARGE MEDICATIONS:  Discharge Medication List as of 11/11/2021 11:40 PM        The care is provided during an unprecedented national emergency due to the novel coronavirus, COVID 19.   Chapis Lema DO  Attending Emergency Physician                  Chapis Lema DO  11/12/21 0221

## 2021-11-12 NOTE — ED NOTES
Discharge papers reviewed with pt. Pt instructed to follow-up with PCP/specialist and to return to ED if symptoms worsen or have any concerns. Pt verbalizes understanding. Pt ambulated out of ED with steady gait and all belongings.        Johnson Da Silva RN  11/12/21 8610

## 2021-12-14 ENCOUNTER — APPOINTMENT (OUTPATIENT)
Dept: GENERAL RADIOLOGY | Age: 46
DRG: 280 | End: 2021-12-14
Payer: COMMERCIAL

## 2021-12-14 ENCOUNTER — HOSPITAL ENCOUNTER (INPATIENT)
Age: 46
LOS: 8 days | Discharge: HOME | DRG: 280 | End: 2021-12-22
Attending: STUDENT IN AN ORGANIZED HEALTH CARE EDUCATION/TRAINING PROGRAM | Admitting: FAMILY MEDICINE
Payer: COMMERCIAL

## 2021-12-14 DIAGNOSIS — I21.4 NSTEMI (NON-ST ELEVATED MYOCARDIAL INFARCTION) (HCC): Primary | ICD-10-CM

## 2021-12-14 PROBLEM — R79.89 ELEVATED TROPONIN: Status: ACTIVE | Noted: 2021-12-14

## 2021-12-14 PROBLEM — R77.8 ELEVATED TROPONIN: Status: ACTIVE | Noted: 2021-12-14

## 2021-12-14 LAB
ABSOLUTE EOS #: 0.3 K/UL (ref 0–0.4)
ABSOLUTE IMMATURE GRANULOCYTE: ABNORMAL K/UL (ref 0–0.3)
ABSOLUTE LYMPH #: 3.6 K/UL (ref 1–4.8)
ABSOLUTE MONO #: 0.7 K/UL (ref 0.1–1.3)
ALBUMIN SERPL-MCNC: 4.2 G/DL (ref 3.5–5.2)
ALBUMIN/GLOBULIN RATIO: ABNORMAL (ref 1–2.5)
ALP BLD-CCNC: 94 U/L (ref 35–104)
ALT SERPL-CCNC: 23 U/L (ref 5–33)
ANION GAP SERPL CALCULATED.3IONS-SCNC: 11 MMOL/L (ref 9–17)
ANTI-XA UNFRAC HEPARIN: <0.1 IU/L (ref 0.3–0.7)
ANTI-XA UNFRAC HEPARIN: <0.1 IU/L (ref 0.3–0.7)
AST SERPL-CCNC: 29 U/L
BASOPHILS # BLD: 1 % (ref 0–2)
BASOPHILS ABSOLUTE: 0.1 K/UL (ref 0–0.2)
BILIRUB SERPL-MCNC: 0.4 MG/DL (ref 0.3–1.2)
BNP INTERPRETATION: ABNORMAL
BUN BLDV-MCNC: 15 MG/DL (ref 6–20)
BUN/CREAT BLD: ABNORMAL (ref 9–20)
CALCIUM SERPL-MCNC: 9.3 MG/DL (ref 8.6–10.4)
CHLORIDE BLD-SCNC: 100 MMOL/L (ref 98–107)
CO2: 28 MMOL/L (ref 20–31)
CREAT SERPL-MCNC: 0.55 MG/DL (ref 0.5–0.9)
D-DIMER QUANTITATIVE: 0.44 MG/L FEU (ref 0–0.59)
DIFFERENTIAL TYPE: ABNORMAL
DIRECT EXAM: NORMAL
EOSINOPHILS RELATIVE PERCENT: 3 % (ref 0–4)
GFR AFRICAN AMERICAN: >60 ML/MIN
GFR NON-AFRICAN AMERICAN: >60 ML/MIN
GFR SERPL CREATININE-BSD FRML MDRD: ABNORMAL ML/MIN/{1.73_M2}
GFR SERPL CREATININE-BSD FRML MDRD: ABNORMAL ML/MIN/{1.73_M2}
GLUCOSE BLD-MCNC: 184 MG/DL (ref 70–99)
GLUCOSE BLD-MCNC: 224 MG/DL (ref 65–105)
HCT VFR BLD CALC: 39.4 % (ref 36–46)
HEMOGLOBIN: 13.2 G/DL (ref 12–16)
IMMATURE GRANULOCYTES: ABNORMAL %
INFLUENZA A: DETECTED
INFLUENZA B: NOT DETECTED
INR BLD: 0.9
LIPASE: 12 U/L (ref 13–60)
LYMPHOCYTES # BLD: 42 % (ref 24–44)
Lab: NORMAL
MCH RBC QN AUTO: 34.1 PG (ref 26–34)
MCHC RBC AUTO-ENTMCNC: 33.5 G/DL (ref 31–37)
MCV RBC AUTO: 101.7 FL (ref 80–100)
MONOCYTES # BLD: 8 % (ref 1–7)
NRBC AUTOMATED: ABNORMAL PER 100 WBC
PARTIAL THROMBOPLASTIN TIME: 30.2 SEC (ref 24–36)
PDW BLD-RTO: 12.9 % (ref 11.5–14.9)
PLATELET # BLD: 238 K/UL (ref 150–450)
PLATELET ESTIMATE: ABNORMAL
PMV BLD AUTO: 8.1 FL (ref 6–12)
POTASSIUM SERPL-SCNC: 3.8 MMOL/L (ref 3.7–5.3)
PRO-BNP: 458 PG/ML
PROTHROMBIN TIME: 12.2 SEC (ref 11.8–14.6)
RBC # BLD: 3.87 M/UL (ref 4–5.2)
RBC # BLD: ABNORMAL 10*6/UL
SARS-COV-2 RNA, RT PCR: NOT DETECTED
SARS-COV-2, RAPID: NOT DETECTED
SEG NEUTROPHILS: 46 % (ref 36–66)
SEGMENTED NEUTROPHILS ABSOLUTE COUNT: 3.9 K/UL (ref 1.3–9.1)
SODIUM BLD-SCNC: 139 MMOL/L (ref 135–144)
SOURCE: ABNORMAL
SPECIMEN DESCRIPTION: ABNORMAL
SPECIMEN DESCRIPTION: NORMAL
SPECIMEN DESCRIPTION: NORMAL
TOTAL PROTEIN: 7.3 G/DL (ref 6.4–8.3)
TROPONIN INTERP: ABNORMAL
TROPONIN T: ABNORMAL NG/ML
TROPONIN, HIGH SENSITIVITY: 315 NG/L (ref 0–14)
TROPONIN, HIGH SENSITIVITY: 325 NG/L (ref 0–14)
TROPONIN, HIGH SENSITIVITY: 327 NG/L (ref 0–14)
WBC # BLD: 8.6 K/UL (ref 3.5–11)
WBC # BLD: ABNORMAL 10*3/UL

## 2021-12-14 PROCEDURE — 6370000000 HC RX 637 (ALT 250 FOR IP): Performed by: PHYSICIAN ASSISTANT

## 2021-12-14 PROCEDURE — 6360000002 HC RX W HCPCS: Performed by: PHYSICIAN ASSISTANT

## 2021-12-14 PROCEDURE — 87880 STREP A ASSAY W/OPTIC: CPT

## 2021-12-14 PROCEDURE — 87636 SARSCOV2 & INF A&B AMP PRB: CPT

## 2021-12-14 PROCEDURE — 85379 FIBRIN DEGRADATION QUANT: CPT

## 2021-12-14 PROCEDURE — 94761 N-INVAS EAR/PLS OXIMETRY MLT: CPT

## 2021-12-14 PROCEDURE — 94640 AIRWAY INHALATION TREATMENT: CPT

## 2021-12-14 PROCEDURE — 2580000003 HC RX 258: Performed by: PHYSICIAN ASSISTANT

## 2021-12-14 PROCEDURE — 85520 HEPARIN ASSAY: CPT

## 2021-12-14 PROCEDURE — 83880 ASSAY OF NATRIURETIC PEPTIDE: CPT

## 2021-12-14 PROCEDURE — 85025 COMPLETE CBC W/AUTO DIFF WBC: CPT

## 2021-12-14 PROCEDURE — 6370000000 HC RX 637 (ALT 250 FOR IP): Performed by: FAMILY MEDICINE

## 2021-12-14 PROCEDURE — 93005 ELECTROCARDIOGRAM TRACING: CPT | Performed by: FAMILY MEDICINE

## 2021-12-14 PROCEDURE — 2060000000 HC ICU INTERMEDIATE R&B

## 2021-12-14 PROCEDURE — 83036 HEMOGLOBIN GLYCOSYLATED A1C: CPT

## 2021-12-14 PROCEDURE — 82947 ASSAY GLUCOSE BLOOD QUANT: CPT

## 2021-12-14 PROCEDURE — 85610 PROTHROMBIN TIME: CPT

## 2021-12-14 PROCEDURE — 80053 COMPREHEN METABOLIC PANEL: CPT

## 2021-12-14 PROCEDURE — 71045 X-RAY EXAM CHEST 1 VIEW: CPT

## 2021-12-14 PROCEDURE — 84484 ASSAY OF TROPONIN QUANT: CPT

## 2021-12-14 PROCEDURE — 2580000003 HC RX 258: Performed by: STUDENT IN AN ORGANIZED HEALTH CARE EDUCATION/TRAINING PROGRAM

## 2021-12-14 PROCEDURE — 87635 SARS-COV-2 COVID-19 AMP PRB: CPT

## 2021-12-14 PROCEDURE — 36415 COLL VENOUS BLD VENIPUNCTURE: CPT

## 2021-12-14 PROCEDURE — 83690 ASSAY OF LIPASE: CPT

## 2021-12-14 PROCEDURE — 96375 TX/PRO/DX INJ NEW DRUG ADDON: CPT

## 2021-12-14 PROCEDURE — 99285 EMERGENCY DEPT VISIT HI MDM: CPT

## 2021-12-14 PROCEDURE — 96365 THER/PROPH/DIAG IV INF INIT: CPT

## 2021-12-14 PROCEDURE — 6360000002 HC RX W HCPCS: Performed by: STUDENT IN AN ORGANIZED HEALTH CARE EDUCATION/TRAINING PROGRAM

## 2021-12-14 PROCEDURE — 85730 THROMBOPLASTIN TIME PARTIAL: CPT

## 2021-12-14 RX ORDER — ACETAMINOPHEN 650 MG/1
650 SUPPOSITORY RECTAL EVERY 6 HOURS PRN
Status: DISCONTINUED | OUTPATIENT
Start: 2021-12-14 | End: 2021-12-23 | Stop reason: HOSPADM

## 2021-12-14 RX ORDER — HYDROCODONE BITARTRATE AND ACETAMINOPHEN 5; 325 MG/1; MG/1
1 TABLET ORAL EVERY 4 HOURS PRN
Status: DISCONTINUED | OUTPATIENT
Start: 2021-12-14 | End: 2021-12-14

## 2021-12-14 RX ORDER — BUTALBITAL, ACETAMINOPHEN AND CAFFEINE 300; 40; 50 MG/1; MG/1; MG/1
1 CAPSULE ORAL ONCE
Status: DISCONTINUED | OUTPATIENT
Start: 2021-12-14 | End: 2021-12-23 | Stop reason: HOSPADM

## 2021-12-14 RX ORDER — CARVEDILOL 12.5 MG/1
12.5 TABLET ORAL 2 TIMES DAILY WITH MEALS
Status: DISCONTINUED | OUTPATIENT
Start: 2021-12-14 | End: 2021-12-15

## 2021-12-14 RX ORDER — BUMETANIDE 1 MG/1
2 TABLET ORAL DAILY
Status: DISCONTINUED | OUTPATIENT
Start: 2021-12-14 | End: 2021-12-23 | Stop reason: HOSPADM

## 2021-12-14 RX ORDER — IBUPROFEN 800 MG/1
800 TABLET ORAL EVERY 8 HOURS PRN
COMMUNITY
End: 2022-02-28

## 2021-12-14 RX ORDER — DEXTROSE MONOHYDRATE 50 MG/ML
100 INJECTION, SOLUTION INTRAVENOUS PRN
Status: DISCONTINUED | OUTPATIENT
Start: 2021-12-14 | End: 2021-12-23 | Stop reason: HOSPADM

## 2021-12-14 RX ORDER — DEXTROSE MONOHYDRATE 25 G/50ML
12.5 INJECTION, SOLUTION INTRAVENOUS PRN
Status: DISCONTINUED | OUTPATIENT
Start: 2021-12-14 | End: 2021-12-23 | Stop reason: HOSPADM

## 2021-12-14 RX ORDER — OXYCODONE HYDROCHLORIDE AND ACETAMINOPHEN 5; 325 MG/1; MG/1
1 TABLET ORAL EVERY 6 HOURS PRN
Status: DISCONTINUED | OUTPATIENT
Start: 2021-12-14 | End: 2021-12-23 | Stop reason: HOSPADM

## 2021-12-14 RX ORDER — SODIUM CHLORIDE 0.9 % (FLUSH) 0.9 %
5-40 SYRINGE (ML) INJECTION EVERY 12 HOURS SCHEDULED
Status: DISCONTINUED | OUTPATIENT
Start: 2021-12-14 | End: 2021-12-23 | Stop reason: HOSPADM

## 2021-12-14 RX ORDER — SODIUM CHLORIDE 9 MG/ML
25 INJECTION, SOLUTION INTRAVENOUS PRN
Status: DISCONTINUED | OUTPATIENT
Start: 2021-12-14 | End: 2021-12-23 | Stop reason: HOSPADM

## 2021-12-14 RX ORDER — 0.9 % SODIUM CHLORIDE 0.9 %
1000 INTRAVENOUS SOLUTION INTRAVENOUS ONCE
Status: COMPLETED | OUTPATIENT
Start: 2021-12-14 | End: 2021-12-14

## 2021-12-14 RX ORDER — FUROSEMIDE 10 MG/ML
80 INJECTION INTRAMUSCULAR; INTRAVENOUS DAILY
Status: DISCONTINUED | OUTPATIENT
Start: 2021-12-14 | End: 2021-12-16

## 2021-12-14 RX ORDER — IPRATROPIUM BROMIDE AND ALBUTEROL SULFATE 2.5; .5 MG/3ML; MG/3ML
1 SOLUTION RESPIRATORY (INHALATION) 4 TIMES DAILY
Status: DISCONTINUED | OUTPATIENT
Start: 2021-12-14 | End: 2021-12-23 | Stop reason: HOSPADM

## 2021-12-14 RX ORDER — POLYETHYLENE GLYCOL 3350 17 G/17G
17 POWDER, FOR SOLUTION ORAL 2 TIMES DAILY
Status: DISCONTINUED | OUTPATIENT
Start: 2021-12-14 | End: 2021-12-23 | Stop reason: HOSPADM

## 2021-12-14 RX ORDER — ATORVASTATIN CALCIUM 20 MG/1
20 TABLET, FILM COATED ORAL EVERY EVENING
Status: DISCONTINUED | OUTPATIENT
Start: 2021-12-14 | End: 2021-12-14 | Stop reason: DRUGHIGH

## 2021-12-14 RX ORDER — ACETAMINOPHEN AND CODEINE PHOSPHATE 300; 60 MG/1; MG/1
1 TABLET ORAL EVERY 4 HOURS PRN
Status: DISCONTINUED | OUTPATIENT
Start: 2021-12-14 | End: 2021-12-14 | Stop reason: CLARIF

## 2021-12-14 RX ORDER — METOPROLOL SUCCINATE 25 MG/1
25 TABLET, EXTENDED RELEASE ORAL DAILY
Status: DISCONTINUED | OUTPATIENT
Start: 2021-12-14 | End: 2021-12-16

## 2021-12-14 RX ORDER — BENZONATATE 100 MG/1
200 CAPSULE ORAL ONCE
Status: COMPLETED | OUTPATIENT
Start: 2021-12-14 | End: 2021-12-14

## 2021-12-14 RX ORDER — ARIPIPRAZOLE 5 MG/1
5 TABLET ORAL DAILY
Status: DISCONTINUED | OUTPATIENT
Start: 2021-12-14 | End: 2021-12-23 | Stop reason: HOSPADM

## 2021-12-14 RX ORDER — HEPARIN SODIUM 1000 [USP'U]/ML
2000 INJECTION, SOLUTION INTRAVENOUS; SUBCUTANEOUS PRN
Status: DISCONTINUED | OUTPATIENT
Start: 2021-12-14 | End: 2021-12-17

## 2021-12-14 RX ORDER — ACETAMINOPHEN 500 MG
1000 TABLET ORAL ONCE
Status: DISCONTINUED | OUTPATIENT
Start: 2021-12-14 | End: 2021-12-14

## 2021-12-14 RX ORDER — IBUPROFEN 800 MG/1
800 TABLET ORAL EVERY 8 HOURS PRN
Status: DISCONTINUED | OUTPATIENT
Start: 2021-12-14 | End: 2021-12-23 | Stop reason: HOSPADM

## 2021-12-14 RX ORDER — ACETAMINOPHEN 500 MG
500 TABLET ORAL EVERY 6 HOURS PRN
Status: DISCONTINUED | OUTPATIENT
Start: 2021-12-14 | End: 2021-12-14 | Stop reason: DRUGHIGH

## 2021-12-14 RX ORDER — BENZONATATE 100 MG/1
100 CAPSULE ORAL 3 TIMES DAILY PRN
Status: DISCONTINUED | OUTPATIENT
Start: 2021-12-14 | End: 2021-12-15

## 2021-12-14 RX ORDER — SODIUM CHLORIDE 0.9 % (FLUSH) 0.9 %
5-40 SYRINGE (ML) INJECTION PRN
Status: DISCONTINUED | OUTPATIENT
Start: 2021-12-14 | End: 2021-12-23 | Stop reason: HOSPADM

## 2021-12-14 RX ORDER — ACETAMINOPHEN 325 MG/1
650 TABLET ORAL EVERY 6 HOURS PRN
Status: DISCONTINUED | OUTPATIENT
Start: 2021-12-14 | End: 2021-12-23 | Stop reason: HOSPADM

## 2021-12-14 RX ORDER — ALBUTEROL SULFATE 90 UG/1
2 AEROSOL, METERED RESPIRATORY (INHALATION) EVERY 4 HOURS PRN
Status: DISCONTINUED | OUTPATIENT
Start: 2021-12-14 | End: 2021-12-23 | Stop reason: HOSPADM

## 2021-12-14 RX ORDER — DULOXETIN HYDROCHLORIDE 60 MG/1
60 CAPSULE, DELAYED RELEASE ORAL 2 TIMES DAILY
Status: DISCONTINUED | OUTPATIENT
Start: 2021-12-14 | End: 2021-12-23 | Stop reason: HOSPADM

## 2021-12-14 RX ORDER — NICOTINE POLACRILEX 4 MG
15 LOZENGE BUCCAL PRN
Status: DISCONTINUED | OUTPATIENT
Start: 2021-12-14 | End: 2021-12-23 | Stop reason: HOSPADM

## 2021-12-14 RX ORDER — CETIRIZINE HYDROCHLORIDE 10 MG/1
10 TABLET ORAL DAILY
Status: DISCONTINUED | OUTPATIENT
Start: 2021-12-14 | End: 2021-12-23 | Stop reason: HOSPADM

## 2021-12-14 RX ORDER — QUETIAPINE FUMARATE 100 MG/1
50 TABLET, FILM COATED ORAL NIGHTLY
Status: DISCONTINUED | OUTPATIENT
Start: 2021-12-14 | End: 2021-12-23 | Stop reason: HOSPADM

## 2021-12-14 RX ORDER — PROMETHAZINE HYDROCHLORIDE 25 MG/1
25 TABLET ORAL EVERY 6 HOURS PRN
Status: DISCONTINUED | OUTPATIENT
Start: 2021-12-14 | End: 2021-12-23 | Stop reason: HOSPADM

## 2021-12-14 RX ORDER — HEPARIN SODIUM 1000 [USP'U]/ML
4000 INJECTION, SOLUTION INTRAVENOUS; SUBCUTANEOUS ONCE
Status: COMPLETED | OUTPATIENT
Start: 2021-12-14 | End: 2021-12-14

## 2021-12-14 RX ORDER — INSULIN GLARGINE 100 [IU]/ML
80 INJECTION, SOLUTION SUBCUTANEOUS 2 TIMES DAILY
Status: DISCONTINUED | OUTPATIENT
Start: 2021-12-14 | End: 2021-12-15

## 2021-12-14 RX ORDER — HEPARIN SODIUM 1000 [USP'U]/ML
4000 INJECTION, SOLUTION INTRAVENOUS; SUBCUTANEOUS PRN
Status: DISCONTINUED | OUTPATIENT
Start: 2021-12-14 | End: 2021-12-17

## 2021-12-14 RX ORDER — ATORVASTATIN CALCIUM 80 MG/1
80 TABLET, FILM COATED ORAL NIGHTLY
Status: DISCONTINUED | OUTPATIENT
Start: 2021-12-14 | End: 2021-12-23 | Stop reason: HOSPADM

## 2021-12-14 RX ADMIN — IPRATROPIUM BROMIDE AND ALBUTEROL SULFATE 1 AMPULE: .5; 3 SOLUTION RESPIRATORY (INHALATION) at 19:12

## 2021-12-14 RX ADMIN — PROMETHAZINE HYDROCHLORIDE 12.5 MG: 25 INJECTION INTRAMUSCULAR; INTRAVENOUS at 12:08

## 2021-12-14 RX ADMIN — CARVEDILOL 12.5 MG: 12.5 TABLET, FILM COATED ORAL at 19:01

## 2021-12-14 RX ADMIN — BENZONATATE 200 MG: 100 CAPSULE ORAL at 12:09

## 2021-12-14 RX ADMIN — INSULIN GLARGINE 80 UNITS: 100 INJECTION, SOLUTION SUBCUTANEOUS at 22:40

## 2021-12-14 RX ADMIN — QUETIAPINE FUMARATE 50 MG: 100 TABLET ORAL at 22:41

## 2021-12-14 RX ADMIN — ATORVASTATIN CALCIUM 80 MG: 80 TABLET, FILM COATED ORAL at 22:41

## 2021-12-14 RX ADMIN — IPRATROPIUM BROMIDE AND ALBUTEROL SULFATE 1 AMPULE: .5; 3 SOLUTION RESPIRATORY (INHALATION) at 15:41

## 2021-12-14 RX ADMIN — DULOXETINE 60 MG: 60 CAPSULE, DELAYED RELEASE ORAL at 22:41

## 2021-12-14 RX ADMIN — INSULIN LISPRO 3 UNITS: 100 INJECTION, SOLUTION INTRAVENOUS; SUBCUTANEOUS at 22:41

## 2021-12-14 RX ADMIN — SODIUM CHLORIDE 1000 ML: 9 INJECTION, SOLUTION INTRAVENOUS at 12:08

## 2021-12-14 RX ADMIN — HEPARIN SODIUM 9.2 UNITS/KG/HR: 10000 INJECTION, SOLUTION INTRAVENOUS; SUBCUTANEOUS at 13:58

## 2021-12-14 RX ADMIN — HEPARIN SODIUM 4000 UNITS: 1000 INJECTION INTRAVENOUS; SUBCUTANEOUS at 13:57

## 2021-12-14 ASSESSMENT — PAIN SCALES - GENERAL: PAINLEVEL_OUTOF10: 10

## 2021-12-14 ASSESSMENT — PAIN DESCRIPTION - LOCATION: LOCATION: GENERALIZED

## 2021-12-14 ASSESSMENT — PAIN DESCRIPTION - PAIN TYPE: TYPE: ACUTE PAIN

## 2021-12-14 NOTE — ED NOTES
Patient reports she is withdrawaling from cigarettes. Offered nicotine patch. Patient refusing nicotine.       Linda Amaral RN  12/14/21 8890

## 2021-12-14 NOTE — ED PROVIDER NOTES
EMERGENCY DEPARTMENT ENCOUNTER   ATTENDING ATTESTATION     Pt Name: Aleta Gil  MRN: [id number]  [place] 1975  Date of evaluation: 12/14/21   Aleta Gil is a 48 y.o. female with CC: Cough, Fever, Headache, Nausea, and Shortness of Breath    MDM:   75-year-old female history of COPD, diabetes, GERD, hypertension, pulmonary hypertension, nonischemic cardiomyopathy with depressed EF back in 2015 which has since recovered, PVCs on recent Holter monitor presents for evaluation of cough, fever, shortness of breath, chest pain. Symptoms present over the past week. Vital signs are stable. Troponin significantly elevated in the 300s. D-dimer negative do not suspect PE.  EKG shows no concerning changes. We will plan to start heparin drip, consult cardiology, admit to the hospital.         CRITICAL CARE:   30 minutes for management of NSTEMI requiring heparin infusion, cardiology consult, admission     EKG: All EKG's are interpreted by the Emergency Department Physician who either signs or Co-signs this chart in the absence of a cardiologist.    Sinus rhythm rate of 71 normal axis normal intervals no concerning ST or T wave changes    RADIOLOGY:All plain film, CT, MRI, and formal ultrasound images (except ED bedside ultrasound) are read by the radiologist, see reports below, unless otherwise noted in MDM or here. XR CHEST PORTABLE   Final Result   No consolidation or sizable pleural effusion. Likely bronchitis with medial   bibasilar atelectasis. LABS: All lab results were reviewed by myself, and all abnormals are listed below.   Labs Reviewed   CBC WITH AUTO DIFFERENTIAL - Abnormal; Notable for the following components:       Result Value    RBC 3.87 (*)     .7 (*)     MCH 34.1 (*)     Monocytes 8 (*)     All other components within normal limits   COMPREHENSIVE METABOLIC PANEL W/ REFLEX TO MG FOR LOW K - Abnormal; Notable for the following components:    Glucose 184 (*)     All other components within normal limits   LIPASE - Abnormal; Notable for the following components:    Lipase 12 (*)     All other components within normal limits   TROPONIN - Abnormal; Notable for the following components:    Troponin, High Sensitivity 327 (*)     All other components within normal limits   COVID-19, RAPID   STREP SCREEN GROUP A THROAT   COVID-19 & INFLUENZA COMBO   D-DIMER, QUANTITATIVE   PROTIME-INR   APTT   TROPONIN   HEPARIN LEVEL/ANTI-XA   HEPARIN LEVEL/ANTI-XA   BRAIN NATRIURETIC PEPTIDE     CONSULTS:  IP CONSULT TO INTERNAL MEDICINE  IP CONSULT TO CARDIOLOGY  FINAL IMPRESSION      1. NSTEMI (non-ST elevated myocardial infarction) ([address]            PASTMEDICAL HISTORY     Past Medical History:   Diagnosis Date    Acute exacerbation of chronic obstructive pulmonary disease (HCC) 3/21/2018    Adhesive capsulitis of left shoulder 9/25/2018    Asthma     Asthma exacerbation 7/24/2014    Atrial fibrillation ([facility])     placed on event monitor 9/25/18 for PVC's & A-fib    Atrial premature depolarization 7/19/2019    Bipolar 1 disorder ([facility])     Bipolar disorder ([facility] 7/19/2019    Bulging disc     CAD (coronary artery disease)     Candida infection 2/23/2018    Cardiomyopathy ([address]     Chest pain 6/13/2017    CHF (congestive heart failure) ([facility])     Chronic otitis media of both ears     rt>lt    Chronic right shoulder pain 3/14/2017    Cigarette nicotine dependence with nicotine-induced disorder 7/19/2019    Class 3 severe obesity due to excess calories with serious comorbidity and body mass index (BMI) of 40.0 to 44.9 in adult ([address] 10/4/2018    Closed fracture of left ankle 6/25/2019    Clostridium difficile infection     COPD (chronic obstructive pulmonary disease) ([facility])     Cough, persistent 3/21/2018    Current moderate episode of major depressive disorder without prior episode ([address] 8/20/2018    Depression     Diabetes mellitus type 2, controlled ([address] 4/30/2014    Diarrhea     Diarrhea     Dizziness DVT (deep venous thrombosis) ([address]     after PICC line right arm    Encounter for monitoring sotalol therapy 2/20/2017    Encounter for screening mammogram for malignant neoplasm of breast 3/7/2018    Endometriosis     Fainting     GERD (gastroesophageal reflux disease)     hx of    GI bleeding     H. pylori infection     Helicobacter pylori (H. pylori)     [place] (hard of hearing)     both ears, no hearing aids    HTN (hypertension) 7/19/2019    Hyperlipidemia     Hypertension     Left bicipital tenosynovitis 10/17/2018    Left leg pain 5/16/2017    Left sided abdominal pain of unknown cause 7/19/2016    Leg swelling 9/21/2018    Mastoiditis     Mastoiditis, acute 4/30/2014    Migraines     Morbid obesity ([address]     Myocardial infarction ([address]     2005    Nausea     Neuropathy     On home oxygen therapy     3 L at night    Ovarian cyst     Passed out     hx of- negative tilt table    Pulmonary hypertension ([address]     Pulmonary insufficiency     PVC (premature ventricular contraction)     Seasonal allergies     Tricuspid insufficiency     Type II or unspecified type diabetes mellitus without mention of complication, not stated as uncontrolled      SURGICAL HISTORY       Past Surgical History:   Procedure Laterality Date    ABDOMEN SURGERY      abcess    ABDOMINAL ADHESION SURGERY      ABDOMINAL EXPLORATION SURGERY      x 4    ABDOMINAL HERNIA REPAIR      with mesh    ABLATION OF DYSRHYTHMIC FOCUS  2016    ABLATION OF DYSRHYTHMIC FOCUS  09/08/2017    Done at the [facility]. ABSCESS DRAINAGE      left hip and chest    APPENDECTOMY      BREAST SURGERY Left 2007    I & D    CARDIAC CATHETERIZATION  2014 & 2000     no stenting    CARPAL TUNNEL RELEASE Right 12/19/2019    Ulnar nerve decompression, right elbow. Release of 1st and 2nd extensor compartments, right forearm.     CARPAL TUNNEL RELEASE  05/04/2020    Carpal tunnel release, left hand    CHOLECYSTECTOMY      COLONOSCOPY      FOOT SURGERY Left     bone fragment removed    FRACTURE SURGERY Right     closed reduction perc pinning ring & middle finger    GASTRIC FUNDOPLICATION  9130    HYSTERECTOMY      total    HYSTERECTOMY      HYSTEROSCOPY      tubal perfusion    MYRINGOTOMY Right 11/13/2015    Right myringotomy with placement of  T-tube on the right side. Removal of plugged ventilating tube, right side. MYRINGOTOMY Left 07/14/2020    MYRINGOTOMY TUBE INSERTION performed by Tiny Cotton MD at [address] Right 06/05/2014    OTHER SURGICAL HISTORY Right 05/29/2014    removal ear tube rt ear    OTHER SURGICAL HISTORY  2009    LOOP recorder inserted and removed 3 mos. later    OTHER SURGICAL HISTORY Right 08/11/2016    ear tube removal with patch    OTHER SURGICAL HISTORY      tubal perfusion, lysis of uterine adhesions    OTHER SURGICAL HISTORY      multiple PICC lines inserted and removed, it has affected circulation bilateral upper arms    OTHER SURGICAL HISTORY  10/19/2020    Revisiion to subcutaneous transposition of unlar nerve, right elbow    OTHER SURGICAL HISTORY Right 06/14/2021    REVISION TO SUBMUSCULAR TRANSPOSITION OF RIGHT ULNAR NERVE    NV MANIPULATN SHLDR JT W ANESTHESIA Right 03/01/2017    SHOULDER MANIPULATION RIGHT performed by Ashwin Madrid MD at [facility] Left 10/17/2018    SHOULDER ARTHROSCOPY W/BICEPS TENDONESIS performed by Esperanza Wright MD at [address] Left     foot    TONSILLECTOMY      TYMPANOSTOMY TUBE PLACEMENT      TYMPANOSTOMY TUBE PLACEMENT Left 03/12/2019    TYMPANOSTOMY TUBE PLACEMENT Right 12/08/2021    Right tympanostomy with tube placement of T-tube with operative microscope and general anesthesia. Right removal of right ear tube.     ULNAR TUNNEL RELEASE Right     UPPER GASTROINTESTINAL ENDOSCOPY      UPPER GASTROINTESTINAL ENDOSCOPY  07/10/2019    UPPER GASTROINTESTINAL ENDOSCOPY N/A 07/10/2019    EGD BIOPSY performed by Dorothy Pozo MD at [facility]       Previous Medications    ACETAMINOPHEN (TYLENOL) 500 MG TABLET    Take 500 mg by mouth every 6 hours as needed for Pain    ACETAMINOPHEN-CODEINE (TYLENOL/CODEINE #4) 300-60 MG PER TABLET    Take 2 tablets by mouth daily as needed for Pain. ALBUTEROL SULFATE HFA (VENTOLIN HFA) 108 (90 BASE) MCG/ACT INHALER    INHALE 2 PUFFS INTO LUNGS EVERY 6 HOURS AS NEEDED FOR WHEEZING    ARIPIPRAZOLE (ABILIFY) 5 MG TABLET        ATORVASTATIN (LIPITOR) 20 MG TABLET    Take 20 mg by mouth every evening     AZITHROMYCIN (ZITHROMAX) 250 MG TABLET    Take 2 tabs on day 1 followed by 250mg on days 2 - 10    BENZONATATE (TESSALON) 100 MG CAPSULE    Take 1-2 capsules by mouth 3 times daily as needed for Cough    BLOOD GLUCOSE TEST STRIPS (ASCENSIA AUTODISC VI;ONE TOUCH ULTRA TEST VI) STRIP    OneTouch Ultra Test strips. Check blood sugars 4x daily    BLOOD GLUCOSE TEST STRIPS (ONETOUCH ULTRA) STRIP    USE TO TEST BLOOD SUGAR BEFORE MEALS, AT BEDTIME, AND AS NEEDED (6 TIMES DAILY)    BUMETANIDE (BUMEX) 2 MG TABLET    Take 2 mg by mouth daily    CARVEDILOL (COREG) 12.5 MG TABLET    Take 12.5 mg by mouth 2 times daily (with meals) Takes at 10:00am and 10:00pm    CLEARLAX 17 GM/SCOOP POWDER    TAKE 17 G BY MOUTH 2 TIMES DAILY    CLOTRIMAZOLE (LOTRIMIN) 1 % CREAM    Apply topically 2 times daily.     DULOXETINE (CYMBALTA) 60 MG EXTENDED RELEASE CAPSULE    TAKE 1 CAPSULE BY MOUTH 2 TIMES DAILY    ERGOCALCIFEROL (ERGOCALCIFEROL) 1.25 MG (95778 UT) CAPSULE    Vitamin D2 1,250 mcg (50,000 unit) capsule   take 1 capsule by mouth every week    HM BACITRACIN ZINC 500 UNIT/GM OINTMENT        HM SALINE NASAL SPRAY 0.65 % NASAL SPRAY        IBUPROFEN (ADVIL;MOTRIN) 800 MG TABLET    TAKE 1 TABLET BY MOUTH EVERY 8 HOURS AS NEEDED FOR PAIN TAKE WITH FOOD    INSULIN DEGLUDEC (TRESIBA FLEXTOUCH) 200 UNIT/ML SOPN    Inject 80 Units into the skin 2 times daily    INSULIN GLARGINE (LANTUS) 100 UNIT/ML INJECTION VIAL    Inject 80 Units into the skin 2 times daily    INSULIN LISPRO, 1 UNIT DIAL, (HUMALOG KWIKPEN) 100 UNIT/ML SOPN    INJECT SUBCUTANEOUSLY PER SLIDING SCALE, 150-200 INJECT 3U, 201-250 INJECT 6U, 251-300 INJECT 9U, >300 INJECT 12U, MAX 30U/DAY    INSULIN PEN NEEDLE (UNIFINE PENTIPS) 31G X 8 MM MISC    USE 4 TIMES DAILY AS DIRECTED    IPRATROPIUM-ALBUTEROL (DUONEB) 0.5-2.5 (3) MG/3ML SOLN NEBULIZER SOLUTION    INHALE 3 MLS (ONE VIAL) WITH NEBULIZER EVERY 6 HOURS AS NEEDED FOR SHORTNESS OF BREATH    LEVOCETIRIZINE (XYZAL) 5 MG TABLET    Take 5 mg by mouth nightly    MAGNESIUM OXIDE (MAG-OX) 400 MG TABLET        MULTIPLE VITAMINS-MINERALS (MULTIVITAMIN GUMMIES WOMENS) CHEW    Take 2 each by mouth daily     OXYCODONE-ACETAMINOPHEN (PERCOCET) 5-325 MG PER TABLET        OXYGEN    Inhale into the lungs Indications: On 3 liters per n/c during the night. PROMETHAZINE (PHENERGAN) 25 MG TABLET    Take 25 mg by mouth every 6 hours as needed for Nausea    QUETIAPINE (SEROQUEL) 50 MG TABLET    TAKE 1 TABLET BY MOUTH EVERY DAY AT NIGHT    TOPIRAMATE (TOPAMAX) 25 MG TABLET         ALLERGIES     is allergic to latex, aspirin, avelox [moxifloxacin], chantix [varenicline], doxycycline, dye [iodides], flexeril [cyclobenzaprine], gabapentin, losartan, morphine, nsaids, pcn [penicillins], reglan [metoclopramide hcl], shellfish-derived products, sulfa antibiotics, vancomycin, zofran, zyvox [linezolid], acyclovir, bactrim [sulfamethoxazole-trimethoprim], betadine [povidone iodine], ceclor [cefaclor], clindamycin/lincomycin, codeine, macrolides and ketolides, novolin r [insulin], novolog [insulin aspart], phenothiazines, tape [adhesive tape], banana, compazine [prochlorperazine], fentanyl, kiwi extract, tamiflu [oseltamivir phosphate], and sotalol. FAMILY HISTORY     She indicated that the status of her mother is unknown. She indicated that the status of her father is unknown.  She indicated that the status of her sister is unknown. She indicated that the status of her maternal grandmother is unknown. She indicated that the status of her maternal grandfather is unknown. She indicated that the status of her paternal grandmother is unknown. She indicated that the status of her paternal grandfather is unknown. She indicated that the status of her maternal aunt is unknown. She indicated that the status of her maternal uncle is unknown. She indicated that the status of her paternal aunt is unknown. She indicated that the status of her paternal uncle is unknown. SOCIAL HISTORY       Social History     Tobacco Use    Smoking status: Current Every Day Smoker     Packs/day: 0.50     Years: 23.00     Pack years: 11.50     Types: Cigarettes    Smokeless tobacco: Never Used   Vaping Use    Vaping Use: Never used   Substance Use Topics    Alcohol use: No     Alcohol/week: 0.0 standard drinks    Drug use: No       I personally evaluated and examined the patient in conjunction with the APC and agree with the assessment, treatment plan, and disposition of the patient as recorded by the APC. Jean Evans MD  The care is provided during an unprecedented national emergency due to the novel coronavirus, COVID 19.   Attending Emergency Physician          Jean Evans MD  12/14/21 [id number]

## 2021-12-14 NOTE — PROGRESS NOTES
Medication History completed:    New medications: none    Medications discontinued: topiramate, promethazine, levocetirizine, Lantus, bacitracin ointment, bumetanide, benzonatate, azithromycin, atorvastatin, acetaminophen-codeine, acetaminophen    Changes to dosing:   Percocet changed to 5-325 mg tablets taking 1 tablet every 4 hours as needed    Stated allergies: As listed    Other pertinent information: Medications confirmed with [facility] and [facility]. Last fill of Percocet was 12/4/21 for a 30 day supply. Only receiving Percocet from one pharmacy and prescriber.      Thank you,  Jennifer Flores, PharmD, [facility]  [phone]

## 2021-12-14 NOTE — PROGRESS NOTES
RN received call from [facility] [address], RN (house supervisor) with update on patient in ER. RN was informed that [address] [place] [address] Addy reached out to risk management regarding patient's insistence to use home supply of medication (including percocet) and keeping the meds on her person. Per security, patient's personal belongings cannot be removed from patient if she refuses. Dr. Serrano Settler to be notified of situation upon arrival to the floor. Daisha, receiving RN notified of situation.

## 2021-12-14 NOTE — ED NOTES
Patient used call light. Patient is reporting a headache. RN reviewed PRN meds. Offered norco. Patient reports that she has percocets with. Patient is requesting her carvedilol at 7 pm. States \"I have my meds with me. I only have a 2 day supply. \" RN informed patient she was paging cardiologist due to toprol XL ordered and both are beta blockers. Patients \"I hate when people mess with my meds. Dr. Effie Steward comes here. I already see a cardiologist. Does Douglas Hess still come here? I don't want anyone to change my medications. \"     RN clarified and patient refusing toprol xl right now. Patient said \"I will take my own meds. \" RN informed patient lasix was ordered. Patient said \"No. I don't want that. Lasix does not work for me. That is why I take bumex. I am getting agitated. This is why I may leave AMA. I hate it when people mess with my medications. I have my meds with me. I want a fan. This room is too hot. \"     RN informed patient we do not have fans down here. RN offered to try and find thermostat to turn down heat. Patient agitated.       Kathleen Colón RN  12/14/21 2006

## 2021-12-14 NOTE — ED NOTES
Patient requesting something for headache. PA notified. Received orders for tylenol. Writer brought tylenol in. Patient refused tylenol and said she took it at home. Patient said \"I have percocets in my purse. I told him that. I told him I took it at home. \" PA notified. Received orders for Fioricet. Writer brought in Matt Wood. Patient asked \"is that the blood test\" in your hand. RN explained that the swab will test for the flu. Patient reports \"I had the flu vaccine! I don't have the flu. \" RN educated patient that some people may still get the flu even with the vaccine depending on how the vaccine and strains are matched. RN educated patient that it will also be a more sensitive test for COVID on the same swab. Patient took percocet in front of RN despite medication not being ordered. Patient reported \"I asked the doctor what troponin means and all he said is that it a sign my heart is stressed. I had to ask my niece in nursing school told me that it means I had a heart attack, having a heart attack, or going to have a heart attack. \" RN attempted to educate the patient that different factors could lead to an increased troponin and not always a heart attack. Patient reporting to RN that no one will stick anything else up her nose.       Ramona Alvarado RN  12/14/21 [id number]

## 2021-12-14 NOTE — CONSULTS
[address],  [facility]  [phone]    HISTORY & PHYSICAL / CONSULT NOTE     Marci Wiseman    PCP:  Carrie Mc MD    Date of Admission:  12/14/2021  Date of Consultation:  12/14/2021 2:31 PM    Consult for  NSTEMI    SUBJECTIVE     History of Present Illness:  Marci Wiesman is a 48 y.o. female  With history of NICM with recovered LVEF,  Symptomatic PVCs s/p ablation at [facility] 2017,   Recurrent syncope, palpitations,   occasionalPACs on Holter monitor all 10/2021,COPD, hyperlipidemia, diabetes mellitus,   Morbid obesity, provoked DVT ( after PICC line placement), although poor disorder. 10/2021  Kayli/nuclear perfusion stress test:  No reversible defects. Normal wall motion EF 58%. Low risk study. 01/2018 TTE:  LVEF 55-60%. Grade 1 diastolic dysfunction. Patient presented to [facility]  ED earlier today complaining of   Not feeling well. Patient stated that she had  Ear tube risk placement procedure last week. Since Thursday she started not feeling well with gradually worsening cough productive yellow sputum, shortness of breath, sore throat, chills, generalized fatigue. Patient also reported having on and off sharp pressure sensation in the center of her chest, not related to exertion, most of the time when she coughs, sometimes without coughing, lasting for few minutes. Also reports feeling short of breath with exertion. Reports that on Saturday she got up out of her bed walking to the bathroom and suddenly collapsed losing her consciousness momentarily with no urine or bowel incontinence or biting her tongue. Patient denies orthopnea or leg edema. on arrival blood pressure 141/76, rest of vitals stable. EKG showed Sinus rhythm, normal EKG. No acute ischemic changes. COVID test negative. Lab work showed initial high sensitivity troponin 327. D-dimer negative. Chest x-ray suggesting bronchitis. Borderline prominence of cardiac silhouette. At the time I saw her she was resting in bed in no acute distress. Has ongoing cough, chest discomfort whenever she coughs. Chest pain not reproducible. Telemetry reviewed. No significant events.     Previous Medical History:    Past Medical History:   Diagnosis Date    Acute exacerbation of chronic obstructive pulmonary disease ([address] 3/21/2018    Adhesive capsulitis of left shoulder 9/25/2018    Asthma     Asthma exacerbation 7/24/2014    Atrial fibrillation (HCC)     placed on event monitor 9/25/18 for PVC's & A-fib    Atrial premature depolarization 7/19/2019    Bipolar 1 disorder (HCC)     Bipolar disorder ([address] 7/19/2019    Bulging disc     CAD (coronary artery disease)     Candida infection 2/23/2018    Cardiomyopathy ([address]     Chest pain 6/13/2017    [facility]congestive heart failure) [facility]     Chronic otitis media of both ears     rt>lt    Chronic right shoulder pain 3/14/2017    Cigarette nicotine dependence with nicotine-induced disorder 7/19/2019    Class 3 severe obesity due to excess calories with serious comorbidity and body mass index (BMI) of 40.0 to 44.9 in adult [facility]) 10/4/2018    Closed fracture of left ankle 6/25/2019    Clostridium difficile infection     COPD (chronic obstructive pulmonary disease) ([facility])     Cough, persistent 3/21/2018    Current moderate episode of major depressive disorder without prior episode ([address] 8/20/2018    Depression     Diabetes mellitus type 2, controlled ([address] 4/30/2014    Diarrhea     Diarrhea     Dizziness     DVT (deep venous thrombosis) ([facility])     after PICC line right arm    Encounter for monitoring sotalol therapy 2/20/2017    Encounter for screening mammogram for malignant neoplasm of breast 3/7/2018    Endometriosis     Fainting     GERD (gastroesophageal reflux disease)     hx of    GI bleeding     H. pylori infection     Helicobacter pylori (H. pylori)     "[employer] (hard of hearing)     both ears, no hearing aids    HTN (hypertension) 7/19/2019    Hyperlipidemia     Hypertension     Left bicipital tenosynovitis 10/17/2018    Left leg pain 5/16/2017    Left sided abdominal pain of unknown cause 7/19/2016    Leg swelling 9/21/2018    Mastoiditis     Mastoiditis, acute 4/30/2014    Migraines     Morbid obesity (HCC)     Myocardial infarction ([address]     2005    Nausea     Neuropathy     On home oxygen therapy     3 L at night    Ovarian cyst     Passed out     hx of- negative tilt table    Pulmonary hypertension (HCC)     Pulmonary insufficiency     PVC (premature ventricular contraction)     Seasonal allergies     Tricuspid insufficiency     Type II or unspecified type diabetes mellitus without mention of complication, not stated as uncontrolled        Previous Surgical History:    Past Surgical History:   Procedure Laterality Date    ABDOMEN SURGERY      abcess    ABDOMINAL ADHESION SURGERY      ABDOMINAL EXPLORATION SURGERY      x 4    ABDOMINAL HERNIA REPAIR      with mesh    ABLATION OF DYSRHYTHMIC FOCUS  2016    ABLATION OF DYSRHYTHMIC FOCUS  09/08/2017    Done at [facility].  ABSCESS DRAINAGE      left hip and chest    APPENDECTOMY      BREAST SURGERY Left 2007    I & D    CARDIAC CATHETERIZATION  2014 & 2000     no stenting    CARPAL TUNNEL RELEASE Right 12/19/2019    Ulnar nerve decompression, right elbow. Release of 1st and 2nd extensor compartments, right forearm.  CARPAL TUNNEL RELEASE  05/04/2020    Carpal tunnel release, left hand    CHOLECYSTECTOMY      COLONOSCOPY      FOOT SURGERY Left     bone fragment removed    FRACTURE SURGERY Right     closed reduction perc pinning ring & middle finger    GASTRIC FUNDOPLICATION  4025    HYSTERECTOMY      total    HYSTERECTOMY      HYSTEROSCOPY      tubal perfusion    MYRINGOTOMY Right 11/13/2015    Right myringotomy with placement of  T-tube on the right side.  Removal of plugged ventilating tube, right side.  MYRINGOTOMY Left 07/14/2020    MYRINGOTOMY TUBE INSERTION performed by Gerard Cabral MD at [facility] Right 06/05/2014    OTHER SURGICAL HISTORY Right 05/29/2014    removal ear tube rt ear    OTHER SURGICAL HISTORY  2009    LOOP recorder inserted and removed 3 mos. later    OTHER SURGICAL HISTORY Right 08/11/2016    ear tube removal with patch    OTHER SURGICAL HISTORY      tubal perfusion, lysis of uterine adhesions    OTHER SURGICAL HISTORY      multiple PICC lines inserted and removed, it has affected circulation bilateral upper arms    OTHER SURGICAL HISTORY  10/19/2020    Revisiion to subcutaneous transposition of unlar nerve, right elbow    OTHER SURGICAL HISTORY Right 06/14/2021    REVISION TO SUBMUSCULAR TRANSPOSITION OF RIGHT ULNAR NERVE    MN MANIPULATGURPREET SHLDR JT W ANESTHESIA Right 03/01/2017    SHOULDER MANIPULATION RIGHT performed by Luli Bateman MD at [address] [facility] Left 10/17/2018    SHOULDER ARTHROSCOPY W/BICEPS TENDONESIS performed by Mino Melendez MD at [facility] Left     foot    TONSILLECTOMY      TYMPANOSTOMY TUBE PLACEMENT      TYMPANOSTOMY TUBE PLACEMENT Left 03/12/2019    TYMPANOSTOMY TUBE PLACEMENT Right 12/08/2021    Right tympanostomy with tube placement of T-tube with operative microscope and general anesthesia. Right removal of right ear tube.  ULNAR TUNNEL RELEASE Right     UPPER GASTROINTESTINAL ENDOSCOPY      UPPER GASTROINTESTINAL ENDOSCOPY  07/10/2019    UPPER GASTROINTESTINAL ENDOSCOPY N/A 07/10/2019    EGD BIOPSY performed by Jonnathan Nguyen MD at [address]:     Allergies   Allergen Reactions    Latex Hives    Aspirin Anaphylaxis    Avelox [Moxifloxacin] Anaphylaxis    Chantix [Varenicline] Swelling    Doxycycline Anaphylaxis    Dye [Iodides] Other (See Comments)     Seizures    Flexeril [Cyclobenzaprine] Anaphylaxis    Gabapentin Anaphylaxis    Losartan Shortness Of Breath    Morphine Itching     Makes patient want to \"scratch out her eyes\". Can take if given with benadryl    Nsaids Anaphylaxis and Hives     Pt states she can take ibuprofen    Pcn [Penicillins] Anaphylaxis and Hives    Reglan [Metoclopramide Hcl] Swelling     Agitation, anger    Shellfish-Derived Products Anaphylaxis    Sulfa Antibiotics Hives, Shortness Of Breath, Itching and Swelling    Vancomycin Anaphylaxis    Zofran Anaphylaxis    Zyvox [Linezolid] Anaphylaxis    Acyclovir Swelling and Rash    Bactrim [Sulfamethoxazole-Trimethoprim] Hives    Betadine [Povidone Iodine] Hives    Ceclor [Cefaclor] Hives    Clindamycin/Lincomycin Hives    Codeine Itching and Rash    Macrolides And Ketolides Hives     Pt states she can take Azithromycin    Novolin R [Insulin] Hives    Novolog [Insulin Aspart] Hives    Phenothiazines Swelling    Tape [Adhesive Tape] Rash     OK to use paper tape and tegaderm per patient    Banana     Compazine [Prochlorperazine] Other (See Comments)     Agitation, anger, \"makes me jerk\"    Fentanyl Itching     Pt states doesn't work and makes patient itch    Kiwi Extract     Tamiflu [Oseltamivir Phosphate] Hives    Sotalol Other (See Comments)     Pt verbalized that she developed a prolonged QT and had an asthma attack with medication.          Hospital Meds:    Current Facility-Administered Medications   Medication Dose Route Frequency Provider Last Rate Last Admin    heparin (porcine) injection 4,000 Units  4,000 Units IntraVENous PRN Ermelinda Snider MD        heparin (porcine) injection 2,000 Units  2,000 Units IntraVENous PRN Ermelinda Snider MD        heparin (porcine) 25,000 Units in dextrose 5 % 250 mL infusion  5-30 Units/kg/hr IntraVENous Continuous Ermelinda Snider MD 10 mL/hr at 12/14/21 1358 9.2 Units/kg/hr at 12/14/21 1358    butalbital-APAP-caffeine -40 MG per capsule 1 capsule  1 capsule Oral Once Gina Guzman PA-C         Current Outpatient Medications   Medication Sig Dispense Refill    QUEtiapine (SEROQUEL) 50 MG tablet TAKE 1 TABLET BY MOUTH EVERY DAY AT NIGHT 30 tablet 11    ARIPiprazole (ABILIFY) 5 MG tablet       Insulin Degludec (TRESIBA FLEXTOUCH) 200 UNIT/ML SOPN Inject 80 Units into the skin 2 times daily 10 pen 11    insulin lispro, 1 Unit Dial, (HUMALOG KWIKPEN) 100 UNIT/ML SOPN INJECT SUBCUTANEOUSLY PER SLIDING SCALE, 150-200 INJECT 3U, 201-250 INJECT 6U, 251-300 INJECT 9U, >300 INJECT 12U, MAX 30U/DAY 5 pen 11    albuterol sulfate HFA (VENTOLIN HFA) 108 (90 Base) MCG/ACT inhaler INHALE 2 PUFFS INTO LUNGS EVERY 6 HOURS AS NEEDED FOR WHEEZING 18 g 11    ipratropium-albuterol (DUONEB) 0.5-2.5 (3) MG/3ML SOLN nebulizer solution INHALE 3 MLS (ONE VIAL) WITH NEBULIZER EVERY 6 HOURS AS NEEDED FOR SHORTNESS OF BREATH 360 each 3    Insulin Pen Needle (UNIFINE PENTIPS) 31G X 8 MM MISC USE 4 TIMES DAILY AS DIRECTED 100 each 3    clotrimazole (LOTRIMIN) 1 % cream Apply topically 2 times daily. 45 g 11    blood glucose test strips (ASCENSIA AUTODISC VI;ONE TOUCH ULTRA TEST VI) strip OneTouch Ultra Test strips.  Check blood sugars 4x daily 100 each 5    blood glucose test strips (ONETOUCH ULTRA) strip USE TO TEST BLOOD SUGAR BEFORE MEALS, AT BEDTIME, AND AS NEEDED (6 TIMES DAILY) 200 strip 11    azithromycin (ZITHROMAX) 250 MG tablet Take 2 tabs on day 1 followed by 250mg on days 2 - 10 (Patient not taking: Reported on 12/13/2021) 11 tablet 0    benzonatate (TESSALON) 100 MG capsule Take 1-2 capsules by mouth 3 times daily as needed for Cough (Patient not taking: Reported on 12/13/2021) 20 capsule 0    HM BACITRACIN ZINC 500 UNIT/GM ointment  (Patient not taking: Reported on 12/13/2021)      ergocalciferol (ERGOCALCIFEROL) 1.25 MG (44300 UT) capsule Vitamin D2 1,250 mcg (50,000 unit) capsule   take 1 capsule by mouth every week      magnesium oxide (MAG-OX) 400 MG tablet       oxyCODONE-acetaminophen (PERCOCET) 5-325 MG per tablet       HM SALINE NASAL SPRAY 0.65 % nasal spray       promethazine (PHENERGAN) 25 MG tablet Take 25 mg by mouth every 6 hours as needed for Nausea      CLEARLAX 17 GM/SCOOP powder TAKE 17 G BY MOUTH 2 TIMES DAILY 1530 g 11    DULoxetine (CYMBALTA) 60 MG extended release capsule TAKE 1 CAPSULE BY MOUTH 2 TIMES DAILY 60 capsule 11    ibuprofen (ADVIL;MOTRIN) 800 MG tablet TAKE 1 TABLET BY MOUTH EVERY 8 HOURS AS NEEDED FOR PAIN TAKE WITH FOOD (Patient not taking: Reported on 12/13/2021) 90 tablet 11    topiramate (TOPAMAX) 25 MG tablet       bumetanide (BUMEX) 2 MG tablet Take 2 mg by mouth daily      acetaminophen (TYLENOL) 500 MG tablet Take 500 mg by mouth every 6 hours as needed for Pain (Patient not taking: Reported on 12/13/2021)      insulin glargine (LANTUS) 100 UNIT/ML injection vial Inject 80 Units into the skin 2 times daily      levocetirizine (XYZAL) 5 MG tablet Take 5 mg by mouth nightly      acetaminophen-codeine (TYLENOL/CODEINE #4) 300-60 MG per tablet Take 2 tablets by mouth daily as needed for Pain. (Patient not taking: Reported on 12/13/2021)      OXYGEN Inhale into the lungs Indications: On 3 liters per n/c during the night.  Multiple Vitamins-Minerals (MULTIVITAMIN GUMMIES WOMENS) CHEW Take 2 each by mouth daily       carvedilol (COREG) 12.5 MG tablet Take 12.5 mg by mouth 2 times daily (with meals) Takes at 10:00am and 10:00pm      atorvastatin (LIPITOR) 20 MG tablet Take 20 mg by mouth every evening          Home Meds:    Prior to Admission medications    Medication Sig Start Date End Date Taking?  Authorizing Provider   QUEtiapine (SEROQUEL) 50 MG tablet TAKE 1 TABLET BY MOUTH EVERY DAY AT NIGHT 11/26/21   Blessing Bai MD   ARIPiprazole (ABILIFY) 5 MG tablet  10/11/21   Historical Provider, MD   Insulin Degludec (TRESIBA FLEXTOUCH) 200 UNIT/ML SOPN Inject 80 Units into the skin 2 Provider, MD PARKER SALINE NASAL SPRAY 0.65 % nasal spray  7/6/21   Historical Provider, MD   promethazine (PHENERGAN) 25 MG tablet Take 25 mg by mouth every 6 hours as needed for Nausea    Historical Provider, MD   CLEARLAX 17 GM/SCOOP powder TAKE 17 G BY MOUTH 2 TIMES DAILY 7/26/21   Antwon Mccrary MD   DULoxetine (CYMBALTA) 60 MG extended release capsule TAKE 1 CAPSULE BY MOUTH 2 TIMES DAILY 3/25/21   MARYANNE Gracia NP   ibuprofen (ADVIL;MOTRIN) 800 MG tablet TAKE 1 TABLET BY MOUTH EVERY 8 HOURS AS NEEDED FOR PAIN TAKE WITH FOOD  Patient not taking: Reported on 12/13/2021 2/17/21   Antwon Mccrary MD   topiramate (TOPAMAX) 25 MG tablet  10/13/20   Historical Provider, MD   bumetanide (BUMEX) 2 MG tablet Take 2 mg by mouth daily    Historical Provider, MD   acetaminophen (TYLENOL) 500 MG tablet Take 500 mg by mouth every 6 hours as needed for Pain  Patient not taking: Reported on 12/13/2021    Historical Provider, MD   insulin glargine (LANTUS) 100 UNIT/ML injection vial Inject 80 Units into the skin 2 times daily    Historical Provider, MD   levocetirizine (XYZAL) 5 MG tablet Take 5 mg by mouth nightly    Historical Provider, MD   acetaminophen-codeine (TYLENOL/CODEINE #4) 300-60 MG per tablet Take 2 tablets by mouth daily as needed for Pain. Patient not taking: Reported on 12/13/2021    Historical Provider, MD   OXYGEN Inhale into the lungs Indications: On 3 liters per n/c during the night. Historical Provider, MD   Multiple Vitamins-Minerals (MULTIVITAMIN GUMMIES WOMENS) CHEW Take 2 each by mouth daily     Historical Provider, MD   carvedilol (COREG) 12.5 MG tablet Take 12.5 mg by mouth 2 times daily (with meals) Takes at 10:00am and 10:00pm    Historical Provider, MD   atorvastatin (LIPITOR) 20 MG tablet Take 20 mg by mouth every evening     Historical Provider, MD        Social History:  TOBACCO:   reports that she has been smoking cigarettes. She has a 11.50 pack-year smoking history.  She has never used smokeless tobacco.  ETOH:   reports no history of alcohol use. DRUGS:  reports no history of drug use. OCCUPATION:      Family History:   Family History   Problem Relation Age of Onset    Ulcerative Colitis Father     Liver Disease Father 61        hep c and b    Diabetes Father     Asthma Father     Heart Disease Father     High Blood Pressure Father     Breast Cancer Maternal Aunt     Cancer Maternal Aunt     Diabetes Maternal Aunt     Heart Disease Maternal Aunt     High Blood Pressure Maternal Aunt     Breast Cancer Paternal Aunt     Heart Disease Paternal Aunt     High Blood Pressure Paternal Aunt     Breast Cancer Maternal Grandmother     Cervical Cancer Maternal Grandmother     Cancer Maternal Grandmother     Diabetes Maternal Grandmother     Asthma Maternal Grandmother     Heart Disease Maternal Grandmother     High Blood Pressure Maternal Grandmother     Lung Cancer Maternal Grandfather     Diabetes Maternal Grandfather     Asthma Maternal Grandfather     Heart Disease Maternal Grandfather     High Blood Pressure Maternal Grandfather     Cervical Cancer Paternal Grandmother     Cancer Paternal Grandmother     Diabetes Paternal Grandmother     Heart Disease Paternal Grandmother     High Blood Pressure Paternal Grandmother     Diabetes Mother     Asthma Mother     Heart Disease Mother     High Blood Pressure Mother     Cancer Sister     Heart Disease Maternal Uncle     High Blood Pressure Maternal Uncle     Heart Disease Paternal Uncle     High Blood Pressure Paternal Uncle     Diabetes Paternal Grandfather     Heart Disease Paternal Grandfather     High Blood Pressure Paternal Grandfather         Review of Systems:   Constitutional: there has been   Chills and generalized fatigue, occasional sweats  Eyes: No visual changes or diplopia, no scleral icterus. ENT: No Headaches, hearing loss or vertigo.   Positive for sore throat  Cardiovascular:  See HPI   Respiratory: positive for cough productive yellow sputum. No wheezing, no hematemesis. Gastrointestinal: No abdominal pain, no constipation, no diarrhea, no hematochezia, no melena. Genitourinary: No dysuria, trouble voiding, or hematuria  Musculoskeletal:  No gait disturbance, weakness or joint complaints  Integumentary: No rash or pruritis  Neurological: No headache, focal muscle weakness, focal numbness or tingling. Hematologic/Lymphatic: No abnormal bruising or bleeding, blood clots. Allergic/Immunologic: Positive for nasal congestion. OBJECTIVE     LAST LABS:   CBC: @LABRCNTIP(WBC:3,HGB:3,HCT:3,MCV:3,PLT:3)@      [email or web address]  PT/INR: @LABRCNTIP(PROTIME:3,INR:3)@  APTT: @LABRCNTIP(aPTT:3)@  MAG: @LABRCNTIP(MG:3)@  D Dimer: @LABRCNTIP(DDIMER:3)@  Troponin I @LABRCNTIP(TROPONINI:3)@  ProBNP @LABRCNTIP(BNP:3)@  Lipid Panel:  No results found for: CHOL, TRIG, HDL  Liver Panel: No results found for: ALB  HgA1C:  No components found for: HGBA1C    ABG: No components found for: ABGSAMPLETYP, ABGBODYTEMP, ABGPHCORRFOR, [id number], ABGPHCORRFOOR, ABGPH, ABGPCO2, ABGPO2, ABGBASEEXCES, ABGBASEDEFIC, ABGHCO3, AYOV3ATH, ABGENDTIDALC, ABGALLENSTES, ABGSPO2, ABGSAMEPLESIT, [id number], ABGOXYGENSOUE    RADIOLOGY:  [unfilled]    PHYSICAL EXAM  Admission Weight: Weight: 240 lb (108.9 kg)  No intake/output data recorded. Weight change: Wt Readings from Last 3 Encounters:   12/14/21 240 lb (108.9 kg)   11/11/21 246 lb (111.6 kg)   11/02/21 253 lb (114.8 kg)       Vitals:  Vitals:    12/14/21 1019 12/14/21 1306   BP: (!) 141/76    Pulse: 89 75   Resp: 20 14   Temp: 98.2 °F (36.8 °C)    TempSrc: Oral    SpO2: 97% 98%   Weight: 240 lb (108.9 kg)    Height: 5' 4\" (1.626 m)        Admit Weight  Weight: 240 lb (108.9 kg)  Last 3 Weights  [unfilled]  Body mass index is 41.2 kg/m².     INTAKE/OUTPUT  No intake/output data recorded. No intake or output data in the 24 hours ending 12/14/21 1431    General appearance: Alert oriented and cooperative, in no acute distress  Skin:  Warm and dry to touch  Head: Normocephalic, without obvious abnormality, atraumatic  Eyes: Conjunctivae unremarkable, EOMs intact, sclera non icteric  Neck: No JVD, no carotid bruit, neck supple, trachea midline  Lungs: Clear to ausculation bilaterally, no use of accessory muscles. Heart[de-identified] RRR with normal S1 and S2 , no murmurs and no gallops. Abdomen: Soft, non-tender, bowel sounds normal  Extremities: No edema  Neurologic: Oriented to time, person and place, affect appropriate, no focal/major motor or sensory defects noted  Psychiatric:  Appropriate mood, memory and judgment    ASSESSMENT     1. NSTEMI -   High sensitivity troponin 327  2. Acute heart failure exacerbation  3. NICM with recovered LVEF (normal LVEF on Nuclear perfusion test 10/2021 and TTE 1/2018)  4. History of symptomatic PVCs status post ablation at [facility] in 2017   5. Negative ange/nuclear stress test 10/2021  6. History of recurrent syncope  7. Hyperlipidemia  8. Diabetes mellitus  9. Angiographically normal coronary arteries on [facility] 02/2015  10. Morbid obesity  11. Aspirin allergy    PLAN     - patient with symptoms concerning for possible myocarditis. - suspect COVID/viral pneumonia. Defer workup to primary team.  - Continue to trend troponins.  -  Echocardiogram to assess LV function.  -   Continue IV heparin infusion.  -   If noted to have wall motion abnormalities, will consider cardiac catheterization.  -    Will start on beta-blocker a. Can resume home   Lipitor 20 mg.  -   Will start on IV Lasix. -   Would keep NPO midnight in case cardiac catheterization needed in the morning.  -   Will follow-up. Angel Ortiz MD    This note was completed using a voice transcription system. Every effort was made to ensure accuracy.  However, inadvertent computerized transcription errors may be present.

## 2021-12-14 NOTE — ED NOTES
Pt is agitated currently stating \" I don't see why I have to explain what's wrong to all these different people, it makes me have to talk and I cant breathe, do you understand what Im saying?!\"  This writer tried to explain why each member of the medical team needs to understand the pts complaints. Pt shook her head and then stated she hasn't eaten in 2 days and is requesting food at this time. Pt resting in bed, call light within reach.            Renee Purcell  12/14/21 [id number]

## 2021-12-14 NOTE — ED PROVIDER NOTES
(congestive heart failure) (HCC)     Chronic otitis media of both ears     rt>lt    Chronic right shoulder pain 3/14/2017    Cigarette nicotine dependence with nicotine-induced disorder 7/19/2019    Class 3 severe obesity due to excess calories with serious comorbidity and body mass index (BMI) of 40.0 to 44.9 in adult [facility]) 10/4/2018    Closed fracture of left ankle 6/25/2019    Clostridium difficile infection     COPD (chronic obstructive pulmonary disease) (HCC)     Cough, persistent 3/21/2018    Current moderate episode of major depressive disorder without prior episode ([address] 8/20/2018    Depression     Diabetes mellitus type 2, controlled ([address] 4/30/2014    Diarrhea     Diarrhea     Dizziness     DVT (deep venous thrombosis) (HCC)     after PICC line right arm    Encounter for monitoring sotalol therapy 2/20/2017    Encounter for screening mammogram for malignant neoplasm of breast 3/7/2018    Endometriosis     Fainting     GERD (gastroesophageal reflux disease)     hx of    GI bleeding     H. pylori infection     Helicobacter pylori (H. pylori)     [place] (hard of hearing)     both ears, no hearing aids    HTN (hypertension) 7/19/2019    Hyperlipidemia     Hypertension     Left bicipital tenosynovitis 10/17/2018    Left leg pain 5/16/2017    Left sided abdominal pain of unknown cause 7/19/2016    Leg swelling 9/21/2018    Mastoiditis     Mastoiditis, acute 4/30/2014    Migraines     Morbid obesity ([address]     Myocardial infarction ([address]     2005    Nausea     Neuropathy     On home oxygen therapy     3 L at night    Ovarian cyst     Passed out     hx of- negative tilt table    Pulmonary hypertension (HCC)     Pulmonary insufficiency     PVC (premature ventricular contraction)     Seasonal allergies     Tricuspid insufficiency     Type II or unspecified type diabetes mellitus without mention of complication, not stated as uncontrolled      None otherwise stated in nurses notes    SURGICAL HISTORY       Past Surgical History:   Procedure Laterality Date    ABDOMEN SURGERY      abcess    ABDOMINAL ADHESION SURGERY      ABDOMINAL EXPLORATION SURGERY      x 4    ABDOMINAL HERNIA REPAIR      with mesh    ABLATION OF DYSRHYTHMIC FOCUS  2016    ABLATION OF DYSRHYTHMIC FOCUS  09/08/2017    [facility] at [facility].  ABSCESS DRAINAGE      left hip and chest    APPENDECTOMY      BREAST SURGERY Left 2007    I & D    CARDIAC CATHETERIZATION  2014 & 2000     no stenting    CARPAL TUNNEL RELEASE Right 12/19/2019    Ulnar nerve decompression, right elbow. Release of 1st and 2nd extensor compartments, right forearm.  CARPAL TUNNEL RELEASE  05/04/2020    Carpal tunnel release, left hand    CHOLECYSTECTOMY      COLONOSCOPY      FOOT SURGERY Left     bone fragment removed    FRACTURE SURGERY Right     closed reduction perc pinning ring & middle finger    GASTRIC FUNDOPLICATION  4187    HYSTERECTOMY      total    HYSTERECTOMY      HYSTEROSCOPY      tubal perfusion    MYRINGOTOMY Right 11/13/2015    Right myringotomy with placement of  T-tube on the right side. Removal of plugged ventilating tube, right side.  MYRINGOTOMY Left 07/14/2020    MYRINGOTOMY TUBE INSERTION performed by Abilio Vasques MD at [facility] Right 06/05/2014    OTHER SURGICAL HISTORY Right 05/29/2014    removal ear tube rt ear    OTHER SURGICAL HISTORY  2009    LOOP recorder inserted and removed 3 mos.  later    OTHER SURGICAL HISTORY Right 08/11/2016    ear tube removal with patch    OTHER SURGICAL HISTORY      tubal perfusion, lysis of uterine adhesions    OTHER SURGICAL HISTORY      multiple PICC lines inserted and removed, it has affected circulation bilateral upper arms    OTHER SURGICAL HISTORY  10/19/2020    Revisiion to subcutaneous transposition of unlar nerve, right elbow    OTHER SURGICAL HISTORY Right 06/14/2021    REVISION TO SUBMUSCULAR TRANSPOSITION OF RIGHT ULNAR NERVE    FL OLYA SHLDR JT W ANESTHESIA Right 03/01/2017    SHOULDER MANIPULATION RIGHT performed by Jono Mueller MD at [address] [facility] Left 10/17/2018    SHOULDER ARTHROSCOPY W/BICEPS TENDONESIS performed by Olesya Ching MD at [facility] Left     foot    TONSILLECTOMY      TYMPANOSTOMY TUBE PLACEMENT      TYMPANOSTOMY TUBE PLACEMENT Left 03/12/2019    TYMPANOSTOMY TUBE PLACEMENT Right 12/08/2021    Right tympanostomy with tube placement of T-tube with operative microscope and general anesthesia. Right removal of right ear tube.  ULNAR TUNNEL RELEASE Right     UPPER GASTROINTESTINAL ENDOSCOPY      UPPER GASTROINTESTINAL ENDOSCOPY  07/10/2019    UPPER GASTROINTESTINAL ENDOSCOPY N/A 07/10/2019    EGD BIOPSY performed by Tracy Becerra MD at [address] [place] [address]     None otherwise stated in nurses notes    CURRENT MEDICATIONS       Previous Medications    ACETAMINOPHEN (TYLENOL) 500 MG TABLET    Take 500 mg by mouth every 6 hours as needed for Pain    ACETAMINOPHEN-CODEINE (TYLENOL/CODEINE #4) 300-60 MG PER TABLET    Take 2 tablets by mouth daily as needed for Pain. ALBUTEROL SULFATE HFA (VENTOLIN HFA) 108 (90 BASE) MCG/ACT INHALER    INHALE 2 PUFFS INTO LUNGS EVERY 6 HOURS AS NEEDED FOR WHEEZING    ARIPIPRAZOLE (ABILIFY) 5 MG TABLET        ATORVASTATIN (LIPITOR) 20 MG TABLET    Take 20 mg by mouth every evening     AZITHROMYCIN (ZITHROMAX) 250 MG TABLET    Take 2 tabs on day 1 followed by 250mg on days 2 - 10    BENZONATATE (TESSALON) 100 MG CAPSULE    Take 1-2 capsules by mouth 3 times daily as needed for Cough    BLOOD GLUCOSE TEST STRIPS (ASCENSIA AUTODISC VI;ONE TOUCH ULTRA TEST VI) STRIP    OneTouch Ultra Test strips.  Check blood sugars 4x daily    BLOOD GLUCOSE TEST STRIPS (ONETOUCH ULTRA) STRIP    USE TO TEST BLOOD SUGAR BEFORE MEALS, AT BEDTIME, AND AS NEEDED (6 TIMES DAILY)    BUMETANIDE (BUMEX) 2 MG TABLET    Take 2 mg by mouth daily    CARVEDILOL (COREG) 12.5 MG TABLET    Take 12.5 mg by mouth 2 times daily (with meals) Takes at 10:00am and 10:00pm    CLEARLAX 17 GM/SCOOP POWDER    TAKE 17 G BY MOUTH 2 TIMES DAILY    CLOTRIMAZOLE (LOTRIMIN) 1 % CREAM    Apply topically 2 times daily. DULOXETINE (CYMBALTA) 60 MG EXTENDED RELEASE CAPSULE    TAKE 1 CAPSULE BY MOUTH 2 TIMES DAILY    ERGOCALCIFEROL (ERGOCALCIFEROL) 1.25 MG (11851 UT) CAPSULE    Vitamin D2 1,250 mcg (50,000 unit) capsule   take 1 capsule by mouth every week    HM BACITRACIN ZINC 500 UNIT/GM OINTMENT        HM SALINE NASAL SPRAY 0.65 % NASAL SPRAY        IBUPROFEN (ADVIL;MOTRIN) 800 MG TABLET    TAKE 1 TABLET BY MOUTH EVERY 8 HOURS AS NEEDED FOR PAIN TAKE WITH FOOD    INSULIN DEGLUDEC (TRESIBA FLEXTOUCH) 200 UNIT/ML SOPN    Inject 80 Units into the skin 2 times daily    INSULIN GLARGINE (LANTUS) 100 UNIT/ML INJECTION VIAL    Inject 80 Units into the skin 2 times daily    INSULIN LISPRO, 1 UNIT DIAL, (HUMALOG KWIKPEN) 100 UNIT/ML SOPN    INJECT SUBCUTANEOUSLY PER SLIDING SCALE, 150-200 INJECT 3U, 201-250 INJECT 6U, 251-300 INJECT 9U, >300 INJECT 12U, MAX 30U/DAY    INSULIN PEN NEEDLE (UNIFINE PENTIPS) 31G X 8 MM MISC    USE 4 TIMES DAILY AS DIRECTED    IPRATROPIUM-ALBUTEROL (DUONEB) 0.5-2.5 (3) MG/3ML SOLN NEBULIZER SOLUTION    INHALE 3 MLS (ONE VIAL) WITH NEBULIZER EVERY 6 HOURS AS NEEDED FOR SHORTNESS OF BREATH    LEVOCETIRIZINE (XYZAL) 5 MG TABLET    Take 5 mg by mouth nightly    MAGNESIUM OXIDE (MAG-OX) 400 MG TABLET        MULTIPLE VITAMINS-MINERALS (MULTIVITAMIN GUMMIES WOMENS) CHEW    Take 2 each by mouth daily     OXYCODONE-ACETAMINOPHEN (PERCOCET) 5-325 MG PER TABLET        OXYGEN    Inhale into the lungs Indications: On 3 liters per n/c during the night.     PROMETHAZINE (PHENERGAN) 25 MG TABLET    Take 25 mg by mouth every 6 hours as needed for Nausea    QUETIAPINE (SEROQUEL) 50 MG TABLET    TAKE 1 TABLET BY MOUTH EVERY DAY AT NIGHT TOPIRAMATE (TOPAMAX) 25 MG TABLET           ALLERGIES     Latex, Aspirin, Avelox [moxifloxacin], Chantix [varenicline], Doxycycline, Dye [iodides], Flexeril [cyclobenzaprine], Gabapentin, Losartan, Morphine, Nsaids, Pcn [penicillins], Reglan [metoclopramide hcl], Shellfish-derived products, Sulfa antibiotics, Vancomycin, Zofran, Zyvox [linezolid], Acyclovir, Bactrim [sulfamethoxazole-trimethoprim], Betadine [povidone iodine], Ceclor [cefaclor], Clindamycin/lincomycin, Codeine, Macrolides and ketolides, Novolin r [insulin], Novolog [insulin aspart], Phenothiazines, Tape [adhesive tape], Banana, Compazine [prochlorperazine], Fentanyl, Kiwi extract, Tamiflu [oseltamivir phosphate], and Sotalol    FAMILY HISTORY           Problem Relation Age of Onset    Ulcerative Colitis Father     Liver Disease Father 61        hep c and b    Diabetes Father     Asthma Father     Heart Disease Father     High Blood Pressure Father     Breast Cancer Maternal Aunt     Cancer Maternal Aunt     Diabetes Maternal Aunt     Heart Disease Maternal Aunt     High Blood Pressure Maternal Aunt     Breast Cancer Paternal Aunt     Heart Disease Paternal Aunt     High Blood Pressure Paternal Aunt     Breast Cancer Maternal Grandmother     Cervical Cancer Maternal Grandmother     Cancer Maternal Grandmother     Diabetes Maternal Grandmother     Asthma Maternal Grandmother     Heart Disease Maternal Grandmother     High Blood Pressure Maternal Grandmother     Lung Cancer Maternal Grandfather     Diabetes Maternal Grandfather     Asthma Maternal Grandfather     Heart Disease Maternal Grandfather     High Blood Pressure Maternal Grandfather     Cervical Cancer Paternal Grandmother     Cancer Paternal Grandmother     Diabetes Paternal Grandmother     Heart Disease Paternal Grandmother     High Blood Pressure Paternal Grandmother     Diabetes Mother     Asthma Mother     Heart Disease Mother     High Blood Pressure Mother     Cancer Sister     Heart Disease Maternal Uncle     High Blood Pressure Maternal Uncle     Heart Disease Paternal Uncle     High Blood Pressure Paternal Uncle     Diabetes Paternal Grandfather     Heart Disease Paternal Grandfather     High Blood Pressure Paternal Grandfather      Family Status   Relation Name Status    Father  (Not Specified)    MAunt  (Not Specified)    PAunt  (Not Specified)    MGM  (Not Specified)    MGF  (Not Specified)    PGM  (Not Specified)    Mother  (Not Specified)    Sister  (Not Specified)    MUnc  (Not Specified)    PUnc  (Not Specified)    PGF  (Not Specified)      None otherwise stated in nurses notes    SOCIAL HISTORY      reports that she has been smoking cigarettes. She has a 11.50 pack-year smoking history. She has never used smokeless tobacco. She reports that she does not drink alcohol and does not use drugs. lives at home with others     PHYSICAL EXAM    (up to 7 for level 4, 8 or more for level 5)     ED Triage Vitals [12/14/21 1019]   BP Temp Temp Source Pulse Resp SpO2 Height Weight   (!) 141/76 98.2 °F (36.8 °C) Oral 89 20 97 % 5' 4\" (1.626 m) 240 lb (108.9 kg)       Physical Exam  Vitals and nursing note reviewed. Constitutional:       Appearance: Normal appearance. She is well-developed. She is obese. HENT:      Head: Normocephalic. Mouth/Throat:      Pharynx: Oropharynx is clear. Uvula midline. No pharyngeal swelling, oropharyngeal exudate, posterior oropharyngeal erythema or uvula swelling. Comments: Hoarse voice. Eyes:      Pupils: Pupils are equal, round, and reactive to light. Cardiovascular:      Rate and Rhythm: Normal rate and regular rhythm. Heart sounds: Normal heart sounds, S1 normal and S2 normal.   Pulmonary:      Effort: Pulmonary effort is normal.      Breath sounds: Normal breath sounds and air entry. No decreased breath sounds, wheezing, rhonchi or rales. Chest:      Chest wall: No tenderness. Abdominal:      Palpations: Abdomen is soft. Tenderness: There is no abdominal tenderness. There is no guarding or rebound. Musculoskeletal:      Cervical back: Full passive range of motion without pain. No spinous process tenderness or muscular tenderness. Skin:     General: Skin is warm. Capillary Refill: Capillary refill takes less than 2 seconds. Findings: No rash. Neurological:      General: No focal deficit present. Mental Status: She is alert. Cranial Nerves: Cranial nerves are intact. Sensory: Sensation is intact. Motor: Motor function is intact. Coordination: Coordination is intact. Gait: Gait is intact. DIAGNOSTIC RESULTS     EKG: All EKG's are interpreted by the Emergency Department Physician who either signs or Co-signs this chart in the absence of a cardiologist.        RADIOLOGY:   All plain film, CT, MRI, and formal ultrasound images (except ED bedside ultrasound) are read by the radiologist, see reports below, unless otherwise noted in MDM or here. XR CHEST PORTABLE   Final Result   No consolidation or sizable pleural effusion. Likely bronchitis with medial   bibasilar atelectasis. XR CHEST PORTABLE    Result Date: 12/14/2021  EXAMINATION: ONE XRAY VIEW OF THE CHEST 12/14/2021 10:51 am COMPARISON: AP chest from 10/31/2021 HISTORY: ORDERING SYSTEM PROVIDED HISTORY: cough, sob TECHNOLOGIST PROVIDED HISTORY: cough, sob Reason for Exam: cough and shortness of breath History of GERD, MI, diabetes, COPD, and hypertension. Continued tobacco abuse. FINDINGS: Metallic amanda status post left rotator cuff repair. Cardiac silhouette prominent for AP technique, likely borderline enlarged. Mediastinal structures midline. Slight bronchial wall thickening and medial bibasilar opacities these obscuring the heart border. Azygous lobe. No consolidation or sizable pleural effusion. Bones unchanged.      No consolidation or sizable pleural effusion. Likely bronchitis with medial bibasilar atelectasis. LABS:  Labs Reviewed   COVID-19 & INFLUENZA COMBO - Abnormal; Notable for the following components:       Result Value    INFLUENZA A DETECTED (*)     All other components within normal limits   CBC WITH AUTO DIFFERENTIAL - Abnormal; Notable for the following components:    RBC 3.87 (*)     .7 (*)     MCH 34.1 (*)     Monocytes 8 (*)     All other components within normal limits   COMPREHENSIVE METABOLIC PANEL W/ REFLEX TO MG FOR LOW K - Abnormal; Notable for the following components:    Glucose 184 (*)     All other components within normal limits   LIPASE - Abnormal; Notable for the following components:    Lipase 12 (*)     All other components within normal limits   TROPONIN - Abnormal; Notable for the following components:    Troponin, High Sensitivity 327 (*)     All other components within normal limits   TROPONIN - Abnormal; Notable for the following components:    Troponin, High Sensitivity 315 (*)     All other components within normal limits   BRAIN NATRIURETIC PEPTIDE - Abnormal; Notable for the following components:    Pro- (*)     All other components within normal limits   HEPARIN LEVEL/ANTI-XA - Abnormal; Notable for the following components:    Anti-XA Unfrac Heparin <0.10 (*)     All other components within normal limits   COVID-19, RAPID   STREP SCREEN GROUP A THROAT   D-DIMER, QUANTITATIVE   PROTIME-INR   APTT   HEPARIN LEVEL/ANTI-XA   HEPARIN LEVEL/ANTI-XA   TROPONIN   TROPONIN   HEMOGLOBIN A1C   POCT GLUCOSE   POCT GLUCOSE       All other labs were within normal range or not returned as of this dictation.     EMERGENCY DEPARTMENT COURSE and DIFFERENTIAL DIAGNOSIS/MDM:   Vitals:    Vitals:    12/14/21 1019 12/14/21 1306   BP: (!) 141/76    Pulse: 89 75   Resp: 20 14   Temp: 98.2 °F (36.8 °C)    TempSrc: Oral    SpO2: 97% 98%   Weight: 240 lb (108.9 kg)    Height: 5' 4\" (1.626 m)          Patient instructed to return to the emergency room if symptoms worsen, return, or any other concern right away which is agreed by the patient    ED MEDS:  Orders Placed This Encounter   Medications    0.9 % sodium chloride bolus    promethazine (PHENERGAN) 12.5 mg in sodium chloride 0.9 % 50 mL IVPB    benzonatate (TESSALON) capsule 200 mg    DISCONTD: acetaminophen (TYLENOL) tablet 1,000 mg    heparin (porcine) injection 4,000 Units    heparin (porcine) injection 4,000 Units    heparin (porcine) injection 2,000 Units    heparin (porcine) 25,000 Units in dextrose 5 % 250 mL infusion    butalbital-APAP-caffeine -40 MG per capsule 1 capsule    DISCONTD: acetaminophen (TYLENOL) tablet 500 mg    DISCONTD: acetaminophen-codeine (TYLENOL #4) 300-60 MG per tablet 1 tablet    albuterol sulfate  (90 Base) MCG/ACT inhaler 2 puff     Order Specific Question:   Initiate RT Bronchodilator Protocol     Answer: Yes    ARIPiprazole (ABILIFY) tablet 5 mg    DISCONTD: atorvastatin (LIPITOR) tablet 20 mg    bumetanide (BUMEX) tablet 2 mg    carvedilol (COREG) tablet 12.5 mg    polyethylene glycol (GLYCOLAX) packet 17 g    DULoxetine (CYMBALTA) extended release capsule 60 mg    sodium chloride (OCEAN, BABY AYR) 0.65 % nasal spray 2 spray    ibuprofen (ADVIL;MOTRIN) tablet 800 mg    Insulin Degludec SOPN 80 Units    ipratropium-albuterol (DUONEB) nebulizer solution 1 ampule     Order Specific Question:   Initiate RT Bronchodilator Protocol     Answer:    Yes    cetirizine (ZYRTEC) tablet 10 mg    magnesium oxide (MAG-OX) tablet 400 mg    promethazine (PHENERGAN) tablet 25 mg    QUEtiapine (SEROQUEL) tablet 50 mg    sodium chloride flush 0.9 % injection 5-40 mL    sodium chloride flush 0.9 % injection 5-40 mL    OR Linked Order Group     acetaminophen (TYLENOL) tablet 650 mg     acetaminophen (TYLENOL) suppository 650 mg    atorvastatin (LIPITOR) tablet 80 mg    insulin lispro (HUMALOG) injection vial 0-18 Units    insulin lispro (HUMALOG) injection vial 0-9 Units    metoprolol succinate (TOPROL XL) extended release tablet 25 mg    furosemide (LASIX) injection 80 mg         CONSULTS:  IP CONSULT TO INTERNAL MEDICINE  IP CONSULT TO CARDIOLOGY    PROCEDURES:  None      FINAL IMPRESSION      1. NSTEMI (non-ST elevated myocardial infarction) ([facility]          DISPOSITION/PLAN   DISPOSITION Admitted    PATIENT REFERRED TO:  No follow-up provider specified. DISCHARGE MEDICATIONS:  New Prescriptions    No medications on file         Summation      Patient Course:      Fever, headache, congestion, sore throat, cough, cp, sob, nausea, diarrhea x 8 days. Wet, productive cough on exam.  Pain with deep breath. No abdominal pain on exam.     Rapid covid is negative. Chest xray is showing bronchitis. No pneumonia. No leukocytosis on CBC. Ddimer is 0.44. Trop is elevated at 327. Dr. Isidoro Carcamo notified and examined patient at bedside. Pt does have significant cardiac hx. Will admit patient. 1310 - pt refused tylenol for her headache. Threatening to take the percocet she has in her purse per RN Devi Fox. Will order Fioricet. 1328 - Pt refused Fioricet. Took percocet from purse in front of Devi Fox RN. Dr. Lita Wells spoke with admitting physician and cardiology. Pt being admitted for NSTEMI. Influenza positive. The care is provided during an unprecedented national emergency due to the novel coronavirus, COVID-19.       ED Medications administered this visit:    Medications   heparin (porcine) injection 4,000 Units (has no administration in time range)   heparin (porcine) injection 2,000 Units (has no administration in time range)   heparin (porcine) 25,000 Units in dextrose 5 % 250 mL infusion (9.2 Units/kg/hr × 108.9 kg IntraVENous New Bag 12/14/21 1358)   butalbital-APAP-caffeine -40 MG per capsule 1 capsule (1 capsule Oral Not Given 12/14/21 1339)   albuterol sulfate  (90 Base) MCG/ACT inhaler 2 puff (has no administration in time range)   ARIPiprazole (ABILIFY) tablet 5 mg (5 mg Oral Not Given 12/14/21 1453)   bumetanide (BUMEX) tablet 2 mg (2 mg Oral Not Given 12/14/21 1452)   carvedilol (COREG) tablet 12.5 mg (has no administration in time range)   polyethylene glycol (GLYCOLAX) packet 17 g (17 g Oral Not Given 12/14/21 1454)   DULoxetine (CYMBALTA) extended release capsule 60 mg (60 mg Oral Not Given 12/14/21 1454)   sodium chloride (OCEAN, BABY AYR) 0.65 % nasal spray 2 spray (has no administration in time range)   ibuprofen (ADVIL;MOTRIN) tablet 800 mg (has no administration in time range)   Insulin Degludec SOPN 80 Units (80 Units SubCUTAneous Not Given 12/14/21 1453)   ipratropium-albuterol (DUONEB) nebulizer solution 1 ampule (has no administration in time range)   cetirizine (ZYRTEC) tablet 10 mg (10 mg Oral Not Given 12/14/21 1454)   magnesium oxide (MAG-OX) tablet 400 mg (400 mg Oral Not Given 12/14/21 1454)   promethazine (PHENERGAN) tablet 25 mg (has no administration in time range)   QUEtiapine (SEROQUEL) tablet 50 mg (has no administration in time range)   sodium chloride flush 0.9 % injection 5-40 mL (has no administration in time range)   sodium chloride flush 0.9 % injection 5-40 mL (has no administration in time range)   acetaminophen (TYLENOL) tablet 650 mg (has no administration in time range)     Or   acetaminophen (TYLENOL) suppository 650 mg (has no administration in time range)   atorvastatin (LIPITOR) tablet 80 mg (has no administration in time range)   insulin lispro (HUMALOG) injection vial 0-18 Units (has no administration in time range)   insulin lispro (HUMALOG) injection vial 0-9 Units (has no administration in time range)   metoprolol succinate (TOPROL XL) extended release tablet 25 mg (has no administration in time range)   furosemide (LASIX) injection 80 mg (has no administration in time range)   0.9 % sodium chloride bolus (0 mLs IntraVENous Stopped 12/14/21 1339)   promethazine (PHENERGAN) 12.5 mg in sodium chloride 0.9 % 50 mL IVPB (0 mg IntraVENous Stopped 12/14/21 1306)   benzonatate (TESSALON) capsule 200 mg (200 mg Oral Given 12/14/21 1209)   heparin (porcine) injection 4,000 Units (4,000 Units IntraVENous Given 12/14/21 1357)       New Prescriptions from this visit:    New Prescriptions    No medications on file       Follow-up:  No follow-up provider specified. Final Impression:   1.  NSTEMI (non-ST elevated myocardial infarction) [facility])               (Please note that portions of this note were completed with a voice recognition program )        Evangelina Corrigan. [address]iley [address], PA-C  12/14/21 9190

## 2021-12-14 NOTE — PROGRESS NOTES
RN spoke to the patient she said that she is going to take her own percocet 5-325. She said that she will hold her own medication and that it is not leaving her room, pharmacy can look at it to verify but they have to do it in the room. She said she will notify nursing staff when she takes it. She said this is not her first , she knows that the nursing staff is busy and that they will not get  It to her on time which will cause her schedule to be off. RN notified DR. Modesta Barnes that the patient will not take the norco and she will take her percocet that she has with her. Ok to order the percocet 5-325 Q6hr prn and discontinue the percocet. RN update Pharmacist Cholo Kelley that the patient  taking her home dose of percocet and that she will not let the meds leave the room. She said that pharmacy would have to verify them in her room.

## 2021-12-14 NOTE — ED NOTES
Mode of arrival:  Cab dropped pt off      Residence prior to admit: home      Chief complaint on admission: cough, fever, N/D, body aches, chills, nasal congestion  Pt states that symptoms started on Thursday last week. Pt did see her PCP yesterday for complaints and was advised to come into the ER. Pt is A&Ox4, ambulates per self, and does not appear to be in any distress at this time. C= \"Have you ever felt that you should Cut down on your drinking? \"  No  A= \"Have people Annoyed you by criticizing your drinking? \"  No  G= \"Have you ever felt bad or Guilty about your drinking? \"  No  E= \"Have you ever had a drink as an Eye-opener first thing in the morning to steady your nerves or to help a hangover? \"  No      Deferred []      Reason for deferring: N/A    *If yes to two or more: probable alcohol abuse. *         Glendy Dillard RN  12/14/21 1024

## 2021-12-15 ENCOUNTER — APPOINTMENT (OUTPATIENT)
Dept: NON INVASIVE DIAGNOSTICS | Age: 46
DRG: 280 | End: 2021-12-15
Payer: COMMERCIAL

## 2021-12-15 PROBLEM — J10.1 INFLUENZA A: Status: ACTIVE | Noted: 2021-12-15

## 2021-12-15 PROBLEM — A49.8 CLOSTRIDIUM DIFFICILE INFECTION: Status: RESOLVED | Noted: 2019-05-02 | Resolved: 2021-12-15

## 2021-12-15 LAB
ANTI-XA UNFRAC HEPARIN: 0.44 IU/L (ref 0.3–0.7)
ANTI-XA UNFRAC HEPARIN: 0.53 IU/L (ref 0.3–0.7)
EKG ATRIAL RATE: 71 BPM
EKG P AXIS: 47 DEGREES
EKG P-R INTERVAL: 138 MS
EKG Q-T INTERVAL: 428 MS
EKG QRS DURATION: 94 MS
EKG QTC CALCULATION (BAZETT): 465 MS
EKG R AXIS: 40 DEGREES
EKG T AXIS: 49 DEGREES
EKG VENTRICULAR RATE: 71 BPM
ESTIMATED AVERAGE GLUCOSE: 197 MG/DL
GLUCOSE BLD-MCNC: 118 MG/DL (ref 65–105)
GLUCOSE BLD-MCNC: 136 MG/DL (ref 65–105)
GLUCOSE BLD-MCNC: 164 MG/DL (ref 65–105)
GLUCOSE BLD-MCNC: 216 MG/DL (ref 65–105)
HBA1C MFR BLD: 8.5 % (ref 4–6)
HCT VFR BLD CALC: 37.9 % (ref 36–46)
HEMOGLOBIN: 12.7 G/DL (ref 12–16)
LV EF: 38 %
LVEF MODALITY: NORMAL
MCH RBC QN AUTO: 34.6 PG (ref 26–34)
MCHC RBC AUTO-ENTMCNC: 33.4 G/DL (ref 31–37)
MCV RBC AUTO: 103.4 FL (ref 80–100)
NRBC AUTOMATED: ABNORMAL PER 100 WBC
PDW BLD-RTO: 12.8 % (ref 11.5–14.9)
PLATELET # BLD: 214 K/UL (ref 150–450)
PMV BLD AUTO: 8.5 FL (ref 6–12)
RBC # BLD: 3.66 M/UL (ref 4–5.2)
TROPONIN INTERP: ABNORMAL
TROPONIN INTERP: ABNORMAL
TROPONIN T: ABNORMAL NG/ML
TROPONIN T: ABNORMAL NG/ML
TROPONIN, HIGH SENSITIVITY: 439 NG/L (ref 0–14)
TROPONIN, HIGH SENSITIVITY: 481 NG/L (ref 0–14)
WBC # BLD: 10.1 K/UL (ref 3.5–11)

## 2021-12-15 PROCEDURE — 2700000000 HC OXYGEN THERAPY PER DAY

## 2021-12-15 PROCEDURE — 94640 AIRWAY INHALATION TREATMENT: CPT

## 2021-12-15 PROCEDURE — 6370000000 HC RX 637 (ALT 250 FOR IP): Performed by: FAMILY MEDICINE

## 2021-12-15 PROCEDURE — 6360000002 HC RX W HCPCS: Performed by: FAMILY MEDICINE

## 2021-12-15 PROCEDURE — 94761 N-INVAS EAR/PLS OXIMETRY MLT: CPT

## 2021-12-15 PROCEDURE — 2580000003 HC RX 258: Performed by: FAMILY MEDICINE

## 2021-12-15 PROCEDURE — 93005 ELECTROCARDIOGRAM TRACING: CPT | Performed by: NURSE PRACTITIONER

## 2021-12-15 PROCEDURE — 84484 ASSAY OF TROPONIN QUANT: CPT

## 2021-12-15 PROCEDURE — 6370000000 HC RX 637 (ALT 250 FOR IP): Performed by: INTERNAL MEDICINE

## 2021-12-15 PROCEDURE — 85520 HEPARIN ASSAY: CPT

## 2021-12-15 PROCEDURE — 6360000004 HC RX CONTRAST MEDICATION: Performed by: INTERNAL MEDICINE

## 2021-12-15 PROCEDURE — 82947 ASSAY GLUCOSE BLOOD QUANT: CPT

## 2021-12-15 PROCEDURE — 6370000000 HC RX 637 (ALT 250 FOR IP): Performed by: NURSE PRACTITIONER

## 2021-12-15 PROCEDURE — 2060000000 HC ICU INTERMEDIATE R&B

## 2021-12-15 PROCEDURE — 93306 TTE W/DOPPLER COMPLETE: CPT

## 2021-12-15 PROCEDURE — 93010 ELECTROCARDIOGRAM REPORT: CPT | Performed by: INTERNAL MEDICINE

## 2021-12-15 PROCEDURE — 85027 COMPLETE CBC AUTOMATED: CPT

## 2021-12-15 PROCEDURE — 99223 1ST HOSP IP/OBS HIGH 75: CPT | Performed by: FAMILY MEDICINE

## 2021-12-15 PROCEDURE — 36415 COLL VENOUS BLD VENIPUNCTURE: CPT

## 2021-12-15 RX ORDER — BENZONATATE 100 MG/1
100 CAPSULE ORAL 3 TIMES DAILY
Status: DISCONTINUED | OUTPATIENT
Start: 2021-12-15 | End: 2021-12-23 | Stop reason: HOSPADM

## 2021-12-15 RX ORDER — NITROGLYCERIN 0.4 MG/1
0.4 TABLET SUBLINGUAL EVERY 5 MIN PRN
Status: DISCONTINUED | OUTPATIENT
Start: 2021-12-15 | End: 2021-12-23 | Stop reason: HOSPADM

## 2021-12-15 RX ORDER — INSULIN GLARGINE 100 [IU]/ML
80 INJECTION, SOLUTION SUBCUTANEOUS 2 TIMES DAILY
Status: DISCONTINUED | OUTPATIENT
Start: 2021-12-15 | End: 2021-12-23 | Stop reason: HOSPADM

## 2021-12-15 RX ORDER — BUDESONIDE 0.5 MG/2ML
0.5 INHALANT ORAL 2 TIMES DAILY
Status: DISCONTINUED | OUTPATIENT
Start: 2021-12-15 | End: 2021-12-23 | Stop reason: HOSPADM

## 2021-12-15 RX ORDER — ALPRAZOLAM 0.5 MG/1
0.5 TABLET ORAL ONCE
Status: COMPLETED | OUTPATIENT
Start: 2021-12-15 | End: 2021-12-16

## 2021-12-15 RX ORDER — COLCHICINE 0.6 MG/1
0.6 TABLET ORAL 2 TIMES DAILY
Status: DISCONTINUED | OUTPATIENT
Start: 2021-12-15 | End: 2021-12-23 | Stop reason: HOSPADM

## 2021-12-15 RX ORDER — GUAIFENESIN DEXTROMETHORPHAN HYDROBROMIDE ORAL SOLUTION 10; 100 MG/5ML; MG/5ML
5 SOLUTION ORAL EVERY 6 HOURS
Status: DISCONTINUED | OUTPATIENT
Start: 2021-12-15 | End: 2021-12-18

## 2021-12-15 RX ORDER — NITROGLYCERIN 20 MG/100ML
5-200 INJECTION INTRAVENOUS CONTINUOUS
Status: DISCONTINUED | OUTPATIENT
Start: 2021-12-15 | End: 2021-12-17

## 2021-12-15 RX ADMIN — ATORVASTATIN CALCIUM 80 MG: 80 TABLET, FILM COATED ORAL at 21:09

## 2021-12-15 RX ADMIN — DULOXETINE 60 MG: 60 CAPSULE, DELAYED RELEASE ORAL at 21:09

## 2021-12-15 RX ADMIN — INSULIN LISPRO 6 UNITS: 100 INJECTION, SOLUTION INTRAVENOUS; SUBCUTANEOUS at 18:00

## 2021-12-15 RX ADMIN — INSULIN GLARGINE 80 UNITS: 100 INJECTION, SOLUTION SUBCUTANEOUS at 18:35

## 2021-12-15 RX ADMIN — PERFLUTREN 2.2 MG: 6.52 INJECTION, SUSPENSION INTRAVENOUS at 10:11

## 2021-12-15 RX ADMIN — HEPARIN SODIUM 13.22 UNITS/KG/HR: 10000 INJECTION, SOLUTION INTRAVENOUS; SUBCUTANEOUS at 11:21

## 2021-12-15 RX ADMIN — QUETIAPINE FUMARATE 50 MG: 100 TABLET ORAL at 21:09

## 2021-12-15 RX ADMIN — HEPARIN SODIUM 4000 UNITS: 1000 INJECTION, SOLUTION INTRAVENOUS; SUBCUTANEOUS at 00:57

## 2021-12-15 RX ADMIN — BENZONATATE 100 MG: 100 CAPSULE ORAL at 21:12

## 2021-12-15 RX ADMIN — BUMETANIDE 2 MG: 1 TABLET ORAL at 10:07

## 2021-12-15 RX ADMIN — Medication 400 MG: at 10:07

## 2021-12-15 RX ADMIN — BENZONATATE 100 MG: 100 CAPSULE ORAL at 17:59

## 2021-12-15 RX ADMIN — CETIRIZINE HYDROCHLORIDE 10 MG: 10 TABLET, FILM COATED ORAL at 10:07

## 2021-12-15 RX ADMIN — IPRATROPIUM BROMIDE AND ALBUTEROL SULFATE 1 AMPULE: .5; 3 SOLUTION RESPIRATORY (INHALATION) at 07:26

## 2021-12-15 RX ADMIN — OXYCODONE HYDROCHLORIDE AND ACETAMINOPHEN 1 TABLET: 5; 325 TABLET ORAL at 19:00

## 2021-12-15 RX ADMIN — IPRATROPIUM BROMIDE AND ALBUTEROL SULFATE 1 AMPULE: .5; 3 SOLUTION RESPIRATORY (INHALATION) at 15:37

## 2021-12-15 RX ADMIN — DULOXETINE 60 MG: 60 CAPSULE, DELAYED RELEASE ORAL at 10:08

## 2021-12-15 RX ADMIN — SODIUM CHLORIDE, PRESERVATIVE FREE 10 ML: 5 INJECTION INTRAVENOUS at 10:16

## 2021-12-15 RX ADMIN — GUAIFENESIN DEXTROMETHORPHAN HYDROBROMIDE ORAL SOLUTION 5 ML: 200; 20 SOLUTION ORAL at 21:07

## 2021-12-15 RX ADMIN — IPRATROPIUM BROMIDE AND ALBUTEROL SULFATE 1 AMPULE: .5; 3 SOLUTION RESPIRATORY (INHALATION) at 11:24

## 2021-12-15 RX ADMIN — ALBUTEROL SULFATE 2 PUFF: 90 AEROSOL, METERED RESPIRATORY (INHALATION) at 02:25

## 2021-12-15 RX ADMIN — IPRATROPIUM BROMIDE AND ALBUTEROL SULFATE 1 AMPULE: .5; 3 SOLUTION RESPIRATORY (INHALATION) at 19:17

## 2021-12-15 RX ADMIN — ARIPIPRAZOLE 5 MG: 5 TABLET ORAL at 10:18

## 2021-12-15 RX ADMIN — GUAIFENESIN DEXTROMETHORPHAN HYDROBROMIDE ORAL SOLUTION 5 ML: 200; 20 SOLUTION ORAL at 14:30

## 2021-12-15 ASSESSMENT — PAIN SCALES - GENERAL
PAINLEVEL_OUTOF10: 8
PAINLEVEL_OUTOF10: 9
PAINLEVEL_OUTOF10: 4
PAINLEVEL_OUTOF10: 9

## 2021-12-15 NOTE — PLAN OF CARE
Problem: Infection:  Goal: Will remain free from infection  Description: Will remain free from infection  Outcome: Ongoing     Problem: Safety:  Goal: Free from accidental physical injury  Description: Free from accidental physical injury  Outcome: Ongoing     Problem: Daily Care:  Goal: Daily care needs are met  Description: Daily care needs are met  Outcome: Ongoing     Problem: Pain:  Goal: Patient's pain/discomfort is manageable  Description: Patient's pain/discomfort is manageable  Outcome: Ongoing     Problem: Skin Integrity:  Goal: Skin integrity will stabilize  Description: Skin integrity will stabilize  Outcome: Met This Shift     Problem: Discharge Planning:  Goal: Patients continuum of care needs are met  Description: Patients continuum of care needs are met  Outcome: Ongoing

## 2021-12-15 NOTE — CONSULTS
Current Facility-Administered Medications   Medication Dose Route Frequency Provider Last Rate Last Admin    nitroGLYCERIN (NITROSTAT) SL tablet 0.4 mg  0.4 mg SubLINGual Q5 Min PRN MARYANNE Hanks CNP        nitroGLYCERIN 50 mg in dextrose 5% 250 mL infusion  5-200 mcg/min IntraVENous Continuous MARYANNE Hanks CNP        nitroGLYCERIN (NITROSTAT) SL tablet 0.4 mg  0.4 mg SubLINGual Q5 Min PRN MARYANNE Hanks CNP        budesonide (PULMICORT) nebulizer suspension 500 mcg  0.5 mg Nebulization BID MARYANNE Kelsey CNP        dextromethorphan-guaiFENesin (ROBITUSSIN-DM)  MG/5ML liquid 5 mL  5 mL Oral Q6H MARYANNE Kelsey CNP   5 mL at 12/15/21 1430    colchicine (COLCRYS) tablet 0.6 mg  0.6 mg Oral BID Christopher Cosby MD        influenza quadrivalent split vaccine (FLUZONE;FLUARIX;FLULAVAL;AFLURIA) injection 0.5 mL  0.5 mL IntraMUSCular Prior to discharge Brody Fagan MD        heparin (porcine) injection 4,000 Units  4,000 Units IntraVENous PRN Brody Fagan MD   4,000 Units at 12/15/21 0057    heparin (porcine) injection 2,000 Units  2,000 Units IntraVENous PRN Brody Fagan MD        heparin (porcine) 25,000 Units in dextrose 5 % 250 mL infusion  5-30 Units/kg/hr IntraVENous Continuous Brody Fagan MD 14.4 mL/hr at 12/15/21 1121 13.223 Units/kg/hr at 12/15/21 1121    butalbital-APAP-caffeine -40 MG per capsule 1 capsule  1 capsule Oral Once Brody Fagan MD        albuterol sulfate  (90 Base) MCG/ACT inhaler 2 puff  2 puff Inhalation Q4H PRN Brody Fagan MD   2 puff at 12/15/21 0225    ARIPiprazole (ABILIFY) tablet 5 mg  5 mg Oral Daily Brody Fagan MD   5 mg at 12/15/21 1018    bumetanide (BUMEX) tablet 2 mg  2 mg Oral Daily Brody Fagan MD   2 mg at 12/15/21 1007    polyethylene glycol (GLYCOLAX) packet 17 g  17 g Oral BID Brody Fagan MD        DULoxetine (CYMBALTA) extended release capsule 60 mg  60 mg Oral BID Marianne Gonzalez MD   60 mg at 12/15/21 1008    sodium chloride (OCEAN, BABY AYR) 0.65 % nasal spray 2 spray  2 spray Each Nostril PRN Marianne Gonzalez MD        ibuprofen (ADVIL;MOTRIN) tablet 800 mg  800 mg Oral Q8H PRN Marianne Gonzalez MD        ipratropium-albuterol (DUONEB) nebulizer solution 1 ampule  1 ampule Inhalation 4x daily Marianne Gonzalez MD   1 ampule at 12/15/21 1537    cetirizine (ZYRTEC) tablet 10 mg  10 mg Oral Daily Marianne Gonzalez MD   10 mg at 12/15/21 1007    magnesium oxide (MAG-OX) tablet 400 mg  400 mg Oral Daily Marianne Gonzalez MD   400 mg at 12/15/21 1007    promethazine (PHENERGAN) tablet 25 mg  25 mg Oral Q6H PRN Marianne Gonzalez MD        QUEtiapine (SEROQUEL) tablet 50 mg  50 mg Oral Nightly Marianne Gonzalez MD   50 mg at 12/14/21 2241    sodium chloride flush 0.9 % injection 5-40 mL  5-40 mL IntraVENous 2 times per day Marianne Gonzalez MD   10 mL at 12/15/21 1016    sodium chloride flush 0.9 % injection 5-40 mL  5-40 mL IntraVENous PRN Marianne Gonzalez MD        0.9 % sodium chloride infusion  25 mL IntraVENous PRN Marianne Gonzalez MD        acetaminophen (TYLENOL) tablet 650 mg  650 mg Oral Q6H PRN Marianne Gonzalez MD        Or   [place] [employer] acetaminophen (TYLENOL) suppository 650 mg  650 mg Rectal Q6H PRN Marianne Gonzalez MD        atorvastatin (LIPITOR) tablet 80 mg  80 mg Oral Nightly Marianne Gonzalez MD   80 mg at 12/14/21 2241    glucose (GLUTOSE) 40 % oral gel 15 g  15 g Oral PRN Marianne Gonzalez MD        dextrose 50 % IV solution  12.5 g IntraVENous PRN Marianne Gonzalez MD        glucagon (rDNA) injection 1 mg  1 mg IntraMUSCular PRGURPREET Gonzalez MD        dextrose 5 % solution  100 mL/hr IntraVENous PRN Marianne Gonzalez MD        insulin lispro (HUMALOG) injection vial 0-18 Units  0-18 Units SubCUTAneous TID WC Marianne Gonzalez MD        insulin lispro disc     CAD (coronary artery disease)     Candida infection 2/23/2018    Cardiomyopathy ([address]     Chest pain 6/13/2017    CHF [facility]congestive heart failure) [facility]     Chronic otitis media of both ears     rt>lt    Chronic right shoulder pain 3/14/2017    Cigarette nicotine dependence with nicotine-induced disorder 7/19/2019    Class 3 severe obesity due to excess calories with serious comorbidity and body mass index (BMI) of 40.0 to 44.9 in adult [facility]) 10/4/2018    Closed fracture of left ankle 6/25/2019    Clostridium difficile infection     COPD (chronic obstructive pulmonary disease) ([facility])     Cough, persistent 3/21/2018    Current moderate episode of major depressive disorder without prior episode ([address] 8/20/2018    Depression     Diabetes mellitus type 2, controlled ([address] 4/30/2014    Diarrhea     Diarrhea     Dizziness     DVT (deep venous thrombosis) ([facility])     after PICC line right arm    Encounter for monitoring sotalol therapy 2/20/2017    Encounter for screening mammogram for malignant neoplasm of breast 3/7/2018    Endometriosis     Fainting     GERD (gastroesophageal reflux disease)     hx of    GI bleeding     H. pylori infection     Helicobacter pylori (H. pylori)     [place] (hard of hearing)     both ears, no hearing aids    HTN (hypertension) 7/19/2019    Hyperlipidemia     Hypertension     Left bicipital tenosynovitis 10/17/2018    Left leg pain 5/16/2017    Left sided abdominal pain of unknown cause 7/19/2016    Leg swelling 9/21/2018    Mastoiditis     Mastoiditis, acute 4/30/2014    Migraines     Morbid obesity ([address]     Myocardial infarction ([address]     2005    Nausea     Neuropathy     On home oxygen therapy     3 L at night    Ovarian cyst     Passed out     hx of- negative tilt table    Pulmonary hypertension (HCC)     Pulmonary insufficiency     PVC (premature ventricular contraction)     Seasonal allergies     Tricuspid insufficiency     Type II or unspecified type diabetes mellitus without mention of complication, not stated as uncontrolled        PSH:   Past Surgical History:   Procedure Laterality Date    ABDOMEN SURGERY      abcess    ABDOMINAL ADHESION SURGERY      ABDOMINAL EXPLORATION SURGERY      x 4    ABDOMINAL HERNIA REPAIR      with mesh    ABLATION OF DYSRHYTHMIC FOCUS  2016    ABLATION OF DYSRHYTHMIC FOCUS  09/08/2017    [facility]  ABSCESS DRAINAGE      left hip and chest    APPENDECTOMY      BREAST SURGERY Left 2007    I & D    CARDIAC CATHETERIZATION  2014 & 2000     no stenting    CARPAL TUNNEL RELEASE Right 12/19/2019    Ulnar nerve decompression, right elbow. Release of 1st and 2nd extensor compartments, right forearm.  CARPAL TUNNEL RELEASE  05/04/2020    Carpal tunnel release, left hand    CHOLECYSTECTOMY      COLONOSCOPY      FOOT SURGERY Left     bone fragment removed    FRACTURE SURGERY Right     closed reduction perc pinning ring & middle finger    GASTRIC FUNDOPLICATION  0101    HYSTERECTOMY      total    HYSTERECTOMY      HYSTEROSCOPY      tubal perfusion    MYRINGOTOMY Right 11/13/2015    Right myringotomy with placement of  T-tube on the right side. Removal of plugged ventilating tube, right side.  MYRINGOTOMY Left 07/14/2020    MYRINGOTOMY TUBE INSERTION performed by Kathryn Gutierrez MD at [facility] Right 06/05/2014    OTHER SURGICAL HISTORY Right 05/29/2014    removal ear tube rt ear    OTHER SURGICAL HISTORY  2009    LOOP recorder inserted and removed 3 mos.  later    OTHER SURGICAL HISTORY Right 08/11/2016    ear tube removal with patch    OTHER SURGICAL HISTORY      tubal perfusion, lysis of uterine adhesions    OTHER SURGICAL HISTORY      multiple PICC lines inserted and removed, it has affected circulation bilateral upper arms    OTHER SURGICAL HISTORY  10/19/2020    Revisiion to subcutaneous transposition of unlar nerve, right elbow    OTHER SURGICAL HISTORY Right 06/14/2021    REVISION TO SUBMUSCULAR TRANSPOSITION OF RIGHT ULNAR NERVE    WI MANIPULATN SHLDR JT W ANESTHESIA Right 03/01/2017    SHOULDER MANIPULATION RIGHT performed by Jyothi Shah MD at [address] [facility] [address] Left 10/17/2018    SHOULDER ARTHROSCOPY W/BICEPS TENDONESIS performed by Judah Boo MD at [address] [facility] Left     foot    TONSILLECTOMY      TYMPANOSTOMY TUBE PLACEMENT      TYMPANOSTOMY TUBE PLACEMENT Left 03/12/2019    TYMPANOSTOMY TUBE PLACEMENT Right 12/08/2021    Right tympanostomy with tube placement of T-tube with operative microscope and general anesthesia. Right removal of right ear tube.  ULNAR TUNNEL RELEASE Right     UPPER GASTROINTESTINAL ENDOSCOPY      UPPER GASTROINTESTINAL ENDOSCOPY  07/10/2019    UPPER GASTROINTESTINAL ENDOSCOPY N/A 07/10/2019    EGD BIOPSY performed by Ronit Everett MD at [address]: Allergies   Allergen Reactions    Latex Hives    Aspirin Anaphylaxis    Avelox [Moxifloxacin] Anaphylaxis    Chantix [Varenicline] Swelling    Doxycycline Anaphylaxis    Dye [Iodides] Other (See Comments)     Seizures    Flexeril [Cyclobenzaprine] Anaphylaxis    Gabapentin Anaphylaxis    Losartan Shortness Of Breath    Morphine Itching     Makes patient want to \"scratch out her eyes\".   Can take if given with benadryl    Nsaids Anaphylaxis and Hives     Pt states she can take ibuprofen    Pcn [Penicillins] Anaphylaxis and Hives    Reglan [Metoclopramide Hcl] Swelling     Agitation, anger    Shellfish-Derived Products Anaphylaxis    Sulfa Antibiotics Hives, Shortness Of Breath, Itching and Swelling    Vancomycin Anaphylaxis    Zofran Anaphylaxis    Zyvox [Linezolid] Anaphylaxis    Acyclovir Swelling and Rash    Bactrim [Sulfamethoxazole-Trimethoprim] Hives    Betadine [Povidone Iodine] Hives    Ceclor [Cefaclor] Hives    Clindamycin/Lincomycin Hives  Codeine Itching and Rash    Macrolides And Ketolides Hives     Pt states she can take Azithromycin    Novolin R [Insulin] Hives    Novolog [Insulin Aspart] Hives    Phenothiazines Swelling    Tape [Adhesive Tape] Rash     OK to use paper tape and tegaderm per patient    Banana     Compazine [Prochlorperazine] Other (See Comments)     Agitation, anger, \"makes me jerk\"    Fentanyl Itching     Pt states doesn't work and makes patient itch    Kiwi Extract     Tamiflu [Oseltamivir Phosphate] Hives    Sotalol Other (See Comments)     Pt verbalized that she developed a prolonged QT and had an asthma attack with medication. Home Meds:  Medications Prior to Admission: ibuprofen (ADVIL;MOTRIN) 800 MG tablet, Take 800 mg by mouth every 8 hours as needed for Pain  QUEtiapine (SEROQUEL) 50 MG tablet, TAKE 1 TABLET BY MOUTH EVERY DAY AT NIGHT  ARIPiprazole (ABILIFY) 5 MG tablet, Take 5 mg by mouth daily   Insulin Degludec (TRESIBA FLEXTOUCH) 200 UNIT/ML SOPN, Inject 80 Units into the skin 2 times daily  insulin lispro, 1 Unit Dial, (HUMALOG KWIKPEN) 100 UNIT/ML SOPN, INJECT SUBCUTANEOUSLY PER SLIDING SCALE, 150-200 INJECT 3U, 201-250 INJECT 6U, 251-300 INJECT 9U, >300 INJECT 12U, MAX 30U/DAY  albuterol sulfate HFA (VENTOLIN HFA) 108 (90 Base) MCG/ACT inhaler, INHALE 2 PUFFS INTO LUNGS EVERY 6 HOURS AS NEEDED FOR WHEEZING  ipratropium-albuterol (DUONEB) 0.5-2.5 (3) MG/3ML SOLN nebulizer solution, INHALE 3 MLS (ONE VIAL) WITH NEBULIZER EVERY 6 HOURS AS NEEDED FOR SHORTNESS OF BREATH  clotrimazole (LOTRIMIN) 1 % cream, Apply topically 2 times daily. ergocalciferol (ERGOCALCIFEROL) 1.25 MG (41408 UT) capsule, Take 50,000 Units by mouth once a week   magnesium oxide (MAG-OX) 400 MG tablet, Take 400 mg by mouth daily   oxyCODONE-acetaminophen (PERCOCET) 5-325 MG per tablet, Take 1 tablet by mouth every 4 hours as needed for Pain.  Indications: last fill 12/4/21 for 30 days   HM SALINE NASAL SPRAY 0.65 % nasal spray, 1 spray by Nasal route as needed for Congestion   CLEARLAX 17 GM/SCOOP powder, TAKE 17 G BY MOUTH 2 TIMES DAILY  DULoxetine (CYMBALTA) 60 MG extended release capsule, TAKE 1 CAPSULE BY MOUTH 2 TIMES DAILY  Multiple Vitamins-Minerals (MULTIVITAMIN GUMMIES WOMENS) CHEW, Take 2 each by mouth daily   carvedilol (COREG) 12.5 MG tablet, Take 12.5 mg by mouth 2 times daily (with meals) Takes at 10:00am and 10:00pm  Insulin Pen Needle (UNIFINE PENTIPS) 31G X 8 MM MISC, USE 4 TIMES DAILY AS DIRECTED  blood glucose test strips (ASCENSIA AUTODISC VI;ONE TOUCH ULTRA TEST VI) strip, OneTouch Ultra Test strips. Check blood sugars 4x daily  blood glucose test strips (ONETOUCH ULTRA) strip, USE TO TEST BLOOD SUGAR BEFORE MEALS, AT BEDTIME, AND AS NEEDED (6 TIMES DAILY)  OXYGEN, Inhale into the lungs Indications: On 3 liters per n/c during the night. Social History:   Social History     Socioeconomic History    Marital status: Single     Spouse name: Not on file    Number of children: Not on file    Years of education: Not on file    Highest education level: Not on file   Occupational History     Employer: NONE   Tobacco Use    Smoking status: Current Every Day Smoker     Packs/day: 0.50     Years: 23.00     Pack years: 11.50     Types: Cigarettes    Smokeless tobacco: Never Used   Vaping Use    Vaping Use: Never used   Substance and Sexual Activity    Alcohol use: No     Alcohol/week: 0.0 standard drinks    Drug use: No    Sexual activity: Not on file   Other Topics Concern    Not on file   Social History Narrative    Not on file     Social Determinants of Health     Financial Resource Strain:     Difficulty of Paying Living Expenses: Not on file   Food Insecurity:     Worried About Running Out of Food in the Last Year: Not on file    Mariano of Food in the Last Year: Not on file   Transportation Needs:     Lack of Transportation (Medical): Not on file    Lack of Transportation (Non-Medical):  Not on file Physical Activity:     Days of Exercise per Week: Not on file    Minutes of Exercise per Session: Not on file   Stress:     Feeling of Stress : Not on file   Social Connections:     Frequency of Communication with Friends and Family: Not on file    Frequency of Social Gatherings with Friends and Family: Not on file    Attends [employer] Services: Not on file    Active Member of Clubs or Organizations: Not on file    Attends Club or Organization Meetings: Not on file    Marital Status: Not on file   Intimate Partner Violence:     Fear of Current or Ex-Partner: Not on file    Emotionally Abused: Not on file    Physically Abused: Not on file    Sexually Abused: Not on file   Housing Stability:     Unable to Pay for Housing in the Last Year: Not on file    Number of Places Lived in the Last Year: Not on file    Unstable Housing in the Last Year: Not on file       Family History:   Family History   Problem Relation Age of Onset    Ulcerative Colitis Father     Liver Disease Father 61        hep c and b    Diabetes Father     Asthma Father     Heart Disease Father     High Blood Pressure Father     Breast Cancer Maternal Aunt     Cancer Maternal Aunt     Diabetes Maternal Aunt     Heart Disease Maternal Aunt     High Blood Pressure Maternal Aunt     Breast Cancer Paternal Aunt     Heart Disease Paternal Aunt     High Blood Pressure Paternal Aunt     Breast Cancer Maternal Grandmother     Cervical Cancer Maternal Grandmother     Cancer Maternal Grandmother     Diabetes Maternal Grandmother     Asthma Maternal Grandmother     Heart Disease Maternal Grandmother     High Blood Pressure Maternal Grandmother     Lung Cancer Maternal Grandfather     Diabetes Maternal Grandfather     Asthma Maternal Grandfather     Heart Disease Maternal Grandfather     High Blood Pressure Maternal Grandfather     Cervical Cancer Paternal Grandmother     Cancer Paternal Grandmother     Diabetes CREATININE 0.55   CALCIUM 9.3     ABGs: No results for input(s): PHART, PO2ART, WPD4FGV, IHS0XIE, BEART, Y0CHPEFY, FOI2RKU in the last 72 hours.    PT/INR:  No results found for: PTINR      ASSESSMENT / PLAN:    Influenza A - Tamiflu - patient is allergic, cough suppressant  COPD - BD  DM  Elevated troponin- heparin gtt; Cardiac cath 12/16/21  SHALINI CASTELLON    Electronically signed by Yuli Charles MD on 12/15/21 at 5:20 PM.

## 2021-12-15 NOTE — PROGRESS NOTES
Patient states she is going to loose it if she cannot go out and smoke. Patient educated on the heparin drip and and that the IV pole cannot go outside. Patient told that the heparin drip will be less effective if it keeps getting unhooked. Patient said unhook and I am going outside to smoke.

## 2021-12-15 NOTE — PROGRESS NOTES
Progress Note  Date:12/15/2021       Room:[facility]-  Patient Hilario Rosenbaum     YOB: 1975     Age:46 y.o. Subjective      Patient resting in bed. States she is still dyspneic with exertion and has a harsh cough. Still has a fairly constant left chest discomfort that dizzy heavy pressure/stab. He states that it waxes/wanes, has not completely resolved overnight. Certainly worse with deep inspiration. Can occur with/without activity. Patient has been NPO overnight pending possible invasive testing today. Of note positive  Influenza A    No lightheadedness, dizziness, syncope, near syncope, palpitations. Objective         Vitals Last 24 Hours:  TEMPERATURE:  Temp  Av.9 °F (36.6 °C)  Min: 97.2 °F (36.2 °C)  Max: 98.7 °F (37.1 °C)  RESPIRATIONS RANGE: Resp  Av.1  Min: 13  Max: 22  PULSE OXIMETRY RANGE: SpO2  Av.3 %  Min: 87 %  Max: 98 %  PULSE RANGE: Pulse  Av.5  Min: 66  Max: 89  BLOOD PRESSURE RANGE: Systolic (96IWD), RGH:945 , Min:109 , PGR:458   ; Diastolic (12RUX), CNH:85, Min:66, Max:76    I/O (24Hr):   No intake or output data in the 24 hours ending 12/15/21 0912  Objective  Labs/Imaging/Diagnostics    Labs:  CBC:  Recent Labs     21  1203 12/15/21  0333   WBC 8.6 10.1   RBC 3.87* 3.66*   HGB 13.2 12.7   HCT 39.4 37.9   .7* 103.4*   RDW 12.9 12.8    214     CHEMISTRIES:  Recent Labs     21  1203      K 3.8      CO2 28   BUN 15   CREATININE 0.55   GLUCOSE 184*     PT/INR:  Recent Labs     21  1203   PROTIME 12.2   INR 0.9     APTT:  Recent Labs     21  1203   APTT 30.2     LIVER PROFILE:  Recent Labs     21  1203   AST 29   ALT 23   BILITOT 0.40   ALKPHOS 94       Imaging Last 24 Hours:  XR CHEST PORTABLE    Result Date: 2021  EXAMINATION: ONE XRAY VIEW OF THE CHEST 2021 10:51 am COMPARISON: AP chest from 10/31/2021 HISTORY: ORDERING SYSTEM PROVIDED HISTORY: cough, sob TECHNOLOGIST ordered stat as of 9:10 this a.m.       Electronically signed by MARYANNE Posadas CNP on 12/15/21 at 6:22 AM EST

## 2021-12-15 NOTE — PROGRESS NOTES
Patient refusing nitro drip and SL nitro stating it will make her HA worse. She also stated that she is not allergic to morphine, that she has an intolerance to morphine and that when she does take it she is also prescribed benadryl at the same time. Cardiology states they are not going to order anything other than nitro. Patient very upset at this time.

## 2021-12-15 NOTE — PROGRESS NOTES
RN call to room by patient about cough and SOB, respiratory called. Per respiratory, pt refused inhaler. Patient took her own Percocet.

## 2021-12-15 NOTE — PLAN OF CARE
Problem: Infection:  Goal: Will remain free from infection  Description: Will remain free from infection  12/15/2021 1729 by Donavan Chung RN  Outcome: Ongoing     Pt shows no signs or symptoms of infection this shift. Problem: Safety:  Goal: Free from accidental physical injury  Description: Free from accidental physical injury  12/15/2021 1729 by Donavan Chung RN  Outcome: Ongoing     No falls noted this shift. Bed kept in low position. Safe environment maintained. Bedside table & call light in reach. Uses call light appropriately when needing assistance. Problem: Pain:  Goal: Patient's pain/discomfort is manageable  Description: Patient's pain/discomfort is manageable  12/15/2021 1729 by Donavan Chung RN  Outcome: Ongoing     Pt reporting 10/10 chest pain. Cardiology wanted to start nitroglycerin. Pt refused after RN educated on the purpose and use of the medication. Pt remains taking Percocet for pain control.

## 2021-12-15 NOTE — CARE COORDINATION
CASE MANAGEMENT NOTE:    Admission Date:  12/14/2021 Giselle Narvaez is a 48 y.o.  female    Admitted for : Elevated troponin [R77.8]  NSTEMI (non-ST elevated myocardial infarction) ([facility] [I21.4]    Met with:  Patient    PCP:  Dr. Mike Oneal:  Radu Han      Is patient alert and oriented at time of discussion:  Yes    Current Residence/ Living Arrangements:  independently at home             Current Services PTA:  No    Does patient go to outpatient dialysis: No  If yes, location and chair time: N/A    Is patient agreeable to VNS: No    Freedom of choice provided:  Yes    List of [address] provided: No    VNS chosen:  No    DME:  shower chair & glucometer    Home Oxygen: Yes @ 3L 24/7 conc/port    Nebulizer: Yes    CPAP/BIPAP: No    Supplier: [employer]    Potential Assistance Needed: No    SNF needed: No    Freedom of choice and list provided: NA    Pharmacy:  [facility]       Does Patient want to use MEDS to BEDS? No    Is patient currently receiving oral anticoagulation therapy? No    Is the Patient an [facility] with Read[facility] Risk Score greater than 14%? No  If yes, pt needs a follow up appointment made within 7 days. Family Members/Caregivers that pt would like involved in their care:    Yes    If yes, list name here:  Celio Minks    Transportation Provider:  Cab through [facility]             Discharge Plan:  12/15: Avinash Mcallister - From 2-story home alone. Patient is independent with her care, however depends on Cab through [employer]. DME - [place], glucometer, neb and Home O2 @ 3L 24/7 ([facility]). Declines VNS. On IV lasix 80 daily. To have heart cath tomorrow. +Influenza.  //EDUARDO                 Electronically signed by: Sri Dexter RN on 12/15/2021 at 4:37 PM

## 2021-12-15 NOTE — PROGRESS NOTES
Patient refusing metoprolol stating she is on coreg. She also refused lasix stating because she is on bumex.

## 2021-12-15 NOTE — H&P
Family Medicine Admit Note    PCP: Mona Lyon MD    Date of Admission: 12/14/2021    Date of Service: Pt seen/examined on 12/15/2021 and Admitted to Inpatient     Chief Complaint:  Cough, fever, shortness of breath      History Of Present Illness: The patient is a 48 y.o. female who presents to [facility] with above complaints over the past week.      Past Medical History:        Diagnosis Date    Acute exacerbation of chronic obstructive pulmonary disease ([address] 3/21/2018    Adhesive capsulitis of left shoulder 9/25/2018    Asthma     Asthma exacerbation 7/24/2014    Atrial fibrillation (HCC)     placed on event monitor 9/25/18 for PVC's & A-fib    Atrial premature depolarization 7/19/2019    Bipolar 1 disorder ([address]     Bipolar disorder ([address] 7/19/2019    Bulging disc     CAD (coronary artery disease)     Candida infection 2/23/2018    Cardiomyopathy ([address]     Chest pain 6/13/2017    CHF (congestive heart failure) ([facility]     Chronic otitis media of both ears     rt>lt    Chronic right shoulder pain 3/14/2017    Cigarette nicotine dependence with nicotine-induced disorder 7/19/2019    Class 3 severe obesity due to excess calories with serious comorbidity and body mass index (BMI) of 40.0 to 44.9 in adult [facility]) 10/4/2018    Closed fracture of left ankle 6/25/2019    Clostridium difficile infection     COPD (chronic obstructive pulmonary disease) ([facility]     Cough, persistent 3/21/2018    Current moderate episode of major depressive disorder without prior episode ([address] 8/20/2018    Depression     Diabetes mellitus type 2, controlled ([address] 4/30/2014    Diarrhea     Diarrhea     Dizziness     DVT (deep venous thrombosis) ([facility])     after PICC line right arm    Encounter for monitoring sotalol therapy 2/20/2017    Encounter for screening mammogram for malignant neoplasm of breast 3/7/2018    Endometriosis     Fainting     GERD (gastroesophageal reflux disease)     hx of    GI bleeding     H. pylori infection     Helicobacter pylori (H. pylori)     Ramona (hard of hearing)     both ears, no hearing aids    HTN (hypertension) 7/19/2019    Hyperlipidemia     Hypertension     Left bicipital tenosynovitis 10/17/2018    Left leg pain 5/16/2017    Left sided abdominal pain of unknown cause 7/19/2016    Leg swelling 9/21/2018    Mastoiditis     Mastoiditis, acute 4/30/2014    Migraines     Morbid obesity (HCC)     Myocardial infarction ([facility]     2005    Nausea     Neuropathy     On home oxygen therapy     3 L at night    Ovarian cyst     Passed out     hx of- negative tilt table    Pulmonary hypertension (HCC)     Pulmonary insufficiency     PVC (premature ventricular contraction)     Seasonal allergies     Tricuspid insufficiency     Type II or unspecified type diabetes mellitus without mention of complication, not stated as uncontrolled        Past Surgical History:        Procedure Laterality Date    ABDOMEN SURGERY      abcess    ABDOMINAL ADHESION SURGERY      ABDOMINAL EXPLORATION SURGERY      x 4    ABDOMINAL HERNIA REPAIR      with mesh    ABLATION OF DYSRHYTHMIC FOCUS  2016    ABLATION OF DYSRHYTHMIC FOCUS  09/08/2017    [facility].  ABSCESS DRAINAGE      left hip and chest    APPENDECTOMY      BREAST SURGERY Left 2007    I & D    CARDIAC CATHETERIZATION  2014 & 2000     no stenting    CARPAL TUNNEL RELEASE Right 12/19/2019    Ulnar nerve decompression, right elbow. Release of 1st and 2nd extensor compartments, right forearm.     CARPAL TUNNEL RELEASE  05/04/2020    Carpal tunnel release, left hand    CHOLECYSTECTOMY      COLONOSCOPY      FOOT SURGERY Left     bone fragment removed    FRACTURE SURGERY Right     closed reduction perc pinning ring & middle finger    GASTRIC FUNDOPLICATION  0935    HYSTERECTOMY      total    HYSTERECTOMY      HYSTEROSCOPY      tubal perfusion    MYRINGOTOMY Right 11/13/2015    Right myringotomy with placement of  T-tube on the right side. Removal of plugged ventilating tube, right side.  MYRINGOTOMY Left 07/14/2020    MYRINGOTOMY TUBE INSERTION performed by Jorge Erazo MD at [facility] Right 06/05/2014    OTHER SURGICAL HISTORY Right 05/29/2014    removal ear tube rt ear    OTHER SURGICAL HISTORY  2009    LOOP recorder inserted and removed 3 mos. later    OTHER SURGICAL HISTORY Right 08/11/2016    ear tube removal with patch    OTHER SURGICAL HISTORY      tubal perfusion, lysis of uterine adhesions    OTHER SURGICAL HISTORY      multiple PICC lines inserted and removed, it has affected circulation bilateral upper arms    OTHER SURGICAL HISTORY  10/19/2020    Revisiion to subcutaneous transposition of unlar nerve, right elbow    OTHER SURGICAL HISTORY Right 06/14/2021    REVISION TO SUBMUSCULAR TRANSPOSITION OF RIGHT ULNAR NERVE    AZ OLYA BATES JT W ANESTHESIA Right 03/01/2017    SHOULDER MANIPULATION RIGHT performed by Js Mason MD at [facility] Left 10/17/2018    SHOULDER ARTHROSCOPY W/BICEPS TENDONESIS performed by Jennyfer Leroy MD at [address] [place] [address] Left     foot    TONSILLECTOMY      TYMPANOSTOMY TUBE PLACEMENT      TYMPANOSTOMY TUBE PLACEMENT Left 03/12/2019    TYMPANOSTOMY TUBE PLACEMENT Right 12/08/2021    Right tympanostomy with tube placement of T-tube with operative microscope and general anesthesia. Right removal of right ear tube.  ULNAR TUNNEL RELEASE Right     UPPER GASTROINTESTINAL ENDOSCOPY      UPPER GASTROINTESTINAL ENDOSCOPY  07/10/2019    UPPER GASTROINTESTINAL ENDOSCOPY N/A 07/10/2019    EGD BIOPSY performed by Jaosn Fox MD at [address] [place] [address]       Medications Prior to Admission:    Prior to Admission medications    Medication Sig Start Date End Date Taking?  Authorizing Provider   ibuprofen (ADVIL;MOTRIN) 800 MG tablet Take 800 mg by mouth every 8 hours as needed for Pain   Yes Historical Provider, MD   QUEtiapine (SEROQUEL) 50 MG tablet TAKE 1 TABLET BY MOUTH EVERY DAY AT NIGHT 11/26/21  Yes Blessing Bai MD   ARIPiprazole (ABILIFY) 5 MG tablet Take 5 mg by mouth daily  10/11/21  Yes Historical Provider, MD   Insulin Degludec (TRESIBA FLEXTOUCH) 200 UNIT/ML SOPN Inject 80 Units into the skin 2 times daily 11/2/21  Yes MARYANNE Haq NP   insulin lispro, 1 Unit Dial, (HUMALOG KWIKPEN) 100 UNIT/ML SOPN INJECT SUBCUTANEOUSLY PER SLIDING SCALE, 150-200 INJECT 3U, 201-250 INJECT 6U, 251-300 INJECT 9U, >300 INJECT 12U, MAX 30U/DAY 11/2/21  Yes MARYANNE Jordan NP   albuterol sulfate HFA (VENTOLIN HFA) 108 (90 Base) MCG/ACT inhaler INHALE 2 PUFFS INTO LUNGS EVERY 6 HOURS AS NEEDED FOR WHEEZING 11/2/21  Yes MARYANNE Haq NP   ipratropium-albuterol (DUONEB) 0.5-2.5 (3) MG/3ML SOLN nebulizer solution INHALE 3 MLS (ONE VIAL) WITH NEBULIZER EVERY 6 HOURS AS NEEDED FOR SHORTNESS OF BREATH 11/2/21  Yes MARYANNE Jordan NP   clotrimazole (LOTRIMIN) 1 % cream Apply topically 2 times daily. 11/2/21  Yes MARYANNE Haq NP   ergocalciferol (ERGOCALCIFEROL) 1.25 MG (69500 UT) capsule Take 50,000 Units by mouth once a week    Yes Historical Provider, MD   magnesium oxide (MAG-OX) 400 MG tablet Take 400 mg by mouth daily  8/9/21  Yes Historical Provider, MD   oxyCODONE-acetaminophen (PERCOCET) 5-325 MG per tablet Take 1 tablet by mouth every 4 hours as needed for Pain.  Indications: last fill 12/4/21 for 30 days  9/6/21  Yes Historical Provider, MD PARKER SALINE NASAL SPRAY 0.65 % nasal spray 1 spray by Nasal route as needed for Congestion  7/6/21  Yes Historical Provider, MD   CLEARLAX 17 GM/SCOOP powder TAKE 17 G BY MOUTH 2 TIMES DAILY 7/26/21  Yes Blessing Bai MD   DULoxetine (CYMBALTA) 60 MG extended release capsule TAKE 1 CAPSULE BY MOUTH 2 TIMES DAILY 3/25/21  Yes Homero Rose APRN - NP   Multiple Vitamins-Minerals (MULTIVITAMIN GUMMIES WOMENS) CHEW Take 2 each by mouth daily    Yes Historical Provider, MD   carvedilol (COREG) 12.5 MG tablet Take 12.5 mg by mouth 2 times daily (with meals) Takes at 10:00am and 10:00pm   Yes Historical Provider, MD   Insulin Pen Needle (UNIFINE PENTIPS) 31G X 8 MM MISC USE 4 TIMES DAILY AS DIRECTED 11/2/21   MARYANNE Cadena NP   blood glucose test strips (ASCENSIA AUTODISC VI;ONE TOUCH ULTRA TEST VI) strip OneTouch Ultra Test strips. Check blood sugars 4x daily 11/2/21   MARYANNE Jordan - NP   blood glucose test strips (ONETOUCH ULTRA) strip USE TO TEST BLOOD SUGAR BEFORE MEALS, AT BEDTIME, AND AS NEEDED (6 TIMES DAILY) 10/31/21   Raulito Batres MD   OXYGEN Inhale into the lungs Indications: On 3 liters per n/c during the night. Historical Provider, MD       Allergies:  Latex, Aspirin, Avelox [moxifloxacin], Chantix [varenicline], Doxycycline, Dye [iodides], Flexeril [cyclobenzaprine], Gabapentin, Losartan, Morphine, Nsaids, Pcn [penicillins], Reglan [metoclopramide hcl], Shellfish-derived products, Sulfa antibiotics, Vancomycin, Zofran, Zyvox [linezolid], Acyclovir, Bactrim [sulfamethoxazole-trimethoprim], Betadine [povidone iodine], Ceclor [cefaclor], Clindamycin/lincomycin, Codeine, Macrolides and ketolides, Novolin r [insulin], Novolog [insulin aspart], Phenothiazines, Tape [adhesive tape], Banana, Compazine [prochlorperazine], Fentanyl, Kiwi extract, Tamiflu [oseltamivir phosphate], and Sotalol    Social History:  The patient currently lives at home    TOBACCO:   reports that she has been smoking cigarettes. She has a 11.50 pack-year smoking history. She has never used smokeless tobacco.  ETOH:   reports no history of alcohol use.       Family History:          Problem Relation Age of Onset    Ulcerative Colitis Father     Liver Disease Father 61        hep c and b    Diabetes Father     Asthma Father     Heart Disease Father     High Blood Pressure Father     Breast Cancer Maternal Aunt     Cancer Maternal Aunt     Diabetes Maternal Aunt     Heart Disease Maternal Aunt     High Blood Pressure Maternal Aunt     Breast Cancer Paternal Aunt     Heart Disease Paternal Aunt     High Blood Pressure Paternal Aunt     Breast Cancer Maternal Grandmother     Cervical Cancer Maternal Grandmother     Cancer Maternal Grandmother     Diabetes Maternal Grandmother     Asthma Maternal Grandmother     Heart Disease Maternal Grandmother     High Blood Pressure Maternal Grandmother     Lung Cancer Maternal Grandfather     Diabetes Maternal Grandfather     Asthma Maternal Grandfather     Heart Disease Maternal Grandfather     High Blood Pressure Maternal Grandfather     Cervical Cancer Paternal Grandmother     Cancer Paternal Grandmother     Diabetes Paternal Grandmother     Heart Disease Paternal Grandmother     High Blood Pressure Paternal Grandmother     Diabetes Mother     Asthma Mother     Heart Disease Mother     High Blood Pressure Mother     Cancer Sister     Heart Disease Maternal Uncle     High Blood Pressure Maternal Uncle     Heart Disease Paternal Uncle     High Blood Pressure Paternal Uncle     Diabetes Paternal Grandfather     Heart Disease Paternal Grandfather     High Blood Pressure Paternal Grandfather        PHYSICAL EXAM:    /72   Pulse 66   Temp 97.2 °F (36.2 °C) (Oral)   Resp 20   Ht 5' 4\" (1.626 m)   Wt 260 lb 2.3 oz (118 kg)   SpO2 (!) 87% Comment: roomair  BMI 44.65 kg/m²     General appearance: No apparent distress appears stated age and cooperative. HEENT Normal cephalic, atraumatic without obvious deformity. Neck: Supple, No jugular venous distention/bruits. Lungs: diminished throughout, wheezes right base  Heart: Regular rate and rhythm with Normal S1/S2 without murmurs, rubs or gallops  Mental status: Alert, oriented, thought content appropriate.     CXR:  I have reviewed the CXR with the following interpretation: No consolidation or sizable pleural effusion.  Likely bronchitis with medial   bibasilar atelectasis.           EKG:  I have reviewed the EKG with the following interpretation: NSR    CBC   Recent Labs     12/14/21  1203 12/15/21  0333   WBC 8.6 10.1   HGB 13.2 12.7   HCT 39.4 37.9    214      RENAL  Recent Labs     12/14/21  1203      K 3.8      CO2 28   BUN 15   CREATININE 0.55     LFT'S  Recent Labs     12/14/21  1203   AST 29   ALT 23   BILITOT 0.40   ALKPHOS 94     COAG  Recent Labs     12/14/21  1203   INR 0.9     CARDIAC ENZYMES  No results for input(s): CKTOTAL, CKMB, CKMBINDEX, TROPONINI in the last 72 hours.     U/A:    Lab Results   Component Value Date    COLORU Yellow 11/11/2021    WBCUA 2 TO 5 07/17/2019    RBCUA 0 TO 2 07/17/2019    MUCUS 2+ 07/17/2019    BACTERIA MANY 07/17/2019    SPECGRAV 1.019 11/11/2021    LEUKOCYTESUR NEGATIVE 11/11/2021    GLUCOSEU NEGATIVE 11/11/2021    AMORPHOUS NOT REPORTED 07/17/2019       ABG    Lab Results   Component Value Date    ABP6DVS 26.1 10/16/2013    X1RKGZQU 98.4 10/16/2013    PHART 7.41 10/16/2013    UIZ4LEW 42 10/16/2013    PO2ART 86 10/16/2013           Active Hospital Problems    Diagnosis Date Noted    Influenza A [J10.1] 12/15/2021    Elevated troponin [R77.8] 12/14/2021    Pulmonary HTN (HCC) [I27.20] 07/19/2019    HTN (hypertension) [I10] 07/19/2019    Bipolar disorder ([address] [F31.9] 07/19/2019    Tobacco abuse [Z72.0] 05/16/2017    Cardiomyopathy ([facility] [I42.9] 11/09/2016    KRISTIN (obstructive sleep apnea) [G47.33] 07/24/2014    Diabetes mellitus type 2, controlled ([facility] [E11.9] 04/30/2014    COPD (chronic obstructive pulmonary disease) ([facility] [J44.9] 04/30/2014    Tricuspid valve disorders, non-rheumatic [I36.9] 10/21/2010         ASSESSMENT/PLAN:    NSTEMI - per cardiology    Influenza A - consult pulmonology    Diabetes - home insulin dose, scale bolus insulin as needed        DVT Prophylaxis: heparin  Diet: ADULT DIET;

## 2021-12-15 NOTE — SIGNIFICANT EVENT
[facility]    Contacted by Sara Marmolejo RN     Repeat HS troponin increased to 481 (325)  Pt c/o 10/10 chest pain  Reviewed w/ Dr. Alba Gan, he is heading to [facility] very soon. SL Nitro now  [employer] gtt  STAT EKG, If abnormal call our service STAT  Troponin drawn at time of phone call.   STAT TTE just completed, await results    If abnormal call our service STAT      For clarification of previous documentation: \"TTE\" (transthoracic echo) is a standard 2D echo      Update Neela CASTELLON of above plan      Signed  Dossie Kelley, APRN-CNP

## 2021-12-16 LAB
ANION GAP SERPL CALCULATED.3IONS-SCNC: 6 MMOL/L (ref 9–17)
ANTI-XA UNFRAC HEPARIN: 0.15 IU/L (ref 0.3–0.7)
BUN BLDV-MCNC: 11 MG/DL (ref 6–20)
BUN/CREAT BLD: ABNORMAL (ref 9–20)
CALCIUM SERPL-MCNC: 8.9 MG/DL (ref 8.6–10.4)
CHLORIDE BLD-SCNC: 103 MMOL/L (ref 98–107)
CO2: 33 MMOL/L (ref 20–31)
CREAT SERPL-MCNC: 0.52 MG/DL (ref 0.5–0.9)
EKG ATRIAL RATE: 65 BPM
EKG Q-T INTERVAL: 424 MS
EKG QRS DURATION: 86 MS
EKG QTC CALCULATION (BAZETT): 448 MS
EKG R AXIS: 38 DEGREES
EKG T AXIS: 62 DEGREES
EKG VENTRICULAR RATE: 67 BPM
GFR AFRICAN AMERICAN: >60 ML/MIN
GFR NON-AFRICAN AMERICAN: >60 ML/MIN
GFR SERPL CREATININE-BSD FRML MDRD: ABNORMAL ML/MIN/{1.73_M2}
GFR SERPL CREATININE-BSD FRML MDRD: ABNORMAL ML/MIN/{1.73_M2}
GLUCOSE BLD-MCNC: 158 MG/DL (ref 65–105)
GLUCOSE BLD-MCNC: 81 MG/DL (ref 65–105)
GLUCOSE BLD-MCNC: 81 MG/DL (ref 65–105)
GLUCOSE BLD-MCNC: 93 MG/DL (ref 65–105)
GLUCOSE BLD-MCNC: 94 MG/DL (ref 70–99)
HCT VFR BLD CALC: 39.6 % (ref 36–46)
HEMOGLOBIN: 13.5 G/DL (ref 12–16)
MCH RBC QN AUTO: 34.5 PG (ref 26–34)
MCHC RBC AUTO-ENTMCNC: 34 G/DL (ref 31–37)
MCV RBC AUTO: 101.5 FL (ref 80–100)
NRBC AUTOMATED: ABNORMAL PER 100 WBC
PDW BLD-RTO: 12.7 % (ref 11.5–14.9)
PLATELET # BLD: 190 K/UL (ref 150–450)
PMV BLD AUTO: 9.1 FL (ref 6–12)
POTASSIUM SERPL-SCNC: 4.1 MMOL/L (ref 3.7–5.3)
RBC # BLD: 3.9 M/UL (ref 4–5.2)
SODIUM BLD-SCNC: 142 MMOL/L (ref 135–144)
TROPONIN INTERP: ABNORMAL
TROPONIN T: ABNORMAL NG/ML
TROPONIN, HIGH SENSITIVITY: 498 NG/L (ref 0–14)
WBC # BLD: 11.1 K/UL (ref 3.5–11)

## 2021-12-16 PROCEDURE — 2580000003 HC RX 258: Performed by: FAMILY MEDICINE

## 2021-12-16 PROCEDURE — 6370000000 HC RX 637 (ALT 250 FOR IP): Performed by: FAMILY MEDICINE

## 2021-12-16 PROCEDURE — 6360000002 HC RX W HCPCS: Performed by: FAMILY MEDICINE

## 2021-12-16 PROCEDURE — 80048 BASIC METABOLIC PNL TOTAL CA: CPT

## 2021-12-16 PROCEDURE — 82947 ASSAY GLUCOSE BLOOD QUANT: CPT

## 2021-12-16 PROCEDURE — 94640 AIRWAY INHALATION TREATMENT: CPT

## 2021-12-16 PROCEDURE — 36415 COLL VENOUS BLD VENIPUNCTURE: CPT

## 2021-12-16 PROCEDURE — 6370000000 HC RX 637 (ALT 250 FOR IP): Performed by: NURSE PRACTITIONER

## 2021-12-16 PROCEDURE — 94761 N-INVAS EAR/PLS OXIMETRY MLT: CPT

## 2021-12-16 PROCEDURE — 99232 SBSQ HOSP IP/OBS MODERATE 35: CPT | Performed by: FAMILY MEDICINE

## 2021-12-16 PROCEDURE — 93010 ELECTROCARDIOGRAM REPORT: CPT | Performed by: INTERNAL MEDICINE

## 2021-12-16 PROCEDURE — 84484 ASSAY OF TROPONIN QUANT: CPT

## 2021-12-16 PROCEDURE — 2060000000 HC ICU INTERMEDIATE R&B

## 2021-12-16 PROCEDURE — 85520 HEPARIN ASSAY: CPT

## 2021-12-16 PROCEDURE — 6370000000 HC RX 637 (ALT 250 FOR IP): Performed by: INTERNAL MEDICINE

## 2021-12-16 PROCEDURE — 2700000000 HC OXYGEN THERAPY PER DAY

## 2021-12-16 PROCEDURE — 85027 COMPLETE CBC AUTOMATED: CPT

## 2021-12-16 RX ORDER — CARVEDILOL 12.5 MG/1
12.5 TABLET ORAL 2 TIMES DAILY WITH MEALS
Status: DISCONTINUED | OUTPATIENT
Start: 2021-12-16 | End: 2021-12-23 | Stop reason: HOSPADM

## 2021-12-16 RX ADMIN — OXYCODONE HYDROCHLORIDE AND ACETAMINOPHEN 1 TABLET: 5; 325 TABLET ORAL at 07:00

## 2021-12-16 RX ADMIN — ATORVASTATIN CALCIUM 80 MG: 80 TABLET, FILM COATED ORAL at 22:58

## 2021-12-16 RX ADMIN — IPRATROPIUM BROMIDE AND ALBUTEROL SULFATE 1 AMPULE: .5; 3 SOLUTION RESPIRATORY (INHALATION) at 11:32

## 2021-12-16 RX ADMIN — ALPRAZOLAM 0.5 MG: 0.5 TABLET ORAL at 00:58

## 2021-12-16 RX ADMIN — HEPARIN SODIUM 13.22 UNITS/KG/HR: 10000 INJECTION, SOLUTION INTRAVENOUS; SUBCUTANEOUS at 01:50

## 2021-12-16 RX ADMIN — Medication 400 MG: at 09:07

## 2021-12-16 RX ADMIN — GUAIFENESIN DEXTROMETHORPHAN HYDROBROMIDE ORAL SOLUTION 5 ML: 200; 20 SOLUTION ORAL at 22:58

## 2021-12-16 RX ADMIN — CARVEDILOL 12.5 MG: 12.5 TABLET, FILM COATED ORAL at 07:00

## 2021-12-16 RX ADMIN — DULOXETINE 60 MG: 60 CAPSULE, DELAYED RELEASE ORAL at 09:07

## 2021-12-16 RX ADMIN — CETIRIZINE HYDROCHLORIDE 10 MG: 10 TABLET, FILM COATED ORAL at 09:07

## 2021-12-16 RX ADMIN — QUETIAPINE FUMARATE 50 MG: 100 TABLET ORAL at 22:57

## 2021-12-16 RX ADMIN — SODIUM CHLORIDE, PRESERVATIVE FREE 10 ML: 5 INJECTION INTRAVENOUS at 23:48

## 2021-12-16 RX ADMIN — IPRATROPIUM BROMIDE AND ALBUTEROL SULFATE 1 AMPULE: .5; 3 SOLUTION RESPIRATORY (INHALATION) at 07:57

## 2021-12-16 RX ADMIN — OXYCODONE HYDROCHLORIDE AND ACETAMINOPHEN 1 TABLET: 5; 325 TABLET ORAL at 00:59

## 2021-12-16 RX ADMIN — BENZONATATE 100 MG: 100 CAPSULE ORAL at 09:06

## 2021-12-16 RX ADMIN — GUAIFENESIN DEXTROMETHORPHAN HYDROBROMIDE ORAL SOLUTION 5 ML: 200; 20 SOLUTION ORAL at 09:06

## 2021-12-16 RX ADMIN — BENZONATATE 100 MG: 100 CAPSULE ORAL at 22:58

## 2021-12-16 RX ADMIN — DULOXETINE 60 MG: 60 CAPSULE, DELAYED RELEASE ORAL at 22:58

## 2021-12-16 ASSESSMENT — PAIN SCALES - GENERAL
PAINLEVEL_OUTOF10: 8
PAINLEVEL_OUTOF10: 6

## 2021-12-16 NOTE — PLAN OF CARE
Problem: Infection:  Goal: Will remain free from infection  Description: Will remain free from infection  Outcome: Met This Shift     Problem: Safety:  Goal: Free from accidental physical injury  Description: Free from accidental physical injury  Outcome: Met This Shift  Goal: Free from intentional harm  Description: Free from intentional harm  Outcome: Met This Shift     Problem: Daily Care:  Goal: Daily care needs are met  Description: Daily care needs are met  Outcome: Met This Shift     Problem: Pain:  Description: Pain management should include both nonpharmacologic and pharmacologic interventions.   Goal: Patient's pain/discomfort is manageable  Description: Patient's pain/discomfort is manageable  Outcome: Ongoing  Goal: Pain level will decrease  Description: Pain level will decrease  Outcome: Ongoing  Goal: Control of acute pain  Description: Control of acute pain  Outcome: Ongoing  Goal: Control of chronic pain  Description: Control of chronic pain  Outcome: Ongoing  Note: Takes own pt supply     Problem: Skin Integrity:  Goal: Skin integrity will stabilize  Description: Skin integrity will stabilize  Outcome: Met This Shift     Problem: Discharge Planning:  Goal: Patients continuum of care needs are met  Description: Patients continuum of care needs are met  Outcome: Not Met This Shift  Note: Still at [facility] hsop ;P/U time scheduled for 2000 from [facility]

## 2021-12-16 NOTE — PROGRESS NOTES
Per report from night nurse- Pt took [address] 31 @ 1900, along with 2 percocet. When writer went to give HS meds, writer informed pt that their Coreg had been dc'ed by Cardiology. Pt responded- \"I've been on this for a long time and I'm going to keep taking it. I'm not going to listen to some other cardiologist that doesn't know me. \"

## 2021-12-16 NOTE — FLOWSHEET NOTE
The pt states she is unable to take a deep breath \"or it will make me choke\" so present assessment is lung sounds are \"diminished\" but pt does have harsh productive cough

## 2021-12-16 NOTE — PROGRESS NOTES
Writer explained to pt at length that writer will have to contact cardiologist re: plan for cardiac cath and that Akshat Kebede will notify pt ASAP with plans as , if plan is to proceed with cardiac cath, it will have to be entered into today's scheduling; pt is concerned about NPO status as this a.m. BS is 80; writer assured pt that BS will be checked frequently and low BS will be addressed accordingly ; pt also expressed concern that cough medication was not given \"on time\"; pt was also assured cough medication is scheduled ATC

## 2021-12-16 NOTE — PROGRESS NOTES
Fantasma Plaza NP cardiology was called and given results of 2D echo with EF of 35-40%;  Fantasma Plaza states pt will need card cath and will contact Dr Nancy Villarreal and call writer with further plan; pt then informed of above

## 2021-12-16 NOTE — CARE COORDINATION
DISCHARGE PLANNING NOTE:    Plan is for this patient to return to home, no needs    Supposed to be going to heart cath today, awaiting ECHO results.      On heparin gtt, refusing nitro gtt.    + influenza     Electronically signed by Malu Herrera RN on 12/16/2021 at 1:46 PM

## 2021-12-16 NOTE — PROGRESS NOTES
Progress Note  Date:2021       Room:[facility]-  Yohan Dill     YOB: 1975     Age:46 y.o. Subjective      Please see significant event and RN notes for details regarding interval history    Briefly, patient with 10/10 chest pain yesterday afternoon, HS troponin increased to 439. EKG without ischemic changes. We recommended sublingual nitro and nitro drip, patient refused due to concern for progressive headache. Heparin drip continued. Stat TTE completed 12/15/2021, however no interpretation of results as of yet. Overnight patient taking her own supply of carvedilol, states she has been on this a long time and plans to continue taking despite also having an order for Toprol. She has also refused colchicine, bumetanide. Very particular about medications. Lengthy discussion had regarding colchicine, patient will consider taking his medication. She does not feel she needs diuresis she does not want to take bumetanide. I will leave order in place as is, advised patient we recommend she take medication. She continues to have fairly constant chest discomfort, waxes/wanes, unchanged overnight. Objective         Vitals Last 24 Hours:  TEMPERATURE:  Temp  Av.5 °F (36.9 °C)  Min: 98.2 °F (36.8 °C)  Max: 99 °F (37.2 °C)  RESPIRATIONS RANGE: Resp  Av  Min: 16  Max: 20  PULSE OXIMETRY RANGE: SpO2  Av.6 %  Min: 93 %  Max: 98 %  PULSE RANGE: Pulse  Av.3  Min: 60  Max: 74  BLOOD PRESSURE RANGE: Systolic (24GUE), ICX:865 , Min:103 , FWI:373   ; Diastolic (80LIU), JIF:13, Min:33, Max:71    I/O (24Hr):     Intake/Output Summary (Last 24 hours) at 2021 0913  Last data filed at 12/15/2021 1818  Gross per 24 hour   Intake 699.61 ml   Output --   Net 699.61 ml     Objective  Labs/Imaging/Diagnostics    Labs:  CBC:  Recent Labs     21  1203 12/15/21  0333 21  0618   WBC 8.6 10.1 11.1*   RBC 3.87* 3.66* 3.90*   HGB 13.2 12.7 13.5   HCT +Influenza A infection   - HS Troponin peak 439   - EKG w/o ischemic changes  Normal CORS on LHC 2/2015 - normal SPECT Lexiscan 10/2021     NICMP w/ improved EF 58% on Lexiscan 10/2021   - EF 55-60% on TTE 1/2018  Acute on chronic HFpEF     H/O recurrent syncope  H/O symptomatic PVCs s/p PVC ablation in 2017 at [facility]     +Influenza A w/ PNA - per primary team  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 Patient started on colchicine 12/15/21 -she has refused due to concern for possible adverse reaction, see HPI    Patient has been taking her on carvedilol. Will place order to resume carvedilol 12.5 mg twice daily as per home dose  Stop Toprol. Maintain NPO until TTE results reviewed  If LV dysfxn or significant WMA will plan [facility]. I have asked bedside RN to contact echo department to expedite TTE results. She will update our service once results available.           Electronically signed by MARYANNE Akhtar CNP on 12/16/21 at 6:54 AM EST

## 2021-12-16 NOTE — PROGRESS NOTES
Pt is very concerned about taking colchicine as she is \"allergic to everything under the sun\"; was reassured by writer that writer will ask floor pharmacist to speak with her about purpose for and  possible reactions; writer also contacted stress lab to expedite 2 D echo results of yesterday as cardiology states proceeding with cardiac cath is dependant on echo results; pt requested to know results of this a.m. troponin levels and is tearful about extreme elevated levels ; writer spoke with pt's sister Linette Diop on cell who is very concerned about pt and in poor health who wants to drive here but pt does not want her to; scotty was reassured that she will be kept informed

## 2021-12-16 NOTE — SIGNIFICANT EVENT
[facility]    Received call from bedside RN stating TTE results available. Reduced LV systolic function, EF 23-21% with septal wall motion abnormality    Discussed results with Dr. Eduardo Hoskins  Patient has been NPO  Will pursue LHC as previously discussed     I have contacted our office  Our transfer desk will arrange for [facility], will also arrange transportation and contact bedside RN with instructions/details.

## 2021-12-16 NOTE — PROGRESS NOTES
Progress Note  Date:2021       Room:[address] [place] [address]  Patient Magdalena Dozier     YOB: 1975     Age:46 y.o. Subjective    Subjective:  Symptoms:  Stable. (Very anxious, suspicious of changes cardiologist made in meds and refusing metoprolol. Taking her own supply of carvedilol. ). Diet:  Adequate intake. Activity level: Impaired due to weakness. Pain:  She complains of pain that is moderate. She reports pain is unchanged. Pain is requiring pain medication. Review of Systems  Objective         Vitals Last 24 Hours:  TEMPERATURE:  Temp  Av.5 °F (36.9 °C)  Min: 98.2 °F (36.8 °C)  Max: 99 °F (37.2 °C)  RESPIRATIONS RANGE: Resp  Av.4  Min: 18  Max: 20  PULSE OXIMETRY RANGE: SpO2  Av.4 %  Min: 93 %  Max: 98 %  PULSE RANGE: Pulse  Av  Min: 69  Max: 74  BLOOD PRESSURE RANGE: Systolic (81JFL), ZO , Min:109 , WDV:920   ; Diastolic (16EXJ), TALHA:06, Min:33, Max:71    I/O (24Hr): Intake/Output Summary (Last 24 hours) at 2021 0813  Last data filed at 12/15/2021 1818  Gross per 24 hour   Intake 699.61 ml   Output --   Net 699.61 ml     Objective:  General Appearance:  Comfortable. Vital signs: (most recent): Blood pressure 117/66, pulse 70, temperature 98.2 °F (36.8 °C), temperature source Oral, resp. rate 20, height 5' 4\" (1.626 m), weight 260 lb 2.3 oz (118 kg), SpO2 96 %. Vital signs are normal.    Lungs:  Normal effort and normal respiratory rate. There are decreased breath sounds. Heart: Normal rate. Regular rhythm.   S1 normal and S2 normal.      Labs/Imaging/Diagnostics    Labs:  CBC:  Recent Labs     21  1203 12/15/21  0333 21  0618   WBC 8.6 10.1 11.1*   RBC 3.87* 3.66* 3.90*   HGB 13.2 12.7 13.5   HCT 39.4 37.9 39.6   .7* 103.4* 101.5*   RDW 12.9 12.8 12.7    214 190     CHEMISTRIES:  Recent Labs     21  1203 21  0724    142   K 3.8 4.1    103   CO2 28 33*   BUN 15 11   CREATININE 0.55 0. 52   GLUCOSE 184* 94     PT/INR:  Recent Labs     12/14/21  1203   PROTIME 12.2   INR 0.9     APTT:  Recent Labs     12/14/21  1203   APTT 30.2     LIVER PROFILE:  Recent Labs     12/14/21  1203   AST 29   ALT 23   BILITOT 0.40   ALKPHOS 94       Imaging Last 24 Hours:  XR CHEST PORTABLE    Result Date: 12/14/2021  EXAMINATION: ONE XRAY VIEW OF THE CHEST 12/14/2021 10:51 am COMPARISON: AP chest from 10/31/2021 HISTORY: ORDERING SYSTEM PROVIDED HISTORY: cough, sob TECHNOLOGIST PROVIDED HISTORY: cough, sob Reason for Exam: cough and shortness of breath History of GERD, MI, diabetes, COPD, and hypertension. Continued tobacco abuse. FINDINGS: Metallic amanda status post left rotator cuff repair. Cardiac silhouette prominent for AP technique, likely borderline enlarged. Mediastinal structures midline. Slight bronchial wall thickening and medial bibasilar opacities these obscuring the heart border. Azygous lobe. No consolidation or sizable pleural effusion. Bones unchanged. No consolidation or sizable pleural effusion. Likely bronchitis with medial bibasilar atelectasis.      Assessment//Plan           Hospital Problems           Last Modified POA    * (Principal) Elevated troponin 12/15/2021 Yes    Diabetes mellitus type 2, controlled ([address] 12/15/2021 Yes    COPD (chronic obstructive pulmonary disease) (HCC) (Chronic) 12/15/2021 Yes    KRISTIN (obstructive sleep apnea) 12/15/2021 Yes    Overview Addendum 7/19/2019  3:21 PM by Julian Gregory     Intolerant to CPAP    Overview:   Overview:   Intolerant to CPAP         Tobacco abuse 12/15/2021 Yes    Cardiomyopathy ([address] 12/15/2021 Yes    HTN (hypertension) 12/15/2021 Yes    Tricuspid valve disorders, non-rheumatic 12/15/2021 Yes    Pulmonary HTN ([address] 12/15/2021 Yes    Bipolar disorder ([address] 12/15/2021 Yes    Influenza A 12/15/2021 Yes    NSTEMI (non-ST elevated myocardial infarction) ([address] 12/15/2021 Yes        Assessment & Plan   Elevated troponin- myocarditis vs NSTEMI. Awaiting echo reading and decision on cardiac cath. Colchicine started.     Diabetes - unchanged    Influenza A        Electronically signed by Paula Aranda MD on 12/16/21 at 8:13 AM EST

## 2021-12-16 NOTE — PROGRESS NOTES
The pt has informed this writer that \"I took my two Percocet and my Coreg at seven. \"  Writer asked pt dosages as the medication is allowed to be tucked away in her belongings; pt states Coreg is 12.5 mg and Percocet is 5/325mg.

## 2021-12-17 LAB
ANION GAP SERPL CALCULATED.3IONS-SCNC: 10 MMOL/L (ref 9–17)
ANTI-XA UNFRAC HEPARIN: 0.17 IU/L (ref 0.3–0.7)
ANTI-XA UNFRAC HEPARIN: 0.18 IU/L (ref 0.3–0.7)
BUN BLDV-MCNC: 12 MG/DL (ref 6–20)
BUN/CREAT BLD: ABNORMAL (ref 9–20)
CALCIUM SERPL-MCNC: 9 MG/DL (ref 8.6–10.4)
CHLORIDE BLD-SCNC: 103 MMOL/L (ref 98–107)
CO2: 29 MMOL/L (ref 20–31)
CREAT SERPL-MCNC: 0.53 MG/DL (ref 0.5–0.9)
GFR AFRICAN AMERICAN: >60 ML/MIN
GFR NON-AFRICAN AMERICAN: >60 ML/MIN
GFR SERPL CREATININE-BSD FRML MDRD: ABNORMAL ML/MIN/{1.73_M2}
GFR SERPL CREATININE-BSD FRML MDRD: ABNORMAL ML/MIN/{1.73_M2}
GLUCOSE BLD-MCNC: 101 MG/DL (ref 65–105)
GLUCOSE BLD-MCNC: 133 MG/DL (ref 65–105)
GLUCOSE BLD-MCNC: 137 MG/DL (ref 70–99)
GLUCOSE BLD-MCNC: 154 MG/DL (ref 65–105)
GLUCOSE BLD-MCNC: 91 MG/DL (ref 65–105)
MYOGLOBIN: 24 NG/ML (ref 25–58)
POTASSIUM SERPL-SCNC: 4.4 MMOL/L (ref 3.7–5.3)
SODIUM BLD-SCNC: 142 MMOL/L (ref 135–144)
TROPONIN INTERP: ABNORMAL
TROPONIN T: ABNORMAL NG/ML
TROPONIN, HIGH SENSITIVITY: 401 NG/L (ref 0–14)

## 2021-12-17 PROCEDURE — 6370000000 HC RX 637 (ALT 250 FOR IP): Performed by: NURSE PRACTITIONER

## 2021-12-17 PROCEDURE — 80048 BASIC METABOLIC PNL TOTAL CA: CPT

## 2021-12-17 PROCEDURE — 36415 COLL VENOUS BLD VENIPUNCTURE: CPT

## 2021-12-17 PROCEDURE — 2060000000 HC ICU INTERMEDIATE R&B

## 2021-12-17 PROCEDURE — 6370000000 HC RX 637 (ALT 250 FOR IP): Performed by: FAMILY MEDICINE

## 2021-12-17 PROCEDURE — 6360000002 HC RX W HCPCS: Performed by: FAMILY MEDICINE

## 2021-12-17 PROCEDURE — 82947 ASSAY GLUCOSE BLOOD QUANT: CPT

## 2021-12-17 PROCEDURE — 2580000003 HC RX 258: Performed by: FAMILY MEDICINE

## 2021-12-17 PROCEDURE — 6370000000 HC RX 637 (ALT 250 FOR IP): Performed by: INTERNAL MEDICINE

## 2021-12-17 PROCEDURE — 84484 ASSAY OF TROPONIN QUANT: CPT

## 2021-12-17 PROCEDURE — 83874 ASSAY OF MYOGLOBIN: CPT

## 2021-12-17 PROCEDURE — 2700000000 HC OXYGEN THERAPY PER DAY

## 2021-12-17 PROCEDURE — 94640 AIRWAY INHALATION TREATMENT: CPT

## 2021-12-17 PROCEDURE — 94761 N-INVAS EAR/PLS OXIMETRY MLT: CPT

## 2021-12-17 PROCEDURE — 85520 HEPARIN ASSAY: CPT

## 2021-12-17 RX ORDER — GUAIFENESIN DEXTROMETHORPHAN HYDROBROMIDE ORAL SOLUTION 10; 100 MG/5ML; MG/5ML
5 SOLUTION ORAL EVERY 6 HOURS
Qty: 120 ML | Refills: 0 | Status: SHIPPED | OUTPATIENT
Start: 2021-12-17 | End: 2022-02-14

## 2021-12-17 RX ORDER — BENZONATATE 100 MG/1
100 CAPSULE ORAL 3 TIMES DAILY
Qty: 21 CAPSULE | Refills: 0 | Status: SHIPPED | OUTPATIENT
Start: 2021-12-17 | End: 2021-12-24

## 2021-12-17 RX ORDER — COLCHICINE 0.6 MG/1
0.6 TABLET ORAL DAILY
Qty: 14 TABLET | Refills: 0 | Status: SHIPPED | OUTPATIENT
Start: 2021-12-17 | End: 2022-03-08

## 2021-12-17 RX ORDER — NITROGLYCERIN 0.4 MG/1
TABLET SUBLINGUAL
Qty: 25 TABLET | Refills: 3 | Status: SHIPPED | OUTPATIENT
Start: 2021-12-17 | End: 2022-05-17

## 2021-12-17 RX ORDER — IPRATROPIUM BROMIDE AND ALBUTEROL SULFATE 2.5; .5 MG/3ML; MG/3ML
1 SOLUTION RESPIRATORY (INHALATION) EVERY 4 HOURS PRN
Status: DISCONTINUED | OUTPATIENT
Start: 2021-12-17 | End: 2021-12-23 | Stop reason: HOSPADM

## 2021-12-17 RX ORDER — ATORVASTATIN CALCIUM 80 MG/1
80 TABLET, FILM COATED ORAL NIGHTLY
Qty: 30 TABLET | Refills: 3 | Status: SHIPPED | OUTPATIENT
Start: 2021-12-17 | End: 2022-02-01

## 2021-12-17 RX ADMIN — BENZONATATE 100 MG: 100 CAPSULE ORAL at 21:44

## 2021-12-17 RX ADMIN — QUETIAPINE FUMARATE 50 MG: 100 TABLET ORAL at 21:44

## 2021-12-17 RX ADMIN — CETIRIZINE HYDROCHLORIDE 10 MG: 10 TABLET, FILM COATED ORAL at 10:47

## 2021-12-17 RX ADMIN — INSULIN GLARGINE 80 UNITS: 100 INJECTION, SOLUTION SUBCUTANEOUS at 10:49

## 2021-12-17 RX ADMIN — IPRATROPIUM BROMIDE AND ALBUTEROL SULFATE 1 AMPULE: .5; 3 SOLUTION RESPIRATORY (INHALATION) at 19:12

## 2021-12-17 RX ADMIN — SODIUM CHLORIDE, PRESERVATIVE FREE 10 ML: 5 INJECTION INTRAVENOUS at 21:47

## 2021-12-17 RX ADMIN — IPRATROPIUM BROMIDE AND ALBUTEROL SULFATE 1 AMPULE: .5; 3 SOLUTION RESPIRATORY (INHALATION) at 07:15

## 2021-12-17 RX ADMIN — DULOXETINE 60 MG: 60 CAPSULE, DELAYED RELEASE ORAL at 10:46

## 2021-12-17 RX ADMIN — ATORVASTATIN CALCIUM 80 MG: 80 TABLET, FILM COATED ORAL at 21:44

## 2021-12-17 RX ADMIN — GUAIFENESIN DEXTROMETHORPHAN HYDROBROMIDE ORAL SOLUTION 5 ML: 200; 20 SOLUTION ORAL at 10:46

## 2021-12-17 RX ADMIN — HEPARIN SODIUM 13.22 UNITS/KG/HR: 10000 INJECTION, SOLUTION INTRAVENOUS; SUBCUTANEOUS at 02:28

## 2021-12-17 RX ADMIN — GUAIFENESIN DEXTROMETHORPHAN HYDROBROMIDE ORAL SOLUTION 5 ML: 200; 20 SOLUTION ORAL at 14:26

## 2021-12-17 RX ADMIN — IPRATROPIUM BROMIDE AND ALBUTEROL SULFATE 1 AMPULE: .5; 3 SOLUTION RESPIRATORY (INHALATION) at 10:56

## 2021-12-17 RX ADMIN — BENZONATATE 100 MG: 100 CAPSULE ORAL at 10:46

## 2021-12-17 RX ADMIN — OXYCODONE HYDROCHLORIDE AND ACETAMINOPHEN 1 TABLET: 5; 325 TABLET ORAL at 13:00

## 2021-12-17 RX ADMIN — BENZONATATE 100 MG: 100 CAPSULE ORAL at 14:26

## 2021-12-17 RX ADMIN — Medication 400 MG: at 10:46

## 2021-12-17 RX ADMIN — HEPARIN SODIUM 2000 UNITS: 1000 INJECTION INTRAVENOUS; SUBCUTANEOUS at 14:26

## 2021-12-17 RX ADMIN — CARVEDILOL 12.5 MG: 12.5 TABLET, FILM COATED ORAL at 19:00

## 2021-12-17 RX ADMIN — CARVEDILOL 12.5 MG: 12.5 TABLET, FILM COATED ORAL at 08:44

## 2021-12-17 RX ADMIN — DULOXETINE 60 MG: 60 CAPSULE, DELAYED RELEASE ORAL at 21:43

## 2021-12-17 RX ADMIN — IPRATROPIUM BROMIDE AND ALBUTEROL SULFATE 1 AMPULE: .5; 3 SOLUTION RESPIRATORY (INHALATION) at 14:59

## 2021-12-17 RX ADMIN — COLCHICINE 0.6 MG: 0.6 TABLET ORAL at 16:38

## 2021-12-17 RX ADMIN — GUAIFENESIN DEXTROMETHORPHAN HYDROBROMIDE ORAL SOLUTION 5 ML: 200; 20 SOLUTION ORAL at 20:04

## 2021-12-17 RX ADMIN — GUAIFENESIN DEXTROMETHORPHAN HYDROBROMIDE ORAL SOLUTION 5 ML: 200; 20 SOLUTION ORAL at 02:21

## 2021-12-17 ASSESSMENT — PAIN SCALES - GENERAL: PAINLEVEL_OUTOF10: 0

## 2021-12-17 NOTE — PROGRESS NOTES
Progress Note  Date:2021       Room:[facility]2125-  Patient Gillian Oneill     YOB: 1975     Age:46 y.o. Subjective      Interval events reviewed. Yesterday TTE results indicated LV dysfxn w/ EF 35-40% w/ septal wall motion abnormality (last EF 58% on Lexiscan 10/2021)  Given NSTEMI w/ HS-troponin 437 and ongoing complaints of chest pain pt was sent to [facility] for [address]. Due to pt's H/O seizures w/ IV dye and anaphylaxis w/ aspirin [facility] post-poned. Initially recs for pt to stay at [facility] for aspirin desensitization prior to [address] [facility]. Patient refused, wanted to be sent back to [facility]. Since pt at [facility], attempted to complete cardiac MRI for w/u of potential myocarditis. Patient unable to lie flat, continued to remove mask. MRI staff ended test.  Again pt insisted on transfer back to [facility]. Today patient states symptoms unchanged. Still intermittent sharp localized chest pain w/ mild constant discomfort that waxes/wanes. Can be aggravated w/ deep inspiration or cough. Objective         Vitals Last 24 Hours:  TEMPERATURE:  Temp  Av.1 °F (36.7 °C)  Min: 98 °F (36.7 °C)  Max: 98.1 °F (36.7 °C)  RESPIRATIONS RANGE: Resp  Av.8  Min: 18  Max: 22  PULSE OXIMETRY RANGE: SpO2  Av.4 %  Min: 95 %  Max: 97 %  PULSE RANGE: Pulse  Av.3  Min: 71  Max: 75  BLOOD PRESSURE RANGE: Systolic (46XOL), MIL:702 , Min:122 , WYU:797   ; Diastolic (10MQN), SVY:89, Min:54, Max:84    I/O (24Hr):   No intake or output data in the 24 hours ending 21 1027  Objective  Labs/Imaging/Diagnostics    Labs:  CBC:  Recent Labs     21  1203 12/15/21  0333 21  0618   WBC 8.6 10.1 11.1*   RBC 3.87* 3.66* 3.90*   HGB 13.2 12.7 13.5   HCT 39.4 37.9 39.6   .7* 103.4* 101.5*   RDW 12.9 12.8 12.7    214 190     CHEMISTRIES:  Recent Labs     21  1203 21  0724 21  0427    142 142   K 3.8 4.1 4.4    103 103   CO2 28 33* 29   BUN 15 11 12   CREATININE 0.55 0.52 0.53   GLUCOSE 184* 94 137*     PT/INR:  Recent Labs     12/14/21  1203   PROTIME 12.2   INR 0.9     APTT:  Recent Labs     12/14/21  1203   APTT 30.2     LIVER PROFILE:  Recent Labs     12/14/21  1203   AST 29   ALT 23   BILITOT 0.40   ALKPHOS 94       Imaging Last 24 Hours:  ECHO Complete 2D W Doppler W Color    Result Date: 12/16/2021  [address] [facility] Transthoracic Echocardiography Report (TTE)  Patient Name Kimberly Trujillo       Date of Study               12/15/2021               TOMASA HURT   Date of      1975  Gender                      Female  Birth   Age          48 year(s)  Race                           Room Number  2125        Height:                     64 inch, 162.56 cm   Corporate ID [id number]    Weight:                     260 pounds, 117.9 kg  #   Patient Acct [de-identified]   BSA:          2.19 m^2      BMI:      44.63  #                                                              kg/m^2   MR #         [id number]      Sonographer                 Yessenia Dempsey   Accession #  [id number]  Interpreting Physician      Elieser Hollingsworth 59   Fellow                   Referring Nurse                           Practitioner   Interpreting             Referring Physician         Allan Pina  Fellow  Additional Comments Technically difficult study. Type of Study   TTE procedure:2D Echocardiogram, M-Mode, Doppler, Color Doppler, Contrast  study. Procedure Date Date: 12/15/2021 Start: 09:26 AM Study Location: [address] [place] [address] Technical Quality: Limited visualization due to body habitus. Indications:Dyspnea/SOB, Elevated troponin and Chest pain. History / Tech. Comments: COPD, AFib, CAD, CMY, CHF, DM, HLD, HTN, MI, PHTN, PVCs, Smoker Patient Status: STAT Contrast Medium: Definity.  Amount - 2 ml Height: 64 inches Weight: 260.01 pounds BSA: 2.19 m^2 BMI: 44.63 kg/m^2 Rhythm: Within normal limits HR: 71 bpm BP: 117/69 mmHg CONCLUSIONS Summary Contrast was utilized on this technically difficult study. Left ventricle is normal in size. Mildly increased LV wall thickness, global hypokinesis Moderately reduced left ventricular systolic function. Estimated LV EF 35-40%. Septal wall motions appears abnormal Left atrium is normal in size. Right atrium is mildly dilated . Difficult visualization of the right ventricle. TAPSE value suggests normal RV systolic function. Mild tricuspid regurgitation. No pulmonary hypertension. Estimated right ventricular systolic pressure is 59FBGU. No significant pericardial effusion is seen. Normal aortic root dimension. IVC dilated but unable to assess respiratory collapse due to patient technical limitations of study. Signature ----------------------------------------------------------------------------  Electronically signed by Aramis Ventura(Interpreting physician) on  12/16/2021 09:49 AM ---------------------------------------------------------------------------- ----------------------------------------------------------------------------  Electronically signed by Yessenia Dempsey(Sonographer) on 12/15/2021 12:44  PM ---------------------------------------------------------------------------- FINDINGS Left Atrium Left atrium is normal in size. Left Ventricle Left ventricle is normal in size. Mild left ventricular hypertrophy. Moderately reduced left ventricular systolic function. Estimated LV EF 35-40%. . Right Atrium Right atrium is mildly dilated . Right Ventricle Difficult visualization of the right ventricle. TAPSE value suggests normal RV systolic function. Mitral Valve No obvious valvular abnormality seen. Trivial mitral regurgitation. Aortic Valve No obvious valvular abnormality seen. No evidence of aortic insufficiency or stenosis. Tricuspid Valve No obvious valvular abnormality. Mild tricuspid regurgitation. No pulmonary hypertension. Estimated right ventricular systolic pressure is 87DAOC.  Pulmonic Valve Pulmonic valve was not well visualized. No evidence of pulmonic insufficiency or stenosis. Pericardial Effusion No significant pericardial effusion is seen. Pleural Effusion No pleural effusion seen. Miscellaneous Normal aortic root dimension. IVC dilated but unable to assess respiratory collapse due to patient technical limitations of study.  M-mode / 2D Measurements & Calculations:   LVIDd:5.07 cm(3.7 - 5.6 cm)      Diastolic REUYWE:391 ml  SVYYQ:3.34 cm(2.2 - 4.0 cm)      Systolic URICNR:24.9 ml  MHJQ:0.24 cm(0.6 - 1.1 cm)       Aortic Root:2.8 cm(2.0 - 3.7 cm)  LVPWd:1.09 cm(0.6 - 1.1 cm)      LA Dimension: 3.8 cm(1.9 - 4.0 cm)  Fractional Shortenin.71 %    LA volume/Index: 69.7 ml /32m^2  Calculated LVEF (%): 72.08 %     LVOT:2 cm   Mitral:                                  Aortic   Valve Area (P1/2-Time): 2.82 cm^2        Peak Velocity: 1.46 m/s  Peak E-Wave: 0.87 m/s                    Mean Velocity: 1.10 m/s  Peak A-Wave: 0.63 m/s                    Peak Gradient: 8.53 mmHg  E/A Ratio: 1.38                          Mean Gradient: 5 mmHg  Peak Gradient: 3.01 mmHg  Deceleration Time: 275 msec  P1/2t: 78 msec                           Area (continuity): 2.4 cm^2                                           AV VTI: 32 cm   Tricuspid:                               Pulmonic:   Estimated RVSP: 28 mmHg  Peak TR Velocity: 2.25 m/s  Peak TR Gradient: 20.25 mmHg  Estimated RA Pressure: 8 mmHg                                           Estimated PASP: 28.25 mmHg  Septal Wall E' velocity:0.09 m/s Lateral Wall E' velocity:0.10 m/s          Physical Examination:  General appearance - alert, well appearing, and in no distress  Neck -   No obvious JVD  Chest -  Very diminished throughout, scattered wheezes, no obvious rhonchi, occasional harsh dry cough, hoarse voice, symmetric air entry  Heart - normal rate, regular rhythm, normal S1, S2, no murmurs, rubs, clicks or gallops  Neurological - alert, oriented, normal speech, no focal findings or movement disorder noted  Extremities - peripheral pulses normal, no pedal edema, no clubbing or cyanosis      Assessment//Plan           NSTEMI   - suspect acute viral myocarditis in setting +Influenza A infection   - HS Troponin peak 439   - EKG w/o ischemic changes    Normal CORS on LHC 2/2015   - normal SPECT Lexiscan 10/2021     NICMP w/ EF 35-40% on TTE 12/15/21   - EF 58% on Lexiscan 10/2021   - EF 55-60% on TTE 1/2018  Acute on chronic HFpEF     H/O recurrent syncope  H/O symptomatic PVCs s/p PVC ablation in 2017 at [facility]     +Influenza A w/ PNA - per primary team  Suspected acute viral myocarditis     Dyslipidemia     Type 2 DM     Obesity - BMI 44.6     Aspirin allergy - anaphylaxis  IV dye allery - ?seizures   Shellfish allergy - anaphylaxis    Bipolar disorder          Ultimately patient needs LHC and cardiac MRI for full w/u of NSTEMI and potential myocarditis. Aspirin and IV dye allergy will require attention prior to [facility]. Patient would like to discuss current events w/ attending, defer changes/plan to Dr. Jethro Coffey.       Electronically signed by MARYANNE Drake CNP on 12/17/21 at 7:06 AM EST

## 2021-12-17 NOTE — PROGRESS NOTES
Dr Gigi Hernandez paged re: possible discharge home; he prefers pt stay overnight and if troponin is trending down possible discharge home tomorrow with F/U early next week to set up OP cath, cardiac MRI; pt informed

## 2021-12-17 NOTE — PROGRESS NOTES
PULMONARY PROGRESS NOTE:    REASON FOR VISIT: influenza A  Interval History:    Shortness of Breath: +  Cough: +  Sputum: no          Hemoptysis: no  Chest Pain: yes  Fever: no                   Swelling Feet: no  Headache: no                                           Nausea, Emesis, Abdominal Pain: no  Diarrhea: no         Constipation: no    Events since last visit: did not have heart cath yesterday, brought back here yesterday, having intermittent chest pain to left of sternum, refusing nitro gtt due to may cause HA.  Refusing pulmicort per RT notes    PAST MEDICAL HISTORY:      Scheduled Meds:   carvedilol  12.5 mg Oral BID WC    budesonide  0.5 mg Nebulization BID    dextromethorphan-guaiFENesin  5 mL Oral Q6H    colchicine  0.6 mg Oral BID    influenza virus vaccine  0.5 mL IntraMUSCular Prior to discharge    insulin glargine  80 Units SubCUTAneous BID    benzonatate  100 mg Oral TID    butalbital-APAP-caffeine  1 capsule Oral Once    ARIPiprazole  5 mg Oral Daily    bumetanide  2 mg Oral Daily    polyethylene glycol  17 g Oral BID    DULoxetine  60 mg Oral BID    ipratropium-albuterol  1 ampule Inhalation 4x daily    cetirizine  10 mg Oral Daily    magnesium oxide  400 mg Oral Daily    QUEtiapine  50 mg Oral Nightly    sodium chloride flush  5-40 mL IntraVENous 2 times per day    atorvastatin  80 mg Oral Nightly    insulin lispro  0-18 Units SubCUTAneous TID WC    insulin lispro  0-9 Units SubCUTAneous Nightly     Continuous Infusions:   nitroGLYCERIN      heparin (PORCINE) Infusion 13.223 Units/kg/hr (12/17/21 0228)    sodium chloride      dextrose       PRN Meds:nitroGLYCERIN, nitroGLYCERIN, heparin (porcine), heparin (porcine), albuterol sulfate HFA, sodium chloride, ibuprofen, promethazine, sodium chloride flush, sodium chloride, acetaminophen **OR** acetaminophen, glucose, dextrose, glucagon (rDNA), dextrose, oxyCODONE-acetaminophen        PHYSICAL EXAMINATION:  BP (!) 148/84 Pulse 71   Temp 98 °F (36.7 °C) (Oral)   Resp 18   Ht 5' 4\" (1.626 m)   Wt 241 lb 6.5 oz (109.5 kg)   SpO2 95%   BMI 41.44 kg/m²     General : Awake, alert  Neck - supple, no lymphadenopathy, JVD not raised  Heart - regular rhythm, S1 and S2 normal; no additional sounds heard  Lungs - Air Entry- fair bilaterally; breath sounds : coarse, harsh cough, 95% on 3 l nc  Abdomen - soft, no tenderness  Upper Extremities  - no cyanosis, mottling; edema : absent  Lower Extremities: no cyanosis, mottling; edema : absent    Current Laboratory, Radiologic, Microbiologic, and Diagnostic studies reviewed  Data ReviewCBC:   Recent Labs     12/14/21  1203 12/15/21  0333 12/16/21  0618   WBC 8.6 10.1 11.1*   RBC 3.87* 3.66* 3.90*   HGB 13.2 12.7 13.5   HCT 39.4 37.9 39.6    214 190     BMP:   Recent Labs     12/14/21  1203 12/16/21  0724 12/17/21  0427   GLUCOSE 184* 94 137*    142 142   K 3.8 4.1 4.4   BUN 15 11 12   CREATININE 0.55 0.52 0.53   CALCIUM 9.3 8.9 9.0     ABGs: No results for input(s): PHART, PO2ART, VMQ1ZPX, CQI3UOS, BEART, R2FFNBEG, MIR5NMD in the last 72 hours.    PT/INR:  No results found for: PTINR    ASSESSMENT / PLAN:    Influenza A - Tamiflu - patient is allergic, cough suppressant  COPD - BD  DM  Elevated troponin - heparin gtt; Cardiac cath not done yesterday and she was brought back to [facility] - needs prep due to dye allery     Plan of care discussed with Dr Marylin Nixon  Electronically signed by MARYANNE Hobbs - CNP on 12/17/21 at 9:48 AM.

## 2021-12-17 NOTE — PROGRESS NOTES
writer informs pt writer will contact cardiology to see what further plans are for today; pt states she didn't refuse the cath,\"it was just that I wasn't going to stay there\"; writer did r/w pt that doctor wanted to prepare her medically d/t her allergies and the reason they wanted to keep her was so that they could do the cath today

## 2021-12-17 NOTE — CARE COORDINATION
ONGOING DISCHARGE PLAN:    Patient is alert and oriented x4. Spoke with patient regarding discharge plan and patient confirms that plan is still to return to home. Declines VNS. To have heart cath later today or tomorrow if possible - Was supposed to yesterday, however needed to be pre-medicated due to allergy to dye. Remains on Heparin gtt    + Influenza    Will continue to follow for additional discharge needs.     Electronically signed by Mono Coulter RN on 12/17/2021 at 11:49 AM

## 2021-12-17 NOTE — PLAN OF CARE
Problem: Infection:  Goal: Will remain free from infection  Description: Will remain free from infection  12/17/2021 0634 by Isa Corbin RN  Outcome: Ongoing     Problem: Safety:  Goal: Free from accidental physical injury  Description: Free from accidental physical injury  12/17/2021 0634 by Isa Corbin RN  Outcome: Ongoing     Problem: Safety:  Goal: Free from intentional harm  Description: Free from intentional harm  12/17/2021 0634 by Isa Corbin RN  Outcome: Ongoing     Problem: Daily Care:  Goal: Daily care needs are met  Description: Daily care needs are met  12/17/2021 0634 by Isa Corbin RN  Outcome: Ongoing     Problem: Pain:  Goal: Patient's pain/discomfort is manageable  Description: Patient's pain/discomfort is manageable  12/17/2021 0634 by Isa Corbin RN  Outcome: Ongoing     Problem: Pain:  Goal: Pain level will decrease  Description: Pain level will decrease  12/17/2021 0634 by Isa Corbin RN  Outcome: Ongoing     Problem: Pain:  Goal: Control of acute pain  Description: Control of acute pain  12/17/2021 0634 by Isa Corbin RN  Outcome: Ongoing     Problem: Pain:  Goal: Control of chronic pain  Description: Control of chronic pain  12/17/2021 0634 by Isa Corbin RN  Outcome: Ongoing     Problem: Skin Integrity:  Goal: Skin integrity will stabilize  Description: Skin integrity will stabilize  12/17/2021 0634 by Isa Corbin RN  Outcome: Ongoing     Problem: Discharge Planning:  Goal: Patients continuum of care needs are met  Description: Patients continuum of care needs are met  12/17/2021 0634 by Isa Corbin RN  Outcome: Ongoing

## 2021-12-17 NOTE — PROGRESS NOTES
Upon entering room pt c/o feeling \"shakey and sweaty\" random BS taken is 154;occ shakey  hands noted

## 2021-12-17 NOTE — PROGRESS NOTES
[address]  [address] [place] [address], 2 Rehab Fabrizio  [phone]    PROGRESS NOTE     Minerva Galvez  Was seen examined at bedside. Cardiac catheterization was canceled yesterday as patient has allergy to iodine contrast reported causing seizures. Patient returned to [facility] overnight. Stated that she still appreciates on and off sharp central chest pain, mostly when she coughs, lasting for seconds to minutes. Her breathing stable at rest, still coughing a lot, not much phlegm coming out. Denies fever chills. No palpitations or dizziness. No orthopnea or leg edema. Telemetry reviewed. In normal sinus rhythm. No significant events overnight. SUBJECTIVE     Allergies: Allergies   Allergen Reactions    Latex Hives    Aspirin Anaphylaxis    Avelox [Moxifloxacin] Anaphylaxis    Chantix [Varenicline] Swelling    Doxycycline Anaphylaxis    Dye [Iodides] Other (See Comments)     Seizures    Flexeril [Cyclobenzaprine] Anaphylaxis    Gabapentin Anaphylaxis    Losartan Shortness Of Breath    Morphine Itching     Makes patient want to \"scratch out her eyes\".   Can take if given with benadryl    Nsaids Anaphylaxis and Hives     Pt states she can take ibuprofen    Pcn [Penicillins] Anaphylaxis and Hives    Reglan [Metoclopramide Hcl] Swelling     Agitation, anger    Shellfish-Derived Products Anaphylaxis    Sulfa Antibiotics Hives, Shortness Of Breath, Itching and Swelling    Vancomycin Anaphylaxis    Zofran Anaphylaxis    Zyvox [Linezolid] Anaphylaxis    Acyclovir Swelling and Rash    Bactrim [Sulfamethoxazole-Trimethoprim] Hives    Betadine [Povidone Iodine] Hives    Ceclor [Cefaclor] Hives    Clindamycin/Lincomycin Hives    Codeine Itching and Rash    Macrolides And Ketolides Hives     Pt states she can take Azithromycin    Novolin R [Insulin] Hives    Novolog [Insulin Aspart] Hives    Phenothiazines Swelling    Tape [Adhesive Tape] Rash OK to use paper tape and tegaderm per patient    Banana     Compazine [Prochlorperazine] Other (See Comments)     Agitation, anger, \"makes me jerk\"    Fentanyl Itching     Pt states doesn't work and makes patient itch    Kiwi Extract     Tamiflu [Oseltamivir Phosphate] Hives    Sotalol Other (See Comments)     Pt verbalized that she developed a prolonged QT and had an asthma attack with medication.          CURRENT MEDICATIONS   carvedilol  12.5 mg Oral BID WC    budesonide  0.5 mg Nebulization BID    dextromethorphan-guaiFENesin  5 mL Oral Q6H    colchicine  0.6 mg Oral BID    influenza virus vaccine  0.5 mL IntraMUSCular Prior to discharge    insulin glargine  80 Units SubCUTAneous BID    benzonatate  100 mg Oral TID    butalbital-APAP-caffeine  1 capsule Oral Once    ARIPiprazole  5 mg Oral Daily    bumetanide  2 mg Oral Daily    polyethylene glycol  17 g Oral BID    DULoxetine  60 mg Oral BID    ipratropium-albuterol  1 ampule Inhalation 4x daily    cetirizine  10 mg Oral Daily    magnesium oxide  400 mg Oral Daily    QUEtiapine  50 mg Oral Nightly    sodium chloride flush  5-40 mL IntraVENous 2 times per day    atorvastatin  80 mg Oral Nightly    insulin lispro  0-18 Units SubCUTAneous TID WC    insulin lispro  0-9 Units SubCUTAneous Nightly     CONTINUOUS INFUSIONS   nitroGLYCERIN      heparin (PORCINE) Infusion 15.22 Units/kg/hr (12/17/21 1433)    sodium chloride      dextrose             OBJECTIVE     CBC: @LABRCNTIP(WBC:3,HGB:3,HCT:3,MCV:3,PLT:3)@      [email or web address]  PT/INR: @LABRCNTIP(PROTIME:3,INR:3)@  APTT: @LABRCNTIP(aPTT:3)@  MAG: @LABRCNTIP(MG:3)@  D Dimer: @LABRCNTIP(DDIMER:3)@  Troponin I @LABRCNTIP(TROPONINI:3)@  ProBNP @LABRCNTIP(BNP:3)@  Lipid Panel:  No results found for: CHOL, TRIG, HDL  Liver Panel: No results found for: ALB  HgA1C:  No components found for: HGBA1C    ABG: No components found for: ABGSAMPLETYP, ABGBODYTEMP, ABGPHCORRFOR, [id number], ABGPHCORRFOOR, ABGPH, ABGPCO2, ABGPO2, ABGBASEEXCES, ABGBASEDEFIC, ABGHCO3, UCQY0LCB, ABGENDTIDALC, ABGALLENSTES, ABGSPO2, ABGSAMEPLESIT, [id number], ABGOXYGENSOUE      LAST ECHO (Within 2 Years)   [unfilled]      RADIOLOGY:  [unfilled]    PHYSICAL EXAM  Admission Weight: Weight: 240 lb (108.9 kg)  No intake/output data recorded. Weight change: Wt Readings from Last 3 Encounters:   12/17/21 241 lb 6.5 oz (109.5 kg)   11/11/21 246 lb (111.6 kg)   11/02/21 253 lb (114.8 kg)       Vitals:  Vitals:    12/17/21 0715 12/17/21 1056 12/17/21 1215 12/17/21 1405   BP:   (!) 112/56 116/84   Pulse:   55 64   Resp: 18 22 20    Temp:   98.1 °F (36.7 °C)    TempSrc:   Oral    SpO2: 95% 95% 98% 94%   Weight:       Height:           Admit Weight  Weight: 240 lb (108.9 kg)  Last 3 Weights  [unfilled]  Body mass index is 41.44 kg/m². INTAKE/OUTPUT  No intake/output data recorded. No intake or output data in the 24 hours ending 12/17/21 1435    General appearance: Alert oriented and cooperative, In no acute distress  Lungs:  Bilateral crackles, no use of accessory muscles  Heart[de-identified] RRR with normal S1 and S2, no murmurs and no gallops. Abdomen: Soft, non-tender, bowel sounds normal.  Extremities: No edema    ASSESSMENT     1. NSTEMI in setting of acute Influenza A viral infectio - suspected acute myopericarditis -   High sensitivity troponin maximum level 439  2. Acute systolic heart failure exacerbation  3. NICM with recovered LVEF (normal LVEF on Nuclear perfusion test 10/2021 and TTE 1/2018) => with new drop in LVEF 35-40% this admission  4. History of symptomatic PVCs status post ablation at [facility] in 2017   5. Negative ange/nuclear stress test 10/2021  6. History of recurrent syncope  7. Hyperlipidemia  8. Diabetes mellitus  9.  Angiographically normal coronary arteries on [facility] 02/2015  10.  Morbid obesity  11.  Aspirin allergy  12. Acute influenza A viral infection    PLAN     -   Patient stable with seen. -   Reluctant to take colchicine,  Discussed with patient, patient agreeable to try it while in the hospital.  Continue colchicine 0.6 b.i.d. Levon Dooley -   Patient will eventually need cardiac catheterization when more stable. Plan for iodine allergy prep prior to procedure when time comes. -   Will also plan for cardiac MRI  when more stable, can consider referral to [facility] early next week. -   Supportive care per primary team.  -   Can discontinue IV heparin infusion.  -   Continue Lipitor, Coreg, Bumex at current doses. -   Will follow-up as peripherally over the weekend. Please call with any questions or concerns. Rose Moreno MD      This note was completed using a voice transcription system. Every effort was made to ensure accuracy. However, inadvertent computerized transcription errors may be present.

## 2021-12-17 NOTE — PROGRESS NOTES
Received return call from [facility]. Instructed to restart heparin drip. No new orders at this time.

## 2021-12-17 NOTE — PROGRESS NOTES
Messaged Dr. Lavinia Clay via [employer], \"Patient returned to 2025 from Cath lab. Patient did not recieve heart cath today. Patient was also unable to complete MRI. Would you like heparin drip to be restarted? Would you like to order any labs at this time. Any testing planned for tomorrow? Please advise. \"  Awaiting response.

## 2021-12-17 NOTE — PROGRESS NOTES
Called [facility] to ask if heparin drip should be restarted and update on cardiac cath not being completed today, MRI not completed. Awaiting call back. Will continue to monitor the patient.

## 2021-12-17 NOTE — PLAN OF CARE
Problem: Infection:  Goal: Will remain free from infection  Description: Will remain free from infection  12/17/2021 1625 by Marcy Hart RN  Outcome: Ongoing  12/17/2021 0634 by John Jackson RN  Outcome: Ongoing     Problem: Safety:  Goal: Free from accidental physical injury  Description: Free from accidental physical injury  12/17/2021 1625 by Marcy Hart RN  Outcome: Met This Shift  12/17/2021 0634 by John Jackson RN  Outcome: Ongoing  Goal: Free from intentional harm  Description: Free from intentional harm  12/17/2021 1625 by Marcy Hart RN  Outcome: Met This Shift  12/17/2021 0634 by John Jackson RN  Outcome: Ongoing     Problem: Daily Care:  Goal: Daily care needs are met  Description: Daily care needs are met  12/17/2021 1625 by Marcy Hart RN  Outcome: Met This Shift  12/17/2021 0634 by John Jackson RN  Outcome: Ongoing     Problem: Pain:  Description: Pain management should include both nonpharmacologic and pharmacologic interventions.   Goal: Patient's pain/discomfort is manageable  Description: Patient's pain/discomfort is manageable  12/17/2021 1625 by Marcy Hart RN  Outcome: Ongoing  12/17/2021 0634 by John Jackson RN  Outcome: Ongoing  Goal: Pain level will decrease  Description: Pain level will decrease  12/17/2021 1625 by Marcy Hart RN  Outcome: Ongoing  12/17/2021 0634 by John Jackson RN  Outcome: Ongoing  Goal: Control of acute pain  Description: Control of acute pain  12/17/2021 1625 by Marcy Hart RN  Outcome: Ongoing  12/17/2021 0634 by John Jackson RN  Outcome: Ongoing  Goal: Control of chronic pain  Description: Control of chronic pain  12/17/2021 1625 by Marcy Hart RN  Outcome: Ongoing  Note: Take own Percocet which the pt is allowed to keep with her per management (see notes); the pt then informs nurse when she take her medication  12/17/2021 0634 by John Jackson RN  Outcome: Ongoing     Problem: Skin Integrity:  Goal: Skin integrity will stabilize  Description: Skin integrity will stabilize  12/17/2021 1625 by Aundra Denver, RN  Outcome: Met This Shift  12/17/2021 0634 by Mi Orourke RN  Outcome: Ongoing     Problem: Discharge Planning:  Goal: Patients continuum of care needs are met  Description: Patients continuum of care needs are met  12/17/2021 1625 by Aundra Denver, RN  Outcome: Not Met This Shift  Note: Ashok per cardiology is for possible discharge home tomorrow if troponins are trending down.   12/17/2021 5600 by Mi Orourke, RN  Outcome: Ongoing

## 2021-12-18 ENCOUNTER — APPOINTMENT (OUTPATIENT)
Dept: GENERAL RADIOLOGY | Age: 46
DRG: 280 | End: 2021-12-18
Payer: COMMERCIAL

## 2021-12-18 LAB
ANION GAP SERPL CALCULATED.3IONS-SCNC: 9 MMOL/L (ref 9–17)
BUN BLDV-MCNC: 10 MG/DL (ref 6–20)
BUN/CREAT BLD: ABNORMAL (ref 9–20)
CALCIUM SERPL-MCNC: 9.1 MG/DL (ref 8.6–10.4)
CHLORIDE BLD-SCNC: 101 MMOL/L (ref 98–107)
CO2: 30 MMOL/L (ref 20–31)
CREAT SERPL-MCNC: 0.56 MG/DL (ref 0.5–0.9)
GFR AFRICAN AMERICAN: >60 ML/MIN
GFR NON-AFRICAN AMERICAN: >60 ML/MIN
GFR SERPL CREATININE-BSD FRML MDRD: ABNORMAL ML/MIN/{1.73_M2}
GFR SERPL CREATININE-BSD FRML MDRD: ABNORMAL ML/MIN/{1.73_M2}
GLUCOSE BLD-MCNC: 130 MG/DL (ref 65–105)
GLUCOSE BLD-MCNC: 134 MG/DL (ref 70–99)
GLUCOSE BLD-MCNC: 157 MG/DL (ref 65–105)
GLUCOSE BLD-MCNC: 78 MG/DL (ref 65–105)
GLUCOSE BLD-MCNC: 82 MG/DL (ref 65–105)
HCT VFR BLD CALC: 37.5 % (ref 36–46)
HEMOGLOBIN: 12.5 G/DL (ref 12–16)
MCH RBC QN AUTO: 34.5 PG (ref 26–34)
MCHC RBC AUTO-ENTMCNC: 33.3 G/DL (ref 31–37)
MCV RBC AUTO: 103.5 FL (ref 80–100)
MYOGLOBIN: <21 NG/ML (ref 25–58)
NRBC AUTOMATED: ABNORMAL PER 100 WBC
PDW BLD-RTO: 12.5 % (ref 11.5–14.9)
PLATELET # BLD: 249 K/UL (ref 150–450)
PMV BLD AUTO: 9 FL (ref 6–12)
POTASSIUM SERPL-SCNC: 4 MMOL/L (ref 3.7–5.3)
RBC # BLD: 3.62 M/UL (ref 4–5.2)
SODIUM BLD-SCNC: 140 MMOL/L (ref 135–144)
TROPONIN INTERP: ABNORMAL
TROPONIN T: ABNORMAL NG/ML
TROPONIN, HIGH SENSITIVITY: 337 NG/L (ref 0–14)
WBC # BLD: 11.1 K/UL (ref 3.5–11)

## 2021-12-18 PROCEDURE — 6370000000 HC RX 637 (ALT 250 FOR IP): Performed by: FAMILY MEDICINE

## 2021-12-18 PROCEDURE — 2700000000 HC OXYGEN THERAPY PER DAY

## 2021-12-18 PROCEDURE — 94640 AIRWAY INHALATION TREATMENT: CPT

## 2021-12-18 PROCEDURE — 83874 ASSAY OF MYOGLOBIN: CPT

## 2021-12-18 PROCEDURE — 85027 COMPLETE CBC AUTOMATED: CPT

## 2021-12-18 PROCEDURE — 6370000000 HC RX 637 (ALT 250 FOR IP): Performed by: INTERNAL MEDICINE

## 2021-12-18 PROCEDURE — 36415 COLL VENOUS BLD VENIPUNCTURE: CPT

## 2021-12-18 PROCEDURE — 82947 ASSAY GLUCOSE BLOOD QUANT: CPT

## 2021-12-18 PROCEDURE — 71046 X-RAY EXAM CHEST 2 VIEWS: CPT

## 2021-12-18 PROCEDURE — 84484 ASSAY OF TROPONIN QUANT: CPT

## 2021-12-18 PROCEDURE — 80048 BASIC METABOLIC PNL TOTAL CA: CPT

## 2021-12-18 PROCEDURE — 2060000000 HC ICU INTERMEDIATE R&B

## 2021-12-18 PROCEDURE — 94761 N-INVAS EAR/PLS OXIMETRY MLT: CPT

## 2021-12-18 PROCEDURE — 6360000002 HC RX W HCPCS: Performed by: INTERNAL MEDICINE

## 2021-12-18 PROCEDURE — 99232 SBSQ HOSP IP/OBS MODERATE 35: CPT | Performed by: FAMILY MEDICINE

## 2021-12-18 PROCEDURE — 2580000003 HC RX 258: Performed by: FAMILY MEDICINE

## 2021-12-18 PROCEDURE — 6370000000 HC RX 637 (ALT 250 FOR IP): Performed by: NURSE PRACTITIONER

## 2021-12-18 RX ORDER — FUROSEMIDE 10 MG/ML
20 INJECTION INTRAMUSCULAR; INTRAVENOUS ONCE
Status: COMPLETED | OUTPATIENT
Start: 2021-12-18 | End: 2021-12-18

## 2021-12-18 RX ORDER — GUAIFENESIN 600 MG/1
600 TABLET, EXTENDED RELEASE ORAL 2 TIMES DAILY
Status: DISCONTINUED | OUTPATIENT
Start: 2021-12-18 | End: 2021-12-20

## 2021-12-18 RX ADMIN — BENZONATATE 100 MG: 100 CAPSULE ORAL at 20:48

## 2021-12-18 RX ADMIN — IPRATROPIUM BROMIDE AND ALBUTEROL SULFATE 1 AMPULE: .5; 3 SOLUTION RESPIRATORY (INHALATION) at 18:00

## 2021-12-18 RX ADMIN — OXYCODONE HYDROCHLORIDE AND ACETAMINOPHEN 1 TABLET: 5; 325 TABLET ORAL at 07:00

## 2021-12-18 RX ADMIN — GUAIFENESIN 600 MG: 600 TABLET, EXTENDED RELEASE ORAL at 14:20

## 2021-12-18 RX ADMIN — IPRATROPIUM BROMIDE AND ALBUTEROL SULFATE 1 AMPULE: .5; 3 SOLUTION RESPIRATORY (INHALATION) at 10:31

## 2021-12-18 RX ADMIN — COLCHICINE 0.6 MG: 0.6 TABLET ORAL at 20:48

## 2021-12-18 RX ADMIN — ATORVASTATIN CALCIUM 80 MG: 80 TABLET, FILM COATED ORAL at 20:49

## 2021-12-18 RX ADMIN — DULOXETINE 60 MG: 60 CAPSULE, DELAYED RELEASE ORAL at 08:56

## 2021-12-18 RX ADMIN — INSULIN LISPRO 2 UNITS: 100 INJECTION, SOLUTION INTRAVENOUS; SUBCUTANEOUS at 20:49

## 2021-12-18 RX ADMIN — SODIUM CHLORIDE, PRESERVATIVE FREE 10 ML: 5 INJECTION INTRAVENOUS at 09:01

## 2021-12-18 RX ADMIN — CARVEDILOL 12.5 MG: 12.5 TABLET, FILM COATED ORAL at 07:00

## 2021-12-18 RX ADMIN — QUETIAPINE FUMARATE 50 MG: 100 TABLET ORAL at 20:48

## 2021-12-18 RX ADMIN — Medication 400 MG: at 08:55

## 2021-12-18 RX ADMIN — GUAIFENESIN 600 MG: 600 TABLET, EXTENDED RELEASE ORAL at 20:49

## 2021-12-18 RX ADMIN — GUAIFENESIN DEXTROMETHORPHAN HYDROBROMIDE ORAL SOLUTION 5 ML: 200; 20 SOLUTION ORAL at 06:25

## 2021-12-18 RX ADMIN — IPRATROPIUM BROMIDE AND ALBUTEROL SULFATE 1 AMPULE: .5; 3 SOLUTION RESPIRATORY (INHALATION) at 17:42

## 2021-12-18 RX ADMIN — SODIUM CHLORIDE, PRESERVATIVE FREE 10 ML: 5 INJECTION INTRAVENOUS at 20:56

## 2021-12-18 RX ADMIN — FUROSEMIDE 20 MG: 10 INJECTION, SOLUTION INTRAMUSCULAR; INTRAVENOUS at 17:02

## 2021-12-18 RX ADMIN — COLCHICINE 0.6 MG: 0.6 TABLET ORAL at 08:55

## 2021-12-18 RX ADMIN — BENZONATATE 100 MG: 100 CAPSULE ORAL at 14:20

## 2021-12-18 RX ADMIN — CETIRIZINE HYDROCHLORIDE 10 MG: 10 TABLET, FILM COATED ORAL at 08:56

## 2021-12-18 RX ADMIN — IPRATROPIUM BROMIDE AND ALBUTEROL SULFATE 1 AMPULE: .5; 3 SOLUTION RESPIRATORY (INHALATION) at 14:54

## 2021-12-18 RX ADMIN — DULOXETINE 60 MG: 60 CAPSULE, DELAYED RELEASE ORAL at 20:49

## 2021-12-18 RX ADMIN — BENZONATATE 100 MG: 100 CAPSULE ORAL at 08:55

## 2021-12-18 RX ADMIN — IPRATROPIUM BROMIDE AND ALBUTEROL SULFATE 1 AMPULE: .5; 3 SOLUTION RESPIRATORY (INHALATION) at 07:19

## 2021-12-18 ASSESSMENT — PAIN SCALES - GENERAL
PAINLEVEL_OUTOF10: 8
PAINLEVEL_OUTOF10: 7

## 2021-12-18 NOTE — PLAN OF CARE
Problem: Infection:  Goal: Will remain free from infection  Description: Will remain free from infection  12/18/2021 1639 by Harpreet Cosby RN  Outcome: Ongoing     Problem: Safety:  Goal: Free from accidental physical injury  Description: Free from accidental physical injury  12/18/2021 1639 by Harpreet Cosby RN  Outcome: Ongoing   Patient without physical injusty this shift  Problem: Safety:  Goal: Free from intentional harm  Description: Free from intentional harm  12/18/2021 1639 by Harpreet Cosby RN  Outcome: Ongoing     Problem: Daily Care:  Goal: Daily care needs are met  Description: Daily care needs are met  12/18/2021 1639 by Harpreet Cosby RN  Outcome: Ongoing     Problem: Pain:  Goal: Patient's pain/discomfort is manageable  Description: Patient's pain/discomfort is manageable  12/18/2021 1639 by Harpreet Cosby RN  Outcome: Ongoing   Pain managed through pharm and non pharm interventions  Problem: Skin Integrity:  Goal: Skin integrity will stabilize  Description: Skin integrity will stabilize  12/18/2021 1639 by Harpreet Cosby RN  Outcome: Ongoing     Problem: Discharge Planning:  Goal: Patients continuum of care needs are met  Description: Patients continuum of care needs are met  12/18/2021 1639 by Harpreet Cosby RN  Outcome: Ongoing   Discharge planning continues.  Possible discharge tomorrow

## 2021-12-18 NOTE — FLOWSHEET NOTE
12/18/21 1142   Encounter Summary   Services provided to: Patient   Referral/Consult From: Rounding   Complexity of Encounter Low   Length of Encounter 15 minutes   Spiritual/Protestant   Type Spiritual support   Assessment Sleeping   Intervention Prayer

## 2021-12-18 NOTE — DISCHARGE INSTR - COC
Continuity of Care Form    Patient Name: Minerva Galvez   :  1975  MRN:  [id number]    Admit date:  2021  Discharge date:  ***    Code Status Order: Full Code   Advance Directives:      Admitting Physician:  Vero Mai MD  PCP: Vero Mai MD    Discharging Nurse: [facility] Unit/Room#: 2125/2125-01  Discharging Unit Phone Number: ***    Emergency Contact:   Extended Emergency Contact Information  Primary Emergency Contact: [address] [place] [address] Phone: [phone]  Relation: Other    Past Surgical History:  Past Surgical History:   Procedure Laterality Date    ABDOMEN SURGERY      abcess    ABDOMINAL ADHESION SURGERY      ABDOMINAL EXPLORATION SURGERY      x 4    ABDOMINAL HERNIA REPAIR      with mesh    ABLATION OF DYSRHYTHMIC FOCUS  2016    ABLATION OF DYSRHYTHMIC FOCUS  2017    Done at the [facility]. ABSCESS DRAINAGE      left hip and chest    APPENDECTOMY      BREAST SURGERY Left 2007    I & D    CARDIAC CATHETERIZATION   &      no stenting    CARPAL TUNNEL RELEASE Right 2019    Ulnar nerve decompression, right elbow. Release of 1st and 2nd extensor compartments, right forearm. CARPAL TUNNEL RELEASE  2020    Carpal tunnel release, left hand    CHOLECYSTECTOMY      COLONOSCOPY      FOOT SURGERY Left     bone fragment removed    FRACTURE SURGERY Right     closed reduction perc pinning ring & middle finger    GASTRIC FUNDOPLICATION  9248    HYSTERECTOMY      total    HYSTERECTOMY      HYSTEROSCOPY      tubal perfusion    MYRINGOTOMY Right 2015    Right myringotomy with placement of  T-tube on the right side. Removal of plugged ventilating tube, right side.     MYRINGOTOMY Left 2020    MYRINGOTOMY TUBE INSERTION performed by Kaylyn Wu MD at [address] [place] [address] Right 2014    OTHER SURGICAL HISTORY Right 2014    removal ear tube rt ear    OTHER SURGICAL HISTORY  2009    LOOP recorder inserted and removed 3 mos. later    OTHER SURGICAL HISTORY Right 08/11/2016    ear tube removal with patch    OTHER SURGICAL HISTORY      tubal perfusion, lysis of uterine adhesions    OTHER SURGICAL HISTORY      multiple PICC lines inserted and removed, it has affected circulation bilateral upper arms    OTHER SURGICAL HISTORY  10/19/2020    Revisiion to subcutaneous transposition of unlar nerve, right elbow    OTHER SURGICAL HISTORY Right 06/14/2021    REVISION TO SUBMUSCULAR TRANSPOSITION OF RIGHT ULNAR NERVE    OH MANIPULATN SHLDR JT W ANESTHESIA Right 03/01/2017    SHOULDER MANIPULATION RIGHT performed by Luli Bateman MD at [facility] Left 10/17/2018    SHOULDER ARTHROSCOPY W/BICEPS TENDONESIS performed by Mino Melendez MD at [facility] Left     foot    TONSILLECTOMY      TYMPANOSTOMY TUBE PLACEMENT      TYMPANOSTOMY TUBE PLACEMENT Left 03/12/2019    TYMPANOSTOMY TUBE PLACEMENT Right 12/08/2021    Right tympanostomy with tube placement of T-tube with operative microscope and general anesthesia. Right removal of right ear tube.     ULNAR TUNNEL RELEASE Right     UPPER GASTROINTESTINAL ENDOSCOPY      UPPER GASTROINTESTINAL ENDOSCOPY  07/10/2019    UPPER GASTROINTESTINAL ENDOSCOPY N/A 07/10/2019    EGD BIOPSY performed by Jonnathan Nguyen MD at [facility]       Immunization History:   Immunization History   Administered Date(s) Administered    Influenza Virus Vaccine 10/02/2015    Influenza, Celio Rogers, IM, (6 mo and older Fluzone, Flulaval, Fluarix and 3 yrs and older Afluria) 10/28/2016    Pneumococcal Polysaccharide ([id number]) 01/01/2015    Tdap (Boostrix, Adacel) 01/01/2014       Active Problems:  Patient Active Problem List   Diagnosis Code    Diabetes mellitus type 2, controlled ([facility] E11.9    COPD (chronic obstructive pulmonary disease) ([facility] J44.9    Asthma J45.909    Acute bronchitis J20.9    KRISTIN (obstructive sleep apnea) G47.33    Adult body mass index 40 and over [id number]    Chronic right shoulder pain M25.511, G89.29    Neck pain M54.2    Radicular pain in right arm M79.2    Left leg pain M79.605    Noncompliance with therapeutic plan Z91.11    Precordial pain R07.2    Tobacco abuse Z72.0    Current moderate episode of major depressive disorder without prior episode ([facility]) F32.1    Adhesive capsulitis of left shoulder M75.02    Morbid obesity ([facility]) E66.01    Left bicipital tenosynovitis M75.22    Refused influenza vaccine Z28.21    Closed fracture of left ankle S82.892A    Atrial premature depolarization I49.1    Ventricular premature depolarization I49.3    Cardiomyopathy ([facility]) I42.9    Cigarette nicotine dependence with nicotine-induced disorder F17.219    Productive cough R05.8    DVT (deep venous thrombosis) ([facility]) I82.409    Endometriosis N80.9    GERD (gastroesophageal reflux disease) K21.9    HTN (hypertension) I10    Hypomagnesemia E83.42    Migraines G43.909    Mixed hyperlipidemia E78.2    Neurocardiogenic syncope R55    Nonrheumatic tricuspid (valve) insufficiency I36.1    Tricuspid valve disorders, non-rheumatic I36.9    Ovarian cyst N83.209    Encounter for monitoring sotalol therapy Z51.81, Z79.899    Pulmonary HTN ([facility]) I27.20    PVC's (premature ventricular contractions) I49.3    Schizophrenia ([facility]) F20.9    Shortness of breath R06.02    Acute exacerbation of chronic obstructive pulmonary disease ([facility]) J44.1    Bipolar disorder ([facility]) F31.9    Left sided abdominal pain of unknown cause R10.9    Leg swelling M79.89    Mastoiditis, acute H70.009    Steroid-induced hyperglycemia R73.9, T38.0X5A    Sclerosing adenosis of left breast N60.22    Tachycardia R00.0    Tremor R25.1    Rupture of right rotator cuff M75.101    Rotator cuff syndrome of left shoulder M75.102    Long term current use of insulin ([facility]) Z79.4    Lesion of ulnar nerve G56.20    Carpal tunnel syndrome G56.00    Acute upper respiratory infection J06.9    Cellulitis of right upper limb L03. [facility] F40.240    Close exposure to 2019 novel coronavirus Z20.822    Congestive heart failure (HCC) I50.9    Diabetic neuropathy (HCC) E11.40    Enthesopathy M77.9    Fibrocystic breast changes K42.63    Helicobacter pylori gastritis K29.70, B96.81    Elevated troponin R77.8    Influenza A J10.1    NSTEMI (non-ST elevated myocardial infarction) ([facility] I21.4       Isolation/Infection:   Isolation            Droplet          Patient Infection Status       Infection Onset Added Last Indicated Last Indicated By Review Planned Expiration Resolved Resolved By    INFLUENZA 12/14/21 12/15/21 12/15/21 Ramy Quintero RN 21      Resolved    COVID-19 (Rule Out) 21 COVID-19 & Influenza Combo (Ordered)   21 Rule-Out Test Resulted    COVID-19 (Rule Out) 21 COVID-19, Rapid (Ordered)   21 Rule-Out Test Resulted    COVID-19 (Rule Out) 10/31/21 10/31/21 10/31/21 COVID-19 & Influenza Combo (Ordered)   10/31/21 Rule-Out Test Resulted    COVID-19 (Rule Out) 10/08/21 10/08/21 10/08/21 COVID-19 & Influenza Combo (Ordered)   10/08/21 Rule-Out Test Resulted    COVID-19 (Rule Out) 07/10/20 07/10/20 07/10/20 COVID-19 (Ordered)   20 Rule-Out Test Resulted    COVID-19 (Rule Out) 20 COVID-19 (Ordered)   20 Rule-Out Test Resulted            Nurse Assessment:  Last Vital Signs: /61   Pulse 65   Temp 97.8 °F (36.6 °C) (Oral)   Resp 18   Ht 5' 4\" (1.626 m)   Wt 252 lb 10.4 oz (114.6 kg)   SpO2 96%   BMI 43.37 kg/m²     Last documented pain score (0-10 scale): Pain Level: 8  Last Weight:   Wt Readings from Last 1 Encounters:   21 252 lb 10.4 oz (114.6 kg)     Mental Status:  {IP PT MENTAL STATUS:}    IV Access:  {[facility] IV ACCESS:781292938}    Nursing Mobility/ADLs:  Walking   {[facility] STX}  Transfer  {[facility] AFPD:591839949}  Bathing {[facility] DME LLRQ:764466513}  Dressing  {CH[facility] DME JZHC:316563120}  Toileting  {[facility] DME GQAY:978385350}  Feeding  {CHP DME GKMM:059080055}  Med Admin  {CHP DME FNFV:193682206}  Med Delivery   { [facility] MED Delivery:846783945}    Wound Care Documentation and Therapy:  Incision 10/17/18 Shoulder (Active)   Number of days: 1158        Elimination:  Continence: Bowel: {YES / V}  Bladder: {YES / RE:32662}  Urinary Catheter: {Urinary Catheter:954933921}   Colostomy/Ileostomy/Ileal Conduit: {YES / LX:77327}       Date of Last BM: ***    Intake/Output Summary (Last 24 hours) at 2021 1544  Last data filed at 2021 1011  Gross per 24 hour   Intake 480 ml   Output --   Net 480 ml     I/O last 3 completed shifts: In: 480 [P.O.:300;  I.V.:180]  Out: -     Safety Concerns:     [facility] Safety Concerns:144676503}    Impairments/Disabilities:      [facility] Impairments/Disabilities:127952440}    Nutrition Therapy:  Current Nutrition Therapy:   [facility] Diet List:926455632}    Routes of Feeding: {[facility] DME Other Feedings:662805819}  Liquids: {Slp liquid thickness:20631}  Daily Fluid Restriction: {CHP DME Yes amt example:304076821}  Last Modified Barium Swallow with Video (Video Swallowing Test): {Done Not Done FEUC:269627565}    Treatments at the Time of Hospital Discharge:   Respiratory Treatments: ***  Oxygen Therapy:  {Therapy; copd oxygen:10981}  Ventilator:    { CC Vent UZR}    Rehab Therapies: {THERAPEUTIC INTERVENTION:8024527941}  Weight Bearing Status/Restrictions: [address] [employer] Weight Bearin}  Other Medical Equipment (for information only, NOT a DME order):  {EQUIPMENT:409083329}  Other Treatments: ***    Patient's personal belongings (please select all that are sent with patient):  {[facility] DME Belongings:038609756}    RN SIGNATURE:  {Esignature:043603477}    CASE MANAGEMENT/SOCIAL WORK SECTION    Inpatient Status Date: ***    Readmission Risk Assessment Score:  Readmission Risk              Risk of Unplanned Readmission:  17           Discharging to Facility/ Agency   Name:   Address:  Phone:  Fax:    Dialysis Facility (if applicable)   Name:  Address:  Dialysis Schedule:  Phone:  Fax:    / signature: {Esignature:531598039}    PHYSICIAN SECTION    Prognosis: {Prognosis:1064612580}    Condition at Discharge: Sachin8 Treasure Dinh Patient Condition:362327733}    Rehab Potential (if transferring to Rehab): {Prognosis:5905190070}    Recommended Labs or Other Treatments After Discharge: ***    Physician Certification: I certify the above information and transfer of Aleta Gil  is necessary for the continuing treatment of the diagnosis listed and that she requires {Admit to Appropriate Level of Care:43625} for {GREATER/LESS:883136470} 30 days.      Update Admission H&P: No change in H&P    PHYSICIAN SIGNATURE:  Electronically signed by Mario Alberto Daley MD on 12/18/21 at 3:44 PM EST

## 2021-12-18 NOTE — PROGRESS NOTES
[address]  [phone]          Progress Note    Patient Name:  Marci Wiseman    :  2021 12:45 PM      SUBJECTIVE       Ms. Sierra Case  has continued to have a cough and chest pain with cough. No edema    OBJECTIVE     Vital signs:    /61   Pulse 65   Temp 97.8 °F (36.6 °C) (Oral)   Resp 18   Ht 5' 4\" (1.626 m)   Wt 252 lb 10.4 oz (114.6 kg)   SpO2 97%   BMI 43.37 kg/m²  3 L/min  .tro    Admit Weight:  240 lb (108.9 kg)    Last 3 weights: Wt Readings from Last 3 Encounters:   21 252 lb 10.4 oz (114.6 kg)   21 246 lb (111.6 kg)   21 253 lb (114.8 kg)       BMI: Body mass index is 43.37 kg/m². Input/Output:       Intake/Output Summary (Last 24 hours) at 2021 1245  Last data filed at 2021 1011  Gross per 24 hour   Intake 480 ml   Output --   Net 480 ml       Date 21 0000 - 21 2359   Shift 1605-0613 7103-4660 0698-5404 24 Hour Total   INTAKE   P.O.(mL/kg/hr)  300  300   Shift Total(mL/kg)  300(2.6)  300(2.6)   OUTPUT   Shift Total(mL/kg)       Weight (kg) 114.6 114.6 114.6 114.6     Exam:     General appearance: awake and alert moves all ext   Lungs:+ rhonchi, + wheezes, no rales  Heart: S1 and S2 no murmur  Abdomen: positive bowel sounds, no bruits, no masses  Extremities: warm and dry, no cyanosis, no clubbing        Laboratory Studies:     CBC:   Recent Labs     21  0618 21  0522   WBC 11.1* 11.1*   HGB 13.5 12.5   HCT 39.6 37.5   .5* 103.5*    249     BMP:   Recent Labs     21  0724 21  0427 21  0522    142 140   K 4.1 4.4 4.0    103 101   CO2 33* 29 30   BUN 11 12 10   CREATININE 0.52 0.53 0.56     PT/INR: No results for input(s): PROTIME, INR in the last 72 hours. APTT: No results for input(s): APTT in the last 72 hours. MAG: No results for input(s): MG in the last 72 hours.   D Dimer: No results for input(s): hospital problems. *      PLAN   Follow up troponin pending. Continue supportive care. Patient will eventually need cardiac catheterization when more stable. Plan for iodine allergy prep prior to procedure when time comes. -   Will also plan for cardiac MRI  when more stable, can consider referral to [facility] early next week. -   Supportive care per primary team.  -   Can discontinue IV heparin infusion.  -   Continue Lipitor, Coreg, Bumex at current doses.       Electronically signed by Michelle Ward DO on 12/18/2021 at 12:45 PM

## 2021-12-18 NOTE — PROGRESS NOTES
PULMONARY PROGRESS NOTE:    REASON FOR VISIT: influenza A  Interval History:    Shortness of Breath: +  Cough: +++  Sputum: no          Hemoptysis: no  Chest Pain: yes  Fever: no                   Swelling Feet: no  Headache: no                                           Nausea, Emesis, Abdominal Pain: no  Diarrhea: no         Constipation: no    Events since last visit: none     PAST MEDICAL HISTORY:      Scheduled Meds:   carvedilol  12.5 mg Oral BID WC    budesonide  0.5 mg Nebulization BID    dextromethorphan-guaiFENesin  5 mL Oral Q6H    colchicine  0.6 mg Oral BID    influenza virus vaccine  0.5 mL IntraMUSCular Prior to discharge    insulin glargine  80 Units SubCUTAneous BID    benzonatate  100 mg Oral TID    butalbital-APAP-caffeine  1 capsule Oral Once    ARIPiprazole  5 mg Oral Daily    bumetanide  2 mg Oral Daily    polyethylene glycol  17 g Oral BID    DULoxetine  60 mg Oral BID    ipratropium-albuterol  1 ampule Inhalation 4x daily    cetirizine  10 mg Oral Daily    magnesium oxide  400 mg Oral Daily    QUEtiapine  50 mg Oral Nightly    sodium chloride flush  5-40 mL IntraVENous 2 times per day    atorvastatin  80 mg Oral Nightly    insulin lispro  0-18 Units SubCUTAneous TID     insulin lispro  0-9 Units SubCUTAneous Nightly     Continuous Infusions:   sodium chloride      dextrose       PRN Meds:ipratropium-albuterol, nitroGLYCERIN, nitroGLYCERIN, albuterol sulfate HFA, sodium chloride, ibuprofen, promethazine, sodium chloride flush, sodium chloride, acetaminophen **OR** acetaminophen, glucose, dextrose, glucagon (rDNA), dextrose, oxyCODONE-acetaminophen        PHYSICAL EXAMINATION:  /61   Pulse 65   Temp 97.8 °F (36.6 °C) (Oral)   Resp 18   Ht 5' 4\" (1.626 m)   Wt 252 lb 10.4 oz (114.6 kg)   SpO2 97%   BMI 43.37 kg/m²     General : Awake, alert  Neck - supple, no lymphadenopathy, JVD not raised  Heart - regular rhythm, S1 and S2 normal; no additional sounds heard  Lungs - Air Entry- fair bilaterally; breath sounds : coarse, harsh cough, 95% on 3 l nc  Abdomen - soft, no tenderness  Upper Extremities  - no cyanosis, mottling; edema : absent  Lower Extremities: no cyanosis, mottling; edema : absent    Current Laboratory, Radiologic, Microbiologic, and Diagnostic studies reviewed  Data ReviewCBC:   Recent Labs     12/16/21  0618 12/18/21  0522   WBC 11.1* 11.1*   RBC 3.90* 3.62*   HGB 13.5 12.5   HCT 39.6 37.5    249     BMP:   Recent Labs     12/16/21  0724 12/17/21  0427 12/18/21  0522   GLUCOSE 94 137* 134*    142 140   K 4.1 4.4 4.0   BUN 11 12 10   CREATININE 0.52 0.53 0.56   CALCIUM 8.9 9.0 9.1     ABGs: No results for input(s): PHART, PO2ART, IPS6WAT, IYE5EFY, BEART, W2QOPWIJ, FFW9DRY in the last 72 hours.    PT/INR:  No results found for: PTINR    ASSESSMENT / PLAN:    Influenza A - Tamiflu - patient is allergic,   COPD - BD  DM  Elevated troponin - heparin gtt; Cardiac cath not done and she was brought back to [facility] - needs prep due to dye allergy  Mucinex, acapella  CXR      Electronically signed by Sabrina Du MD on 12/18/21 at 1:44 PM

## 2021-12-18 NOTE — PLAN OF CARE
stabilize  12/18/2021 0449 by Jani Peters RN  Outcome: Ongoing  12/17/2021 1625 by Marcy Hart RN  Outcome: Met This Shift     Problem: Discharge Planning:  Goal: Patients continuum of care needs are met  Description: Patients continuum of care needs are met  12/18/2021 0449 by Jani Peters RN  Outcome: Ongoing  12/17/2021 1625 by Marcy Hart RN  Outcome: Not Met This Shift  Note: Ashok per cardiology is for possible discharge home tomorrow if troponins are trending down.

## 2021-12-18 NOTE — CARE COORDINATION
ONGOING DISCHARGE PLAN:    Patient is sleeping and would not wake up when writer in room. Spoke with patient regarding discharge plan and patient confirms that plan is still home with no needs. Pt has Home O2 24/7 . Pt was to have heart cath but due to dye allergy unable to complete . Pt will most likely need outpt heart cath per cardio. Following for transportation needs at discharge. Will continue to follow for additional discharge needs.     Electronically signed by Ara Paniagua RN on 12/18/2021 at 10:57 AM

## 2021-12-19 LAB
ANION GAP SERPL CALCULATED.3IONS-SCNC: 8 MMOL/L (ref 9–17)
BUN BLDV-MCNC: 9 MG/DL (ref 6–20)
BUN/CREAT BLD: ABNORMAL (ref 9–20)
CALCIUM SERPL-MCNC: 9.3 MG/DL (ref 8.6–10.4)
CHLORIDE BLD-SCNC: 100 MMOL/L (ref 98–107)
CO2: 33 MMOL/L (ref 20–31)
CREAT SERPL-MCNC: 0.58 MG/DL (ref 0.5–0.9)
GFR AFRICAN AMERICAN: >60 ML/MIN
GFR NON-AFRICAN AMERICAN: >60 ML/MIN
GFR SERPL CREATININE-BSD FRML MDRD: ABNORMAL ML/MIN/{1.73_M2}
GFR SERPL CREATININE-BSD FRML MDRD: ABNORMAL ML/MIN/{1.73_M2}
GLUCOSE BLD-MCNC: 115 MG/DL (ref 70–99)
GLUCOSE BLD-MCNC: 123 MG/DL (ref 65–105)
GLUCOSE BLD-MCNC: 157 MG/DL (ref 65–105)
GLUCOSE BLD-MCNC: 161 MG/DL (ref 65–105)
GLUCOSE BLD-MCNC: 73 MG/DL (ref 65–105)
POTASSIUM SERPL-SCNC: 4 MMOL/L (ref 3.7–5.3)
SODIUM BLD-SCNC: 141 MMOL/L (ref 135–144)

## 2021-12-19 PROCEDURE — 94640 AIRWAY INHALATION TREATMENT: CPT

## 2021-12-19 PROCEDURE — 94761 N-INVAS EAR/PLS OXIMETRY MLT: CPT

## 2021-12-19 PROCEDURE — 6370000000 HC RX 637 (ALT 250 FOR IP): Performed by: INTERNAL MEDICINE

## 2021-12-19 PROCEDURE — 6370000000 HC RX 637 (ALT 250 FOR IP): Performed by: NURSE PRACTITIONER

## 2021-12-19 PROCEDURE — 2700000000 HC OXYGEN THERAPY PER DAY

## 2021-12-19 PROCEDURE — 99232 SBSQ HOSP IP/OBS MODERATE 35: CPT | Performed by: FAMILY MEDICINE

## 2021-12-19 PROCEDURE — 2060000000 HC ICU INTERMEDIATE R&B

## 2021-12-19 PROCEDURE — 82947 ASSAY GLUCOSE BLOOD QUANT: CPT

## 2021-12-19 PROCEDURE — 36415 COLL VENOUS BLD VENIPUNCTURE: CPT

## 2021-12-19 PROCEDURE — 6360000002 HC RX W HCPCS: Performed by: INTERNAL MEDICINE

## 2021-12-19 PROCEDURE — 6370000000 HC RX 637 (ALT 250 FOR IP): Performed by: FAMILY MEDICINE

## 2021-12-19 PROCEDURE — 2580000003 HC RX 258: Performed by: FAMILY MEDICINE

## 2021-12-19 PROCEDURE — 80048 BASIC METABOLIC PNL TOTAL CA: CPT

## 2021-12-19 RX ORDER — FUROSEMIDE 10 MG/ML
20 INJECTION INTRAMUSCULAR; INTRAVENOUS ONCE
Status: DISCONTINUED | OUTPATIENT
Start: 2021-12-19 | End: 2021-12-19

## 2021-12-19 RX ORDER — FUROSEMIDE 10 MG/ML
40 INJECTION INTRAMUSCULAR; INTRAVENOUS ONCE
Status: COMPLETED | OUTPATIENT
Start: 2021-12-19 | End: 2021-12-19

## 2021-12-19 RX ADMIN — SODIUM CHLORIDE, PRESERVATIVE FREE 10 ML: 5 INJECTION INTRAVENOUS at 22:05

## 2021-12-19 RX ADMIN — COLCHICINE 0.6 MG: 0.6 TABLET ORAL at 08:03

## 2021-12-19 RX ADMIN — IPRATROPIUM BROMIDE AND ALBUTEROL SULFATE 1 AMPULE: .5; 3 SOLUTION RESPIRATORY (INHALATION) at 15:20

## 2021-12-19 RX ADMIN — IPRATROPIUM BROMIDE AND ALBUTEROL SULFATE 1 AMPULE: .5; 2.5 SOLUTION RESPIRATORY (INHALATION) at 23:06

## 2021-12-19 RX ADMIN — BENZONATATE 100 MG: 100 CAPSULE ORAL at 08:02

## 2021-12-19 RX ADMIN — SODIUM CHLORIDE, PRESERVATIVE FREE 10 ML: 5 INJECTION INTRAVENOUS at 08:04

## 2021-12-19 RX ADMIN — CETIRIZINE HYDROCHLORIDE 10 MG: 10 TABLET, FILM COATED ORAL at 08:04

## 2021-12-19 RX ADMIN — CARVEDILOL 12.5 MG: 12.5 TABLET, FILM COATED ORAL at 06:32

## 2021-12-19 RX ADMIN — INSULIN LISPRO 3 UNITS: 100 INJECTION, SOLUTION INTRAVENOUS; SUBCUTANEOUS at 12:07

## 2021-12-19 RX ADMIN — COLCHICINE 0.6 MG: 0.6 TABLET ORAL at 22:05

## 2021-12-19 RX ADMIN — FUROSEMIDE 40 MG: 10 INJECTION, SOLUTION INTRAVENOUS at 13:39

## 2021-12-19 RX ADMIN — QUETIAPINE FUMARATE 50 MG: 100 TABLET ORAL at 22:05

## 2021-12-19 RX ADMIN — BENZONATATE 100 MG: 100 CAPSULE ORAL at 13:39

## 2021-12-19 RX ADMIN — INSULIN LISPRO 3 UNITS: 100 INJECTION, SOLUTION INTRAVENOUS; SUBCUTANEOUS at 17:43

## 2021-12-19 RX ADMIN — BENZONATATE 100 MG: 100 CAPSULE ORAL at 22:05

## 2021-12-19 RX ADMIN — GUAIFENESIN 600 MG: 600 TABLET, EXTENDED RELEASE ORAL at 22:05

## 2021-12-19 RX ADMIN — DULOXETINE 60 MG: 60 CAPSULE, DELAYED RELEASE ORAL at 22:05

## 2021-12-19 RX ADMIN — Medication 400 MG: at 08:04

## 2021-12-19 RX ADMIN — IPRATROPIUM BROMIDE AND ALBUTEROL SULFATE 1 AMPULE: .5; 3 SOLUTION RESPIRATORY (INHALATION) at 20:15

## 2021-12-19 RX ADMIN — GUAIFENESIN 600 MG: 600 TABLET, EXTENDED RELEASE ORAL at 08:04

## 2021-12-19 RX ADMIN — ATORVASTATIN CALCIUM 80 MG: 80 TABLET, FILM COATED ORAL at 22:05

## 2021-12-19 RX ADMIN — CARVEDILOL 12.5 MG: 12.5 TABLET, FILM COATED ORAL at 19:10

## 2021-12-19 RX ADMIN — IPRATROPIUM BROMIDE AND ALBUTEROL SULFATE 1 AMPULE: .5; 3 SOLUTION RESPIRATORY (INHALATION) at 11:09

## 2021-12-19 RX ADMIN — IPRATROPIUM BROMIDE AND ALBUTEROL SULFATE 1 AMPULE: .5; 3 SOLUTION RESPIRATORY (INHALATION) at 07:34

## 2021-12-19 RX ADMIN — DULOXETINE 60 MG: 60 CAPSULE, DELAYED RELEASE ORAL at 08:03

## 2021-12-19 ASSESSMENT — PAIN SCALES - GENERAL
PAINLEVEL_OUTOF10: 8
PAINLEVEL_OUTOF10: 10

## 2021-12-19 NOTE — PLAN OF CARE
met  Description: Patients continuum of care needs are met  12/19/2021 0423 by Dina Barrett RN  Outcome: Ongoing  12/18/2021 1639 by Alice Rinaldi RN  Outcome: Ongoing

## 2021-12-19 NOTE — PROGRESS NOTES
PULMONARY PROGRESS NOTE:    REASON FOR VISIT: influenza A  Interval History:    Shortness of Breath: +  Cough: +++  Sputum: no          Hemoptysis: no  Chest Pain: yes  Fever: no                   Swelling Feet: no  Headache: no                                           Nausea, Emesis, Abdominal Pain: no  Diarrhea: no         Constipation: no    Events since last visit: hemoptysis x 1     PAST MEDICAL HISTORY:      Scheduled Meds:   guaiFENesin  600 mg Oral BID    carvedilol  12.5 mg Oral BID WC    budesonide  0.5 mg Nebulization BID    colchicine  0.6 mg Oral BID    influenza virus vaccine  0.5 mL IntraMUSCular Prior to discharge    insulin glargine  80 Units SubCUTAneous BID    benzonatate  100 mg Oral TID    butalbital-APAP-caffeine  1 capsule Oral Once    ARIPiprazole  5 mg Oral Daily    bumetanide  2 mg Oral Daily    polyethylene glycol  17 g Oral BID    DULoxetine  60 mg Oral BID    ipratropium-albuterol  1 ampule Inhalation 4x daily    cetirizine  10 mg Oral Daily    magnesium oxide  400 mg Oral Daily    QUEtiapine  50 mg Oral Nightly    sodium chloride flush  5-40 mL IntraVENous 2 times per day    atorvastatin  80 mg Oral Nightly    insulin lispro  0-18 Units SubCUTAneous TID     insulin lispro  0-9 Units SubCUTAneous Nightly     Continuous Infusions:   sodium chloride      dextrose       PRN Meds:ipratropium-albuterol, nitroGLYCERIN, nitroGLYCERIN, albuterol sulfate HFA, sodium chloride, ibuprofen, promethazine, sodium chloride flush, sodium chloride, acetaminophen **OR** acetaminophen, glucose, dextrose, glucagon (rDNA), dextrose, oxyCODONE-acetaminophen        PHYSICAL EXAMINATION:  /61   Pulse 59   Temp 98.9 °F (37.2 °C) (Oral)   Resp 16   Ht 5' 4\" (1.626 m)   Wt 257 lb 15 oz (117 kg)   SpO2 95%   BMI 44.27 kg/m²     General : Awake, alert  Neck - supple, no lymphadenopathy, JVD not raised  Heart - regular rhythm, S1 and S2 normal; no additional sounds heard  Lungs - Air Entry- fair bilaterally; breath sounds : coarse, harsh cough, 95% on 3 l nc  Abdomen - soft, no tenderness  Upper Extremities  - no cyanosis, mottling; edema : absent  Lower Extremities: no cyanosis, mottling; edema : absent    Current Laboratory, Radiologic, Microbiologic, and Diagnostic studies reviewed  Data ReviewCBC:   Recent Labs     12/18/21  0522   WBC 11.1*   RBC 3.62*   HGB 12.5   HCT 37.5        BMP:   Recent Labs     12/17/21  0427 12/18/21  0522 12/19/21  0417   GLUCOSE 137* 134* 115*    140 141   K 4.4 4.0 4.0   BUN 12 10 9   CREATININE 0.53 0.56 0.58   CALCIUM 9.0 9.1 9.3     ABGs: No results for input(s): PHART, PO2ART, YUK7RZF, CYY5OAF, BEART, X7JHTRAY, RQK5XJV in the last 72 hours.    PT/INR:  No results found for: PTINR    ASSESSMENT / PLAN:    Influenza A - Tamiflu - patient is allergic,   COPD - BD  DM  Elevated troponin - heparin gtt; Cardiac cath not done and she was brought back to [facility] - needs prep due to dye allergy  Mucinex, acapella  CXR - PVC, pl effusion  IV lasix  Monitor expectorated sputum      Electronically signed by Grey Fu MD on 12/19/21 at 12:35 PM

## 2021-12-19 NOTE — PROGRESS NOTES
FAMILY MEDICINE  - PROGRESS NOTE    Date:  12/19/2021  Isabelle Boyle  [id number]      Chief Complaint   Patient presents with    Cough    Fever    Headache    Nausea    Shortness of Breath         Interval History:  not changed much, she had an episode of blood tinged sputum. Her vitals are stable and she is afebrile. Specialists notes, labs & imaging reviewed.       Subjective  Constitutional: positive for obesity  Respiratory: positive for cough, emphysema, sputum and KRISTIN  Musculoskeletal:positive for arthralgias, myalgias and stiff joints  Behavioral/Psych: positive for anxiety and depression:    Objective:    /61   Pulse 59   Temp 98.9 °F (37.2 °C) (Oral)   Resp 18   Ht 5' 4\" (1.626 m)   Wt 257 lb 15 oz (117 kg)   SpO2 96%   BMI 44.27 kg/m²   General appearance - alert, well appearing, and in no distress and overweight  Mental status - alert, oriented to person, place, and time  Eyes - pupils equal and reactive, extraocular eye movements intact  Ears - hearing grossly normal bilaterally  Nose - normal and patent, no erythema, discharge or polyps  Mouth - mucous membranes moist, pharynx normal without lesions  Neck - supple, no significant adenopathy  Lymphatics - no palpable lymphadenopathy, no hepatosplenomegaly  Chest - clear to auscultation, no wheezes, rales or rhonchi, symmetric air entry, decreased air entry noted posteriorly  Heart - normal rate, regular rhythm, normal S1, S2, no murmurs, rubs, clicks or gallops  Abdomen - soft, nontender, nondistended, no masses or organomegaly  Breasts - not examined  Back exam - not examined  Neurological - alert, oriented, normal speech, no focal findings or movement disorder noted  Musculoskeletal - no joint tenderness, deformity or swelling  Extremities - pedal edema trace +  Skin - normal coloration and turgor, no rashes, no suspicious skin lesions noted    Data:   Medications:   Current Facility-Administered Medications   Medication Dose Route Frequency Provider Last Rate Last Admin    guaiFENesin (MUCINEX) extended release tablet 600 mg  600 mg Oral BID Jc Hernandez MD   600 mg at 12/19/21 0804    ipratropium-albuterol (DUONEB) nebulizer solution 1 ampule  1 ampule Inhalation Q4H PRN Onesimo Quintero MD        carvedilol (COREG) tablet 12.5 mg  12.5 mg Oral BID  Josphine Hymen, APRN - CNP   12.5 mg at 12/19/21 7476    nitroGLYCERIN (NITROSTAT) SL tablet 0.4 mg  0.4 mg SubLINGual Q5 Min PRN Josphine Hymen, APRN - CNP        nitroGLYCERIN (NITROSTAT) SL tablet 0.4 mg  0.4 mg SubLINGual Q5 Min PRN Josphine Hymen, APRN - CNP        budesonide (PULMICORT) nebulizer suspension 500 mcg  0.5 mg Nebulization BID Mauricio Sean, MARYANNE - HANNAH        colchicine (COLCRYS) tablet 0.6 mg  0.6 mg Oral BID Leisa Arroyo MD   0.6 mg at 12/19/21 0803    influenza quadrivalent split vaccine (FLUZONE;FLUARIX;FLULAVAL;AFLURIA) injection 0.5 mL  0.5 mL IntraMUSCular Prior to discharge Onesimo Quintero MD        insulin glargine (LANTUS) injection vial 80 Units  80 Units SubCUTAneous BID Onesimo Quintero MD   80 Units at 12/17/21 1049    benzonatate (TESSALON) capsule 100 mg  100 mg Oral TID Jc Hernandez MD   100 mg at 12/19/21 0802    butalbital-APAP-caffeine -40 MG per capsule 1 capsule  1 capsule Oral Once Onesimo Quintero MD        albuterol sulfate  (90 Base) MCG/ACT inhaler 2 puff  2 puff Inhalation Q4H PRN Onesimo Quintero MD   2 puff at 12/15/21 0225    ARIPiprazole (ABILIFY) tablet 5 mg  5 mg Oral Daily Onesimo Quintero MD   5 mg at 12/15/21 1018    bumetanide (BUMEX) tablet 2 mg  2 mg Oral Daily Onesimo Quintero MD   2 mg at 12/15/21 1007    polyethylene glycol (GLYCOLAX) packet 17 g  17 g Oral BID Onesimo Quintero MD        DULoxetine (CYMBALTA) extended release capsule 60 mg  60 mg Oral BID Onesimo Quintero MD   60 mg at 12/19/21 0803    sodium David Horne MD   2 Units at 12/18/21 2049    oxyCODONE-acetaminophen (PERCOCET) 5-325 MG per tablet 1 tablet  1 tablet Oral Q6H PRN David Horne MD   1 tablet at 12/18/21 0700       Intake/Output Summary (Last 24 hours) at 12/19/2021 0958  Last data filed at 12/19/2021 0803  Gross per 24 hour   Intake 310 ml   Output --   Net 310 ml     Recent Results (from the past 24 hour(s))   POC Glucose Fingerstick    Collection Time: 12/18/21 11:28 AM   Result Value Ref Range    POC Glucose 82 65 - 105 mg/dL   Trop/Myoglobin    Collection Time: 12/18/21 11:37 AM   Result Value Ref Range    Troponin, High Sensitivity 337 (HH) 0 - 14 ng/L    Troponin T NOT REPORTED <0.03 ng/mL    Troponin Interp NOT REPORTED     Myoglobin <21 (L) 25 - 58 ng/mL   POC Glucose Fingerstick    Collection Time: 12/18/21  4:39 PM   Result Value Ref Range    POC Glucose 130 (H) 65 - 105 mg/dL   POC Glucose Fingerstick    Collection Time: 12/18/21  8:15 PM   Result Value Ref Range    POC Glucose 157 (H) 65 - 105 mg/dL   BASIC METABOLIC PANEL    Collection Time: 12/19/21  4:17 AM   Result Value Ref Range    Glucose 115 (H) 70 - 99 mg/dL    BUN 9 6 - 20 mg/dL    CREATININE 0.58 0.50 - 0.90 mg/dL    Bun/Cre Ratio NOT REPORTED 9 - 20    Calcium 9.3 8.6 - 10.4 mg/dL    Sodium 141 135 - 144 mmol/L    Potassium 4.0 3.7 - 5.3 mmol/L    Chloride 100 98 - 107 mmol/L    CO2 33 (H) 20 - 31 mmol/L    Anion Gap 8 (L) 9 - 17 mmol/L    GFR Non-African American >60 >60 mL/min    GFR African American >60 >60 mL/min    GFR Comment          GFR Staging NOT REPORTED    POC Glucose Fingerstick    Collection Time: 12/19/21  6:34 AM   Result Value Ref Range    POC Glucose 73 65 - 105 mg/dL   POC Glucose Fingerstick    Collection Time: 12/19/21  8:19 AM   Result Value Ref Range    POC Glucose 123 (H) 65 - 105 mg/dL     -----------------------------------------------------------------  RAD:  OBED:  Micro:     Assessment & Plan:    Patient Active Problem List: neuropathy (HCC)     Enthesopathy     Fibrocystic breast changes     Helicobacter pylori gastritis     Elevated troponin     Influenza A     NSTEMI (non-ST elevated myocardial infarction) (HCC)           Plan:  -Elevated Troponin - plans per Cardiology.  -Influenza A - allergic to Tamiflu. -COPD with reported hemoptysis - CXR in am, Pulmonology managing. -DM2 - stable. -Continue current treatments.  -Complete orders per chart.     See orders   Disposition:    Electronically signed by Ena Christine MD on 12/19/2021 at 9:58 AM

## 2021-12-19 NOTE — PLAN OF CARE
Problem: Infection:  Goal: Will remain free from infection  Description: Will remain free from infection  12/19/2021 1615 by Nani Bian RN  Outcome: Ongoing     Problem: Safety:  Goal: Free from accidental physical injury  Description: Free from accidental physical injury  12/19/2021 1615 by Nani Bain RN  Outcome: Ongoing     Problem: Safety:  Goal: Free from intentional harm  Description: Free from intentional harm  12/19/2021 1615 by Nani Bain RN  Outcome: Ongoing     Problem: Daily Care:  Goal: Daily care needs are met  Description: Daily care needs are met  12/19/2021 1615 by Nani Bain RN  Outcome: Ongoing     Problem: Pain:  Goal: Patient's pain/discomfort is manageable  Description: Patient's pain/discomfort is manageable  12/19/2021 1615 by Nani Bain RN  Outcome: Ongoing     Problem: Skin Integrity:  Goal: Skin integrity will stabilize  Description: Skin integrity will stabilize  12/19/2021 1615 by Nani Bain RN  Outcome: Ongoing     Problem: Discharge Planning:  Goal: Patients continuum of care needs are met  Description: Patients continuum of care needs are met  12/19/2021 1615 by Nani Bain RN  Outcome: Ongoing

## 2021-12-19 NOTE — PROGRESS NOTES
[address], [address] [facility]  [phone]          Progress Note    Patient Name:  Tanner Zhu    :  2021 12:08 PM      SUBJECTIVE       Ms. Rojelio Rider  Has continued to have a cough and chest pain with cough. She complains of hemoptysis. OBJECTIVE     Vital signs:    /61   Pulse 59   Temp 98.9 °F (37.2 °C) (Oral)   Resp 16   Ht 5' 4\" (1.626 m)   Wt 257 lb 15 oz (117 kg)   SpO2 95%   BMI 44.27 kg/m²  3 L/min  .tro    Admit Weight:  240 lb (108.9 kg)    Last 3 weights: Wt Readings from Last 3 Encounters:   21 257 lb 15 oz (117 kg)   21 246 lb (111.6 kg)   21 253 lb (114.8 kg)       BMI: Body mass index is 44.27 kg/m². Input/Output:       Intake/Output Summary (Last 24 hours) at 2021 1208  Last data filed at 2021 0803  Gross per 24 hour   Intake 10 ml   Output --   Net 10 ml       Date 21 0000 - 21 2359   Shift 1238-3075 4436-6846 9800-6978 24 Hour Total   INTAKE   I.V.(mL/kg)  10(0.1)  10(0.1)   Shift Total(mL/kg)  10(0.1)  10(0.1)   OUTPUT   Shift Total(mL/kg)       Weight (kg) 117 117 117 117     Exam:     General appearance: awake and alert moves all ext   Lungs: + wheezes, no rales  Heart: S1 and S2 no murmur  Abdomen: positive bowel sounds, no bruits, no masses  Extremities: warm and dry, no cyanosis, no clubbing        Laboratory Studies:     CBC:   Recent Labs     21  0522   WBC 11.1*   HGB 12.5   HCT 37.5   .5*        BMP:   Recent Labs     21  0427 21  0522 21  0417    140 141   K 4.4 4.0 4.0    101 100   CO2 29 30 33*   BUN 12 10 9   CREATININE 0.53 0.56 0.58     PT/INR: No results for input(s): PROTIME, INR in the last 72 hours. APTT: No results for input(s): APTT in the last 72 hours. MAG: No results for input(s): MG in the last 72 hours. D Dimer: No results for input(s): DDIMER in the last 72 hours.   Troponin Recent Labs     21  1637 21  1137   TROPHS 401* 337*                BNP No results for input(s): BNP in the last 72 hours. No results for input(s): PROBNP in the last 72 hours. Pulse Ox: SpO2  Av %  Min: 94 %  Max: 98 %  Supplemental O2: O2 Flow Rate (L/min): 3 L/min     Current Meds:    guaiFENesin  600 mg Oral BID    carvedilol  12.5 mg Oral BID     budesonide  0.5 mg Nebulization BID    colchicine  0.6 mg Oral BID    influenza virus vaccine  0.5 mL IntraMUSCular Prior to discharge    insulin glargine  80 Units SubCUTAneous BID    benzonatate  100 mg Oral TID    butalbital-APAP-caffeine  1 capsule Oral Once    ARIPiprazole  5 mg Oral Daily    bumetanide  2 mg Oral Daily    polyethylene glycol  17 g Oral BID    DULoxetine  60 mg Oral BID    ipratropium-albuterol  1 ampule Inhalation 4x daily    cetirizine  10 mg Oral Daily    magnesium oxide  400 mg Oral Daily    QUEtiapine  50 mg Oral Nightly    sodium chloride flush  5-40 mL IntraVENous 2 times per day    atorvastatin  80 mg Oral Nightly    insulin lispro  0-18 Units SubCUTAneous TID     insulin lispro  0-9 Units SubCUTAneous Nightly     Continuous Infusions:    sodium chloride      dextrose           XR CHEST (2 VW)    Result Date: 2021  Small amount of fluid in the right fissure and mild ground-glass prominence in the right lower lung zone suggesting mild pneumonia. .     XR CHEST PORTABLE    Result Date: 2021  No consolidation or sizable pleural effusion. Likely bronchitis with medial bibasilar atelectasis.        Echo:      ASSESSMENT     Principal Problem:    Elevated troponin  Active Problems:    Diabetes mellitus type 2, controlled (HCC)    COPD (chronic obstructive pulmonary disease) (HCC)    KRISTIN (obstructive sleep apnea)    Tobacco abuse    Cardiomyopathy ([address]    HTN (hypertension)    Tricuspid valve disorders, non-rheumatic    Pulmonary HTN ([address]    Bipolar disorder ([address] Influenza A    NSTEMI (non-ST elevated myocardial infarction) [address]  Resolved Problems:    * No resolved hospital problems. *      PLAN   Continue supportive care. As noted per Dr. Karlie Mclaughlin, cardiac catheterization with iodine allergy prep and asa desensitization in future as well as plans for cardiac MRI in future. Will continue to follow.           Electronically signed by Tanya Grover DO on 12/19/2021 at 12:08 PM

## 2021-12-19 NOTE — CARE COORDINATION
ONGOING DISCHARGE PLAN:    Patient is alert and oriented x4. Spoke with patient regarding discharge plan and patient confirms that plan is still to return to home. Denies VNS. Most likely outpt Cardiac Cath, r/t iodine allergy. Denies needs. Will continue to follow for additional discharge needs.     Electronically signed by Jose Medina RN on 12/19/2021 at 3:59 PM

## 2021-12-19 NOTE — PROGRESS NOTES
Patient started coughing up bloody sputum, states it just started happening minutes before RN walked into room. Patient VSS. Voicemail left for Dr. Pippa Real, asking for call back.

## 2021-12-19 NOTE — PROGRESS NOTES
Patient needing repeat Troponin, if decreased she is supposed to discharge home. Cardiac catheterization unable to be performed 12/16; patient insisted on coming back to [facility]. Patient refused to wear mask for MRI yesterday, so MRI not done. Heparin restarted. Per nursing, Troponin drawn late am and Cardiology wants the patient to stay overnight.     Plan is to D/C home in am.    Samuel Norris MD  12/18/21 9:07pm

## 2021-12-20 ENCOUNTER — APPOINTMENT (OUTPATIENT)
Dept: GENERAL RADIOLOGY | Age: 46
DRG: 280 | End: 2021-12-20
Payer: COMMERCIAL

## 2021-12-20 LAB
ANION GAP SERPL CALCULATED.3IONS-SCNC: 9 MMOL/L (ref 9–17)
BUN BLDV-MCNC: 13 MG/DL (ref 6–20)
BUN/CREAT BLD: ABNORMAL (ref 9–20)
CALCIUM SERPL-MCNC: 9.6 MG/DL (ref 8.6–10.4)
CHLORIDE BLD-SCNC: 97 MMOL/L (ref 98–107)
CO2: 34 MMOL/L (ref 20–31)
CREAT SERPL-MCNC: 0.59 MG/DL (ref 0.5–0.9)
GFR AFRICAN AMERICAN: >60 ML/MIN
GFR NON-AFRICAN AMERICAN: >60 ML/MIN
GFR SERPL CREATININE-BSD FRML MDRD: ABNORMAL ML/MIN/{1.73_M2}
GFR SERPL CREATININE-BSD FRML MDRD: ABNORMAL ML/MIN/{1.73_M2}
GLUCOSE BLD-MCNC: 122 MG/DL (ref 65–105)
GLUCOSE BLD-MCNC: 140 MG/DL (ref 65–105)
GLUCOSE BLD-MCNC: 150 MG/DL (ref 70–99)
GLUCOSE BLD-MCNC: 167 MG/DL (ref 65–105)
GLUCOSE BLD-MCNC: 168 MG/DL (ref 65–105)
GLUCOSE BLD-MCNC: 204 MG/DL (ref 65–105)
HCT VFR BLD CALC: 39.9 % (ref 36–46)
HEMOGLOBIN: 13.5 G/DL (ref 12–16)
MCH RBC QN AUTO: 33.9 PG (ref 26–34)
MCHC RBC AUTO-ENTMCNC: 33.8 G/DL (ref 31–37)
MCV RBC AUTO: 100.2 FL (ref 80–100)
NRBC AUTOMATED: ABNORMAL PER 100 WBC
PDW BLD-RTO: 12.7 % (ref 11.5–14.9)
PLATELET # BLD: 327 K/UL (ref 150–450)
PMV BLD AUTO: 8.1 FL (ref 6–12)
POTASSIUM SERPL-SCNC: 3.8 MMOL/L (ref 3.7–5.3)
RBC # BLD: 3.98 M/UL (ref 4–5.2)
SODIUM BLD-SCNC: 140 MMOL/L (ref 135–144)
WBC # BLD: 10.2 K/UL (ref 3.5–11)

## 2021-12-20 PROCEDURE — 6370000000 HC RX 637 (ALT 250 FOR IP): Performed by: FAMILY MEDICINE

## 2021-12-20 PROCEDURE — 2060000000 HC ICU INTERMEDIATE R&B

## 2021-12-20 PROCEDURE — 99232 SBSQ HOSP IP/OBS MODERATE 35: CPT | Performed by: FAMILY MEDICINE

## 2021-12-20 PROCEDURE — 36415 COLL VENOUS BLD VENIPUNCTURE: CPT

## 2021-12-20 PROCEDURE — 2580000003 HC RX 258: Performed by: FAMILY MEDICINE

## 2021-12-20 PROCEDURE — 2700000000 HC OXYGEN THERAPY PER DAY

## 2021-12-20 PROCEDURE — 6370000000 HC RX 637 (ALT 250 FOR IP): Performed by: INTERNAL MEDICINE

## 2021-12-20 PROCEDURE — 6370000000 HC RX 637 (ALT 250 FOR IP): Performed by: NURSE PRACTITIONER

## 2021-12-20 PROCEDURE — 85027 COMPLETE CBC AUTOMATED: CPT

## 2021-12-20 PROCEDURE — 82947 ASSAY GLUCOSE BLOOD QUANT: CPT

## 2021-12-20 PROCEDURE — 80048 BASIC METABOLIC PNL TOTAL CA: CPT

## 2021-12-20 PROCEDURE — 94640 AIRWAY INHALATION TREATMENT: CPT

## 2021-12-20 PROCEDURE — 94761 N-INVAS EAR/PLS OXIMETRY MLT: CPT

## 2021-12-20 PROCEDURE — 71046 X-RAY EXAM CHEST 2 VIEWS: CPT

## 2021-12-20 RX ORDER — CLOTRIMAZOLE 1 %
CREAM (GRAM) TOPICAL 2 TIMES DAILY
Status: DISCONTINUED | OUTPATIENT
Start: 2021-12-20 | End: 2021-12-23 | Stop reason: HOSPADM

## 2021-12-20 RX ORDER — GUAIFENESIN DEXTROMETHORPHAN HYDROBROMIDE ORAL SOLUTION 10; 100 MG/5ML; MG/5ML
10 SOLUTION ORAL EVERY 6 HOURS
Status: DISCONTINUED | OUTPATIENT
Start: 2021-12-20 | End: 2021-12-23 | Stop reason: HOSPADM

## 2021-12-20 RX ADMIN — BENZONATATE 100 MG: 100 CAPSULE ORAL at 08:43

## 2021-12-20 RX ADMIN — COLCHICINE 0.6 MG: 0.6 TABLET ORAL at 08:42

## 2021-12-20 RX ADMIN — PROMETHAZINE HYDROCHLORIDE 25 MG: 25 TABLET ORAL at 17:59

## 2021-12-20 RX ADMIN — GUAIFENESIN 600 MG: 600 TABLET, EXTENDED RELEASE ORAL at 08:43

## 2021-12-20 RX ADMIN — CETIRIZINE HYDROCHLORIDE 10 MG: 10 TABLET, FILM COATED ORAL at 08:43

## 2021-12-20 RX ADMIN — BENZONATATE 100 MG: 100 CAPSULE ORAL at 15:34

## 2021-12-20 RX ADMIN — INSULIN LISPRO 2 UNITS: 100 INJECTION, SOLUTION INTRAVENOUS; SUBCUTANEOUS at 21:38

## 2021-12-20 RX ADMIN — INSULIN LISPRO 3 UNITS: 100 INJECTION, SOLUTION INTRAVENOUS; SUBCUTANEOUS at 12:12

## 2021-12-20 RX ADMIN — CARVEDILOL 12.5 MG: 12.5 TABLET, FILM COATED ORAL at 18:29

## 2021-12-20 RX ADMIN — INSULIN GLARGINE 80 UNITS: 100 INJECTION, SOLUTION SUBCUTANEOUS at 18:29

## 2021-12-20 RX ADMIN — IPRATROPIUM BROMIDE AND ALBUTEROL SULFATE 1 AMPULE: .5; 3 SOLUTION RESPIRATORY (INHALATION) at 12:35

## 2021-12-20 RX ADMIN — GUAIFENESIN DEXTROMETHORPHAN HYDROBROMIDE ORAL SOLUTION 10 ML: 200; 20 SOLUTION ORAL at 17:12

## 2021-12-20 RX ADMIN — CARVEDILOL 12.5 MG: 12.5 TABLET, FILM COATED ORAL at 08:41

## 2021-12-20 RX ADMIN — INSULIN LISPRO 3 UNITS: 100 INJECTION, SOLUTION INTRAVENOUS; SUBCUTANEOUS at 17:12

## 2021-12-20 RX ADMIN — CLOTRIMAZOLE: 10 CREAM TOPICAL at 21:34

## 2021-12-20 RX ADMIN — SODIUM CHLORIDE, PRESERVATIVE FREE 10 ML: 5 INJECTION INTRAVENOUS at 08:47

## 2021-12-20 RX ADMIN — ATORVASTATIN CALCIUM 80 MG: 80 TABLET, FILM COATED ORAL at 21:32

## 2021-12-20 RX ADMIN — INSULIN GLARGINE 80 UNITS: 100 INJECTION, SOLUTION SUBCUTANEOUS at 08:54

## 2021-12-20 RX ADMIN — IPRATROPIUM BROMIDE AND ALBUTEROL SULFATE 1 AMPULE: .5; 3 SOLUTION RESPIRATORY (INHALATION) at 16:22

## 2021-12-20 RX ADMIN — Medication 400 MG: at 08:42

## 2021-12-20 RX ADMIN — GUAIFENESIN DEXTROMETHORPHAN HYDROBROMIDE ORAL SOLUTION 10 ML: 200; 20 SOLUTION ORAL at 12:12

## 2021-12-20 RX ADMIN — DULOXETINE 60 MG: 60 CAPSULE, DELAYED RELEASE ORAL at 09:00

## 2021-12-20 RX ADMIN — BENZONATATE 100 MG: 100 CAPSULE ORAL at 21:32

## 2021-12-20 RX ADMIN — COLCHICINE 0.6 MG: 0.6 TABLET ORAL at 21:30

## 2021-12-20 RX ADMIN — SODIUM CHLORIDE, PRESERVATIVE FREE 10 ML: 5 INJECTION INTRAVENOUS at 21:37

## 2021-12-20 RX ADMIN — DULOXETINE 60 MG: 60 CAPSULE, DELAYED RELEASE ORAL at 21:32

## 2021-12-20 RX ADMIN — QUETIAPINE FUMARATE 50 MG: 100 TABLET ORAL at 21:31

## 2021-12-20 RX ADMIN — IPRATROPIUM BROMIDE AND ALBUTEROL SULFATE 1 AMPULE: .5; 3 SOLUTION RESPIRATORY (INHALATION) at 07:57

## 2021-12-20 ASSESSMENT — PAIN SCALES - GENERAL
PAINLEVEL_OUTOF10: 10
PAINLEVEL_OUTOF10: 7

## 2021-12-20 NOTE — CARE COORDINATION
ONGOING DISCHARGE PLAN:    Patient is alert and oriented x4. Spoke with patient regarding discharge plan and patient confirms that plan is still  to return to home.     Denies VNS.     Most likely outpt Cardiac Cath, r/t iodine allergy.     Denies needs. Awaiting Pulm/Cardio to see. ?DC to home today. Will continue to follow for additional discharge needs.     Electronically signed by Kriss Toth RN on 12/20/2021 at 9:55 AM

## 2021-12-20 NOTE — PROGRESS NOTES
Progress Note    Patient Name:  Vika Murry    :  2021 11:40 AM      SUBJECTIVE       Ms. Luciano Chavarria  has no chest pain, shortness of breath, palpitations, nausea or vomiting      OBJECTIVE     Vital signs:    BP (!) 106/54   Pulse 63   Temp 98.4 °F (36.9 °C) (Oral)   Resp 20   Ht 5' 4\" (1.626 m)   Wt 257 lb 15 oz (117 kg)   SpO2 97%   BMI 44.27 kg/m²  3 L/min      Admit Weight:  240 lb (108.9 kg)    Last 3 weights: Wt Readings from Last 3 Encounters:   21 257 lb 15 oz (117 kg)   21 246 lb (111.6 kg)   21 253 lb (114.8 kg)       BMI: Body mass index is 44.27 kg/m². Input/Output:       Intake/Output Summary (Last 24 hours) at 2021 1140  Last data filed at 2021 1520  Gross per 24 hour   Intake 480 ml   Output --   Net 480 ml         Exam:     General appearance: awake and alert moves all ext   Lungs: no rhonchi, no wheezes, no rales  Heart: S1 and S2 no murmur  Abdomen: positive bowel sounds, no bruits, no masses  Extremities: warm and dry, no cyanosis, no clubbing        Laboratory Studies:     CBC:   Recent Labs     21  0522 21  0505   WBC 11.1* 10.2   HGB 12.5 13.5   HCT 37.5 39.9   .5* 100.2*    327     BMP:   Recent Labs     21  0522 21  0417 21  0505    141 140   K 4.0 4.0 3.8    100 97*   CO2 30 33* 34*   BUN 10 9 13   CREATININE 0.56 0.58 0.59     PT/INR: No results for input(s): PROTIME, INR in the last 72 hours. APTT: No results for input(s): APTT in the last 72 hours. MAG: No results for input(s): MG in the last 72 hours. D Dimer: No results for input(s): DDIMER in the last 72 hours. Troponin  No results for input(s): TROPONINI in the last 72 hours. Recent Labs     21  1637 21  1137   TROPONINT NOT REPORTED NOT REPORTED      BNP No results for input(s): BNP in the last 72 hours. No results for input(s): PROBNP in the last 72 hours. Pulse Ox:  SpO2  Avg: 96.2 %  Min: 92 %  Max: 99 %  Supplemental O2: O2 Flow Rate (L/min): 3 L/min     Current Meds:    guaiFENesin-dextromethorphan  10 mL Oral Q6H    carvedilol  12.5 mg Oral BID WC    budesonide  0.5 mg Nebulization BID    colchicine  0.6 mg Oral BID    influenza virus vaccine  0.5 mL IntraMUSCular Prior to discharge    insulin glargine  80 Units SubCUTAneous BID    benzonatate  100 mg Oral TID    butalbital-APAP-caffeine  1 capsule Oral Once    ARIPiprazole  5 mg Oral Daily    bumetanide  2 mg Oral Daily    polyethylene glycol  17 g Oral BID    DULoxetine  60 mg Oral BID    ipratropium-albuterol  1 ampule Inhalation 4x daily    cetirizine  10 mg Oral Daily    magnesium oxide  400 mg Oral Daily    QUEtiapine  50 mg Oral Nightly    sodium chloride flush  5-40 mL IntraVENous 2 times per day    atorvastatin  80 mg Oral Nightly    insulin lispro  0-18 Units SubCUTAneous TID WC    insulin lispro  0-9 Units SubCUTAneous Nightly     Continuous Infusions:    sodium chloride      dextrose              ASSESSMENT     Principal Problem:    Elevated troponin  Active Problems:    Diabetes mellitus type 2, controlled (HCC)    COPD (chronic obstructive pulmonary disease) ([facility])    KRISTIN (obstructive sleep apnea)    Tobacco abuse    Cardiomyopathy (HCC)    HTN (hypertension)    Tricuspid valve disorders, non-rheumatic    Pulmonary HTN ([facility])    Bipolar disorder (HCC)    Influenza A    NSTEMI (non-ST elevated myocardial infarction) ([facility]  Resolved Problems:    * No resolved hospital problems. *      PLAN      patient has allergy to aspirin as well as dye. Was transferred to [facility] last Thursday for cardiac catheterization, once there patient refused to stay. Cardiac catheterization never completed. Will need aspirin desensitization prior to any repeated attempt at cardiac catheterization. Will discuss timing with attending. At this time continue supportive care.

## 2021-12-20 NOTE — PROGRESS NOTES
PULMONARY PROGRESS NOTE:    REASON FOR VISIT: influenza A  Interval History:    Shortness of Breath: +  Cough: +++  Sputum: no          Hemoptysis: no further episodes  Chest Pain: yes - worse with cough, talking  Fever: no                   Swelling Feet: no  Headache: no                                           Nausea, Emesis, Abdominal Pain: no  Diarrhea: no         Constipation: no    Events since last visit: none    PAST MEDICAL HISTORY:      Scheduled Meds:   dextromethorphan-guaiFENesin  10 mL Oral Q6H    carvedilol  12.5 mg Oral BID WC    budesonide  0.5 mg Nebulization BID    colchicine  0.6 mg Oral BID    influenza virus vaccine  0.5 mL IntraMUSCular Prior to discharge    insulin glargine  80 Units SubCUTAneous BID    benzonatate  100 mg Oral TID    butalbital-APAP-caffeine  1 capsule Oral Once    ARIPiprazole  5 mg Oral Daily    bumetanide  2 mg Oral Daily    polyethylene glycol  17 g Oral BID    DULoxetine  60 mg Oral BID    ipratropium-albuterol  1 ampule Inhalation 4x daily    cetirizine  10 mg Oral Daily    magnesium oxide  400 mg Oral Daily    QUEtiapine  50 mg Oral Nightly    sodium chloride flush  5-40 mL IntraVENous 2 times per day    atorvastatin  80 mg Oral Nightly    insulin lispro  0-18 Units SubCUTAneous TID     insulin lispro  0-9 Units SubCUTAneous Nightly     Continuous Infusions:   sodium chloride      dextrose       PRN Meds:ipratropium-albuterol, nitroGLYCERIN, nitroGLYCERIN, albuterol sulfate HFA, sodium chloride, ibuprofen, promethazine, sodium chloride flush, sodium chloride, acetaminophen **OR** acetaminophen, glucose, dextrose, glucagon (rDNA), dextrose, oxyCODONE-acetaminophen        PHYSICAL EXAMINATION:  BP (!) 106/54   Pulse 63   Temp 98.4 °F (36.9 °C) (Oral)   Resp 20   Ht 5' 4\" (1.626 m)   Wt 257 lb 15 oz (117 kg)   SpO2 97%   BMI 44.27 kg/m²     General : Awake, alert  Neck - supple, no lymphadenopathy, JVD not raised  Heart - regular rhythm, S1 and S2 normal; no additional sounds heard  Lungs - Air Entry- fair bilaterally; breath sounds : coarse 92% on RA  Abdomen - soft, no tenderness  Upper Extremities  - no cyanosis, mottling; edema : absent  Lower Extremities: no cyanosis, mottling; edema : absent    Current Laboratory, Radiologic, Microbiologic, and Diagnostic studies reviewed  Data ReviewCBC:   Recent Labs     12/18/21  0522 12/20/21  0505   WBC 11.1* 10.2   RBC 3.62* 3.98*   HGB 12.5 13.5   HCT 37.5 39.9    327     BMP:   Recent Labs     12/18/21  0522 12/19/21  0417 12/20/21  0505   GLUCOSE 134* 115* 150*    141 140   K 4.0 4.0 3.8   BUN 10 9 13   CREATININE 0.56 0.58 0.59   CALCIUM 9.1 9.3 9.6     ABGs: No results for input(s): PHART, PO2ART, WQY6CVC, JCF7XOU, BEART, R0MBISBN, KQL4EKT in the last 72 hours.    PT/INR:  No results found for: PTINR    ASSESSMENT / PLAN:    Influenza A - Tamiflu - patient is allergic,   COPD - BD  DM  Elevated troponin - heparin gtt; Cardiac cath not done and she was brought back to [facility] - needs prep due to dye allergy  Schedule mucinex DM cough syrup  acapella  CXR - PVC, pl effusion  IV lasix  Hemoptysis - resolved  Check 2V CXR today    Plan of care discussed with Dr Zully Lyon  Electronically signed by MARYANNE Coleman CNP on 12/20/21 at 11:43 AM

## 2021-12-20 NOTE — PROGRESS NOTES
Progress Note  Date:2021       Room:[address] [place] [address]-  Patient Yesenia Hair     YOB: 1975     Age:46 y.o. Subjective    Subjective:  Symptoms:  (On an aerosol treatment. Complains of chest pain. ). Activity level: Returning to normal.    Pain:  She complains of pain that is moderate. Review of Systems  Objective         Vitals Last 24 Hours:  TEMPERATURE:  Temp  Av.5 °F (36.9 °C)  Min: 98.1 °F (36.7 °C)  Max: 99.1 °F (37.3 °C)  RESPIRATIONS RANGE: Resp  Av.6  Min: 16  Max: 20  PULSE OXIMETRY RANGE: SpO2  Av.1 %  Min: 92 %  Max: 99 %  PULSE RANGE: Pulse  Av.6  Min: 55  Max: 69  BLOOD PRESSURE RANGE: Systolic (16LXI), STACY:750 , Min:103 , YFS:496   ; Diastolic (33CJR), JKV:27, Min:54, Max:69    I/O (24Hr): Intake/Output Summary (Last 24 hours) at 2021 0836  Last data filed at 2021 1520  Gross per 24 hour   Intake 480 ml   Output --   Net 480 ml     Objective:  General Appearance:  Comfortable. Vital signs: (most recent): Blood pressure (!) 106/54, pulse 63, temperature 98.4 °F (36.9 °C), temperature source Oral, resp. rate 20, height 5' 4\" (1.626 m), weight 257 lb 15 oz (117 kg), SpO2 97 %. Vital signs are normal.    Lungs:  Normal effort and normal respiratory rate. There are decreased breath sounds. Heart: Normal rate. Regular rhythm. S1 normal and S2 normal.      Labs/Imaging/Diagnostics    Labs:  CBC:  Recent Labs     21  0522 21  0505   WBC 11.1* 10.2   RBC 3.62* 3.98*   HGB 12.5 13.5   HCT 37.5 39.9   .5* 100.2*   RDW 12.5 12.7    327     CHEMISTRIES:  Recent Labs     21  0522 21  0417 21  0505    141 140   K 4.0 4.0 3.8    100 97*   CO2 30 33* 34*   BUN 10 9 13   CREATININE 0.56 0.58 0.59   GLUCOSE 134* 115* 150*     PT/INR:No results for input(s): PROTIME, INR in the last 72 hours. APTT:No results for input(s): APTT in the last 72 hours.   LIVER PROFILE:No results for input(s): AST, ALT, BILIDIR, BILITOT, ALKPHOS in the last 72 hours. Imaging Last 24 Hours:  XR CHEST (2 VW)    Result Date: 12/18/2021  EXAMINATION: TWO XRAY VIEWS OF THE CHEST 12/18/2021 12:23 pm COMPARISON: 12/14/2021 HISTORY: ORDERING SYSTEM PROVIDED HISTORY: cough TECHNOLOGIST PROVIDED HISTORY: cough Reason for Exam: cough, congestion FINDINGS: Small amount of fluid in the right fissure and mild ground-glass prominence in the right lower lung zone. .The cardiac size is borderline enlarged. . Pulmonary vascularity appears normal.. [place] No acute bony abnormalities. The hilar structures are normal.     Small amount of fluid in the right fissure and mild ground-glass prominence in the right lower lung zone suggesting mild pneumonia. .     Assessment//Plan           Hospital Problems           Last Modified POA    * (Principal) Elevated troponin 12/15/2021 Yes    Diabetes mellitus type 2, controlled ([address] 12/15/2021 Yes    COPD (chronic obstructive pulmonary disease) (HCC) (Chronic) 12/15/2021 Yes    KRISTIN (obstructive sleep apnea) 12/15/2021 Yes    Overview Addendum 7/19/2019  3:21 PM by Ashley Gregory     Intolerant to CPAP    Overview:   Overview:   Intolerant to CPAP         Tobacco abuse 12/15/2021 Yes    Cardiomyopathy ([address] 12/15/2021 Yes    HTN (hypertension) 12/15/2021 Yes    Tricuspid valve disorders, non-rheumatic 12/15/2021 Yes    Pulmonary HTN ([address] 12/15/2021 Yes    Bipolar disorder ([address] 12/15/2021 Yes    Influenza A 12/15/2021 Yes    NSTEMI (non-ST elevated myocardial infarction) ([address] 12/15/2021 Yes        Assessment & Plan  D/c plan per cardiology/pulmonology      Electronically signed by Nuria Rowland MD on 12/20/21 at 8:36 AM EST

## 2021-12-20 NOTE — PROGRESS NOTES
Pt requested lotrimin cream for redness under breasts as that is what she uses at home. New order per dr Mg Denson to order BID.

## 2021-12-20 NOTE — PLAN OF CARE
Problem: Infection:  Goal: Will remain free from infection  Description: Will remain free from infection  12/20/2021 2614 by Annette Perez RN  Outcome: Ongoing  12/19/2021 1615 by Jd Bonilla RN  Outcome: Ongoing     Problem: Safety:  Goal: Free from accidental physical injury  Description: Free from accidental physical injury  12/20/2021 0613 by Annette Perez RN  Outcome: Ongoing  12/19/2021 1615 by Jd Bonilla RN  Outcome: Ongoing  Goal: Free from intentional harm  Description: Free from intentional harm  12/20/2021 0613 by Annette Perez RN  Outcome: Ongoing  12/19/2021 1615 by Jd Bonilla RN  Outcome: Ongoing     Problem: Daily Care:  Goal: Daily care needs are met  Description: Daily care needs are met  12/20/2021 4913 by Annette Perez RN  Outcome: Ongoing  12/19/2021 1615 by Jd Bonilla RN  Outcome: Ongoing     Problem: Pain:  Goal: Patient's pain/discomfort is manageable  Description: Patient's pain/discomfort is manageable  12/20/2021 0613 by Annette Perez RN  Outcome: Ongoing  12/19/2021 1615 by Jd Bonilla RN  Outcome: Ongoing  Goal: Pain level will decrease  Description: Pain level will decrease  12/20/2021 0613 by Annette Perez RN  Outcome: Ongoing  12/19/2021 1615 by Jd Bonilla RN  Outcome: Ongoing  Goal: Control of acute pain  Description: Control of acute pain  12/20/2021 0613 by Annette Perez RN  Outcome: Ongoing  12/19/2021 1615 by Jd Bonilla RN  Outcome: Ongoing  Goal: Control of chronic pain  Description: Control of chronic pain  12/20/2021 0613 by Annette Perez RN  Outcome: Ongoing  12/19/2021 1615 by Jd Bonilla RN  Outcome: Ongoing     Problem: Skin Integrity:  Goal: Skin integrity will stabilize  Description: Skin integrity will stabilize  12/20/2021 0613 by Annette Perez RN  Outcome: Ongoing  12/19/2021 1615 by Jd Bonilla RN  Outcome: Ongoing     Problem: Discharge Planning:  Goal: Patients continuum of care needs are met  Description: Patients continuum of care needs are met  12/20/2021 6300 by Radha Perez RN  Outcome: Ongoing  12/19/2021 1615 by Abby Bardales RN  Outcome: Ongoing

## 2021-12-21 LAB
-: NORMAL
ANION GAP SERPL CALCULATED.3IONS-SCNC: 10 MMOL/L (ref 9–17)
BUN BLDV-MCNC: 17 MG/DL (ref 6–20)
BUN/CREAT BLD: ABNORMAL (ref 9–20)
CALCIUM SERPL-MCNC: 9.3 MG/DL (ref 8.6–10.4)
CHLORIDE BLD-SCNC: 100 MMOL/L (ref 98–107)
CO2: 31 MMOL/L (ref 20–31)
CREAT SERPL-MCNC: 0.7 MG/DL (ref 0.5–0.9)
GFR AFRICAN AMERICAN: >60 ML/MIN
GFR NON-AFRICAN AMERICAN: >60 ML/MIN
GFR SERPL CREATININE-BSD FRML MDRD: ABNORMAL ML/MIN/{1.73_M2}
GFR SERPL CREATININE-BSD FRML MDRD: ABNORMAL ML/MIN/{1.73_M2}
GLUCOSE BLD-MCNC: 154 MG/DL (ref 65–105)
GLUCOSE BLD-MCNC: 155 MG/DL (ref 70–99)
GLUCOSE BLD-MCNC: 173 MG/DL (ref 65–105)
GLUCOSE BLD-MCNC: 269 MG/DL (ref 65–105)
GLUCOSE BLD-MCNC: 281 MG/DL (ref 65–105)
POTASSIUM SERPL-SCNC: 3.9 MMOL/L (ref 3.7–5.3)
REASON FOR REJECTION: NORMAL
SODIUM BLD-SCNC: 141 MMOL/L (ref 135–144)
ZZ NTE CLEAN UP: ORDERED TEST: NORMAL
ZZ NTE WITH NAME CLEAN UP: SPECIMEN SOURCE: NORMAL

## 2021-12-21 PROCEDURE — 82947 ASSAY GLUCOSE BLOOD QUANT: CPT

## 2021-12-21 PROCEDURE — 6370000000 HC RX 637 (ALT 250 FOR IP): Performed by: NURSE PRACTITIONER

## 2021-12-21 PROCEDURE — 94640 AIRWAY INHALATION TREATMENT: CPT

## 2021-12-21 PROCEDURE — 6370000000 HC RX 637 (ALT 250 FOR IP): Performed by: FAMILY MEDICINE

## 2021-12-21 PROCEDURE — 6360000002 HC RX W HCPCS: Performed by: INTERNAL MEDICINE

## 2021-12-21 PROCEDURE — 6370000000 HC RX 637 (ALT 250 FOR IP): Performed by: INTERNAL MEDICINE

## 2021-12-21 PROCEDURE — 2580000003 HC RX 258: Performed by: FAMILY MEDICINE

## 2021-12-21 PROCEDURE — 2060000000 HC ICU INTERMEDIATE R&B

## 2021-12-21 PROCEDURE — 94669 MECHANICAL CHEST WALL OSCILL: CPT

## 2021-12-21 PROCEDURE — 94761 N-INVAS EAR/PLS OXIMETRY MLT: CPT

## 2021-12-21 PROCEDURE — 36415 COLL VENOUS BLD VENIPUNCTURE: CPT

## 2021-12-21 PROCEDURE — 99232 SBSQ HOSP IP/OBS MODERATE 35: CPT | Performed by: FAMILY MEDICINE

## 2021-12-21 PROCEDURE — 2700000000 HC OXYGEN THERAPY PER DAY

## 2021-12-21 PROCEDURE — 80048 BASIC METABOLIC PNL TOTAL CA: CPT

## 2021-12-21 RX ORDER — CLOPIDOGREL BISULFATE 75 MG/1
75 TABLET ORAL DAILY
Status: DISCONTINUED | OUTPATIENT
Start: 2021-12-21 | End: 2021-12-23 | Stop reason: HOSPADM

## 2021-12-21 RX ORDER — DIPHENHYDRAMINE HYDROCHLORIDE 50 MG/ML
25 INJECTION INTRAMUSCULAR; INTRAVENOUS EVERY 6 HOURS
Status: DISPENSED | OUTPATIENT
Start: 2021-12-21 | End: 2021-12-22

## 2021-12-21 RX ORDER — CLOTRIMAZOLE 1 %
CREAM (GRAM) TOPICAL
Qty: 60 G | Refills: 1 | Status: SHIPPED | OUTPATIENT
Start: 2021-12-21 | End: 2021-12-28

## 2021-12-21 RX ORDER — FAMOTIDINE 20 MG/1
20 TABLET, FILM COATED ORAL EVERY 6 HOURS
Status: DISPENSED | OUTPATIENT
Start: 2021-12-21 | End: 2021-12-22

## 2021-12-21 RX ORDER — METHYLPREDNISOLONE SODIUM SUCCINATE 40 MG/ML
40 INJECTION, POWDER, LYOPHILIZED, FOR SOLUTION INTRAMUSCULAR; INTRAVENOUS EVERY 6 HOURS
Status: DISPENSED | OUTPATIENT
Start: 2021-12-21 | End: 2021-12-22

## 2021-12-21 RX ORDER — GUAIFENESIN DEXTROMETHORPHAN HYDROBROMIDE ORAL SOLUTION 10; 100 MG/5ML; MG/5ML
10 SOLUTION ORAL EVERY 6 HOURS
Qty: 1 EACH | Refills: 0
Start: 2021-12-21 | End: 2022-02-14 | Stop reason: SDUPTHER

## 2021-12-21 RX ORDER — ALPRAZOLAM 0.25 MG/1
0.25 TABLET ORAL EVERY 6 HOURS PRN
Status: DISCONTINUED | OUTPATIENT
Start: 2021-12-21 | End: 2021-12-23 | Stop reason: HOSPADM

## 2021-12-21 RX ADMIN — ATORVASTATIN CALCIUM 80 MG: 80 TABLET, FILM COATED ORAL at 22:03

## 2021-12-21 RX ADMIN — PROMETHAZINE HYDROCHLORIDE 25 MG: 25 TABLET ORAL at 09:29

## 2021-12-21 RX ADMIN — GUAIFENESIN DEXTROMETHORPHAN HYDROBROMIDE ORAL SOLUTION 10 ML: 200; 20 SOLUTION ORAL at 12:21

## 2021-12-21 RX ADMIN — METHYLPREDNISOLONE SODIUM SUCCINATE 40 MG: 40 INJECTION, POWDER, FOR SOLUTION INTRAMUSCULAR; INTRAVENOUS at 22:03

## 2021-12-21 RX ADMIN — GUAIFENESIN DEXTROMETHORPHAN HYDROBROMIDE ORAL SOLUTION 10 ML: 200; 20 SOLUTION ORAL at 17:32

## 2021-12-21 RX ADMIN — CETIRIZINE HYDROCHLORIDE 10 MG: 10 TABLET, FILM COATED ORAL at 09:28

## 2021-12-21 RX ADMIN — FAMOTIDINE 20 MG: 20 TABLET ORAL at 09:57

## 2021-12-21 RX ADMIN — GUAIFENESIN DEXTROMETHORPHAN HYDROBROMIDE ORAL SOLUTION 10 ML: 200; 20 SOLUTION ORAL at 05:37

## 2021-12-21 RX ADMIN — CARVEDILOL 12.5 MG: 12.5 TABLET, FILM COATED ORAL at 09:27

## 2021-12-21 RX ADMIN — DIPHENHYDRAMINE HYDROCHLORIDE 25 MG: 50 INJECTION, SOLUTION INTRAMUSCULAR; INTRAVENOUS at 15:15

## 2021-12-21 RX ADMIN — IPRATROPIUM BROMIDE AND ALBUTEROL SULFATE 1 AMPULE: .5; 3 SOLUTION RESPIRATORY (INHALATION) at 19:06

## 2021-12-21 RX ADMIN — COLCHICINE 0.6 MG: 0.6 TABLET ORAL at 09:28

## 2021-12-21 RX ADMIN — OXYCODONE HYDROCHLORIDE AND ACETAMINOPHEN 1 TABLET: 5; 325 TABLET ORAL at 19:00

## 2021-12-21 RX ADMIN — METHYLPREDNISOLONE SODIUM SUCCINATE 40 MG: 40 INJECTION, POWDER, FOR SOLUTION INTRAMUSCULAR; INTRAVENOUS at 15:17

## 2021-12-21 RX ADMIN — FAMOTIDINE 20 MG: 20 TABLET ORAL at 15:17

## 2021-12-21 RX ADMIN — DULOXETINE 60 MG: 60 CAPSULE, DELAYED RELEASE ORAL at 22:03

## 2021-12-21 RX ADMIN — DULOXETINE 60 MG: 60 CAPSULE, DELAYED RELEASE ORAL at 09:28

## 2021-12-21 RX ADMIN — SODIUM CHLORIDE, PRESERVATIVE FREE 10 ML: 5 INJECTION INTRAVENOUS at 22:14

## 2021-12-21 RX ADMIN — QUETIAPINE FUMARATE 50 MG: 100 TABLET ORAL at 22:04

## 2021-12-21 RX ADMIN — INSULIN GLARGINE 80 UNITS: 100 INJECTION, SOLUTION SUBCUTANEOUS at 09:32

## 2021-12-21 RX ADMIN — SODIUM CHLORIDE, PRESERVATIVE FREE 10 ML: 5 INJECTION INTRAVENOUS at 09:34

## 2021-12-21 RX ADMIN — ALPRAZOLAM 0.25 MG: 0.25 TABLET ORAL at 22:04

## 2021-12-21 RX ADMIN — INSULIN LISPRO 9 UNITS: 100 INJECTION, SOLUTION INTRAVENOUS; SUBCUTANEOUS at 17:36

## 2021-12-21 RX ADMIN — CLOTRIMAZOLE: 10 CREAM TOPICAL at 09:35

## 2021-12-21 RX ADMIN — SODIUM CHLORIDE, PRESERVATIVE FREE 10 ML: 5 INJECTION INTRAVENOUS at 15:20

## 2021-12-21 RX ADMIN — CLOTRIMAZOLE: 10 CREAM TOPICAL at 22:12

## 2021-12-21 RX ADMIN — METHYLPREDNISOLONE SODIUM SUCCINATE 40 MG: 40 INJECTION, POWDER, FOR SOLUTION INTRAMUSCULAR; INTRAVENOUS at 09:30

## 2021-12-21 RX ADMIN — FAMOTIDINE 20 MG: 20 TABLET ORAL at 22:04

## 2021-12-21 RX ADMIN — INSULIN LISPRO 5 UNITS: 100 INJECTION, SOLUTION INTRAVENOUS; SUBCUTANEOUS at 22:02

## 2021-12-21 RX ADMIN — Medication 400 MG: at 09:29

## 2021-12-21 RX ADMIN — INSULIN GLARGINE 80 UNITS: 100 INJECTION, SOLUTION SUBCUTANEOUS at 17:35

## 2021-12-21 RX ADMIN — INSULIN LISPRO 3 UNITS: 100 INJECTION, SOLUTION INTRAVENOUS; SUBCUTANEOUS at 12:21

## 2021-12-21 RX ADMIN — DIPHENHYDRAMINE HYDROCHLORIDE 25 MG: 50 INJECTION, SOLUTION INTRAMUSCULAR; INTRAVENOUS at 22:53

## 2021-12-21 RX ADMIN — GUAIFENESIN DEXTROMETHORPHAN HYDROBROMIDE ORAL SOLUTION 10 ML: 200; 20 SOLUTION ORAL at 00:02

## 2021-12-21 RX ADMIN — DIPHENHYDRAMINE HYDROCHLORIDE 25 MG: 50 INJECTION, SOLUTION INTRAMUSCULAR; INTRAVENOUS at 09:30

## 2021-12-21 RX ADMIN — BENZONATATE 100 MG: 100 CAPSULE ORAL at 22:03

## 2021-12-21 RX ADMIN — INSULIN LISPRO 3 UNITS: 100 INJECTION, SOLUTION INTRAVENOUS; SUBCUTANEOUS at 09:32

## 2021-12-21 RX ADMIN — BENZONATATE 100 MG: 100 CAPSULE ORAL at 15:17

## 2021-12-21 RX ADMIN — CARVEDILOL 12.5 MG: 12.5 TABLET, FILM COATED ORAL at 19:15

## 2021-12-21 RX ADMIN — BENZONATATE 100 MG: 100 CAPSULE ORAL at 09:29

## 2021-12-21 RX ADMIN — GUAIFENESIN DEXTROMETHORPHAN HYDROBROMIDE ORAL SOLUTION 10 ML: 200; 20 SOLUTION ORAL at 22:54

## 2021-12-21 RX ADMIN — CLOPIDOGREL BISULFATE 75 MG: 75 TABLET ORAL at 09:27

## 2021-12-21 RX ADMIN — IPRATROPIUM BROMIDE AND ALBUTEROL SULFATE 1 AMPULE: .5; 3 SOLUTION RESPIRATORY (INHALATION) at 07:36

## 2021-12-21 ASSESSMENT — PAIN SCALES - GENERAL
PAINLEVEL_OUTOF10: 8
PAINLEVEL_OUTOF10: 5
PAINLEVEL_OUTOF10: 8

## 2021-12-21 ASSESSMENT — PAIN DESCRIPTION - PAIN TYPE: TYPE: ACUTE PAIN

## 2021-12-21 ASSESSMENT — PAIN DESCRIPTION - LOCATION: LOCATION: GENERALIZED

## 2021-12-21 NOTE — CARE COORDINATION
DISCHARGE PLANNING NOTE:    Plan is for this patient to return to home upon discharge. Patient is going for heart cath tomorrow. Declines VNS.      Electronically signed by Brinda Jacobson RN on 12/21/2021 at 2:37 PM

## 2021-12-21 NOTE — PROGRESS NOTES
PULMONARY PROGRESS NOTE:    REASON FOR VISIT: influenza A  Interval History:    Shortness of Breath: +  Cough: +++  Sputum: no          Hemoptysis: no further episodes  Chest Pain: yes - worse with cough, talking  Fever: no                   Swelling Feet: no  Headache: no                                           Nausea, Emesis, Abdominal Pain: no  Diarrhea: no         Constipation: no    Events since last visit: none    PAST MEDICAL HISTORY:      Scheduled Meds:   famotidine  20 mg Oral Q6H    methylPREDNISolone  40 mg IntraVENous Q6H    clopidogrel  75 mg Oral Daily    diphenhydrAMINE  25 mg IntraVENous Q6H    dextromethorphan-guaiFENesin  10 mL Oral Q6H    clotrimazole   Topical BID    carvedilol  12.5 mg Oral BID WC    budesonide  0.5 mg Nebulization BID    colchicine  0.6 mg Oral BID    influenza virus vaccine  0.5 mL IntraMUSCular Prior to discharge    insulin glargine  80 Units SubCUTAneous BID    benzonatate  100 mg Oral TID    butalbital-APAP-caffeine  1 capsule Oral Once    ARIPiprazole  5 mg Oral Daily    bumetanide  2 mg Oral Daily    polyethylene glycol  17 g Oral BID    DULoxetine  60 mg Oral BID    ipratropium-albuterol  1 ampule Inhalation 4x daily    cetirizine  10 mg Oral Daily    magnesium oxide  400 mg Oral Daily    QUEtiapine  50 mg Oral Nightly    sodium chloride flush  5-40 mL IntraVENous 2 times per day    atorvastatin  80 mg Oral Nightly    insulin lispro  0-18 Units SubCUTAneous TID WC    insulin lispro  0-9 Units SubCUTAneous Nightly     Continuous Infusions:   sodium chloride      dextrose       PRN Meds:ipratropium-albuterol, nitroGLYCERIN, nitroGLYCERIN, albuterol sulfate HFA, sodium chloride, ibuprofen, promethazine, sodium chloride flush, sodium chloride, acetaminophen **OR** acetaminophen, glucose, dextrose, glucagon (rDNA), dextrose, oxyCODONE-acetaminophen        PHYSICAL EXAMINATION:  BP (!) 147/133   Pulse 85   Temp 98.2 °F (36.8 °C) (Oral) Resp 16   Ht 5' 4\" (1.626 m)   Wt 252 lb 3.3 oz (114.4 kg)   SpO2 98%   BMI 43.29 kg/m²     General : Awake, alert  Neck - supple, no lymphadenopathy, JVD not raised  Heart - regular rhythm, S1 and S2 normal; no additional sounds heard  Lungs - Air Entry- fair bilaterally; breath sounds : coarse 92% on RA  Abdomen - soft, no tenderness  Upper Extremities  - no cyanosis, mottling; edema : absent  Lower Extremities: no cyanosis, mottling; edema : absent    Current Laboratory, Radiologic, Microbiologic, and Diagnostic studies reviewed  Data ReviewCBC:   Recent Labs     12/20/21  0505   WBC 10.2   RBC 3.98*   HGB 13.5   HCT 39.9        BMP:   Recent Labs     12/19/21  0417 12/20/21  0505 12/21/21  0651   GLUCOSE 115* 150* 155*    140 141   K 4.0 3.8 3.9   BUN 9 13 17   CREATININE 0.58 0.59 0.70   CALCIUM 9.3 9.6 9.3     ABGs: No results for input(s): PHART, PO2ART, HBM6GFW, TEA9ICD, BEART, K5OQPVAU, VPX2KNA in the last 72 hours.    PT/INR:  No results found for: PTINR    ASSESSMENT / PLAN:    Influenza A - Tamiflu - patient is allergic,   COPD - BD  DM  Elevated troponin - heparin gtt; Cardiac cath not done and she was brought back to [facility] - needs prep due to dye allergy  Schedule mucinex DM cough syrup  acapella  CXR - PVC, pl effusion  IV lasix  Plans for cath noted for 12/22/21  DW DR Cespedes/ DR Han Pac      Electronically signed by Solo Villa MD on 12/21/21 at 8:33 AM

## 2021-12-21 NOTE — PROGRESS NOTES
RN rounded with Dr. Bernice Aguiar. New orders for solumedrol, benadryl and pepcid 6 hours scheduled for 6 doses for dye prophylaxis prior to heart cath. New orders also entered for Plavix daily.

## 2021-12-21 NOTE — PROGRESS NOTES
RN spoke with Dr. Natalie Emerson and informed him that pharmacy was questioning the order for pepcid every 6 hours for 6 doses. He confirmed that the order was correct as it was for dye prophylaxis. He stated that it could be changed to Zantac if needed. RN notified Pharmacy.

## 2021-12-21 NOTE — PROGRESS NOTES
Progress Note  Date:2021       Room:[address] [place] [address]  Patient Leann Apgar     YOB: 1975     Age:46 y.o. Subjective    Subjective:  Symptoms:  Stable. (Sitting up in bed, resps easy until she starts coughing. Asked good questions about viral vs bacterial infection and the flu vaccine). Diet:  Adequate intake. Activity level: Returning to normal.       Review of Systems  Objective         Vitals Last 24 Hours:  TEMPERATURE:  Temp  Av.2 °F (36.8 °C)  Min: 97.9 °F (36.6 °C)  Max: 98.5 °F (36.9 °C)  RESPIRATIONS RANGE: Resp  Av  Min: 16  Max: 20  PULSE OXIMETRY RANGE: SpO2  Av.7 %  Min: 93 %  Max: 98 %  PULSE RANGE: Pulse  Av.4  Min: 67  Max: 85  BLOOD PRESSURE RANGE: Systolic (92IIV), CYK:376 , Min:108 , LFX:606   ; Diastolic (81TDJ), XWX:29, Min:51, Max:133    I/O (24Hr): Intake/Output Summary (Last 24 hours) at 2021 0818  Last data filed at 2021 1235  Gross per 24 hour   Intake 420 ml   Output --   Net 420 ml     Objective:  General Appearance:  Comfortable. Vital signs: (most recent): Blood pressure (!) 147/133, pulse 85, temperature 98.2 °F (36.8 °C), temperature source Oral, resp. rate 16, height 5' 4\" (1.626 m), weight 252 lb 3.3 oz (114.4 kg), SpO2 98 %. Lungs:  Normal effort and normal respiratory rate. There are decreased breath sounds. Heart: Normal rate. Regular rhythm. S1 normal and S2 normal.      Labs/Imaging/Diagnostics    Labs:  CBC:  Recent Labs     21  0505   WBC 10.2   RBC 3.98*   HGB 13.5   HCT 39.9   .2*   RDW 12.7        CHEMISTRIES:  Recent Labs     21  0417 21  0505 21  0651    140 141   K 4.0 3.8 3.9    97* 100   CO2 33* 34* 31   BUN 9 13 17   CREATININE 0.58 0.59 0.70   GLUCOSE 115* 150* 155*     PT/INR:No results for input(s): PROTIME, INR in the last 72 hours. APTT:No results for input(s): APTT in the last 72 hours.   LIVER PROFILE:No results for input(s): AST, ALT, BILIDIR, BILITOT, ALKPHOS in the last 72 hours. Imaging Last 24 Hours:  XR CHEST (2 VW)    Result Date: 12/20/2021  EXAMINATION: TWO XRAY VIEWS OF THE CHEST 12/20/2021 11:51 am COMPARISON: Chest, two view 12/18/2021 HISTORY: ORDERING SYSTEM PROVIDED HISTORY: pl effusion TECHNOLOGIST PROVIDED HISTORY: pl effusion Reason for Exam: PNEUMONIA FINDINGS: PA and lateral views of the chest were obtained. There is enlargement of the cardiac silhouette. Mediastinal contour and pulmonary vascularity are within normal limits. There is thickening of the right minor fissure. Alternately, this could represent fluid within the fissure or atelectasis adjacent to the fissure. There are alveolar opacities in the right lower lung. Lungs and pleural spaces are otherwise clear. No evidence of fluid in the posterior costophrenic sulcus. Little change in right lower lobe airspace disease.      Assessment//Plan           Hospital Problems           Last Modified POA    * (Principal) Elevated troponin 12/15/2021 Yes    Diabetes mellitus type 2, controlled ([address] 12/15/2021 Yes    COPD (chronic obstructive pulmonary disease) (HCC) (Chronic) 12/15/2021 Yes    KRISTIN (obstructive sleep apnea) 12/15/2021 Yes    Overview Addendum 7/19/2019  3:21 PM by [facility]     Intolerant to CPAP    Overview:   Overview:   Intolerant to CPAP         Tobacco abuse 12/15/2021 Yes    Cardiomyopathy ([address] 12/15/2021 Yes    HTN (hypertension) 12/15/2021 Yes    Tricuspid valve disorders, non-rheumatic 12/15/2021 Yes    Pulmonary HTN ([address] 12/15/2021 Yes    Bipolar disorder ([address] 12/15/2021 Yes    Influenza A 12/15/2021 Yes    NSTEMI (non-ST elevated myocardial infarction) ([address] 12/15/2021 Yes        Assessment & Plan  Discharge planning    Electronically signed by Teja Peters MD on 12/21/21 at 8:18 AM EST

## 2021-12-21 NOTE — PROGRESS NOTES
Nutrition Assessment     Type and Reason for Visit: Initial,RD Nutrition Re-Screen/LOS    Nutrition Recommendations/Plan: Will continue to provide 5 carbohydrate choices per tray     Nutrition Assessment:  Pt admitted due to MI. She has consistently consumed more than 50% of food provided on appropriate diet. Malnutrition Assessment:  Malnutrition Status: No malnutrition     Current Nutrition Therapies:    ADULT DIET;  Regular; 5 carb choices (75 gm/meal)    Anthropometric Measures:  · Height: 5' 4\" (162.6 cm)  · Current Body Wt: 252 lb (114.3 kg)   · BMI: 43.2    Nutrition Diagnosis:   · No nutrition diagnosis at this time related to   as evidenced by      Nutrition Interventions:   Food and/or Nutrient Delivery:  Continue Current Diet  Food/Nutrient Intake Outcomes:  Food and Nutrient Intake    Discharge Planning:    Continue current diet     Electronically signed by Brittanie Polk RD, LD on 12/21/21 at 2:09 PM EST    Contact: [phone]

## 2021-12-21 NOTE — PROGRESS NOTES
Progress Note    Patient Name:  Vika Murry    :  2021 8:04 AM      SUBJECTIVE       Ms. Luciano Chavarria  has no chest pain, shortness of breath, palpitations, nausea or vomiting      OBJECTIVE     Vital signs:    BP (!) 147/133   Pulse 85   Temp 98.2 °F (36.8 °C) (Oral)   Resp 16   Ht 5' 4\" (1.626 m)   Wt 252 lb 3.3 oz (114.4 kg)   SpO2 98%   BMI 43.29 kg/m²  3.5 L/min      Admit Weight:  240 lb (108.9 kg)    Last 3 weights: Wt Readings from Last 3 Encounters:   21 252 lb 3.3 oz (114.4 kg)   21 246 lb (111.6 kg)   21 253 lb (114.8 kg)       BMI: Body mass index is 43.29 kg/m². Input/Output:       Intake/Output Summary (Last 24 hours) at 2021 0804  Last data filed at 2021 1235  Gross per 24 hour   Intake 420 ml   Output --   Net 420 ml         Exam:     General appearance: awake and alert moves all ext   Lungs: no rhonchi, no wheezes, no rales  Heart: S1 and S2 no murmur  Abdomen: positive bowel sounds, no bruits, no masses  Extremities: warm and dry, no cyanosis, no clubbing        Laboratory Studies:     CBC:   Recent Labs     21  0505   WBC 10.2   HGB 13.5   HCT 39.9   .2*        BMP:   Recent Labs     21  0417 21  0505 21  0651    140 141   K 4.0 3.8 3.9    97* 100   CO2 33* 34* 31   BUN 9 13 17   CREATININE 0.58 0.59 0.70     PT/INR: No results for input(s): PROTIME, INR in the last 72 hours. APTT: No results for input(s): APTT in the last 72 hours. MAG: No results for input(s): MG in the last 72 hours. D Dimer: No results for input(s): DDIMER in the last 72 hours. Troponin  No results for input(s): TROPONINI in the last 72 hours. Recent Labs     21  1137   TROPONINT NOT REPORTED      BNP No results for input(s): BNP in the last 72 hours. No results for input(s): PROBNP in the last 72 hours. Pulse Ox:  SpO2  Av.7 %  Min: 93 %  Max: 98 %  Supplemental O2: O2 Flow Rate

## 2021-12-21 NOTE — PLAN OF CARE
Problem: Infection:  Goal: Will remain free from infection  Description: Will remain free from infection  12/21/2021 0344 by Dee Whitehead RN  Outcome: Ongoing     Problem: Safety:  Goal: Free from accidental physical injury  Description: Free from accidental physical injury  12/21/2021 0344 by Dee Whitehead RN  Outcome: Ongoing     Problem: Safety:  Goal: Free from intentional harm  Description: Free from intentional harm  12/21/2021 0344 by Dee Whitehead RN  Outcome: Ongoing     Problem: Daily Care:  Goal: Daily care needs are met  Description: Daily care needs are met  12/21/2021 0344 by Dee Whitehead RN  Outcome: Ongoing     Problem: Skin Integrity:  Goal: Skin integrity will stabilize  Description: Skin integrity will stabilize  12/21/2021 0344 by Dee Whitehead RN  Outcome: Ongoing    Problem: Discharge Planning:  Goal: Patients continuum of care needs are met  Description: Patients continuum of care needs are met  12/21/2021 0344 by Dee Whitehead RN  Outcome: Ongoing

## 2021-12-22 VITALS
BODY MASS INDEX: 42.68 KG/M2 | TEMPERATURE: 98.1 F | HEIGHT: 64 IN | SYSTOLIC BLOOD PRESSURE: 126 MMHG | RESPIRATION RATE: 20 BRPM | OXYGEN SATURATION: 96 % | DIASTOLIC BLOOD PRESSURE: 76 MMHG | HEART RATE: 83 BPM | WEIGHT: 250 LBS

## 2021-12-22 LAB
ANION GAP SERPL CALCULATED.3IONS-SCNC: 14 MMOL/L (ref 9–17)
BUN BLDV-MCNC: 16 MG/DL (ref 6–20)
BUN/CREAT BLD: ABNORMAL (ref 9–20)
CALCIUM SERPL-MCNC: 9.7 MG/DL (ref 8.6–10.4)
CHLORIDE BLD-SCNC: 99 MMOL/L (ref 98–107)
CHOLESTEROL/HDL RATIO: 3.6
CHOLESTEROL: 182 MG/DL
CO2: 26 MMOL/L (ref 20–31)
CREAT SERPL-MCNC: 0.7 MG/DL (ref 0.5–0.9)
GFR AFRICAN AMERICAN: >60 ML/MIN
GFR NON-AFRICAN AMERICAN: >60 ML/MIN
GFR SERPL CREATININE-BSD FRML MDRD: ABNORMAL ML/MIN/{1.73_M2}
GFR SERPL CREATININE-BSD FRML MDRD: ABNORMAL ML/MIN/{1.73_M2}
GLUCOSE BLD-MCNC: 311 MG/DL (ref 65–105)
GLUCOSE BLD-MCNC: 353 MG/DL (ref 70–99)
GLUCOSE BLD-MCNC: 363 MG/DL (ref 65–105)
HCT VFR BLD CALC: 42.8 % (ref 36–46)
HDLC SERPL-MCNC: 51 MG/DL
HEMOGLOBIN: 14.1 G/DL (ref 12–16)
LDL CHOLESTEROL: 110 MG/DL (ref 0–130)
MCH RBC QN AUTO: 33.6 PG (ref 26–34)
MCHC RBC AUTO-ENTMCNC: 33 G/DL (ref 31–37)
MCV RBC AUTO: 102 FL (ref 80–100)
NRBC AUTOMATED: ABNORMAL PER 100 WBC
PDW BLD-RTO: 12.5 % (ref 11.5–14.9)
PLATELET # BLD: 337 K/UL (ref 150–450)
PMV BLD AUTO: 7.9 FL (ref 6–12)
POTASSIUM SERPL-SCNC: 4.6 MMOL/L (ref 3.7–5.3)
RBC # BLD: 4.19 M/UL (ref 4–5.2)
SODIUM BLD-SCNC: 139 MMOL/L (ref 135–144)
TRIGL SERPL-MCNC: 105 MG/DL
VLDLC SERPL CALC-MCNC: NORMAL MG/DL (ref 1–30)
WBC # BLD: 15.9 K/UL (ref 3.5–11)

## 2021-12-22 PROCEDURE — 6370000000 HC RX 637 (ALT 250 FOR IP): Performed by: INTERNAL MEDICINE

## 2021-12-22 PROCEDURE — 94761 N-INVAS EAR/PLS OXIMETRY MLT: CPT

## 2021-12-22 PROCEDURE — 94669 MECHANICAL CHEST WALL OSCILL: CPT

## 2021-12-22 PROCEDURE — 6370000000 HC RX 637 (ALT 250 FOR IP): Performed by: NURSE PRACTITIONER

## 2021-12-22 PROCEDURE — 82947 ASSAY GLUCOSE BLOOD QUANT: CPT

## 2021-12-22 PROCEDURE — 36415 COLL VENOUS BLD VENIPUNCTURE: CPT

## 2021-12-22 PROCEDURE — 85027 COMPLETE CBC AUTOMATED: CPT

## 2021-12-22 PROCEDURE — 94640 AIRWAY INHALATION TREATMENT: CPT

## 2021-12-22 PROCEDURE — 6360000002 HC RX W HCPCS: Performed by: INTERNAL MEDICINE

## 2021-12-22 PROCEDURE — 80061 LIPID PANEL: CPT

## 2021-12-22 PROCEDURE — 6370000000 HC RX 637 (ALT 250 FOR IP): Performed by: FAMILY MEDICINE

## 2021-12-22 PROCEDURE — 80048 BASIC METABOLIC PNL TOTAL CA: CPT

## 2021-12-22 RX ADMIN — FAMOTIDINE 20 MG: 20 TABLET ORAL at 02:59

## 2021-12-22 RX ADMIN — CARVEDILOL 12.5 MG: 12.5 TABLET, FILM COATED ORAL at 06:04

## 2021-12-22 RX ADMIN — DIPHENHYDRAMINE HYDROCHLORIDE 25 MG: 50 INJECTION, SOLUTION INTRAMUSCULAR; INTRAVENOUS at 02:59

## 2021-12-22 RX ADMIN — IPRATROPIUM BROMIDE AND ALBUTEROL SULFATE 1 AMPULE: .5; 3 SOLUTION RESPIRATORY (INHALATION) at 19:10

## 2021-12-22 RX ADMIN — DIPHENHYDRAMINE HYDROCHLORIDE 25 MG: 50 INJECTION, SOLUTION INTRAMUSCULAR; INTRAVENOUS at 06:58

## 2021-12-22 RX ADMIN — METHYLPREDNISOLONE SODIUM SUCCINATE 40 MG: 40 INJECTION, POWDER, FOR SOLUTION INTRAMUSCULAR; INTRAVENOUS at 06:58

## 2021-12-22 RX ADMIN — FAMOTIDINE 20 MG: 20 TABLET ORAL at 06:58

## 2021-12-22 RX ADMIN — METHYLPREDNISOLONE SODIUM SUCCINATE 40 MG: 40 INJECTION, POWDER, FOR SOLUTION INTRAMUSCULAR; INTRAVENOUS at 02:59

## 2021-12-22 NOTE — PROGRESS NOTES
Patient A&O, VS WNL,transferred to [facility] for heart cath via [id number] with all belongings including her PERCOCETs that were not locked up, they were at her bedside, amount unknown to writer.

## 2021-12-22 NOTE — CARE COORDINATION
DISCHARGE PLANNING NOTE:    Patient is at heart cath at [facility]. Should return and then be discharged to home if cath negative.     + Influenza    May need cab ride home upon discharge.      Electronically signed by Jerri Schwartz RN on 12/22/2021 at 4:00 PM

## 2021-12-22 NOTE — PROGRESS NOTES
When receiving report, RN was notified that pt has been keeping her own percocet at the bedside. She takes at her own will during scheduled times (0700, 1300, 1900, 0100). Pt will tell RN when she takes them so RN can document. Last documented time percocet was taken was 1900 on 12/21/21.

## 2021-12-22 NOTE — PLAN OF CARE
Problem: Infection:  Goal: Will remain free from infection  Description: Will remain free from infection  Outcome: Ongoing     Problem: Safety:  Goal: Free from accidental physical injury  Description: Free from accidental physical injury  Outcome: Ongoing     Problem: Safety:  Goal: Free from intentional harm  Description: Free from intentional harm  Outcome: Ongoing     Problem: Daily Care:  Goal: Daily care needs are met  Description: Daily care needs are met  Outcome: Ongoing     Problem: Pain:  Goal: Patient's pain/discomfort is manageable  Description: Patient's pain/discomfort is manageable  12/22/2021 0448 by Juancarlos Neely RN  Outcome: Ongoing     Problem: Pain:  Goal: Pain level will decrease  Description: Pain level will decrease  Outcome: Ongoing     Problem: Pain:  Goal: Control of acute pain  Description: Control of acute pain  Outcome: Ongoing     Problem: Pain:  Goal: Control of chronic pain  Description: Control of chronic pain  Outcome: Ongoing     Problem: Skin Integrity:  Goal: Skin integrity will stabilize  Description: Skin integrity will stabilize  12/22/2021 0448 by Juancarlos Neely RN  Outcome: Ongoing     Problem: Discharge Planning:  Goal: Patients continuum of care needs are met  Description: Patients continuum of care needs are met  Outcome: Ongoing

## 2021-12-22 NOTE — PROGRESS NOTES
RN spoke with Dr. Modesta Barnes regarding anticipated discharge after heart cath. MD states patient may discharge. Meds clarified with the exception of plavix which needs to be clarified by cardiology. RN paged cardiology via answering service to clarify.

## 2021-12-22 NOTE — PROGRESS NOTES
Progress Note    Patient Name:  Minerva Galvez    :  2021 7:07 AM      SUBJECTIVE       Ms. Sanders Koyanagi  has no chest pain, shortness of breath, palpitations, nausea or vomiting      OBJECTIVE     Vital signs:    BP (!) 150/84   Pulse 78   Temp 97.5 °F (36.4 °C) (Oral)   Resp 20   Ht 5' 4\" (1.626 m)   Wt 250 lb (113.4 kg)   SpO2 95%   BMI 42.91 kg/m²  3.5 L/min      Admit Weight:  240 lb (108.9 kg)    Last 3 weights: Wt Readings from Last 3 Encounters:   21 250 lb (113.4 kg)   21 246 lb (111.6 kg)   21 253 lb (114.8 kg)       BMI: Body mass index is 42.91 kg/m². Input/Output:       Intake/Output Summary (Last 24 hours) at 2021 0707  Last data filed at 2021 1520  Gross per 24 hour   Intake 5 ml   Output --   Net 5 ml         Exam:     General appearance: awake and alert moves all ext   Lungs: no rhonchi, no wheezes, no rales  Heart: S1 and S2 no murmur  Abdomen: positive bowel sounds, no bruits, no masses  Extremities: warm and dry, no cyanosis, no clubbing        Laboratory Studies:     CBC:   Recent Labs     21  0505 21  0515   WBC 10.2 15.9*   HGB 13.5 14.1   HCT 39.9 42.8   .2* 102.0*    337     BMP:   Recent Labs     21  0505 21  0651 21  0515    141 139   K 3.8 3.9 4.6   CL 97* 100 99   CO2 34* 31 26   BUN 13 17 16   CREATININE 0.59 0.70 0.70     PT/INR: No results for input(s): PROTIME, INR in the last 72 hours. APTT: No results for input(s): APTT in the last 72 hours. MAG: No results for input(s): MG in the last 72 hours. D Dimer: No results for input(s): DDIMER in the last 72 hours. Troponin  No results for input(s): TROPONINI in the last 72 hours. No results for input(s): TROPONINT in the last 72 hours. BNP No results for input(s): BNP in the last 72 hours. No results for input(s): PROBNP in the last 72 hours. Pulse Ox:  SpO2  Av %  Min: 90 %  Max: 98 %  Supplemental O2:  O2 Flow Rate (L/min): 3.5 L/min     Current Meds:    famotidine  20 mg Oral Q6H    methylPREDNISolone  40 mg IntraVENous Q6H    clopidogrel  75 mg Oral Daily    diphenhydrAMINE  25 mg IntraVENous Q6H    enoxaparin  30 mg SubCUTAneous BID    dextromethorphan-guaiFENesin  10 mL Oral Q6H    clotrimazole   Topical BID    carvedilol  12.5 mg Oral BID WC    budesonide  0.5 mg Nebulization BID    colchicine  0.6 mg Oral BID    influenza virus vaccine  0.5 mL IntraMUSCular Prior to discharge    insulin glargine  80 Units SubCUTAneous BID    benzonatate  100 mg Oral TID    butalbital-APAP-caffeine  1 capsule Oral Once    ARIPiprazole  5 mg Oral Daily    bumetanide  2 mg Oral Daily    polyethylene glycol  17 g Oral BID    DULoxetine  60 mg Oral BID    ipratropium-albuterol  1 ampule Inhalation 4x daily    cetirizine  10 mg Oral Daily    magnesium oxide  400 mg Oral Daily    QUEtiapine  50 mg Oral Nightly    sodium chloride flush  5-40 mL IntraVENous 2 times per day    atorvastatin  80 mg Oral Nightly    insulin lispro  0-18 Units SubCUTAneous TID     insulin lispro  0-9 Units SubCUTAneous Nightly     Continuous Infusions:    sodium chloride      dextrose              ASSESSMENT/PLAN     Principal Problem:    Elevated troponin  -in the setting of acute illness with influenza A  - nuclear stress test 09/23/2021 with small to moderate  Reduction in uptake present in the apical to mid anterior septal locations that is fixed defect of he appears to be an artifact caused by breast attenuation low risk study  -attempted cardiac catheterization at [facility] patient has anaphylaxis to aspirin and allergy to dye was not premedicated prior to procedure  - patient expressed that she does not wish to be aspirin desensitized  -patient is being pretreated for dye allergy  -adding plavix  -plan for cardiac cath today at [facility]      Active Problems:    Diabetes mellitus type 2, controlled ([facility]    COPD (chronic obstructive pulmonary disease) (HCC)    KRISTIN (obstructive sleep apnea)    Tobacco abuse    Cardiomyopathy ([facility]    HTN (hypertension)    Tricuspid valve disorders, non-rheumatic    Pulmonary HTN (HCC)    Bipolar disorder (HCC)    Influenza A    NSTEMI (non-ST elevated myocardial infarction) ([facility]    Resting in bed. All questions answered appropriately. Cardiac cath today.

## 2021-12-22 NOTE — PLAN OF CARE
Problem: Pain:  Goal: Patient's pain/discomfort is manageable  Description: Patient's pain/discomfort is manageable  Outcome: Ongoing  Note: Pt c/o right chest/side pain. Pt has personal Percocet in her possession. Informs nurse when she takes it. Pt able to reposition self for comfort without difficulty. Problem: Skin Integrity:  Goal: Skin integrity will stabilize  Description: Skin integrity will stabilize  Outcome: Ongoing  Note: No new skin breakdown noted this shift. Redness under bilat breast continue, cream applied as ordered.

## 2021-12-22 NOTE — PROGRESS NOTES
[facility] Hospitalist Group  Progress Note    Patient: Adrian Ferrera Age: 44 y.o. : 1968 MR#: [id number] SSN: [id number]  Date:     Subjective:     Patient lying in bed awake, eyes open, . Family at bedside ( including 2 sons and 2 sisters )    Assessment/Plan: 1? GI Bleed, ANemia  2- SADA  3- Hypotension, ? shock- likely hypovolemic.  DOUBT Sepsis  4- Metastatic Colon Ca  5- Ascites  6- h/o PE- was on eliquis  7- acute encephalopathy- uremic  8- Severe Protein calorie malnutrition        PLAN    Octreotide PPI drips  Monitor H&H , Transfuse PRN, as per gi  Monitor renal function, iv fluids, I/O, nephro on case  Pressors as needed  Palliative care following, partial code noted  D/w Family at bedside  Judicious Pain control      Case discussed with:  []Patient  []Family  []Nursing  []Case Management  DVT Prophylaxis:  []Lovenox  []Hep SQ  []SCDs  []Coumadin   []On Heparin gtt    Objective:   VS:   Visit Vitals    /79    Pulse (!) 101    Temp 97 °F (36.1 °C)    Resp 10    Ht 5' 7\" (1.702 m)    Wt 70.9 kg (156 lb 6.4 oz)    SpO2 100%    BMI 24.5 kg/m2      Tmax/24hrs: Temp (24hrs), Av.5 °F (36.4 °C), Min:97 °F (36.1 °C), Max:97.9 °F (36.6 °C)      Intake/Output Summary (Last 24 hours) at 17 1511  Last data filed at 17   Gross per 24 hour   Intake           1861.2 ml   Output                0 ml   Net           1861.2 ml       General:  Awake, eyes open  Cardiovascular:  S1S2+, RRR  Pulmonary:  Coarse bs b/l  GI:  Soft, BS+, NT, ND, + ascites  Extremities:  ++edema        Labs:      Labs reviewed    Signed By: Lj Gongora MD RN gave report to [facility] lab. All questions answered, medications reviewed. RN asked writer to give benadryl, solumedrol, and pepcid to pt before she transferred. Gave RN at cath lab phone number to unit and name of oncoming RN, Celina Han.

## 2021-12-22 NOTE — PROGRESS NOTES
RN spoke with Dr. Criselda Lopez via telephone for medication clarification post-cath. MD states that patient does not need plavix at discharge.

## 2021-12-24 LAB
EKG ATRIAL RATE: 65 BPM
EKG ATRIAL RATE: 75 BPM
EKG P AXIS: 68 DEGREES
EKG P-R INTERVAL: 152 MS
EKG Q-T INTERVAL: 388 MS
EKG Q-T INTERVAL: 424 MS
EKG QRS DURATION: 84 MS
EKG QRS DURATION: 86 MS
EKG QTC CALCULATION (BAZETT): 433 MS
EKG QTC CALCULATION (BAZETT): 448 MS
EKG R AXIS: 38 DEGREES
EKG R AXIS: 68 DEGREES
EKG T AXIS: 59 DEGREES
EKG T AXIS: 62 DEGREES
EKG VENTRICULAR RATE: 67 BPM
EKG VENTRICULAR RATE: 75 BPM

## 2022-01-03 NOTE — DISCHARGE SUMMARY
Family Medicine Discharge Summary    Vika Murry  :  1975  MRN:  [id number]    Admit date:  2021  Discharge date:  2021    Admitting Physician:  Brody Fagan MD    Discharge Diagnoses:    Patient Active Problem List   Diagnosis    Diabetes mellitus type 2, controlled ([facility]    COPD (chronic obstructive pulmonary disease) ([facility]    Asthma    Acute bronchitis    KRISTIN (obstructive sleep apnea)    Adult body mass index 40 and over    Chronic right shoulder pain    Neck pain    Radicular pain in right arm    Left leg pain    Noncompliance with therapeutic plan    Precordial pain    Tobacco abuse    Current moderate episode of major depressive disorder without prior episode (HCC)    Adhesive capsulitis of left shoulder    Morbid obesity ([facility]    Left bicipital tenosynovitis    Refused influenza vaccine    Closed fracture of left ankle    Atrial premature depolarization    Ventricular premature depolarization    Cardiomyopathy ([address]    Cigarette nicotine dependence with nicotine-induced disorder    Productive cough    DVT (deep venous thrombosis) (HCC)    Endometriosis    GERD (gastroesophageal reflux disease)    HTN (hypertension)    Hypomagnesemia    Migraines    Mixed hyperlipidemia    Neurocardiogenic syncope    Nonrheumatic tricuspid (valve) insufficiency    Tricuspid valve disorders, non-rheumatic    Ovarian cyst    Encounter for monitoring sotalol therapy    Pulmonary HTN ([facility]    PVC's (premature ventricular contractions)    Schizophrenia (HCC)    Shortness of breath    Acute exacerbation of chronic obstructive pulmonary disease (HCC)    Bipolar disorder ([address]    Left sided abdominal pain of unknown cause    Leg swelling    Mastoiditis, acute    Steroid-induced hyperglycemia    Sclerosing adenosis of left breast    Tachycardia    Tremor    Rupture of right rotator cuff    Rotator cuff syndrome of left shoulder    Long term current use of insulin (HCC)    Lesion of ulnar nerve    Carpal tunnel syndrome    Acute upper respiratory infection    Cellulitis of right upper limb    Claustrophobia    Close exposure to 2019 novel coronavirus    Congestive heart failure (HCC)    Diabetic neuropathy (HCC)    Enthesopathy    Fibrocystic breast changes    Helicobacter pylori gastritis    Elevated troponin    Influenza A    NSTEMI (non-ST elevated myocardial infarction) [facility])       Admission Condition:  guarded  Discharged Condition:  fair    Hospital Course:   49 yo admitted with cough, fever, headache, nausea, and shortness of breath. She was found to have influenza A and elevated troponin. Attempt was made to transfer patient for cardiac catheterization early in the admission, but she is allergic to dye and she refused to be admitted to [facility] to do the necessary testing and prep to administer the dye. She also refused MRI at that time. She could not take Tamiflu d/t allergy. She was transferred again to [facility] for cardiac catheterization on 12/22. Discharge Medications:         Medication List      START taking these medications    atorvastatin 80 MG tablet  Commonly known as: LIPITOR  Take 1 tablet by mouth nightly     colchicine 0.6 MG tablet  Commonly known as: COLCRYS  Take 1 tablet by mouth daily     * dextromethorphan-guaiFENesin  MG/5ML syrup  Commonly known as: ROBITUSSIN-DM  Take 5 mLs by mouth every 6 hours     * dextromethorphan-guaiFENesin  MG/5ML syrup  Commonly known as: ROBITUSSIN-DM  Take 10 mLs by mouth every 6 hours     nitroGLYCERIN 0.4 MG SL tablet  Commonly known as: NITROSTAT  up to max of 3 total doses. If no relief after 1 dose, call 911. * This list has 2 medication(s) that are the same as other medications prescribed for you. Read the directions carefully, and ask your doctor or other care provider to review them with you.             CONTINUE taking these medications    albuterol sulfate  (90 Base) MCG/ACT inhaler  Commonly known as: Ventolin HFA  INHALE 2 PUFFS INTO LUNGS EVERY 6 HOURS AS NEEDED FOR WHEEZING     ARIPiprazole 5 MG tablet  Commonly known as: ABILIFY     ClearLax 17 GM/SCOOP powder  Generic drug: polyethylene glycol  TAKE 17 G BY MOUTH 2 TIMES DAILY     * clotrimazole 1 % cream  Commonly known as: LOTRIMIN  Apply topically 2 times daily. Coreg 12.5 MG tablet  Generic drug: carvedilol     DULoxetine 60 MG extended release capsule  Commonly known as: CYMBALTA  TAKE 1 CAPSULE BY MOUTH 2 TIMES DAILY     ergocalciferol 1.25 MG (54684 UT) capsule  Commonly known as: ERGOCALCIFEROL     HM Saline Nasal Spray 0.65 % nasal spray  Generic drug: sodium chloride     ibuprofen 800 MG tablet  Commonly known as: ADVIL;MOTRIN     insulin lispro (1 Unit Dial) 100 UNIT/ML Sopn  Commonly known as: HumaLOG KwikPen  INJECT SUBCUTANEOUSLY PER SLIDING SCALE, 150-200 INJECT 3U, 201-250 INJECT 6U, 251-300 INJECT 9U, >300 INJECT 12U, MAX 30U/DAY     ipratropium-albuterol 0.5-2.5 (3) MG/3ML Soln nebulizer solution  Commonly known as: DUONEB  INHALE 3 MLS (ONE VIAL) WITH NEBULIZER EVERY 6 HOURS AS NEEDED FOR SHORTNESS OF BREATH     magnesium oxide 400 MG tablet  Commonly known as: MAG-OX     Multivitamin Gummies Womens Chew     * OneTouch Ultra strip  Generic drug: blood glucose test strips  USE TO TEST BLOOD SUGAR BEFORE MEALS, AT BEDTIME, AND AS NEEDED (6 TIMES DAILY)     * blood glucose test strips strip  Commonly known as: ASCENSIA AUTODISC VI;ONE TOUCH ULTRA TEST VI  OneTouch Ultra Test strips.  Check blood sugars 4x daily     oxyCODONE-acetaminophen 5-325 MG per tablet  Commonly known as: PERCOCET     OXYGEN     QUEtiapine 50 MG tablet  Commonly known as: SEROQUEL  TAKE 1 TABLET BY MOUTH EVERY DAY AT NIGHT     Tresiba FlexTouch 200 UNIT/ML Sopn  Generic drug: Insulin Degludec  Inject 80 Units into the skin 2 times daily     Unifine Pentips 31G X 8 MM Misc  Generic drug: Insulin Pen Needle  USE 4 TIMES DAILY AS DIRECTED         * This list has 3 medication(s) that are the same as other medications prescribed for you. Read the directions carefully, and ask your doctor or other care provider to review them with you. ASK your doctor about these medications    benzonatate 100 MG capsule  Commonly known as: TESSALON  Take 1 capsule by mouth 3 times daily for 7 days  Ask about: Should I take this medication? * clotrimazole 1 % cream  Commonly known as: LOTRIMIN  Apply topically 2 times daily. Ask about: Should I take this medication? * This list has 1 medication(s) that are the same as other medications prescribed for you. Read the directions carefully, and ask your doctor or other care provider to review them with you. Where to Get Your Medications      These medications were sent to [address] [place] [address] [address]  [facility] [address]    Hours: 24-hours Phone: [phone]   · atorvastatin 80 MG tablet  · benzonatate 100 MG capsule  · clotrimazole 1 % cream  · colchicine 0.6 MG tablet  · dextromethorphan-guaiFENesin  MG/5ML syrup  · nitroGLYCERIN 0.4 MG SL tablet     Information about where to get these medications is not yet available    Ask your nurse or doctor about these medications  · dextromethorphan-guaiFENesin  MG/5ML syrup         Consults:  Cardiology, pulmonology    Significant Diagnostic Studies:  Labs, radiology    Treatments:   Supportive therapies    Disposition:   home  Follow up with Chante Gold MD in 1-2 weeks. Signed:   Analisa Dunn MD, FAAFP  1/3/2022, 8:18 AM

## 2022-01-04 ENCOUNTER — APPOINTMENT (OUTPATIENT)
Dept: GENERAL RADIOLOGY | Age: 47
End: 2022-01-04
Payer: COMMERCIAL

## 2022-01-04 ENCOUNTER — HOSPITAL ENCOUNTER (EMERGENCY)
Age: 47
Discharge: HOME | End: 2022-01-04
Attending: EMERGENCY MEDICINE
Payer: COMMERCIAL

## 2022-01-04 VITALS
RESPIRATION RATE: 15 BRPM | BODY MASS INDEX: 42.68 KG/M2 | WEIGHT: 250 LBS | OXYGEN SATURATION: 96 % | HEART RATE: 76 BPM | TEMPERATURE: 98.1 F | SYSTOLIC BLOOD PRESSURE: 149 MMHG | HEIGHT: 64 IN | DIASTOLIC BLOOD PRESSURE: 76 MMHG

## 2022-01-04 DIAGNOSIS — R07.9 CHEST PAIN, UNSPECIFIED TYPE: Primary | ICD-10-CM

## 2022-01-04 DIAGNOSIS — B34.9 VIRAL SYNDROME: ICD-10-CM

## 2022-01-04 LAB
ABSOLUTE EOS #: 0.4 K/UL (ref 0–0.4)
ABSOLUTE IMMATURE GRANULOCYTE: ABNORMAL K/UL (ref 0–0.3)
ABSOLUTE LYMPH #: 3.8 K/UL (ref 1–4.8)
ABSOLUTE MONO #: 0.7 K/UL (ref 0.1–1.3)
ALBUMIN SERPL-MCNC: 4.4 G/DL (ref 3.5–5.2)
ALBUMIN/GLOBULIN RATIO: ABNORMAL (ref 1–2.5)
ALP BLD-CCNC: 91 U/L (ref 35–104)
ALT SERPL-CCNC: 24 U/L (ref 5–33)
ANION GAP SERPL CALCULATED.3IONS-SCNC: 13 MMOL/L (ref 9–17)
AST SERPL-CCNC: 20 U/L
BASOPHILS # BLD: 1 % (ref 0–2)
BASOPHILS ABSOLUTE: 0.1 K/UL (ref 0–0.2)
BILIRUB SERPL-MCNC: 0.37 MG/DL (ref 0.3–1.2)
BNP INTERPRETATION: ABNORMAL
BUN BLDV-MCNC: 14 MG/DL (ref 6–20)
BUN/CREAT BLD: ABNORMAL (ref 9–20)
C-REACTIVE PROTEIN: 4.3 MG/L (ref 0–5)
CALCIUM SERPL-MCNC: 9.9 MG/DL (ref 8.6–10.4)
CHLORIDE BLD-SCNC: 98 MMOL/L (ref 98–107)
CO2: 29 MMOL/L (ref 20–31)
CREAT SERPL-MCNC: 0.62 MG/DL (ref 0.5–0.9)
D-DIMER QUANTITATIVE: <0.27 MG/L FEU (ref 0–0.59)
DIFFERENTIAL TYPE: ABNORMAL
EOSINOPHILS RELATIVE PERCENT: 3 % (ref 0–4)
GFR AFRICAN AMERICAN: >60 ML/MIN
GFR NON-AFRICAN AMERICAN: >60 ML/MIN
GFR SERPL CREATININE-BSD FRML MDRD: ABNORMAL ML/MIN/{1.73_M2}
GFR SERPL CREATININE-BSD FRML MDRD: ABNORMAL ML/MIN/{1.73_M2}
GLUCOSE BLD-MCNC: 178 MG/DL (ref 70–99)
HCT VFR BLD CALC: 45.2 % (ref 36–46)
HEMOGLOBIN: 15.1 G/DL (ref 12–16)
IMMATURE GRANULOCYTES: ABNORMAL %
INR BLD: 0.9
LYMPHOCYTES # BLD: 28 % (ref 24–44)
MAGNESIUM: 1.6 MG/DL (ref 1.6–2.6)
MCH RBC QN AUTO: 33.8 PG (ref 26–34)
MCHC RBC AUTO-ENTMCNC: 33.4 G/DL (ref 31–37)
MCV RBC AUTO: 101 FL (ref 80–100)
MONOCYTES # BLD: 5 % (ref 1–7)
NRBC AUTOMATED: ABNORMAL PER 100 WBC
PARTIAL THROMBOPLASTIN TIME: 27.8 SEC (ref 24–36)
PDW BLD-RTO: 12.8 % (ref 11.5–14.9)
PLATELET # BLD: 321 K/UL (ref 150–450)
PLATELET ESTIMATE: ABNORMAL
PMV BLD AUTO: 7.9 FL (ref 6–12)
POTASSIUM SERPL-SCNC: 4.2 MMOL/L (ref 3.7–5.3)
PRO-BNP: 500 PG/ML
PROTHROMBIN TIME: 12.6 SEC (ref 11.8–14.6)
RBC # BLD: 4.47 M/UL (ref 4–5.2)
RBC # BLD: ABNORMAL 10*6/UL
SARS-COV-2, RAPID: NOT DETECTED
SEG NEUTROPHILS: 63 % (ref 36–66)
SEGMENTED NEUTROPHILS ABSOLUTE COUNT: 8.6 K/UL (ref 1.3–9.1)
SODIUM BLD-SCNC: 140 MMOL/L (ref 135–144)
SPECIMEN DESCRIPTION: NORMAL
TOTAL PROTEIN: 7.4 G/DL (ref 6.4–8.3)
TROPONIN INTERP: ABNORMAL
TROPONIN INTERP: ABNORMAL
TROPONIN T: ABNORMAL NG/ML
TROPONIN T: ABNORMAL NG/ML
TROPONIN, HIGH SENSITIVITY: 17 NG/L (ref 0–14)
TROPONIN, HIGH SENSITIVITY: 17 NG/L (ref 0–14)
WBC # BLD: 13.5 K/UL (ref 3.5–11)
WBC # BLD: ABNORMAL 10*3/UL

## 2022-01-04 PROCEDURE — 87635 SARS-COV-2 COVID-19 AMP PRB: CPT

## 2022-01-04 PROCEDURE — 83735 ASSAY OF MAGNESIUM: CPT

## 2022-01-04 PROCEDURE — 84484 ASSAY OF TROPONIN QUANT: CPT

## 2022-01-04 PROCEDURE — 85379 FIBRIN DEGRADATION QUANT: CPT

## 2022-01-04 PROCEDURE — 36415 COLL VENOUS BLD VENIPUNCTURE: CPT

## 2022-01-04 PROCEDURE — 85610 PROTHROMBIN TIME: CPT

## 2022-01-04 PROCEDURE — 96374 THER/PROPH/DIAG INJ IV PUSH: CPT

## 2022-01-04 PROCEDURE — 85025 COMPLETE CBC W/AUTO DIFF WBC: CPT

## 2022-01-04 PROCEDURE — 86140 C-REACTIVE PROTEIN: CPT

## 2022-01-04 PROCEDURE — 71045 X-RAY EXAM CHEST 1 VIEW: CPT

## 2022-01-04 PROCEDURE — 99284 EMERGENCY DEPT VISIT MOD MDM: CPT

## 2022-01-04 PROCEDURE — 83880 ASSAY OF NATRIURETIC PEPTIDE: CPT

## 2022-01-04 PROCEDURE — 6360000002 HC RX W HCPCS: Performed by: EMERGENCY MEDICINE

## 2022-01-04 PROCEDURE — 80053 COMPREHEN METABOLIC PANEL: CPT

## 2022-01-04 PROCEDURE — 93005 ELECTROCARDIOGRAM TRACING: CPT | Performed by: EMERGENCY MEDICINE

## 2022-01-04 PROCEDURE — 85730 THROMBOPLASTIN TIME PARTIAL: CPT

## 2022-01-04 RX ORDER — DICYCLOMINE HYDROCHLORIDE 10 MG/1
10 CAPSULE ORAL EVERY 6 HOURS PRN
Qty: 20 CAPSULE | Refills: 0 | Status: SHIPPED | OUTPATIENT
Start: 2022-01-04 | End: 2022-03-08

## 2022-01-04 RX ORDER — MORPHINE SULFATE 4 MG/ML
4 INJECTION, SOLUTION INTRAMUSCULAR; INTRAVENOUS ONCE
Status: COMPLETED | OUTPATIENT
Start: 2022-01-04 | End: 2022-01-04

## 2022-01-04 RX ADMIN — MORPHINE SULFATE 4 MG: 4 INJECTION INTRAVENOUS at 16:15

## 2022-01-04 ASSESSMENT — ENCOUNTER SYMPTOMS
ABDOMINAL PAIN: 0
BACK PAIN: 0
COUGH: 1
NAUSEA: 1
VOMITING: 1
SHORTNESS OF BREATH: 1
SORE THROAT: 0
CONSTIPATION: 0
COLOR CHANGE: 0
DIARRHEA: 0
TROUBLE SWALLOWING: 0
BLOOD IN STOOL: 0

## 2022-01-04 ASSESSMENT — PAIN SCALES - GENERAL: PAINLEVEL_OUTOF10: 9

## 2022-01-04 NOTE — ED NOTES
Patient to emergency department with complaints of chest pain, cough, SOB and nausea since last night.  Per pt she was admitted in December for flu A, & NSTEMI     Zari Alonzo RN  01/04/22 [id number]

## 2022-01-04 NOTE — ED PROVIDER NOTES
[address]  EMERGENCY DEPARTMENT ENCOUNTER    Pt Name: Raul Michelle  MRN: [id number]  YOB: 1975  Date of evaluation:1/4/22  PCP: Sulema Isaac MD    CHIEF COMPLAINT       Chief Complaint   Patient presents with    Shortness of Breath    Chest Pain       HISTORY OF PRESENT ILLNESS    Raul Michelle is a 48 y.o. female who presents with a chief complaint of chest pain and difficulty breathing. Patient states for almost past month she has had issues with the symptoms and ended up testing positive for influenza A back last month when she was admitted. Also states that she had pneumonia at that time. States the symptoms are mostly consistent with her symptoms that she had then. Pain is on the left side of her chest with radiation to the left side of her neck. Pain is described as sharp. She feels short of breath and has a cough. Also with nausea and vomiting. No fevers at home. She acknowledges having elevated troponins and actually had a heart cath done at [facility] during the last admission which had \"a blockage that was cleared. \"  She states she took 2 nitroglycerin last night and it did not relieve her pain at all. Symptoms are acute. Symptomatic. Nothing make symptoms better or worse. Patient has no other complaints at this time. REVIEW OF SYSTEMS       Review of Systems   Constitutional: Positive for fatigue. Negative for chills and fever. HENT: Negative for congestion, ear pain, sore throat and trouble swallowing. Eyes: Negative for visual disturbance. Respiratory: Positive for cough and shortness of breath. Cardiovascular: Positive for chest pain. Negative for palpitations and leg swelling. Gastrointestinal: Positive for nausea and vomiting. Negative for abdominal pain, blood in stool, constipation and diarrhea. Genitourinary: Negative for dysuria and flank pain. Musculoskeletal: Negative for arthralgias, back pain, myalgias and neck pain.    Skin: Negative for color change, rash and wound. Neurological: Positive for headaches. Negative for dizziness, weakness, light-headedness and numbness. Psychiatric/Behavioral: Negative for confusion. All other systems reviewed and are negative. Negative in 10 essential Systems except as mentioned above and in the HPI. PAST MEDICAL HISTORY     Past Medical History:   Diagnosis Date    Acute exacerbation of chronic obstructive pulmonary disease ([address] 3/21/2018    Adhesive capsulitis of left shoulder 9/25/2018    Asthma     Asthma exacerbation 7/24/2014    Atrial fibrillation ([address]     placed on event monitor 9/25/18 for PVC's & A-fib    Atrial premature depolarization 7/19/2019    Bipolar 1 disorder ([address]     Bipolar disorder ([address] 7/19/2019    Bulging disc     CAD (coronary artery disease)     Candida infection 2/23/2018    Cardiomyopathy ([address]     Chest pain 6/13/2017    CHF (congestive heart failure) ([facility]     Chronic otitis media of both ears     rt>lt    Chronic right shoulder pain 3/14/2017    Cigarette nicotine dependence with nicotine-induced disorder 7/19/2019    Class 3 severe obesity due to excess calories with serious comorbidity and body mass index (BMI) of 40.0 to 44.9 in adult [facility]) 10/4/2018    Closed fracture of left ankle 6/25/2019    Clostridium difficile infection     COPD (chronic obstructive pulmonary disease) ([facility])     Cough, persistent 3/21/2018    Current moderate episode of major depressive disorder without prior episode ([address] 8/20/2018    Depression     Diabetes mellitus type 2, controlled ([address] 4/30/2014    Diarrhea     Diarrhea     Dizziness     DVT (deep venous thrombosis) ([facility])     after PICC line right arm    Encounter for monitoring sotalol therapy 2/20/2017    Encounter for screening mammogram for malignant neoplasm of breast 3/7/2018    Endometriosis     Fainting     GERD (gastroesophageal reflux disease)     hx of    GI bleeding     H. pylori infection     Helicobacter pylori (H. pylori)     [place] (hard of hearing)     both ears, no hearing aids    HTN (hypertension) 7/19/2019    Hyperlipidemia     Hypertension     Left bicipital tenosynovitis 10/17/2018    Left leg pain 5/16/2017    Left sided abdominal pain of unknown cause 7/19/2016    Leg swelling 9/21/2018    Mastoiditis     Mastoiditis, acute 4/30/2014    Migraines     Morbid obesity (HCC)     Myocardial infarction ([facility]     2005    Nausea     Neuropathy     On home oxygen therapy     3 L at night    Ovarian cyst     Passed out     hx of- negative tilt table    Pulmonary hypertension (HCC)     Pulmonary insufficiency     PVC (premature ventricular contraction)     Seasonal allergies     Tricuspid insufficiency     Type II or unspecified type diabetes mellitus without mention of complication, not stated as uncontrolled          SURGICAL HISTORY      has a past surgical history that includes Hysterectomy; Cholecystectomy; Appendectomy; Colonoscopy; Upper gastrointestinal endoscopy; Abdomen surgery; Abdominal adhesion surgery; Abdominal exploration surgery; Tympanostomy tube placement; Tonsillectomy; Foot surgery (Left); Abdominal hernia repair; hysteroscopy; Gastric fundoplication (4362); Abscess Drainage; Scaphoid fracture surgery (Left); other surgical history (Right, 05/29/2014); Myringotomy Tympanostomy Tube Placement (Right, 06/05/2014); myringotomy (Right, 11/13/2015); Hysterectomy; Cardiac catheterization (2014 & 2000); other surgical history (2009); Breast surgery (Left, 2007); fracture surgery (Right); other surgical history (Right, 08/11/2016); ablation of dysrhythmic focus (2016); other surgical history; other surgical history; pr manipulatn shldr jt w anesthesia (Right, 03/01/2017); ablation of dysrhythmic focus (09/08/2017); pr shoulder scope bone shaving (Left, 10/17/2018); Tympanostomy tube placement (Left, 03/12/2019);  Upper gastrointestinal endoscopy (07/10/2019); Upper gastrointestinal endoscopy (N/A, 07/10/2019); Carpal tunnel release (Right, 12/19/2019); Carpal tunnel release (05/04/2020); Ulnar tunnel release (Right); myringotomy (Left, 07/14/2020); other surgical history (10/19/2020); other surgical history (Right, 06/14/2021); and Tympanostomy tube placement (Right, 12/08/2021). CURRENT MEDICATIONS       Previous Medications    ALBUTEROL SULFATE HFA (VENTOLIN HFA) 108 (90 BASE) MCG/ACT INHALER    INHALE 2 PUFFS INTO LUNGS EVERY 6 HOURS AS NEEDED FOR WHEEZING    ARIPIPRAZOLE (ABILIFY) 5 MG TABLET    Take 5 mg by mouth daily     ATORVASTATIN (LIPITOR) 80 MG TABLET    Take 1 tablet by mouth nightly    BLOOD GLUCOSE TEST STRIPS (ASCENSIA AUTODISC VI;ONE TOUCH ULTRA TEST VI) STRIP    OneTouch Ultra Test strips. Check blood sugars 4x daily    BLOOD GLUCOSE TEST STRIPS (ONETOUCH ULTRA) STRIP    USE TO TEST BLOOD SUGAR BEFORE MEALS, AT BEDTIME, AND AS NEEDED (6 TIMES DAILY)    CARVEDILOL (COREG) 12.5 MG TABLET    Take 12.5 mg by mouth 2 times daily (with meals) Takes at 10:00am and 10:00pm    CLEARLAX 17 GM/SCOOP POWDER    TAKE 17 G BY MOUTH 2 TIMES DAILY    CLOTRIMAZOLE (LOTRIMIN) 1 % CREAM    Apply topically 2 times daily.     COLCHICINE (COLCRYS) 0.6 MG TABLET    Take 1 tablet by mouth daily    DEXTROMETHORPHAN-GUAIFENESIN (ROBITUSSIN-DM)  MG/5ML SYRUP    Take 5 mLs by mouth every 6 hours    DEXTROMETHORPHAN-GUAIFENESIN (ROBITUSSIN-DM)  MG/5ML SYRUP    Take 10 mLs by mouth every 6 hours    DULOXETINE (CYMBALTA) 60 MG EXTENDED RELEASE CAPSULE    TAKE 1 CAPSULE BY MOUTH 2 TIMES DAILY    ERGOCALCIFEROL (ERGOCALCIFEROL) 1.25 MG (76927 UT) CAPSULE    Take 50,000 Units by mouth once a week     HM SALINE NASAL SPRAY 0.65 % NASAL SPRAY    1 spray by Nasal route as needed for Congestion     IBUPROFEN (ADVIL;MOTRIN) 800 MG TABLET    Take 800 mg by mouth every 8 hours as needed for Pain    INSULIN DEGLUDEC (TRESIBA FLEXTOUCH) 200 UNIT/ML SOPN    Inject 80 Units into the skin 2 times daily    INSULIN LISPRO, 1 UNIT DIAL, (HUMALOG KWIKPEN) 100 UNIT/ML SOPN    INJECT SUBCUTANEOUSLY PER SLIDING SCALE, 150-200 INJECT 3U, 201-250 INJECT 6U, 251-300 INJECT 9U, >300 INJECT 12U, MAX 30U/DAY    INSULIN PEN NEEDLE (UNIFINE PENTIPS) 31G X 8 MM MISC    USE 4 TIMES DAILY AS DIRECTED    IPRATROPIUM-ALBUTEROL (DUONEB) 0.5-2.5 (3) MG/3ML SOLN NEBULIZER SOLUTION    INHALE 3 MLS (ONE VIAL) WITH NEBULIZER EVERY 6 HOURS AS NEEDED FOR SHORTNESS OF BREATH    MAGNESIUM OXIDE (MAG-OX) 400 MG TABLET    Take 400 mg by mouth daily     MULTIPLE VITAMINS-MINERALS (MULTIVITAMIN GUMMIES WOMENS) CHEW    Take 2 each by mouth daily     NITROGLYCERIN (NITROSTAT) 0.4 MG SL TABLET    up to max of 3 total doses. If no relief after 1 dose, call 911. OXYCODONE-ACETAMINOPHEN (PERCOCET) 5-325 MG PER TABLET    Take 1 tablet by mouth every 4 hours as needed for Pain. Indications: last fill 12/4/21 for 30 days     OXYGEN    Inhale into the lungs Indications: On 3 liters per n/c during the night. QUETIAPINE (SEROQUEL) 50 MG TABLET    TAKE 1 TABLET BY MOUTH EVERY DAY AT NIGHT       ALLERGIES     is allergic to latex, aspirin, avelox [moxifloxacin], chantix [varenicline], doxycycline, dye [iodides], flexeril [cyclobenzaprine], gabapentin, losartan, morphine, nsaids, pcn [penicillins], reglan [metoclopramide hcl], shellfish-derived products, sulfa antibiotics, vancomycin, zofran, zyvox [linezolid], acyclovir, bactrim [sulfamethoxazole-trimethoprim], betadine [povidone iodine], ceclor [cefaclor], clindamycin/lincomycin, codeine, macrolides and ketolides, novolin r [insulin], novolog [insulin aspart], phenothiazines, tape [adhesive tape], banana, compazine [prochlorperazine], fentanyl, kiwi extract, tamiflu [oseltamivir phosphate], and sotalol. FAMILY HISTORY     She indicated that the status of her mother is unknown. She indicated that the status of her father is unknown.  She indicated that the status of her sister is unknown. She indicated that the status of her maternal grandmother is unknown. She indicated that the status of her maternal grandfather is unknown. She indicated that the status of her paternal grandmother is unknown. She indicated that the status of her paternal grandfather is unknown. She indicated that the status of her maternal aunt is unknown. She indicated that the status of her maternal uncle is unknown. She indicated that the status of her paternal aunt is unknown. She indicated that the status of her paternal uncle is unknown.     family history includes Asthma in her father, maternal grandfather, maternal grandmother, and mother; Breast Cancer in her maternal aunt, maternal grandmother, and paternal aunt; Cancer in her maternal aunt, maternal grandmother, paternal grandmother, and sister; Cervical Cancer in her maternal grandmother and paternal grandmother; Diabetes in her father, maternal aunt, maternal grandfather, maternal grandmother, mother, paternal grandfather, and paternal grandmother; Heart Disease in her father, maternal aunt, maternal grandfather, maternal grandmother, maternal uncle, mother, paternal aunt, paternal grandfather, paternal grandmother, and paternal uncle; High Blood Pressure in her father, maternal aunt, maternal grandfather, maternal grandmother, maternal uncle, mother, paternal aunt, paternal grandfather, paternal grandmother, and paternal uncle; Liver Disease (age of onset: 61) in her father; Coby Outlaw in her maternal grandfather; Ulcerative Colitis in her father. SOCIAL HISTORY      reports that she has been smoking cigarettes. She has a 11.50 pack-year smoking history. She has never used smokeless tobacco. She reports that she does not drink alcohol and does not use drugs. PHYSICAL EXAM     INITIAL VITALS:  height is 5' 4\" (1.626 m) and weight is 250 lb (113.4 kg). Her temperature is 98.1 °F (36.7 °C).  Her blood pressure is 164/79 (abnormal) and her pulse is 95. Her respiration is 20 and oxygen saturation is 97%. Physical Exam  Vitals and nursing note reviewed. Constitutional:       General: She is not in acute distress. Appearance: She is diaphoretic. HENT:      Head: Normocephalic and atraumatic. Eyes:      Conjunctiva/sclera: Conjunctivae normal.      Pupils: Pupils are equal, round, and reactive to light. Cardiovascular:      Rate and Rhythm: Normal rate and regular rhythm. Heart sounds: Normal heart sounds. No murmur heard. Pulmonary:      Effort: Pulmonary effort is normal. No respiratory distress. Breath sounds: Normal breath sounds. Abdominal:      General: Bowel sounds are normal. There is no distension. Palpations: Abdomen is soft. Tenderness: There is no abdominal tenderness. Musculoskeletal:         General: No tenderness. Cervical back: Neck supple. Skin:     General: Skin is warm. Findings: No rash. Neurological:      Mental Status: She is alert and oriented to person, place, and time. Psychiatric:         Judgment: Judgment normal.           DIFFERENTIAL DIAGNOSIS/MDM:   12-year-old female presents with continued chest pain, difficulty breathing and cough. She is afebrile here, nontoxic, normal vital signs. No acute distress. Chart review shows she was admitted with influenza a last month and ended up having a heart cath secondary to elevated troponins. Patient is under the impression that she had some sort of intervention however cath report shows that only medical therapy was recommended and a totally occluded RCA was nondominant and there was no interventions there. We will get a cardiac work-up. We will check for Covid as she was negative the last time she was here.     DIAGNOSTIC RESULTS     EKG: All EKG's are interpreted by the Emergency Department Physician who either signs or Co-signs this chart in the absence of a cardiologist.    EKG Interpretation    Interpreted by me    Rhythm: normal sinus   Rate: normal  Axis: normal  Ectopy: none  Conduction: normal  ST Segments: no acute change  T Waves: no acute change  Q Waves: none    Clinical Impression: Nonspecific EKG    RADIOLOGY:   I directly visualized the following  images and reviewed the radiologist interpretations:  XR CHEST PORTABLE   Final Result   No acute cardiopulmonary process                 ED BEDSIDE ULTRASOUND:      LABS:  Labs Reviewed   TROPONIN - Abnormal; Notable for the following components:       Result Value    Troponin, High Sensitivity 17 (*)     All other components within normal limits   CBC WITH AUTO DIFFERENTIAL - Abnormal; Notable for the following components:    WBC 13.5 (*)     .0 (*)     All other components within normal limits   COMPREHENSIVE METABOLIC PANEL - Abnormal; Notable for the following components:    Glucose 178 (*)     All other components within normal limits   BRAIN NATRIURETIC PEPTIDE - Abnormal; Notable for the following components:    Pro- (*)     All other components within normal limits   COVID-19, RAPID   D-DIMER, QUANTITATIVE   PROTIME-INR   APTT   MAGNESIUM   C-REACTIVE PROTEIN   TROPONIN         EMERGENCY DEPARTMENT COURSE:   Vitals:    Vitals:    01/04/22 1351 01/04/22 1428   BP: (!) 164/79    Pulse: 95    Resp: 20    Temp:  98.1 °F (36.7 °C)   SpO2: 97%    Weight: 250 lb (113.4 kg)    Height: 5' 4\" (1.626 m)      5:50 PM EST  Patient's initial troponin is 17. Her electrolytes are normal.  Her D-dimer is negative. X-ray shows no evidence of pneumonia. Troponin has significantly improved from her prior admission a few weeks ago. With her heart cath showing a nondominant occlusion with recommendation from cardiology for medical management and her improved troponin I have a low suspicion for new ACS. I think patient can be appropriately managed outpatient with follow-up with cardiology. COVID-19 test is negative.   Patient may be still feeling viral effects from her influenza infection. I do not think she needs antibiotics. Have a low suspicion for superimposed bacterial infection. Patient will be discharged home after second troponin if no delta troponin. CRITICALCARE:      CONSULTS:  None      PROCEDURES:      FINAL IMPRESSION      1. Chest pain, unspecified type    2. Viral syndrome            DISPOSITION/PLAN   DISPOSITION          PATIENT REFERRED TO:  [facility]  [address] [place] [address]  [phone]    Schedule an appointment as soon as possible for a visit       [facility] ED  [facility] [address] [phone]    As needed, If symptoms worsen      DISCHARGE MEDICATIONS:  New Prescriptions    DICYCLOMINE (BENTYL) 10 MG CAPSULE    Take 1 capsule by mouth every 6 hours as needed (cramps)       The care is provided during an unprecedented national emergency due to the novel coronavirus, COVID-19.     (Please note that portions ofthis note were completed with a voice recognition program.  Efforts were made to edit the dictations but occasionally words are mis-transcribed.)    Padmini Moyer DO  Attending Emergency Physician          Padmini Moyer DO  01/04/22 0893

## 2022-01-05 LAB
EKG ATRIAL RATE: 99 BPM
EKG P AXIS: 63 DEGREES
EKG P-R INTERVAL: 138 MS
EKG Q-T INTERVAL: 344 MS
EKG QRS DURATION: 78 MS
EKG QTC CALCULATION (BAZETT): 441 MS
EKG R AXIS: 62 DEGREES
EKG T AXIS: 35 DEGREES
EKG VENTRICULAR RATE: 99 BPM

## 2022-01-05 PROCEDURE — 93010 ELECTROCARDIOGRAM REPORT: CPT | Performed by: INTERNAL MEDICINE

## 2022-01-09 ENCOUNTER — HOSPITAL ENCOUNTER (EMERGENCY)
Age: 47
Discharge: HOME | End: 2022-01-10
Attending: EMERGENCY MEDICINE
Payer: COMMERCIAL

## 2022-01-09 ENCOUNTER — APPOINTMENT (OUTPATIENT)
Dept: GENERAL RADIOLOGY | Age: 47
End: 2022-01-09
Payer: COMMERCIAL

## 2022-01-09 VITALS
DIASTOLIC BLOOD PRESSURE: 68 MMHG | HEART RATE: 83 BPM | RESPIRATION RATE: 18 BRPM | WEIGHT: 254 LBS | BODY MASS INDEX: 43.36 KG/M2 | TEMPERATURE: 99 F | OXYGEN SATURATION: 96 % | SYSTOLIC BLOOD PRESSURE: 120 MMHG | HEIGHT: 64 IN

## 2022-01-09 DIAGNOSIS — J40 BRONCHITIS: ICD-10-CM

## 2022-01-09 DIAGNOSIS — R07.9 CHEST PAIN, UNSPECIFIED TYPE: Primary | ICD-10-CM

## 2022-01-09 LAB
ABSOLUTE EOS #: 0.3 K/UL (ref 0–0.4)
ABSOLUTE IMMATURE GRANULOCYTE: ABNORMAL K/UL (ref 0–0.3)
ABSOLUTE LYMPH #: 3.4 K/UL (ref 1–4.8)
ABSOLUTE MONO #: 0.6 K/UL (ref 0.1–1.3)
ANION GAP SERPL CALCULATED.3IONS-SCNC: 10 MMOL/L (ref 9–17)
BASOPHILS # BLD: 1 % (ref 0–2)
BASOPHILS ABSOLUTE: 0.1 K/UL (ref 0–0.2)
BNP INTERPRETATION: ABNORMAL
BUN BLDV-MCNC: 14 MG/DL (ref 6–20)
BUN/CREAT BLD: ABNORMAL (ref 9–20)
CALCIUM SERPL-MCNC: 9.6 MG/DL (ref 8.6–10.4)
CHLORIDE BLD-SCNC: 102 MMOL/L (ref 98–107)
CO2: 29 MMOL/L (ref 20–31)
CREAT SERPL-MCNC: 0.55 MG/DL (ref 0.5–0.9)
D-DIMER QUANTITATIVE: 0.36 MG/L FEU (ref 0–0.59)
DIFFERENTIAL TYPE: ABNORMAL
EOSINOPHILS RELATIVE PERCENT: 2 % (ref 0–4)
GFR AFRICAN AMERICAN: >60 ML/MIN
GFR NON-AFRICAN AMERICAN: >60 ML/MIN
GFR SERPL CREATININE-BSD FRML MDRD: ABNORMAL ML/MIN/{1.73_M2}
GFR SERPL CREATININE-BSD FRML MDRD: ABNORMAL ML/MIN/{1.73_M2}
GLUCOSE BLD-MCNC: 166 MG/DL (ref 70–99)
HCT VFR BLD CALC: 42.8 % (ref 36–46)
HEMOGLOBIN: 14.6 G/DL (ref 12–16)
IMMATURE GRANULOCYTES: ABNORMAL %
LYMPHOCYTES # BLD: 31 % (ref 24–44)
MAGNESIUM: 1.7 MG/DL (ref 1.6–2.6)
MCH RBC QN AUTO: 34.1 PG (ref 26–34)
MCHC RBC AUTO-ENTMCNC: 34 G/DL (ref 31–37)
MCV RBC AUTO: 100.2 FL (ref 80–100)
MONOCYTES # BLD: 6 % (ref 1–7)
NRBC AUTOMATED: ABNORMAL PER 100 WBC
PDW BLD-RTO: 12.9 % (ref 11.5–14.9)
PLATELET # BLD: 273 K/UL (ref 150–450)
PLATELET ESTIMATE: ABNORMAL
PMV BLD AUTO: 8.2 FL (ref 6–12)
POTASSIUM SERPL-SCNC: 3.9 MMOL/L (ref 3.7–5.3)
PRO-BNP: 393 PG/ML
RBC # BLD: 4.27 M/UL (ref 4–5.2)
RBC # BLD: ABNORMAL 10*6/UL
SEG NEUTROPHILS: 60 % (ref 36–66)
SEGMENTED NEUTROPHILS ABSOLUTE COUNT: 6.4 K/UL (ref 1.3–9.1)
SODIUM BLD-SCNC: 141 MMOL/L (ref 135–144)
TROPONIN INTERP: NORMAL
TROPONIN T: NORMAL NG/ML
TROPONIN, HIGH SENSITIVITY: 14 NG/L (ref 0–14)
TSH SERPL DL<=0.05 MIU/L-ACNC: 0.7 MIU/L (ref 0.3–5)
WBC # BLD: 10.8 K/UL (ref 3.5–11)
WBC # BLD: ABNORMAL 10*3/UL

## 2022-01-09 PROCEDURE — 85379 FIBRIN DEGRADATION QUANT: CPT

## 2022-01-09 PROCEDURE — 80048 BASIC METABOLIC PNL TOTAL CA: CPT

## 2022-01-09 PROCEDURE — 85025 COMPLETE CBC W/AUTO DIFF WBC: CPT

## 2022-01-09 PROCEDURE — 93005 ELECTROCARDIOGRAM TRACING: CPT | Performed by: EMERGENCY MEDICINE

## 2022-01-09 PROCEDURE — 6360000002 HC RX W HCPCS: Performed by: EMERGENCY MEDICINE

## 2022-01-09 PROCEDURE — 84443 ASSAY THYROID STIM HORMONE: CPT

## 2022-01-09 PROCEDURE — 71045 X-RAY EXAM CHEST 1 VIEW: CPT

## 2022-01-09 PROCEDURE — 99283 EMERGENCY DEPT VISIT LOW MDM: CPT

## 2022-01-09 PROCEDURE — 83735 ASSAY OF MAGNESIUM: CPT

## 2022-01-09 PROCEDURE — 96374 THER/PROPH/DIAG INJ IV PUSH: CPT

## 2022-01-09 PROCEDURE — 36415 COLL VENOUS BLD VENIPUNCTURE: CPT

## 2022-01-09 PROCEDURE — 84484 ASSAY OF TROPONIN QUANT: CPT

## 2022-01-09 PROCEDURE — 6370000000 HC RX 637 (ALT 250 FOR IP): Performed by: EMERGENCY MEDICINE

## 2022-01-09 PROCEDURE — 83880 ASSAY OF NATRIURETIC PEPTIDE: CPT

## 2022-01-09 RX ORDER — OXYCODONE HYDROCHLORIDE AND ACETAMINOPHEN 5; 325 MG/1; MG/1
2 TABLET ORAL ONCE
Status: COMPLETED | OUTPATIENT
Start: 2022-01-09 | End: 2022-01-09

## 2022-01-09 RX ORDER — METHYLPREDNISOLONE SODIUM SUCCINATE 125 MG/2ML
125 INJECTION, POWDER, LYOPHILIZED, FOR SOLUTION INTRAMUSCULAR; INTRAVENOUS ONCE
Status: COMPLETED | OUTPATIENT
Start: 2022-01-09 | End: 2022-01-09

## 2022-01-09 RX ADMIN — OXYCODONE HYDROCHLORIDE AND ACETAMINOPHEN 2 TABLET: 5; 325 TABLET ORAL at 22:46

## 2022-01-09 RX ADMIN — METHYLPREDNISOLONE SODIUM SUCCINATE 125 MG: 125 INJECTION, POWDER, FOR SOLUTION INTRAMUSCULAR; INTRAVENOUS at 22:47

## 2022-01-09 ASSESSMENT — ENCOUNTER SYMPTOMS
VOMITING: 0
DIARRHEA: 1
BACK PAIN: 0
COUGH: 1
ABDOMINAL PAIN: 0
SHORTNESS OF BREATH: 1

## 2022-01-09 ASSESSMENT — PAIN SCALES - GENERAL
PAINLEVEL_OUTOF10: 10
PAINLEVEL_OUTOF10: 8

## 2022-01-09 ASSESSMENT — PAIN DESCRIPTION - LOCATION: LOCATION: CHEST

## 2022-01-09 ASSESSMENT — PAIN DESCRIPTION - PAIN TYPE: TYPE: CHRONIC PAIN

## 2022-01-09 ASSESSMENT — PAIN DESCRIPTION - ORIENTATION: ORIENTATION: LEFT

## 2022-01-10 RX ORDER — METHYLPREDNISOLONE 4 MG/1
TABLET ORAL
Qty: 1 KIT | Refills: 0 | Status: SHIPPED | OUTPATIENT
Start: 2022-01-10 | End: 2022-01-23 | Stop reason: ALTCHOICE

## 2022-01-10 NOTE — ED PROVIDER NOTES
EMERGENCY DEPARTMENT ENCOUNTER    Pt Name: Elda Worrell  MRN: [id number]  [place] 1975  Date of evaluation: 1/9/22  CHIEF COMPLAINT       Chief Complaint   Patient presents with    Hemoptysis    Chest Pain    Shortness of Breath     HISTORY OF PRESENT ILLNESS   HPI     This is a 49-year-old female with multiple medical problems who comes in today. The patient was admitted to the hospital for 8 days with influenza she was transferred to [facility] where she had a cardiac catheterization secondary to elevated troponin and she was found to have a small RCA occlusion that was deemed to be medically managed she was prescribed Plavix but has not picked up the prescription yet this morning she had cough with bloody sputum production this afternoon as well and then around 4 PM so 3 total episodes of sputum with blood she is not on any blood thinners she is also complaining of chest tightness and weakness wheezing she still is smoking. Says that her cough has been constant since her admission. She denies any abdominal pain has chronic diarrhea no vomiting. REVIEW OF SYSTEMS     Review of Systems   Constitutional: Negative for fever. HENT: Negative for congestion. Respiratory: Positive for cough and shortness of breath. Cardiovascular: Positive for chest pain. Gastrointestinal: Positive for diarrhea. Negative for abdominal pain and vomiting. Genitourinary: Negative for dysuria. Musculoskeletal: Negative for back pain. Skin: Negative for rash. Neurological: Negative for headaches. All other systems reviewed and are negative.     PASTMEDICAL HISTORY     Past Medical History:   Diagnosis Date    Acute exacerbation of chronic obstructive pulmonary disease ([address] 3/21/2018    Adhesive capsulitis of left shoulder 9/25/2018    Asthma     Asthma exacerbation 7/24/2014    Atrial fibrillation ([address]     placed on event monitor 9/25/18 for PVC's & A-fib    Atrial premature depolarization 7/19/2019    Bipolar 1 disorder ([address]     Bipolar disorder ([address] 7/19/2019    Bulging disc     CAD (coronary artery disease)     Candida infection 2/23/2018    Cardiomyopathy ([address]     Chest pain 6/13/2017    [facility]     Chronic otitis media of both ears     rt>lt    Chronic right shoulder pain 3/14/2017    Cigarette nicotine dependence with nicotine-induced disorder 7/19/2019    Class 3 severe obesity due to excess calories with serious comorbidity and body mass index (BMI) of 40.0 to 44.9 in adult [facility]) 10/4/2018    Closed fracture of left ankle 6/25/2019    Clostridium difficile infection     COPD (chronic obstructive pulmonary disease) (HCC)     Cough, persistent 3/21/2018    Current moderate episode of major depressive disorder without prior episode ([address] 8/20/2018    Depression     Diabetes mellitus type 2, controlled ([address] 4/30/2014    Diarrhea     Diarrhea     Dizziness     DVT (deep venous thrombosis) ([facility]     after PICC line right arm    Encounter for monitoring sotalol therapy 2/20/2017    Encounter for screening mammogram for malignant neoplasm of breast 3/7/2018    Endometriosis     Fainting     GERD (gastroesophageal reflux disease)     hx of    GI bleeding     H. pylori infection     Helicobacter pylori (H. pylori)     Native (hard of hearing)     both ears, no hearing aids    HTN (hypertension) 7/19/2019    Hyperlipidemia     Hypertension     Left bicipital tenosynovitis 10/17/2018    Left leg pain 5/16/2017    Left sided abdominal pain of unknown cause 7/19/2016    Leg swelling 9/21/2018    Mastoiditis     Mastoiditis, acute 4/30/2014    Migraines     Morbid obesity ([address]     Myocardial infarction ([address]     2005    Nausea     Neuropathy     On home oxygen therapy     3 L at night    Ovarian cyst     Passed out     hx of- negative tilt table    Pulmonary hypertension (HCC)     Pulmonary insufficiency     PVC (premature ventricular contraction)     Seasonal allergies     Tricuspid insufficiency     Type II or unspecified type diabetes mellitus without mention of complication, not stated as uncontrolled      SURGICAL HISTORY       Past Surgical History:   Procedure Laterality Date    ABDOMEN SURGERY      abcess    ABDOMINAL ADHESION SURGERY      ABDOMINAL EXPLORATION SURGERY      x 4    ABDOMINAL HERNIA REPAIR      with mesh    ABLATION OF DYSRHYTHMIC FOCUS  2016    ABLATION OF DYSRHYTHMIC FOCUS  09/08/2017    Done at [facility].  ABSCESS DRAINAGE      left hip and chest    APPENDECTOMY      BREAST SURGERY Left 2007    I & D    CARDIAC CATHETERIZATION  2014 & 2000     no stenting    CARPAL TUNNEL RELEASE Right 12/19/2019    Ulnar nerve decompression, right elbow. Release of 1st and 2nd extensor compartments, right forearm.  CARPAL TUNNEL RELEASE  05/04/2020    Carpal tunnel release, left hand    CHOLECYSTECTOMY      COLONOSCOPY      FOOT SURGERY Left     bone fragment removed    FRACTURE SURGERY Right     closed reduction perc pinning ring & middle finger    GASTRIC FUNDOPLICATION  0050    HYSTERECTOMY      total    HYSTERECTOMY      HYSTEROSCOPY      tubal perfusion    MYRINGOTOMY Right 11/13/2015    Right myringotomy with placement of  T-tube on the right side. Removal of plugged ventilating tube, right side.  MYRINGOTOMY Left 07/14/2020    MYRINGOTOMY TUBE INSERTION performed by Cortland Severs, MD at [facility] Right 06/05/2014    OTHER SURGICAL HISTORY Right 05/29/2014    removal ear tube rt ear    OTHER SURGICAL HISTORY  2009    LOOP recorder inserted and removed 3 mos.  later    OTHER SURGICAL HISTORY Right 08/11/2016    ear tube removal with patch    OTHER SURGICAL HISTORY      tubal perfusion, lysis of uterine adhesions    OTHER SURGICAL HISTORY      multiple PICC lines inserted and removed, it has affected circulation bilateral upper arms    OTHER SURGICAL HISTORY  10/19/2020    Revisiion to subcutaneous transposition of unlar nerve, right elbow    OTHER SURGICAL HISTORY Right 06/14/2021    REVISION TO SUBMUSCULAR TRANSPOSITION OF RIGHT ULNAR NERVE    WI OLYA BATES JT W ANESTHESIA Right 03/01/2017    SHOULDER MANIPULATION RIGHT performed by Felipe Anna MD at [address] Left 10/17/2018    SHOULDER ARTHROSCOPY W/BICEPS TENDONESIS performed by Slim Odom MD at [facility] Left     foot    TONSILLECTOMY      TYMPANOSTOMY TUBE PLACEMENT      TYMPANOSTOMY TUBE PLACEMENT Left 03/12/2019    TYMPANOSTOMY TUBE PLACEMENT Right 12/08/2021    Right tympanostomy with tube placement of T-tube with operative microscope and general anesthesia. Right removal of right ear tube.  ULNAR TUNNEL RELEASE Right     UPPER GASTROINTESTINAL ENDOSCOPY      UPPER GASTROINTESTINAL ENDOSCOPY  07/10/2019    UPPER GASTROINTESTINAL ENDOSCOPY N/A 07/10/2019    EGD BIOPSY performed by Orlando Blankenship MD at [address]       Previous Medications    ALBUTEROL SULFATE HFA (VENTOLIN HFA) 108 (90 BASE) MCG/ACT INHALER    INHALE 2 PUFFS INTO LUNGS EVERY 6 HOURS AS NEEDED FOR WHEEZING    ARIPIPRAZOLE (ABILIFY) 5 MG TABLET    Take 5 mg by mouth daily     ATORVASTATIN (LIPITOR) 80 MG TABLET    Take 1 tablet by mouth nightly    BLOOD GLUCOSE TEST STRIPS (ASCENSIA AUTODISC VI;ONE TOUCH ULTRA TEST VI) STRIP    OneTouch Ultra Test strips. Check blood sugars 4x daily    BLOOD GLUCOSE TEST STRIPS (ONETOUCH ULTRA) STRIP    USE TO TEST BLOOD SUGAR BEFORE MEALS, AT BEDTIME, AND AS NEEDED (6 TIMES DAILY)    CARVEDILOL (COREG) 12.5 MG TABLET    Take 12.5 mg by mouth 2 times daily (with meals) Takes at 10:00am and 10:00pm    CLEARLAX 17 GM/SCOOP POWDER    TAKE 17 G BY MOUTH 2 TIMES DAILY    CLOTRIMAZOLE (LOTRIMIN) 1 % CREAM    Apply topically 2 times daily.     COLCHICINE (COLCRYS) 0.6 MG TABLET Take 1 tablet by mouth daily    DEXTROMETHORPHAN-GUAIFENESIN (ROBITUSSIN-DM)  MG/5ML SYRUP    Take 5 mLs by mouth every 6 hours    DEXTROMETHORPHAN-GUAIFENESIN (ROBITUSSIN-DM)  MG/5ML SYRUP    Take 10 mLs by mouth every 6 hours    DICYCLOMINE (BENTYL) 10 MG CAPSULE    Take 1 capsule by mouth every 6 hours as needed (cramps)    DULOXETINE (CYMBALTA) 60 MG EXTENDED RELEASE CAPSULE    TAKE 1 CAPSULE BY MOUTH 2 TIMES DAILY    ERGOCALCIFEROL (ERGOCALCIFEROL) 1.25 MG (30159 UT) CAPSULE    Take 50,000 Units by mouth once a week     HM SALINE NASAL SPRAY 0.65 % NASAL SPRAY    1 spray by Nasal route as needed for Congestion     IBUPROFEN (ADVIL;MOTRIN) 800 MG TABLET    Take 800 mg by mouth every 8 hours as needed for Pain    INSULIN DEGLUDEC (TRESIBA FLEXTOUCH) 200 UNIT/ML SOPN    Inject 80 Units into the skin 2 times daily    INSULIN LISPRO, 1 UNIT DIAL, (HUMALOG KWIKPEN) 100 UNIT/ML SOPN    INJECT SUBCUTANEOUSLY PER SLIDING SCALE, 150-200 INJECT 3U, 201-250 INJECT 6U, 251-300 INJECT 9U, >300 INJECT 12U, MAX 30U/DAY    INSULIN PEN NEEDLE (UNIFINE PENTIPS) 31G X 8 MM MISC    USE 4 TIMES DAILY AS DIRECTED    IPRATROPIUM-ALBUTEROL (DUONEB) 0.5-2.5 (3) MG/3ML SOLN NEBULIZER SOLUTION    INHALE 3 MLS (ONE VIAL) WITH NEBULIZER EVERY 6 HOURS AS NEEDED FOR SHORTNESS OF BREATH    MAGNESIUM OXIDE (MAG-OX) 400 MG TABLET    Take 400 mg by mouth daily     MULTIPLE VITAMINS-MINERALS (MULTIVITAMIN GUMMIES WOMENS) CHEW    Take 2 each by mouth daily     NITROGLYCERIN (NITROSTAT) 0.4 MG SL TABLET    up to max of 3 total doses. If no relief after 1 dose, call 911. OXYCODONE-ACETAMINOPHEN (PERCOCET) 5-325 MG PER TABLET    Take 1 tablet by mouth every 4 hours as needed for Pain. Indications: last fill 12/4/21 for 30 days     OXYGEN    Inhale into the lungs Indications: On 3 liters per n/c during the night.     QUETIAPINE (SEROQUEL) 50 MG TABLET    TAKE 1 TABLET BY MOUTH EVERY DAY AT NIGHT     ALLERGIES     is allergic to latex, aspirin, avelox [moxifloxacin], chantix [varenicline], doxycycline, dye [iodides], flexeril [cyclobenzaprine], gabapentin, losartan, morphine, nsaids, pcn [penicillins], reglan [metoclopramide hcl], shellfish-derived products, sulfa antibiotics, vancomycin, zofran, zyvox [linezolid], acyclovir, bactrim [sulfamethoxazole-trimethoprim], betadine [povidone iodine], ceclor [cefaclor], clindamycin/lincomycin, codeine, macrolides and ketolides, novolin r [insulin], novolog [insulin aspart], phenothiazines, tape [adhesive tape], banana, compazine [prochlorperazine], fentanyl, kiwi extract, tamiflu [oseltamivir phosphate], and sotalol. FAMILY HISTORY     She indicated that the status of her mother is unknown. She indicated that the status of her father is unknown. She indicated that the status of her sister is unknown. She indicated that the status of her maternal grandmother is unknown. She indicated that the status of her maternal grandfather is unknown. She indicated that the status of her paternal grandmother is unknown. She indicated that the status of her paternal grandfather is unknown. She indicated that the status of her maternal aunt is unknown. She indicated that the status of her maternal uncle is unknown. She indicated that the status of her paternal aunt is unknown. She indicated that the status of her paternal uncle is unknown. SOCIAL HISTORY       Social History     Tobacco Use    Smoking status: Current Every Day Smoker     Packs/day: 0.50     Years: 23.00     Pack years: 11.50     Types: Cigarettes    Smokeless tobacco: Never Used   Vaping Use    Vaping Use: Never used   Substance Use Topics    Alcohol use: No     Alcohol/week: 0.0 standard drinks    Drug use: No     PHYSICAL EXAM     INITIAL VITALS: /68   Pulse 83   Temp 99 °F (37.2 °C) (Oral)   Resp 18   Ht 5' 4\" (1.626 m)   Wt 254 lb (115.2 kg)   SpO2 96%   BMI 43.60 kg/m²    Physical Exam  Vitals and nursing note reviewed. Constitutional:       General: She is not in acute distress. Appearance: She is well-developed. HENT:      Head: Normocephalic and atraumatic. Eyes:      Conjunctiva/sclera: Conjunctivae normal.   Cardiovascular:      Rate and Rhythm: Normal rate and regular rhythm. Heart sounds: No murmur heard. No friction rub. Pulmonary:      Comments: Expiratory wheezing throughout all lung fields  Abdominal:      General: There is no distension. Palpations: Abdomen is soft. Tenderness: There is no abdominal tenderness. There is no guarding or rebound. Musculoskeletal:      Cervical back: Neck supple. Skin:     General: Skin is warm and dry. Capillary Refill: Capillary refill takes less than 2 seconds. Neurological:      Mental Status: She is alert. DIAGNOSTIC RESULTS   RADIOLOGY:All plain film, CT, MRI, and formal ultrasound images (except ED bedside ultrasound) are read by the radiologist, see reports below, unless otherwisenoted in MDM or here. XR CHEST PORTABLE   Final Result      Vague increased opacity in the lateral left lower lung field suggest possible   pneumonitis, correlate clinically. LABS: All lab results were reviewed by myself, and all abnormals are listed below.   Labs Reviewed   CBC WITH AUTO DIFFERENTIAL - Abnormal; Notable for the following components:       Result Value    .2 (*)     MCH 34.1 (*)     All other components within normal limits   BASIC METABOLIC PANEL - Abnormal; Notable for the following components:    Glucose 166 (*)     All other components within normal limits   BRAIN NATRIURETIC PEPTIDE - Abnormal; Notable for the following components:    Pro- (*)     All other components within normal limits   D-DIMER, QUANTITATIVE   MAGNESIUM   TROPONIN   TSH WITH REFLEX   TROPONIN   URINE DRUG SCREEN       EMERGENCY DEPARTMENTCOURSE:   Differential diagnosis includes ACS pneumonia pneumothorax pulmonary embolism I am concerned for PE with cough with sputum production and recent hospitalization versus pneumonia however the patient is allergic to IV contrast dye requiring a 24-hour prep will obtain D-dimer    1:32 AM EST  There is elevation in creatinine no electrolyte abnormality BNP is 393 troponin is 14 down from 17 TSH is normal there is no leukocytosis no anemia chest x-ray is concerning for bronchiolitis she already has been diagnosed with influenza given her wheezing I am concerned for a bronchitis she was treated with Solu-Medrol here I did speak to her primary care physician who knows her well Dr. Vincent Raymond who agrees that the patient does not need to be admitted as long as her repeat troponin is similar and she should be followed up as an outpatient we will discharge her with a Medrol Dosepak.   Patient states that she missed her carvedilol dose I recommended that I wanted her labs to return before I order the carvedilol now they are back and the patient states that she does not want her carvedilol anymore she would just wait until 7 AM.    1:40 AM EST  Update by nursing staff that the patient is refusing her repeat troponin she would like to be discharged immediately the patient will be discharged       EKG:All EKG's are interpreted by the Emergency Department Physician who either signs or Co-signs this chart in the absence of a cardiologist.  Normal sinus rhythm with a heart rate of 76 bpm normal axis no prolonged intervals no acute ST or T wave changes    CONSULTS:  IP CONSULT TO FAMILY MEDICINE    Vitals:    Vitals:    01/09/22 1819   BP: 120/68   Pulse: 83   Resp: 18   Temp: 99 °F (37.2 °C)   TempSrc: Oral   SpO2: 96%   Weight: 254 lb (115.2 kg)   Height: 5' 4\" (1.626 m)       The patient was given the following medications while in the emergency department:  Orders Placed This Encounter   Medications    oxyCODONE-acetaminophen (PERCOCET) 5-325 MG per tablet 2 tablet    methylPREDNISolone sodium (SOLU-MEDROL) injection 125 mg    methylPREDNISolone (MEDROL, FREDDY,) 4 MG tablet     Sig: Take by mouth. Dispense:  1 kit     Refill:  0         FINAL IMPRESSION      1. Chest pain, unspecified type    2. Bronchitis         DISPOSITION/PLAN   DISPOSITION Discharge - Pending Orders Complete 01/10/2022 01:33:23 AM      PATIENT REFERRED TO:  Arpit Cabrales, [address]  [address] [place] [address]  [phone]    Schedule an appointment as soon as possible for a visit       DISCHARGE MEDICATIONS:  New Prescriptions    METHYLPREDNISOLONE (MEDROL, FREDDY,) 4 MG TABLET    Take by mouth.      Stevie Garvin MD  Attending Emergency Physician    This charting supersedes any ED resident or staff charting and was written using speech recognition software        Stevie Garvin MD  01/10/22 [id number]

## 2022-01-11 LAB
EKG ATRIAL RATE: 76 BPM
EKG P AXIS: 51 DEGREES
EKG P-R INTERVAL: 152 MS
EKG Q-T INTERVAL: 390 MS
EKG QRS DURATION: 86 MS
EKG QTC CALCULATION (BAZETT): 438 MS
EKG R AXIS: 32 DEGREES
EKG T AXIS: 37 DEGREES
EKG VENTRICULAR RATE: 76 BPM

## 2022-01-11 PROCEDURE — 93010 ELECTROCARDIOGRAM REPORT: CPT | Performed by: INTERNAL MEDICINE

## 2022-01-14 PROBLEM — R77.8 ELEVATED TROPONIN: Status: RESOLVED | Noted: 2021-12-14 | Resolved: 2022-01-14

## 2022-01-14 PROBLEM — R79.89 ELEVATED TROPONIN: Status: RESOLVED | Noted: 2021-12-14 | Resolved: 2022-01-14

## 2022-01-14 PROBLEM — J10.1 INFLUENZA A: Status: RESOLVED | Noted: 2021-12-15 | Resolved: 2022-01-14

## 2022-01-17 ENCOUNTER — APPOINTMENT (OUTPATIENT)
Dept: GENERAL RADIOLOGY | Age: 47
End: 2022-01-17
Payer: COMMERCIAL

## 2022-01-17 ENCOUNTER — HOSPITAL ENCOUNTER (EMERGENCY)
Age: 47
Discharge: HOME | End: 2022-01-17
Attending: EMERGENCY MEDICINE
Payer: COMMERCIAL

## 2022-01-17 VITALS
RESPIRATION RATE: 17 BRPM | HEIGHT: 64 IN | OXYGEN SATURATION: 98 % | DIASTOLIC BLOOD PRESSURE: 101 MMHG | SYSTOLIC BLOOD PRESSURE: 128 MMHG | WEIGHT: 250 LBS | TEMPERATURE: 98.6 F | BODY MASS INDEX: 42.68 KG/M2 | HEART RATE: 80 BPM

## 2022-01-17 DIAGNOSIS — J06.9 VIRAL URI: Primary | ICD-10-CM

## 2022-01-17 LAB
ABSOLUTE EOS #: 0.4 K/UL (ref 0–0.4)
ABSOLUTE IMMATURE GRANULOCYTE: ABNORMAL K/UL (ref 0–0.3)
ABSOLUTE LYMPH #: 3.8 K/UL (ref 1–4.8)
ABSOLUTE MONO #: 0.7 K/UL (ref 0.1–1.3)
ALBUMIN SERPL-MCNC: 4.1 G/DL (ref 3.5–5.2)
ALBUMIN/GLOBULIN RATIO: ABNORMAL (ref 1–2.5)
ALP BLD-CCNC: 90 U/L (ref 35–104)
ALT SERPL-CCNC: 15 U/L (ref 5–33)
ANION GAP SERPL CALCULATED.3IONS-SCNC: 14 MMOL/L (ref 9–17)
AST SERPL-CCNC: 20 U/L
BASOPHILS # BLD: 0 % (ref 0–2)
BASOPHILS ABSOLUTE: 0.1 K/UL (ref 0–0.2)
BILIRUB SERPL-MCNC: 0.32 MG/DL (ref 0.3–1.2)
BNP INTERPRETATION: ABNORMAL
BUN BLDV-MCNC: 17 MG/DL (ref 6–20)
BUN/CREAT BLD: ABNORMAL (ref 9–20)
CALCIUM SERPL-MCNC: 10.5 MG/DL (ref 8.6–10.4)
CHLORIDE BLD-SCNC: 103 MMOL/L (ref 98–107)
CO2: 26 MMOL/L (ref 20–31)
CREAT SERPL-MCNC: 0.67 MG/DL (ref 0.5–0.9)
DIFFERENTIAL TYPE: ABNORMAL
EOSINOPHILS RELATIVE PERCENT: 3 % (ref 0–4)
GFR AFRICAN AMERICAN: >60 ML/MIN
GFR NON-AFRICAN AMERICAN: >60 ML/MIN
GFR SERPL CREATININE-BSD FRML MDRD: ABNORMAL ML/MIN/{1.73_M2}
GFR SERPL CREATININE-BSD FRML MDRD: ABNORMAL ML/MIN/{1.73_M2}
GLUCOSE BLD-MCNC: 127 MG/DL (ref 70–99)
GLUCOSE BLD-MCNC: 143 MG/DL (ref 65–105)
HCT VFR BLD CALC: 45.1 % (ref 36–46)
HEMOGLOBIN: 15.1 G/DL (ref 12–16)
IMMATURE GRANULOCYTES: ABNORMAL %
INFLUENZA A: NOT DETECTED
INFLUENZA B: NOT DETECTED
LIPASE: 12 U/L (ref 13–60)
LYMPHOCYTES # BLD: 29 % (ref 24–44)
MCH RBC QN AUTO: 34 PG (ref 26–34)
MCHC RBC AUTO-ENTMCNC: 33.5 G/DL (ref 31–37)
MCV RBC AUTO: 101.4 FL (ref 80–100)
MONOCYTES # BLD: 6 % (ref 1–7)
NRBC AUTOMATED: ABNORMAL PER 100 WBC
PDW BLD-RTO: 13.2 % (ref 11.5–14.9)
PLATELET # BLD: 322 K/UL (ref 150–450)
PLATELET ESTIMATE: ABNORMAL
PMV BLD AUTO: 7.9 FL (ref 6–12)
POTASSIUM SERPL-SCNC: 4.3 MMOL/L (ref 3.7–5.3)
PRO-BNP: 467 PG/ML
RBC # BLD: 4.45 M/UL (ref 4–5.2)
RBC # BLD: ABNORMAL 10*6/UL
SARS-COV-2 RNA, RT PCR: NOT DETECTED
SEG NEUTROPHILS: 62 % (ref 36–66)
SEGMENTED NEUTROPHILS ABSOLUTE COUNT: 8 K/UL (ref 1.3–9.1)
SODIUM BLD-SCNC: 143 MMOL/L (ref 135–144)
SOURCE: NORMAL
SPECIMEN DESCRIPTION: NORMAL
TOTAL PROTEIN: 7.3 G/DL (ref 6.4–8.3)
TROPONIN INTERP: ABNORMAL
TROPONIN T: ABNORMAL NG/ML
TROPONIN, HIGH SENSITIVITY: 19 NG/L (ref 0–14)
WBC # BLD: 13.1 K/UL (ref 3.5–11)
WBC # BLD: ABNORMAL 10*3/UL

## 2022-01-17 PROCEDURE — 87636 SARSCOV2 & INF A&B AMP PRB: CPT

## 2022-01-17 PROCEDURE — 99284 EMERGENCY DEPT VISIT MOD MDM: CPT

## 2022-01-17 PROCEDURE — 93005 ELECTROCARDIOGRAM TRACING: CPT | Performed by: STUDENT IN AN ORGANIZED HEALTH CARE EDUCATION/TRAINING PROGRAM

## 2022-01-17 PROCEDURE — 80053 COMPREHEN METABOLIC PANEL: CPT

## 2022-01-17 PROCEDURE — 84484 ASSAY OF TROPONIN QUANT: CPT

## 2022-01-17 PROCEDURE — 82947 ASSAY GLUCOSE BLOOD QUANT: CPT

## 2022-01-17 PROCEDURE — 83690 ASSAY OF LIPASE: CPT

## 2022-01-17 PROCEDURE — 36415 COLL VENOUS BLD VENIPUNCTURE: CPT

## 2022-01-17 PROCEDURE — 85025 COMPLETE CBC W/AUTO DIFF WBC: CPT

## 2022-01-17 PROCEDURE — 71045 X-RAY EXAM CHEST 1 VIEW: CPT

## 2022-01-17 PROCEDURE — 83880 ASSAY OF NATRIURETIC PEPTIDE: CPT

## 2022-01-17 RX ORDER — ACETAMINOPHEN 500 MG
1000 TABLET ORAL ONCE
Status: DISCONTINUED | OUTPATIENT
Start: 2022-01-17 | End: 2022-01-17 | Stop reason: HOSPADM

## 2022-01-17 ASSESSMENT — ENCOUNTER SYMPTOMS
VOMITING: 0
DIARRHEA: 0
CONSTIPATION: 0
NAUSEA: 1
SHORTNESS OF BREATH: 1
BLOOD IN STOOL: 0
ABDOMINAL PAIN: 1
COUGH: 1

## 2022-01-17 ASSESSMENT — PAIN DESCRIPTION - LOCATION: LOCATION: GENERALIZED

## 2022-01-17 ASSESSMENT — PAIN SCALES - GENERAL: PAINLEVEL_OUTOF10: 10

## 2022-01-17 ASSESSMENT — PAIN DESCRIPTION - PAIN TYPE: TYPE: ACUTE PAIN

## 2022-01-17 NOTE — ED PROVIDER NOTES
[address]  Emergency Department Encounter  Emergency Medicine Resident     Pt Name: Girish Morrison  YHQ:[id number]  [place] 1975  Date of evaluation: 1/17/22  PCP:  Umair Cordero MD    CHIEF COMPLAINT       Chief Complaint   Patient presents with    Concern For COVID-19     HISTORY OF PRESENT ILLNESS  (Location/Symptom, Timing/Onset, Context/Setting, Quality, Duration, ModifyingFactors, Severity.)      Girish Morrison is a 48 y.o. female with PMH of COPD, diabetes, hypertension, hyperlipidemia, pulmonary hypertension, CHF, who presents for evaluation of chest pain, shortness of breath, cough, nausea, epigastric abdominal pain, diarrhea since this morning. States that she felt fine when she went to bed and this woke up with the above symptoms. Patient had virtual visit with her PCP today, PCP note states that pulse ox was high 80s off O2, then low 90s with O2. Patient normally wears 3 L nasal cannula at night but nothing during the day. No headache, dizziness, changes in vision, leg swelling, constipation, urinary symptoms, vaginal bleeding/discharge. Patient has not been sexually active in 15 years. History of provoked DVT following PICC line but no other history of blood clots. No recent travel or surgery in the past 4 weeks. No hormone use. No history of cancer. Admission mid December for influenza and elevated troponin cardiac cath at that time showed small RCA occlusion medically managed with Plavix. Echo 12/14/2021 showed EF 35-40%. Follows with Dr. Zaki Juarez for COPD. Unvaccinated for Claudio Koehler.       PAST MEDICAL / SURGICAL / SOCIAL /FAMILY HISTORY      has a past medical history of Acute exacerbation of chronic obstructive pulmonary disease (HCC), Adhesive capsulitis of left shoulder, Asthma, Asthma exacerbation, Atrial fibrillation (HCC), Atrial premature depolarization, Bipolar 1 disorder ([address] Bipolar disorder ([address] Bulging disc, CAD (coronary artery disease), Candida infection, Cardiomyopathy ([address] Chest pain, CHF (congestive heart failure) (HCC), Chronic otitis media of both ears, Chronic right shoulder pain, Cigarette nicotine dependence with nicotine-induced disorder, Class 3 severe obesity due to excess calories with serious comorbidity and body mass index (BMI) of 40.0 to 44.9 in adult [facility]), Closed fracture of left ankle, Clostridium difficile infection, COPD (chronic obstructive pulmonary disease) (HCC), Cough, persistent, Current moderate episode of major depressive disorder without prior episode ([address] Depression, Diabetes mellitus type 2, controlled ([address] Diarrhea, Diarrhea, Dizziness, DVT (deep venous thrombosis) ([address] Encounter for monitoring sotalol therapy, Encounter for screening mammogram for malignant neoplasm of breast, Endometriosis, Fainting, GERD (gastroesophageal reflux disease), GI bleeding, H. pylori infection, Helicobacter pylori (H. pylori), [place] (hard of hearing), HTN (hypertension), Hyperlipidemia, Hypertension, Left bicipital tenosynovitis, Left leg pain, Left sided abdominal pain of unknown cause, Leg swelling, Mastoiditis, Mastoiditis, acute, Migraines, Morbid obesity ([address] Myocardial infarction ([address] Nausea, Neuropathy, On home oxygen therapy, Ovarian cyst, Passed out, Pulmonary hypertension ([address] Pulmonary insufficiency, PVC (premature ventricular contraction), Seasonal allergies, Tricuspid insufficiency, and Type II or unspecified type diabetes mellitus without mention of complication, not stated as uncontrolled. No other pertinent PMH on review with patient/guardian. has a past surgical history that includes Hysterectomy; Cholecystectomy; Appendectomy; Colonoscopy; Upper gastrointestinal endoscopy; Abdomen surgery; Abdominal adhesion surgery; Abdominal exploration surgery; Tympanostomy tube placement; Tonsillectomy; Foot surgery (Left); Abdominal hernia repair; hysteroscopy; Gastric fundoplication (7288);  Abscess Drainage; Scaphoid fracture surgery (Left); other surgical history (Right, 05/29/2014); Myringotomy Tympanostomy Tube Placement (Right, 06/05/2014); myringotomy (Right, 11/13/2015); Hysterectomy; Cardiac catheterization (2014 & 2000); other surgical history (2009); Breast surgery (Left, 2007); fracture surgery (Right); other surgical history (Right, 08/11/2016); ablation of dysrhythmic focus (2016); other surgical history; other surgical history; pr manipulatn shldr jt w anesthesia (Right, 03/01/2017); ablation of dysrhythmic focus (09/08/2017); pr shoulder scope bone shaving (Left, 10/17/2018); Tympanostomy tube placement (Left, 03/12/2019); Upper gastrointestinal endoscopy (07/10/2019); Upper gastrointestinal endoscopy (N/A, 07/10/2019); Carpal tunnel release (Right, 12/19/2019); Carpal tunnel release (05/04/2020); Ulnar tunnel release (Right); myringotomy (Left, 07/14/2020); other surgical history (10/19/2020); other surgical history (Right, 06/14/2021); and Tympanostomy tube placement (Right, 12/08/2021). No other pertinent PSH on review with patient/guardian.   Social History     Socioeconomic History    Marital status: Single     Spouse name: Not on file    Number of children: Not on file    Years of education: Not on file    Highest education level: Not on file   Occupational History     Employer: NONE   Tobacco Use    Smoking status: Current Every Day Smoker     Packs/day: 0.50     Years: 23.00     Pack years: 11.50     Types: Cigarettes    Smokeless tobacco: Never Used   Vaping Use    Vaping Use: Never used   Substance and Sexual Activity    Alcohol use: No     Alcohol/week: 0.0 standard drinks    Drug use: No    Sexual activity: Not on file   Other Topics Concern    Not on file   Social History Narrative    Not on file     Social Determinants of Health     Financial Resource Strain:     Difficulty of Paying Living Expenses: Not on file   Food Insecurity:     Worried About [address] in the Last Year: Not on file    Ran Out of Food in the Last Year: Not on file   Transportation Needs:     Lack of Transportation (Medical): Not on file    Lack of Transportation (Non-Medical): Not on file   Physical Activity:     Days of Exercise per Week: Not on file    Minutes of Exercise per Session: Not on file   Stress:     Feeling of Stress : Not on file   Social Connections:     Frequency of Communication with Friends and Family: Not on file    Frequency of Social Gatherings with Friends and Family: Not on file    Attends Bahai Services: Not on file    Active Member of [address] [place] [address] [employer] or Organizations: Not on file    Attends Club or Organization Meetings: Not on file    Marital Status: Not on file   Intimate Partner Violence:     Fear of Current or Ex-Partner: Not on file    Emotionally Abused: Not on file    Physically Abused: Not on file    Sexually Abused: Not on file   Housing Stability:     Unable to Pay for Housing in the Last Year: Not on file    Number of [place] in the Last Year: Not on file    Unstable Housing in the Last Year: Not on file       I counseled the patient against using tobacco products.     Family History   Problem Relation Age of Onset    Ulcerative Colitis Father     Liver Disease Father 61        hep c and b    Diabetes Father     Asthma Father     Heart Disease Father     High Blood Pressure Father     Breast Cancer Maternal Aunt     Cancer Maternal Aunt     Diabetes Maternal Aunt     Heart Disease Maternal Aunt     High Blood Pressure Maternal Aunt     Breast Cancer Paternal Aunt     Heart Disease Paternal Aunt     High Blood Pressure Paternal Aunt     Breast Cancer Maternal Grandmother     Cervical Cancer Maternal Grandmother     Cancer Maternal Grandmother     Diabetes Maternal Grandmother     Asthma Maternal Grandmother     Heart Disease Maternal Grandmother     High Blood Pressure Maternal Grandmother     Lung Cancer Maternal Grandfather     Diabetes Maternal Grandfather     Asthma Maternal Grandfather     Heart Disease Maternal Grandfather     High Blood Pressure Maternal Grandfather     Cervical Cancer Paternal Grandmother     Cancer Paternal Grandmother     Diabetes Paternal Grandmother     Heart Disease Paternal Grandmother     High Blood Pressure Paternal Grandmother     Diabetes Mother     Asthma Mother     Heart Disease Mother     High Blood Pressure Mother     Cancer Sister     Heart Disease Maternal Uncle     High Blood Pressure Maternal Uncle     Heart Disease Paternal Uncle     High Blood Pressure Paternal Uncle     Diabetes Paternal Grandfather     Heart Disease Paternal Grandfather     High Blood Pressure Paternal Grandfather      No other pertinent FamHx on review with patient/guardian. Allergies:  Latex, Aspirin, Avelox [moxifloxacin], Chantix [varenicline], Doxycycline, Dye [iodides], Flexeril [cyclobenzaprine], Gabapentin, Losartan, Morphine, Nsaids, Pcn [penicillins], Reglan [metoclopramide hcl], Shellfish-derived products, Sulfa antibiotics, Vancomycin, Zofran, Zyvox [linezolid], Acyclovir, Bactrim [sulfamethoxazole-trimethoprim], Betadine [povidone iodine], Ceclor [cefaclor], Clindamycin/lincomycin, Codeine, Macrolides and ketolides, Novolin r [insulin], Novolog [insulin aspart], Phenothiazines, Tape [adhesive tape], Banana, Compazine [prochlorperazine], Fentanyl, Kiwi extract, Tamiflu [oseltamivir phosphate], and Sotalol    Home Medications:  Prior to Admission medications    Medication Sig Start Date End Date Taking? Authorizing Provider   QUEtiapine (SEROQUEL) 50 MG tablet TAKE 1 TABLET BY MOUTH EVERY DAY AT NIGHT 1/17/22   MARYANNE Tejada - RAVI   methylPREDNISolone (MEDROL, FREDDY,) 4 MG tablet Take by mouth.  1/10/22   Ana Laura Portillo MD   dicyclomine (BENTYL) 10 MG capsule Take 1 capsule by mouth every 6 hours as needed (cramps) 1/4/22   Kirit Anne,    albuterol sulfate HFA (VENTOLIN HFA) 108 (90 Base) MCG/ACT inhaler INHALE 2 PUFFS INTO LUNGS EVERY 6 HOURS AS NEEDED FOR WHEEZING 1/3/22   Wilfredo Dooley MD   dextromethorphan-guaiFENesin (ROBITUSSIN-DM)  MG/5ML syrup Take 10 mLs by mouth every 6 hours 12/21/21   Wilfredo Dooley MD   nitroGLYCERIN (NITROSTAT) 0.4 MG SL tablet up to max of 3 total doses. If no relief after 1 dose, call 911. 12/17/21   Wilfredo Dooley MD   atorvastatin (LIPITOR) 80 MG tablet Take 1 tablet by mouth nightly 12/17/21   Wilfredo Dooley MD   dextromethorphan-guaiFENesin (ROBITUSSIN-DM)  MG/5ML syrup Take 5 mLs by mouth every 6 hours 12/17/21   Wilfredo Dooley MD   colchicine (COLCRYS) 0.6 MG tablet Take 1 tablet by mouth daily 12/17/21   Wilfredo Dooley MD   ibuprofen (ADVIL;MOTRIN) 800 MG tablet Take 800 mg by mouth every 8 hours as needed for Pain    Historical Provider, MD   Insulin Degludec (TRESIBA FLEXTOUCH) 200 UNIT/ML SOPN Inject 80 Units into the skin 2 times daily 11/2/21   MARYANNE Jordan NP   insulin lispro, 1 Unit Dial, (HUMALOG KWIKPEN) 100 UNIT/ML SOPN INJECT SUBCUTANEOUSLY PER SLIDING SCALE, 150-200 INJECT 3U, 201-250 INJECT 6U, 251-300 INJECT 9U, >300 INJECT 12U, MAX 30U/DAY 11/2/21   MARYANNE Jordan NP   ipratropium-albuterol (DUONEB) 0.5-2.5 (3) MG/3ML SOLN nebulizer solution INHALE 3 MLS (ONE VIAL) WITH NEBULIZER EVERY 6 HOURS AS NEEDED FOR SHORTNESS OF BREATH 11/2/21   MARYANNE Jordan NP   Insulin Pen Needle (UNIFINE PENTIPS) 31G X 8 MM MISC USE 4 TIMES DAILY AS DIRECTED 11/2/21   MARYANNE Way NP   clotrimazole (LOTRIMIN) 1 % cream Apply topically 2 times daily. 11/2/21   Christell Molt, APRN - NP   blood glucose test strips (ASCENSIA AUTODISC VI;ONE TOUCH ULTRA TEST VI) strip OneTouch Ultra Test strips.  Check blood sugars 4x daily 11/2/21   MARYANNE Jordan NP   blood glucose test strips (ONETOUCH ULTRA) strip USE TO TEST BLOOD SUGAR BEFORE MEALS, AT BEDTIME, AND AS NEEDED (6 TIMES DAILY) 10/31/21 Wilton Tijerina MD   ergocalciferol (ERGOCALCIFEROL) 1.25 MG (77198 UT) capsule Take 50,000 Units by mouth once a week     Historical Provider, MD   magnesium oxide (MAG-OX) 400 MG tablet Take 400 mg by mouth daily  8/9/21   Historical Provider, MD   oxyCODONE-acetaminophen (PERCOCET) 5-325 MG per tablet Take 1 tablet by mouth every 4 hours as needed for Pain. Indications: last fill 12/4/21 for 30 days  9/6/21   Historical Provider, MD PARKER SALINE NASAL SPRAY 0.65 % nasal spray 1 spray by Nasal route as needed for Congestion  7/6/21   Historical Provider, MD   CLEARLAX 17 GM/SCOOP powder TAKE 17 G BY MOUTH 2 TIMES DAILY 7/26/21   Wilton Tijerina MD   DULoxetine (CYMBALTA) 60 MG extended release capsule TAKE 1 CAPSULE BY MOUTH 2 TIMES DAILY 3/25/21   MARYANNE Womack - NP   OXYGEN Inhale into the lungs Indications: On 3 liters per n/c during the night. Historical Provider, MD   Multiple Vitamins-Minerals (MULTIVITAMIN GUMMIES WOMENS) CHEW Take 2 each by mouth daily     Historical Provider, MD   carvedilol (COREG) 12.5 MG tablet Take 12.5 mg by mouth 2 times daily (with meals) Takes at 10:00am and 10:00pm    Historical Provider, MD       REVIEW OF SYSTEMS    (2-9 systems for level 4, 10 ormore for level 5)      Review of Systems   Constitutional: Negative for fever. Eyes: Negative for visual disturbance. Respiratory: Positive for cough and shortness of breath. Cardiovascular: Positive for chest pain. Negative for palpitations and leg swelling. Gastrointestinal: Positive for abdominal pain and nausea. Negative for blood in stool, constipation, diarrhea and vomiting. Genitourinary: Negative for dysuria, frequency, urgency, vaginal bleeding and vaginal discharge. Skin: Negative for rash. Allergic/Immunologic: Negative for immunocompromised state. Neurological: Negative for dizziness and headaches. Hematological: Does not bruise/bleed easily.        PHYSICAL EXAM   (up to 7 for level 4, 8 or more for level 5)      INITIAL VITALS:   BP (!) 139/113   Pulse 78   Temp 98.6 °F (37 °C) (Oral)   Resp 19   Ht 5' 4\" (1.626 m)   Wt 250 lb (113.4 kg)   SpO2 99%   BMI 42.91 kg/m²     Physical Exam  Constitutional:       General: She is not in acute distress. Appearance: Normal appearance. She is not ill-appearing, toxic-appearing or diaphoretic. HENT:      Head: Normocephalic and atraumatic. Right Ear: External ear normal.      Left Ear: External ear normal.   Eyes:      General:         Right eye: No discharge. Left eye: No discharge. Cardiovascular:      Rate and Rhythm: Normal rate and regular rhythm. Pulses: Normal pulses. Heart sounds: No murmur heard. Pulmonary:      Effort: Pulmonary effort is normal. No respiratory distress. Breath sounds: Normal breath sounds. No wheezing, rhonchi or rales. Abdominal:      Palpations: Abdomen is soft. Tenderness: There is abdominal tenderness (Mild epigastric). There is no right CVA tenderness, left CVA tenderness, guarding or rebound. Musculoskeletal:      Right lower leg: No edema. Left lower leg: No edema. Skin:     Capillary Refill: Capillary refill takes less than 2 seconds. Neurological:      General: No focal deficit present. Mental Status: She is alert.        DIFFERENTIAL  DIAGNOSIS     PLAN (LABS / IMAGING / EKG):  Orders Placed This Encounter   Procedures    COVID-19 & Influenza Combo    XR CHEST PORTABLE    CBC Auto Differential    Comprehensive Metabolic Panel w/ Reflex to MG    Lipase    Troponin    Brain Natriuretic Peptide    POC Glucose Fingerstick    EKG 12 Lead     MEDICATIONS ORDERED:  Orders Placed This Encounter   Medications    acetaminophen (TYLENOL) tablet 1,000 mg     DIAGNOSTIC RESULTS / EMERGENCY DEPARTMENT COURSE / MDM     LABS:  Results for orders placed or performed during the hospital encounter of 01/17/22   COVID-19 & Influenza Combo    Specimen: Nasopharyngeal Swab   Result Value Ref Range    Specimen Description . NASOPHARYNGEAL SWAB     Source . NASOPHARYNGEAL SWAB     SARS-CoV-2 RNA, RT PCR Not Detected Not Detected    INFLUENZA A Not Detected Not Detected    INFLUENZA B Not Detected Not Detected   CBC Auto Differential   Result Value Ref Range    WBC 13.1 (H) 3.5 - 11.0 k/uL    RBC 4.45 4.0 - 5.2 m/uL    Hemoglobin 15.1 12.0 - 16.0 g/dL    Hematocrit 45.1 36 - 46 %    .4 (H) 80 - 100 fL    MCH 34.0 26 - 34 pg    MCHC 33.5 31 - 37 g/dL    RDW 13.2 11.5 - 14.9 %    Platelets 040 581 - 726 k/uL    MPV 7.9 6.0 - 12.0 fL    NRBC Automated NOT REPORTED per 100 WBC    Differential Type NOT REPORTED     Seg Neutrophils 62 36 - 66 %    Lymphocytes 29 24 - 44 %    Monocytes 6 1 - 7 %    Eosinophils % 3 0 - 4 %    Basophils 0 0 - 2 %    Immature Granulocytes NOT REPORTED 0 %    Segs Absolute 8.00 1.3 - 9.1 k/uL    Absolute Lymph # 3.80 1.0 - 4.8 k/uL    Absolute Mono # 0.70 0.1 - 1.3 k/uL    Absolute Eos # 0.40 0.0 - 0.4 k/uL    Basophils Absolute 0.10 0.0 - 0.2 k/uL    Absolute Immature Granulocyte NOT REPORTED 0.00 - 0.30 k/uL    WBC Morphology NOT REPORTED     RBC Morphology NOT REPORTED     Platelet Estimate NOT REPORTED    Comprehensive Metabolic Panel w/ Reflex to MG   Result Value Ref Range    Glucose 127 (H) 70 - 99 mg/dL    BUN 17 6 - 20 mg/dL    CREATININE 0.67 0.50 - 0.90 mg/dL    Bun/Cre Ratio NOT REPORTED 9 - 20    Calcium 10.5 (H) 8.6 - 10.4 mg/dL    Sodium 143 135 - 144 mmol/L    Potassium 4.3 3.7 - 5.3 mmol/L    Chloride 103 98 - 107 mmol/L    CO2 26 20 - 31 mmol/L    Anion Gap 14 9 - 17 mmol/L    Alkaline Phosphatase 90 35 - 104 U/L    ALT 15 5 - 33 U/L    AST 20 <32 U/L    Total Bilirubin 0.32 0.3 - 1.2 mg/dL    Total Protein 7.3 6.4 - 8.3 g/dL    Albumin 4.1 3.5 - 5.2 g/dL    Albumin/Globulin Ratio NOT REPORTED 1.0 - 2.5    GFR Non-African American >60 >60 mL/min    GFR African American >60 >60 mL/min    GFR Comment          GFR Staging NOT REPORTED    Lipase   Result Value Ref Range    Lipase 12 (L) 13 - 60 U/L   Troponin   Result Value Ref Range    Troponin, High Sensitivity 19 (H) 0 - 14 ng/L    Troponin T NOT REPORTED <0.03 ng/mL    Troponin Interp NOT REPORTED    Brain Natriuretic Peptide   Result Value Ref Range    Pro- (H) <300 pg/mL    BNP Interpretation NOT REPORTED    POC Glucose Fingerstick   Result Value Ref Range    POC Glucose 143 (H) 65 - 105 mg/dL   EKG 12 Lead   Result Value Ref Range    Ventricular Rate 89 BPM    Atrial Rate 89 BPM    P-R Interval 146 ms    QRS Duration 78 ms    Q-T Interval 350 ms    QTc Calculation (Bazett) 425 ms    P Axis 56 degrees    R Axis 42 degrees    T Axis 37 degrees       IMPRESSION/MDM/ED COURSE:  48 y.o. female presented with chest pain, shortness of breath, cough, nausea, epigastric abdominal pain, diarrhea since this morning. Patient afebrile. Slightly hypertensive but vitals otherwise WNL. SPO2 documented as 99% in triage, reportedly low during virtual visit this morning. Will place on monitor and monitor vitals during work-up. On exam patient resting company no acute distress, nontoxic-appearing. Texting and taking selfies during exam.  Heart RRR, lungs clear. Mild epigastric abdominal tenderness. Abdomen soft without guarding or rebound. No edema of legs. Will obtain cardiac work-up and abdominal pain work-up. Do not feel D-dimer is necessary since patient's only risk factors precipitated previous DVT. Symptomatic treatment with droperidol due to multiple medication allergies if EKG does not have prolonged QTC. I did not order a pregnancy test because patient had a hysterectomy.      ED Course as of 01/17/22 1934 Mon Jan 17, 2022   1824 CBC Auto Differential(!):    WBC 13.1(!)   RBC 4.45   Hemoglobin Quant 15.1   Hematocrit 45.1   .4(!)   MCH 34.0   MCHC 33.5   RDW 13.2   Platelet Count 227   MPV 7.9   NRBC Automated NOT REPORTED   Differential Type NOT REPORTED   Seg Neutrophils 62   Lymphocytes 29   Monocytes 6   Eosinophils % 3   Basophils 0   Immature Granulocytes NOT REPORTED   Segs Absolute 8.00   Absolute Lymph # 3.80   Absolute Mono # 0.70   Absolute Eos # 0.40   Basophils Absolute 0.10   Absolute Immature Granulocyte NOT REPORTED   WBC Morphology NOT REPORTED   RBC Morphology NOT REPORTED   Platelet Estimate NOT REPORTED [AF]   1824 POC Glucose Fingerstick(!):    POC Glucose 143(!) [AF]   1824 IMPRESSION:  No radiologic evidence of cardiopulmonary disease. [AF]   1831 Brain Natriuretic Peptide(!):    Pro-(!)   BNP Interpretation NOT REPORTED  Baseline [AF]   1831 Troponin(!):    Troponin, High Sensitivity 19(!)   Troponin T NOT REPORTED   Troponin Interp NOT REPORTED  Baseline [AF]   1831 Lipase(!):    Lipase 12(!) [AF]   1831 Comprehensive Metabolic Panel w/ Reflex to MG(!):    Glucose 127(!)   BUN 17   Creatinine 0.67   Bun/Cre Ratio NOT REPORTED   CALCIUM, SERUM, 086314 10.5(!)   Sodium 143   Potassium 4.3   Chloride 103   CO2 26   Anion Gap 14   Alk Phos 90   ALT 15   AST 20   Bilirubin 0.32   Total Protein 7.3   Albumin 4.1   Albumin/Globulin Ratio NOT REPORTED   GFR Non- >60   GFR  >60   GFR Comment        GFR Staging NOT REPORTED [AF]   1932 Will discharge patient with symptomatic treatment. She already has rectal and oral Phenergan at home. Continue Tylenol as needed for pain. Follow-up with PCP in 2 days for recheck. I discussed signs and symptoms that would require reevaluation in the ED. The patient expressed understanding and agreement with plan. All questions answered. [AF]      ED Course User Index  [AF] Jian Mcgregor DO       RADIOLOGY:  XR CHEST PORTABLE   Final Result   No radiologic evidence of cardiopulmonary disease.          EKG  EKG Interpretation    Interpreted by me    Rhythm: normal sinus   Rate: normal  Axis: normal  Ectopy: none  Conduction: normal  ST Segments: no acute change  T Waves: no acute procedures. I have documented in the chart those procedures where I was not present during the key portions. I have personally reviewed all images and agree with the resident's interpretation. I have reviewed the emergency nurses triage note. I agree with the chief complaint, past medical history, past surgical history, allergies, medications, social and family history as documented unless otherwise noted.     Jethro Hickman MD  Attending Emergency Physician           Jethro Hickman MD  01/19/22 [phone]

## 2022-01-17 NOTE — ED TRIAGE NOTES
Concern for covid  Pt exposed yesterday  Sx started today.  Pt having cp and states that her SPO2 was low

## 2022-01-18 LAB
EKG ATRIAL RATE: 89 BPM
EKG P AXIS: 56 DEGREES
EKG P-R INTERVAL: 146 MS
EKG Q-T INTERVAL: 350 MS
EKG QRS DURATION: 78 MS
EKG QTC CALCULATION (BAZETT): 425 MS
EKG R AXIS: 42 DEGREES
EKG T AXIS: 37 DEGREES
EKG VENTRICULAR RATE: 89 BPM

## 2022-01-18 PROCEDURE — 93010 ELECTROCARDIOGRAM REPORT: CPT | Performed by: INTERNAL MEDICINE

## 2022-01-22 ENCOUNTER — HOSPITAL ENCOUNTER (EMERGENCY)
Age: 47
Discharge: HOME | End: 2022-01-22
Attending: EMERGENCY MEDICINE
Payer: COMMERCIAL

## 2022-01-22 VITALS
HEIGHT: 64 IN | RESPIRATION RATE: 18 BRPM | DIASTOLIC BLOOD PRESSURE: 79 MMHG | BODY MASS INDEX: 42.68 KG/M2 | OXYGEN SATURATION: 97 % | WEIGHT: 250 LBS | TEMPERATURE: 98.8 F | HEART RATE: 88 BPM | SYSTOLIC BLOOD PRESSURE: 122 MMHG

## 2022-01-22 VITALS
RESPIRATION RATE: 26 BRPM | DIASTOLIC BLOOD PRESSURE: 71 MMHG | SYSTOLIC BLOOD PRESSURE: 103 MMHG | TEMPERATURE: 98.9 F | OXYGEN SATURATION: 95 % | HEART RATE: 79 BPM

## 2022-01-22 DIAGNOSIS — H01.119 ALLERGIC BLEPHARITIS, UNSPECIFIED LATERALITY: ICD-10-CM

## 2022-01-22 DIAGNOSIS — K29.00 ACUTE GASTRITIS WITHOUT HEMORRHAGE, UNSPECIFIED GASTRITIS TYPE: Primary | ICD-10-CM

## 2022-01-22 LAB
ABSOLUTE EOS #: 0.2 K/UL (ref 0–0.4)
ABSOLUTE IMMATURE GRANULOCYTE: ABNORMAL K/UL (ref 0–0.3)
ABSOLUTE LYMPH #: 2.7 K/UL (ref 1–4.8)
ABSOLUTE MONO #: 0.8 K/UL (ref 0.1–1.3)
ANION GAP SERPL CALCULATED.3IONS-SCNC: 10 MMOL/L (ref 9–17)
BASOPHILS # BLD: 0 % (ref 0–2)
BASOPHILS ABSOLUTE: 0 K/UL (ref 0–0.2)
BUN BLDV-MCNC: 16 MG/DL (ref 6–20)
BUN/CREAT BLD: ABNORMAL (ref 9–20)
CALCIUM SERPL-MCNC: 9.6 MG/DL (ref 8.6–10.4)
CHLORIDE BLD-SCNC: 102 MMOL/L (ref 98–107)
CO2: 30 MMOL/L (ref 20–31)
CREAT SERPL-MCNC: 0.6 MG/DL (ref 0.5–0.9)
DIFFERENTIAL TYPE: ABNORMAL
EOSINOPHILS RELATIVE PERCENT: 2 % (ref 0–4)
GFR AFRICAN AMERICAN: >60 ML/MIN
GFR NON-AFRICAN AMERICAN: >60 ML/MIN
GFR SERPL CREATININE-BSD FRML MDRD: ABNORMAL ML/MIN/{1.73_M2}
GFR SERPL CREATININE-BSD FRML MDRD: ABNORMAL ML/MIN/{1.73_M2}
GLUCOSE BLD-MCNC: 192 MG/DL (ref 70–99)
HCT VFR BLD CALC: 41.8 % (ref 36–46)
HEMOGLOBIN: 14.1 G/DL (ref 12–16)
IMMATURE GRANULOCYTES: ABNORMAL %
LYMPHOCYTES # BLD: 26 % (ref 24–44)
MCH RBC QN AUTO: 33.9 PG (ref 26–34)
MCHC RBC AUTO-ENTMCNC: 33.7 G/DL (ref 31–37)
MCV RBC AUTO: 100.8 FL (ref 80–100)
MONOCYTES # BLD: 7 % (ref 1–7)
MYOGLOBIN: 35 NG/ML (ref 25–58)
NRBC AUTOMATED: ABNORMAL PER 100 WBC
PDW BLD-RTO: 12.7 % (ref 11.5–14.9)
PLATELET # BLD: 267 K/UL (ref 150–450)
PLATELET ESTIMATE: ABNORMAL
PMV BLD AUTO: 8 FL (ref 6–12)
POTASSIUM SERPL-SCNC: 4.1 MMOL/L (ref 3.7–5.3)
RBC # BLD: 4.15 M/UL (ref 4–5.2)
RBC # BLD: ABNORMAL 10*6/UL
SEG NEUTROPHILS: 65 % (ref 36–66)
SEGMENTED NEUTROPHILS ABSOLUTE COUNT: 6.8 K/UL (ref 1.3–9.1)
SODIUM BLD-SCNC: 142 MMOL/L (ref 135–144)
TROPONIN INTERP: ABNORMAL
TROPONIN T: ABNORMAL NG/ML
TROPONIN, HIGH SENSITIVITY: 17 NG/L (ref 0–14)
WBC # BLD: 10.5 K/UL (ref 3.5–11)
WBC # BLD: ABNORMAL 10*3/UL

## 2022-01-22 PROCEDURE — 93005 ELECTROCARDIOGRAM TRACING: CPT | Performed by: EMERGENCY MEDICINE

## 2022-01-22 PROCEDURE — 36415 COLL VENOUS BLD VENIPUNCTURE: CPT

## 2022-01-22 PROCEDURE — 85025 COMPLETE CBC W/AUTO DIFF WBC: CPT

## 2022-01-22 PROCEDURE — 99283 EMERGENCY DEPT VISIT LOW MDM: CPT

## 2022-01-22 PROCEDURE — 99284 EMERGENCY DEPT VISIT MOD MDM: CPT

## 2022-01-22 PROCEDURE — 2500000003 HC RX 250 WO HCPCS: Performed by: EMERGENCY MEDICINE

## 2022-01-22 PROCEDURE — 83874 ASSAY OF MYOGLOBIN: CPT

## 2022-01-22 PROCEDURE — 6370000000 HC RX 637 (ALT 250 FOR IP): Performed by: EMERGENCY MEDICINE

## 2022-01-22 PROCEDURE — 2580000003 HC RX 258: Performed by: EMERGENCY MEDICINE

## 2022-01-22 PROCEDURE — 96374 THER/PROPH/DIAG INJ IV PUSH: CPT

## 2022-01-22 PROCEDURE — 84484 ASSAY OF TROPONIN QUANT: CPT

## 2022-01-22 PROCEDURE — 80048 BASIC METABOLIC PNL TOTAL CA: CPT

## 2022-01-22 RX ORDER — HYDROXYZINE 50 MG/1
50 TABLET, FILM COATED ORAL ONCE
Status: COMPLETED | OUTPATIENT
Start: 2022-01-22 | End: 2022-01-22

## 2022-01-22 RX ORDER — SUCRALFATE ORAL 1 G/10ML
1 SUSPENSION ORAL 4 TIMES DAILY
Qty: 420 ML | Refills: 0 | Status: SHIPPED | OUTPATIENT
Start: 2022-01-22 | End: 2022-03-08

## 2022-01-22 RX ORDER — 0.9 % SODIUM CHLORIDE 0.9 %
1000 INTRAVENOUS SOLUTION INTRAVENOUS ONCE
Status: COMPLETED | OUTPATIENT
Start: 2022-01-22 | End: 2022-01-22

## 2022-01-22 RX ORDER — MAGNESIUM HYDROXIDE/ALUMINUM HYDROXICE/SIMETHICONE 120; 1200; 1200 MG/30ML; MG/30ML; MG/30ML
30 SUSPENSION ORAL
Status: COMPLETED | OUTPATIENT
Start: 2022-01-22 | End: 2022-01-22

## 2022-01-22 RX ORDER — HYDROXYZINE HYDROCHLORIDE 25 MG/1
25 TABLET, FILM COATED ORAL EVERY 6 HOURS PRN
Qty: 20 TABLET | Refills: 0 | Status: SHIPPED | OUTPATIENT
Start: 2022-01-22 | End: 2022-02-01

## 2022-01-22 RX ADMIN — HYDROXYZINE HYDROCHLORIDE 50 MG: 50 TABLET, FILM COATED ORAL at 07:40

## 2022-01-22 RX ADMIN — FAMOTIDINE 20 MG: 10 INJECTION, SOLUTION INTRAVENOUS at 07:39

## 2022-01-22 RX ADMIN — SODIUM CHLORIDE 1000 ML: 9 INJECTION, SOLUTION INTRAVENOUS at 07:37

## 2022-01-22 RX ADMIN — ALUMINUM HYDROXIDE, MAGNESIUM HYDROXIDE, AND SIMETHICONE 30 ML: 200; 200; 20 SUSPENSION ORAL at 07:39

## 2022-01-22 ASSESSMENT — PAIN DESCRIPTION - DESCRIPTORS: DESCRIPTORS: STABBING

## 2022-01-22 ASSESSMENT — ENCOUNTER SYMPTOMS
COUGH: 1
ABDOMINAL PAIN: 0
BACK PAIN: 0
WHEEZING: 0
SHORTNESS OF BREATH: 0
FACIAL SWELLING: 0
CHEST TIGHTNESS: 0
EYE PAIN: 0
VOMITING: 0
COLOR CHANGE: 0
NAUSEA: 1
BLOOD IN STOOL: 0
DIARRHEA: 0
SINUS PRESSURE: 0
RHINORRHEA: 0
SORE THROAT: 0
EYE DISCHARGE: 0
CONSTIPATION: 0
TROUBLE SWALLOWING: 0
EYE REDNESS: 0

## 2022-01-22 ASSESSMENT — PAIN DESCRIPTION - PROGRESSION: CLINICAL_PROGRESSION: NOT CHANGED

## 2022-01-22 ASSESSMENT — PAIN DESCRIPTION - LOCATION
LOCATION: ABDOMEN
LOCATION: ABDOMEN

## 2022-01-22 ASSESSMENT — PAIN SCALES - GENERAL
PAINLEVEL_OUTOF10: 10
PAINLEVEL_OUTOF10: 10

## 2022-01-22 ASSESSMENT — PAIN DESCRIPTION - FREQUENCY: FREQUENCY: CONTINUOUS

## 2022-01-22 ASSESSMENT — PAIN DESCRIPTION - ORIENTATION: ORIENTATION: MID;LOWER

## 2022-01-22 ASSESSMENT — PAIN DESCRIPTION - ONSET: ONSET: ON-GOING

## 2022-01-22 ASSESSMENT — PAIN DESCRIPTION - PAIN TYPE: TYPE: ACUTE PAIN

## 2022-01-22 NOTE — ED PROVIDER NOTES
[address]  eMERGENCY dEPARTMENT eNCOUnter      Pt Name: Susanna Casillas  MRN: [id number]  [place] 1975  Date of evaluation: 1/22/22      CHIEF COMPLAINT       Chief Complaint   Patient presents with    Abdominal Pain    Eye Problem    Pruritis         HISTORY OF PRESENT ILLNESS    Susanna Casillas is a 48 y.o. female who presents complaining of multiple complaints. Patient states that she has been ill for the last week. Patient was here 5 days ago for possible COVID test was negative. Patient is continued to have a cough with nausea and states that she has chest pain. Patient states the pain feels like there is something and is sitting in her chest blocking things making it difficult to eat. Patient states that when she swallows it increases the pain. Patient has chronic history of chest pain but has been seen and admitted recently for elevated troponins. Patient denies shortness of breath or fevers. Patient is also stating that she woke up this morning with her eyes swollen. Patient states she itches all over. Patient denies any new foods or medications. REVIEW OF SYSTEMS       Review of Systems   Constitutional: Negative for activity change, appetite change, chills, diaphoresis and fever. HENT: Positive for congestion. Negative for ear pain, facial swelling, nosebleeds, rhinorrhea, sinus pressure, sore throat and trouble swallowing. Eyes: Negative for pain, discharge and redness. Respiratory: Positive for cough. Negative for chest tightness, shortness of breath and wheezing. Cardiovascular: Positive for chest pain. Negative for palpitations and leg swelling. Gastrointestinal: Positive for nausea. Negative for abdominal pain, blood in stool, constipation, diarrhea and vomiting. Genitourinary: Negative for difficulty urinating, dysuria, flank pain, frequency, genital sores and hematuria.    Musculoskeletal: Negative for arthralgias, back pain, gait problem, joint swelling, myalgias and neck pain. Skin: Positive for rash. Negative for color change, pallor and wound. Neurological: Negative for dizziness, tremors, seizures, syncope, speech difficulty, weakness, numbness and headaches. Psychiatric/Behavioral: Negative for confusion, decreased concentration, hallucinations, self-injury, sleep disturbance and suicidal ideas.        PAST MEDICAL HISTORY     Past Medical History:   Diagnosis Date    Acute exacerbation of chronic obstructive pulmonary disease ([address] 3/21/2018    Adhesive capsulitis of left shoulder 9/25/2018    Asthma     Asthma exacerbation 7/24/2014    Atrial fibrillation ([address]     placed on event monitor 9/25/18 for PVC's & A-fib    Atrial premature depolarization 7/19/2019    Bipolar 1 disorder ([address]     Bipolar disorder ([address] 7/19/2019    Bulging disc     CAD (coronary artery disease)     Candida infection 2/23/2018    Cardiomyopathy ([address]     Chest pain 6/13/2017    CH[facility]congestive heart failure) ([facility]     Chronic otitis media of both ears     rt>lt    Chronic right shoulder pain 3/14/2017    Cigarette nicotine dependence with nicotine-induced disorder 7/19/2019    Class 3 severe obesity due to excess calories with serious comorbidity and body mass index (BMI) of 40.0 to 44.9 in adult [facility]) 10/4/2018    Closed fracture of left ankle 6/25/2019    Clostridium difficile infection     COPD (chronic obstructive pulmonary disease) ([facility])     Cough, persistent 3/21/2018    Current moderate episode of major depressive disorder without prior episode ([address] 8/20/2018    Depression     Diabetes mellitus type 2, controlled ([address] 4/30/2014    Diarrhea     Diarrhea     Dizziness     DVT (deep venous thrombosis) ([facility])     after PICC line right arm    Encounter for monitoring sotalol therapy 2/20/2017    Encounter for screening mammogram for malignant neoplasm of breast 3/7/2018    Endometriosis     Fainting     GERD (gastroesophageal reflux disease)     hx of  GI bleeding     H. pylori infection     Helicobacter pylori (H. pylori)     [place] (hard of hearing)     both ears, no hearing aids    HTN (hypertension) 7/19/2019    Hyperlipidemia     Hypertension     Left bicipital tenosynovitis 10/17/2018    Left leg pain 5/16/2017    Left sided abdominal pain of unknown cause 7/19/2016    Leg swelling 9/21/2018    Mastoiditis     Mastoiditis, acute 4/30/2014    Migraines     Morbid obesity (HCC)     Myocardial infarction ([facility]     2005    Nausea     Neuropathy     On home oxygen therapy     3 L at night    Ovarian cyst     Passed out     hx of- negative tilt table    Pulmonary hypertension (HCC)     Pulmonary insufficiency     PVC (premature ventricular contraction)     Seasonal allergies     Tricuspid insufficiency     Type II or unspecified type diabetes mellitus without mention of complication, not stated as uncontrolled        SURGICAL HISTORY       Past Surgical History:   Procedure Laterality Date    ABDOMEN SURGERY      abcess    ABDOMINAL ADHESION SURGERY      ABDOMINAL EXPLORATION SURGERY      x 4    ABDOMINAL HERNIA REPAIR      with mesh    ABLATION OF DYSRHYTHMIC FOCUS  2016    ABLATION OF DYSRHYTHMIC FOCUS  09/08/2017    Done at [facility].  ABSCESS DRAINAGE      left hip and chest    APPENDECTOMY      BREAST SURGERY Left 2007    I & D    CARDIAC CATHETERIZATION  2014 & 2000     no stenting    CARPAL TUNNEL RELEASE Right 12/19/2019    Ulnar nerve decompression, right elbow. Release of 1st and 2nd extensor compartments, right forearm.     CARPAL TUNNEL RELEASE  05/04/2020    Carpal tunnel release, left hand    CHOLECYSTECTOMY      COLONOSCOPY      FOOT SURGERY Left     bone fragment removed    FRACTURE SURGERY Right     closed reduction perc pinning ring & middle finger    GASTRIC FUNDOPLICATION  0266    HYSTERECTOMY      total    HYSTERECTOMY      HYSTEROSCOPY      tubal perfusion    MYRINGOTOMY Right 11/13/2015    Right myringotomy with placement of  T-tube on the right side. Removal of plugged ventilating tube, right side.  MYRINGOTOMY Left 07/14/2020    MYRINGOTOMY TUBE INSERTION performed by Perico Malhotra MD at [facility] Right 06/05/2014    OTHER SURGICAL HISTORY Right 05/29/2014    removal ear tube rt ear    OTHER SURGICAL HISTORY  2009    LOOP recorder inserted and removed 3 mos. later    OTHER SURGICAL HISTORY Right 08/11/2016    ear tube removal with patch    OTHER SURGICAL HISTORY      tubal perfusion, lysis of uterine adhesions    OTHER SURGICAL HISTORY      multiple PICC lines inserted and removed, it has affected circulation bilateral upper arms    OTHER SURGICAL HISTORY  10/19/2020    Revisiion to subcutaneous transposition of unlar nerve, right elbow    OTHER SURGICAL HISTORY Right 06/14/2021    REVISION TO SUBMUSCULAR TRANSPOSITION OF RIGHT ULNAR NERVE    HI LAVERNEULARAÚL BATES JT W ANESTHESIA Right 03/01/2017    SHOULDER MANIPULATION RIGHT performed by Shahid Rodríguez MD at [facility] Left 10/17/2018    SHOULDER ARTHROSCOPY W/BICEPS TENDONESIS performed by Annabel Morin MD at [address] [place] [address] Left     foot    TONSILLECTOMY      TYMPANOSTOMY TUBE PLACEMENT      TYMPANOSTOMY TUBE PLACEMENT Left 03/12/2019    TYMPANOSTOMY TUBE PLACEMENT Right 12/08/2021    Right tympanostomy with tube placement of T-tube with operative microscope and general anesthesia. Right removal of right ear tube.     ULNAR TUNNEL RELEASE Right     UPPER GASTROINTESTINAL ENDOSCOPY      UPPER GASTROINTESTINAL ENDOSCOPY  07/10/2019    UPPER GASTROINTESTINAL ENDOSCOPY N/A 07/10/2019    EGD BIOPSY performed by Rafael Spring MD at [facility]       Previous Medications    ALBUTEROL SULFATE HFA (VENTOLIN HFA) 108 (90 BASE) MCG/ACT INHALER    INHALE 2 PUFFS INTO LUNGS EVERY 6 HOURS AS NEEDED FOR WHEEZING    ATORVASTATIN (LIPITOR) 80 MG TABLET    Take 1 tablet by mouth nightly    BLOOD GLUCOSE TEST STRIPS (ASCENSIA AUTODISC VI;ONE TOUCH ULTRA TEST VI) STRIP    OneTouch Ultra Test strips. Check blood sugars 4x daily    BLOOD GLUCOSE TEST STRIPS (ONETOUCH ULTRA) STRIP    USE TO TEST BLOOD SUGAR BEFORE MEALS, AT BEDTIME, AND AS NEEDED (6 TIMES DAILY)    CARVEDILOL (COREG) 12.5 MG TABLET    Take 12.5 mg by mouth 2 times daily (with meals) Takes at 10:00am and 10:00pm    CLEARLAX 17 GM/SCOOP POWDER    TAKE 17 G BY MOUTH 2 TIMES DAILY    CLOTRIMAZOLE (LOTRIMIN) 1 % CREAM    Apply topically 2 times daily.     COLCHICINE (COLCRYS) 0.6 MG TABLET    Take 1 tablet by mouth daily    DEXTROMETHORPHAN-GUAIFENESIN (ROBITUSSIN-DM)  MG/5ML SYRUP    Take 5 mLs by mouth every 6 hours    DEXTROMETHORPHAN-GUAIFENESIN (ROBITUSSIN-DM)  MG/5ML SYRUP    Take 10 mLs by mouth every 6 hours    DICYCLOMINE (BENTYL) 10 MG CAPSULE    Take 1 capsule by mouth every 6 hours as needed (cramps)    DULOXETINE (CYMBALTA) 60 MG EXTENDED RELEASE CAPSULE    TAKE 1 CAPSULE BY MOUTH 2 TIMES DAILY    ERGOCALCIFEROL (ERGOCALCIFEROL) 1.25 MG (07181 UT) CAPSULE    Take 50,000 Units by mouth once a week     HM SALINE NASAL SPRAY 0.65 % NASAL SPRAY    1 spray by Nasal route as needed for Congestion     IBUPROFEN (ADVIL;MOTRIN) 800 MG TABLET    Take 800 mg by mouth every 8 hours as needed for Pain    INSULIN DEGLUDEC (TRESIBA FLEXTOUCH) 200 UNIT/ML SOPN    Inject 80 Units into the skin 2 times daily    INSULIN LISPRO, 1 UNIT DIAL, (HUMALOG KWIKPEN) 100 UNIT/ML SOPN    INJECT SUBCUTANEOUSLY PER SLIDING SCALE, 150-200 INJECT 3U, 201-250 INJECT 6U, 251-300 INJECT 9U, >300 INJECT 12U, MAX 30U/DAY    INSULIN PEN NEEDLE (UNIFINE PENTIPS) 31G X 8 MM MISC    USE 4 TIMES DAILY AS DIRECTED    IPRATROPIUM-ALBUTEROL (DUONEB) 0.5-2.5 (3) MG/3ML SOLN NEBULIZER SOLUTION    INHALE 3 MLS (ONE VIAL) WITH NEBULIZER EVERY 6 HOURS AS NEEDED FOR SHORTNESS OF BREATH MAGNESIUM OXIDE (MAG-OX) 400 MG TABLET    Take 400 mg by mouth daily     METHYLPREDNISOLONE (MEDROL, FREDDY,) 4 MG TABLET    Take by mouth. MULTIPLE VITAMINS-MINERALS (MULTIVITAMIN GUMMIES WOMENS) CHEW    Take 2 each by mouth daily     NITROGLYCERIN (NITROSTAT) 0.4 MG SL TABLET    up to max of 3 total doses. If no relief after 1 dose, call 911. OXYCODONE-ACETAMINOPHEN (PERCOCET) 5-325 MG PER TABLET    Take 1 tablet by mouth every 4 hours as needed for Pain. Indications: last fill 12/4/21 for 30 days     OXYGEN    Inhale into the lungs Indications: On 3 liters per n/c during the night. QUETIAPINE (SEROQUEL) 50 MG TABLET    TAKE 1 TABLET BY MOUTH EVERY DAY AT NIGHT       ALLERGIES     is allergic to latex, aspirin, avelox [moxifloxacin], chantix [varenicline], doxycycline, dye [iodides], flexeril [cyclobenzaprine], gabapentin, losartan, morphine, nsaids, pcn [penicillins], reglan [metoclopramide hcl], shellfish-derived products, sulfa antibiotics, vancomycin, zofran, zyvox [linezolid], acyclovir, bactrim [sulfamethoxazole-trimethoprim], betadine [povidone iodine], ceclor [cefaclor], clindamycin/lincomycin, codeine, macrolides and ketolides, novolin r [insulin], novolog [insulin aspart], phenothiazines, tape [adhesive tape], banana, compazine [prochlorperazine], fentanyl, kiwi extract, tamiflu [oseltamivir phosphate], and sotalol. FAMILY HISTORY     [unfilled]     SOCIAL HISTORY      reports that she has been smoking cigarettes. She has a 11.50 pack-year smoking history. She has never used smokeless tobacco. She reports that she does not drink alcohol and does not use drugs. PHYSICAL EXAM     INITIAL VITALS: BP (!) 140/89   Pulse 85   Temp 98.9 °F (37.2 °C)   Resp 14   SpO2 97%      Physical Exam  Vitals and nursing note reviewed. Constitutional:       General: She is not in acute distress. Appearance: She is well-developed. She is not diaphoretic. HENT:      Head: Normocephalic and atraumatic. Eyes:      General: Allergic shiner present. No scleral icterus. Right eye: No discharge. Left eye: No discharge. Conjunctiva/sclera: Conjunctivae normal.      Pupils: Pupils are equal, round, and reactive to light. Cardiovascular:      Rate and Rhythm: Normal rate and regular rhythm. Heart sounds: Normal heart sounds. No murmur heard. No friction rub. No gallop. Pulmonary:      Effort: Pulmonary effort is normal. No respiratory distress. Breath sounds: Normal breath sounds. No wheezing or rales. Chest:      Chest wall: No tenderness. Abdominal:      General: Bowel sounds are normal. There is no distension. Palpations: Abdomen is soft. There is no mass. Tenderness: There is abdominal tenderness in the epigastric area. There is no guarding or rebound. Musculoskeletal:         General: No tenderness. Normal range of motion. Skin:     General: Skin is warm and dry. Coloration: Skin is not pale. Findings: No erythema or rash. Neurological:      Mental Status: She is alert and oriented to person, place, and time. Cranial Nerves: No cranial nerve deficit. Sensory: No sensory deficit. Motor: No abnormal muscle tone. Coordination: Coordination normal.      Deep Tendon Reflexes: Reflexes normal.   Psychiatric:         Behavior: Behavior normal.         Thought Content: Thought content normal.         Judgment: Judgment normal.         MEDICAL DECISION MAKING:     Patient has no notable rash but there are some redness swelling around the eyes. Patient's chest pain is most likely related to some gastritis or pleuritic from the virus. Because of her history we will do a work-up on her but will be able to discharge home as long as its unremarkable. We will try to give her some more medication to help with the sensation and also with her itching.     DIAGNOSTIC RESULTS     EKG: All EKG's are interpreted by the Emergency Department Physician who either signs or Co-signs this chart in the absence of a cardiologist.    EKG Interpretation    Interpreted by me    Rhythm: normal sinus   Rate: normal  Axis: normal  Ectopy: none  Conduction: normal  ST Segments: no acute change  T Waves: no acute change  Q Waves: none    Clinical Impression: no acute changes and normal EKG    RADIOLOGY:All plain film, CT, MRI, and formal ultrasound images (except ED bedside ultrasound)are read by the radiologist and interpretations are directly viewed by the emergency physician. No results found. LABS: All lab results were reviewed bymina, and all abnormals are listed below.   Labs Reviewed   CBC WITH AUTO DIFFERENTIAL - Abnormal; Notable for the following components:       Result Value    .8 (*)     All other components within normal limits   BASIC METABOLIC PANEL - Abnormal; Notable for the following components:    Glucose 192 (*)     All other components within normal limits   TROP/MYOGLOBIN - Abnormal; Notable for the following components:    Troponin, High Sensitivity 17 (*)     All other components within normal limits   TROP/MYOGLOBIN         EMERGENCY DEPARTMENT COURSE:   Vitals:    Vitals:    01/22/22 0726   BP: (!) 140/89   Pulse: 85   Resp: 14   Temp: 98.9 °F (37.2 °C)   SpO2: 97%       The patient was given the following medications while in the emergency department:     Orders Placed This Encounter   Medications    0.9 % sodium chloride bolus    aluminum & magnesium hydroxide-simethicone (MAALOX) 200-200-20 MG/5ML suspension 30 mL    hydrOXYzine (ATARAX) tablet 50 mg    famotidine (PEPCID) injection 20 mg    hydrOXYzine (ATARAX) 25 MG tablet     Sig: Take 1 tablet by mouth every 6 hours as needed for Itching     Dispense:  20 tablet     Refill:  0    sucralfate (CARAFATE) 1 GM/10ML suspension     Sig: Take 10 mLs by mouth 4 times daily     Dispense:  420 mL     Refill:  0       -------------------------  8:10 AM EST  Patient was reevaluated and as expected really states she is not feeling any better. From the respect of the itching is not really anything more I can do she was just on steroids and I do not want to put her back on steroids and she is taken Benadryl and Atarax. We will just tell her to do warm compresses and continue with the Atarax and Benadryl as needed. From a chest/abdominal pain issue this is a chronic issue for her her work-up was unremarkable I will send her home with some Carafate as she is not been on that before and have her follow-up with her family doctor. CRITICAL CARE:   None    CONSULTS:  None    PROCEDURES:  None    FINAL IMPRESSION      1. Acute gastritis without hemorrhage, unspecified gastritis type    2.  Allergic blepharitis, unspecified laterality          DISPOSITION/PLAN   DISPOSITION Decision To Discharge 01/22/2022 08:07:42 AM      PATIENT REFERRED TO:  Francis Jordan, [address]  [address] [place] [address]  [phone]    In 1 week      [facility] ED  [place] [address]  [phone]    If symptoms worsen      DISCHARGE MEDICATIONS:  New Prescriptions    HYDROXYZINE (ATARAX) 25 MG TABLET    Take 1 tablet by mouth every 6 hours as needed for Itching    SUCRALFATE (CARAFATE) 1 GM/10ML SUSPENSION    Take 10 mLs by mouth 4 times daily       (Please note that portions of this note were completed with a voice recognition program.  Efforts were made to edit the dictations but occasionally words are mis-transcribed.)    Saturnino Koehler MD  Attending Mila Deutsch MD  01/22/22 1836

## 2022-01-23 ENCOUNTER — HOSPITAL ENCOUNTER (EMERGENCY)
Age: 47
Discharge: HOME | End: 2022-01-23
Attending: EMERGENCY MEDICINE
Payer: COMMERCIAL

## 2022-01-23 DIAGNOSIS — T78.40XA ALLERGY, INITIAL ENCOUNTER: Primary | ICD-10-CM

## 2022-01-23 PROCEDURE — 6370000000 HC RX 637 (ALT 250 FOR IP): Performed by: EMERGENCY MEDICINE

## 2022-01-23 RX ORDER — PREDNISONE 50 MG/1
50 TABLET ORAL DAILY
Qty: 5 TABLET | Refills: 0 | Status: SHIPPED | OUTPATIENT
Start: 2022-01-23 | End: 2022-01-28

## 2022-01-23 RX ORDER — CALCIUM CARBONATE 200(500)MG
1 TABLET,CHEWABLE ORAL DAILY
Qty: 30 TABLET | Refills: 0 | Status: SHIPPED | OUTPATIENT
Start: 2022-01-23 | End: 2022-02-22

## 2022-01-23 RX ORDER — FAMOTIDINE 20 MG/1
20 TABLET, FILM COATED ORAL ONCE
Status: COMPLETED | OUTPATIENT
Start: 2022-01-23 | End: 2022-01-23

## 2022-01-23 RX ORDER — MAGNESIUM HYDROXIDE/ALUMINUM HYDROXICE/SIMETHICONE 120; 1200; 1200 MG/30ML; MG/30ML; MG/30ML
30 SUSPENSION ORAL ONCE
Status: COMPLETED | OUTPATIENT
Start: 2022-01-23 | End: 2022-01-23

## 2022-01-23 RX ORDER — FAMOTIDINE 20 MG/1
20 TABLET, FILM COATED ORAL 2 TIMES DAILY PRN
Qty: 15 TABLET | Refills: 0 | Status: SHIPPED | OUTPATIENT
Start: 2022-01-23 | End: 2022-03-08

## 2022-01-23 RX ADMIN — FAMOTIDINE 20 MG: 20 TABLET, FILM COATED ORAL at 04:13

## 2022-01-23 RX ADMIN — PREDNISONE 50 MG: 20 TABLET ORAL at 01:47

## 2022-01-23 RX ADMIN — ALUMINUM HYDROXIDE, MAGNESIUM HYDROXIDE, AND SIMETHICONE 30 ML: 200; 200; 20 SUSPENSION ORAL at 04:13

## 2022-01-23 ASSESSMENT — ENCOUNTER SYMPTOMS: ALLERGIC REACTION: 1

## 2022-01-23 NOTE — ED PROVIDER NOTES
[facility] ENCOUNTER    Pt Name: Girish Morrison  MRN: [id number]  [place] 1975  Date of evaluation: 1/23/22  CHIEF COMPLAINT       Chief Complaint   Patient presents with    Pruritis    Abdominal Pain     HISTORY OF PRESENT ILLNESS   The history is provided by the patient. Allergic Reaction  Presenting symptoms: itching    Severity:  Moderate  Relieved by: Antihistamines  Worsened by:  Nothing    Patient notes she was seen earlier today and was given Atarax and was advised to come back if she worsens she notes she has itching of her eyes. Patient also notes that she has stomach upset in the midepigastric region. Patient denies any other modifying, alleviating or precipitating factors. REVIEW OF SYSTEMS     Review of Systems   Skin: Positive for itching. All other systems reviewed and are negative.     PASTMEDICAL HISTORY     Past Medical History:   Diagnosis Date    Acute exacerbation of chronic obstructive pulmonary disease ([address] 3/21/2018    Adhesive capsulitis of left shoulder 9/25/2018    Asthma     Asthma exacerbation 7/24/2014    Atrial fibrillation (HCC)     placed on event monitor 9/25/18 for PVC's & A-fib    Atrial premature depolarization 7/19/2019    Bipolar 1 disorder ([address]     Bipolar disorder ([address] 7/19/2019    Bulging disc     CAD (coronary artery disease)     Candida infection 2/23/2018    Cardiomyopathy ([address]     Chest pain 6/13/2017    CH[facility]congestive heart failure) [facility]     Chronic otitis media of both ears     rt>lt    Chronic right shoulder pain 3/14/2017    Cigarette nicotine dependence with nicotine-induced disorder 7/19/2019    Class 3 severe obesity due to excess calories with serious comorbidity and body mass index (BMI) of 40.0 to 44.9 in adult [facility]) 10/4/2018    Closed fracture of left ankle 6/25/2019    Clostridium difficile infection     COPD (chronic obstructive pulmonary disease) ([facility])     Cough, persistent 3/21/2018    Current moderate episode of major depressive disorder without prior episode ([facility] 8/20/2018    Depression     Diabetes mellitus type 2, controlled ([facility] 4/30/2014    Diarrhea     Diarrhea     Dizziness     DVT (deep venous thrombosis) ([facility]     after PICC line right arm    Encounter for monitoring sotalol therapy 2/20/2017    Encounter for screening mammogram for malignant neoplasm of breast 3/7/2018    Endometriosis     Fainting     GERD (gastroesophageal reflux disease)     hx of    GI bleeding     H. pylori infection     Helicobacter pylori (H. pylori)     [place] (hard of hearing)     both ears, no hearing aids    HTN (hypertension) 7/19/2019    Hyperlipidemia     Hypertension     Left bicipital tenosynovitis 10/17/2018    Left leg pain 5/16/2017    Left sided abdominal pain of unknown cause 7/19/2016    Leg swelling 9/21/2018    Mastoiditis     Mastoiditis, acute 4/30/2014    Migraines     Morbid obesity ([facility]     Myocardial infarction ([facility]     2005    Nausea     Neuropathy     On home oxygen therapy     3 L at night    Ovarian cyst     Passed out     hx of- negative tilt table    Pulmonary hypertension (HCC)     Pulmonary insufficiency     PVC (premature ventricular contraction)     Seasonal allergies     Tricuspid insufficiency     Type II or unspecified type diabetes mellitus without mention of complication, not stated as uncontrolled      Past Problem List  Patient Active Problem List   Diagnosis Code    Diabetes mellitus type 2, controlled ([facility] E11.9    COPD (chronic obstructive pulmonary disease) (HCC) J44.9    Asthma J45.909    Acute bronchitis J20.9    KRISTIN (obstructive sleep apnea) G47.33    Adult body mass index 40 and over [id number]    Chronic right shoulder pain M25.511, G89.29    Neck pain M54.2    Radicular pain in right arm M79.2    Left leg pain M79.605    Noncompliance with therapeutic plan Z91.11    Precordial pain R07.2    Tobacco abuse Z72.0    Current changes H41.95    Helicobacter pylori gastritis K29.70, B96.81    NSTEMI (non-ST elevated myocardial infarction) ([facility] I21.4     SURGICAL HISTORY       Past Surgical History:   Procedure Laterality Date    ABDOMEN SURGERY      abcess    ABDOMINAL ADHESION SURGERY      ABDOMINAL EXPLORATION SURGERY      x 4    ABDOMINAL HERNIA REPAIR      with mesh    ABLATION OF DYSRHYTHMIC FOCUS  2016    ABLATION OF DYSRHYTHMIC FOCUS  09/08/2017    Done at [facility].  ABSCESS DRAINAGE      left hip and chest    APPENDECTOMY      BREAST SURGERY Left 2007    I & D    CARDIAC CATHETERIZATION  2014 & 2000     no stenting    CARPAL TUNNEL RELEASE Right 12/19/2019    Ulnar nerve decompression, right elbow. Release of 1st and 2nd extensor compartments, right forearm.  CARPAL TUNNEL RELEASE  05/04/2020    Carpal tunnel release, left hand    CHOLECYSTECTOMY      COLONOSCOPY      FOOT SURGERY Left     bone fragment removed    FRACTURE SURGERY Right     closed reduction perc pinning ring & middle finger    GASTRIC FUNDOPLICATION  2277    HYSTERECTOMY      total    HYSTERECTOMY      HYSTEROSCOPY      tubal perfusion    MYRINGOTOMY Right 11/13/2015    Right myringotomy with placement of  T-tube on the right side. Removal of plugged ventilating tube, right side.  MYRINGOTOMY Left 07/14/2020    MYRINGOTOMY TUBE INSERTION performed by Julian Scanlon MD at [facility] Right 06/05/2014    OTHER SURGICAL HISTORY Right 05/29/2014    removal ear tube rt ear    OTHER SURGICAL HISTORY  2009    LOOP recorder inserted and removed 3 mos.  later    OTHER SURGICAL HISTORY Right 08/11/2016    ear tube removal with patch    OTHER SURGICAL HISTORY      tubal perfusion, lysis of uterine adhesions    OTHER SURGICAL HISTORY      multiple PICC lines inserted and removed, it has affected circulation bilateral upper arms    OTHER SURGICAL HISTORY  10/19/2020    Revisiion to subcutaneous transposition of unlar nerve, right elbow    OTHER SURGICAL HISTORY Right 06/14/2021    REVISION TO SUBMUSCULAR TRANSPOSITION OF RIGHT ULNAR NERVE    NV MANIPULATN SHLDR JT W ANESTHESIA Right 03/01/2017    SHOULDER MANIPULATION RIGHT performed by Joycelyn Webb MD at [facility] Left 10/17/2018    SHOULDER ARTHROSCOPY W/BICEPS TENDONESIS performed by Martha Womack MD at [address] Left     foot    TONSILLECTOMY      TYMPANOSTOMY TUBE PLACEMENT      TYMPANOSTOMY TUBE PLACEMENT Left 03/12/2019    TYMPANOSTOMY TUBE PLACEMENT Right 12/08/2021    Right tympanostomy with tube placement of T-tube with operative microscope and general anesthesia. Right removal of right ear tube.  ULNAR TUNNEL RELEASE Right     UPPER GASTROINTESTINAL ENDOSCOPY      UPPER GASTROINTESTINAL ENDOSCOPY  07/10/2019    UPPER GASTROINTESTINAL ENDOSCOPY N/A 07/10/2019    EGD BIOPSY performed by Tyler Childress MD at [facility]       Previous Medications    ALBUTEROL SULFATE HFA (VENTOLIN HFA) 108 (90 BASE) MCG/ACT INHALER    INHALE 2 PUFFS INTO LUNGS EVERY 6 HOURS AS NEEDED FOR WHEEZING    ATORVASTATIN (LIPITOR) 80 MG TABLET    Take 1 tablet by mouth nightly    BLOOD GLUCOSE TEST STRIPS (ASCENSIA AUTODISC VI;ONE TOUCH ULTRA TEST VI) STRIP    OneTouch Ultra Test strips. Check blood sugars 4x daily    BLOOD GLUCOSE TEST STRIPS (ONETOUCH ULTRA) STRIP    USE TO TEST BLOOD SUGAR BEFORE MEALS, AT BEDTIME, AND AS NEEDED (6 TIMES DAILY)    CARVEDILOL (COREG) 12.5 MG TABLET    Take 12.5 mg by mouth 2 times daily (with meals) Takes at 10:00am and 10:00pm    CLEARLAX 17 GM/SCOOP POWDER    TAKE 17 G BY MOUTH 2 TIMES DAILY    CLOTRIMAZOLE (LOTRIMIN) 1 % CREAM    Apply topically 2 times daily.     COLCHICINE (COLCRYS) 0.6 MG TABLET    Take 1 tablet by mouth daily    DEXTROMETHORPHAN-GUAIFENESIN (ROBITUSSIN-DM)  MG/5ML SYRUP    Take 5 mLs by mouth every 6 hours    DEXTROMETHORPHAN-GUAIFENESIN (ROBITUSSIN-DM)  MG/5ML SYRUP    Take 10 mLs by mouth every 6 hours    DICYCLOMINE (BENTYL) 10 MG CAPSULE    Take 1 capsule by mouth every 6 hours as needed (cramps)    DULOXETINE (CYMBALTA) 60 MG EXTENDED RELEASE CAPSULE    TAKE 1 CAPSULE BY MOUTH 2 TIMES DAILY    ERGOCALCIFEROL (ERGOCALCIFEROL) 1.25 MG (39740 UT) CAPSULE    Take 50,000 Units by mouth once a week     HM SALINE NASAL SPRAY 0.65 % NASAL SPRAY    1 spray by Nasal route as needed for Congestion     HYDROXYZINE (ATARAX) 25 MG TABLET    Take 1 tablet by mouth every 6 hours as needed for Itching    IBUPROFEN (ADVIL;MOTRIN) 800 MG TABLET    Take 800 mg by mouth every 8 hours as needed for Pain    INSULIN DEGLUDEC (TRESIBA FLEXTOUCH) 200 UNIT/ML SOPN    Inject 80 Units into the skin 2 times daily    INSULIN LISPRO, 1 UNIT DIAL, (HUMALOG KWIKPEN) 100 UNIT/ML SOPN    INJECT SUBCUTANEOUSLY PER SLIDING SCALE, 150-200 INJECT 3U, 201-250 INJECT 6U, 251-300 INJECT 9U, >300 INJECT 12U, MAX 30U/DAY    INSULIN PEN NEEDLE (UNIFINE PENTIPS) 31G X 8 MM MISC    USE 4 TIMES DAILY AS DIRECTED    IPRATROPIUM-ALBUTEROL (DUONEB) 0.5-2.5 (3) MG/3ML SOLN NEBULIZER SOLUTION    INHALE 3 MLS (ONE VIAL) WITH NEBULIZER EVERY 6 HOURS AS NEEDED FOR SHORTNESS OF BREATH    MAGNESIUM OXIDE (MAG-OX) 400 MG TABLET    Take 400 mg by mouth daily     METHYLPREDNISOLONE (MEDROL, FREDDY,) 4 MG TABLET    Take by mouth. MULTIPLE VITAMINS-MINERALS (MULTIVITAMIN GUMMIES WOMENS) CHEW    Take 2 each by mouth daily     NITROGLYCERIN (NITROSTAT) 0.4 MG SL TABLET    up to max of 3 total doses. If no relief after 1 dose, call 911. OXYCODONE-ACETAMINOPHEN (PERCOCET) 5-325 MG PER TABLET    Take 1 tablet by mouth every 4 hours as needed for Pain. Indications: last fill 12/4/21 for 30 days     OXYGEN    Inhale into the lungs Indications: On 3 liters per n/c during the night.     QUETIAPINE (SEROQUEL) 50 MG TABLET    TAKE 1 TABLET BY MOUTH EVERY DAY AT NIGHT SUCRALFATE (CARAFATE) 1 GM/10ML SUSPENSION    Take 10 mLs by mouth 4 times daily     ALLERGIES     is allergic to latex, aspirin, avelox [moxifloxacin], chantix [varenicline], doxycycline, dye [iodides], flexeril [cyclobenzaprine], gabapentin, losartan, morphine, nsaids, pcn [penicillins], reglan [metoclopramide hcl], shellfish-derived products, sulfa antibiotics, vancomycin, zofran, zyvox [linezolid], acyclovir, bactrim [sulfamethoxazole-trimethoprim], betadine [povidone iodine], ceclor [cefaclor], clindamycin/lincomycin, codeine, macrolides and ketolides, novolin r [insulin], novolog [insulin aspart], phenothiazines, tape [adhesive tape], banana, compazine [prochlorperazine], fentanyl, kiwi extract, tamiflu [oseltamivir phosphate], and sotalol. FAMILY HISTORY     She indicated that the status of her mother is unknown. She indicated that the status of her father is unknown. She indicated that the status of her sister is unknown. She indicated that the status of her maternal grandmother is unknown. She indicated that the status of her maternal grandfather is unknown. She indicated that the status of her paternal grandmother is unknown. She indicated that the status of her paternal grandfather is unknown. She indicated that the status of her maternal aunt is unknown. She indicated that the status of her maternal uncle is unknown. She indicated that the status of her paternal aunt is unknown. She indicated that the status of her paternal uncle is unknown.      SOCIAL HISTORY       Social History     Tobacco Use    Smoking status: Current Every Day Smoker     Packs/day: 0.50     Years: 23.00     Pack years: 11.50     Types: Cigarettes    Smokeless tobacco: Never Used   Vaping Use    Vaping Use: Never used   Substance Use Topics    Alcohol use: No     Alcohol/week: 0.0 standard drinks    Drug use: No     PHYSICAL EXAM     INITIAL VITALS: /79   Pulse 88   Temp 98.8 °F (37.1 °C) (Oral)   Resp 18   Ht 5' 4\" (1.626 m)   Wt 250 lb (113.4 kg)   SpO2 97%   BMI 42.91 kg/m²    Physical Exam  Constitutional:       General: She is not in acute distress. Appearance: Normal appearance. She is well-developed. She is not diaphoretic. HENT:      Head: Normocephalic and atraumatic. Right Ear: External ear normal.      Left Ear: External ear normal.      Nose: Nose normal. No congestion. Mouth/Throat:      Mouth: Mucous membranes are moist.      Pharynx: Oropharynx is clear. Eyes:      General:         Right eye: No discharge. Left eye: No discharge. Conjunctiva/sclera: Conjunctivae normal.      Pupils: Pupils are equal, round, and reactive to light. Neck:      Trachea: No tracheal deviation. Cardiovascular:      Rate and Rhythm: Normal rate and regular rhythm. Pulses: Normal pulses. Heart sounds: Normal heart sounds. Pulmonary:      Effort: Pulmonary effort is normal. No respiratory distress. Breath sounds: Normal breath sounds. No stridor. No wheezing or rales. Abdominal:      Palpations: Abdomen is soft. Tenderness: There is no abdominal tenderness. There is no guarding or rebound. Musculoskeletal:         General: No tenderness or deformity. Normal range of motion. Cervical back: Normal range of motion and neck supple. Skin:     General: Skin is warm and dry. Capillary Refill: Capillary refill takes less than 2 seconds. Findings: No erythema or rash. Neurological:      General: No focal deficit present. Mental Status: She is alert and oriented to person, place, and time. Coordination: Coordination normal.   Psychiatric:         Mood and Affect: Mood normal.         Behavior: Behavior normal.         Thought Content: Thought content normal.         Judgment: Judgment normal.         MEDICAL DECISION MAKING:            Patient was seen and examined at bedside soon after arrival to emergency department.   Chart was reviewed include history and physical examination was performed. Patient was given a dose of prednisone and her itching improved. Patient was given dose of Maalox and Pepcid for reflux. DIAGNOSTIC RESULTS   EKG:All EKG's are interpreted by the Emergency Department Physician who either signs or Co-signs this chart in the absence of a cardiologist.        RADIOLOGY:All plain film, CT, MRI, and formal ultrasound images (except ED bedside ultrasound) are read by the radiologist, see reports below, unless otherwisenoted in MDM or here. No orders to display     LABS: All lab results were reviewed by myself, and all abnormals are listed below. Labs Reviewed - No data to display    EMERGENCY DEPARTMENTCOURSE:     Patient improved on reevaluation. Vitals:    Vitals:    01/22/22 2341   BP: 122/79   Pulse: 88   Resp: 18   Temp: 98.8 °F (37.1 °C)   TempSrc: Oral   SpO2: 97%   Weight: 250 lb (113.4 kg)   Height: 5' 4\" (1.626 m)       The patient was given the following medications while in the emergency department:  Orders Placed This Encounter   Medications    predniSONE (DELTASONE) tablet 50 mg    aluminum & magnesium hydroxide-simethicone (MAALOX) 200-200-20 MG/5ML suspension 30 mL    famotidine (PEPCID) tablet 20 mg    calcium carbonate (ANTACID) 500 MG chewable tablet     Sig: Take 1 tablet by mouth daily     Dispense:  30 tablet     Refill:  0    famotidine (PEPCID) 20 MG tablet     Sig: Take 1 tablet by mouth 2 times daily as needed (reflux)     Dispense:  15 tablet     Refill:  0     CONSULTS:  None    FINAL IMPRESSION      1.  Allergy, initial encounter          DISPOSITION/PLAN   DISPOSITION Decision To Discharge 01/23/2022 03:35:35 AM      PATIENT REFERRED TO:  Nadine Lyles, [address]   [address] [facility]  [phone]          DISCHARGE MEDICATIONS:  New Prescriptions    CALCIUM CARBONATE (ANTACID) 500 MG CHEWABLE TABLET    Take 1 tablet by mouth daily    FAMOTIDINE (PEPCID) 20 MG TABLET    Take 1 tablet by mouth 2 times daily as needed (reflux)     The care is provided during an unprecedented national emergency due to the novel coronavirus, COVID [address] [facility],   Attending Emergency Physician                  Melanie Moore DO  01/23/22 [id number]

## 2022-01-23 NOTE — ED TRIAGE NOTES
Pt presents to ED via cab by advice of Gordo Hogan; pt states she was seen earlier today at this ED for c/o stomach pain and facial itching and sent home w/ hydroxyzine and carafate. Pt states she has taken the medications and reports \"it's not helping, nothing is helping- I want to know what's wrong. \" Pt reports that eating or drinking exacerbates the abdominal pain.

## 2022-01-24 LAB
EKG ATRIAL RATE: 82 BPM
EKG P AXIS: 59 DEGREES
EKG P-R INTERVAL: 142 MS
EKG Q-T INTERVAL: 372 MS
EKG QRS DURATION: 88 MS
EKG QTC CALCULATION (BAZETT): 434 MS
EKG R AXIS: 65 DEGREES
EKG T AXIS: 51 DEGREES
EKG VENTRICULAR RATE: 82 BPM

## 2022-02-21 ENCOUNTER — APPOINTMENT (OUTPATIENT)
Dept: GENERAL RADIOLOGY | Age: 47
End: 2022-02-21
Payer: COMMERCIAL

## 2022-02-21 ENCOUNTER — HOSPITAL ENCOUNTER (EMERGENCY)
Age: 47
Discharge: HOME | End: 2022-02-21
Attending: EMERGENCY MEDICINE
Payer: COMMERCIAL

## 2022-02-21 VITALS
TEMPERATURE: 97.4 F | DIASTOLIC BLOOD PRESSURE: 84 MMHG | BODY MASS INDEX: 42.68 KG/M2 | SYSTOLIC BLOOD PRESSURE: 161 MMHG | OXYGEN SATURATION: 96 % | HEART RATE: 88 BPM | WEIGHT: 250 LBS | HEIGHT: 64 IN | RESPIRATION RATE: 18 BRPM

## 2022-02-21 DIAGNOSIS — J44.1 COPD EXACERBATION (HCC): Primary | ICD-10-CM

## 2022-02-21 DIAGNOSIS — J40 BRONCHITIS: ICD-10-CM

## 2022-02-21 LAB
ABSOLUTE EOS #: 0.4 K/UL (ref 0–0.4)
ABSOLUTE LYMPH #: 3.1 K/UL (ref 1–4.8)
ABSOLUTE MONO #: 0.7 K/UL (ref 0.1–1.3)
ALBUMIN SERPL-MCNC: 4.4 G/DL (ref 3.5–5.2)
ALP BLD-CCNC: 117 U/L (ref 35–104)
ALT SERPL-CCNC: 20 U/L (ref 5–33)
ANION GAP SERPL CALCULATED.3IONS-SCNC: 12 MMOL/L (ref 9–17)
AST SERPL-CCNC: 17 U/L
BASOPHILS # BLD: 1 % (ref 0–2)
BASOPHILS ABSOLUTE: 0.1 K/UL (ref 0–0.2)
BILIRUB SERPL-MCNC: 0.29 MG/DL (ref 0.3–1.2)
BUN BLDV-MCNC: 14 MG/DL (ref 6–20)
CALCIUM SERPL-MCNC: 9.4 MG/DL (ref 8.6–10.4)
CHLORIDE BLD-SCNC: 95 MMOL/L (ref 98–107)
CO2: 29 MMOL/L (ref 20–31)
CREAT SERPL-MCNC: 0.66 MG/DL (ref 0.5–0.9)
EOSINOPHILS RELATIVE PERCENT: 3 % (ref 0–4)
GFR AFRICAN AMERICAN: >60 ML/MIN
GFR NON-AFRICAN AMERICAN: >60 ML/MIN
GFR SERPL CREATININE-BSD FRML MDRD: ABNORMAL ML/MIN/{1.73_M2}
GLUCOSE BLD-MCNC: 330 MG/DL (ref 70–99)
HCT VFR BLD CALC: 45.8 % (ref 36–46)
HEMOGLOBIN: 15.3 G/DL (ref 12–16)
INFLUENZA A: NOT DETECTED
INFLUENZA B: NOT DETECTED
LIPASE: 15 U/L (ref 13–60)
LYMPHOCYTES # BLD: 24 % (ref 24–44)
MAGNESIUM: 1.7 MG/DL (ref 1.6–2.6)
MCH RBC QN AUTO: 33.3 PG (ref 26–34)
MCHC RBC AUTO-ENTMCNC: 33.4 G/DL (ref 31–37)
MCV RBC AUTO: 99.6 FL (ref 80–100)
MONOCYTES # BLD: 5 % (ref 1–7)
PDW BLD-RTO: 12.9 % (ref 11.5–14.9)
PLATELET # BLD: 284 K/UL (ref 150–450)
PMV BLD AUTO: 8.5 FL (ref 6–12)
POTASSIUM SERPL-SCNC: 4 MMOL/L (ref 3.7–5.3)
RBC # BLD: 4.6 M/UL (ref 4–5.2)
SARS-COV-2 RNA, RT PCR: NOT DETECTED
SEG NEUTROPHILS: 67 % (ref 36–66)
SEGMENTED NEUTROPHILS ABSOLUTE COUNT: 8.7 K/UL (ref 1.3–9.1)
SODIUM BLD-SCNC: 136 MMOL/L (ref 135–144)
SOURCE: NORMAL
SPECIMEN DESCRIPTION: NORMAL
TOTAL PROTEIN: 7.5 G/DL (ref 6.4–8.3)
TROPONIN, HIGH SENSITIVITY: 13 NG/L (ref 0–14)
WBC # BLD: 12.9 K/UL (ref 3.5–11)

## 2022-02-21 PROCEDURE — 36415 COLL VENOUS BLD VENIPUNCTURE: CPT

## 2022-02-21 PROCEDURE — 80053 COMPREHEN METABOLIC PANEL: CPT

## 2022-02-21 PROCEDURE — 85025 COMPLETE CBC W/AUTO DIFF WBC: CPT

## 2022-02-21 PROCEDURE — 87636 SARSCOV2 & INF A&B AMP PRB: CPT

## 2022-02-21 PROCEDURE — 71045 X-RAY EXAM CHEST 1 VIEW: CPT

## 2022-02-21 PROCEDURE — 99284 EMERGENCY DEPT VISIT MOD MDM: CPT

## 2022-02-21 PROCEDURE — 6370000000 HC RX 637 (ALT 250 FOR IP): Performed by: EMERGENCY MEDICINE

## 2022-02-21 PROCEDURE — 93005 ELECTROCARDIOGRAM TRACING: CPT | Performed by: EMERGENCY MEDICINE

## 2022-02-21 PROCEDURE — 84484 ASSAY OF TROPONIN QUANT: CPT

## 2022-02-21 PROCEDURE — 2580000003 HC RX 258: Performed by: EMERGENCY MEDICINE

## 2022-02-21 PROCEDURE — 83690 ASSAY OF LIPASE: CPT

## 2022-02-21 PROCEDURE — 83735 ASSAY OF MAGNESIUM: CPT

## 2022-02-21 RX ORDER — 0.9 % SODIUM CHLORIDE 0.9 %
1000 INTRAVENOUS SOLUTION INTRAVENOUS ONCE
Status: COMPLETED | OUTPATIENT
Start: 2022-02-21 | End: 2022-02-21

## 2022-02-21 RX ORDER — PROMETHAZINE HYDROCHLORIDE 25 MG/1
25 TABLET ORAL ONCE
Status: COMPLETED | OUTPATIENT
Start: 2022-02-21 | End: 2022-02-21

## 2022-02-21 RX ORDER — BENZONATATE 100 MG/1
100 CAPSULE ORAL 3 TIMES DAILY PRN
Qty: 30 CAPSULE | Refills: 0 | Status: SHIPPED | OUTPATIENT
Start: 2022-02-21 | End: 2022-02-28

## 2022-02-21 RX ORDER — ACETAMINOPHEN 500 MG
1000 TABLET ORAL EVERY 6 HOURS PRN
Qty: 60 TABLET | Refills: 0 | Status: SHIPPED | OUTPATIENT
Start: 2022-02-21 | End: 2022-03-08

## 2022-02-21 RX ORDER — PREDNISONE 10 MG/1
40 TABLET ORAL DAILY
Qty: 20 TABLET | Refills: 0 | Status: SHIPPED | OUTPATIENT
Start: 2022-02-21 | End: 2022-02-26

## 2022-02-21 RX ORDER — ACETAMINOPHEN 500 MG
1000 TABLET ORAL ONCE
Status: DISCONTINUED | OUTPATIENT
Start: 2022-02-21 | End: 2022-02-21 | Stop reason: HOSPADM

## 2022-02-21 RX ORDER — BENZONATATE 100 MG/1
100 CAPSULE ORAL ONCE
Status: COMPLETED | OUTPATIENT
Start: 2022-02-21 | End: 2022-02-21

## 2022-02-21 RX ADMIN — PROMETHAZINE HYDROCHLORIDE 25 MG: 25 TABLET ORAL at 16:24

## 2022-02-21 RX ADMIN — BENZONATATE 100 MG: 100 CAPSULE ORAL at 16:24

## 2022-02-21 RX ADMIN — SODIUM CHLORIDE 1000 ML: 9 INJECTION, SOLUTION INTRAVENOUS at 16:26

## 2022-02-21 ASSESSMENT — ENCOUNTER SYMPTOMS: COUGH: 1

## 2022-02-21 ASSESSMENT — PAIN - FUNCTIONAL ASSESSMENT: PAIN_FUNCTIONAL_ASSESSMENT: 0-10

## 2022-02-21 ASSESSMENT — PAIN SCALES - GENERAL
PAINLEVEL_OUTOF10: 9
PAINLEVEL_OUTOF10: 9

## 2022-02-21 NOTE — ED NOTES
Pt refused vital signs and then discharged. Pt is A+O x 4, GCS = 15, PMS x 4 intact, eupneic, and PWD.        Mara Jack RN  02/21/22 [id number]

## 2022-02-21 NOTE — ED PROVIDER NOTES
Tawnya    Pt Name: Mikel Cleveland  MRN: [id number]  [place] 1975  Date of evaluation: 2/21/22  CHIEF COMPLAINT       Chief Complaint   Patient presents with    Chest Pain    Shortness of Breath    Headache     HISTORY OF PRESENT ILLNESS     Cough  Cough characteristics:  Productive  Sputum characteristics:  Nondescript  Severity:  Moderate  Onset quality:  Gradual  Duration:  1 week  Timing:  Constant  Progression:  Unchanged  Chronicity:  New  Context: upper respiratory infection    Relieved by:  Nothing  Worsened by:  Nothing  Ineffective treatments: OTC cough meds. HA  Chest hurts to cough  Not covid vaccinated        REVIEW OF SYSTEMS     Review of Systems   Respiratory: Positive for cough. All other systems reviewed and are negative.     PASTMEDICAL HISTORY     Past Medical History:   Diagnosis Date    Acute exacerbation of chronic obstructive pulmonary disease ([address] 3/21/2018    Adhesive capsulitis of left shoulder 9/25/2018    Asthma     Asthma exacerbation 7/24/2014    Atrial fibrillation (HCC)     placed on event monitor 9/25/18 for PVC's & A-fib    Atrial premature depolarization 7/19/2019    [facility]     Bipolar disorder ([facility] 7/19/2019    Bulging disc     CAD (coronary artery disease)     Candida infection 2/23/2018    Cardiomyopathy ([facility]     Chest pain 6/13/2017    CH[facility]congestive heart failure) ([facility]     Chronic otitis media of both ears     rt>lt    Chronic right shoulder pain 3/14/2017    Cigarette nicotine dependence with nicotine-induced disorder 7/19/2019    Class 3 severe obesity due to excess calories with serious comorbidity and body mass index (BMI) of 40.0 to 44.9 in adult [facility]) 10/4/2018    Closed fracture of left ankle 6/25/2019    Clostridium difficile infection     COPD (chronic obstructive pulmonary disease) (HCC)     Cough, persistent 3/21/2018    Current moderate episode of major depressive disorder without prior episode ([facility] 8/20/2018    Depression     Diabetes mellitus type 2, controlled ([facility] 4/30/2014    Diarrhea     Diarrhea     Dizziness     DVT (deep venous thrombosis) [facility]     after PICC line right arm    Encounter for monitoring sotalol therapy 2/20/2017    Encounter for screening mammogram for malignant neoplasm of breast 3/7/2018    Endometriosis     Fainting     GERD (gastroesophageal reflux disease)     hx of    GI bleeding     H. pylori infection     Helicobacter pylori (H. pylori)     [place] (hard of hearing)     both ears, no hearing aids    HTN (hypertension) 7/19/2019    Hyperlipidemia     Hypertension     Left bicipital tenosynovitis 10/17/2018    Left leg pain 5/16/2017    Left sided abdominal pain of unknown cause 7/19/2016    Leg swelling 9/21/2018    Mastoiditis     Mastoiditis, acute 4/30/2014    Migraines     Morbid obesity ([facility]     Myocardial infarction ([facility]     2005    Nausea     Neuropathy     On home oxygen therapy     3 L at night    Ovarian cyst     Passed out     hx of- negative tilt table    Pulmonary hypertension ([facility]     Pulmonary insufficiency     PVC (premature ventricular contraction)     Seasonal allergies     Tricuspid insufficiency     Type II or unspecified type diabetes mellitus without mention of complication, not stated as uncontrolled      Past Problem List  Patient Active Problem List   Diagnosis Code    Diabetes mellitus type 2, controlled ([facility] E11.9    COPD (chronic obstructive pulmonary disease) ([facility] J44.9    Asthma J45.909    Acute bronchitis J20.9    KRISTIN (obstructive sleep apnea) G47.33    Adult body mass index 40 and over [id number]    Chronic right shoulder pain M25.511, G89.29    Neck pain M54.2    Radicular pain in right arm M79.2    Left leg pain M79.605    Noncompliance with therapeutic plan Z91.11    Precordial pain R07.2    Tobacco abuse Z72.0    Current moderate episode of major depressive disorder without prior episode ([facility] F32.1    Adhesive capsulitis of left shoulder M75.02    Morbid obesity ([facility] E66.01    Left bicipital tenosynovitis M75.22    Refused influenza vaccine Z28.21    Closed fracture of left ankle S82.892A    Atrial premature depolarization I49.1    Ventricular premature depolarization I49.3    Cardiomyopathy ([facility]) I42.9    Cigarette nicotine dependence with nicotine-induced disorder F17.219    Productive cough R05.8    DVT (deep venous thrombosis) ([facility]) I82.409    Endometriosis N80.9    GERD (gastroesophageal reflux disease) K21.9    HTN (hypertension) I10    Hypomagnesemia E83.42    Migraines G43.909    Mixed hyperlipidemia E78.2    Neurocardiogenic syncope R55    Nonrheumatic tricuspid (valve) insufficiency I36.1    Tricuspid valve disorders, non-rheumatic I36.9    Ovarian cyst N83.209    Encounter for monitoring sotalol therapy Z51.81, Z79.899    Pulmonary HTN ([facility]) I27.20    PVC's (premature ventricular contractions) I49.3    Schizophrenia ([facility]) F20.9    Shortness of breath R06.02    Acute exacerbation of chronic obstructive pulmonary disease ([facility]) J44.1    Bipolar disorder ([facility]) F31.9    Left sided abdominal pain of unknown cause R10.9    Leg swelling M79.89    Mastoiditis, acute H70.009    Steroid-induced hyperglycemia R73.9, T38.0X5A    Sclerosing adenosis of left breast N60.22    Tachycardia R00.0    Tremor R25.1    Rupture of right rotator cuff M75.101    Rotator cuff syndrome of left shoulder M75.102    Long term current use of insulin ([facility]) Z79.4    Lesion of ulnar nerve G56.20    Carpal tunnel syndrome G56.00    Acute upper respiratory infection J06.9    Cellulitis of right upper limb [address] F40.240    Close exposure to 2019 novel coronavirus Z20.822    Congestive heart failure ([facility]) I50.9    Diabetic neuropathy ([facility]) E11.40    Enthesopathy M77.9    Fibrocystic breast changes O55.33    Helicobacter pylori gastritis K29.70, B96.81    NSTEMI (non-ST elevated myocardial infarction) ([facility] I21.4     SURGICAL HISTORY       Past Surgical History:   Procedure Laterality Date    ABDOMEN SURGERY      abcess    ABDOMINAL ADHESION SURGERY      ABDOMINAL EXPLORATION SURGERY      x 4    ABDOMINAL HERNIA REPAIR      with mesh    ABLATION OF DYSRHYTHMIC FOCUS  2016    ABLATION OF DYSRHYTHMIC FOCUS  09/08/2017    [facility] at [facility].  ABSCESS DRAINAGE      left hip and chest    APPENDECTOMY      BREAST SURGERY Left 2007    I & D    CARDIAC CATHETERIZATION  2014 & 2000     no stenting    CARPAL TUNNEL RELEASE Right 12/19/2019    Ulnar nerve decompression, right elbow. Release of 1st and 2nd extensor compartments, right forearm.  CARPAL TUNNEL RELEASE  05/04/2020    Carpal tunnel release, left hand    CHOLECYSTECTOMY      COLONOSCOPY      FOOT SURGERY Left     bone fragment removed    FRACTURE SURGERY Right     closed reduction perc pinning ring & middle finger    GASTRIC FUNDOPLICATION  7826    HYSTERECTOMY      total    HYSTERECTOMY      HYSTEROSCOPY      tubal perfusion    MYRINGOTOMY Right 11/13/2015    Right myringotomy with placement of  T-tube on the right side. Removal of plugged ventilating tube, right side.  MYRINGOTOMY Left 07/14/2020    MYRINGOTOMY TUBE INSERTION performed by Flor Stevens MD at [facility] Right 06/05/2014    OTHER SURGICAL HISTORY Right 05/29/2014    removal ear tube rt ear    OTHER SURGICAL HISTORY  2009    LOOP recorder inserted and removed 3 mos.  later    OTHER SURGICAL HISTORY Right 08/11/2016    ear tube removal with patch    OTHER SURGICAL HISTORY      tubal perfusion, lysis of uterine adhesions    OTHER SURGICAL HISTORY      multiple PICC lines inserted and removed, it has affected circulation bilateral upper arms    OTHER SURGICAL HISTORY  10/19/2020    Revisiion to subcutaneous transposition of unlar nerve, right elbow  OTHER SURGICAL HISTORY Right 06/14/2021    REVISION TO SUBMUSCULAR TRANSPOSITION OF RIGHT ULNAR NERVE    [place] LAVERNEULATGURPREET SHLDR JT W ANESTHESIA Right 03/01/2017    SHOULDER MANIPULATION RIGHT performed by Elana Zavala MD at [address] [facility] Left 10/17/2018    SHOULDER ARTHROSCOPY W/BICEPS TENDONESIS performed by Kelley Reyes MD at [address] [facility] Left     foot    TONSILLECTOMY      TYMPANOSTOMY TUBE PLACEMENT      TYMPANOSTOMY TUBE PLACEMENT Left 03/12/2019    TYMPANOSTOMY TUBE PLACEMENT Right 12/08/2021    Right tympanostomy with tube placement of T-tube with operative microscope and general anesthesia. Right removal of right ear tube.  ULNAR TUNNEL RELEASE Right     UPPER GASTROINTESTINAL ENDOSCOPY      UPPER GASTROINTESTINAL ENDOSCOPY  07/10/2019    UPPER GASTROINTESTINAL ENDOSCOPY N/A 07/10/2019    EGD BIOPSY performed [facility] Malika Hankins [facility] at [facility]       Previous Medications    ALBUTEROL SULFATE HFA (VENTOLIN HFA) 108 (90 BASE) MCG/ACT INHALER    INHALE 2 PUFFS INTO LUNGS EVERY 6 HOURS AS NEEDED FOR WHEEZING    ATORVASTATIN (LIPITOR) 80 MG TABLET    TAKE 1 TABLET BY MOUTH NIGHTLY    BLOOD GLUCOSE TEST STRIPS (ASCENSIA AUTODISC VI;ONE TOUCH ULTRA TEST VI) STRIP    OneTouch Ultra Test strips. Check blood sugars 4x daily    BLOOD GLUCOSE TEST STRIPS (ONETOUCH ULTRA) STRIP    USE TO TEST BLOOD SUGAR BEFORE MEALS, AT BEDTIME, AND AS NEEDED (6 TIMES DAILY)    CALCIUM CARBONATE (ANTACID) 500 MG CHEWABLE TABLET    Take 1 tablet by mouth daily    CARVEDILOL (COREG) 12.5 MG TABLET    Take 12.5 mg by mouth 2 times daily (with meals) Takes at 10:00am and 10:00pm    CLEARLAX 17 GM/SCOOP POWDER    TAKE 17 G BY MOUTH 2 TIMES DAILY    CLOPIDOGREL (PLAVIX) 75 MG TABLET        CLOTRIMAZOLE (LOTRIMIN) 1 % CREAM    Apply topically 2 times daily.     COLCHICINE (COLCRYS) 0.6 MG TABLET    Take 1 tablet by mouth daily DEXTROMETHORPHAN-GUAIFENESIN (ROBITUSSIN-DM)  MG/5ML SYRUP    Take 10 mLs by mouth every 6 hours    DICYCLOMINE (BENTYL) 10 MG CAPSULE    Take 1 capsule by mouth every 6 hours as needed (cramps)    DULOXETINE (CYMBALTA) 60 MG EXTENDED RELEASE CAPSULE    TAKE 1 CAPSULE BY MOUTH 2 TIMES DAILY    ERGOCALCIFEROL (ERGOCALCIFEROL) 1.25 MG (34119 UT) CAPSULE    Take 50,000 Units by mouth once a week     FAMOTIDINE (PEPCID) 20 MG TABLET    Take 1 tablet by mouth 2 times daily as needed (reflux)    GUAIFENESIN (MUCINEX) 600 MG EXTENDED RELEASE TABLET    Take 1 tablet by mouth 2 times daily for 15 days    HM SALINE NASAL SPRAY 0.65 % NASAL SPRAY    1 spray by Nasal route as needed for Congestion     IBUPROFEN (ADVIL;MOTRIN) 800 MG TABLET    Take 800 mg by mouth every 8 hours as needed for Pain    INSULIN DEGLUDEC (TRESIBA FLEXTOUCH) 200 UNIT/ML SOPN    Inject 80 Units into the skin 2 times daily    INSULIN LISPRO, 1 UNIT DIAL, (HUMALOG KWIKPEN) 100 UNIT/ML SOPN    INJECT SUBCUTANEOUSLY PER SLIDING SCALE, 150-200 INJECT 3U, 201-250 INJECT 6U, 251-300 INJECT 9U, >300 INJECT 12U, MAX 30U/DAY    INSULIN PEN NEEDLE (UNIFINE PENTIPS) 31G X 8 MM MISC    USE 4 TIMES DAILY AS DIRECTED    IPRATROPIUM-ALBUTEROL (DUONEB) 0.5-2.5 (3) MG/3ML SOLN NEBULIZER SOLUTION    INHALE 3 MLS (ONE VIAL) WITH NEBULIZER EVERY 6 HOURS AS NEEDED FOR SHORTNESS OF BREATH    MAGNESIUM OXIDE (MAG-OX) 400 MG TABLET    Take 400 mg by mouth daily     MULTIPLE VITAMINS-MINERALS (MULTIVITAMIN GUMMIES WOMENS) CHEW    Take 2 each by mouth daily     NITROGLYCERIN (NITROSTAT) 0.4 MG SL TABLET    up to max of 3 total doses. If no relief after 1 dose, call 911. OXYCODONE-ACETAMINOPHEN (PERCOCET) 5-325 MG PER TABLET    Take 1 tablet by mouth every 4 hours as needed for Pain. Indications: last fill 12/4/21 for 30 days     OXYGEN    Inhale into the lungs Indications: On 3 liters per n/c during the night.     PANTOPRAZOLE (PROTONIX) 40 MG TABLET    pantoprazole 40 mg tablet,delayed release   TAKE 1 TABLET BY MOUTH EVERY DAY    QUETIAPINE (SEROQUEL) 50 MG TABLET    Take 2 tablets by mouth daily TAKE 1 TABLET BY MOUTH EVERY DAY AT NIGHT    SUCRALFATE (CARAFATE) 1 GM/10ML SUSPENSION    Take 10 mLs by mouth 4 times daily     ALLERGIES     is allergic to latex, aspirin, avelox [moxifloxacin], chantix [varenicline], doxycycline, dye [iodides], flexeril [cyclobenzaprine], gabapentin, losartan, morphine, nsaids, pcn [penicillins], reglan [metoclopramide hcl], shellfish-derived products, sulfa antibiotics, vancomycin, zofran, zyvox [linezolid], acyclovir, bactrim [sulfamethoxazole-trimethoprim], betadine [povidone iodine], ceclor [cefaclor], clindamycin/lincomycin, codeine, macrolides and ketolides, novolin r [insulin], novolog [insulin aspart], phenothiazines, tape [adhesive tape], banana, compazine [prochlorperazine], fentanyl, kiwi extract, tamiflu [oseltamivir phosphate], and sotalol. FAMILY HISTORY     She indicated that the status of her mother is unknown. She indicated that the status of her father is unknown. She indicated that the status of her sister is unknown. She indicated that the status of her maternal grandmother is unknown. She indicated that the status of her maternal grandfather is unknown. She indicated that the status of her paternal grandmother is unknown. She indicated that the status of her paternal grandfather is unknown. She indicated that the status of her maternal aunt is unknown. She indicated that the status of her maternal uncle is unknown. She indicated that the status of her paternal aunt is unknown. She indicated that the status of her paternal uncle is unknown.      SOCIAL HISTORY       Social History     Tobacco Use    Smoking status: Current Every Day Smoker     Packs/day: 0.50     Years: 23.00     Pack years: 11.50     Types: Cigarettes    Smokeless tobacco: Never Used   Vaping Use    Vaping Use: Never used   Substance Use Topics    Alcohol use: No     Alcohol/week: 0.0 standard drinks    Drug use: No     PHYSICAL EXAM     INITIAL VITALS: BP (!) 161/84   Pulse 88   Temp 97.4 °F (36.3 °C)   Resp 18   Ht 5' 4\" (1.626 m)   Wt 250 lb (113.4 kg)   SpO2 96%   BMI 42.91 kg/m²    Physical Exam  Constitutional:       General: She is not in acute distress. Appearance: Normal appearance. She is well-developed. She is not ill-appearing, toxic-appearing or diaphoretic. HENT:      Head: Normocephalic and atraumatic. Right Ear: External ear normal.      Left Ear: External ear normal.      Nose: Nose normal. No congestion. Mouth/Throat:      Mouth: Mucous membranes are moist.      Pharynx: Oropharynx is clear. Eyes:      General:         Right eye: No discharge. Left eye: No discharge. Conjunctiva/sclera: Conjunctivae normal.   Neck:      Trachea: No tracheal deviation. Cardiovascular:      Rate and Rhythm: Normal rate and regular rhythm. Pulses: Normal pulses. Heart sounds: Normal heart sounds. Pulmonary:      Effort: Pulmonary effort is normal. No respiratory distress. Breath sounds: Normal breath sounds. No stridor. No wheezing or rales. Abdominal:      Palpations: Abdomen is soft. Tenderness: There is no abdominal tenderness. There is no guarding or rebound. Musculoskeletal:         General: No tenderness or deformity. Normal range of motion. Cervical back: Normal range of motion and neck supple. Skin:     General: Skin is warm and dry. Capillary Refill: Capillary refill takes less than 2 seconds. Findings: No erythema or rash. Neurological:      General: No focal deficit present. Mental Status: She is alert and oriented to person, place, and time. Coordination: Coordination normal.   Psychiatric:         Mood and Affect: Mood normal.         Behavior: Behavior normal.         Thought Content:  Thought content normal.         Judgment: Judgment normal.         MEDICAL DECISION MAKING:       ED Course as of 02/21/22 1702   Mon Feb 21, 2022   1529 Reviewed past med records, she has a hx of aggressive behavior towards ED staff when they do not give her narcotics [WM]      ED Course User Index  [WM] Raymond Todd MD   Procedures      Do not suspect pe or acs or ami or ad  Reviewed labs and cxr  Constant symptoms 1 week  Ami r/o with 1 trop, it is at her baseline level  rx tessalon, tylenol, prednisone, she has percocet at home  Holding abx due to multiple allergies and no sign of pneumonia      DIAGNOSTIC RESULTS   EKG:All EKG's are interpreted by the Emergency Department Physician who either signs or Co-signs this chart in the absence of a cardiologist.  Normal ekg      RADIOLOGY:All plain film, CT, MRI, and formal ultrasound images (except ED bedside ultrasound) are read by the radiologist, see reports below, unless otherwisenoted in MDM or here. XR CHEST PORTABLE   Final Result   No radiographic evidence of acute cardiopulmonary disease. LABS: All lab results were reviewed by myself, and all abnormals are listed below.   Labs Reviewed   CBC WITH AUTO DIFFERENTIAL - Abnormal; Notable for the following components:       Result Value    WBC 12.9 (*)     Seg Neutrophils 67 (*)     All other components within normal limits   COMPREHENSIVE METABOLIC PANEL - Abnormal; Notable for the following components:    Glucose 330 (*)     Chloride 95 (*)     Alkaline Phosphatase 117 (*)     Total Bilirubin 0.29 (*)     All other components within normal limits   COVID-19 & INFLUENZA COMBO   TROPONIN   LIPASE   MAGNESIUM       EMERGENCY DEPARTMENTCOURSE:         Vitals:    Vitals:    02/21/22 1322   BP: (!) 161/84   Pulse: 88   Resp: 18   Temp: 97.4 °F (36.3 °C)   SpO2: 96%   Weight: 250 lb (113.4 kg)   Height: 5' 4\" (1.626 m)       The patient was given the following medications while in the emergency department:  Orders Placed This Encounter   Medications    0.9 % sodium chloride bolus    benzonatate (TESSALON) capsule 100 mg    acetaminophen (TYLENOL) tablet 1,000 mg    promethazine (PHENERGAN) tablet 25 mg    predniSONE (DELTASONE) 10 MG tablet     Sig: Take 4 tablets by mouth daily for 5 days     Dispense:  20 tablet     Refill:  0    benzonatate (TESSALON) 100 MG capsule     Sig: Take 1 capsule by mouth 3 times daily as needed for Cough     Dispense:  30 capsule     Refill:  0    acetaminophen (TYLENOL) 500 MG tablet     Sig: Take 2 tablets by mouth every 6 hours as needed for Pain     Dispense:  60 tablet     Refill:  0    predniSONE (DELTASONE) tablet 50 mg     CONSULTS:  None    FINAL IMPRESSION      1. COPD exacerbation (HCC)    2. Bronchitis          DISPOSITION/PLAN   DISPOSITION        PATIENT REFERRED TO:  Nadine Lyles, [address]  [address] [place] [address]  [phone]    Schedule an appointment as soon as possible for a visit in 1 day      DISCHARGE MEDICATIONS:  New Prescriptions    ACETAMINOPHEN (TYLENOL) 500 MG TABLET    Take 2 tablets by mouth every 6 hours as needed for Pain    BENZONATATE (TESSALON) 100 MG CAPSULE    Take 1 capsule by mouth 3 times daily as needed for Cough    PREDNISONE (DELTASONE) 10 MG TABLET    Take 4 tablets by mouth daily for 5 days     The care is provided during an unprecedented national emergency due to the novel coronavirus, COVID 19.   MD Rene Vidal MD  02/21/22 88

## 2022-02-22 LAB
EKG ATRIAL RATE: 78 BPM
EKG P AXIS: -13 DEGREES
EKG P-R INTERVAL: 126 MS
EKG Q-T INTERVAL: 380 MS
EKG QRS DURATION: 84 MS
EKG QTC CALCULATION (BAZETT): 433 MS
EKG R AXIS: 59 DEGREES
EKG T AXIS: 42 DEGREES
EKG VENTRICULAR RATE: 78 BPM

## 2022-02-22 PROCEDURE — 93010 ELECTROCARDIOGRAM REPORT: CPT | Performed by: INTERNAL MEDICINE

## 2022-03-06 ENCOUNTER — HOSPITAL ENCOUNTER (EMERGENCY)
Age: 47
Discharge: HOME | DRG: 158 | End: 2022-03-06
Attending: EMERGENCY MEDICINE
Payer: COMMERCIAL

## 2022-03-06 VITALS
RESPIRATION RATE: 16 BRPM | DIASTOLIC BLOOD PRESSURE: 81 MMHG | HEIGHT: 64 IN | WEIGHT: 250 LBS | BODY MASS INDEX: 42.68 KG/M2 | SYSTOLIC BLOOD PRESSURE: 142 MMHG | TEMPERATURE: 98.8 F | OXYGEN SATURATION: 97 % | HEART RATE: 88 BPM

## 2022-03-06 DIAGNOSIS — K04.7 DENTAL INFECTION: Primary | ICD-10-CM

## 2022-03-06 PROCEDURE — 99284 EMERGENCY DEPT VISIT MOD MDM: CPT

## 2022-03-06 PROCEDURE — 6370000000 HC RX 637 (ALT 250 FOR IP): Performed by: EMERGENCY MEDICINE

## 2022-03-06 RX ORDER — TRAMADOL HYDROCHLORIDE 50 MG/1
50 TABLET ORAL EVERY 6 HOURS PRN
Qty: 10 TABLET | Refills: 0 | Status: SHIPPED | OUTPATIENT
Start: 2022-03-06 | End: 2022-03-08

## 2022-03-06 RX ORDER — AZITHROMYCIN 250 MG/1
500 TABLET, FILM COATED ORAL ONCE
Status: COMPLETED | OUTPATIENT
Start: 2022-03-06 | End: 2022-03-06

## 2022-03-06 RX ORDER — AZITHROMYCIN 500 MG/1
500 TABLET, FILM COATED ORAL DAILY
Qty: 5 TABLET | Refills: 0 | Status: ON HOLD | OUTPATIENT
Start: 2022-03-06 | End: 2022-03-11 | Stop reason: HOSPADM

## 2022-03-06 RX ADMIN — AZITHROMYCIN MONOHYDRATE 500 MG: 250 TABLET ORAL at 13:24

## 2022-03-06 ASSESSMENT — ENCOUNTER SYMPTOMS
NAUSEA: 0
SINUS PAIN: 1
FACIAL SWELLING: 1
COUGH: 0
VOMITING: 0
SINUS PRESSURE: 1
RHINORRHEA: 0
SHORTNESS OF BREATH: 0
DIARRHEA: 0
ABDOMINAL PAIN: 0
CONSTIPATION: 0

## 2022-03-06 ASSESSMENT — PAIN SCALES - GENERAL: PAINLEVEL_OUTOF10: 10

## 2022-03-06 NOTE — ED PROVIDER NOTES
[address]  eMERGENCY dEPARTMENT eNCOUnter      Pt Name: Brando Bernstein  MRN: [id number]  [place] 1975  Date of evaluation: 3/6/22      CHIEF COMPLAINT       Chief Complaint   Patient presents with    Oral Swelling         HISTORY OF PRESENT ILLNESS    Brando Bernstein is a 50 y.o. female who presents complaining of facial swelling. Patient states yesterday she started having pain in her right side of her nose going into her cheek. Patient woke up this morning and noticed that it swelling in the face and her pain is severe. Patient denies fevers but states that she has had low-grade zip intermittently. No cough no rhinorrhea but states she feels congested like she has to blow her nose but cannot. Patient has no cough vomiting or diarrhea. REVIEW OF SYSTEMS       Review of Systems   Constitutional: Negative for chills and fever. HENT: Positive for congestion, dental problem, facial swelling, sinus pressure and sinus pain. Negative for rhinorrhea. Respiratory: Negative for cough and shortness of breath. Cardiovascular: Negative for chest pain and palpitations. Gastrointestinal: Negative for abdominal pain, constipation, diarrhea, nausea and vomiting. Neurological: Negative for dizziness, seizures and headaches.        PAST MEDICAL HISTORY     Past Medical History:   Diagnosis Date    Acute exacerbation of chronic obstructive pulmonary disease ([address] 3/21/2018    Adhesive capsulitis of left shoulder 9/25/2018    Asthma     Asthma exacerbation 7/24/2014    Atrial fibrillation ([address]     placed on event monitor 9/25/18 for PVC's & A-fib    Atrial premature depolarization 7/19/2019    Bipolar 1 disorder ([address]     Bipolar disorder ([address] 7/19/2019    Bulging disc     CAD (coronary artery disease)     Candida infection 2/23/2018    Cardiomyopathy ([address]     Chest pain 6/13/2017    CHF (congestive heart failure) (HCC)     Chronic otitis media of both ears     rt>lt    Chronic right shoulder pain 3/14/2017    Cigarette nicotine dependence with nicotine-induced disorder 7/19/2019    Class 3 severe obesity due to excess calories with serious comorbidity and body mass index (BMI) of 40.0 to 44.9 in adult [facility]) 10/4/2018    Closed fracture of left ankle 6/25/2019    Clostridium difficile infection     COPD (chronic obstructive pulmonary disease) (HCC)     Cough, persistent 3/21/2018    Current moderate episode of major depressive disorder without prior episode ([address] 8/20/2018    Depression     Diabetes mellitus type 2, controlled ([address] 4/30/2014    Diarrhea     Diarrhea     Dizziness     DVT (deep venous thrombosis) (HCC)     after PICC line right arm    Encounter for monitoring sotalol therapy 2/20/2017    Encounter for screening mammogram for malignant neoplasm of breast 3/7/2018    Endometriosis     Fainting     GERD (gastroesophageal reflux disease)     hx of    GI bleeding     H. pylori infection     Helicobacter pylori (H. pylori)     [place] (hard of hearing)     both ears, no hearing aids    HTN (hypertension) 7/19/2019    Hyperlipidemia     Hypertension     Left bicipital tenosynovitis 10/17/2018    Left leg pain 5/16/2017    Left sided abdominal pain of unknown cause 7/19/2016    Leg swelling 9/21/2018    Mastoiditis     Mastoiditis, acute 4/30/2014    Migraines     Morbid obesity ([address]     Myocardial infarction ([address]     2005    Nausea     Neuropathy     On home oxygen therapy     3 L at night    Ovarian cyst     Passed out     hx of- negative tilt table    Pulmonary hypertension (HCC)     Pulmonary insufficiency     PVC (premature ventricular contraction)     Seasonal allergies     Tricuspid insufficiency     Type II or unspecified type diabetes mellitus without mention of complication, not stated as uncontrolled        SURGICAL HISTORY       Past Surgical History:   Procedure Laterality Date    ABDOMEN SURGERY      abcess    ABDOMINAL ADHESION SURGERY      ABDOMINAL EXPLORATION SURGERY      x 4    ABDOMINAL HERNIA REPAIR      with mesh    ABLATION OF DYSRHYTHMIC FOCUS  2016    ABLATION OF DYSRHYTHMIC FOCUS  09/08/2017    Done at [facility].  ABSCESS DRAINAGE      left hip and chest    APPENDECTOMY      BREAST SURGERY Left 2007    I & D    CARDIAC CATHETERIZATION  2014 & 2000     no stenting    CARPAL TUNNEL RELEASE Right 12/19/2019    Ulnar nerve decompression, right elbow. Release of 1st and 2nd extensor compartments, right forearm.  CARPAL TUNNEL RELEASE  05/04/2020    Carpal tunnel release, left hand    CHOLECYSTECTOMY      COLONOSCOPY      FOOT SURGERY Left     bone fragment removed    FRACTURE SURGERY Right     closed reduction perc pinning ring & middle finger    GASTRIC FUNDOPLICATION  4456    HYSTERECTOMY      total    HYSTERECTOMY      HYSTEROSCOPY      tubal perfusion    MYRINGOTOMY Right 11/13/2015    Right myringotomy with placement of  T-tube on the right side. Removal of plugged ventilating tube, right side.  MYRINGOTOMY Left 07/14/2020    MYRINGOTOMY TUBE INSERTION performed by Julian Scanlon [facility] at [facility] Right 06/05/2014    OTHER SURGICAL HISTORY Right 05/29/2014    removal ear tube rt ear    OTHER SURGICAL HISTORY  2009    LOOP recorder inserted and removed 3 mos.  later    OTHER SURGICAL HISTORY Right 08/11/2016    ear tube removal with patch    OTHER SURGICAL HISTORY      tubal perfusion, lysis of uterine adhesions    OTHER SURGICAL HISTORY      multiple PICC lines inserted and removed, it has affected circulation bilateral upper arms    OTHER SURGICAL HISTORY  10/19/2020    Revisiion to subcutaneous transposition of unlar nerve, right elbow    OTHER SURGICAL HISTORY Right 06/14/2021    REVISION TO SUBMUSCULAR TRANSPOSITION OF RIGHT ULNAR NERVE    BECKY BATES JT W ANESTHESIA Right 03/01/2017    SHOULDER MANIPULATION RIGHT performed by Darian Collado MD at [address] [facility] Left 10/17/2018    SHOULDER ARTHROSCOPY W/BICEPS TENDONESIS performed by Antonio Prather MD at [address] Left     foot    TONSILLECTOMY      TYMPANOSTOMY TUBE PLACEMENT      TYMPANOSTOMY TUBE PLACEMENT Left 03/12/2019    TYMPANOSTOMY TUBE PLACEMENT Right 12/08/2021    Right tympanostomy with tube placement of T-tube with operative microscope and general anesthesia. Right removal of right ear tube.  ULNAR TUNNEL RELEASE Right     UPPER GASTROINTESTINAL ENDOSCOPY      UPPER GASTROINTESTINAL ENDOSCOPY  07/10/2019    UPPER GASTROINTESTINAL ENDOSCOPY N/A 07/10/2019    EGD BIOPSY performed by Johan Garcia MD at [address] [place] [address]       Previous Medications    ACETAMINOPHEN (TYLENOL) 500 MG TABLET    Take 2 tablets by mouth every 6 hours as needed for Pain    ALBUTEROL SULFATE HFA (VENTOLIN HFA) 108 (90 BASE) MCG/ACT INHALER    INHALE 2 PUFFS INTO LUNGS EVERY 6 HOURS AS NEEDED FOR WHEEZING    ATORVASTATIN (LIPITOR) 80 MG TABLET    TAKE 1 TABLET BY MOUTH NIGHTLY    BLOOD GLUCOSE TEST STRIPS (ASCENSIA AUTODISC VI;ONE TOUCH ULTRA TEST VI) STRIP    OneTouch Ultra Test strips. Check blood sugars 4x daily    BLOOD GLUCOSE TEST STRIPS (ONETOUCH ULTRA) STRIP    USE TO TEST BLOOD SUGAR BEFORE MEALS, AT BEDTIME, AND AS NEEDED (6 TIMES DAILY)    CARVEDILOL (COREG) 12.5 MG TABLET    Take 12.5 mg by mouth 2 times daily (with meals) Takes at 10:00am and 10:00pm    CLEARLAX 17 GM/SCOOP POWDER    TAKE 17 G BY MOUTH 2 TIMES DAILY    CLOPIDOGREL (PLAVIX) 75 MG TABLET        CLOTRIMAZOLE (LOTRIMIN) 1 % CREAM    Apply topically 2 times daily.     COLCHICINE (COLCRYS) 0.6 MG TABLET    Take 1 tablet by mouth daily    DEXTROMETHORPHAN-GUAIFENESIN (ROBITUSSIN-DM)  MG/5ML SYRUP    Take 10 mLs by mouth every 6 hours    DICYCLOMINE (BENTYL) 10 MG CAPSULE    Take 1 capsule by mouth every 6 hours as needed (cramps) DULOXETINE (CYMBALTA) 60 MG EXTENDED RELEASE CAPSULE    TAKE 1 CAPSULE BY MOUTH 2 TIMES DAILY    ERGOCALCIFEROL (ERGOCALCIFEROL) 1.25 MG (09281 UT) CAPSULE    Take 50,000 Units by mouth once a week     FAMOTIDINE (PEPCID) 20 MG TABLET    Take 1 tablet by mouth 2 times daily as needed (reflux)    HM SALINE NASAL SPRAY 0.65 % NASAL SPRAY    1 spray by Nasal route as needed for Congestion     IBUPROFEN (ADVIL;MOTRIN) 800 MG TABLET    TAKE 1 TABLET BY MOUTH EVERY 8 HOURS WITH FOOD AS NEEDED FOR PAIN    INSULIN LISPRO, 1 UNIT DIAL, (HUMALOG KWIKPEN) 100 UNIT/ML SOPN    INJECT SUBCUTANEOUSLY PER SLIDING SCALE, 150-200 INJECT 3U, 201-250 INJECT 6U, 251-300 INJECT 9U, >300 INJECT 12U, MAX 30U/DAY    INSULIN PEN NEEDLE (UNIFINE PENTIPS) 31G X 8 MM MISC    USE 4 TIMES DAILY AS DIRECTED    IPRATROPIUM-ALBUTEROL (DUONEB) 0.5-2.5 (3) MG/3ML SOLN NEBULIZER SOLUTION    INHALE 3 MLS (ONE VIAL) WITH NEBULIZER EVERY 6 HOURS AS NEEDED FOR SHORTNESS OF BREATH    MAGNESIUM OXIDE (MAG-OX) 400 MG TABLET    Take 400 mg by mouth daily     MULTIPLE VITAMINS-MINERALS (MULTIVITAMIN GUMMIES WOMENS) CHEW    Take 2 each by mouth daily     NITROGLYCERIN (NITROSTAT) 0.4 MG SL TABLET    up to max of 3 total doses. If no relief after 1 dose, call 911. OXYCODONE-ACETAMINOPHEN (PERCOCET) 5-325 MG PER TABLET    Take 1 tablet by mouth every 4 hours as needed for Pain. Indications: last fill 12/4/21 for 30 days     OXYGEN    Inhale into the lungs Indications: On 3 liters per n/c during the night.     PANTOPRAZOLE (PROTONIX) 40 MG TABLET    pantoprazole 40 mg tablet,delayed release   TAKE 1 TABLET BY MOUTH EVERY DAY    QUETIAPINE (SEROQUEL) 50 MG TABLET    Take 2 tablets by mouth daily TAKE 1 TABLET BY MOUTH EVERY DAY AT NIGHT    SUCRALFATE (CARAFATE) 1 GM/10ML SUSPENSION    Take 10 mLs by mouth 4 times daily    TRESIBA FLEXTOUCH 200 UNIT/ML SOPN    INJECT 80 UNITS UNDER THE SKIN 2 TIMES DAILY       ALLERGIES     is allergic to latex, aspirin, avelox [moxifloxacin], chantix [varenicline], doxycycline, dye [iodides], flexeril [cyclobenzaprine], gabapentin, losartan, morphine, nsaids, pcn [penicillins], reglan [metoclopramide hcl], shellfish-derived products, sulfa antibiotics, vancomycin, zofran, zyvox [linezolid], acyclovir, bactrim [sulfamethoxazole-trimethoprim], betadine [povidone iodine], ceclor [cefaclor], clindamycin/lincomycin, codeine, macrolides and ketolides, novolin r [insulin], novolog [insulin aspart], phenothiazines, tape [adhesive tape], banana, compazine [prochlorperazine], fentanyl, kiwi extract, tamiflu [oseltamivir phosphate], and sotalol. SOCIAL HISTORY      reports that she has been smoking cigarettes. She has a 11.50 pack-year smoking history. She has never used smokeless tobacco. She reports that she does not drink alcohol and does not use drugs. PHYSICAL EXAM     INITIAL VITALS: BP (!) 142/81   Pulse 88   Temp 98.8 °F (37.1 °C)   Resp 16   Ht 5' 4\" (1.626 m)   Wt 250 lb (113.4 kg)   BMI 42.91 kg/m²      Physical Exam  Vitals and nursing note reviewed. Constitutional:       General: She is not in acute distress. Appearance: She is well-developed. She is not diaphoretic. HENT:      Head: Normocephalic and atraumatic. Mouth/Throat:      Comments: Severe tenderness well trying to do a exam of her right maxillary teeth which are all decayed down to the gumline there is no obvious redness or swelling. Patient has significant facial swelling and tenderness over the sinus. Eyes:      General: No scleral icterus. Right eye: No discharge. Left eye: No discharge. Conjunctiva/sclera: Conjunctivae normal.      Pupils: Pupils are equal, round, and reactive to light. Pulmonary:      Effort: Pulmonary effort is normal. No respiratory distress. Skin:     General: Skin is warm and dry. Coloration: Skin is not pale. Findings: No erythema or rash.    Neurological:      Mental Status: She is alert and oriented to person, place, and time. Psychiatric:         Behavior: Behavior normal.         Thought Content: Thought content normal.         Judgment: Judgment normal.         DIAGNOSTIC RESULTS     RADIOLOGY:All plain film, CT,MRI, and formal ultrasound images (except ED bedside ultrasound) are read by the radiologist and the interpretations are directly viewed by the emergency physician. LABS: All lab results were reviewed by myself, and all abnormals are listed below. Labs Reviewed - No data to display      MEDICAL DECISION MAKING:     Unclear if this is just a bad sinusitis versus a dental infection causing the facial swelling. I do not see anything that looks like an abscess to drain at this point in time so we will start her on antibiotics and have her follow-up. EMERGENCY DEPARTMENT COURSE:   Vitals:    Vitals:    03/06/22 1301   BP: (!) 142/81   Pulse: 88   Resp: 16   Temp: 98.8 °F (37.1 °C)   Weight: 250 lb (113.4 kg)   Height: 5' 4\" (1.626 m)       The patient was given the following medications while in the emergency department:  Orders Placed This Encounter   Medications    azithromycin (ZITHROMAX) tablet 500 mg     Order Specific Question:   Antimicrobial Indications     Answer:   Head and Neck Infection    azithromycin (ZITHROMAX) 500 MG tablet     Sig: Take 1 tablet by mouth daily for 5 days     Dispense:  5 tablet     Refill:  0    traMADol (ULTRAM) 50 MG tablet     Sig: Take 1 tablet by mouth every 6 hours as needed for Pain for up to 3 days. Dispense:  10 tablet     Refill:  0       -------------------------      CONSULTS:  None    PROCEDURES:  None    FINAL IMPRESSION      1.  Dental infection          DISPOSITION/PLAN   DISPOSITION Decision To Discharge 03/06/2022 01:09:13 PM      PATIENT REFERREDTO:  Jeanette Vick, [address]  [address] [place] [address]  [phone]    In 3 days      [facility]  [facility] [address]  [phone]    If symptoms worsen      DISCHARGEMEDICATIONS:  New Prescriptions    AZITHROMYCIN (ZITHROMAX) 500 MG TABLET    Take 1 tablet by mouth daily for 5 days    TRAMADOL (ULTRAM) 50 MG TABLET    Take 1 tablet by mouth every 6 hours as needed for Pain for up to 3 days.        (Please note that portions of this note were completed with a voice recognition program.  Efforts were made to edit thedictations but occasionally words are mis-transcribed.)    Magaly Siddiqui MD  Attending Emergency Physician                        Magaly Siddiqui MD  03/06/22 3522

## 2022-03-06 NOTE — ED TRIAGE NOTES
Patient to emergency department with complaints of right side facil swelling and dental pin which began yesterday.

## 2022-03-08 ENCOUNTER — APPOINTMENT (OUTPATIENT)
Dept: CT IMAGING | Age: 47
DRG: 158 | End: 2022-03-08
Payer: COMMERCIAL

## 2022-03-08 ENCOUNTER — HOSPITAL ENCOUNTER (INPATIENT)
Age: 47
LOS: 3 days | Discharge: HOME | DRG: 158 | End: 2022-03-11
Attending: EMERGENCY MEDICINE | Admitting: FAMILY MEDICINE
Payer: COMMERCIAL

## 2022-03-08 DIAGNOSIS — L03.90 CELLULITIS, UNSPECIFIED CELLULITIS SITE: Primary | ICD-10-CM

## 2022-03-08 DIAGNOSIS — E11.65 UNCONTROLLED TYPE 2 DIABETES MELLITUS WITH HYPERGLYCEMIA (HCC): ICD-10-CM

## 2022-03-08 PROBLEM — L03.211 FACIAL CELLULITIS: Status: ACTIVE | Noted: 2022-03-08

## 2022-03-08 LAB
ABSOLUTE EOS #: 0.6 K/UL (ref 0–0.4)
ABSOLUTE LYMPH #: 3.5 K/UL (ref 1–4.8)
ABSOLUTE MONO #: 0.9 K/UL (ref 0.1–1.3)
ANION GAP SERPL CALCULATED.3IONS-SCNC: 8 MMOL/L (ref 9–17)
BASOPHILS # BLD: 1 % (ref 0–2)
BASOPHILS ABSOLUTE: 0.1 K/UL (ref 0–0.2)
BUN BLDV-MCNC: 16 MG/DL (ref 6–20)
CALCIUM SERPL-MCNC: 9.3 MG/DL (ref 8.6–10.4)
CHLORIDE BLD-SCNC: 106 MMOL/L (ref 98–107)
CO2: 28 MMOL/L (ref 20–31)
CREAT SERPL-MCNC: 0.62 MG/DL (ref 0.5–0.9)
EOSINOPHILS RELATIVE PERCENT: 6 % (ref 0–4)
GFR AFRICAN AMERICAN: >60 ML/MIN
GFR NON-AFRICAN AMERICAN: >60 ML/MIN
GFR SERPL CREATININE-BSD FRML MDRD: ABNORMAL ML/MIN/{1.73_M2}
GLUCOSE BLD-MCNC: 161 MG/DL (ref 65–105)
GLUCOSE BLD-MCNC: 166 MG/DL (ref 65–105)
GLUCOSE BLD-MCNC: 166 MG/DL (ref 65–105)
GLUCOSE BLD-MCNC: 67 MG/DL (ref 70–99)
HCT VFR BLD CALC: 39.3 % (ref 36–46)
HEMOGLOBIN: 13 G/DL (ref 12–16)
LYMPHOCYTES # BLD: 32 % (ref 24–44)
MCH RBC QN AUTO: 33 PG (ref 26–34)
MCHC RBC AUTO-ENTMCNC: 33 G/DL (ref 31–37)
MCV RBC AUTO: 100 FL (ref 80–100)
MONOCYTES # BLD: 8 % (ref 1–7)
PDW BLD-RTO: 13.1 % (ref 11.5–14.9)
PLATELET # BLD: 282 K/UL (ref 150–450)
PMV BLD AUTO: 8.4 FL (ref 6–12)
POTASSIUM SERPL-SCNC: 4 MMOL/L (ref 3.7–5.3)
RBC # BLD: 3.93 M/UL (ref 4–5.2)
SEG NEUTROPHILS: 53 % (ref 36–66)
SEGMENTED NEUTROPHILS ABSOLUTE COUNT: 5.8 K/UL (ref 1.3–9.1)
SODIUM BLD-SCNC: 142 MMOL/L (ref 135–144)
WBC # BLD: 10.9 K/UL (ref 3.5–11)

## 2022-03-08 PROCEDURE — 2500000003 HC RX 250 WO HCPCS: Performed by: FAMILY MEDICINE

## 2022-03-08 PROCEDURE — 70486 CT MAXILLOFACIAL W/O DYE: CPT

## 2022-03-08 PROCEDURE — 2580000003 HC RX 258: Performed by: EMERGENCY MEDICINE

## 2022-03-08 PROCEDURE — 1200000000 HC SEMI PRIVATE

## 2022-03-08 PROCEDURE — 2580000003 HC RX 258: Performed by: FAMILY MEDICINE

## 2022-03-08 PROCEDURE — 6370000000 HC RX 637 (ALT 250 FOR IP): Performed by: FAMILY MEDICINE

## 2022-03-08 PROCEDURE — 99283 EMERGENCY DEPT VISIT LOW MDM: CPT

## 2022-03-08 PROCEDURE — 2500000003 HC RX 250 WO HCPCS: Performed by: EMERGENCY MEDICINE

## 2022-03-08 PROCEDURE — 36415 COLL VENOUS BLD VENIPUNCTURE: CPT

## 2022-03-08 PROCEDURE — 82947 ASSAY GLUCOSE BLOOD QUANT: CPT

## 2022-03-08 PROCEDURE — 6360000002 HC RX W HCPCS: Performed by: EMERGENCY MEDICINE

## 2022-03-08 PROCEDURE — 96375 TX/PRO/DX INJ NEW DRUG ADDON: CPT

## 2022-03-08 PROCEDURE — 85025 COMPLETE CBC W/AUTO DIFF WBC: CPT

## 2022-03-08 PROCEDURE — 80048 BASIC METABOLIC PNL TOTAL CA: CPT

## 2022-03-08 PROCEDURE — 96365 THER/PROPH/DIAG IV INF INIT: CPT

## 2022-03-08 PROCEDURE — 6360000002 HC RX W HCPCS: Performed by: FAMILY MEDICINE

## 2022-03-08 RX ORDER — NICOTINE POLACRILEX 4 MG
15 LOZENGE BUCCAL PRN
Status: DISCONTINUED | OUTPATIENT
Start: 2022-03-08 | End: 2022-03-08 | Stop reason: CLARIF

## 2022-03-08 RX ORDER — INSULIN GLARGINE 100 [IU]/ML
80 INJECTION, SOLUTION SUBCUTANEOUS 2 TIMES DAILY
Status: DISCONTINUED | OUTPATIENT
Start: 2022-03-08 | End: 2022-03-11 | Stop reason: HOSPADM

## 2022-03-08 RX ORDER — M-VIT,TX,IRON,MINS/CALC/FOLIC 27MG-0.4MG
1 TABLET ORAL DAILY
Status: DISCONTINUED | OUTPATIENT
Start: 2022-03-09 | End: 2022-03-11 | Stop reason: HOSPADM

## 2022-03-08 RX ORDER — SUCRALFATE 1 G/1
1 TABLET ORAL EVERY 6 HOURS SCHEDULED
Status: DISCONTINUED | OUTPATIENT
Start: 2022-03-08 | End: 2022-03-11 | Stop reason: HOSPADM

## 2022-03-08 RX ORDER — SODIUM CHLORIDE 0.9 % (FLUSH) 0.9 %
5-40 SYRINGE (ML) INJECTION EVERY 12 HOURS SCHEDULED
Status: DISCONTINUED | OUTPATIENT
Start: 2022-03-08 | End: 2022-03-11 | Stop reason: HOSPADM

## 2022-03-08 RX ORDER — ACETAMINOPHEN 325 MG/1
650 TABLET ORAL EVERY 6 HOURS PRN
Status: DISCONTINUED | OUTPATIENT
Start: 2022-03-08 | End: 2022-03-11 | Stop reason: HOSPADM

## 2022-03-08 RX ORDER — GUAIFENESIN DEXTROMETHORPHAN HYDROBROMIDE ORAL SOLUTION 10; 100 MG/5ML; MG/5ML
10 SOLUTION ORAL EVERY 6 HOURS
Status: DISCONTINUED | OUTPATIENT
Start: 2022-03-08 | End: 2022-03-08 | Stop reason: ALTCHOICE

## 2022-03-08 RX ORDER — ATORVASTATIN CALCIUM 80 MG/1
80 TABLET, FILM COATED ORAL NIGHTLY
Status: DISCONTINUED | OUTPATIENT
Start: 2022-03-08 | End: 2022-03-11 | Stop reason: HOSPADM

## 2022-03-08 RX ORDER — PANTOPRAZOLE SODIUM 40 MG/1
40 TABLET, DELAYED RELEASE ORAL
Status: DISCONTINUED | OUTPATIENT
Start: 2022-03-09 | End: 2022-03-11 | Stop reason: HOSPADM

## 2022-03-08 RX ORDER — POLYETHYLENE GLYCOL 3350 17 G/17G
17 POWDER, FOR SOLUTION ORAL 2 TIMES DAILY
Status: DISCONTINUED | OUTPATIENT
Start: 2022-03-08 | End: 2022-03-08

## 2022-03-08 RX ORDER — SODIUM CHLORIDE 0.9 % (FLUSH) 0.9 %
5-40 SYRINGE (ML) INJECTION PRN
Status: DISCONTINUED | OUTPATIENT
Start: 2022-03-08 | End: 2022-03-11 | Stop reason: HOSPADM

## 2022-03-08 RX ORDER — CLINDAMYCIN PHOSPHATE 600 MG/50ML
600 INJECTION INTRAVENOUS EVERY 6 HOURS
Status: DISCONTINUED | OUTPATIENT
Start: 2022-03-08 | End: 2022-03-11

## 2022-03-08 RX ORDER — DEXTROSE MONOHYDRATE 25 G/50ML
12.5 INJECTION, SOLUTION INTRAVENOUS PRN
Status: DISCONTINUED | OUTPATIENT
Start: 2022-03-08 | End: 2022-03-11 | Stop reason: HOSPADM

## 2022-03-08 RX ORDER — ALBUTEROL SULFATE 90 UG/1
2 AEROSOL, METERED RESPIRATORY (INHALATION) EVERY 4 HOURS PRN
Status: DISCONTINUED | OUTPATIENT
Start: 2022-03-08 | End: 2022-03-11 | Stop reason: HOSPADM

## 2022-03-08 RX ORDER — POLYETHYLENE GLYCOL 3350 17 G/17G
17 POWDER, FOR SOLUTION ORAL 2 TIMES DAILY
Status: DISCONTINUED | OUTPATIENT
Start: 2022-03-08 | End: 2022-03-11 | Stop reason: HOSPADM

## 2022-03-08 RX ORDER — DULOXETIN HYDROCHLORIDE 60 MG/1
60 CAPSULE, DELAYED RELEASE ORAL 2 TIMES DAILY
Status: DISCONTINUED | OUTPATIENT
Start: 2022-03-08 | End: 2022-03-11 | Stop reason: HOSPADM

## 2022-03-08 RX ORDER — KETOROLAC TROMETHAMINE 30 MG/ML
15 INJECTION, SOLUTION INTRAMUSCULAR; INTRAVENOUS ONCE
Status: DISCONTINUED | OUTPATIENT
Start: 2022-03-08 | End: 2022-03-08 | Stop reason: ALTCHOICE

## 2022-03-08 RX ORDER — ACETAMINOPHEN 650 MG/1
650 SUPPOSITORY RECTAL EVERY 6 HOURS PRN
Status: DISCONTINUED | OUTPATIENT
Start: 2022-03-08 | End: 2022-03-11 | Stop reason: HOSPADM

## 2022-03-08 RX ORDER — DEXTROSE MONOHYDRATE 50 MG/ML
100 INJECTION, SOLUTION INTRAVENOUS PRN
Status: DISCONTINUED | OUTPATIENT
Start: 2022-03-08 | End: 2022-03-11 | Stop reason: HOSPADM

## 2022-03-08 RX ORDER — OXYCODONE HYDROCHLORIDE AND ACETAMINOPHEN 5; 325 MG/1; MG/1
1 TABLET ORAL EVERY 4 HOURS PRN
Status: DISCONTINUED | OUTPATIENT
Start: 2022-03-08 | End: 2022-03-10

## 2022-03-08 RX ORDER — IPRATROPIUM BROMIDE AND ALBUTEROL SULFATE 2.5; .5 MG/3ML; MG/3ML
1 SOLUTION RESPIRATORY (INHALATION) EVERY 4 HOURS PRN
Status: DISCONTINUED | OUTPATIENT
Start: 2022-03-08 | End: 2022-03-11 | Stop reason: HOSPADM

## 2022-03-08 RX ORDER — ACETAMINOPHEN 500 MG
1000 TABLET ORAL EVERY 6 HOURS PRN
Status: DISCONTINUED | OUTPATIENT
Start: 2022-03-08 | End: 2022-03-08 | Stop reason: SDUPTHER

## 2022-03-08 RX ORDER — QUETIAPINE FUMARATE 100 MG/1
100 TABLET, FILM COATED ORAL DAILY
Status: DISCONTINUED | OUTPATIENT
Start: 2022-03-08 | End: 2022-03-09

## 2022-03-08 RX ORDER — POLYETHYLENE GLYCOL 3350 17 G/17G
17 POWDER, FOR SOLUTION ORAL DAILY PRN
Status: DISCONTINUED | OUTPATIENT
Start: 2022-03-08 | End: 2022-03-11 | Stop reason: HOSPADM

## 2022-03-08 RX ORDER — CLOPIDOGREL BISULFATE 75 MG/1
75 TABLET ORAL DAILY
Status: DISCONTINUED | OUTPATIENT
Start: 2022-03-08 | End: 2022-03-11 | Stop reason: HOSPADM

## 2022-03-08 RX ORDER — SODIUM CHLORIDE 9 MG/ML
25 INJECTION, SOLUTION INTRAVENOUS PRN
Status: DISCONTINUED | OUTPATIENT
Start: 2022-03-08 | End: 2022-03-11 | Stop reason: HOSPADM

## 2022-03-08 RX ORDER — CARVEDILOL 12.5 MG/1
12.5 TABLET ORAL 2 TIMES DAILY WITH MEALS
Status: DISCONTINUED | OUTPATIENT
Start: 2022-03-08 | End: 2022-03-11 | Stop reason: HOSPADM

## 2022-03-08 RX ORDER — CALCIUM CITRATE/VITAMIN D3 200MG-6.25
2 TABLET ORAL DAILY
Status: DISCONTINUED | OUTPATIENT
Start: 2022-03-08 | End: 2022-03-08 | Stop reason: CLARIF

## 2022-03-08 RX ORDER — DICYCLOMINE HYDROCHLORIDE 10 MG/1
10 CAPSULE ORAL EVERY 6 HOURS PRN
Status: DISCONTINUED | OUTPATIENT
Start: 2022-03-08 | End: 2022-03-11 | Stop reason: HOSPADM

## 2022-03-08 RX ORDER — QUETIAPINE FUMARATE 50 MG/1
100 TABLET, FILM COATED ORAL NIGHTLY
COMMUNITY
End: 2022-04-05 | Stop reason: SDUPTHER

## 2022-03-08 RX ADMIN — INSULIN GLARGINE 80 UNITS: 100 INJECTION, SOLUTION SUBCUTANEOUS at 22:49

## 2022-03-08 RX ADMIN — DULOXETINE 60 MG: 60 CAPSULE, DELAYED RELEASE ORAL at 19:48

## 2022-03-08 RX ADMIN — HYDROMORPHONE HYDROCHLORIDE 1 MG: 1 INJECTION, SOLUTION INTRAMUSCULAR; INTRAVENOUS; SUBCUTANEOUS at 17:48

## 2022-03-08 RX ADMIN — CLOPIDOGREL BISULFATE 75 MG: 75 TABLET ORAL at 19:48

## 2022-03-08 RX ADMIN — PROMETHAZINE HYDROCHLORIDE 25 MG: 25 INJECTION INTRAMUSCULAR; INTRAVENOUS at 13:21

## 2022-03-08 RX ADMIN — QUETIAPINE FUMARATE 100 MG: 100 TABLET ORAL at 19:48

## 2022-03-08 RX ADMIN — INSULIN LISPRO 2 UNITS: 100 INJECTION, SOLUTION INTRAVENOUS; SUBCUTANEOUS at 22:49

## 2022-03-08 RX ADMIN — SODIUM CHLORIDE, PRESERVATIVE FREE 10 ML: 5 INJECTION INTRAVENOUS at 19:47

## 2022-03-08 RX ADMIN — HYDROMORPHONE HYDROCHLORIDE 1 MG: 1 INJECTION, SOLUTION INTRAMUSCULAR; INTRAVENOUS; SUBCUTANEOUS at 22:48

## 2022-03-08 RX ADMIN — ATORVASTATIN CALCIUM 80 MG: 80 TABLET, FILM COATED ORAL at 19:48

## 2022-03-08 RX ADMIN — CLINDAMYCIN IN 5 PERCENT DEXTROSE 600 MG: 12 INJECTION, SOLUTION INTRAVENOUS at 12:39

## 2022-03-08 RX ADMIN — CARVEDILOL 12.5 MG: 12.5 TABLET, FILM COATED ORAL at 19:48

## 2022-03-08 RX ADMIN — CLINDAMYCIN IN 5 PERCENT DEXTROSE 600 MG: 12 INJECTION, SOLUTION INTRAVENOUS at 18:28

## 2022-03-08 RX ADMIN — INSULIN LISPRO 3 UNITS: 100 INJECTION, SOLUTION INTRAVENOUS; SUBCUTANEOUS at 18:01

## 2022-03-08 RX ADMIN — HYDROMORPHONE HYDROCHLORIDE 1 MG: 1 INJECTION, SOLUTION INTRAMUSCULAR; INTRAVENOUS; SUBCUTANEOUS at 12:31

## 2022-03-08 ASSESSMENT — PAIN SCALES - GENERAL
PAINLEVEL_OUTOF10: 10
PAINLEVEL_OUTOF10: 8
PAINLEVEL_OUTOF10: 10
PAINLEVEL_OUTOF10: 10
PAINLEVEL_OUTOF10: 0
PAINLEVEL_OUTOF10: 10

## 2022-03-08 ASSESSMENT — PAIN DESCRIPTION - PAIN TYPE
TYPE: ACUTE PAIN

## 2022-03-08 ASSESSMENT — PAIN DESCRIPTION - FREQUENCY
FREQUENCY: CONTINUOUS

## 2022-03-08 ASSESSMENT — PAIN DESCRIPTION - LOCATION
LOCATION: FACE

## 2022-03-08 ASSESSMENT — PAIN DESCRIPTION - ORIENTATION: ORIENTATION: RIGHT

## 2022-03-08 ASSESSMENT — PAIN DESCRIPTION - DESCRIPTORS: DESCRIPTORS: BURNING;ACHING

## 2022-03-08 NOTE — PROGRESS NOTES
Pharmacist Review and Automatic Dose Adjustment of Prophylactic Enoxaparin      The reviewing pharmacist has made an adjustment to the ordered enoxaparin dose or converted to UFH per the approved [facility] protocol and table as identified below. Lise Lynn is a 50 y.o. female. Recent Labs     03/08/22  1219   CREATININE 0.62       Estimated Creatinine Clearance: 136 mL/min (based on SCr of 0.62 mg/dL).     Height:   Ht Readings from Last 1 Encounters:   03/08/22 5' 4\" (1.626 m)     Weight:  Wt Readings from Last 1 Encounters:   03/08/22 250 lb (113.4 kg)               Plan: Based upon the patient's weight and renal function, the enoxaparin dose has been changed/converted to 30mg BID      Thank you,  Elsy Lynn, [address] [facility]  3/8/2022, 5:13 PM

## 2022-03-08 NOTE — FLOWSHEET NOTE
Patient did not want visit from [facility], noting she was Gnosticist. Writer acknowledged and told her chaplains are available for listening presence as well. She said things were not going well, had her jacket and purse and was walking out of her room. Writer asked if her nurse knew she was leaving the floor and she did not answer. Writer notified staff.      03/08/22 0115   Encounter Summary   Services provided to: Patient   Referral/Consult From: Rounding   Continue Visiting   (3-8-22)   Complexity of Encounter Moderate   Length of Encounter 15 minutes   Spiritual Assessment Completed Yes   Routine   Type Initial   Assessment Anxious   Intervention Active listening;Explored feelings, thoughts, concerns;Sustaining presence/ Ministry of presence; Discussed illness/injury and it's impact   Outcome Expressed gratitude;Engaged in conversation

## 2022-03-08 NOTE — PROGRESS NOTES
Medication History completed:    New medications: none    Medications discontinued: tramadol, sucralfate suspension, famotidine, dicyclomine, dextromethorphan-guaifenesin syrup, colchicine, polyethylene glycol, acetaminophen    Changes to dosing:  Quetiapine changed to 100 mg (two 50 mg tablets) nightly  Ergocalciferol is taken on Fridays    Stated allergies: As listed    Other pertinent information: Medications confirmed with [facility]acy. The patient's last fill of Percocet was 2/27/22 with a quantity of 170 tablets for 30 days.      Thank you,  Adela Ann, PharmD, BCPS  [phone]

## 2022-03-08 NOTE — FLOWSHEET NOTE
Mode of arrival (squad #, walk in, police, etc) : Walk in        Chief complaint(s): Facial Swelling        Arrival Note (brief scenario, treatment PTA, etc). : Patient presents to ED for facial swelling. Patient states she was seen in ED 3 days ago for swelling to her face and was prescribed antibiotics. Patient states swelling has progressed and when she woke up this morning her right eye was swollen shut. Patient states she also feels like when she swallows things are getting suck. C= \"Have you ever felt that you should Cut down on your drinking? \"  No  A= \"Have people Annoyed you by criticizing your drinking? \"  No  G= \"Have you ever felt bad or Guilty about your drinking? \"  No  E= \"Have you ever had a drink as an Eye-opener first thing in the morning to steady your nerves or to help a hangover? \"  No      Deferred []      Reason for deferring: N/A    *If yes to two or more: probable alcohol abuse. *

## 2022-03-08 NOTE — ED NOTES
TRANSFER - OUT REPORT:    Verbal report given to RAVINDER Arora on Reanna Carreno  being transferred to Med/Surg 2066 for routine progression of patient care       Report consisted of patient's Situation, Background, Assessment and   Recommendations(SBAR). Information from the following report(s) Nurse Handoff Report was reviewed with the receiving nurse. Lines:   Peripheral IV 03/08/22 Left Forearm (Active)   Site Assessment Clean;Dry; Intact 03/08/22 1225   Line Status Blood return noted;Capped;Flushed;Normal saline locked;Specimen collected 03/08/22 1225   Dressing Status Clean;Dry; Intact 03/08/22 1225   Dressing Intervention New 03/08/22 1225        Opportunity for questions and clarification was provided.       Patient transported with:  Sandra Walter RN  03/08/22 0327

## 2022-03-08 NOTE — ED PROVIDER NOTES
obesity due to excess calories with serious comorbidity and body mass index (BMI) of 40.0 to 44.9 in adult [facility]) 10/4/2018    Closed fracture of left ankle 6/25/2019    Clostridium difficile infection     COPD (chronic obstructive pulmonary disease) (HCC)     Cough, persistent 3/21/2018    Current moderate episode of major depressive disorder without prior episode ([address] 8/20/2018    Depression     Diabetes mellitus type 2, controlled ([address] 4/30/2014    Diarrhea     Diarrhea     Dizziness     DVT (deep venous thrombosis) (HCC)     after PICC line right arm    Encounter for monitoring sotalol therapy 2/20/2017    Encounter for screening mammogram for malignant neoplasm of breast 3/7/2018    Endometriosis     Fainting     GERD (gastroesophageal reflux disease)     hx of    GI bleeding     H. pylori infection     Helicobacter pylori (H. pylori)     [place]hard of hearing[place]     both ears, no hearing aids    HTN (hypertension) 7/19/2019    Hyperlipidemia     Hypertension     Left bicipital tenosynovitis 10/17/2018    Left leg pain 5/16/2017    Left sided abdominal pain of unknown cause 7/19/2016    Leg swelling 9/21/2018    Mastoiditis     Mastoiditis, acute 4/30/2014    Migraines     Morbid obesity ([address]     Myocardial infarction ([address]     2005    Nausea     Neuropathy     On home oxygen therapy     3 L at night    Ovarian cyst     Passed out     hx of- negative tilt table    Pulmonary hypertension (HCC)     Pulmonary insufficiency     PVC (premature ventricular contraction)     Seasonal allergies     Tricuspid insufficiency     Type II or unspecified type diabetes mellitus without mention of complication, not stated as uncontrolled      SURGICAL HISTORY       Past Surgical History:   Procedure Laterality Date    ABDOMEN SURGERY      abcess    ABDOMINAL ADHESION SURGERY      ABDOMINAL EXPLORATION SURGERY      x 4    ABDOMINAL HERNIA REPAIR      with mesh    ABLATION OF DYSRHYTHMIC FOCUS  2016    ABLATION OF DYSRHYTHMIC FOCUS  09/08/2017    Done [facility].  ABSCESS DRAINAGE      left hip and chest    APPENDECTOMY      BREAST SURGERY Left 2007    I & D    CARDIAC CATHETERIZATION  2014 & 2000     no stenting    CARPAL TUNNEL RELEASE Right 12/19/2019    Ulnar nerve decompression, right elbow. Release of 1st and 2nd extensor compartments, right forearm.  CARPAL TUNNEL RELEASE  05/04/2020    Carpal tunnel release, left hand    CHOLECYSTECTOMY      COLONOSCOPY      FOOT SURGERY Left     bone fragment removed    FRACTURE SURGERY Right     closed reduction perc pinning ring & middle finger    GASTRIC FUNDOPLICATION  0329    HYSTERECTOMY      total    HYSTERECTOMY      HYSTEROSCOPY      tubal perfusion    MYRINGOTOMY Right 11/13/2015    Right myringotomy with placement of  T-tube on the right side. Removal of plugged ventilating tube, right side.  MYRINGOTOMY Left 07/14/2020    MYRINGOTOMY TUBE INSERTION performed by Ángel Palencia MD at [facility] Right 06/05/2014    OTHER SURGICAL HISTORY Right 05/29/2014    removal ear tube rt ear    OTHER SURGICAL HISTORY  2009    LOOP recorder inserted and removed 3 mos.  later    OTHER SURGICAL HISTORY Right 08/11/2016    ear tube removal with patch    OTHER SURGICAL HISTORY      tubal perfusion, lysis of uterine adhesions    OTHER SURGICAL HISTORY      multiple PICC lines inserted and removed, it has affected circulation bilateral upper arms    OTHER SURGICAL HISTORY  10/19/2020    Revisiion to subcutaneous transposition of unlar nerve, right elbow    OTHER SURGICAL HISTORY Right 06/14/2021    REVISION TO SUBMUSCULAR TRANSPOSITION OF RIGHT ULNAR NERVE    BECKY DENNIS W ANESTHESIA Right 03/01/2017    SHOULDER MANIPULATION RIGHT performed by Jessica Smith MD at [address] Left 10/17/2018    SHOULDER ARTHROSCOPY W/BICEPS TENDONESIS performed by Antonio Prather MD at [address] [facility] Left     foot    TONSILLECTOMY      TYMPANOSTOMY TUBE PLACEMENT      TYMPANOSTOMY TUBE PLACEMENT Left 03/12/2019    TYMPANOSTOMY TUBE PLACEMENT Right 12/08/2021    Right tympanostomy with tube placement of T-tube with operative microscope and general anesthesia. Right removal of right ear tube.  ULNAR TUNNEL RELEASE Right     UPPER GASTROINTESTINAL ENDOSCOPY      UPPER GASTROINTESTINAL ENDOSCOPY  07/10/2019    UPPER GASTROINTESTINAL ENDOSCOPY N/A 07/10/2019    EGD BIOPSY performed by Johan Garcia MD at [address] [facility]       Previous Medications    ACETAMINOPHEN (TYLENOL) 500 MG TABLET    Take 2 tablets by mouth every 6 hours as needed for Pain    ALBUTEROL SULFATE HFA (VENTOLIN HFA) 108 (90 BASE) MCG/ACT INHALER    INHALE 2 PUFFS INTO LUNGS EVERY 6 HOURS AS NEEDED FOR WHEEZING    ATORVASTATIN (LIPITOR) 80 MG TABLET    TAKE 1 TABLET BY MOUTH NIGHTLY    AZITHROMYCIN (ZITHROMAX) 500 MG TABLET    Take 1 tablet by mouth daily for 5 days    BLOOD GLUCOSE TEST STRIPS (ASCENSIA AUTODISC VI;ONE TOUCH ULTRA TEST VI) STRIP    OneTouch Ultra Test strips. Check blood sugars 4x daily    BLOOD GLUCOSE TEST STRIPS (ONETOUCH ULTRA) STRIP    USE TO TEST BLOOD SUGAR BEFORE MEALS, AT BEDTIME, AND AS NEEDED (6 TIMES DAILY)    CARVEDILOL (COREG) 12.5 MG TABLET    Take 12.5 mg by mouth 2 times daily (with meals) Takes at 10:00am and 10:00pm    CLEARLAX 17 GM/SCOOP POWDER    TAKE 17 G BY MOUTH 2 TIMES DAILY    CLOPIDOGREL (PLAVIX) 75 MG TABLET        CLOTRIMAZOLE (LOTRIMIN) 1 % CREAM    Apply topically 2 times daily.     COLCHICINE (COLCRYS) 0.6 MG TABLET    Take 1 tablet by mouth daily    DEXTROMETHORPHAN-GUAIFENESIN (ROBITUSSIN-DM)  MG/5ML SYRUP    Take 10 mLs by mouth every 6 hours    DICYCLOMINE (BENTYL) 10 MG CAPSULE    Take 1 capsule by mouth every 6 hours as needed (cramps)    DULOXETINE (CYMBALTA) 60 MG EXTENDED RELEASE CAPSULE    TAKE 1 CAPSULE BY MOUTH 2 TIMES DAILY    ERGOCALCIFEROL (ERGOCALCIFEROL) 1.25 MG (45038 UT) CAPSULE    Take 50,000 Units by mouth once a week     FAMOTIDINE (PEPCID) 20 MG TABLET    Take 1 tablet by mouth 2 times daily as needed (reflux)    HM SALINE NASAL SPRAY 0.65 % NASAL SPRAY    1 spray by Nasal route as needed for Congestion     IBUPROFEN (ADVIL;MOTRIN) 800 MG TABLET    TAKE 1 TABLET BY MOUTH EVERY 8 HOURS WITH FOOD AS NEEDED FOR PAIN    INSULIN LISPRO, 1 UNIT DIAL, (HUMALOG KWIKPEN) 100 UNIT/ML SOPN    INJECT SUBCUTANEOUSLY PER SLIDING SCALE, 150-200 INJECT 3U, 201-250 INJECT 6U, 251-300 INJECT 9U, >300 INJECT 12U, MAX 30U/DAY    INSULIN PEN NEEDLE (UNIFINE PENTIPS) 31G X 8 MM MISC    USE 4 TIMES DAILY AS DIRECTED    IPRATROPIUM-ALBUTEROL (DUONEB) 0.5-2.5 (3) MG/3ML SOLN NEBULIZER SOLUTION    INHALE 3 MLS (ONE VIAL) WITH NEBULIZER EVERY 6 HOURS AS NEEDED FOR SHORTNESS OF BREATH    MAGNESIUM OXIDE (MAG-OX) 400 MG TABLET    Take 400 mg by mouth daily     MULTIPLE VITAMINS-MINERALS (MULTIVITAMIN GUMMIES WOMENS) CHEW    Take 2 each by mouth daily     NITROGLYCERIN (NITROSTAT) 0.4 MG SL TABLET    up to max of 3 total doses. If no relief after 1 dose, call 911. OXYCODONE-ACETAMINOPHEN (PERCOCET) 5-325 MG PER TABLET    Take 1 tablet by mouth every 4 hours as needed for Pain. Indications: last fill 12/4/21 for 30 days     OXYGEN    Inhale into the lungs Indications: On 3 liters per n/c during the night. PANTOPRAZOLE (PROTONIX) 40 MG TABLET    pantoprazole 40 mg tablet,delayed release   TAKE 1 TABLET BY MOUTH EVERY DAY    QUETIAPINE (SEROQUEL) 50 MG TABLET    Take 2 tablets by mouth daily TAKE 1 TABLET BY MOUTH EVERY DAY AT NIGHT    SUCRALFATE (CARAFATE) 1 GM/10ML SUSPENSION    Take 10 mLs by mouth 4 times daily    TRAMADOL (ULTRAM) 50 MG TABLET    Take 1 tablet by mouth every 6 hours as needed for Pain for up to 3 days.     TRESIBA FLEXTOUCH 200 UNIT/ML SOPN    INJECT 80 UNITS UNDER THE SKIN 2 TIMES DAILY     ALLERGIES     is allergic to latex, aspirin, avelox [moxifloxacin], chantix [varenicline], doxycycline, dye [iodides], flexeril [cyclobenzaprine], gabapentin, losartan, morphine, nsaids, pcn [penicillins], reglan [metoclopramide hcl], shellfish-derived products, sulfa antibiotics, toradol [ketorolac tromethamine], vancomycin, zofran, zyvox [linezolid], acyclovir, bactrim [sulfamethoxazole-trimethoprim], betadine [povidone iodine], ceclor [cefaclor], clindamycin/lincomycin, codeine, macrolides and ketolides, novolin r [insulin], novolog [insulin aspart], phenothiazines, tape [adhesive tape], banana, compazine [prochlorperazine], fentanyl, kiwi extract, tamiflu [oseltamivir phosphate], and sotalol. FAMILY HISTORY     She indicated that the status of her mother is unknown. She indicated that the status of her father is unknown. She indicated that the status of her sister is unknown. She indicated that the status of her maternal grandmother is unknown. She indicated that the status of her maternal grandfather is unknown. She indicated that the status of her paternal grandmother is unknown. She indicated that the status of her paternal grandfather is unknown. She indicated that the status of her maternal aunt is unknown. She indicated that the status of her maternal uncle is unknown. She indicated that the status of her paternal aunt is unknown. She indicated that the status of her paternal uncle is unknown. SOCIAL HISTORY      reports that she has been smoking cigarettes. She has a 11.50 pack-year smoking history. She has never used smokeless tobacco. She reports that she does not drink alcohol and does not use drugs.   PHYSICAL EXAM     INITIAL VITALS: BP (!) 181/88   Pulse 94   Temp 99.3 °F (37.4 °C) (Oral)   Resp 20   Ht 5' 4\" (1.626 m)   Wt 250 lb (113.4 kg)   SpO2 97%   BMI 42.91 kg/m²      General: Mild distress  Head: Right facial swelling with tenderness to palpation and disease greatest in the right maxillary sinus   where there may be an odontogenic component due to possible dehiscence of the   sinus floor at the level of the right maxillary 1st molar. Proptotic globes with suggestion of mild symmetric fusiform prominence of the   extraocular musculature. Correlate for contributory cause such as thyroid   eye disease. LABS: All lab results were reviewed by myself, and all abnormals are listed below. Labs Reviewed   CBC WITH AUTO DIFFERENTIAL - Abnormal; Notable for the following components:       Result Value    RBC 3.93 (*)     Monocytes 8 (*)     Eosinophils % 6 (*)     Absolute Eos # 0.60 (*)     All other components within normal limits   BASIC METABOLIC PANEL W/ REFLEX TO MG FOR LOW K - Abnormal; Notable for the following components:    Glucose 67 (*)     Anion Gap 8 (*)     All other components within normal limits     EMERGENCY DEPARTMENT COURSE:   Vitals:    Vitals:    03/08/22 1041   BP: (!) 181/88   Pulse: 94   Resp: 20   Temp: 99.3 °F (37.4 °C)   TempSrc: Oral   SpO2: 97%   Weight: 250 lb (113.4 kg)   Height: 5' 4\" (1.626 m)       The patient was given the following medications while in the emergency department:  Orders Placed This Encounter   Medications    ketorolac (TORADOL) injection 15 mg    clindamycin (CLEOCIN) 600 mg in dextrose 5 % 50 mL IVPB     Order Specific Question:   Antimicrobial Indications     Answer:   Skin and Soft Tissue Infection    HYDROmorphone (DILAUDID) injection 1 mg    promethazine (PHENERGAN) 25 mg in sodium chloride 0.9 % 50 mL IVPB     CONSULTS:  IP CONSULT TO INTERNAL MEDICINE    FINAL IMPRESSION      1. Cellulitis, unspecified cellulitis site          DISPOSITION/PLAN   DISPOSITION Decision To Admit 03/08/2022 12:45:06 PM      PATIENT REFERRED TO:  No follow-up provider specified.   DISCHARGE MEDICATIONS:  New Prescriptions    No medications on file     Taiwo Jacobo MD  Attending Emergency Physician  Dragon voice recognition software used in portions of this document.                     Josue Dumont MD  03/08/22 4934

## 2022-03-09 LAB
GLUCOSE BLD-MCNC: 114 MG/DL (ref 65–105)
GLUCOSE BLD-MCNC: 154 MG/DL (ref 65–105)
GLUCOSE BLD-MCNC: 193 MG/DL (ref 65–105)

## 2022-03-09 PROCEDURE — 2500000003 HC RX 250 WO HCPCS: Performed by: FAMILY MEDICINE

## 2022-03-09 PROCEDURE — 6370000000 HC RX 637 (ALT 250 FOR IP): Performed by: FAMILY MEDICINE

## 2022-03-09 PROCEDURE — 6360000002 HC RX W HCPCS: Performed by: FAMILY MEDICINE

## 2022-03-09 PROCEDURE — 2580000003 HC RX 258: Performed by: FAMILY MEDICINE

## 2022-03-09 PROCEDURE — 82947 ASSAY GLUCOSE BLOOD QUANT: CPT

## 2022-03-09 PROCEDURE — 99223 1ST HOSP IP/OBS HIGH 75: CPT | Performed by: FAMILY MEDICINE

## 2022-03-09 PROCEDURE — 1200000000 HC SEMI PRIVATE

## 2022-03-09 RX ORDER — QUETIAPINE FUMARATE 100 MG/1
100 TABLET, FILM COATED ORAL NIGHTLY
Status: DISCONTINUED | OUTPATIENT
Start: 2022-03-09 | End: 2022-03-11 | Stop reason: HOSPADM

## 2022-03-09 RX ADMIN — DULOXETINE 60 MG: 60 CAPSULE, DELAYED RELEASE ORAL at 07:04

## 2022-03-09 RX ADMIN — INSULIN LISPRO 2 UNITS: 100 INJECTION, SOLUTION INTRAVENOUS; SUBCUTANEOUS at 21:04

## 2022-03-09 RX ADMIN — CLINDAMYCIN IN 5 PERCENT DEXTROSE 600 MG: 12 INJECTION, SOLUTION INTRAVENOUS at 12:26

## 2022-03-09 RX ADMIN — CLOPIDOGREL BISULFATE 75 MG: 75 TABLET ORAL at 19:16

## 2022-03-09 RX ADMIN — HYDROMORPHONE HYDROCHLORIDE 1 MG: 1 INJECTION, SOLUTION INTRAMUSCULAR; INTRAVENOUS; SUBCUTANEOUS at 02:57

## 2022-03-09 RX ADMIN — Medication 400 MG: at 07:04

## 2022-03-09 RX ADMIN — DULOXETINE 60 MG: 60 CAPSULE, DELAYED RELEASE ORAL at 19:16

## 2022-03-09 RX ADMIN — CLINDAMYCIN IN 5 PERCENT DEXTROSE 600 MG: 12 INJECTION, SOLUTION INTRAVENOUS at 06:14

## 2022-03-09 RX ADMIN — CARVEDILOL 12.5 MG: 12.5 TABLET, FILM COATED ORAL at 07:04

## 2022-03-09 RX ADMIN — CARVEDILOL 12.5 MG: 12.5 TABLET, FILM COATED ORAL at 20:15

## 2022-03-09 RX ADMIN — HYDROMORPHONE HYDROCHLORIDE 1 MG: 1 INJECTION, SOLUTION INTRAMUSCULAR; INTRAVENOUS; SUBCUTANEOUS at 07:04

## 2022-03-09 RX ADMIN — INSULIN LISPRO 6 UNITS: 100 INJECTION, SOLUTION INTRAVENOUS; SUBCUTANEOUS at 09:02

## 2022-03-09 RX ADMIN — INSULIN GLARGINE 80 UNITS: 100 INJECTION, SOLUTION SUBCUTANEOUS at 09:02

## 2022-03-09 RX ADMIN — PANTOPRAZOLE SODIUM 40 MG: 40 TABLET, DELAYED RELEASE ORAL at 07:04

## 2022-03-09 RX ADMIN — HYDROMORPHONE HYDROCHLORIDE 1 MG: 1 INJECTION, SOLUTION INTRAMUSCULAR; INTRAVENOUS; SUBCUTANEOUS at 16:02

## 2022-03-09 RX ADMIN — HYDROMORPHONE HYDROCHLORIDE 1 MG: 1 INJECTION, SOLUTION INTRAMUSCULAR; INTRAVENOUS; SUBCUTANEOUS at 20:15

## 2022-03-09 RX ADMIN — INSULIN GLARGINE 80 UNITS: 100 INJECTION, SOLUTION SUBCUTANEOUS at 21:04

## 2022-03-09 RX ADMIN — CLINDAMYCIN IN 5 PERCENT DEXTROSE 600 MG: 12 INJECTION, SOLUTION INTRAVENOUS at 00:15

## 2022-03-09 RX ADMIN — SODIUM CHLORIDE, PRESERVATIVE FREE 10 ML: 5 INJECTION INTRAVENOUS at 20:16

## 2022-03-09 RX ADMIN — QUETIAPINE FUMARATE 100 MG: 100 TABLET ORAL at 19:16

## 2022-03-09 RX ADMIN — HYDROMORPHONE HYDROCHLORIDE 1 MG: 1 INJECTION, SOLUTION INTRAMUSCULAR; INTRAVENOUS; SUBCUTANEOUS at 11:31

## 2022-03-09 RX ADMIN — CLINDAMYCIN IN 5 PERCENT DEXTROSE 600 MG: 12 INJECTION, SOLUTION INTRAVENOUS at 18:23

## 2022-03-09 RX ADMIN — MULTIPLE VITAMINS W/ MINERALS TAB 1 TABLET: TAB at 09:02

## 2022-03-09 RX ADMIN — ATORVASTATIN CALCIUM 80 MG: 80 TABLET, FILM COATED ORAL at 19:16

## 2022-03-09 RX ADMIN — INSULIN LISPRO 3 UNITS: 100 INJECTION, SOLUTION INTRAVENOUS; SUBCUTANEOUS at 11:30

## 2022-03-09 ASSESSMENT — PAIN SCALES - GENERAL
PAINLEVEL_OUTOF10: 8
PAINLEVEL_OUTOF10: 10
PAINLEVEL_OUTOF10: 9
PAINLEVEL_OUTOF10: 9
PAINLEVEL_OUTOF10: 0

## 2022-03-09 NOTE — PLAN OF CARE
Nutrition Problem #1: Inadequate oral intake  Intervention: Food and/or Nutrient Delivery: Continue Current Diet,Start Oral Nutrition Supplement  Nutritional Goals: po intake greatert lua 50%

## 2022-03-09 NOTE — PROGRESS NOTES
Comprehensive Nutrition Assessment    Type and Reason for Visit:  Initial,Positive Nutrition Screen (wt loss, poor appetite, chewing/swallowing)    Nutrition Recommendations/Plan: Will provide 4 carbohydrate choices per tray with easy to chew texture and Ensure High Protein 3 times daily    Nutrition Assessment:  Pt was admitted due to facial cellulitis which has been going on for 4 days. Pt was unable to take breakfast. She states drinking hurts as badly as chewing. She was unable to state her usual body wt. Malnutrition Assessment:  Malnutrition Status: At risk for malnutrition (Comment)    Context:  Acute Illness     Findings of the 6 clinical characteristics of malnutrition:  Energy Intake:  7 - 50% or less of estimated energy requirements for 5 or more days  Weight Loss:  Unable to assess     Body Fat Loss:  No significant body fat loss     Muscle Mass Loss:  No significant muscle mass loss    Fluid Accumulation:  No significant fluid accumulation     Strength:  Not Performed    Estimated Daily Nutrient Needs:  Energy (kcal):  15 kcal/kg= 1700 kcal; Weight Used for Energy Requirements:  Admission     Protein (g):  1.5g/kg= 80 g; Weight Used for Protein Requirements:  Ideal          Nutrition Related Findings:  +2 facial edema, Labs/Meds: Reviewed, PMH: COPD, CHF, DM      Wounds:  None       Current Nutrition Therapies:    ADULT DIET; Easy to Chew; 4 carb choices (60 gm/meal)  ADULT ORAL NUTRITION SUPPLEMENT; Breakfast, Lunch, Dinner;  Low Calorie/High Protein Oral Supplement    Anthropometric Measures:  · Height: 5' 4\" (162.6 cm)  · Current Body Weight: 250 lb (113.4 kg)   · Admission Body Weight: 250 lb (113.4 kg)    · Usual Body Weight:  (239-272#)     · Ideal Body Weight: 120 lbs;   · BMI: 42.9  · BMI Categories: Obese Class 3 (BMI 40.0 or greater)       Nutrition Diagnosis:   · Inadequate oral intake related to  (current medical condition) as evidenced by intake 0-25%,poor intake prior to admission    Nutrition Interventions:   Food and/or Nutrient Delivery:  Continue Current Diet,Start Oral Nutrition Supplement  Nutrition Education/Counseling:  No recommendation at this time   Coordination of Nutrition Care:  Continue to monitor while inpatient    Goals:  po intake greatert lua 50%       Nutrition Monitoring and Evaluation:   Food/Nutrient Intake Outcomes:  Food and Nutrient Intake,Supplement Intake  Physical Signs/Symptoms Outcomes:  Biochemical Data,Chewing or Swallowing,GI Status,Fluid Status or Edema,Skin,Weight     Discharge Planning:    Continue current diet     Electronically signed by Katarzyna Toth RD, LD on 3/9/22 at 1:40 PM EST    Contact: [phone]

## 2022-03-09 NOTE — PLAN OF CARE
Problem: Pain:  Goal: Pain level will decrease  Description: Pain level will decrease  3/9/2022 1848 by Amina Bernstein RN  Outcome: Met This Shift  3/9/2022 0552 by Brice Taveras RN  Outcome: Ongoing  Goal: Control of acute pain  Description: Control of acute pain  3/9/2022 1848 by Amina Bernstein RN  Outcome: Met This Shift  3/9/2022 0552 by Brice Taveras RN  Outcome: Ongoing  Goal: Control of chronic pain  Description: Control of chronic pain  3/9/2022 1848 by Amina Bernstein RN  Outcome: Met This Shift  3/9/2022 0552 by Brice Taveras RN  Outcome: Ongoing     Problem: Skin Integrity:  Goal: Will show no infection signs and symptoms  Description: Will show no infection signs and symptoms  3/9/2022 1848 by Amina Bernstein RN  Outcome: Met This Shift  3/9/2022 0552 by Brice Taveras RN  Outcome: Ongoing  Goal: Absence of new skin breakdown  Description: Absence of new skin breakdown  3/9/2022 1848 by Amina Bernstein RN  Outcome: Met This Shift  3/9/2022 0552 by Brice Taveras RN  Outcome: Ongoing     Problem: Falls - Risk of:  Goal: Will remain free from falls  Description: Will remain free from falls  3/9/2022 1848 by Amina Bernstein RN  Outcome: Met This Shift  3/9/2022 0552 by Brice Taveras RN  Outcome: Ongoing  Goal: Absence of physical injury  Description: Absence of physical injury  3/9/2022 1848 by Amina Bernstein RN  Outcome: Met This Shift  3/9/2022 0552 by Brice Taveras RN  Outcome: Ongoing     Problem: Nutrition  Goal: Optimal nutrition therapy  3/9/2022 1848 by Amina Bernstein RN  Outcome: Met This Shift  3/9/2022 1342 by Nataly Owens RD, LD  Outcome: Ongoing

## 2022-03-09 NOTE — PLAN OF CARE
Problem: Pain:  Goal: Pain level will decrease  Description: Pain level will decrease  3/9/2022 0552 by Mati Mcnally RN  Outcome: Ongoing  3/8/2022 1736 by Orvan Bloch, RN  Outcome: Met This Shift  Goal: Control of acute pain  Description: Control of acute pain  3/9/2022 0552 by Mati Mcnally RN  Outcome: Ongoing  3/8/2022 1736 by Orvan Bloch, RN  Outcome: Met This Shift  Goal: Control of chronic pain  Description: Control of chronic pain  3/9/2022 0552 by Mati Mcnally RN  Outcome: Ongoing  3/8/2022 1736 by Orvan Bloch, RN  Outcome: Met This Shift     Problem: Skin Integrity:  Goal: Will show no infection signs and symptoms  Description: Will show no infection signs and symptoms  3/9/2022 0552 by Mati Mcnally RN  Outcome: Ongoing  3/8/2022 1736 by Orvan Bloch, RN  Outcome: Met This Shift  Goal: Absence of new skin breakdown  Description: Absence of new skin breakdown  3/9/2022 0552 by Mati Mcnally RN  Outcome: Ongoing  3/8/2022 1736 by Orvan Bloch, RN  Outcome: Met This Shift     Problem: Falls - Risk of:  Goal: Will remain free from falls  Description: Will remain free from falls  3/9/2022 0552 by Mati Mcnally RN  Outcome: Ongoing  3/8/2022 1736 by Orvan Bloch, RN  Outcome: Met This Shift  Goal: Absence of physical injury  Description: Absence of physical injury  3/9/2022 0552 by Mati Mcnally RN  Outcome: Ongoing  3/8/2022 1736 by Orvan Bloch, RN  Outcome: Met This Shift

## 2022-03-09 NOTE — CARE COORDINATION
Scheduled a Dentist appt for Friday 11th at 0930. If patient is still admitted will need transport set up.       Dr. Jose Scales    Address: [address] [place] [address], [place], [address] [place] [address] Av  Phone: [phone]

## 2022-03-09 NOTE — CARE COORDINATION
CASE MANAGEMENT NOTE:    Admission Date:  3/8/2022 Kayleigh Brooke is a 50 y.o.  female    Admitted for : Facial cellulitis [L03.211]  Cellulitis, unspecified cellulitis site [L03.90]    Met with:  Patient    PCP:  Dr. Link Ser:   Rajendra Cordero       Is patient alert and oriented at time of discussion:  Yes    Current Residence/ Living Arrangements:  independently at home             Current Services PTA:  No    Does patient go to outpatient dialysis: No  If yes, location and chair time:     Is patient agreeable to VNS: No    Freedom of choice provided:  No    List of [address] provided: NA    VNS chosen:  No    DME:  none    Home Oxygen: No    Nebulizer: No    CPAP/BIPAP: No    Supplier: N/A    Potential Assistance Needed: No    SNF needed: No    Freedom of choice and list provided: NA    [facility]  [facility]       Does Patient want to use MEDS to BEDS? No    Is patient currently receiving oral anticoagulation therapy? No    Is the Patient an [facility] with Readmission Risk Score greater than 14%? No  If yes, pt needs a follow up appointment made within 7 days. Family Members/Caregivers that pt would like involved in their care:    Yes    If yes, list name here:  [phone]    Transportation Provider:  Family             Discharge Plan:  3/9/22 Pt is from home in a one story home alone DME none VNS denies Plan is to discharge to home witn no needs will continue to follow for needs . //tv                 Electronically signed by:  Enzo Tee RN on 3/9/2022 at 10:56 AM

## 2022-03-09 NOTE — H&P
Family Medicine Admit Note    PCP: Moody Thakkar MD    Date of Admission: 3/8/2022    Date of Service: Pt seen/examined on 3/9/2022 and Admitted to Inpatient n. Chief Complaint:  Right facial swelling      History Of Present Illness: The patient is a 50 y.o. female who presents to [facility] with dental abscesses that were initially treated via EC with oral abx over the weekend. Pain and swelling have not improved. Patient reported temp of 102.6 from home. This morning, patient is sleeping. She was not awakened. She has refused blood draw and meds except pain meds.         Past Medical History:        Diagnosis Date    Acute exacerbation of chronic obstructive pulmonary disease ([address] 3/21/2018    Adhesive capsulitis of left shoulder 9/25/2018    Asthma     Asthma exacerbation 7/24/2014    Atrial fibrillation (HCC)     placed on event monitor 9/25/18 for PVC's & A-fib    Atrial premature depolarization 7/19/2019    Bipolar 1 disorder ([address]     Bipolar disorder ([address] 7/19/2019    Bulging disc     CAD (coronary artery disease)     Candida infection 2/23/2018    Cardiomyopathy ([address]     Chest pain 6/13/2017    CHF (congestive heart failure) (HCC)     Chronic otitis media of both ears     rt>lt    Chronic right shoulder pain 3/14/2017    Cigarette nicotine dependence with nicotine-induced disorder 7/19/2019    Class 3 severe obesity due to excess calories with serious comorbidity and body mass index (BMI) of 40.0 to 44.9 in adult [facility]) 10/4/2018    Closed fracture of left ankle 6/25/2019    Clostridium difficile infection     COPD (chronic obstructive pulmonary disease) (HCC)     Cough, persistent 3/21/2018    Current moderate episode of major depressive disorder without prior episode ([address] 8/20/2018    Depression     Diabetes mellitus type 2, controlled ([address] 4/30/2014    Diarrhea     Diarrhea     Dizziness     DVT (deep venous thrombosis) ([address]     after PICC line right arm    Encounter for monitoring sotalol therapy 2/20/2017    Encounter for screening mammogram for malignant neoplasm of breast 3/7/2018    Endometriosis     Fainting     GERD (gastroesophageal reflux disease)     hx of    GI bleeding     H. pylori infection     Helicobacter pylori (H. pylori)     [place] (hard of hearing)     both ears, no hearing aids    HTN (hypertension) 7/19/2019    Hyperlipidemia     Hypertension     Left bicipital tenosynovitis 10/17/2018    Left leg pain 5/16/2017    Left sided abdominal pain of unknown cause 7/19/2016    Leg swelling 9/21/2018    Mastoiditis     Mastoiditis, acute 4/30/2014    Migraines     Morbid obesity ([address]     Myocardial infarction ([address]     2005    Nausea     Neuropathy     On home oxygen therapy     3 L at night    Ovarian cyst     Passed out     hx of- negative tilt table    Pulmonary hypertension (HCC)     Pulmonary insufficiency     PVC (premature ventricular contraction)     Seasonal allergies     Tricuspid insufficiency     Type II or unspecified type diabetes mellitus without mention of complication, not stated as uncontrolled        Past Surgical History:        Procedure Laterality Date    ABDOMEN SURGERY      abcess    ABDOMINAL ADHESION SURGERY      ABDOMINAL EXPLORATION SURGERY      x 4    ABDOMINAL HERNIA REPAIR      with mesh    ABLATION OF DYSRHYTHMIC FOCUS  2016    ABLATION OF DYSRHYTHMIC FOCUS  09/08/2017    Done at the [facility].  ABSCESS DRAINAGE      left hip and chest    APPENDECTOMY      BREAST SURGERY Left 2007    I & D    CARDIAC CATHETERIZATION  2014 & 2000     no stenting    CARPAL TUNNEL RELEASE Right 12/19/2019    Ulnar nerve decompression, right elbow. Release of 1st and 2nd extensor compartments, right forearm.     CARPAL TUNNEL RELEASE  05/04/2020    Carpal tunnel release, left hand    CHOLECYSTECTOMY      COLONOSCOPY      FOOT SURGERY Left     bone fragment removed    FRACTURE SURGERY Right     closed reduction perc pinning ring & middle finger    GASTRIC FUNDOPLICATION  2447    HYSTERECTOMY      total    HYSTERECTOMY      HYSTEROSCOPY      tubal perfusion    MYRINGOTOMY Right 11/13/2015    Right myringotomy with placement of  T-tube on the right side. Removal of plugged ventilating tube, right side.  MYRINGOTOMY Left 07/14/2020    MYRINGOTOMY TUBE INSERTION performed by Audrie Cooks, MD at [facility] Right 06/05/2014    OTHER SURGICAL HISTORY Right 05/29/2014    removal ear tube rt ear    OTHER SURGICAL HISTORY  2009    LOOP recorder inserted and removed 3 mos. later    OTHER SURGICAL HISTORY Right 08/11/2016    ear tube removal with patch    OTHER SURGICAL HISTORY      tubal perfusion, lysis of uterine adhesions    OTHER SURGICAL HISTORY      multiple PICC lines inserted and removed, it has affected circulation bilateral upper arms    OTHER SURGICAL HISTORY  10/19/2020    Revisiion to subcutaneous transposition of unlar nerve, right elbow    OTHER SURGICAL HISTORY Right 06/14/2021    REVISION TO SUBMUSCULAR TRANSPOSITION OF RIGHT ULNAR NERVE    MT MANIPULATGURPREET SHLDR JT W ANESTHESIA Right 03/01/2017    SHOULDER MANIPULATION RIGHT performed by Pillo Mercer MD at [address] [facility] Left 10/17/2018    SHOULDER ARTHROSCOPY W/BICEPS TENDONESIS performed by Major Bui MD at [address] [facility] Left     foot    TONSILLECTOMY      TYMPANOSTOMY TUBE PLACEMENT      TYMPANOSTOMY TUBE PLACEMENT Left 03/12/2019    TYMPANOSTOMY TUBE PLACEMENT Right 12/08/2021    Right tympanostomy with tube placement of T-tube with operative microscope and general anesthesia. Right removal of right ear tube.     ULNAR TUNNEL RELEASE Right     UPPER GASTROINTESTINAL ENDOSCOPY      UPPER GASTROINTESTINAL ENDOSCOPY  07/10/2019    UPPER GASTROINTESTINAL ENDOSCOPY N/A 07/10/2019    EGD BIOPSY performed by Get Ramos Radha Christian MD at [address] [place] [address]       Medications Prior to Admission:    Prior to Admission medications    Medication Sig Start Date End Date Taking? Authorizing Provider   QUEtiapine (SEROQUEL) 50 MG tablet Take 100 mg by mouth at bedtime   Yes Historical Provider, MD   azithromycin (ZITHROMAX) 500 MG tablet Take 1 tablet by mouth daily for 5 days 3/6/22 3/11/22 Yes Eryn Piper MD   ibuprofen (ADVIL;MOTRIN) 800 MG tablet TAKE 1 TABLET BY MOUTH EVERY 8 HOURS WITH FOOD AS NEEDED FOR PAIN 2/28/22  Yes Dion Olguin MD   TRESIBA FLEXTOUCH 200 UNIT/ML SOPN INJECT 80 UNITS UNDER THE SKIN 2 TIMES DAILY 2/28/22  Yes Dion Olguin MD   clopidogrel (PLAVIX) 75 MG tablet Take 75 mg by mouth daily  1/10/22  Yes Historical Provider, MD   pantoprazole (PROTONIX) 40 MG tablet Take 40 mg by mouth daily  1/23/22  Yes Historical Provider, MD   atorvastatin (LIPITOR) 80 MG tablet TAKE 1 TABLET BY MOUTH NIGHTLY 2/1/22  Yes MARYANNE Jordan NP   albuterol sulfate HFA (VENTOLIN HFA) 108 (90 Base) MCG/ACT inhaler INHALE 2 PUFFS INTO LUNGS EVERY 6 HOURS AS NEEDED FOR WHEEZING 1/3/22  Yes Dion Olguin MD   insulin lispro, 1 Unit Dial, (HUMALOG KWIKPEN) 100 UNIT/ML SOPN INJECT SUBCUTANEOUSLY PER SLIDING SCALE, 150-200 INJECT 3U, 201-250 INJECT 6U, 251-300 INJECT 9U, >300 INJECT 12U, MAX 30U/DAY 11/2/21  Yes MARYANNE Martin NP   ipratropium-albuterol (DUONEB) 0.5-2.5 (3) MG/3ML SOLN nebulizer solution INHALE 3 MLS (ONE VIAL) WITH NEBULIZER EVERY 6 HOURS AS NEEDED FOR SHORTNESS OF BREATH 11/2/21  Yes MARYANNE Jordan NP   clotrimazole (LOTRIMIN) 1 % cream Apply topically 2 times daily.  11/2/21  Yes MARYANNE Martin NP   ergocalciferol (ERGOCALCIFEROL) 1.25 MG (76703 UT) capsule Take 50,000 Units by mouth once a week Indications: Fridays    Yes Historical Provider, MD   magnesium oxide (MAG-OX) 400 MG tablet Take 400 mg by mouth daily  8/9/21  Yes Historical Provider, MD   oxyCODONE-acetaminophen (PERCOCET) 5-325 MG per tablet Take 1 tablet by mouth every 4 hours as needed for Pain. Indications: last fill 2/27/22 with quantity 170 for 30 days  9/6/21  Yes Historical Provider, MD PARKER SALINE NASAL SPRAY 0.65 % nasal spray 1 spray by Nasal route as needed for Congestion  7/6/21  Yes Historical Provider, MD   DULoxetine (CYMBALTA) 60 MG extended release capsule TAKE 1 CAPSULE BY MOUTH 2 TIMES DAILY 3/25/21  Yes MARYANNE Dorman NP   OXYGEN Inhale into the lungs Indications: On 3 liters per n/c during the night. Yes Historical Provider, MD   Multiple Vitamins-Minerals (MULTIVITAMIN GUMMIES WOMENS) CHEW Take 2 each by mouth daily    Yes Historical Provider, MD   carvedilol (COREG) 12.5 MG tablet Take 12.5 mg by mouth 2 times daily (with meals)    Yes Historical Provider, MD   nitroGLYCERIN (NITROSTAT) 0.4 MG SL tablet up to max of 3 total doses. If no relief after 1 dose, call 911. 12/17/21   Roderick Robertson MD   Insulin Pen Needle (UNIFINE PENTIPS) 31G X 8 MM MISC USE 4 TIMES DAILY AS DIRECTED 11/2/21   MARYANNE Dorman NP   blood glucose test strips (ASCENSIA AUTODISC VI;ONE TOUCH ULTRA TEST VI) strip OneTouch Ultra Test strips.  Check blood sugars 4x daily 11/2/21   MARYANNE Dorman NP   blood glucose test strips (ONETOUCH ULTRA) strip USE TO TEST BLOOD SUGAR BEFORE MEALS, AT BEDTIME, AND AS NEEDED (6 TIMES DAILY) 10/31/21   Roderick Robertson MD       Allergies:  Latex, Aspirin, Avelox [moxifloxacin], Chantix [varenicline], Doxycycline, Dye [iodides], Flexeril [cyclobenzaprine], Gabapentin, Losartan, Morphine, Nsaids, Pcn [penicillins], Reglan [metoclopramide hcl], Shellfish-derived products, Sulfa antibiotics, Toradol [ketorolac tromethamine], Vancomycin, Zofran, Zyvox [linezolid], Acyclovir, Bactrim [sulfamethoxazole-trimethoprim], Betadine [povidone iodine], Ceclor [cefaclor], Clindamycin/lincomycin, Codeine, Macrolides and ketolides, Novolin r [insulin], Novolog [insulin aspart], Phenothiazines, Tape [employer]ven [employer] tape], Banana, Compazine [prochlorperazine], Fentanyl, Kiwi extract, Tamiflu [oseltamivir phosphate], and Sotalol    Social History:  The patient currently lives at home    TOBACCO:   reports that she has been smoking cigarettes. She has a 11.50 pack-year smoking history. She has never used smokeless tobacco.  ETOH:   reports no history of alcohol use.       Family History:          Problem Relation Age of Onset    Ulcerative Colitis Father     Liver Disease Father 61        hep c and b    Diabetes Father     Asthma Father     Heart Disease Father     High Blood Pressure Father     Breast Cancer Maternal Aunt     Cancer Maternal Aunt     Diabetes Maternal Aunt     Heart Disease Maternal Aunt     High Blood Pressure Maternal Aunt     Breast Cancer Paternal Aunt     Heart Disease Paternal Aunt     High Blood Pressure Paternal Aunt     Breast Cancer Maternal Grandmother     Cervical Cancer Maternal Grandmother     Cancer Maternal Grandmother     Diabetes Maternal Grandmother     Asthma Maternal Grandmother     Heart Disease Maternal Grandmother     High Blood Pressure Maternal Grandmother     Lung Cancer Maternal Grandfather     Diabetes Maternal Grandfather     Asthma Maternal Grandfather     Heart Disease Maternal Grandfather     High Blood Pressure Maternal Grandfather     Cervical Cancer Paternal Grandmother     Cancer Paternal Grandmother     Diabetes Paternal Grandmother     Heart Disease Paternal Grandmother     High Blood Pressure Paternal Grandmother     Diabetes Mother     Asthma Mother     Heart Disease Mother     High Blood Pressure Mother     Cancer Sister     Heart Disease Maternal Uncle     High Blood Pressure Maternal Uncle     Heart Disease Paternal Uncle     High Blood Pressure Paternal Uncle     Diabetes Paternal Grandfather     Heart Disease Paternal Grandfather     High Blood Pressure Paternal Grandfather        PHYSICAL EXAM:    /71 Pulse 79   Temp 98.1 °F (36.7 °C)   Resp 18   Ht 5' 4\" (1.626 m)   Wt 250 lb (113.4 kg)   SpO2 90%   BMI 42.91 kg/m²     General appearance: sleeping        CT maxillofacial w/o contrast:   No acute fracture of the facial bones.       Extensive periodontal disease with multiple periapical lucencies suspicious   for periapical abscesses as detailed above.  Areas of osseous dehiscence to   the buccal surface at the right maxillary medial incisor, left maxillary   canine and 1st premolar, left mandibular 1st and 2nd premolar, and the left   mandibular 1st molar.  Assessment for subperiosteal abscess is limited by the   absence of contrast, but there appears to be mild overlying soft tissue   swelling which is greatest at the right maxillary medial incisor.       Mild chronic sinus mucosal disease greatest in the right maxillary sinus   where there may be an odontogenic component due to possible dehiscence of the   sinus floor at the level of the right maxillary 1st molar.       Proptotic globes with suggestion of mild symmetric fusiform prominence of the   extraocular musculature.  Correlate for contributory cause such as thyroid   eye disease. CBC   Recent Labs     03/08/22  1219   WBC 10.9   HGB 13.0   HCT 39.3         RENAL  Recent Labs     03/08/22  1219      K 4.0      CO2 28   BUN 16   CREATININE 0.62     LFT'S  No results for input(s): AST, ALT, ALB, BILIDIR, BILITOT, ALKPHOS in the last 72 hours. COAG  No results for input(s): INR in the last 72 hours. CARDIAC ENZYMES  No results for input(s): CKTOTAL, CKMB, CKMBINDEX, TROPONINI in the last 72 hours.     U/A:    Lab Results   Component Value Date    COLORU Yellow 11/11/2021    WBCUA 2 TO 5 07/17/2019    RBCUA 0 TO 2 07/17/2019    MUCUS 2+ 07/17/2019    BACTERIA MANY 07/17/2019    SPECGRAV 1.019 11/11/2021    LEUKOCYTESUR NEGATIVE 11/11/2021    GLUCOSEU NEGATIVE 11/11/2021    AMORPHOUS NOT REPORTED 07/17/2019       ABG    Lab Results   Component Value Date    WNT3ECN 26.1 10/16/2013    Z6ZBPVPA 98.4 10/16/2013    PHART 7.41 10/16/2013    YWQ7UKL 42 10/16/2013    PO2ART 86 10/16/2013           Active Hospital Problems    Diagnosis Date Noted    Facial cellulitis [L43.065] 03/08/2022         ASSESSMENT/PLAN:    Facial/dentall abscess/celluitis - clindamycin.  Consult oral surgery    Diabetes - refusing insulin          DVT Prophylaxis: enoxaparin  Diet: ADULT DIET; Easy to Chew; 4 carb choices (60 gm/meal)  Code Status: Full Code      Dispo - admitted      Jarrell Tran MD, FAAFP  3/9/2022, 9:22 AM

## 2022-03-10 LAB
GLUCOSE BLD-MCNC: 103 MG/DL (ref 65–105)
GLUCOSE BLD-MCNC: 104 MG/DL (ref 65–105)
GLUCOSE BLD-MCNC: 43 MG/DL (ref 65–105)
GLUCOSE BLD-MCNC: 58 MG/DL (ref 65–105)
GLUCOSE BLD-MCNC: 73 MG/DL (ref 65–105)
GLUCOSE BLD-MCNC: 75 MG/DL (ref 65–105)
GLUCOSE BLD-MCNC: 81 MG/DL (ref 65–105)
GLUCOSE BLD-MCNC: 84 MG/DL (ref 65–105)
GLUCOSE BLD-MCNC: 87 MG/DL (ref 65–105)

## 2022-03-10 PROCEDURE — 1200000000 HC SEMI PRIVATE

## 2022-03-10 PROCEDURE — 82947 ASSAY GLUCOSE BLOOD QUANT: CPT

## 2022-03-10 PROCEDURE — 99232 SBSQ HOSP IP/OBS MODERATE 35: CPT | Performed by: FAMILY MEDICINE

## 2022-03-10 PROCEDURE — 6360000002 HC RX W HCPCS: Performed by: FAMILY MEDICINE

## 2022-03-10 PROCEDURE — 6370000000 HC RX 637 (ALT 250 FOR IP): Performed by: FAMILY MEDICINE

## 2022-03-10 PROCEDURE — 2500000003 HC RX 250 WO HCPCS: Performed by: FAMILY MEDICINE

## 2022-03-10 RX ORDER — IBUPROFEN 800 MG/1
800 TABLET ORAL EVERY 8 HOURS PRN
Status: DISCONTINUED | OUTPATIENT
Start: 2022-03-10 | End: 2022-03-11 | Stop reason: HOSPADM

## 2022-03-10 RX ORDER — OXYCODONE HYDROCHLORIDE AND ACETAMINOPHEN 5; 325 MG/1; MG/1
2 TABLET ORAL EVERY 4 HOURS PRN
Status: DISCONTINUED | OUTPATIENT
Start: 2022-03-10 | End: 2022-03-11 | Stop reason: HOSPADM

## 2022-03-10 RX ORDER — OXYCODONE HYDROCHLORIDE AND ACETAMINOPHEN 5; 325 MG/1; MG/1
1 TABLET ORAL EVERY 4 HOURS PRN
Status: DISCONTINUED | OUTPATIENT
Start: 2022-03-10 | End: 2022-03-11 | Stop reason: HOSPADM

## 2022-03-10 RX ADMIN — OXYCODONE HYDROCHLORIDE AND ACETAMINOPHEN 2 TABLET: 5; 325 TABLET ORAL at 18:59

## 2022-03-10 RX ADMIN — CLOPIDOGREL BISULFATE 75 MG: 75 TABLET ORAL at 18:59

## 2022-03-10 RX ADMIN — QUETIAPINE FUMARATE 100 MG: 100 TABLET ORAL at 19:04

## 2022-03-10 RX ADMIN — ATORVASTATIN CALCIUM 80 MG: 80 TABLET, FILM COATED ORAL at 18:58

## 2022-03-10 RX ADMIN — CARVEDILOL 12.5 MG: 12.5 TABLET, FILM COATED ORAL at 07:03

## 2022-03-10 RX ADMIN — CLINDAMYCIN IN 5 PERCENT DEXTROSE 600 MG: 12 INJECTION, SOLUTION INTRAVENOUS at 00:16

## 2022-03-10 RX ADMIN — OXYCODONE HYDROCHLORIDE AND ACETAMINOPHEN 2 TABLET: 5; 325 TABLET ORAL at 09:35

## 2022-03-10 RX ADMIN — HYDROMORPHONE HYDROCHLORIDE 1 MG: 1 INJECTION, SOLUTION INTRAMUSCULAR; INTRAVENOUS; SUBCUTANEOUS at 05:30

## 2022-03-10 RX ADMIN — Medication 400 MG: at 07:03

## 2022-03-10 RX ADMIN — DULOXETINE 60 MG: 60 CAPSULE, DELAYED RELEASE ORAL at 18:59

## 2022-03-10 RX ADMIN — MULTIPLE VITAMINS W/ MINERALS TAB 1 TABLET: TAB at 07:06

## 2022-03-10 RX ADMIN — CLINDAMYCIN IN 5 PERCENT DEXTROSE 600 MG: 12 INJECTION, SOLUTION INTRAVENOUS at 12:22

## 2022-03-10 RX ADMIN — CLINDAMYCIN IN 5 PERCENT DEXTROSE 600 MG: 12 INJECTION, SOLUTION INTRAVENOUS at 05:30

## 2022-03-10 RX ADMIN — PANTOPRAZOLE SODIUM 40 MG: 40 TABLET, DELAYED RELEASE ORAL at 07:03

## 2022-03-10 RX ADMIN — CLINDAMYCIN IN 5 PERCENT DEXTROSE 600 MG: 12 INJECTION, SOLUTION INTRAVENOUS at 18:36

## 2022-03-10 RX ADMIN — HYDROMORPHONE HYDROCHLORIDE 1 MG: 1 INJECTION, SOLUTION INTRAMUSCULAR; INTRAVENOUS; SUBCUTANEOUS at 01:28

## 2022-03-10 RX ADMIN — CARVEDILOL 12.5 MG: 12.5 TABLET, FILM COATED ORAL at 18:59

## 2022-03-10 RX ADMIN — DULOXETINE 60 MG: 60 CAPSULE, DELAYED RELEASE ORAL at 07:03

## 2022-03-10 RX ADMIN — OXYCODONE HYDROCHLORIDE AND ACETAMINOPHEN 2 TABLET: 5; 325 TABLET ORAL at 14:24

## 2022-03-10 RX ADMIN — IBUPROFEN 800 MG: 800 TABLET, FILM COATED ORAL at 22:27

## 2022-03-10 ASSESSMENT — PAIN SCALES - GENERAL
PAINLEVEL_OUTOF10: 0
PAINLEVEL_OUTOF10: 10
PAINLEVEL_OUTOF10: 10
PAINLEVEL_OUTOF10: 9
PAINLEVEL_OUTOF10: 10
PAINLEVEL_OUTOF10: 8

## 2022-03-10 ASSESSMENT — PAIN DESCRIPTION - LOCATION
LOCATION: FACE

## 2022-03-10 ASSESSMENT — PAIN DESCRIPTION - PAIN TYPE
TYPE: ACUTE PAIN

## 2022-03-10 ASSESSMENT — PAIN DESCRIPTION - ORIENTATION
ORIENTATION: RIGHT

## 2022-03-10 NOTE — PLAN OF CARE
Problem: Pain:  Goal: Pain level will decrease  Description: Pain level will decrease  Outcome: Met This Shift  Goal: Control of acute pain  Description: Control of acute pain  Outcome: Met This Shift  Goal: Control of chronic pain  Description: Control of chronic pain  Outcome: Met This Shift     Problem: Skin Integrity:  Goal: Will show no infection signs and symptoms  Description: Will show no infection signs and symptoms  Outcome: Met This Shift  Goal: Absence of new skin breakdown  Description: Absence of new skin breakdown  Outcome: Met This Shift     Problem: Falls - Risk of:  Goal: Will remain free from falls  Description: Will remain free from falls  Outcome: Met This Shift  Goal: Absence of physical injury  Description: Absence of physical injury  Outcome: Met This Shift     Problem: Nutrition  Goal: Optimal nutrition therapy  Outcome: Met This Shift

## 2022-03-10 NOTE — PROGRESS NOTES
Pt called as she was about to go outside and stated she thought she needed her blood sugar checked as she couldn't see. We checked it was 43. I gave her couple packets of Peanut butter along with cups of orange juice. Will recheck in 15 mins if still low will hang D5 at 100ml/hr per hypoglycemic protocol.

## 2022-03-10 NOTE — PLAN OF CARE
Problem: Pain:  Goal: Pain level will decrease  Description: Pain level will decrease  3/10/2022 0409 by Osmar Agudelo RN  Outcome: Ongoing     Problem: Pain:  Goal: Control of acute pain  Description: Control of acute pain  3/10/2022 0409 by Osmar Agudelo RN  Outcome: Ongoing     Problem: Pain:  Goal: Control of chronic pain  Description: Control of chronic pain  3/10/2022 0409 by Osmar Agudelo RN  Outcome: Ongoing     Problem: Skin Integrity:  Goal: Will show no infection signs and symptoms  Description: Will show no infection signs and symptoms  3/10/2022 0409 by Osmar Agudelo RN  Outcome: Ongoing     Problem: Skin Integrity:  Goal: Absence of new skin breakdown  Description: Absence of new skin breakdown  3/10/2022 0409 by Osmar Agudelo RN  Outcome: Ongoing     Problem: Falls - Risk of:  Goal: Will remain free from falls  Description: Will remain free from falls  3/10/2022 0409 by Osmar Agudelo RN  Outcome: Ongoing     Problem: Falls - Risk of:  Goal: Absence of physical injury  Description: Absence of physical injury  3/10/2022 0409 by Osmar Agudelo RN  Outcome: Ongoing     Problem: Nutrition  Goal: Optimal nutrition therapy  3/10/2022 0409 by Osmar Agudelo RN  Outcome: Ongoing

## 2022-03-10 NOTE — CARE COORDINATION
ONGOING DISCHARGE PLAN:    Patient is alert and oriented x4. Spoke with patient regarding discharge plan and patient confirms that plan is still to discharge to home with no needs  Patient has a dental appt tomorrow at 0930   PO atb   On iv access  Possible discharge to home after appt     Will continue to follow for additional discharge needs.     Electronically signed by Dorothy Thompson RN on 3/10/2022 at 11:43 AM

## 2022-03-10 NOTE — PROGRESS NOTES
Pt got blood sugar checked for dinner it was 58 thus, we went to give glucose tablets per protocol and pt refused as she states she cant chew the harder tablets. We provided orange juice and peanut butter.

## 2022-03-10 NOTE — PROGRESS NOTES
Patient presents to the infusion clinic for her Zarxio injection. Reviewed CBC with Kavya Hoang NP. Per NP, proceed with injection as the patient's WBC is expected to drop. Informed NP the patient's PICC is not drawing back blood. Per joe Hoff to administer alteplase.    Left and Upper arm central line site  Failed Access: unable to aspirate at least 3 ml from CVAD and CVDA flushed with 10 - 20 ml 0.9 percent NaCl  Patient Repositioned: sitting forward and arm behind neck  Primary Interventions: patient cough and deep breath and aspiration with a 10 ml syringe  Kavya Hoang NP notified of occluded central line. Orders received to instill alteplase per policy.   Secondary Interventions: Alteplase instilled using a 10 ml syringe and positive pressure, 2 mg (2 ml) alteplase instilled for each lumen of a PICC and Alteplase dwelled at least 30 minutes in CVAD  Time of Blood Return: 1245  Blood Return: Greater than 3 ml, Discarded 6 - 10 ml of blood and Flushed with 20 ml 0.9 percent NaCl  Patient Discharge: Stable and Follow up appointment scheduled    Received patient's shipment of Lovenox syringes. Provided them to the patient (total of 3 boxes with 10 syringes in each box.) Patient reports that she still has approximately 9-10 syringes at home. She confirms that she is giving herself 1 syringe/day. Staff message placed for 1 month from now to process refill request through [employer].    Zarxio  Pre-Injection Assessment: Vital signs entered, Allergies assessed as required for injection type., Pt states that she is feeling weak and somewhat dizzy, however, states the scopolamine patch has been helping the dizziness. Orthostatic BP's obtained with no drop in BP. Writer offered to look into scopolamine patches to help when not in the hospital with nausea & dizziness and patient agreed to thsi. Patient reports she is eating well, and has been using the antinausea medication to help her eat. Pt denies signs and  Progress Note  Date:3/10/2022       Room:[facility]-  Patient Nikki King     YOB: 1975     Age:47 y.o. Subjective    Subjective:  Symptoms:  Improved. Activity level: Returning to normal.    Pain:  (Patient states pain improved). Review of Systems  Objective         Vitals Last 24 Hours:  TEMPERATURE:  Temp  Av.8 °F (37.1 °C)  Min: 98.6 °F (37 °C)  Max: 98.9 °F (37.2 °C)  RESPIRATIONS RANGE: Resp  Av  Min: 16  Max: 16  PULSE OXIMETRY RANGE: SpO2  Av.3 %  Min: 91 %  Max: 98 %  PULSE RANGE: Pulse  Av.7  Min: 62  Max: 73  BLOOD PRESSURE RANGE: Systolic (14RTR), QOK:072 , Min:106 , RGU:424   ; Diastolic (91JYO), HZS:72, Min:65, Max:82    I/O (24Hr): Intake/Output Summary (Last 24 hours) at 3/10/2022 0800  Last data filed at 3/9/2022 2015  Gross per 24 hour   Intake 10 ml   Output --   Net 10 ml     Objective:  General Appearance:  Comfortable. Vital signs: (most recent): Blood pressure 137/65, pulse 62, temperature 98.9 °F (37.2 °C), temperature source Oral, resp. rate 16, height 5' 4\" (1.626 m), weight 250 lb (113.4 kg), SpO2 97 %. Vital signs are normal.      Labs/Imaging/Diagnostics    Labs:  CBC:  Recent Labs     22  1219   WBC 10.9   RBC 3.93*   HGB 13.0   HCT 39.3   .0   RDW 13.1        CHEMISTRIES:  Recent Labs     22  1219      K 4.0      CO2 28   BUN 16   CREATININE 0.62   GLUCOSE 67*     PT/INR:No results for input(s): PROTIME, INR in the last 72 hours. APTT:No results for input(s): APTT in the last 72 hours. LIVER PROFILE:No results for input(s): AST, ALT, BILIDIR, BILITOT, ALKPHOS in the last 72 hours. Imaging Last 24 Hours:  CT MAXILLOFACIAL WO CONTRAST    Result Date: 3/8/2022  EXAMINATION: CT OF THE FACE WITHOUT CONTRAST  3/8/2022 8:14 am TECHNIQUE: CT of the face was performed without the administration of intravenous contrast. Multiplanar reformatted images are provided for review.  Dose modulation, iterative reconstruction, and/or weight based adjustment of the mA/kV was utilized to reduce the radiation dose to as low as reasonably achievable. COMPARISON: None HISTORY: ORDERING SYSTEM PROVIDED HISTORY: Swelling TECHNOLOGIST PROVIDED HISTORY: Swelling Decision Support Exception - unselect if not a suspected or confirmed emergency medical condition->Emergency Medical Condition (MA) Is the patient pregnant?->No Reason for Exam: Facial swelling since saturday, no known injury FINDINGS: FACIAL BONES:  Extensive periodontal disease with numerous dental caries and various fractured and absent teeth. Periapical lucency at the right maxillary medial incisor, left maxillary canine, bilateral maxillary premolars, bilateral maxillary 1st molars, and at the left mandibular 1st and 2nd premolars and 1st molar. There is osseous dehiscence to the buccal surface at the right maxillary medial incisor, the left maxillary canine and 1st premolar, and at the left mandibular 1st and 2nd premolar and 1st molar. Overlying soft tissue swelling and inflammatory fat stranding along both the anterior mandible and maxilla, most significant adjacent to the right maxillary medial incisor. Assessment for subperiosteal abscess is limited in the absence of contrast.  No acute fracture of the maxilla or mandible. The mandibular condyles are normally situated. The pterygoid plates and zygomatic arches are intact. The nasal bones and maxillary nasal processes are intact. ORBITS:  The globes are proptotic but appear intact. Mild fusiform symmetric prominence of the extraocular musculature. The optic nerve sheath complexes appear within normal limits. No retrobulbar hematoma or mass is seen. The orbital walls and rims are intact.  SINUSES/MASTOIDS:  Mild lobulated chronic mucosal thickening of the right greater than left maxillary sinuses with possible small area of dehiscence of the floor of the right maxillary sinus at the symptoms of infection. and Authorization completed if applicable.     Refer to MAR (medication administration record) for type of injection and medication given.  Needle Size: Standard needle size with Zarxio syringe  Patient tolerated well: Stable and Follow up appointment scheduled    Dr. Gini Miramontes is supervising provider today.    Today's visit was performed with the assistance of  Services.   Name of : Eddie   Service used: Video: Alyson    level of the 1st maxillary molar. Mild chronic mucosal thickening in the ethmoid air cells. Mastoid air cells and middle ears are clear. No acute fracture is seen. SOFT TISSUES:  Mild right-sided facial soft tissue swelling is suggested. No soft tissue gas or radiopaque retained foreign body. Upper cervical chain lymph nodes are upper limits of normal for size. No gross acute intracranial abnormality. No acute fracture of the facial bones. Extensive periodontal disease with multiple periapical lucencies suspicious for periapical abscesses as detailed above. Areas of osseous dehiscence to the buccal surface at the right maxillary medial incisor, left maxillary canine and 1st premolar, left mandibular 1st and 2nd premolar, and the left mandibular 1st molar. Assessment for subperiosteal abscess is limited by the absence of contrast, but there appears to be mild overlying soft tissue swelling which is greatest at the right maxillary medial incisor. Mild chronic sinus mucosal disease greatest in the right maxillary sinus where there may be an odontogenic component due to possible dehiscence of the sinus floor at the level of the right maxillary 1st molar. Proptotic globes with suggestion of mild symmetric fusiform prominence of the extraocular musculature. Correlate for contributory cause such as thyroid eye disease. Assessment//Plan           Hospital Problems           Last Modified POA    * (Principal) Facial cellulitis 3/8/2022 Yes    Diabetes mellitus type 2, controlled ([address] 3/9/2022 Yes    Cellulitis 3/9/2022 Yes        Assessment & Plan  Dental cellulitis - continue clindamycin. Change to PO pain medications. If improvement continues, may d/c after her dental appt tomorrow.        Electronically signed by Jv Rodriguez MD on 3/10/22 at 8:00 AM EST

## 2022-03-10 NOTE — PROGRESS NOTES
Pt called out her IV was leaking and attempted to get blood return and flush it wouldn't so we lost IV access. Will assess and attempt to start new IV.

## 2022-03-11 VITALS
OXYGEN SATURATION: 97 % | RESPIRATION RATE: 12 BRPM | SYSTOLIC BLOOD PRESSURE: 95 MMHG | WEIGHT: 250 LBS | HEIGHT: 64 IN | DIASTOLIC BLOOD PRESSURE: 62 MMHG | BODY MASS INDEX: 42.68 KG/M2 | HEART RATE: 62 BPM | TEMPERATURE: 98.1 F

## 2022-03-11 LAB — GLUCOSE BLD-MCNC: 256 MG/DL (ref 65–105)

## 2022-03-11 PROCEDURE — 2500000003 HC RX 250 WO HCPCS: Performed by: FAMILY MEDICINE

## 2022-03-11 PROCEDURE — 6370000000 HC RX 637 (ALT 250 FOR IP): Performed by: FAMILY MEDICINE

## 2022-03-11 RX ORDER — INSULIN DEGLUDEC 200 U/ML
80 INJECTION, SOLUTION SUBCUTANEOUS 2 TIMES DAILY
Qty: 12 PEN | Refills: 11
Start: 2022-03-11 | End: 2022-04-21 | Stop reason: DRUGHIGH

## 2022-03-11 RX ORDER — CLINDAMYCIN HYDROCHLORIDE 150 MG/1
600 CAPSULE ORAL EVERY 6 HOURS SCHEDULED
Status: DISCONTINUED | OUTPATIENT
Start: 2022-03-11 | End: 2022-03-11 | Stop reason: HOSPADM

## 2022-03-11 RX ORDER — CLINDAMYCIN HYDROCHLORIDE 300 MG/1
600 CAPSULE ORAL 4 TIMES DAILY
Qty: 80 CAPSULE | Refills: 0 | Status: SHIPPED | OUTPATIENT
Start: 2022-03-11 | End: 2022-03-21

## 2022-03-11 RX ADMIN — Medication 400 MG: at 07:12

## 2022-03-11 RX ADMIN — OXYCODONE HYDROCHLORIDE AND ACETAMINOPHEN 2 TABLET: 5; 325 TABLET ORAL at 12:23

## 2022-03-11 RX ADMIN — OXYCODONE HYDROCHLORIDE AND ACETAMINOPHEN 2 TABLET: 5; 325 TABLET ORAL at 08:19

## 2022-03-11 RX ADMIN — OXYCODONE HYDROCHLORIDE AND ACETAMINOPHEN 2 TABLET: 5; 325 TABLET ORAL at 03:18

## 2022-03-11 RX ADMIN — CLINDAMYCIN HYDROCHLORIDE 600 MG: 150 CAPSULE ORAL at 12:23

## 2022-03-11 RX ADMIN — PANTOPRAZOLE SODIUM 40 MG: 40 TABLET, DELAYED RELEASE ORAL at 07:13

## 2022-03-11 RX ADMIN — IBUPROFEN 800 MG: 800 TABLET, FILM COATED ORAL at 07:12

## 2022-03-11 RX ADMIN — CLINDAMYCIN IN 5 PERCENT DEXTROSE 600 MG: 12 INJECTION, SOLUTION INTRAVENOUS at 00:25

## 2022-03-11 RX ADMIN — DULOXETINE 60 MG: 60 CAPSULE, DELAYED RELEASE ORAL at 07:13

## 2022-03-11 ASSESSMENT — PAIN SCALES - GENERAL
PAINLEVEL_OUTOF10: 8
PAINLEVEL_OUTOF10: 9
PAINLEVEL_OUTOF10: 4
PAINLEVEL_OUTOF10: 9
PAINLEVEL_OUTOF10: 8

## 2022-03-11 ASSESSMENT — PAIN DESCRIPTION - DESCRIPTORS
DESCRIPTORS: BURNING;ACHING
DESCRIPTORS: ACHING;CONSTANT

## 2022-03-11 ASSESSMENT — PAIN DESCRIPTION - FREQUENCY
FREQUENCY: CONTINUOUS
FREQUENCY: CONTINUOUS

## 2022-03-11 ASSESSMENT — PAIN DESCRIPTION - LOCATION
LOCATION: FACE
LOCATION: FACE

## 2022-03-11 ASSESSMENT — PAIN DESCRIPTION - PAIN TYPE
TYPE: ACUTE PAIN
TYPE: ACUTE PAIN

## 2022-03-11 ASSESSMENT — PAIN DESCRIPTION - ORIENTATION
ORIENTATION: RIGHT
ORIENTATION: RIGHT

## 2022-03-11 NOTE — PLAN OF CARE
Problem: Pain:  Goal: Pain level will decrease  Description: Pain level will decrease  3/11/2022 0412 by Vanda Cunha RN  Outcome: Ongoing     Problem: Pain:  Goal: Control of acute pain  Description: Control of acute pain  3/11/2022 0412 by Vanda Cunha RN  Outcome: Ongoing     Problem: Pain:  Goal: Control of chronic pain  Description: Control of chronic pain  3/11/2022 0412 by Vanda Cunha RN  Outcome: Ongoing     Problem: Skin Integrity:  Goal: Will show no infection signs and symptoms  Description: Will show no infection signs and symptoms  3/11/2022 0412 by Vanda Cunha RN  Outcome: Ongoing     Problem: Skin Integrity:  Goal: Absence of new skin breakdown  Description: Absence of new skin breakdown  3/11/2022 0412 by Vanda Cunha RN  Outcome: Ongoing     Problem: Nutrition  Goal: Optimal nutrition therapy  3/11/2022 0412 by Vanda Cunha RN  Outcome: Ongoing

## 2022-03-11 NOTE — CARE COORDINATION
ONGOING DISCHARGE PLAN:    Patient is alert and oriented x4. Spoke with patient regarding discharge plan and patient confirms that plan is still home with no needs. Pt will be on oral clindamycin at d/c. Will continue to follow for additional discharge needs.     Electronically signed by Regulo Ames RN on 3/11/2022 at 12:49 PM

## 2022-03-11 NOTE — PROGRESS NOTES
Pt saw oral surgery this am. Referred pt to out patient oral surgery to extract remaining teeth. Pt is aware of need to contact insurance company to find a covered oral surgeon. Provided pt with discharge paperwork, all questions answered. Pt is agreeable and appropriate for discharge. Pt ambulated to lobby independently to cab.

## 2022-03-11 NOTE — PROGRESS NOTES
Progress Note  Date:3/11/2022       Room:/-01  Patient Flavia Jimenez     YOB: 1975     Age:47 y.o. Subjective    Subjective:  Symptoms:  Improved. (Pain control improved with motrin added. To see oral surgeon today. IV infiltrated. ). Diet:  Poor intake. Activity level: Returning to normal.    Pain:  She complains of pain that is severe. Review of Systems  Objective         Vitals Last 24 Hours:  TEMPERATURE:  Temp  Av.1 °F (36.7 °C)  Min: 98.1 °F (36.7 °C)  Max: 98.1 °F (36.7 °C)  RESPIRATIONS RANGE: Resp  Av  Min: 12  Max: 16  PULSE OXIMETRY RANGE: SpO2  Av %  Min: 97 %  Max: 100 %  PULSE RANGE: Pulse  Av.3  Min: 62  Max: 72  BLOOD PRESSURE RANGE: Systolic (26HKC), FIB:389 , Min:95 , JNS:627   ; Diastolic (39NBW), MAV:80, Min:62, Max:83    I/O (24Hr): No intake or output data in the 24 hours ending 22 0812  Objective:  General Appearance:  Comfortable. Vital signs: (most recent): Blood pressure 95/62, pulse 62, temperature 98.1 °F (36.7 °C), temperature source Oral, resp. rate 12, height 5' 4\" (1.626 m), weight 250 lb (113.4 kg), SpO2 97 %. Vital signs are normal.      Labs/Imaging/Diagnostics    Labs:  CBC:Recent Labs     22  1219   WBC 10.9   RBC 3.93*   HGB 13.0   HCT 39.3   .0   RDW 13.1        CHEMISTRIES:  Recent Labs     22  1219      K 4.0      CO2 28   BUN 16   CREATININE 0.62   GLUCOSE 67*     PT/INR:No results for input(s): PROTIME, INR in the last 72 hours. APTT:No results for input(s): APTT in the last 72 hours. LIVER PROFILE:No results for input(s): AST, ALT, BILIDIR, BILITOT, ALKPHOS in the last 72 hours. Imaging Last 24 Hours:  No results found.   Assessment//Plan           Hospital Problems           Last Modified POA    * (Principal) Facial cellulitis 3/8/2022 Yes    Diabetes mellitus type 2, controlled ([facility] [address] 3/9/2022 Yes    Cellulitis 3/9/2022 Yes        Assessment & Plan  Change to oral abx     May d/c home after oral surgery appt if tolerating po      Electronically signed by Wilton Tijerina MD on 3/11/22 at 8:12 AM EST

## 2022-03-11 NOTE — PLAN OF CARE
Problem: Pain:  Description: Pain management should include both nonpharmacologic and pharmacologic interventions.   Goal: Pain level will decrease  Description: Pain level will decrease  3/11/2022 1239 by Hermelinda Coe RN  Outcome: Completed  3/11/2022 0412 by Vanda Cunha RN  Outcome: Ongoing  Goal: Control of acute pain  Description: Control of acute pain  3/11/2022 1239 by Hermelinda Coe RN  Outcome: Completed  3/11/2022 0412 by Vanda Cunha RN  Outcome: Ongoing  Goal: Control of chronic pain  Description: Control of chronic pain  3/11/2022 1239 by Hermelinda Coe RN  Outcome: Completed  3/11/2022 0412 by Vanda Cunha RN  Outcome: Ongoing     Problem: Skin Integrity:  Goal: Will show no infection signs and symptoms  Description: Will show no infection signs and symptoms  3/11/2022 1239 by Hermelinda Coe RN  Outcome: Completed  3/11/2022 0412 by Vanda Cunha RN  Outcome: Ongoing  Goal: Absence of new skin breakdown  Description: Absence of new skin breakdown  3/11/2022 1239 by Hermelinda Coe RN  Outcome: Completed  3/11/2022 0412 by Vanda Cunha RN  Outcome: Ongoing     Problem: Falls - Risk of:  Goal: Will remain free from falls  Description: Will remain free from falls  3/11/2022 1239 by Hermelinda Coe RN  Outcome: Completed  3/11/2022 0412 by Vanda Cunha RN  Outcome: Ongoing  Goal: Absence of physical injury  Description: Absence of physical injury  3/11/2022 1239 by Hermelinda Coe RN  Outcome: Completed  3/11/2022 0412 by Vanda Cunha RN  Outcome: Ongoing     Problem: Nutrition  Goal: Optimal nutrition therapy  3/11/2022 1239 by Hermelinda Coe RN  Outcome: Completed  3/11/2022 0412 by Vanda Cunha RN  Outcome: Ongoing

## 2022-03-11 NOTE — PROGRESS NOTES
Pt IV infiltrated. House supervisor notified and asked to come attempt IV start since she needed ultrasound assistance earlier in the day. House supervisor unsuccessful at this time.

## 2022-03-13 NOTE — DISCHARGE SUMMARY
Family Medicine Discharge Summary    Raul Michelle  :  1975  MRN:  [id number]    Admit date:  3/8/2022  Discharge date:  3/11/2022    Admitting Physician:  Sulema Isaac MD    Discharge Diagnoses:    Patient Active Problem List   Diagnosis    Diabetes mellitus type 2, controlled ([address]    COPD (chronic obstructive pulmonary disease) ([address]    Asthma    Acute bronchitis    KRISTIN (obstructive sleep apnea)    Adult body mass index 40 and over    Chronic right shoulder pain    Neck pain    Radicular pain in right arm    Left leg pain    Noncompliance with therapeutic plan    Precordial pain    Tobacco abuse    Current moderate episode of major depressive disorder without prior episode (HCC)    Adhesive capsulitis of left shoulder    Morbid obesity ([address]    Left bicipital tenosynovitis    Refused influenza vaccine    Closed fracture of left ankle    Atrial premature depolarization    Ventricular premature depolarization    Cardiomyopathy ([address]    Cigarette nicotine dependence with nicotine-induced disorder    Productive cough    DVT (deep venous thrombosis) (HCC)    Endometriosis    GERD (gastroesophageal reflux disease)    HTN (hypertension)    Hypomagnesemia    Migraines    Mixed hyperlipidemia    Neurocardiogenic syncope    Nonrheumatic tricuspid (valve) insufficiency    Tricuspid valve disorders, non-rheumatic    Ovarian cyst    Encounter for monitoring sotalol therapy    Pulmonary HTN ([address]    PVC's (premature ventricular contractions)    Schizophrenia (HCC)    Shortness of breath    Acute exacerbation of chronic obstructive pulmonary disease (HCC)    Bipolar disorder ([address]    Left sided abdominal pain of unknown cause    Leg swelling    Mastoiditis, acute    Steroid-induced hyperglycemia    Sclerosing adenosis of left breast    Tachycardia    Tremor    Rupture of right rotator cuff    Rotator cuff syndrome of left shoulder    Long term current use of insulin (HCC)    Lesion of ulnar nerve    Carpal tunnel syndrome    Acute upper respiratory infection    Cellulitis of right upper limb    Claustrophobia    Close exposure to 2019 novel coronavirus    Congestive heart failure (HCC)    Diabetic neuropathy (HCC)    Enthesopathy    Fibrocystic breast changes    Helicobacter pylori gastritis    NSTEMI (non-ST elevated myocardial infarction) ([facility] [address]    Facial cellulitis    Cellulitis       Admission Condition:  fair      Discharged Condition:  fair    Hospital Course:   51 yo with dental abscess admitted with facial and dental cellulitis/infection. She was treated with IV clindamycin; she had dental extractions done on the day of discharge. Discharge Medications:         Medication List      START taking these medications    clindamycin 300 MG capsule  Commonly known as: CLEOCIN  Take 2 capsules by mouth 4 times daily for 10 days        CHANGE how you take these medications    Tresiba FlexTouch 200 UNIT/ML Sopn  Generic drug: Insulin Degludec  Inject 80 Units into the skin in the morning and at bedtime If po intake poor, decrease to 20 units daily  What changed: See the new instructions. CONTINUE taking these medications    albuterol sulfate  (90 Base) MCG/ACT inhaler  Commonly known as: Ventolin HFA  INHALE 2 PUFFS INTO LUNGS EVERY 6 HOURS AS NEEDED FOR WHEEZING     atorvastatin 80 MG tablet  Commonly known as: LIPITOR  TAKE 1 TABLET BY MOUTH NIGHTLY     clopidogrel 75 MG tablet  Commonly known as: PLAVIX     clotrimazole 1 % cream  Commonly known as: LOTRIMIN  Apply topically 2 times daily.      Coreg 12.5 MG tablet  Generic drug: carvedilol     DULoxetine 60 MG extended release capsule  Commonly known as: CYMBALTA  TAKE 1 CAPSULE BY MOUTH 2 TIMES DAILY     ergocalciferol 1.25 MG (46263 UT) capsule  Commonly known as: ERGOCALCIFEROL     HM Saline Nasal Spray 0.65 % nasal spray  Generic drug: sodium chloride     ibuprofen 800 MG tablet  Commonly known as: ADVIL;MOTRIN  TAKE 1 TABLET BY MOUTH EVERY 8 HOURS WITH FOOD AS NEEDED FOR PAIN     insulin lispro (1 Unit Dial) 100 UNIT/ML Sopn  Commonly known as: HumaLOG KwikPen  INJECT SUBCUTANEOUSLY PER SLIDING SCALE, 150-200 INJECT 3U, 201-250 INJECT 6U, 251-300 INJECT 9U, >300 INJECT 12U, MAX 30U/DAY     ipratropium-albuterol 0.5-2.5 (3) MG/3ML Soln nebulizer solution  Commonly known as: DUONEB  INHALE 3 MLS (ONE VIAL) WITH NEBULIZER EVERY 6 HOURS AS NEEDED FOR SHORTNESS OF BREATH     magnesium oxide 400 MG tablet  Commonly known as: MAG-OX     Multivitamin Gummies Womens Chew     nitroGLYCERIN 0.4 MG SL tablet  Commonly known as: NITROSTAT  up to max of 3 total doses. If no relief after 1 dose, call 911. * OneTouch Ultra strip  Generic drug: blood glucose test strips  USE TO TEST BLOOD SUGAR BEFORE MEALS, AT BEDTIME, AND AS NEEDED (6 TIMES DAILY)     * blood glucose test strips strip  Commonly known as: ASCENSIA AUTODISC VI;ONE TOUCH ULTRA TEST VI  OneTouch Ultra Test strips. Check blood sugars 4x daily     oxyCODONE-acetaminophen 5-325 MG per tablet  Commonly known as: PERCOCET     OXYGEN     pantoprazole 40 MG tablet  Commonly known as: PROTONIX     QUEtiapine 50 MG tablet  Commonly known as: SEROQUEL         * This list has 2 medication(s) that are the same as other medications prescribed for you. Read the directions carefully, and ask your doctor or other care provider to review them with you.             STOP taking these medications    azithromycin 500 MG tablet  Commonly known as: ZITHROMAX     Unifine Pentips 31G X 8 MM Misc  Generic drug: Insulin Pen Needle           Where to Get Your Medications      These medications were sent to [address] [place] [address] [facility]. [address]    Hours: 24-hours Phone: [phone]   · clindamycin 300 MG capsule     Information about where to get these medications is not yet available Ask your nurse or doctor about these medications  · Tresiba FlexTouch 200 UNIT/ML Sopn         Consults:  Oral surgery    Significant Diagnostic Studies:  labs    Treatments:   Dental extraction, antibiotics    Disposition:   home  Follow up with Aileen Stockton MD in 1-2 weeks. Signed:   Rudy Robles MD, [facility]  3/13/2022, 3:37 PM

## 2022-04-05 PROBLEM — E11.65 UNCONTROLLED TYPE 2 DIABETES MELLITUS WITH HYPERGLYCEMIA (HCC): Status: ACTIVE | Noted: 2022-04-05

## 2022-04-05 PROBLEM — F33.1 MODERATE EPISODE OF RECURRENT MAJOR DEPRESSIVE DISORDER (HCC): Status: ACTIVE | Noted: 2022-04-05

## 2022-04-21 PROBLEM — F41.9 RECURRENT MODERATE MAJOR DEPRESSIVE DISORDER WITH ANXIETY (HCC): Status: ACTIVE | Noted: 2022-04-05

## 2022-04-25 PROBLEM — G56.20 CUBITAL TUNNEL SYNDROME: Status: ACTIVE | Noted: 2019-10-15

## 2022-04-25 PROBLEM — I25.10 CORONARY ARTERY DISEASE INVOLVING NATIVE CORONARY ARTERY OF NATIVE HEART WITHOUT ANGINA PECTORIS: Status: ACTIVE | Noted: 2022-01-07

## 2022-04-25 PROBLEM — I51.9 MODERATE LEFT VENTRICULAR SYSTOLIC DYSFUNCTION: Status: ACTIVE | Noted: 2022-01-07

## 2022-04-25 PROBLEM — R00.2 PALPITATIONS: Status: ACTIVE | Noted: 2021-11-04

## 2022-04-25 PROBLEM — I51.89 MODERATE LEFT VENTRICULAR SYSTOLIC DYSFUNCTION: Status: ACTIVE | Noted: 2022-01-07

## 2022-04-25 PROBLEM — I50.22 CHRONIC SYSTOLIC (CONGESTIVE) HEART FAILURE (HCC): Status: ACTIVE | Noted: 2022-01-07

## 2022-04-25 PROBLEM — N99.83 OVARIAN REMNANT SYNDROME: Status: ACTIVE | Noted: 2019-10-15

## 2022-04-25 PROBLEM — R42 DIZZINESS: Status: ACTIVE | Noted: 2021-09-23

## 2022-04-29 ENCOUNTER — HOSPITAL ENCOUNTER (OUTPATIENT)
Age: 47
Discharge: HOME | End: 2022-04-29
Payer: COMMERCIAL

## 2022-04-29 ENCOUNTER — HOSPITAL ENCOUNTER (OUTPATIENT)
Dept: GENERAL RADIOLOGY | Age: 47
Discharge: HOME | End: 2022-05-01
Payer: COMMERCIAL

## 2022-04-29 ENCOUNTER — HOSPITAL ENCOUNTER (OUTPATIENT)
Age: 47
Discharge: HOME | End: 2022-05-01
Payer: COMMERCIAL

## 2022-04-29 DIAGNOSIS — R06.02 SOB (SHORTNESS OF BREATH): ICD-10-CM

## 2022-04-29 DIAGNOSIS — I50.22 CHRONIC SYSTOLIC (CONGESTIVE) HEART FAILURE (HCC): ICD-10-CM

## 2022-04-29 LAB — PRO-BNP: 169 PG/ML

## 2022-04-29 PROCEDURE — 83880 ASSAY OF NATRIURETIC PEPTIDE: CPT

## 2022-04-29 PROCEDURE — 71046 X-RAY EXAM CHEST 2 VIEWS: CPT

## 2022-04-29 PROCEDURE — 36415 COLL VENOUS BLD VENIPUNCTURE: CPT

## 2022-05-17 ENCOUNTER — APPOINTMENT (OUTPATIENT)
Dept: GENERAL RADIOLOGY | Age: 47
DRG: 291 | End: 2022-05-17
Payer: COMMERCIAL

## 2022-05-17 ENCOUNTER — HOSPITAL ENCOUNTER (INPATIENT)
Age: 47
LOS: 9 days | Discharge: HOME | DRG: 291 | End: 2022-05-26
Attending: STUDENT IN AN ORGANIZED HEALTH CARE EDUCATION/TRAINING PROGRAM | Admitting: FAMILY MEDICINE
Payer: COMMERCIAL

## 2022-05-17 DIAGNOSIS — R07.9 CHEST PAIN, UNSPECIFIED TYPE: ICD-10-CM

## 2022-05-17 DIAGNOSIS — J43.1 PANLOBULAR EMPHYSEMA (HCC): Chronic | ICD-10-CM

## 2022-05-17 DIAGNOSIS — R60.0 BILATERAL LOWER EXTREMITY EDEMA: Primary | ICD-10-CM

## 2022-05-17 DIAGNOSIS — J44.1 ACUTE EXACERBATION OF CHRONIC OBSTRUCTIVE PULMONARY DISEASE (HCC): ICD-10-CM

## 2022-05-17 PROBLEM — I50.9 ACUTE CONGESTIVE HEART FAILURE (HCC): Status: ACTIVE | Noted: 2022-05-17

## 2022-05-17 LAB
ABSOLUTE EOS #: 0.4 K/UL (ref 0–0.4)
ABSOLUTE LYMPH #: 3 K/UL (ref 1–4.8)
ABSOLUTE MONO #: 0.7 K/UL (ref 0.1–1.3)
ANION GAP SERPL CALCULATED.3IONS-SCNC: 9 MMOL/L (ref 9–17)
BASOPHILS # BLD: 1 % (ref 0–2)
BASOPHILS ABSOLUTE: 0.1 K/UL (ref 0–0.2)
BUN BLDV-MCNC: 13 MG/DL (ref 6–20)
CALCIUM SERPL-MCNC: 9.6 MG/DL (ref 8.6–10.4)
CHLORIDE BLD-SCNC: 98 MMOL/L (ref 98–107)
CO2: 29 MMOL/L (ref 20–31)
CREAT SERPL-MCNC: 0.65 MG/DL (ref 0.5–0.9)
EOSINOPHILS RELATIVE PERCENT: 4 % (ref 0–4)
GFR AFRICAN AMERICAN: >60 ML/MIN
GFR NON-AFRICAN AMERICAN: >60 ML/MIN
GFR SERPL CREATININE-BSD FRML MDRD: ABNORMAL ML/MIN/{1.73_M2}
GLUCOSE BLD-MCNC: 291 MG/DL (ref 65–105)
GLUCOSE BLD-MCNC: 301 MG/DL (ref 70–99)
GLUCOSE BLD-MCNC: 407 MG/DL (ref 65–105)
HCT VFR BLD CALC: 42.1 % (ref 36–46)
HEMOGLOBIN: 14.1 G/DL (ref 12–16)
LYMPHOCYTES # BLD: 32 % (ref 24–44)
MCH RBC QN AUTO: 33.4 PG (ref 26–34)
MCHC RBC AUTO-ENTMCNC: 33.5 G/DL (ref 31–37)
MCV RBC AUTO: 99.7 FL (ref 80–100)
MONOCYTES # BLD: 7 % (ref 1–7)
PDW BLD-RTO: 13 % (ref 11.5–14.9)
PLATELET # BLD: 273 K/UL (ref 150–450)
PMV BLD AUTO: 8.8 FL (ref 6–12)
POTASSIUM SERPL-SCNC: 4.3 MMOL/L (ref 3.7–5.3)
PRO-BNP: 315 PG/ML
RBC # BLD: 4.22 M/UL (ref 4–5.2)
SEG NEUTROPHILS: 56 % (ref 36–66)
SEGMENTED NEUTROPHILS ABSOLUTE COUNT: 5.3 K/UL (ref 1.3–9.1)
SODIUM BLD-SCNC: 136 MMOL/L (ref 135–144)
TROPONIN, HIGH SENSITIVITY: 13 NG/L (ref 0–14)
TSH SERPL DL<=0.05 MIU/L-ACNC: 1.56 UIU/ML (ref 0.3–5)
WBC # BLD: 9.5 K/UL (ref 3.5–11)

## 2022-05-17 PROCEDURE — 93005 ELECTROCARDIOGRAM TRACING: CPT | Performed by: STUDENT IN AN ORGANIZED HEALTH CARE EDUCATION/TRAINING PROGRAM

## 2022-05-17 PROCEDURE — 84484 ASSAY OF TROPONIN QUANT: CPT

## 2022-05-17 PROCEDURE — 6360000002 HC RX W HCPCS: Performed by: FAMILY MEDICINE

## 2022-05-17 PROCEDURE — 6370000000 HC RX 637 (ALT 250 FOR IP): Performed by: FAMILY MEDICINE

## 2022-05-17 PROCEDURE — 2580000003 HC RX 258: Performed by: FAMILY MEDICINE

## 2022-05-17 PROCEDURE — 71046 X-RAY EXAM CHEST 2 VIEWS: CPT

## 2022-05-17 PROCEDURE — 2060000000 HC ICU INTERMEDIATE R&B

## 2022-05-17 PROCEDURE — 2500000003 HC RX 250 WO HCPCS: Performed by: FAMILY MEDICINE

## 2022-05-17 PROCEDURE — 85025 COMPLETE CBC W/AUTO DIFF WBC: CPT

## 2022-05-17 PROCEDURE — 84443 ASSAY THYROID STIM HORMONE: CPT

## 2022-05-17 PROCEDURE — 6370000000 HC RX 637 (ALT 250 FOR IP): Performed by: STUDENT IN AN ORGANIZED HEALTH CARE EDUCATION/TRAINING PROGRAM

## 2022-05-17 PROCEDURE — 99285 EMERGENCY DEPT VISIT HI MDM: CPT

## 2022-05-17 PROCEDURE — 94761 N-INVAS EAR/PLS OXIMETRY MLT: CPT

## 2022-05-17 PROCEDURE — 96374 THER/PROPH/DIAG INJ IV PUSH: CPT

## 2022-05-17 PROCEDURE — 36415 COLL VENOUS BLD VENIPUNCTURE: CPT

## 2022-05-17 PROCEDURE — 94640 AIRWAY INHALATION TREATMENT: CPT

## 2022-05-17 PROCEDURE — 83880 ASSAY OF NATRIURETIC PEPTIDE: CPT

## 2022-05-17 PROCEDURE — 82947 ASSAY GLUCOSE BLOOD QUANT: CPT

## 2022-05-17 PROCEDURE — 80048 BASIC METABOLIC PNL TOTAL CA: CPT

## 2022-05-17 PROCEDURE — 2500000003 HC RX 250 WO HCPCS: Performed by: STUDENT IN AN ORGANIZED HEALTH CARE EDUCATION/TRAINING PROGRAM

## 2022-05-17 PROCEDURE — 6360000002 HC RX W HCPCS: Performed by: STUDENT IN AN ORGANIZED HEALTH CARE EDUCATION/TRAINING PROGRAM

## 2022-05-17 RX ORDER — MAGNESIUM OXIDE 400 MG/1
400 TABLET ORAL DAILY
Status: DISCONTINUED | OUTPATIENT
Start: 2022-05-17 | End: 2022-05-17 | Stop reason: ALTCHOICE

## 2022-05-17 RX ORDER — SODIUM CHLORIDE 0.9 % (FLUSH) 0.9 %
5-40 SYRINGE (ML) INJECTION EVERY 12 HOURS SCHEDULED
Status: DISCONTINUED | OUTPATIENT
Start: 2022-05-17 | End: 2022-05-26 | Stop reason: HOSPADM

## 2022-05-17 RX ORDER — DEXTROSE MONOHYDRATE 50 MG/ML
100 INJECTION, SOLUTION INTRAVENOUS PRN
Status: DISCONTINUED | OUTPATIENT
Start: 2022-05-17 | End: 2022-05-26 | Stop reason: HOSPADM

## 2022-05-17 RX ORDER — INSULIN GLARGINE 100 [IU]/ML
80 INJECTION, SOLUTION SUBCUTANEOUS 2 TIMES DAILY
Status: DISCONTINUED | OUTPATIENT
Start: 2022-05-17 | End: 2022-05-26 | Stop reason: HOSPADM

## 2022-05-17 RX ORDER — DULOXETIN HYDROCHLORIDE 60 MG/1
60 CAPSULE, DELAYED RELEASE ORAL 2 TIMES DAILY
Status: DISCONTINUED | OUTPATIENT
Start: 2022-05-17 | End: 2022-05-26 | Stop reason: HOSPADM

## 2022-05-17 RX ORDER — LANOLIN ALCOHOL/MO/W.PET/CERES
400 CREAM (GRAM) TOPICAL DAILY
Status: DISCONTINUED | OUTPATIENT
Start: 2022-05-17 | End: 2022-05-19

## 2022-05-17 RX ORDER — ACETAMINOPHEN 325 MG/1
650 TABLET ORAL EVERY 6 HOURS PRN
Status: DISCONTINUED | OUTPATIENT
Start: 2022-05-17 | End: 2022-05-26 | Stop reason: HOSPADM

## 2022-05-17 RX ORDER — SODIUM CHLORIDE 9 MG/ML
INJECTION, SOLUTION INTRAVENOUS PRN
Status: DISCONTINUED | OUTPATIENT
Start: 2022-05-17 | End: 2022-05-26 | Stop reason: HOSPADM

## 2022-05-17 RX ORDER — ENOXAPARIN SODIUM 100 MG/ML
40 INJECTION SUBCUTANEOUS DAILY
Status: DISCONTINUED | OUTPATIENT
Start: 2022-05-17 | End: 2022-05-17 | Stop reason: DRUGHIGH

## 2022-05-17 RX ORDER — ATORVASTATIN CALCIUM 80 MG/1
80 TABLET, FILM COATED ORAL NIGHTLY
Status: DISCONTINUED | OUTPATIENT
Start: 2022-05-17 | End: 2022-05-26 | Stop reason: HOSPADM

## 2022-05-17 RX ORDER — ALBUTEROL SULFATE 90 UG/1
2 AEROSOL, METERED RESPIRATORY (INHALATION) EVERY 4 HOURS PRN
Status: DISCONTINUED | OUTPATIENT
Start: 2022-05-17 | End: 2022-05-26 | Stop reason: HOSPADM

## 2022-05-17 RX ORDER — INSULIN GLARGINE 100 [IU]/ML
60 INJECTION, SOLUTION SUBCUTANEOUS 2 TIMES DAILY
Status: DISCONTINUED | OUTPATIENT
Start: 2022-05-17 | End: 2022-05-17

## 2022-05-17 RX ORDER — ALPRAZOLAM 0.5 MG/1
0.5 TABLET ORAL 2 TIMES DAILY PRN
COMMUNITY
End: 2022-06-20

## 2022-05-17 RX ORDER — ENOXAPARIN SODIUM 100 MG/ML
30 INJECTION SUBCUTANEOUS 2 TIMES DAILY
Status: DISCONTINUED | OUTPATIENT
Start: 2022-05-17 | End: 2022-05-26 | Stop reason: HOSPADM

## 2022-05-17 RX ORDER — SODIUM CHLORIDE 0.9 % (FLUSH) 0.9 %
5-40 SYRINGE (ML) INJECTION PRN
Status: DISCONTINUED | OUTPATIENT
Start: 2022-05-17 | End: 2022-05-26 | Stop reason: HOSPADM

## 2022-05-17 RX ORDER — CARVEDILOL 12.5 MG/1
12.5 TABLET ORAL 2 TIMES DAILY WITH MEALS
Status: DISCONTINUED | OUTPATIENT
Start: 2022-05-17 | End: 2022-05-26 | Stop reason: HOSPADM

## 2022-05-17 RX ORDER — BUMETANIDE 0.25 MG/ML
2 INJECTION, SOLUTION INTRAMUSCULAR; INTRAVENOUS ONCE
Status: COMPLETED | OUTPATIENT
Start: 2022-05-17 | End: 2022-05-17

## 2022-05-17 RX ORDER — PANTOPRAZOLE SODIUM 40 MG/1
40 TABLET, DELAYED RELEASE ORAL DAILY
Status: DISCONTINUED | OUTPATIENT
Start: 2022-05-17 | End: 2022-05-17

## 2022-05-17 RX ORDER — PANTOPRAZOLE SODIUM 40 MG/1
40 TABLET, DELAYED RELEASE ORAL 2 TIMES DAILY
Status: DISCONTINUED | OUTPATIENT
Start: 2022-05-17 | End: 2022-05-26 | Stop reason: HOSPADM

## 2022-05-17 RX ORDER — CLOPIDOGREL BISULFATE 75 MG/1
75 TABLET ORAL DAILY
Status: DISCONTINUED | OUTPATIENT
Start: 2022-05-17 | End: 2022-05-26 | Stop reason: HOSPADM

## 2022-05-17 RX ORDER — OXYCODONE HYDROCHLORIDE AND ACETAMINOPHEN 5; 325 MG/1; MG/1
1 TABLET ORAL EVERY 4 HOURS PRN
Status: DISCONTINUED | OUTPATIENT
Start: 2022-05-17 | End: 2022-05-26 | Stop reason: HOSPADM

## 2022-05-17 RX ORDER — NITROGLYCERIN 0.4 MG/1
0.4 TABLET SUBLINGUAL EVERY 5 MIN PRN
Status: DISCONTINUED | OUTPATIENT
Start: 2022-05-17 | End: 2022-05-17 | Stop reason: SDUPTHER

## 2022-05-17 RX ORDER — INSULIN LISPRO 100 [IU]/ML
0-18 INJECTION, SOLUTION INTRAVENOUS; SUBCUTANEOUS
Status: DISCONTINUED | OUTPATIENT
Start: 2022-05-18 | End: 2022-05-26 | Stop reason: HOSPADM

## 2022-05-17 RX ORDER — CLOPIDOGREL BISULFATE 75 MG/1
300 TABLET ORAL ONCE
Status: COMPLETED | OUTPATIENT
Start: 2022-05-17 | End: 2022-05-17

## 2022-05-17 RX ORDER — IPRATROPIUM BROMIDE AND ALBUTEROL SULFATE 2.5; .5 MG/3ML; MG/3ML
1 SOLUTION RESPIRATORY (INHALATION)
Status: DISCONTINUED | OUTPATIENT
Start: 2022-05-17 | End: 2022-05-26 | Stop reason: HOSPADM

## 2022-05-17 RX ORDER — VILAZODONE HYDROCHLORIDE 20 MG/1
20 TABLET ORAL DAILY
Status: DISCONTINUED | OUTPATIENT
Start: 2022-05-17 | End: 2022-05-26 | Stop reason: HOSPADM

## 2022-05-17 RX ORDER — ALPRAZOLAM 0.5 MG/1
0.5 TABLET ORAL 2 TIMES DAILY PRN
Status: DISCONTINUED | OUTPATIENT
Start: 2022-05-17 | End: 2022-05-26 | Stop reason: HOSPADM

## 2022-05-17 RX ORDER — ACETAMINOPHEN 650 MG/1
650 SUPPOSITORY RECTAL EVERY 6 HOURS PRN
Status: DISCONTINUED | OUTPATIENT
Start: 2022-05-17 | End: 2022-05-26 | Stop reason: HOSPADM

## 2022-05-17 RX ORDER — NITROGLYCERIN 0.4 MG/1
0.4 TABLET SUBLINGUAL EVERY 5 MIN PRN
Status: DISCONTINUED | OUTPATIENT
Start: 2022-05-17 | End: 2022-05-26 | Stop reason: HOSPADM

## 2022-05-17 RX ORDER — POLYETHYLENE GLYCOL 3350 17 G/17G
17 POWDER, FOR SOLUTION ORAL DAILY PRN
Status: DISCONTINUED | OUTPATIENT
Start: 2022-05-17 | End: 2022-05-26 | Stop reason: HOSPADM

## 2022-05-17 RX ORDER — CALCIUM CITRATE/VITAMIN D3 200MG-6.25
2 TABLET ORAL DAILY
Status: DISCONTINUED | OUTPATIENT
Start: 2022-05-17 | End: 2022-05-17 | Stop reason: CLARIF

## 2022-05-17 RX ORDER — BUMETANIDE 0.25 MG/ML
1 INJECTION, SOLUTION INTRAMUSCULAR; INTRAVENOUS 2 TIMES DAILY
Status: DISCONTINUED | OUTPATIENT
Start: 2022-05-17 | End: 2022-05-19

## 2022-05-17 RX ORDER — INSULIN LISPRO 100 [IU]/ML
0-9 INJECTION, SOLUTION INTRAVENOUS; SUBCUTANEOUS NIGHTLY
Status: DISCONTINUED | OUTPATIENT
Start: 2022-05-17 | End: 2022-05-26 | Stop reason: HOSPADM

## 2022-05-17 RX ADMIN — CARVEDILOL 12.5 MG: 12.5 TABLET, FILM COATED ORAL at 18:50

## 2022-05-17 RX ADMIN — BUMETANIDE 1 MG: 0.25 INJECTION, SOLUTION INTRAMUSCULAR; INTRAVENOUS at 19:50

## 2022-05-17 RX ADMIN — BUMETANIDE 2 MG: 0.25 INJECTION, SOLUTION INTRAMUSCULAR; INTRAVENOUS at 14:16

## 2022-05-17 RX ADMIN — Medication 400 MG: at 16:40

## 2022-05-17 RX ADMIN — HYDROMORPHONE HYDROCHLORIDE 0.5 MG: 1 INJECTION, SOLUTION INTRAMUSCULAR; INTRAVENOUS; SUBCUTANEOUS at 17:42

## 2022-05-17 RX ADMIN — PANTOPRAZOLE SODIUM 40 MG: 40 TABLET, DELAYED RELEASE ORAL at 18:49

## 2022-05-17 RX ADMIN — CLOPIDOGREL BISULFATE 75 MG: 75 TABLET ORAL at 18:49

## 2022-05-17 RX ADMIN — SODIUM CHLORIDE, PRESERVATIVE FREE 10 ML: 5 INJECTION INTRAVENOUS at 19:49

## 2022-05-17 RX ADMIN — HYDROMORPHONE HYDROCHLORIDE 0.5 MG: 1 INJECTION, SOLUTION INTRAMUSCULAR; INTRAVENOUS; SUBCUTANEOUS at 14:16

## 2022-05-17 RX ADMIN — DULOXETINE 60 MG: 60 CAPSULE, DELAYED RELEASE ORAL at 18:50

## 2022-05-17 RX ADMIN — CLOPIDOGREL BISULFATE 300 MG: 75 TABLET ORAL at 11:55

## 2022-05-17 RX ADMIN — HYDROMORPHONE HYDROCHLORIDE 0.5 MG: 1 INJECTION, SOLUTION INTRAMUSCULAR; INTRAVENOUS; SUBCUTANEOUS at 22:01

## 2022-05-17 RX ADMIN — INSULIN LISPRO 9 UNITS: 100 INJECTION, SOLUTION INTRAVENOUS; SUBCUTANEOUS at 22:02

## 2022-05-17 RX ADMIN — IPRATROPIUM BROMIDE AND ALBUTEROL SULFATE 1 AMPULE: 2.5; .5 SOLUTION RESPIRATORY (INHALATION) at 19:11

## 2022-05-17 RX ADMIN — HYDROMORPHONE HYDROCHLORIDE 0.25 MG: 1 INJECTION, SOLUTION INTRAMUSCULAR; INTRAVENOUS; SUBCUTANEOUS at 11:55

## 2022-05-17 RX ADMIN — ATORVASTATIN CALCIUM 80 MG: 80 TABLET, FILM COATED ORAL at 18:49

## 2022-05-17 RX ADMIN — INSULIN GLARGINE 80 UNITS: 100 INJECTION, SOLUTION SUBCUTANEOUS at 18:50

## 2022-05-17 RX ADMIN — QUETIAPINE FUMARATE 150 MG: 100 TABLET ORAL at 18:49

## 2022-05-17 ASSESSMENT — ENCOUNTER SYMPTOMS
NAUSEA: 0
COUGH: 0
VOMITING: 0
ABDOMINAL PAIN: 0
CHEST TIGHTNESS: 1
BACK PAIN: 0
RHINORRHEA: 0
SHORTNESS OF BREATH: 1

## 2022-05-17 ASSESSMENT — PAIN - FUNCTIONAL ASSESSMENT: PAIN_FUNCTIONAL_ASSESSMENT: 0-10

## 2022-05-17 ASSESSMENT — PAIN SCALES - GENERAL
PAINLEVEL_OUTOF10: 10
PAINLEVEL_OUTOF10: 8

## 2022-05-17 ASSESSMENT — PAIN DESCRIPTION - DESCRIPTORS
DESCRIPTORS: PRESSURE;PINS AND NEEDLES
DESCRIPTORS: PINS AND NEEDLES;SHARP

## 2022-05-17 ASSESSMENT — PAIN DESCRIPTION - LOCATION: LOCATION: LEG;FOOT;OTHER (COMMENT)
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
## 2022-05-17 NOTE — PROGRESS NOTES
Medication History completed:    Patient is very strict on her dosing schedule. She takes all her medications at 0700 and 1900. She wants this to continue while she is in the hospital.     New medications:  -Xanax 0.5 mg   -Vilazodone 20 mg, patient has not yet started this medication    Medications discontinued:  -Saline nasal spray   -Ibuprofen 800 mg   -Patient has not been using her oxygen, she is supposed to use 3L at night    Changes to dosing:  -Vitamin D 50,000 units switched from Fridays to Saturdays   -Tresiba FlexTouch 80 units in the morning and 80 units in the evening   -Percocet one tablet at 0700, one tablet at 1900, one tablet in the afternoon prn pain    Stated allergies:  -Patient has many listed allergies. She states that nothing has changed and there is nothing new to add.      Other pertinent information:  -Confirmed with patient, OARRs, dispense report from EHR, recent outpatient encounter notes    Adam AlfordD 5/17/2022 1:43 PM  [facility] PGY1 Resident

## 2022-05-17 NOTE — ED NOTES
Report given to Heather Garcia RN from U. Report method by phone   The following was reviewed with receiving RN:   Current vital signs:  BP (!) 168/85   Pulse 86   Temp 98.7 °F (37.1 °C)   Resp 18   Ht 5' 8\" (1.727 m)   Wt 248 lb (112.5 kg)   SpO2 97%   BMI 37.71 kg/m²                MEWS Score: 1     Any medication or safety alerts were reviewed. Any pending diagnostics and notifications were also reviewed, as well as any safety concerns or issues, abnormal labs, abnormal imaging, and abnormal assessment findings. Questions were answered.             Joseluis Samayoa RN  05/17/22 [id number]

## 2022-05-17 NOTE — PROGRESS NOTES
Pt arrived to floor from ED to room 2097. Vitals taken and telemetry applied. No distress noted. See doc flowsheet for details. Call light within reach, and pt educated on its use. Bed in lowest position, and locked. Side rails up x 2. Denied further questions or needs at this time. Will continue to monitor.

## 2022-05-17 NOTE — ED PROVIDER NOTES
[address] [facility]  Emergency Department Encounter  Emergency Medicine Physician     Pt Name: Merry Sanderson  MRN: [id number]  [place] 1975  Date of evaluation: 5/17/22  PCP:  James Juárez MD    CHIEF COMPLAINT       Chief Complaint   Patient presents with    Chest Pain       HISTORY OF PRESENT ILLNESS  (Location/Symptom, Timing/Onset, Context/Setting, Quality, Duration, Modifying Factors, Severity.)    Merry Sanderson is a 50 y.o. female who presents with bilateral lower extremity edema and chest pain. Worsening over the past 2 days. Chest pain intermittent with no obvious provoking activity. She states that she is taking Bumex twice a day as prescribed by her cardiologist but she states that she is not urinating very much. States that she try to limit herself to less than 2 L of fluids per day. States that her chest pain began worse yesterday and more common yesterday as well. Endorses a history of myocardial infarction with stents. States that she has an anaphylactic allergy to asthma but does take Plavix. Has not taken her Plavix this morning at however. Does have a history of a DVT to the left arm and left subclavian vein which was provoked from midline catheter. States that she has been compliant with all of her other medications.       PAST MEDICAL / SURGICAL / SOCIAL / FAMILY HISTORY    has a past medical history of Acute exacerbation of chronic obstructive pulmonary disease (HCC), Adhesive capsulitis of left shoulder, Asthma, Asthma exacerbation, Atrial fibrillation (HCC), Atrial premature depolarization, Bipolar 1 disorder ([address] Bipolar disorder ([address] Bulging disc, CAD (coronary artery disease), Candida infection, Cardiomyopathy ([address] Chest pain, CHF (congestive heart failure) ([address] Chronic otitis media of both ears, Chronic right shoulder pain, Cigarette nicotine dependence with nicotine-induced disorder, Class 3 severe obesity due to excess calories with serious comorbidity and body mass index (BMI) of 40.0 to 44.9 in adult [facility]), Closed fracture of left ankle, Clostridium difficile infection, COPD (chronic obstructive pulmonary disease) (HCC), Cough, persistent, Current moderate episode of major depressive disorder without prior episode ([address] Depression, Diabetes mellitus type 2, controlled ([address] Diarrhea, Diarrhea, Dizziness, DVT (deep venous thrombosis) ([address] Encounter for monitoring sotalol therapy, Encounter for screening mammogram for malignant neoplasm of breast, Endometriosis, Fainting, GERD (gastroesophageal reflux disease), GI bleeding, H. pylori infection, Helicobacter pylori (H. pylori), Tribe (hard of hearing), HTN (hypertension), Hyperlipidemia, Hypertension, Left bicipital tenosynovitis, Left leg pain, Left sided abdominal pain of unknown cause, Leg swelling, Mastoiditis, Mastoiditis, acute, Migraines, Morbid obesity ([address] Myocardial infarction ([address] Nausea, Neuropathy, On home oxygen therapy, Ovarian cyst, Passed out, Pulmonary hypertension ([address] Pulmonary insufficiency, PVC (premature ventricular contraction), Seasonal allergies, Tricuspid insufficiency, and Type II or unspecified type diabetes mellitus without mention of complication, not stated as uncontrolled. has a past surgical history that includes Hysterectomy; Cholecystectomy; Appendectomy; Colonoscopy; Upper gastrointestinal endoscopy; Abdomen surgery; Abdominal adhesion surgery; Abdominal exploration surgery; Tympanostomy tube placement; Tonsillectomy; Foot surgery (Left); Abdominal hernia repair; hysteroscopy; Gastric fundoplication (8491); Abscess Drainage; Scaphoid fracture surgery (Left); other surgical history (Right, 05/29/2014); Myringotomy Tympanostomy Tube Placement (Right, 06/05/2014); myringotomy (Right, 11/13/2015); Hysterectomy; Cardiac catheterization (2014 & 2000); other surgical history (2009);  Breast surgery (Left, 2007); fracture surgery (Right); other surgical history (Right, 08/11/2016); ablation of dysrhythmic focus (2016); other surgical history; other surgical history; pr manoj shldr jt w anesthesia (Right, 03/01/2017); ablation of dysrhythmic focus (09/08/2017); pr shoulder scope bone shaving (Left, 10/17/2018); Tympanostomy tube placement (Left, 03/12/2019); Upper gastrointestinal endoscopy (07/10/2019); Upper gastrointestinal endoscopy (N/A, 07/10/2019); Carpal tunnel release (Right, 12/19/2019); Carpal tunnel release (05/04/2020); Ulnar tunnel release (Right); myringotomy (Left, 07/14/2020); other surgical history (10/19/2020); other surgical history (Right, 06/14/2021); Tympanostomy tube placement (Right, 12/08/2021); other surgical history (Left, 03/24/2022); and Ventriculoperitoneal shunt (Right, 04/21/2022). Social History     Socioeconomic History    Marital status: Single     Spouse name: Not on file    Number of children: Not on file    Years of education: Not on file    Highest education level: Not on file   Occupational History     Employer: NONE   Tobacco Use    Smoking status: Current Every Day Smoker     Packs/day: 0.50     Years: 23.00     Pack years: 11.50     Types: Cigarettes    Smokeless tobacco: Never Used   Vaping Use    Vaping Use: Never used   Substance and Sexual Activity    Alcohol use: No     Alcohol/week: 0.0 standard drinks    Drug use: No    Sexual activity: Not on file   Other Topics Concern    Not on file   Social History Narrative    Not on file     Social Determinants of Health     Financial Resource Strain:     Difficulty of Paying Living Expenses: Not on file   Food Insecurity:     Worried About Running Out of Food in the Last Year: Not on file    Mariano of Food in the Last Year: Not on file   Transportation Needs:     Lack of Transportation (Medical): Not on file    Lack of Transportation (Non-Medical):  Not on file   Physical Activity:     Days of Exercise per Week: Not on file    Minutes of Exercise per Session: Not on file   Stress:     Feeling of Stress : Not on file   Social Connections:     Frequency of Communication with Friends and Family: Not on file    Frequency of Social Gatherings with Friends and Family: Not on file    Attends Latter-day Services: Not on file    Active Member of Clubs or Organizations: Not on file    Attends Club or Organization Meetings: Not on file    Marital Status: Not on file   Intimate Partner Violence:     Fear of Current or Ex-Partner: Not on file    Emotionally Abused: Not on file    Physically Abused: Not on file    Sexually Abused: Not on file   Housing Stability:     Unable to Pay for Housing in the Last Year: Not on file    Number of Places Lived in the Last Year: Not on file    Unstable Housing in the Last Year: Not on file       Family History   Problem Relation Age of Onset    Ulcerative Colitis Father     Liver Disease Father 61        hep c and b    Diabetes Father     Asthma Father     Heart Disease Father     High Blood Pressure Father     Breast Cancer Maternal Aunt     Cancer Maternal Aunt     Diabetes Maternal Aunt     Heart Disease Maternal Aunt     High Blood Pressure Maternal Aunt     Breast Cancer Paternal Aunt     Heart Disease Paternal Aunt     High Blood Pressure Paternal Aunt     Breast Cancer Maternal Grandmother     Cervical Cancer Maternal Grandmother     Cancer Maternal Grandmother     Diabetes Maternal Grandmother     Asthma Maternal Grandmother     Heart Disease Maternal Grandmother     High Blood Pressure Maternal Grandmother     Lung Cancer Maternal Grandfather     Diabetes Maternal Grandfather     Asthma Maternal Grandfather     Heart Disease Maternal Grandfather     High Blood Pressure Maternal Grandfather     Cervical Cancer Paternal Grandmother     Cancer Paternal Grandmother     Diabetes Paternal Grandmother     Heart Disease Paternal Grandmother     High Blood Pressure Paternal Grandmother     Diabetes Mother     Asthma Mother     Heart Disease Mother     High Blood Pressure Mother     Cancer Sister     Heart Disease Maternal Uncle     High Blood Pressure Maternal Uncle     Heart Disease Paternal Uncle     High Blood Pressure Paternal Uncle     Diabetes Paternal Grandfather     Heart Disease Paternal Grandfather     High Blood Pressure Paternal Grandfather        Allergies:    Latex, Aspirin, Avelox [moxifloxacin], Chantix [varenicline], Doxycycline, Dye [iodides], Flexeril [cyclobenzaprine], Gabapentin, Losartan, Morphine, Nsaids, Pcn [penicillins], Reglan [metoclopramide hcl], Shellfish-derived products, Sulfa antibiotics, Toradol [ketorolac tromethamine], Vancomycin, Zofran, Zyvox [linezolid], Acyclovir, Bactrim [sulfamethoxazole-trimethoprim], Betadine [povidone iodine], Ceclor [cefaclor], Codeine, Macrolides and ketolides, Novolin r [insulin], Novolog [insulin aspart], Phenothiazines, Tape [adhesive tape], Banana, Compazine [prochlorperazine], Fentanyl, Kiwi extract, Tamiflu [oseltamivir phosphate], Clindamycin/lincomycin, and Sotalol    Home Medications:  Prior to Admission medications    Medication Sig Start Date End Date Taking? Authorizing Provider   ALPRAZolam Shital Farrell) 0.5 MG tablet Take 0.5 mg by mouth 2 times daily as needed for Anxiety. Yes Historical Provider, MD   VILAZODONE HCL 20 MG Tablet TAKE 1 TABLET BY MOUTH EVERY DAY  Patient taking differently: New medication, hasn't started this medication yet 5/10/22   Oscar Carrero MD   QUEtiapine (SEROQUEL) 100 MG tablet TAKE 1 AND 1/2 TABLETS BY MOUTH AT BEDTIME  Patient taking differently: TAKE 1 AND 1/2 TABLETS BY MOUTH AT BEDTIME  Patient has 50 mg tabs 4/28/22   Oscar Carrero MD   nitroGLYCERIN (NITROSTAT) 0.4 MG SL tablet Place 1 tablet under the tongue every 5 minutes as needed for Chest pain up to max of 3 total doses.  If no relief after 1 dose, call 911. 4/28/22   Oscar Carrero MD   DULoxetine (CYMBALTA) 60 MG extended release capsule TAKE 1 CAPSULE BY MOUTH 2 TIMES PER DAY 4/22/22   MARYANNE Farias NP   Insulin Degludec (TRESIBA FLEXTOUCH) 200 UNIT/ML SOPN Inject 60 Units into the skin in the morning and at bedtime If po intake poor, decrease to 20 units daily  Patient taking differently: Inject 60 Units into the skin in the morning and at bedtime 80 units in the morning, and 80 units in the evening 4/21/22   MARYANNE Farias NP   Insulin Pen Needle (B-D ULTRAFINE III SHORT PEN) 31G X 8 MM MISC Inject 1 each into the skin daily May substitute with any brand 4/14/22   MARYANNE Farias NP   blood glucose test strips (ONETOUCH ULTRA) strip USE TO TEST BLOOD SUGAR BEFORE MEALS, AT BEDTIME, AND AS NEEDED (up to 9 TIMES DAILY) 4/5/22   MARYANNE Farias NP   ibuprofen (ADVIL;MOTRIN) 800 MG tablet TAKE 1 TABLET BY MOUTH EVERY 8 HOURS WITH FOOD AS NEEDED FOR PAIN  Patient not taking: Reported on 5/17/2022 2/28/22   Rick Anne MD   clopidogrel (PLAVIX) 75 MG tablet Take 75 mg by mouth daily nightly 1/10/22   Historical Provider, MD   pantoprazole (PROTONIX) 40 MG tablet Take 40 mg by mouth daily BID 1/23/22   Historical Provider, MD   atorvastatin (LIPITOR) 80 MG tablet TAKE 1 TABLET BY MOUTH NIGHTLY 2/1/22   MARYANNE Jordan NP   albuterol sulfate HFA (VENTOLIN HFA) 108 (90 Base) MCG/ACT inhaler INHALE 2 PUFFS INTO LUNGS EVERY 6 HOURS AS NEEDED FOR WHEEZING 1/3/22   Rick Anne MD   insulin lispro, 1 Unit Dial, (HUMALOG KWIKPEN) 100 UNIT/ML SOPN INJECT SUBCUTANEOUSLY PER SLIDING SCALE, 150-200 INJECT 3U, 201-250 INJECT 6U, 251-300 INJECT 9U, >300 INJECT 12U, MAX 30U/DAY 11/2/21   MARYANNE Jordan NP   ipratropium-albuterol (DUONEB) 0.5-2.5 (3) MG/3ML SOLN nebulizer solution INHALE 3 MLS (ONE VIAL) WITH NEBULIZER EVERY 6 HOURS AS NEEDED FOR SHORTNESS OF BREATH 11/2/21   MARYANNE Jordan NP   clotrimazole (LOTRIMIN) 1 % cream Apply topically 2 times daily.  11/2/21 Vanessa Bob, APRN - NP   ergocalciferol (ERGOCALCIFEROL) 1.25 MG (28562 UT) capsule Take 50,000 Units by mouth once a week Indications: Saturdays     Historical Provider, MD   magnesium oxide (MAG-OX) 400 MG tablet Take 400 mg by mouth daily Afternoon 8/9/21   Historical Provider, MD   oxyCODONE-acetaminophen (PERCOCET) 5-325 MG per tablet Take 1 tablet by mouth every 4 hours as needed for Pain. Indications: last fill 2/27/22 with quantity 170 for 30 days One tablet at 0700, one tablet at 1900, one tablet in the afternoon prn pain 9/6/21   Historical Provider, MD PARKER SALINE NASAL SPRAY 0.65 % nasal spray 1 spray by Nasal route as needed for Congestion   Patient not taking: Reported on 5/17/2022 7/6/21   Historical Provider, MD   OXYGEN Inhale into the lungs Indications: On 3 liters per n/c during the night. Patient not taking: Reported on 4/28/2022    Historical Provider, MD   Multiple Vitamins-Minerals (MULTIVITAMIN GUMMIES WOMENS) CHEW Take 2 each by mouth daily     Historical Provider, MD   carvedilol (COREG) 12.5 MG tablet Take 12.5 mg by mouth 2 times daily (with meals)     Historical Provider, MD       REVIEW OF SYSTEMS    (2-9 systems for level 4, 10 or more for level 5)    Review of Systems   Constitutional: Negative for chills, fatigue and fever. HENT: Negative for congestion and rhinorrhea. Respiratory: Positive for chest tightness and shortness of breath. Negative for cough. Cardiovascular: Positive for chest pain and leg swelling. Gastrointestinal: Negative for abdominal pain, nausea and vomiting. Genitourinary: Negative for flank pain. Musculoskeletal: Negative for back pain and myalgias. Neurological: Negative for headaches. Psychiatric/Behavioral: Negative for confusion. The patient is not nervous/anxious. All other systems reviewed and are negative.       PHYSICAL EXAM   (up to 7 for level 4, 8 or more for level 5)    INITIAL VITALS:   ED Triage Vitals [05/17/22 1104]   BP Temp Temp src Pulse Resp SpO2 Height Weight   (!) 168/85 98.7 °F (37.1 °C) -- 86 18 97 % 5' 8\" (1.727 m) 248 lb (112.5 kg)       Physical Exam  Vitals and nursing note reviewed. Constitutional:       General: She is not in acute distress. Appearance: She is well-developed. She is obese. She is not ill-appearing or diaphoretic. Cardiovascular:      Rate and Rhythm: Normal rate and regular rhythm. Pulses: Normal pulses. Radial pulses are 2+ on the right side and 2+ on the left side. Posterior tibial pulses are 2+ on the right side and 2+ on the left side. Heart sounds: Normal heart sounds. No murmur heard. Pulmonary:      Effort: Pulmonary effort is normal. No respiratory distress. Breath sounds: Examination of the right-lower field reveals rales. Examination of the left-lower field reveals rales. Rales present. No decreased breath sounds. Abdominal:      General: There is no distension. Palpations: Abdomen is soft. Tenderness: There is no abdominal tenderness. Musculoskeletal:         General: Swelling present. Right lower leg: 3+ Edema present. Left lower leg: 3+ Edema present. Skin:     General: Skin is warm and dry. Capillary Refill: Capillary refill takes less than 2 seconds. Neurological:      Mental Status: She is alert and oriented to person, place, and time. DIFFERENTIAL  DIAGNOSIS   PLAN (LABS / IMAGING / EKG):  Orders Placed This Encounter   Procedures    XR CHEST (2 VW)    CBC with Auto Differential    Basic Metabolic Panel    Troponin    Brain Natriuretic Peptide    Basic Metabolic Panel    Magnesium    CBC auto differential    Brain Natriuretic Peptide    Lipid panel - fasting    TSH without Reflex    ADULT DIET;  Regular; Low Fat/Low Chol/High Fiber/ROBY; Low Sodium (2 gm); 2000 ml    Vital signs per unit routine    Notify physician    Daily weights    Intake and output    Up as tolerated    Telemetry monitoring - continuous duration    HYPOGLYCEMIA TREATMENT: blood glucose less than 50 mg/dL and patient  ALERT and TOLERATING PO    HYPOGLYCEMIA TREATMENT: blood glucose less than 70 mg/dL and patient ALERT and TOLERATING PO    HYPOGLYCEMIA TREATMENT: blood glucose less than 70 mg/dL and patient NOT ALERT or NPO    Full Code    Inpatient consult to Internal Medicine    Inpatient consult to Cardiology    Inpatient consult to Pulmonology    Initiate Oxygen Therapy Protocol    POCT glucose    POCT Glucose    POC Glucose Fingerstick    POC Glucose Fingerstick    EKG 12 Lead    ADMIT TO INPATIENT       MEDICATIONS ORDERED:  Orders Placed This Encounter   Medications    clopidogrel (PLAVIX) tablet 300 mg    DISCONTD: nitroGLYCERIN (NITROSTAT) SL tablet 0.4 mg    HYDROmorphone (DILAUDID) injection 0.25 mg    bumetanide (BUMEX) injection 2 mg    HYDROmorphone (DILAUDID) injection 0.5 mg    sodium chloride flush 0.9 % injection 5-40 mL    sodium chloride flush 0.9 % injection 5-40 mL    0.9 % sodium chloride infusion    polyethylene glycol (GLYCOLAX) packet 17 g    OR Linked Order Group     acetaminophen (TYLENOL) tablet 650 mg     acetaminophen (TYLENOL) suppository 650 mg    DISCONTD: enoxaparin (LOVENOX) injection 40 mg     Order Specific Question:   Indication of Use     Answer:   Prophylaxis-DVT/PE    albuterol sulfate  (90 Base) MCG/ACT inhaler 2 puff     Order Specific Question:   Initiate RT Bronchodilator Protocol     Answer: Yes    ALPRAZolam (XANAX) tablet 0.5 mg    atorvastatin (LIPITOR) tablet 80 mg    carvedilol (COREG) tablet 12.5 mg    clopidogrel (PLAVIX) tablet 75 mg    DULoxetine (CYMBALTA) extended release capsule 60 mg    DISCONTD: Insulin Degludec SOPN 60 Units    ipratropium-albuterol (DUONEB) nebulizer solution 1 ampule     Order Specific Question:   Initiate RT Bronchodilator Protocol     Answer:    Yes    DISCONTD: magnesium oxide (MAG-OX) tablet 400 mg  DISCONTD: Multivitamin Gummies Womens CHEW 2 each    nitroGLYCERIN (NITROSTAT) SL tablet 0.4 mg    oxyCODONE-acetaminophen (PERCOCET) 5-325 MG per tablet 1 tablet    DISCONTD: pantoprazole (PROTONIX) tablet 40 mg    QUEtiapine (SEROQUEL) tablet 150 mg    vilazodone HCl (VIIBRYD) TABS 20 mg    bumetanide (BUMEX) injection 1 mg    HYDROmorphone (DILAUDID) injection 0.5 mg    enoxaparin Sodium (LOVENOX) injection 30 mg     Order Specific Question:   Indication of Use     Answer:   Prophylaxis-DVT/PE    magnesium oxide (MAG-OX) tablet 400 mg    DISCONTD: insulin glargine (LANTUS) injection vial 60 Units    glucose chewable tablet 16 g    OR Linked Order Group     dextrose bolus 10% 125 mL     dextrose bolus 10% 250 mL    glucagon (rDNA) injection 1 mg    dextrose 5 % solution    insulin glargine (LANTUS) injection vial 80 Units    pantoprazole (PROTONIX) tablet 40 mg         DIAGNOSTIC RESULTS / EMERGENCYDEPARTMENT COURSE / MDM   LABS:  Labs Reviewed   BASIC METABOLIC PANEL - Abnormal; Notable for the following components:       Result Value    Glucose 301 (*)     All other components within normal limits   BRAIN NATRIURETIC PEPTIDE - Abnormal; Notable for the following components:    Pro- (*)     All other components within normal limits   POC GLUCOSE FINGERSTICK - Abnormal; Notable for the following components:    POC Glucose 291 (*)     All other components within normal limits   POC GLUCOSE FINGERSTICK - Abnormal; Notable for the following components:    POC Glucose 407 (*)     All other components within normal limits   CBC WITH AUTO DIFFERENTIAL   TROPONIN   TSH   BASIC METABOLIC PANEL   MAGNESIUM   CBC WITH AUTO DIFFERENTIAL   LIPID PANEL   POCT GLUCOSE   POCT GLUCOSE   POCT GLUCOSE       RADIOLOGY:  XR CHEST (2 VW)    Result Date: 5/17/2022  EXAMINATION: TWO XRAY VIEWS OF THE CHEST 5/17/2022 11:56 am COMPARISON: None.  HISTORY: ORDERING SYSTEM PROVIDED HISTORY: shortness of breath TECHNOLOGIST PROVIDED HISTORY: shortness of breath Reason for Exam: sob, chest pain, lower leg swelling, wheezing FINDINGS: Normal cardiomediastinal silhouette. Mild right lower lobe airspace disease. No pneumothorax or pleural effusion. Mild right lower lobe airspace disease. This could represent atelectasis or in the appropriate clinical setting pneumonia. EKG    EKG Interpretation    Interpreted by me    Rhythm: normal sinus   Rate: normal, 81  Axis: normal  Ectopy: none  Conduction: normal  ST Segments: no acute change  T Waves: Enlarged in multiple leads, inverted V1  Q Waves: none    Clinical Impression: Sinus rhythm rate of 81. No ST segment changes, no arrhythmia, no ectopy. Normal axis, good R wave progression. Deeply inverted T wave in V1. Enlarged P waves in V2. Peaked T waves seen in multiple leads      All EKG's are interpreted by the Emergency Department Physician whoeither signs or Co-signs this chart in the absence of a cardiologist.    Impression:  Patient presents with bilateral lower extremity edema no worsening over the past several days accompanied by some intermittent chest pain or shortness of breath. Does have extensive coronary artery disease. She states that she is on Bumex twice daily however there is no immediate record available of this. Given patient's worsening bilateral lower extremity edema despite the fact that she is taking Bumex, I am concerned that patient fluid overload status is provoking chest pain and shortness of breath. Cardiac work-up, will hopefully speak to her cardiologist is sending to come to [facility], otherwise possible admission. EMERGENCY DEPARTMENT COURSE:  ED Course as of 05/17/22 2114   Tue May 17, 2022   1219 Initial troponin 13. BNP elevated at 315 however given patient's obesity this could be falsely lowered. Will speak with patient's cardiologist. [AP]   [phone] with Dr. Mt Mora of [facility] cardiology.   He agrees with IV Bumex for now, admission, and states that one of his partners will come see patient either today or tomorrow morning. [AP]      ED Course User Index  [AP] Cookie Tavarez DO       MDM:  Spoke with cardiology as above. They agree with admission. Spoke with patient's primary care provider who agreed with admission as well and admitted the patient. Pain control currently with Dilaudid as patient has intolerances to morphine and to fentanyl. Again has an anaphylactic allergy to aspirin and therefore was given a Plavix load. Patient otherwise safe for admission. Remained hemodynamically stable in the emergency department otherwise        CONSULTS:  Denis Jacobs TO CARDIOLOGY  IP CONSULT TO PULMONOLOGY    CRITICAL CARE:  There was a high probability of clinically significant/life threatening deterioration in this patient's condition which required my urgent intervention. Total critical care time was 25 minutes. This excludes any time for separately reportable procedures. FINAL IMPRESSION     1. Bilateral lower extremity edema    2. Chest pain, unspecified type          DISPOSITION / PLAN   DISPOSITION Admitted 05/17/2022 01:40:04 PM        PATIENT REFERRED TO:  No follow-up provider specified.     DISCHARGE MEDICATIONS:  Current Discharge Medication List          Cookie Tavarez DO  Emergency Medicine Attending    (Please note that portions of this note were completed with a voice recognition program.  Efforts were made to edit the dictations but occasionally words are mis-transcribed.)          Cookie Tavarez DO  05/17/22 0362

## 2022-05-18 PROBLEM — I50.23 ACUTE ON CHRONIC SYSTOLIC (CONGESTIVE) HEART FAILURE (HCC): Status: ACTIVE | Noted: 2022-01-07

## 2022-05-18 LAB
ABSOLUTE EOS #: 0.5 K/UL (ref 0–0.4)
ABSOLUTE LYMPH #: 2.9 K/UL (ref 1–4.8)
ABSOLUTE MONO #: 1 K/UL (ref 0.1–1.3)
ANION GAP SERPL CALCULATED.3IONS-SCNC: 11 MMOL/L (ref 9–17)
BASOPHILS # BLD: 1 % (ref 0–2)
BASOPHILS ABSOLUTE: 0.1 K/UL (ref 0–0.2)
BUN BLDV-MCNC: 14 MG/DL (ref 6–20)
CALCIUM SERPL-MCNC: 9.4 MG/DL (ref 8.6–10.4)
CHLORIDE BLD-SCNC: 101 MMOL/L (ref 98–107)
CHOLESTEROL/HDL RATIO: 5.2
CHOLESTEROL: 233 MG/DL
CO2: 30 MMOL/L (ref 20–31)
CREAT SERPL-MCNC: 0.59 MG/DL (ref 0.5–0.9)
EKG ATRIAL RATE: 81 BPM
EKG P AXIS: 51 DEGREES
EKG P-R INTERVAL: 144 MS
EKG Q-T INTERVAL: 364 MS
EKG QRS DURATION: 80 MS
EKG QTC CALCULATION (BAZETT): 422 MS
EKG R AXIS: 53 DEGREES
EKG T AXIS: 46 DEGREES
EKG VENTRICULAR RATE: 81 BPM
EOSINOPHILS RELATIVE PERCENT: 6 % (ref 0–4)
GFR AFRICAN AMERICAN: >60 ML/MIN
GFR NON-AFRICAN AMERICAN: >60 ML/MIN
GFR SERPL CREATININE-BSD FRML MDRD: ABNORMAL ML/MIN/{1.73_M2}
GLUCOSE BLD-MCNC: 144 MG/DL (ref 70–99)
GLUCOSE BLD-MCNC: 152 MG/DL (ref 65–105)
GLUCOSE BLD-MCNC: 157 MG/DL (ref 65–105)
GLUCOSE BLD-MCNC: 161 MG/DL (ref 65–105)
GLUCOSE BLD-MCNC: 227 MG/DL (ref 65–105)
GLUCOSE BLD-MCNC: 437 MG/DL (ref 65–105)
HCT VFR BLD CALC: 41.9 % (ref 36–46)
HDLC SERPL-MCNC: 45 MG/DL
HEMOGLOBIN: 13.7 G/DL (ref 12–16)
LDL CHOLESTEROL: 138 MG/DL (ref 0–130)
LYMPHOCYTES # BLD: 31 % (ref 24–44)
MAGNESIUM: 1.7 MG/DL (ref 1.6–2.6)
MCH RBC QN AUTO: 33.5 PG (ref 26–34)
MCHC RBC AUTO-ENTMCNC: 32.8 G/DL (ref 31–37)
MCV RBC AUTO: 102.3 FL (ref 80–100)
MONOCYTES # BLD: 10 % (ref 1–7)
PDW BLD-RTO: 13.1 % (ref 11.5–14.9)
PLATELET # BLD: 255 K/UL (ref 150–450)
PMV BLD AUTO: 8.8 FL (ref 6–12)
POTASSIUM SERPL-SCNC: 3.6 MMOL/L (ref 3.7–5.3)
PROCALCITONIN: 0.03 NG/ML
RBC # BLD: 4.1 M/UL (ref 4–5.2)
SEG NEUTROPHILS: 52 % (ref 36–66)
SEGMENTED NEUTROPHILS ABSOLUTE COUNT: 5 K/UL (ref 1.3–9.1)
SODIUM BLD-SCNC: 142 MMOL/L (ref 135–144)
TRIGL SERPL-MCNC: 249 MG/DL
WBC # BLD: 9.5 K/UL (ref 3.5–11)

## 2022-05-18 PROCEDURE — 83735 ASSAY OF MAGNESIUM: CPT

## 2022-05-18 PROCEDURE — 84145 PROCALCITONIN (PCT): CPT

## 2022-05-18 PROCEDURE — 6370000000 HC RX 637 (ALT 250 FOR IP): Performed by: NURSE PRACTITIONER

## 2022-05-18 PROCEDURE — 36415 COLL VENOUS BLD VENIPUNCTURE: CPT

## 2022-05-18 PROCEDURE — 80061 LIPID PANEL: CPT

## 2022-05-18 PROCEDURE — 6360000002 HC RX W HCPCS: Performed by: FAMILY MEDICINE

## 2022-05-18 PROCEDURE — 99223 1ST HOSP IP/OBS HIGH 75: CPT | Performed by: FAMILY MEDICINE

## 2022-05-18 PROCEDURE — 80048 BASIC METABOLIC PNL TOTAL CA: CPT

## 2022-05-18 PROCEDURE — 94640 AIRWAY INHALATION TREATMENT: CPT

## 2022-05-18 PROCEDURE — 2580000003 HC RX 258: Performed by: FAMILY MEDICINE

## 2022-05-18 PROCEDURE — 6370000000 HC RX 637 (ALT 250 FOR IP): Performed by: FAMILY MEDICINE

## 2022-05-18 PROCEDURE — 93010 ELECTROCARDIOGRAM REPORT: CPT | Performed by: INTERNAL MEDICINE

## 2022-05-18 PROCEDURE — 2060000000 HC ICU INTERMEDIATE R&B

## 2022-05-18 PROCEDURE — 85025 COMPLETE CBC W/AUTO DIFF WBC: CPT

## 2022-05-18 PROCEDURE — 2500000003 HC RX 250 WO HCPCS: Performed by: FAMILY MEDICINE

## 2022-05-18 PROCEDURE — 94761 N-INVAS EAR/PLS OXIMETRY MLT: CPT

## 2022-05-18 RX ORDER — BUDESONIDE 0.5 MG/2ML
0.5 INHALANT ORAL 2 TIMES DAILY
Status: DISCONTINUED | OUTPATIENT
Start: 2022-05-18 | End: 2022-05-26 | Stop reason: HOSPADM

## 2022-05-18 RX ORDER — GUAIFENESIN DEXTROMETHORPHAN HYDROBROMIDE ORAL SOLUTION 10; 100 MG/5ML; MG/5ML
5 SOLUTION ORAL EVERY 4 HOURS PRN
Status: DISCONTINUED | OUTPATIENT
Start: 2022-05-18 | End: 2022-05-26 | Stop reason: HOSPADM

## 2022-05-18 RX ORDER — POTASSIUM CHLORIDE 20 MEQ/1
40 TABLET, EXTENDED RELEASE ORAL PRN
Status: DISCONTINUED | OUTPATIENT
Start: 2022-05-18 | End: 2022-05-26 | Stop reason: HOSPADM

## 2022-05-18 RX ORDER — POTASSIUM CHLORIDE 7.45 MG/ML
10 INJECTION INTRAVENOUS PRN
Status: DISCONTINUED | OUTPATIENT
Start: 2022-05-18 | End: 2022-05-26 | Stop reason: HOSPADM

## 2022-05-18 RX ADMIN — HYDROMORPHONE HYDROCHLORIDE 0.5 MG: 1 INJECTION, SOLUTION INTRAMUSCULAR; INTRAVENOUS; SUBCUTANEOUS at 19:59

## 2022-05-18 RX ADMIN — INSULIN GLARGINE 80 UNITS: 100 INJECTION, SOLUTION SUBCUTANEOUS at 08:05

## 2022-05-18 RX ADMIN — HYDROMORPHONE HYDROCHLORIDE 0.5 MG: 1 INJECTION, SOLUTION INTRAMUSCULAR; INTRAVENOUS; SUBCUTANEOUS at 11:15

## 2022-05-18 RX ADMIN — INSULIN LISPRO 2 UNITS: 100 INJECTION, SOLUTION INTRAVENOUS; SUBCUTANEOUS at 21:05

## 2022-05-18 RX ADMIN — HYDROMORPHONE HYDROCHLORIDE 0.5 MG: 1 INJECTION, SOLUTION INTRAMUSCULAR; INTRAVENOUS; SUBCUTANEOUS at 15:26

## 2022-05-18 RX ADMIN — Medication 400 MG: at 08:03

## 2022-05-18 RX ADMIN — OXYCODONE HYDROCHLORIDE AND ACETAMINOPHEN 1 TABLET: 5; 325 TABLET ORAL at 17:21

## 2022-05-18 RX ADMIN — PANTOPRAZOLE SODIUM 40 MG: 40 TABLET, DELAYED RELEASE ORAL at 17:26

## 2022-05-18 RX ADMIN — CLOPIDOGREL BISULFATE 75 MG: 75 TABLET ORAL at 08:03

## 2022-05-18 RX ADMIN — SODIUM CHLORIDE, PRESERVATIVE FREE 10 ML: 5 INJECTION INTRAVENOUS at 20:01

## 2022-05-18 RX ADMIN — QUETIAPINE FUMARATE 150 MG: 100 TABLET ORAL at 17:26

## 2022-05-18 RX ADMIN — GUAIFENESIN DEXTROMETHORPHAN HYDROBROMIDE ORAL SOLUTION 5 ML: 200; 20 SOLUTION ORAL at 21:06

## 2022-05-18 RX ADMIN — BUMETANIDE 1 MG: 0.25 INJECTION, SOLUTION INTRAMUSCULAR; INTRAVENOUS at 21:05

## 2022-05-18 RX ADMIN — IPRATROPIUM BROMIDE AND ALBUTEROL SULFATE 1 AMPULE: 2.5; .5 SOLUTION RESPIRATORY (INHALATION) at 15:17

## 2022-05-18 RX ADMIN — ATORVASTATIN CALCIUM 80 MG: 80 TABLET, FILM COATED ORAL at 19:59

## 2022-05-18 RX ADMIN — CARVEDILOL 12.5 MG: 12.5 TABLET, FILM COATED ORAL at 17:25

## 2022-05-18 RX ADMIN — PANTOPRAZOLE SODIUM 40 MG: 40 TABLET, DELAYED RELEASE ORAL at 07:04

## 2022-05-18 RX ADMIN — VILAZODONE HYDROCHLORIDE 20 MG: 20 TABLET ORAL at 08:09

## 2022-05-18 RX ADMIN — INSULIN LISPRO 3 UNITS: 100 INJECTION, SOLUTION INTRAVENOUS; SUBCUTANEOUS at 08:05

## 2022-05-18 RX ADMIN — CARVEDILOL 12.5 MG: 12.5 TABLET, FILM COATED ORAL at 07:04

## 2022-05-18 RX ADMIN — INSULIN GLARGINE 80 UNITS: 100 INJECTION, SOLUTION SUBCUTANEOUS at 17:28

## 2022-05-18 RX ADMIN — ALPRAZOLAM 0.5 MG: 0.5 TABLET ORAL at 08:04

## 2022-05-18 RX ADMIN — DULOXETINE 60 MG: 60 CAPSULE, DELAYED RELEASE ORAL at 08:03

## 2022-05-18 RX ADMIN — HYDROMORPHONE HYDROCHLORIDE 0.5 MG: 1 INJECTION, SOLUTION INTRAMUSCULAR; INTRAVENOUS; SUBCUTANEOUS at 02:51

## 2022-05-18 RX ADMIN — SODIUM CHLORIDE, PRESERVATIVE FREE 10 ML: 5 INJECTION INTRAVENOUS at 08:09

## 2022-05-18 RX ADMIN — BUMETANIDE 1 MG: 0.25 INJECTION, SOLUTION INTRAMUSCULAR; INTRAVENOUS at 08:04

## 2022-05-18 RX ADMIN — INSULIN LISPRO 15 UNITS: 100 INJECTION, SOLUTION INTRAVENOUS; SUBCUTANEOUS at 17:29

## 2022-05-18 RX ADMIN — HYDROMORPHONE HYDROCHLORIDE 0.5 MG: 1 INJECTION, SOLUTION INTRAMUSCULAR; INTRAVENOUS; SUBCUTANEOUS at 07:04

## 2022-05-18 RX ADMIN — DULOXETINE 60 MG: 60 CAPSULE, DELAYED RELEASE ORAL at 17:26

## 2022-05-18 RX ADMIN — IPRATROPIUM BROMIDE AND ALBUTEROL SULFATE 1 AMPULE: 2.5; .5 SOLUTION RESPIRATORY (INHALATION) at 11:34

## 2022-05-18 RX ADMIN — IPRATROPIUM BROMIDE AND ALBUTEROL SULFATE 1 AMPULE: 2.5; .5 SOLUTION RESPIRATORY (INHALATION) at 20:43

## 2022-05-18 RX ADMIN — IPRATROPIUM BROMIDE AND ALBUTEROL SULFATE 1 AMPULE: 2.5; .5 SOLUTION RESPIRATORY (INHALATION) at 06:49

## 2022-05-18 ASSESSMENT — PAIN SCALES - GENERAL
PAINLEVEL_OUTOF10: 10
PAINLEVEL_OUTOF10: 10
PAINLEVEL_OUTOF10: 9
PAINLEVEL_OUTOF10: 9
PAINLEVEL_OUTOF10: 10

## 2022-05-18 ASSESSMENT — PAIN DESCRIPTION - LOCATION
LOCATION: CHEST;FOOT;LEG
LOCATION: CHEST

## 2022-05-18 NOTE — PLAN OF CARE
Problem: Discharge Planning  Goal: Discharge to home or other facility with appropriate resources  5/18/2022 1753 by Kedar Bernard RN  Outcome: Progressing     Problem: Pain  Goal: Verbalizes/displays adequate comfort level or baseline comfort level  5/18/2022 1753 by Kedar Bernard RN  Outcome: Progressing     Problem: ABCDS Injury Assessment  Goal: Absence of physical injury  5/18/2022 1753 by Kedar Bernard RN  Outcome: Progressing     Problem: Safety - Adult  Goal: Free from fall injury  5/18/2022 1753 by Kedar Bernard RN  Outcome: Progressing

## 2022-05-18 NOTE — PROGRESS NOTES
Came to evaluate the patient however she was out of her and probably stepped outside the hospital eyes and looked for her around the step-down floor and I was not able to locate her. She is a 40-year-old lady with past medical history of ischemic and nonischemic cardiomyopathy with mild-to-moderately reduced LVEF, coronary artery disease with   off a small RCA, dyslipidemia, tobacco dependence,   Previous history of normalized LVEF, history of frequent PVC status post ablation at [facility], obesity and manic depressive disorder. Patient apparently presented with shortness of breath and lower limb edema. She was admitted and started on IV diuresis. ECG without acute ischemic changes. Initial high sensitivity troponin was negative. Review of previous cardiac workup revealed:    Cardiac catheterization 12/22/2021 showed significant obstructive coronary artery disease with totally occluded non dominant RCA otherwise normal coronary arteries. Holter monitor 10/14/2021 did not show evidence of significant arrhythmias or AV blocks. Nuclear stress test 10/14/2021 was low risk for cardiovascular events. Echocardiogram 01/17/2018 showed LVEF 55-60%. Echocardiogram 05/20/2019 showed LVEF 55%, mild TR. Echocardiogram 12/15/2021 showed LVEF 35-40%, mild tricuspid regurgitation. Will defer management to primary team until I am able to evaluate her tomorrow.

## 2022-05-18 NOTE — PROGRESS NOTES
Comprehensive Nutrition Assessment    Type and Reason for Visit:  Initial,Positive Nutrition Screen (Wt loss and poor appetite)    Nutrition Recommendations/Plan:   Continue current diet (handouts provided for patient's review)     Malnutrition Assessment:  Malnutrition Status: At risk for malnutrition (Comment) (05/18/22 9269)    Context:  Chronic Illness     Findings of the 6 clinical characteristics of malnutrition:  Energy Intake:  No significant decrease in energy intake  Weight Loss:  No significant weight loss     Body Fat Loss:  No significant body fat loss     Muscle Mass Loss:  No significant muscle mass loss    Fluid Accumulation:  Mild Extremities   Strength:  Not Performed    Nutrition Assessment:    Pt admitted due to chest pain and found to have CHF exacerbation and COPD exacerbation. Pt stated she had recevied education on low sodium and carb controlled diet in the past, reviewed diets with her anyway. Pt says she is trying to watch sodium at home but is not so concerned about carbohydrates. Pt does not want to be on a carb counted diet at this time. Nutrition Related Findings:    Trace edema in RLE and LLE. Glu: 144. Meds reviewed. PMH: CAD, COPD, T2DM, GERD, HTN, hyperlipidemia, CHF Wound Type: None       Current Nutrition Intake & Therapies:    Average Meal Intake: %     ADULT DIET; Regular; Low Fat/Low Chol/High Fiber/ROBY; Low Sodium (2 gm); 2000 ml    Anthropometric Measures:  Height: 5' 8\" (172.7 cm)  Ideal Body Weight (IBW): 140 lbs (64 kg)    Admission Body Weight: 248 lb 0.3 oz (112.5 kg)  Current Body Weight: 248 lb 0.3 oz (112.5 kg), 177.2 % IBW.  Weight Source: Not Specified  Current BMI (kg/m2): 37.7  Usual Body Weight: 252 lb 3.3 oz (114.4 kg) (12/21 bed scale)  % Weight Change (Calculated): -1.7                    BMI Categories: Obese Class 2 (BMI 35.0 -39.9)    Estimated Daily Nutrient Needs:  Energy Requirements Based On: Formula  Weight Used for Energy Requirements: Current  Energy (kcal/day): 1800 kcal = mifflin * 1  Weight Used for Protein Requirements: Current  Protein (g/day): 115-135g (1-1.2g/kg)     Nutrition Diagnosis:   Limited adherence to nutrition-related recommendations related to food and nutrition related knowledge deficit as evidenced by  (Admission due to CHF)      Nutrition Interventions:   Food and/or Nutrient Delivery: Continue Current Diet  Nutrition Education/Counseling: Education completed (Heart healthy carb counting handouts from nutrition care manual)  Coordination of Nutrition Care: Continue to monitor while inpatient       Nutrition Monitoring and Evaluation:      Food/Nutrient Intake Outcomes: Food and Nutrient Intake       Discharge Planning:    Continue current diet     Cami Marques, Dietetic intern  [phone]    Reviewed and approved by:  Gabriel Guy R.D., L.D.

## 2022-05-18 NOTE — CARE COORDINATION
CASE MANAGEMENT NOTE:    Admission Date:  5/17/2022 Sunny Black is a 50 y.o.  female    Admitted for : Bilateral lower extremity edema [R60.0]  Chest pain, unspecified type [R07.9]  Acute congestive heart failure, unspecified heart failure type ([facility] [address] [I50.9]    Met with:  Patient    PCP:  Dr. Maine Arenas:  [facility]      Is patient alert and oriented at time of discussion:  Yes    Current Residence/ Living Arrangements:  independently at home             Current Services PTA:  No    Does patient go to outpatient dialysis: No  If yes, location and chair time:     Is patient agreeable to VNS: No    Freedom of choice provided:  No    List of [address] provided: No    VNS chosen:  No    DME:  none    Home Oxygen: No    Nebulizer: Yes    CPAP/BIPAP: No    Supplier: N/A    Potential Assistance Needed: No    SNF needed: No    Freedom of choice and list provided: No    Pharmacy:  [facility]       Is patient currently receiving oral anticoagulation therapy? No    Is the Patient an [facility] with Readmission Risk Score greater than 14%? No  If yes, pt needs a follow up appointment made within 7 days. Family Members/Caregivers that pt would like involved in their care:    Yes    If yes, list name here:  Michael Segura    Transportation Provider:  Will need a cab             Discharge Plan:  5/18/22 [facility] Pt is from home in a two story home DME none VNS denies Plan is to discharge to home with no needs. Will need a cab ride home will follow .//tv                   Electronically signed by:  Nestor Duran RN on 5/18/2022 at 12:15 PM

## 2022-05-18 NOTE — PROGRESS NOTES
Results for Kelley Azar (MRN [id number]) as of 5/17/2022 21:08   Ref. Range 5/17/2022 19:59   POC Glucose Latest Ref Range: 65 - 105 mg/dL 407 ()   Dr. Carmelina Pastrana notified of critical blood glucose and current orders.

## 2022-05-18 NOTE — H&P
breast 3/7/2018    Endometriosis     Fainting     GERD (gastroesophageal reflux disease)     hx of    GI bleeding     H. pylori infection     Helicobacter pylori (H. pylori)     [place] (hard of hearing)     both ears, no hearing aids    HTN (hypertension) 7/19/2019    Hyperlipidemia     Hypertension     Left bicipital tenosynovitis 10/17/2018    Left leg pain 5/16/2017    Left sided abdominal pain of unknown cause 7/19/2016    Leg swelling 9/21/2018    Mastoiditis     Mastoiditis, acute 4/30/2014    Migraines     Morbid obesity (HCC)     Myocardial infarction ([facility] [address]     2005    Nausea     Neuropathy     On home oxygen therapy     3 L at night    Ovarian cyst     Passed out     hx of- negative tilt table    Pulmonary hypertension (HCC)     Pulmonary insufficiency     PVC (premature ventricular contraction)     Seasonal allergies     Tricuspid insufficiency     Type II or unspecified type diabetes mellitus without mention of complication, not stated as uncontrolled        Past Surgical History:        Procedure Laterality Date    ABDOMEN SURGERY      abcess    ABDOMINAL ADHESION SURGERY      ABDOMINAL EXPLORATION SURGERY      x 4    ABDOMINAL HERNIA REPAIR      with mesh    ABLATION OF DYSRHYTHMIC FOCUS  2016    ABLATION OF DYSRHYTHMIC FOCUS  09/08/2017    Done at [facility].  ABSCESS DRAINAGE      left hip and chest    APPENDECTOMY      BREAST SURGERY Left 2007    I & D    CARDIAC CATHETERIZATION  2014 & 2000     no stenting    CARPAL TUNNEL RELEASE Right 12/19/2019    Ulnar nerve decompression, right elbow. Release of 1st and 2nd extensor compartments, right forearm.     CARPAL TUNNEL RELEASE  05/04/2020    Carpal tunnel release, left hand    CHOLECYSTECTOMY      COLONOSCOPY      FOOT SURGERY Left     bone fragment removed    FRACTURE SURGERY Right     closed reduction perc pinning ring & middle finger    GASTRIC FUNDOPLICATION  4110    HYSTERECTOMY      total VENTRICULOPERITONEAL SHUNT Right 04/21/2022    Placement of right sided ventriculoperitoneal shunt Medtronic programmable valve set at 1.5. Medications Prior to Admission:    Prior to Admission medications    Medication Sig Start Date End Date Taking? Authorizing Provider   ALPRAZolam Veredy Bloom) 0.5 MG tablet Take 0.5 mg by mouth 2 times daily as needed for Anxiety. Yes Historical Provider, MD   VILAZODONE HCL 20 MG Tablet TAKE 1 TABLET BY MOUTH EVERY DAY  Patient taking differently: New medication, hasn't started this medication yet 5/10/22   Rolly Gold MD   QUEtiapine (SEROQUEL) 100 MG tablet TAKE 1 AND 1/2 TABLETS BY MOUTH AT BEDTIME  Patient taking differently: TAKE 1 AND 1/2 TABLETS BY MOUTH AT BEDTIME  Patient has 50 mg tabs 4/28/22   Rolly Gold MD   nitroGLYCERIN (NITROSTAT) 0.4 MG SL tablet Place 1 tablet under the tongue every 5 minutes as needed for Chest pain up to max of 3 total doses.  If no relief after 1 dose, call 911. 4/28/22   Rolly Gold MD   DULoxetine (CYMBALTA) 60 MG extended release capsule TAKE 1 CAPSULE BY MOUTH 2 TIMES PER DAY 4/22/22   Luanne Lacey APRN - NP   Insulin Degludec (TRESIBA FLEXTOUCH) 200 UNIT/ML SOPN Inject 60 Units into the skin in the morning and at bedtime If po intake poor, decrease to 20 units daily  Patient taking differently: Inject 60 Units into the skin in the morning and at bedtime 80 units in the morning, and 80 units in the evening 4/21/22   Luanne Lacey APRN - NP   Insulin Pen Needle (B-D ULTRAFINE III SHORT PEN) 31G X 8 MM MISC Inject 1 each into the skin daily May substitute with any brand 4/14/22   Luanne Kaplanst APRN - NP   blood glucose test strips (ONETOUCH ULTRA) strip USE TO TEST BLOOD SUGAR BEFORE MEALS, AT BEDTIME, AND AS NEEDED (up to 9 TIMES DAILY) 4/5/22   Luanne Kaplanst APRN - NP   ibuprofen (ADVIL;MOTRIN) 800 MG tablet TAKE 1 TABLET BY MOUTH EVERY 8 HOURS WITH FOOD AS NEEDED FOR PAIN  Patient not taking: Reported on 5/17/2022 2/28/22   Laura Rogers MD   clopidogrel (PLAVIX) 75 MG tablet Take 75 mg by mouth daily nightly 1/10/22   Historical Provider, MD   pantoprazole (PROTONIX) 40 MG tablet Take 40 mg by mouth daily BID 1/23/22   Historical Provider, MD   atorvastatin (LIPITOR) 80 MG tablet TAKE 1 TABLET BY MOUTH NIGHTLY 2/1/22   MARYANNE Jordan NP   albuterol sulfate HFA (VENTOLIN HFA) 108 (90 Base) MCG/ACT inhaler INHALE 2 PUFFS INTO LUNGS EVERY 6 HOURS AS NEEDED FOR WHEEZING 1/3/22   Laura Rogers MD   insulin lispro, 1 Unit Dial, (HUMALOG KWIKPEN) 100 UNIT/ML SOPN INJECT SUBCUTANEOUSLY PER SLIDING SCALE, 150-200 INJECT 3U, 201-250 INJECT 6U, 251-300 INJECT 9U, >300 INJECT 12U, MAX 30U/DAY 11/2/21   MARYANNE Jordan NP   ipratropium-albuterol (DUONEB) 0.5-2.5 (3) MG/3ML SOLN nebulizer solution INHALE 3 MLS (ONE VIAL) WITH NEBULIZER EVERY 6 HOURS AS NEEDED FOR SHORTNESS OF BREATH 11/2/21   MARYANNE Jordan NP   clotrimazole (LOTRIMIN) 1 % cream Apply topically 2 times daily. 11/2/21   MARYANNE Borja NP   ergocalciferol (ERGOCALCIFEROL) 1.25 MG (30730 UT) capsule Take 50,000 Units by mouth once a week Indications: Saturdays     Historical Provider, MD   magnesium oxide (MAG-OX) 400 MG tablet Take 400 mg by mouth daily Afternoon 8/9/21   Historical Provider, MD   oxyCODONE-acetaminophen (PERCOCET) 5-325 MG per tablet Take 1 tablet by mouth every 4 hours as needed for Pain. Indications: last fill 2/27/22 with quantity 170 for 30 days One tablet at 0700, one tablet at 1900, one tablet in the afternoon prn pain 9/6/21   Historical Provider, MD PARKER SALINE NASAL SPRAY 0.65 % nasal spray 1 spray by Nasal route as needed for Congestion   Patient not taking: Reported on 5/17/2022 7/6/21   Historical Provider, MD   OXYGEN Inhale into the lungs Indications: On 3 liters per n/c during the night.   Patient not taking: Reported on 4/28/2022    Historical Provider, MD   Multiple Vitamins-Minerals (MULTIVITAMIN GUMMIES WOMENS) CHEW Take 2 each by mouth daily     Historical Provider, MD   carvedilol (COREG) 12.5 MG tablet Take 12.5 mg by mouth 2 times daily (with meals)     Historical Provider, MD       Allergies:  Latex, Aspirin, Avelox [moxifloxacin], Chantix [varenicline], Doxycycline, Dye [iodides], Flexeril [cyclobenzaprine], Gabapentin, Losartan, Morphine, Nsaids, Pcn [penicillins], Reglan [metoclopramide hcl], Shellfish-derived products, Sulfa antibiotics, Toradol [ketorolac tromethamine], Vancomycin, Zofran, Zyvox [linezolid], Acyclovir, Bactrim [sulfamethoxazole-trimethoprim], Betadine [povidone iodine], Ceclor [cefaclor], Codeine, Macrolides and ketolides, Novolin r [insulin], Novolog [insulin aspart], Phenothiazines, Tape [adhesive tape], Banana, Compazine [prochlorperazine], Fentanyl, Kiwi extract, Tamiflu [oseltamivir phosphate], Clindamycin/lincomycin, and Sotalol    Social History:  The patient currently lives at home    TOBACCO:   reports that she has been smoking cigarettes. She has a 11.50 pack-year smoking history. She has never used smokeless tobacco.  ETOH:   reports no history of alcohol use.       Family History:          Problem Relation Age of Onset    Ulcerative Colitis Father     Liver Disease Father 61        hep c and b    Diabetes Father     Asthma Father     Heart Disease Father     High Blood Pressure Father     Breast Cancer Maternal Aunt     Cancer Maternal Aunt     Diabetes Maternal Aunt     Heart Disease Maternal Aunt     High Blood Pressure Maternal Aunt     Breast Cancer Paternal Aunt     Heart Disease Paternal Aunt     High Blood Pressure Paternal Aunt     Breast Cancer Maternal Grandmother     Cervical Cancer Maternal Grandmother     Cancer Maternal Grandmother     Diabetes Maternal Grandmother     Asthma Maternal Grandmother     Heart Disease Maternal Grandmother     High Blood Pressure Maternal Grandmother     Lung Cancer Maternal Grandfather     Diabetes Maternal Grandfather     Asthma Maternal Grandfather     Heart Disease Maternal Grandfather     High Blood Pressure Maternal Grandfather     Cervical Cancer Paternal Grandmother     Cancer Paternal Grandmother     Diabetes Paternal Grandmother     Heart Disease Paternal Grandmother     High Blood Pressure Paternal Grandmother     Diabetes Mother     Asthma Mother     Heart Disease Mother     High Blood Pressure Mother     Cancer Sister     Heart Disease Maternal Uncle     High Blood Pressure Maternal Uncle     Heart Disease Paternal Uncle     High Blood Pressure Paternal Uncle     Diabetes Paternal Grandfather     Heart Disease Paternal Grandfather     High Blood Pressure Paternal Grandfather        PHYSICAL EXAM:    BP (!) 124/91   Pulse 75   Temp 98.4 °F (36.9 °C) (Oral)   Resp 18   Ht 5' 8\" (1.727 m)   Wt 248 lb (112.5 kg)   SpO2 98%   BMI 37.71 kg/m²     General appearance: No apparent distress appears stated age and cooperative. HEENT Normal cephalic, atraumatic without obvious deformity. Neck: Supple, No jugular venous distention/bruits. Lungs: Clear to auscultation, bilaterally without rales/wheezes/rhonchi with good respiratory effort. Heart: Regular rate and rhythm with Normal S1/S2 without murmurs, rubs or gallops  Mental status: Alert, oriented, thought content appropriate.     CXR:  I have reviewed the CXR with the following interpretation:   Mild right lower lobe airspace disease.  This could represent atelectasis or   in the appropriate clinical setting pneumonia       EKG:  I have reviewed the EKG with the following interpretation: NSR    CBC   Recent Labs     05/17/22  1136 05/18/22  0522   WBC 9.5 9.5   HGB 14.1 13.7   HCT 42.1 41.9    255      RENAL  Recent Labs     05/17/22  1136 05/18/22  0522    142   K 4.3 3.6*   CL 98 101   CO2 29 30   BUN 13 14   CREATININE 0.65 0.59     LFT'S  No results for input(s): AST, ALT, ALB, BILIDIR, BILITOT, ALKPHOS in the last 72 hours. COAG  No results for input(s): INR in the last 72 hours. CARDIAC ENZYMES  No results for input(s): CKTOTAL, CKMB, CKMBINDEX, TROPONINI in the last 72 hours. U/A:    Lab Results   Component Value Date    COLORU Yellow 11/11/2021    WBCUA 2 TO 5 07/17/2019    RBCUA 0 TO 2 07/17/2019    MUCUS 2+ 07/17/2019    BACTERIA MANY 07/17/2019    SPECGRAV 1.019 11/11/2021    LEUKOCYTESUR NEGATIVE 11/11/2021    GLUCOSEU NEGATIVE 11/11/2021    AMORPHOUS NOT REPORTED 07/17/2019       ABG    Lab Results   Component Value Date    YFA5NGS 26.1 10/16/2013    U5MAFQIV 98.4 10/16/2013    PHART 7.41 10/16/2013    CBZ1UCO 42 10/16/2013    PO2ART 86 10/16/2013           Active Hospital Problems    Diagnosis Date Noted    Moderate left ventricular systolic dysfunction [G04.3] 01/07/2022     Priority: Medium    Acute on chronic systolic (congestive) heart failure (HCC) [I50.23] 01/07/2022     Priority: Medium    Uncontrolled type 2 diabetes mellitus with hyperglycemia (HCC) [E11.65] 04/05/2022    Pulmonary HTN ([facility] [address] [I27.20] 07/19/2019    Tobacco abuse [Z72.0] 05/16/2017    COPD (chronic obstructive pulmonary disease) ([facility] [address] [J44.9] 04/30/2014         ASSESSMENT/PLAN:    Acute on chronic systolic CHF - consult cardiology    COPD - consult pulmonology    Diabetes - home meds        DVT Prophylaxis: enoxaparin ordered - patient usually refuses when admitted  Diet: ADULT DIET;  Regular; Low Fat/Low Chol/High Fiber/ROBY; Low Sodium (2 gm); 2000 ml  Code Status: Full Code      Dispo - admitted      Sarah Ledesma MD, FAAFP  5/18/2022, 8:03 AM

## 2022-05-18 NOTE — PROGRESS NOTES
Dr. Galicia Adjutant gave orders for high dose sliding scale. Nurse updated her on listed allergies of insulin aspart. Okay to order humalog sliding scale.

## 2022-05-18 NOTE — CONSULTS
PULMONARY  CONSULT NOTE      Date of Admission: 5/17/2022 11:04 AM    Reason for Consult: COPD exac, Chest pain     Referring Physician: Dr. Candance Phoenix  PCP: Randi Clark MD     History of Present Illness: Saran Iglesias is a 50 y.o. White (non-)   female, past medical history COPD, CHF, Pulm HTN, DM, KRISTIN, DVT, HTN, who presents with complaint of worsening bilateral lower extremity edema and chest pain. Worsening over the past 2 days. Chest pain intermittent with no obvious provoking activity. She describes as a tightening sensation and tells me it radiates into her  back. She states that she is taking Bumex twice a day as prescribed by her cardiologist but she states that she is not urinating very much. Pro-. CXR mild right lower lobe ASD. Most recent ECHO 12/2021 showed EF 35-40%, RVSP 28. She is currently smoking. She tells me she recently cut down to 0.5ppd but previuosly she was smoking 2.5ppd since age 10. She tells me she was only using albuterol at home. CXR shows mild RLL ASD. Pro-. Troponin negtive. WBC unremarkable.      Problem:  Principal Problem: CHF exac, COPD exac    PMH:   Past Medical History:   Diagnosis Date    Acute exacerbation of chronic obstructive pulmonary disease ([address] 3/21/2018    Adhesive capsulitis of left shoulder 9/25/2018    Asthma     Asthma exacerbation 7/24/2014    Atrial fibrillation ([address]     placed on event monitor 9/25/18 for PVC's & A-fib    Atrial premature depolarization 7/19/2019    Bipolar 1 disorder ([address]     Bipolar disorder ([address] 7/19/2019    Bulging disc     CAD (coronary artery disease)     Candida infection 2/23/2018    Cardiomyopathy ([address]     Chest pain 6/13/2017    CHF (congestive heart failure) (HCC)     Chronic otitis media of both ears     rt>lt    Chronic right shoulder pain 3/14/2017    Cigarette nicotine dependence with nicotine-induced disorder 7/19/2019    Class 3 severe obesity due to excess calories with serious comorbidity and body mass index (BMI) of 40.0 to 44.9 in adult [facility]) 10/4/2018    Closed fracture of left ankle 6/25/2019    Clostridium difficile infection     COPD (chronic obstructive pulmonary disease) (HCC)     Cough, persistent 3/21/2018    Current moderate episode of major depressive disorder without prior episode ([address] 8/20/2018    Depression     Diabetes mellitus type 2, controlled ([address] 4/30/2014    Diarrhea     Diarrhea     Dizziness     DVT (deep venous thrombosis) (HCC)     after PICC line right arm    Encounter for monitoring sotalol therapy 2/20/2017    Encounter for screening mammogram for malignant neoplasm of breast 3/7/2018    Endometriosis     Fainting     GERD (gastroesophageal reflux disease)     hx of    GI bleeding     H. pylori infection     Helicobacter pylori (H. pylori)     [place] (hard of hearing)     both ears, no hearing aids    HTN (hypertension) 7/19/2019    Hyperlipidemia     Hypertension     Left bicipital tenosynovitis 10/17/2018    Left leg pain 5/16/2017    Left sided abdominal pain of unknown cause 7/19/2016    Leg swelling 9/21/2018    Mastoiditis     Mastoiditis, acute 4/30/2014    Migraines     Morbid obesity ([address]     Myocardial infarction ([address]     2005    Nausea     Neuropathy     On home oxygen therapy     3 L at night    Ovarian cyst     Passed out     hx of- negative tilt table    Pulmonary hypertension (HCC)     Pulmonary insufficiency     PVC (premature ventricular contraction)     Seasonal allergies     Tricuspid insufficiency     Type II or unspecified type diabetes mellitus without mention of complication, not stated as uncontrolled        PSH:   Past Surgical History:   Procedure Laterality Date    ABDOMEN SURGERY      abcess    ABDOMINAL ADHESION SURGERY      ABDOMINAL EXPLORATION SURGERY      x 4    ABDOMINAL HERNIA REPAIR      with mesh    ABLATION OF DYSRHYTHMIC FOCUS  2016    ABLATION OF DYSRHYTHMIC FOCUS  09/08/2017    Done at [facility].  ABSCESS DRAINAGE      left hip and chest    APPENDECTOMY      BREAST SURGERY Left 2007    I & D    CARDIAC CATHETERIZATION  2014 & 2000     no stenting    CARPAL TUNNEL RELEASE Right 12/19/2019    Ulnar nerve decompression, right elbow. Release of 1st and 2nd extensor compartments, right forearm.  CARPAL TUNNEL RELEASE  05/04/2020    Carpal tunnel release, left hand    CHOLECYSTECTOMY      COLONOSCOPY      FOOT SURGERY Left     bone fragment removed    FRACTURE SURGERY Right     closed reduction perc pinning ring & middle finger    GASTRIC FUNDOPLICATION  3917    HYSTERECTOMY      total    HYSTERECTOMY      HYSTEROSCOPY      tubal perfusion    MYRINGOTOMY Right 11/13/2015    Right myringotomy with placement of  T-tube on the right side. Removal of plugged ventilating tube, right side.  MYRINGOTOMY Left 07/14/2020    MYRINGOTOMY TUBE INSERTION performed by Laurel Davies MD at [facility] Right 06/05/2014    OTHER SURGICAL HISTORY Right 05/29/2014    removal ear tube rt ear    OTHER SURGICAL HISTORY  2009    LOOP recorder inserted and removed 3 mos.  later    OTHER SURGICAL HISTORY Right 08/11/2016    ear tube removal with patch    OTHER SURGICAL HISTORY      tubal perfusion, lysis of uterine adhesions    OTHER SURGICAL HISTORY      multiple PICC lines inserted and removed, it has affected circulation bilateral upper arms    OTHER SURGICAL HISTORY  10/19/2020    Revisiion to subcutaneous transposition of unlar nerve, right elbow    OTHER SURGICAL HISTORY Right 06/14/2021    REVISION TO SUBMUSCULAR TRANSPOSITION OF RIGHT ULNAR NERVE    OTHER SURGICAL HISTORY Left 03/24/2022    DECOMPRESSION OF ULNAR NERVE, LEFT ELBOW    IL OLYA BATES JT W ANESTHESIA Right 03/01/2017    SHOULDER MANIPULATION RIGHT performed by Ritesh Oh MD at [facility] Left 10/17/2018 SHOULDER ARTHROSCOPY W/BICEPS TENDONESIS performed by Jacqueline Real MD at [address] [place] [address] Left     foot    TONSILLECTOMY      TYMPANOSTOMY TUBE PLACEMENT      TYMPANOSTOMY TUBE PLACEMENT Left 03/12/2019    TYMPANOSTOMY TUBE PLACEMENT Right 12/08/2021    Right tympanostomy with tube placement of T-tube with operative microscope and general anesthesia. Right removal of right ear tube.  ULNAR TUNNEL RELEASE Right     UPPER GASTROINTESTINAL ENDOSCOPY      UPPER GASTROINTESTINAL ENDOSCOPY  07/10/2019    UPPER GASTROINTESTINAL ENDOSCOPY N/A 07/10/2019    EGD BIOPSY performed by Angela Yanes MD at [facility] Right 04/21/2022    Placement of right sided ventriculoperitoneal shunt Med[employer]onic programmable valve set at 1.5. Allergies: Allergies   Allergen Reactions    Latex Hives    Aspirin Anaphylaxis    Avelox [Moxifloxacin] Anaphylaxis    Chantix [Varenicline] Swelling    Doxycycline Anaphylaxis    Dye [Iodides] Other (See Comments)     Seizures    Flexeril [Cyclobenzaprine] Anaphylaxis    Gabapentin Anaphylaxis    Losartan Shortness Of Breath    Morphine Itching     Makes patient want to \"scratch out her eyes\". Can take if given with benadryl    Nsaids Anaphylaxis and Hives     Pt states she can take ibuprofen    Pcn [Penicillins] Anaphylaxis and Hives    Reglan [Metoclopramide Hcl] Swelling     Agitation, anger    Shellfish-Derived Products Anaphylaxis    Sulfa Antibiotics Hives, Shortness Of Breath, Itching and Swelling    Toradol [Ketorolac Tromethamine] Hives     Patient states she has a reaction.      Vancomycin Anaphylaxis    Zofran Anaphylaxis    Zyvox [Linezolid] Anaphylaxis    Acyclovir Swelling and Rash    Bactrim [Sulfamethoxazole-Trimethoprim] Hives    Betadine [Povidone Iodine] Hives    Ceclor [Cefaclor] Hives    Codeine Itching and Rash    Macrolides And Ketolides Hives     Pt states she can take Azithromycin    Novolin R [Insulin] Hives    Novolog [Insulin Aspart] Hives    Phenothiazines Swelling    Tape Moses Lord Tape] Rash     OK to use paper tape and tegaderm per patient    Banana     Compazine [Prochlorperazine] Other (See Comments)     Agitation, anger, \"makes me jerk\"    Fentanyl Itching     Pt states doesn't work and makes patient itch    Kiwi Extract     Tamiflu [Oseltamivir Phosphate] Hives    Clindamycin/Lincomycin Nausea Only    Sotalol Other (See Comments)     Pt verbalized that she developed a prolonged QT and had an asthma attack with medication. Home Meds:  Medications Prior to Admission: ALPRAZolam (XANAX) 0.5 MG tablet, Take 0.5 mg by mouth 2 times daily as needed for Anxiety. VILAZODONE HCL 20 MG Tablet, TAKE 1 TABLET BY MOUTH EVERY DAY (Patient taking differently: New medication, hasn't started this medication yet)  QUEtiapine (SEROQUEL) 100 MG tablet, TAKE 1 AND 1/2 TABLETS BY MOUTH AT BEDTIME (Patient taking differently: TAKE 1 AND 1/2 TABLETS BY MOUTH AT BEDTIME Patient has 50 mg tabs)  nitroGLYCERIN (NITROSTAT) 0.4 MG SL tablet, Place 1 tablet under the tongue every 5 minutes as needed for Chest pain up to max of 3 total doses. If no relief after 1 dose, call 911.   DULoxetine (CYMBALTA) 60 MG extended release capsule, TAKE 1 CAPSULE BY MOUTH 2 TIMES PER DAY  Insulin Degludec (TRESIBA FLEXTOUCH) 200 UNIT/ML SOPN, Inject 60 Units into the skin in the morning and at bedtime If po intake poor, decrease to 20 units daily (Patient taking differently: Inject 60 Units into the skin in the morning and at bedtime 80 units in the morning, and 80 units in the evening)  Insulin Pen Needle (B-D ULTRAFINE III SHORT PEN) 31G X 8 MM MISC, Inject 1 each into the skin daily May substitute with any brand  blood glucose test strips (ONETOUCH ULTRA) strip, USE TO TEST BLOOD SUGAR BEFORE MEALS, AT BEDTIME, AND AS NEEDED (up to 9 TIMES DAILY)  ibuprofen (ADVIL;MOTRIN) 800 MG tablet, TAKE 1 TABLET BY MOUTH EVERY 8 HOURS WITH FOOD AS NEEDED FOR PAIN (Patient not taking: Reported on 5/17/2022)  clopidogrel (PLAVIX) 75 MG tablet, Take 75 mg by mouth daily nightly  pantoprazole (PROTONIX) 40 MG tablet, Take 40 mg by mouth daily BID  atorvastatin (LIPITOR) 80 MG tablet, TAKE 1 TABLET BY MOUTH NIGHTLY  albuterol sulfate HFA (VENTOLIN HFA) 108 (90 Base) MCG/ACT inhaler, INHALE 2 PUFFS INTO LUNGS EVERY 6 HOURS AS NEEDED FOR WHEEZING  insulin lispro, 1 Unit Dial, (HUMALOG KWIKPEN) 100 UNIT/ML SOPN, INJECT SUBCUTANEOUSLY PER SLIDING SCALE, 150-200 INJECT 3U, 201-250 INJECT 6U, 251-300 INJECT 9U, >300 INJECT 12U, MAX 30U/DAY  ipratropium-albuterol (DUONEB) 0.5-2.5 (3) MG/3ML SOLN nebulizer solution, INHALE 3 MLS (ONE VIAL) WITH NEBULIZER EVERY 6 HOURS AS NEEDED FOR SHORTNESS OF BREATH  clotrimazole (LOTRIMIN) 1 % cream, Apply topically 2 times daily. ergocalciferol (ERGOCALCIFEROL) 1.25 MG (54206 UT) capsule, Take 50,000 Units by mouth once a week Indications: Saturdays   magnesium oxide (MAG-OX) 400 MG tablet, Take 400 mg by mouth daily Afternoon  oxyCODONE-acetaminophen (PERCOCET) 5-325 MG per tablet, Take 1 tablet by mouth every 4 hours as needed for Pain. Indications: last fill 2/27/22 with quantity 170 for 30 days One tablet at 0700, one tablet at 1900, one tablet in the afternoon prn pain  HM SALINE NASAL SPRAY 0.65 % nasal spray, 1 spray by Nasal route as needed for Congestion  (Patient not taking: Reported on 5/17/2022)  OXYGEN, Inhale into the lungs Indications: On 3 liters per n/c during the night.  (Patient not taking: Reported on 4/28/2022)  Multiple Vitamins-Minerals (MULTIVITAMIN GUMMIES WOMENS) CHEW, Take 2 each by mouth daily   carvedilol (COREG) 12.5 MG tablet, Take 12.5 mg by mouth 2 times daily (with meals)     Social History:   Social History     Socioeconomic History    Marital status: Single     Spouse name: Not on file    Number of children: Not on file    Years of education: Not on file    Highest education level: Not on file   Occupational History     Employer: NONE   Tobacco Use    Smoking status: Current Every Day Smoker     Packs/day: 0.50     Years: 23.00     Pack years: 11.50     Types: Cigarettes    Smokeless tobacco: Never Used   Vaping Use    Vaping Use: Never used   Substance and Sexual Activity    Alcohol use: No     Alcohol/week: 0.0 standard drinks    Drug use: No    Sexual activity: Not on file   Other Topics Concern    Not on file   Social History Narrative    Not on file     Social Determinants of Health     Financial Resource Strain:     Difficulty of Paying Living Expenses: Not on file   Food Insecurity:     Worried About Running Out of Food in the Last Year: Not on file    Mariano of Food in the Last Year: Not on file   Transportation Needs:     Lack of Transportation (Medical): Not on file    Lack of Transportation (Non-Medical):  Not on file   Physical Activity:     Days of Exercise per Week: Not on file    Minutes of Exercise per Session: Not on file   Stress:     Feeling of Stress : Not on file   Social Connections:     Frequency of Communication with Friends and Family: Not on file    Frequency of Social Gatherings with Friends and Family: Not on file    Attends Buddhism Services: Not on file    Active Member of [address] [place] [address] [employer] or Organizations: Not on file    Attends Club or Organization Meetings: Not on file    Marital Status: Not on file   Intimate Partner Violence:     Fear of Current or Ex-Partner: Not on file    Emotionally Abused: Not on file    Physically Abused: Not on file    Sexually Abused: Not on file   Housing Stability:     Unable to Pay for Housing in the Last Year: Not on file    Number of [place] in the Last Year: Not on file    Unstable Housing in the Last Year: Not on file       Family History:   Family History   Problem Relation Age of Onset    Ulcerative Colitis Father     Liver Disease Father 61        hep c and b    Diabetes Father  Asthma Father     Heart Disease Father     High Blood Pressure Father     Breast Cancer Maternal Aunt     Cancer Maternal Aunt     Diabetes Maternal Aunt     Heart Disease Maternal Aunt     High Blood Pressure Maternal Aunt     Breast Cancer Paternal Aunt     Heart Disease Paternal Aunt     High Blood Pressure Paternal Aunt     Breast Cancer Maternal Grandmother     Cervical Cancer Maternal Grandmother     Cancer Maternal Grandmother     Diabetes Maternal Grandmother     Asthma Maternal Grandmother     Heart Disease Maternal Grandmother     High Blood Pressure Maternal Grandmother     Lung Cancer Maternal Grandfather     Diabetes Maternal Grandfather     Asthma Maternal Grandfather     Heart Disease Maternal Grandfather     High Blood Pressure Maternal Grandfather     Cervical Cancer Paternal Grandmother     Cancer Paternal Grandmother     Diabetes Paternal Grandmother     Heart Disease Paternal Grandmother     High Blood Pressure Paternal Grandmother     Diabetes Mother     Asthma Mother     Heart Disease Mother     High Blood Pressure Mother     Cancer Sister     Heart Disease Maternal Uncle     High Blood Pressure Maternal Uncle     Heart Disease Paternal Uncle     High Blood Pressure Paternal Uncle     Diabetes Paternal Grandfather     Heart Disease Paternal Grandfather     High Blood Pressure Paternal Grandfather        Review of Systems  Fever/ chills - no  Chest pain - yes   Cough - yes, whitish green phlegm  Expectoration / hemoptysis - yes   shortness of breath - yes   Headache - no  Sinus drainage/ sore throat - no  abdominal pain - no  Swelling feet - yes   Nausea/ vomiting/ diarrhea/ constipation - no    Physical Exam  Vital Signs: BP (!) 124/91   Pulse 75   Temp 98.4 °F (36.9 °C) (Oral)   Resp 18   Ht 5' 8\" (1.727 m)   Wt 248 lb (112.5 kg)   SpO2 98%   BMI 37.71 kg/m²       Admission Weight: Weight: 248 lb (112.5 kg)    General Appearance: Healthy, alert, active, cooperative, and in no distress  Head: Normocephalic, without obvious abnormality, atraumatic  Neck: no adenopathy, no JVD, supple, symmetrical, trachea midline\"thyroid not enlarged, symmetric, no tenderness/mass/nodule  Lungs: fair air entry bilaterally; breath sounds- vesicular; rhonchi- absent; rales/ crackles- absent, 98% on RA   Heart: : regular rate and rhythm, S1, S2 normal, no murmur, click, rub or gallop  Abdomen: soft, non-tender; bowel sounds normal; no masses,  no organomegaly  Extremities: extremities normal, atraumatic, no cyanosis , +1 BLE edema  Skin: skin color, texture, turgor normal. No rashes or lesions  Neurologic: Grossly normal    [unfilled]    Recent labs, Imaging studies reviewed      Data ReviewCBC:   Recent Labs     05/17/22  1136 05/18/22  0522   WBC 9.5 9.5   RBC 4.22 4.10   HGB 14.1 13.7   HCT 42.1 41.9    255     BMP:   Recent Labs     05/17/22  1136 05/18/22  0522   GLUCOSE 301* 144*    142   K 4.3 3.6*   BUN 13 14   CREATININE 0.65 0.59   CALCIUM 9.6 9.4     ABGs: No results for input(s): PHART, PO2ART, KTV9ADW, AGM9PUO, BEART, J3WSGBZR, QOU2WOX in the last 72 hours. PT/INR:  No results found for: PTINR      ASSESSMENT / PLAN:  · Acute on chronic systolic heart failure- cardiology consulted   · IV diuresis   · COPD exac-BD, pulm hygiene  · Possible pneumonia  · Patient has numerous antibiotic allergies which would severely limit antibiotic selection.    · Check procal  · Nicotine dependence- encourage smoking cessation   · Hx DM  · Plan of care discussed with Dr. Shahid Burrows     Electronically signed by MARYANNE Hull - HANNAH on 05/18/22

## 2022-05-18 NOTE — FLOWSHEET NOTE
05/18/22 1924   Encounter Summary   Encounter Overview/Reason  Spiritual/Emotional Needs   Service Provided For: Patient   Referral/Consult From: Rounding   Complexity of Encounter Low   Spiritual/Emotional needs   Type Spiritual Support   Assessment/Intervention/Outcome   Assessment Unable to assess  (patient sleeping)   Intervention Prayer (assurance of)/[place]

## 2022-05-19 LAB
ANION GAP SERPL CALCULATED.3IONS-SCNC: 10 MMOL/L (ref 9–17)
BUN BLDV-MCNC: 22 MG/DL (ref 6–20)
CALCIUM SERPL-MCNC: 9.3 MG/DL (ref 8.6–10.4)
CHLORIDE BLD-SCNC: 98 MMOL/L (ref 98–107)
CO2: 33 MMOL/L (ref 20–31)
CREAT SERPL-MCNC: 0.81 MG/DL (ref 0.5–0.9)
GFR AFRICAN AMERICAN: >60 ML/MIN
GFR NON-AFRICAN AMERICAN: >60 ML/MIN
GFR SERPL CREATININE-BSD FRML MDRD: ABNORMAL ML/MIN/{1.73_M2}
GLUCOSE BLD-MCNC: 108 MG/DL (ref 65–105)
GLUCOSE BLD-MCNC: 143 MG/DL (ref 65–105)
GLUCOSE BLD-MCNC: 335 MG/DL (ref 65–105)
GLUCOSE BLD-MCNC: 338 MG/DL (ref 70–99)
GLUCOSE BLD-MCNC: 93 MG/DL (ref 65–105)
MAGNESIUM: 1.5 MG/DL (ref 1.6–2.6)
POTASSIUM SERPL-SCNC: 3.6 MMOL/L (ref 3.7–5.3)
SODIUM BLD-SCNC: 141 MMOL/L (ref 135–144)

## 2022-05-19 PROCEDURE — 87205 SMEAR GRAM STAIN: CPT

## 2022-05-19 PROCEDURE — 83735 ASSAY OF MAGNESIUM: CPT

## 2022-05-19 PROCEDURE — 6360000002 HC RX W HCPCS: Performed by: INTERNAL MEDICINE

## 2022-05-19 PROCEDURE — 2060000000 HC ICU INTERMEDIATE R&B

## 2022-05-19 PROCEDURE — 6360000002 HC RX W HCPCS: Performed by: FAMILY MEDICINE

## 2022-05-19 PROCEDURE — 82947 ASSAY GLUCOSE BLOOD QUANT: CPT

## 2022-05-19 PROCEDURE — 99232 SBSQ HOSP IP/OBS MODERATE 35: CPT | Performed by: FAMILY MEDICINE

## 2022-05-19 PROCEDURE — 87070 CULTURE OTHR SPECIMN AEROBIC: CPT

## 2022-05-19 PROCEDURE — 94640 AIRWAY INHALATION TREATMENT: CPT

## 2022-05-19 PROCEDURE — 94761 N-INVAS EAR/PLS OXIMETRY MLT: CPT

## 2022-05-19 PROCEDURE — 6370000000 HC RX 637 (ALT 250 FOR IP): Performed by: FAMILY MEDICINE

## 2022-05-19 PROCEDURE — 2580000003 HC RX 258: Performed by: FAMILY MEDICINE

## 2022-05-19 PROCEDURE — 6370000000 HC RX 637 (ALT 250 FOR IP): Performed by: INTERNAL MEDICINE

## 2022-05-19 PROCEDURE — 36415 COLL VENOUS BLD VENIPUNCTURE: CPT

## 2022-05-19 PROCEDURE — 2700000000 HC OXYGEN THERAPY PER DAY

## 2022-05-19 PROCEDURE — 6370000000 HC RX 637 (ALT 250 FOR IP): Performed by: NURSE PRACTITIONER

## 2022-05-19 PROCEDURE — 2580000003 HC RX 258: Performed by: INTERNAL MEDICINE

## 2022-05-19 PROCEDURE — 80048 BASIC METABOLIC PNL TOTAL CA: CPT

## 2022-05-19 RX ORDER — LANOLIN ALCOHOL/MO/W.PET/CERES
400 CREAM (GRAM) TOPICAL 2 TIMES DAILY
Status: DISCONTINUED | OUTPATIENT
Start: 2022-05-19 | End: 2022-05-26 | Stop reason: HOSPADM

## 2022-05-19 RX ORDER — BUMETANIDE 1 MG/1
2 TABLET ORAL DAILY
Status: DISCONTINUED | OUTPATIENT
Start: 2022-05-19 | End: 2022-05-26 | Stop reason: HOSPADM

## 2022-05-19 RX ORDER — ISOSORBIDE MONONITRATE 30 MG/1
30 TABLET, EXTENDED RELEASE ORAL DAILY
Status: DISCONTINUED | OUTPATIENT
Start: 2022-05-19 | End: 2022-05-26 | Stop reason: HOSPADM

## 2022-05-19 RX ORDER — COLCHICINE 0.6 MG/1
0.6 TABLET ORAL 2 TIMES DAILY
Status: DISCONTINUED | OUTPATIENT
Start: 2022-05-19 | End: 2022-05-26 | Stop reason: HOSPADM

## 2022-05-19 RX ORDER — POTASSIUM CHLORIDE 20 MEQ/1
20 TABLET, EXTENDED RELEASE ORAL DAILY
Status: DISCONTINUED | OUTPATIENT
Start: 2022-05-19 | End: 2022-05-26 | Stop reason: HOSPADM

## 2022-05-19 RX ORDER — CLINDAMYCIN HYDROCHLORIDE 150 MG/1
150 CAPSULE ORAL EVERY 6 HOURS SCHEDULED
Status: DISCONTINUED | OUTPATIENT
Start: 2022-05-19 | End: 2022-05-21 | Stop reason: ALTCHOICE

## 2022-05-19 RX ADMIN — SODIUM CHLORIDE, PRESERVATIVE FREE 10 ML: 5 INJECTION INTRAVENOUS at 09:06

## 2022-05-19 RX ADMIN — IPRATROPIUM BROMIDE AND ALBUTEROL SULFATE 1 AMPULE: 2.5; .5 SOLUTION RESPIRATORY (INHALATION) at 15:05

## 2022-05-19 RX ADMIN — POTASSIUM CHLORIDE 20 MEQ: 20 TABLET, EXTENDED RELEASE ORAL at 09:05

## 2022-05-19 RX ADMIN — ALPRAZOLAM 0.5 MG: 0.5 TABLET ORAL at 08:58

## 2022-05-19 RX ADMIN — CLINDAMYCIN HYDROCHLORIDE 150 MG: 150 CAPSULE ORAL at 18:39

## 2022-05-19 RX ADMIN — BUMETANIDE 2 MG: 1 TABLET ORAL at 08:58

## 2022-05-19 RX ADMIN — COLCHICINE 0.6 MG: 0.6 TABLET ORAL at 12:01

## 2022-05-19 RX ADMIN — CARVEDILOL 12.5 MG: 12.5 TABLET, FILM COATED ORAL at 18:36

## 2022-05-19 RX ADMIN — CARVEDILOL 12.5 MG: 12.5 TABLET, FILM COATED ORAL at 07:20

## 2022-05-19 RX ADMIN — INSULIN GLARGINE 80 UNITS: 100 INJECTION, SOLUTION SUBCUTANEOUS at 18:36

## 2022-05-19 RX ADMIN — SODIUM CHLORIDE, PRESERVATIVE FREE 10 ML: 5 INJECTION INTRAVENOUS at 22:32

## 2022-05-19 RX ADMIN — ISOSORBIDE MONONITRATE 30 MG: 30 TABLET, EXTENDED RELEASE ORAL at 08:58

## 2022-05-19 RX ADMIN — OXYCODONE HYDROCHLORIDE AND ACETAMINOPHEN 1 TABLET: 5; 325 TABLET ORAL at 08:58

## 2022-05-19 RX ADMIN — HYDROMORPHONE HYDROCHLORIDE 0.5 MG: 1 INJECTION, SOLUTION INTRAMUSCULAR; INTRAVENOUS; SUBCUTANEOUS at 14:28

## 2022-05-19 RX ADMIN — HYDROMORPHONE HYDROCHLORIDE 0.5 MG: 1 INJECTION, SOLUTION INTRAMUSCULAR; INTRAVENOUS; SUBCUTANEOUS at 18:32

## 2022-05-19 RX ADMIN — GUAIFENESIN DEXTROMETHORPHAN HYDROBROMIDE ORAL SOLUTION 5 ML: 200; 20 SOLUTION ORAL at 07:17

## 2022-05-19 RX ADMIN — PANTOPRAZOLE SODIUM 40 MG: 40 TABLET, DELAYED RELEASE ORAL at 18:38

## 2022-05-19 RX ADMIN — ATORVASTATIN CALCIUM 80 MG: 80 TABLET, FILM COATED ORAL at 18:36

## 2022-05-19 RX ADMIN — GUAIFENESIN DEXTROMETHORPHAN HYDROBROMIDE ORAL SOLUTION 5 ML: 200; 20 SOLUTION ORAL at 18:32

## 2022-05-19 RX ADMIN — MAGNESIUM OXIDE TAB 400 MG (241.3 MG ELEMENTAL MG) 400 MG: 400 (241.3 MG) TAB at 21:16

## 2022-05-19 RX ADMIN — HYDROMORPHONE HYDROCHLORIDE 0.5 MG: 1 INJECTION, SOLUTION INTRAMUSCULAR; INTRAVENOUS; SUBCUTANEOUS at 00:10

## 2022-05-19 RX ADMIN — AZITHROMYCIN MONOHYDRATE 500 MG: 500 INJECTION, POWDER, LYOPHILIZED, FOR SOLUTION INTRAVENOUS at 14:49

## 2022-05-19 RX ADMIN — HYDROMORPHONE HYDROCHLORIDE 0.5 MG: 1 INJECTION, SOLUTION INTRAMUSCULAR; INTRAVENOUS; SUBCUTANEOUS at 22:32

## 2022-05-19 RX ADMIN — SODIUM CHLORIDE, PRESERVATIVE FREE 10 ML: 5 INJECTION INTRAVENOUS at 21:19

## 2022-05-19 RX ADMIN — HYDROMORPHONE HYDROCHLORIDE 0.5 MG: 1 INJECTION, SOLUTION INTRAMUSCULAR; INTRAVENOUS; SUBCUTANEOUS at 05:56

## 2022-05-19 RX ADMIN — INSULIN GLARGINE 80 UNITS: 100 INJECTION, SOLUTION SUBCUTANEOUS at 08:58

## 2022-05-19 RX ADMIN — INSULIN LISPRO 12 UNITS: 100 INJECTION, SOLUTION INTRAVENOUS; SUBCUTANEOUS at 08:59

## 2022-05-19 RX ADMIN — GUAIFENESIN DEXTROMETHORPHAN HYDROBROMIDE ORAL SOLUTION 5 ML: 200; 20 SOLUTION ORAL at 12:01

## 2022-05-19 RX ADMIN — QUETIAPINE FUMARATE 150 MG: 100 TABLET ORAL at 18:36

## 2022-05-19 RX ADMIN — HYDROMORPHONE HYDROCHLORIDE 0.5 MG: 1 INJECTION, SOLUTION INTRAMUSCULAR; INTRAVENOUS; SUBCUTANEOUS at 10:26

## 2022-05-19 RX ADMIN — COLCHICINE 0.6 MG: 0.6 TABLET ORAL at 21:16

## 2022-05-19 RX ADMIN — IPRATROPIUM BROMIDE AND ALBUTEROL SULFATE 1 AMPULE: 2.5; .5 SOLUTION RESPIRATORY (INHALATION) at 11:02

## 2022-05-19 RX ADMIN — IPRATROPIUM BROMIDE AND ALBUTEROL SULFATE 1 AMPULE: 2.5; .5 SOLUTION RESPIRATORY (INHALATION) at 07:12

## 2022-05-19 RX ADMIN — IPRATROPIUM BROMIDE AND ALBUTEROL SULFATE 1 AMPULE: 2.5; .5 SOLUTION RESPIRATORY (INHALATION) at 19:42

## 2022-05-19 RX ADMIN — DULOXETINE 60 MG: 60 CAPSULE, DELAYED RELEASE ORAL at 18:37

## 2022-05-19 RX ADMIN — CLOPIDOGREL BISULFATE 75 MG: 75 TABLET ORAL at 19:00

## 2022-05-19 RX ADMIN — PANTOPRAZOLE SODIUM 40 MG: 40 TABLET, DELAYED RELEASE ORAL at 07:17

## 2022-05-19 RX ADMIN — OXYCODONE HYDROCHLORIDE AND ACETAMINOPHEN 1 TABLET: 5; 325 TABLET ORAL at 16:25

## 2022-05-19 RX ADMIN — MAGNESIUM OXIDE TAB 400 MG (241.3 MG ELEMENTAL MG) 400 MG: 400 (241.3 MG) TAB at 08:58

## 2022-05-19 RX ADMIN — CLINDAMYCIN HYDROCHLORIDE 150 MG: 150 CAPSULE ORAL at 14:28

## 2022-05-19 RX ADMIN — DULOXETINE 60 MG: 60 CAPSULE, DELAYED RELEASE ORAL at 08:58

## 2022-05-19 RX ADMIN — DICLOFENAC SODIUM 2 G: 10 GEL TOPICAL at 12:02

## 2022-05-19 ASSESSMENT — PAIN SCALES - GENERAL
PAINLEVEL_OUTOF10: 7
PAINLEVEL_OUTOF10: 10
PAINLEVEL_OUTOF10: 10
PAINLEVEL_OUTOF10: 8
PAINLEVEL_OUTOF10: 7
PAINLEVEL_OUTOF10: 10
PAINLEVEL_OUTOF10: 8
PAINLEVEL_OUTOF10: 10
PAINLEVEL_OUTOF10: 5
PAINLEVEL_OUTOF10: 9
PAINLEVEL_OUTOF10: 8

## 2022-05-19 ASSESSMENT — PAIN DESCRIPTION - LOCATION
LOCATION: LEG;FOOT
LOCATION: LEG

## 2022-05-19 ASSESSMENT — PAIN DESCRIPTION - ORIENTATION
ORIENTATION: LEFT;RIGHT
ORIENTATION: RIGHT;LEFT

## 2022-05-19 ASSESSMENT — PAIN DESCRIPTION - DESCRIPTORS: DESCRIPTORS: BURNING;CRAMPING

## 2022-05-19 NOTE — PROGRESS NOTES
Progress Note  Date:2022       Room:7/2097-01  Patient Bertha Jordan     YOB: 1975     Age:47 y.o. Subjective    Subjective:  Symptoms:  (Sleeping with unlabored resps when I entered the room. Started coughing with inspiration for exam. C/o pain right lung base. ). Diet:  Adequate intake (I&O not recorded). Review of Systems  Objective         Vitals Last 24 Hours:  TEMPERATURE:  Temp  Av.2 °F (36.8 °C)  Min: 97.4 °F (36.3 °C)  Max: 98.7 °F (37.1 °C)  RESPIRATIONS RANGE: Resp  Av.3  Min: 18  Max: 20  PULSE OXIMETRY RANGE: SpO2  Av.3 %  Min: 89 %  Max: 99 %  PULSE RANGE: Pulse  Av.8  Min: 71  Max: 81  BLOOD PRESSURE RANGE: Systolic (00ADQ), QZD:490 , Min:112 , AEL:653   ; Diastolic (57VWL), JGQ:45, Min:57, Max:105    I/O (24Hr): No intake or output data in the 24 hours ending 22 0832  Objective:  General Appearance:  Comfortable. Vital signs: (most recent): Blood pressure (!) 112/57, pulse 75, temperature 97.4 °F (36.3 °C), temperature source Oral, resp. rate 20, height 5' 8\" (1.727 m), weight 262 lb 2 oz (118.9 kg), SpO2 92 %. Vital signs are normal.    Lungs:  Normal effort and normal respiratory rate. Breath sounds clear to auscultation. Heart: Normal rate. Regular rhythm. S1 normal and S2 normal.      Labs/Imaging/Diagnostics    Labs:  CBC:Recent Labs     22  1136 22  05   WBC 9.5 9.5   RBC 4.22 4.10   HGB 14.1 13.7   HCT 42.1 41.9   MCV 99.7 102.3*   RDW 13.0 13.1    255     CHEMISTRIES:  Recent Labs     22  1136 22  0522 22  0519    142 141   K 4.3 3.6* 3.6*   CL 98 101 98   CO2 29 30 33*   BUN 13 14 22*   CREATININE 0.65 0.59 0.81   GLUCOSE 301* 144* 338*   MG  --  1.7 1.5*     PT/INR:No results for input(s): PROTIME, INR in the last 72 hours. APTT:No results for input(s): APTT in the last 72 hours.   LIVER PROFILE:No results for input(s): AST, ALT, BILIDIR, BILITOT, ALKPHOS in the last 72 hours. Imaging Last 24 Hours:  XR CHEST (2 VW)    Result Date: 5/17/2022  EXAMINATION: TWO XRAY VIEWS OF THE CHEST 5/17/2022 11:56 am COMPARISON: None. HISTORY: ORDERING SYSTEM PROVIDED HISTORY: shortness of breath TECHNOLOGIST PROVIDED HISTORY: shortness of breath Reason for Exam: sob, chest pain, lower leg swelling, wheezing FINDINGS: Normal cardiomediastinal silhouette. Mild right lower lobe airspace disease. No pneumothorax or pleural effusion. Mild right lower lobe airspace disease. This could represent atelectasis or in the appropriate clinical setting pneumonia. Assessment//Plan           Hospital Problems           Last Modified POA    * (Principal) Acute on chronic systolic (congestive) heart failure ([address] 5/18/2022 Yes    Moderate left ventricular systolic dysfunction 0/57/3463 Yes    Bilateral lower extremity edema 5/18/2022 Yes    COPD (chronic obstructive pulmonary disease) (HCC) (Chronic) 5/18/2022 Yes    Tobacco abuse 5/18/2022 Yes    Pulmonary HTN ([address] 5/18/2022 Yes    Uncontrolled type 2 diabetes mellitus with hyperglycemia ([address] 5/18/2022 Yes        Assessment & Plan     Acute on chronic systolic CHF - per cardiology.  I&O not recorded despite being ordered    COPD - per pulmonology    Diabetes - home meds      Electronically signed by Joy Alvarenga MD on 5/19/22 at 8:32 AM EDT

## 2022-05-19 NOTE — CARE COORDINATION
ONGOING DISCHARGE PLAN:    Patient is alert and oriented x4. Spoke with patient regarding discharge plan and patient confirms that plan is still to discharge to home with no needs  Patient started on imdur   2 mg bumex  On 3 liters    Will continue to follow for additional discharge needs.     Electronically signed by Tico Moreno RN on 5/19/2022 at 3:56 PM

## 2022-05-19 NOTE — CONSULTS
Cardiology Consult           Date of Admission:  5/17/2022  Date of Consultation:  5/19/2022      PCP:  Oscar Carrero MD      Chief Complaint:   Shortness of breath, chest pain    History of Present Illness:  Mathew Nicholson is a 50 y.o. female with past medical history of ischemic and nonischemic cardiomyopathy with mild-to-moderately reduced LVEF, coronary artery disease with   off a small RCA, dyslipidemia, tobacco dependence,   Previous history of normalized LVEF, history of frequent PVC status post ablation at [facility], obesity and manic depressive disorder.     Patient apparently presented with shortness of breath and lower limb edema. She was admitted and started on IV diuresis. Lower limb swelling is minimal mainly in the right side. She states that this gets worse when it is hot and humid. Appears to be compliant with salt restriction. However she drinks about 84 oz of fluid daily. She also complains of constant chest pain over the last several days. This is worse with coughing. While I was in her room I witnessed a spell of  Coughing until her face turned red and she confirmed that this exaggerated her chest pain. Unfortunately she continues to smoke.     ECG without acute ischemic changes. Initial high sensitivity troponin was negative.     Review of previous cardiac workup revealed:     Cardiac catheterization 12/22/2021 showed significant obstructive coronary artery disease with totally occluded non dominant RCA otherwise normal coronary arteries.     Holter monitor 10/14/2021 did not show evidence of significant arrhythmias or AV blocks.     Nuclear stress test 10/14/2021 was low risk for cardiovascular events.     Echocardiogram 01/17/2018 showed LVEF 55-60%.    Echocardiogram 05/20/2019 showed LVEF 55%, mild TR.     Echocardiogram 12/15/2021 showed LVEF 35-40%, mild tricuspid regurgitation.       PMH:   has a past medical history of Acute exacerbation of chronic obstructive pulmonary disease (HCC), Adhesive capsulitis of left shoulder, Asthma, Asthma exacerbation, Atrial fibrillation ([facility]), Atrial premature depolarization, Bipolar 1 disorder ([facility]), Bipolar disorder ([facility]), Bulging disc, CAD (coronary artery disease), Candida infection, Cardiomyopathy ([facility]), Chest pain, CHF (congestive heart failure) ([facility]), Chronic otitis media of both ears, Chronic right shoulder pain, Cigarette nicotine dependence with nicotine-induced disorder, Class 3 severe obesity due to excess calories with serious comorbidity and body mass index (BMI) of 40.0 to 44.9 in adult [facility]), Closed fracture of left ankle, Clostridium difficile infection, COPD (chronic obstructive pulmonary disease) ([facility]), Cough, persistent, Current moderate episode of major depressive disorder without prior episode ([facility]), Depression, Diabetes mellitus type 2, controlled ([facility]), Diarrhea, Diarrhea, Dizziness, DVT (deep venous thrombosis) ([facility]), Encounter for monitoring sotalol therapy, Encounter for screening mammogram for malignant neoplasm of breast, Endometriosis, Fainting, GERD (gastroesophageal reflux disease), GI bleeding, H. pylori infection, Helicobacter pylori (H. pylori), [place] (hard of hearing), HTN (hypertension), Hyperlipidemia, Hypertension, Left bicipital tenosynovitis, Left leg pain, Left sided abdominal pain of unknown cause, Leg swelling, Mastoiditis, Mastoiditis, acute, Migraines, Morbid obesity ([facility]), Myocardial infarction ([facility]), Nausea, Neuropathy, On home oxygen therapy, Ovarian cyst, Passed out, Pulmonary hypertension ([facility]), Pulmonary insufficiency, PVC (premature ventricular contraction), Seasonal allergies, Tricuspid insufficiency, and Type II or unspecified type diabetes mellitus without mention of complication, not stated as uncontrolled. PSH:   has a past surgical history that includes Hysterectomy; Cholecystectomy; Appendectomy; Colonoscopy; Upper gastrointestinal endoscopy;  Abdomen surgery; Abdominal adhesion surgery; Abdominal exploration surgery; Tympanostomy tube placement; Tonsillectomy; Foot surgery (Left); Abdominal hernia repair; hysteroscopy; Gastric fundoplication (5188); Abscess Drainage; Scaphoid fracture surgery (Left); other surgical history (Right, 05/29/2014); Myringotomy Tympanostomy Tube Placement (Right, 06/05/2014); myringotomy (Right, 11/13/2015); Hysterectomy; Cardiac catheterization (2014 & 2000); other surgical history (2009); Breast surgery (Left, 2007); fracture surgery (Right); other surgical history (Right, 08/11/2016); ablation of dysrhythmic focus (2016); other surgical history; other surgical history; pr manipulatn shldr jt w anesthesia (Right, 03/01/2017); ablation of dysrhythmic focus (09/08/2017); pr shoulder scope bone shaving (Left, 10/17/2018); Tympanostomy tube placement (Left, 03/12/2019); Upper gastrointestinal endoscopy (07/10/2019); Upper gastrointestinal endoscopy (N/A, 07/10/2019); Carpal tunnel release (Right, 12/19/2019); Carpal tunnel release (05/04/2020); Ulnar tunnel release (Right); myringotomy (Left, 07/14/2020); other surgical history (10/19/2020); other surgical history (Right, 06/14/2021); Tympanostomy tube placement (Right, 12/08/2021); other surgical history (Left, 03/24/2022); and Ventriculoperitoneal shunt (Right, 04/21/2022). Allergies: Allergies   Allergen Reactions    Latex Hives    Aspirin Anaphylaxis    Avelox [Moxifloxacin] Anaphylaxis    Chantix [Varenicline] Swelling    Doxycycline Anaphylaxis    Dye [Iodides] Other (See Comments)     Seizures    Flexeril [Cyclobenzaprine] Anaphylaxis    Gabapentin Anaphylaxis    Losartan Shortness Of Breath    Morphine Itching     Makes patient want to \"scratch out her eyes\".   Can take if given with benadryl    Nsaids Anaphylaxis and Hives     Pt states she can take ibuprofen    Pcn [Penicillins] Anaphylaxis and Hives    Reglan [Metoclopramide Hcl] Swelling     Agitation, Charles Luna MD   DULoxetine (CYMBALTA) 60 MG extended release capsule TAKE 1 CAPSULE BY MOUTH 2 TIMES PER DAY 4/22/22   Lucillie Ganser, APRN - NP   Insulin Degludec (TRESIBA FLEXTOUCH) 200 UNIT/ML SOPN Inject 60 Units into the skin in the morning and at bedtime If po intake poor, decrease to 20 units daily  Patient taking differently: Inject 60 Units into the skin in the morning and at bedtime 80 units in the morning, and 80 units in the evening 4/21/22   Lucillie Ganser, APRN - NP   Insulin Pen Needle (B-D ULTRAFINE III SHORT PEN) 31G X 8 MM MISC Inject 1 each into the skin daily May substitute with any brand 4/14/22   Lucillie Ganser, APRN - NP   blood glucose test strips (ONETOUCH ULTRA) strip USE TO TEST BLOOD SUGAR BEFORE MEALS, AT BEDTIME, AND AS NEEDED (up to 9 TIMES DAILY) 4/5/22   Lucillie Ganser, APRN - NP   ibuprofen (ADVIL;MOTRIN) 800 MG tablet TAKE 1 TABLET BY MOUTH EVERY 8 HOURS WITH FOOD AS NEEDED FOR PAIN  Patient not taking: Reported on 5/17/2022 2/28/22   Joy Alvarenga MD   clopidogrel (PLAVIX) 75 MG tablet Take 75 mg by mouth daily nightly 1/10/22   Historical Provider, MD   pantoprazole (PROTONIX) 40 MG tablet Take 40 mg by mouth daily BID 1/23/22   Historical Provider, MD   atorvastatin (LIPITOR) 80 MG tablet TAKE 1 TABLET BY MOUTH NIGHTLY 2/1/22   MARYANNE Jordan NP   albuterol sulfate HFA (VENTOLIN HFA) 108 (90 Base) MCG/ACT inhaler INHALE 2 PUFFS INTO LUNGS EVERY 6 HOURS AS NEEDED FOR WHEEZING 1/3/22   Joy Alvarenga MD   insulin lispro, 1 Unit Dial, (HUMALOG KWIKPEN) 100 UNIT/ML SOPN INJECT SUBCUTANEOUSLY PER SLIDING SCALE, 150-200 INJECT 3U, 201-250 INJECT 6U, 251-300 INJECT 9U, >300 INJECT 12U, MAX 30U/DAY 11/2/21   MARYANNE Jordan NP   ipratropium-albuterol (DUONEB) 0.5-2.5 (3) MG/3ML SOLN nebulizer solution INHALE 3 MLS (ONE VIAL) WITH NEBULIZER EVERY 6 HOURS AS NEEDED FOR SHORTNESS OF BREATH 11/2/21   Lucillie Ganser, APRN - NP   clotrimazole (LOTRIMIN) 1 % cream Apply topically 2 times daily. 11/2/21   MARYANNE Obrien - NP   ergocalciferol (ERGOCALCIFEROL) 1.25 MG (66136 UT) capsule Take 50,000 Units by mouth once a week Indications: Saturdays     Historical Provider, MD   magnesium oxide (MAG-OX) 400 MG tablet Take 400 mg by mouth daily Afternoon 8/9/21   Historical Provider, MD   oxyCODONE-acetaminophen (PERCOCET) 5-325 MG per tablet Take 1 tablet by mouth every 4 hours as needed for Pain. Indications: last fill 2/27/22 with quantity 170 for 30 days One tablet at 0700, one tablet at 1900, one tablet in the afternoon prn pain 9/6/21   Historical Provider, MD PARKER SALINE NASAL SPRAY 0.65 % nasal spray 1 spray by Nasal route as needed for Congestion   Patient not taking: Reported on 5/17/2022 7/6/21   Historical Provider, MD   OXYGEN Inhale into the lungs Indications: On 3 liters per n/c during the night.   Patient not taking: Reported on 4/28/2022    Historical Provider, MD   Multiple Vitamins-Minerals (MULTIVITAMIN GUMMIES WOMENS) CHEW Take 2 each by mouth daily     Historical Provider, MD   carvedilol (COREG) 12.5 MG tablet Take 12.5 mg by mouth 2 times daily (with meals)     Historical Provider, MD        Hospital Meds:    Current Facility-Administered Medications   Medication Dose Route Frequency Provider Last Rate Last Admin    isosorbide mononitrate (IMDUR) extended release tablet 30 mg  30 mg Oral Daily David Haines MD   30 mg at 05/19/22 0858    bumetanide (BUMEX) tablet 2 mg  2 mg Oral Daily David Haines MD   2 mg at 05/19/22 0858    potassium chloride (KLOR-CON M) extended release tablet 20 mEq  20 mEq Oral Daily David Haines MD   20 mEq at 05/19/22 0905    magnesium oxide (MAG-OX) tablet 400 mg  400 mg Oral BID David Haines MD   400 mg at 05/19/22 0858    diclofenac sodium (VOLTAREN) 1 % gel 2 g  2 g Topical BID Jason Torres APRN - CNP        potassium chloride (KLOR-CON M) extended release tablet 40 mEq  40 mEq Oral PRN Maryetta Seip, MD Or    potassium bicarb-citric acid (EFFER-K) effervescent tablet 40 mEq  40 mEq Oral PRN Catie Serrano MD        Or    potassium chloride 10 mEq/100 mL IVPB (Peripheral Line)  10 mEq IntraVENous PRN Catie Serrano MD        dextromethorphan-guaiFENesin (ROBITUSSIN-DM)  MG/5ML liquid 5 mL  5 mL Oral Q4H PRN MARYANNE Wilson CNP   5 mL at 05/19/22 0717    budesonide (PULMICORT) nebulizer suspension 500 mcg  0.5 mg Nebulization BID MARYANNE Wilson - CNP        sodium chloride flush 0.9 % injection 5-40 mL  5-40 mL IntraVENous 2 times per day Catie Serrano MD   10 mL at 05/19/22 0906    sodium chloride flush 0.9 % injection 5-40 mL  5-40 mL IntraVENous PRN Catie Serrano MD        0.9 % sodium chloride infusion   IntraVENous PRN Catie Serrano MD        polyethylene glycol [facility]) packet 17 g  17 g Oral Daily PRN Catie Serrano MD        acetaminophen (TYLENOL) tablet 650 mg  650 mg Oral Q6H PRN Catie Serrano MD        Or   [facility] acetaminophen (TYLENOL) suppository 650 mg  650 mg Rectal Q6H PRN Catie Serrano MD        albuterol sulfate  (90 Base) MCG/ACT inhaler 2 puff  2 puff Inhalation Q4H PRN Catie Serrano MD        ALPRAZolam Ria Pile) tablet 0.5 mg  0.5 mg Oral BID PRN Catie Serrano MD   0.5 mg at 05/19/22 0858    atorvastatin (LIPITOR) tablet 80 mg  80 mg Oral Nightly Catie Serrano MD   80 mg at 05/18/22 1959    carvedilol (COREG) tablet 12.5 mg  12.5 mg Oral BID WC Catie Serrano MD   12.5 mg at 05/19/22 0720    clopidogrel (PLAVIX) tablet 75 mg  75 mg Oral Daily Catie Serrano MD   75 mg at 05/18/22 0803    DULoxetine (CYMBALTA) extended release capsule 60 mg  60 mg Oral BID Catie Serrano MD   60 mg at 05/19/22 0858    ipratropium-albuterol (DUONEB) nebulizer solution 1 ampule  1 ampule Inhalation Q4H WA Catie Serrano MD   1 ampule at 05/19/22 1102    nitroGLYCERIN (NITROSTAT) SL tablet 0.4 mg  0.4 mg SubLINGual Q5 Min PRN Anne Marie Soto MD        oxyCODONE-acetaminophen (PERCOCET) 5-325 MG per tablet 1 tablet  1 tablet Oral Q4H PRN Anne Marie Soto MD   1 tablet at 05/19/22 0858    QUEtiapine (SEROQUEL) tablet 150 mg  150 mg Oral Nightly Anne Marie Soto MD   150 mg at 05/18/22 1726    vilazodone HCl (VIIBRYD) TABS 20 mg  20 mg Oral Daily Anne Marie Soto MD   20 mg at 05/18/22 0809    HYDROmorphone (DILAUDID) injection 0.5 mg  0.5 mg IntraVENous Q4H PRN Anne Marie Soto MD   0.5 mg at 05/19/22 1026    enoxaparin Sodium (LOVENOX) injection 30 mg  30 mg SubCUTAneous BID Anne Marie Soto MD        glucose chewable tablet 16 g  4 tablet Oral PRN Anne Marie Soto MD        dextrose bolus 10% 125 mL  125 mL IntraVENous PRN Anne Marie Soto MD        Or    dextrose bolus 10% 250 mL  250 mL IntraVENous PRN Anne Marie Soto MD        glucagon (rDNA) injection 1 mg  1 mg IntraMUSCular PRN Anne Marie Soto MD        dextrose 5 % solution  100 mL/hr IntraVENous PRN Anne Marie Soto MD        insulin glargine (LANTUS) injection vial 80 Units  80 Units SubCUTAneous BID Anne Marie Soto MD   80 Units at 05/19/22 0858    pantoprazole (PROTONIX) tablet 40 mg  40 mg Oral BID Anne Marie Soto MD   40 mg at 05/19/22 0717    insulin lispro (HUMALOG) injection vial 0-18 Units  0-18 Units SubCUTAneous TID WC Anne Marie Soto MD   12 Units at 05/19/22 0859    insulin lispro (HUMALOG) injection vial 0-9 Units  0-9 Units SubCUTAneous Nightly Anne Marie Soto MD   2 Units at 05/18/22 2105       Social History:       TOBACCO:   reports that she has been smoking cigarettes. She has a 11.50 pack-year smoking history. She has never used smokeless tobacco.  ETOH:   reports no history of alcohol use. DRUGS:  reports no history of drug use.   OCCUPATION:          Family Histroy:         Problem Relation Age of Onset    Ulcerative Colitis Father     Liver Disease Father 61        hep c and b    Diabetes Father     Asthma Father     Heart Disease Father     High Blood Pressure Father     Breast Cancer Maternal Aunt     Cancer Maternal Aunt     Diabetes Maternal Aunt     Heart Disease Maternal Aunt     High Blood Pressure Maternal Aunt     Breast Cancer Paternal Aunt     Heart Disease Paternal Aunt     High Blood Pressure Paternal Aunt     Breast Cancer Maternal Grandmother     Cervical Cancer Maternal Grandmother     Cancer Maternal Grandmother     Diabetes Maternal Grandmother     Asthma Maternal Grandmother     Heart Disease Maternal Grandmother     High Blood Pressure Maternal Grandmother     Lung Cancer Maternal Grandfather     Diabetes Maternal Grandfather     Asthma Maternal Grandfather     Heart Disease Maternal Grandfather     High Blood Pressure Maternal Grandfather     Cervical Cancer Paternal Grandmother     Cancer Paternal Grandmother     Diabetes Paternal Grandmother     Heart Disease Paternal Grandmother     High Blood Pressure Paternal Grandmother     Diabetes Mother     Asthma Mother     Heart Disease Mother     High Blood Pressure Mother     Cancer Sister     Heart Disease Maternal Uncle     High Blood Pressure Maternal Uncle     Heart Disease Paternal Uncle     High Blood Pressure Paternal Uncle     Diabetes Paternal Grandfather     Heart Disease Paternal Grandfather     High Blood Pressure Paternal Grandfather            Review of Systems:   · Constitutional: there has been no unanticipated weight loss. There's been no change in energy level, sleep pattern, or activity level. · Eyes: No visual changes or diplopia. No scleral icterus. · ENT: No Headaches, hearing loss or vertigo. No mouth sores or sore throat. · Cardiovascular: No chest pain, dyspnea on exertion, palpitations or loss of consciousness. No cough, hemoptysis, pleuritic pain, or phlebitis.   · Respiratory: No cough or wheezing, no sputum production. No hematemesis. · Gastrointestinal: No abdominal pain, appetite loss, blood in stools. No change in bowel or bladder habits. · Genitourinary: No dysuria, trouble voiding, or hematuria. · Musculoskeletal:  No gait disturbance, weakness or joint complaints. · Integumentary: No rash or pruritis. · Neurological: No headache, diplopia, change in muscle strength, numbness or tingling. No change in gait, balance, coordination, mood, affect, memory, mentation, behavior. · Psychiatric: No anxiety, or depression. · Endocrine: No temperature intolerance. No excessive thirst, fluid intake, or urination. No tremor. · Hematologic/Lymphatic: No abnormal bruising or bleeding, blood clots or swollen lymph nodes. · Allergic/Immunologic: No nasal congestion or hives. Physical Exam    Vital Signs: BP (!) 112/57   Pulse 75   Temp 97.4 °F (36.3 °C) (Oral)   Resp 14   Ht 5' 8\" (1.727 m)   Wt 262 lb 2 oz (118.9 kg)   SpO2 93%   BMI 39.86 kg/m²  O2 Flow Rate (L/min): 3 L/min     Admission Weight: 248 lb (112.5 kg)     General appearance: Awake, Alert Cooperative    Head: Normocephalic, without obvious abnormality, atraumatic    Eyes: Conjunctivae/corneas clear. PERRL, EOM's intact. Fundi benign    Neck: no adenopathy, no carotid bruit, no JVD, supple, symmetrical, trachea midline and thyroid: not enlarged, symmetric, no tenderness/mass/nodules    Lungs: clear to auscultation bilaterally    Heart: regular rate and rhythm, S1, S2 normal, no murmur, click, rub or gallop    Abdomen: Soft, non-tender.  Bowel sounds normal. No masses,  no organomegaly    Extremities:   Trace right-sided lower limb swelling    Skin: Skin color, texture, turgor normal. No rashes or lesions    Neurologic: Grossly normal            Labs:      CBC:   Recent Labs     05/17/22  1136 05/18/22  0522   WBC 9.5 9.5   HGB 14.1 13.7   HCT 42.1 41.9   MCV 99.7 102.3*    255     BMP:   Recent Labs 05/17/22  1136 05/18/22  0522 05/19/22  0519    142 141   K 4.3 3.6* 3.6*   CL 98 101 98   CO2 29 30 33*   BUN 13 14 22*   CREATININE 0.65 0.59 0.81     PT/INR: No results for input(s): PROTIME, INR in the last 72 hours. APTT: No results for input(s): APTT in the last 72 hours. MAG:   Recent Labs     05/18/22  0522 05/19/22  0519   MG 1.7 1.5*     D Dimer: No results for input(s): DDIMER in the last 72 hours. Troponin T No results for input(s): TROPONINT in the last 72 hours. ProBNP Invalid input(s): PRO-BNP          Diagnosis:  Principal Problem:    Acute on chronic systolic (congestive) heart failure (HCC)  Active Problems: Moderate left ventricular systolic dysfunction    Bilateral lower extremity edema    COPD (chronic obstructive pulmonary disease) ([facility])    Tobacco abuse    Pulmonary HTN ([facility] [address]    Uncontrolled type 2 diabetes mellitus with hyperglycemia ([facility])  Resolved Problems:    * No resolved hospital problems. *      1. Shortness of breath/ chest pain   2. Acute on chronic decompensated systolic congestive heart failure   3. Ischemic and nonischemic cardiomyopathy   4. Hypertension   5. Dyslipidemia   6. Tobacco dependence   7. Obesity, BMI 40    Plan:     patient's chest pain is reproducible with coughing and not clearly cardiac in origin. In addition, her shortness of breath is probably multifactorial with a component of volume overload and underlying COPD.      agree with diuresis for now. Will need strict I&Os. Okay to switch IV diuresis to p.o. Bumex 2 mg p.o. daily. Will also recommend starting Imdur 30 mg p.o. daily for possible anginal pain. Will see how she response to that.       Will continue to follow with you while in the hospital.      Electronically signed by Joe Raymond MD on 5/19/2022 at 11:15 AM

## 2022-05-19 NOTE — PROGRESS NOTES
BRONCHOSPASM/BRONCHOCONSTRICTION     []         IMPROVE AERATION/BREATH SOUNDS  []   ADMINISTER BRONCHODILATOR THERAPY AS APPROPRIATE  []   ASSESS BREATH SOUNDS  []   IMPLEMENT AEROSOL/MDI PROTOCOL  []   PATIENT EDUCATION AS NEEDED    BRONCHOSPASM/BRONCHOCONSTRICTION     [x]         IMPROVE AERATION/BREATH SOUNDS  [x]   ADMINISTER BRONCHODILATOR THERAPY AS APPROPRIATE  [x]   ASSESS BREATH SOUNDS  [x]   IMPLEMENT AEROSOL/MDI PROTOCOL  [x]   PATIENT EDUCATION AS NEEDED    MOBILIZE SECRETIONS    [x]   ASSESS BREATH SOUNDS  [x]   ASSESS SPUTUM PRODUCTION  [x]   COUGH AND DEEP BREATHING  [x]  IMPLEMENT SECRETION MANAGEMENT PROTOCOL  [x]   PATIENT EDUCATION AS NEEDED

## 2022-05-19 NOTE — PROGRESS NOTES
BRONCHOSPASM/BRONCHOCONSTRICTION     [x]         IMPROVE AERATION/BREATH SOUNDS  [x]   ADMINISTER BRONCHODILATOR THERAPY AS APPROPRIATE  [x]   ASSESS BREATH SOUNDS  []   IMPLEMENT AEROSOL/MDI PROTOCOL  [x]   PATIENT EDUCATION AS NEEDED    PROVIDE ADEQUATE OXYGENATION WITH ACCEPTABLE SP02/ABG'S    [x]  IDENTIFY APPROPRIATE OXYGEN THERAPY  [x]   MONITOR SP02/ABG'S AS NEEDED   [x]   PATIENT EDUCATION AS NEEDED     Stable on RA during the day, 3L at night.  Diminished breath sounds

## 2022-05-19 NOTE — PROGRESS NOTES
PULMONARY PROGRESS NOTE:    REASON FOR VISIT:COPD exac, Chest pain   Interval History:    Shortness of Breath: yes, wore o2 overnight   Cough: yes   Sputum: no          Hemoptysis: no  Chest Pain: yes, right sided   Fever: no                   Swelling Feet: no  Headache: no                                           Nausea, Emesis, Abdominal Pain: no  Diarrhea: no         Constipation: no    Events since last visit: procal 0.03    PAST MEDICAL HISTORY:      Scheduled Meds:   isosorbide mononitrate  30 mg Oral Daily    bumetanide  2 mg Oral Daily    potassium chloride  20 mEq Oral Daily    magnesium oxide  400 mg Oral BID    budesonide  0.5 mg Nebulization BID    sodium chloride flush  5-40 mL IntraVENous 2 times per day    atorvastatin  80 mg Oral Nightly    carvedilol  12.5 mg Oral BID WC    clopidogrel  75 mg Oral Daily    DULoxetine  60 mg Oral BID    ipratropium-albuterol  1 ampule Inhalation Q4H WA    QUEtiapine  150 mg Oral Nightly    vilazodone HCl  20 mg Oral Daily    enoxaparin  30 mg SubCUTAneous BID    insulin glargine  80 Units SubCUTAneous BID    pantoprazole  40 mg Oral BID    insulin lispro  0-18 Units SubCUTAneous TID WC    insulin lispro  0-9 Units SubCUTAneous Nightly     Continuous Infusions:   sodium chloride      dextrose       PRN Meds:potassium chloride **OR** potassium alternative oral replacement **OR** potassium chloride, dextromethorphan-guaiFENesin, sodium chloride flush, sodium chloride, polyethylene glycol, acetaminophen **OR** acetaminophen, albuterol sulfate HFA, ALPRAZolam, nitroGLYCERIN, oxyCODONE-acetaminophen, HYDROmorphone, glucose, dextrose bolus **OR** dextrose bolus, glucagon (rDNA), dextrose        PHYSICAL EXAMINATION:  BP (!) 112/57   Pulse 75   Temp 97.4 °F (36.3 °C) (Oral)   Resp 20   Ht 5' 8\" (1.727 m)   Wt 262 lb 2 oz (118.9 kg)   SpO2 92%   BMI 39.86 kg/m²     General : Awake, alert, oriented x 3   Neck  supple, no lymphadenopathy, JVD not raised  Heart  regular rhythm, S1 and S2 normal; no additional sounds heard  Lungs  poor Entry- fair bilaterally; breath sounds : vesicular;   rales/crackles - absent  Abdomen  soft, no tenderness  Upper Extremities  - no cyanosis, mottling; edema : absent  Lower Extremities: no cyanosis, mottling; edema : absent    Current Laboratory, Radiologic, Microbiologic, and Diagnostic studies reviewed  Data ReviewCBC:   Recent Labs     05/17/22  1136 05/18/22  0522   WBC 9.5 9.5   RBC 4.22 4.10   HGB 14.1 13.7   HCT 42.1 41.9    255     BMP:   Recent Labs     05/17/22  1136 05/18/22  0522 05/19/22  0519   GLUCOSE 301* 144* 338*    142 141   K 4.3 3.6* 3.6*   BUN 13 14 22*   CREATININE 0.65 0.59 0.81   CALCIUM 9.6 9.4 9.3     ABGs: No results for input(s): PHART, PO2ART, IVR1WFK, HLM0LJH, BEART, P0JHKNRD, GCX5OHJ in the last 72 hours. PT/INR:  No results found for: PTINR    ASSESSMENT / PLAN:    · Acute on chronic systolic heart failure- cardiology consulted   ? IV diuresis   · COPD exac-BD, pulm hygiene  · Sputum culture pending  · Possible pneumonia  ? Patient has numerous antibiotic allergies which would severely limit antibiotic selection. ? Check procal- 0.03  ?  Abx added  · Nicotine dependence- encourage smoking cessation   · Hx DM  · Plan of care discussed with Dr. Odilia Munguia       Electronically signed by MARYANNE Prakash - CNP on 05/19/22

## 2022-05-19 NOTE — PLAN OF CARE
Problem: Discharge Planning  Goal: Discharge to home or other facility with appropriate resources  Outcome: Progressing     Problem: Pain  Goal: Verbalizes/displays adequate comfort level or baseline comfort level  Outcome: Progressing     Problem: ABCDS Injury Assessment  Goal: Absence of physical injury  Outcome: Progressing     Problem: Safety - Adult  Goal: Free from fall injury  Outcome: Progressing     Problem: Chronic Conditions and Co-morbidities  Goal: Patient's chronic conditions and co-morbidity symptoms are monitored and maintained or improved  Outcome: Progressing

## 2022-05-19 NOTE — PLAN OF CARE
Problem: Pain  Goal: Verbalizes/displays adequate comfort level or baseline comfort level  5/19/2022 0113 by Miryam Diana RN  Outcome: Progressing  Note: Pt able to verbalize pain. Pt given medication. See MAR.       Problem: ABCDS Injury Assessment  Goal: Absence of physical injury  5/19/2022 0113 by Miryam Diana RN  Outcome: Progressing  Note: Pt absent of any physical injury     Problem: Safety - Adult  Goal: Free from fall injury  5/19/2022 0113 by Miryam Diana RN  Outcome: Progressing  Note: Pt free from falls

## 2022-05-20 ENCOUNTER — APPOINTMENT (OUTPATIENT)
Dept: GENERAL RADIOLOGY | Age: 47
DRG: 291 | End: 2022-05-20
Payer: COMMERCIAL

## 2022-05-20 LAB
ANION GAP SERPL CALCULATED.3IONS-SCNC: 12 MMOL/L (ref 9–17)
BUN BLDV-MCNC: 21 MG/DL (ref 6–20)
CALCIUM SERPL-MCNC: 9.4 MG/DL (ref 8.6–10.4)
CHLORIDE BLD-SCNC: 99 MMOL/L (ref 98–107)
CO2: 31 MMOL/L (ref 20–31)
CREAT SERPL-MCNC: 0.79 MG/DL (ref 0.5–0.9)
GFR AFRICAN AMERICAN: >60 ML/MIN
GFR NON-AFRICAN AMERICAN: >60 ML/MIN
GFR SERPL CREATININE-BSD FRML MDRD: ABNORMAL ML/MIN/{1.73_M2}
GLUCOSE BLD-MCNC: 130 MG/DL (ref 70–99)
GLUCOSE BLD-MCNC: 144 MG/DL (ref 65–105)
GLUCOSE BLD-MCNC: 187 MG/DL (ref 65–105)
GLUCOSE BLD-MCNC: 187 MG/DL (ref 65–105)
GLUCOSE BLD-MCNC: 231 MG/DL (ref 65–105)
GLUCOSE BLD-MCNC: 303 MG/DL (ref 65–105)
MAGNESIUM: 1.7 MG/DL (ref 1.6–2.6)
POTASSIUM SERPL-SCNC: 3.6 MMOL/L (ref 3.7–5.3)
PRO-BNP: 56 PG/ML
SODIUM BLD-SCNC: 142 MMOL/L (ref 135–144)

## 2022-05-20 PROCEDURE — 83880 ASSAY OF NATRIURETIC PEPTIDE: CPT

## 2022-05-20 PROCEDURE — 2700000000 HC OXYGEN THERAPY PER DAY

## 2022-05-20 PROCEDURE — 2580000003 HC RX 258: Performed by: FAMILY MEDICINE

## 2022-05-20 PROCEDURE — 6370000000 HC RX 637 (ALT 250 FOR IP): Performed by: INTERNAL MEDICINE

## 2022-05-20 PROCEDURE — 6370000000 HC RX 637 (ALT 250 FOR IP): Performed by: FAMILY MEDICINE

## 2022-05-20 PROCEDURE — 6360000002 HC RX W HCPCS: Performed by: INTERNAL MEDICINE

## 2022-05-20 PROCEDURE — 82947 ASSAY GLUCOSE BLOOD QUANT: CPT

## 2022-05-20 PROCEDURE — 94669 MECHANICAL CHEST WALL OSCILL: CPT

## 2022-05-20 PROCEDURE — 83735 ASSAY OF MAGNESIUM: CPT

## 2022-05-20 PROCEDURE — 36415 COLL VENOUS BLD VENIPUNCTURE: CPT

## 2022-05-20 PROCEDURE — 94640 AIRWAY INHALATION TREATMENT: CPT

## 2022-05-20 PROCEDURE — 2060000000 HC ICU INTERMEDIATE R&B

## 2022-05-20 PROCEDURE — 6360000002 HC RX W HCPCS: Performed by: NURSE PRACTITIONER

## 2022-05-20 PROCEDURE — 94761 N-INVAS EAR/PLS OXIMETRY MLT: CPT

## 2022-05-20 PROCEDURE — 80048 BASIC METABOLIC PNL TOTAL CA: CPT

## 2022-05-20 PROCEDURE — 6360000002 HC RX W HCPCS: Performed by: FAMILY MEDICINE

## 2022-05-20 PROCEDURE — 71046 X-RAY EXAM CHEST 2 VIEWS: CPT

## 2022-05-20 PROCEDURE — 2580000003 HC RX 258: Performed by: INTERNAL MEDICINE

## 2022-05-20 PROCEDURE — 6370000000 HC RX 637 (ALT 250 FOR IP): Performed by: NURSE PRACTITIONER

## 2022-05-20 RX ORDER — METHYLPREDNISOLONE SODIUM SUCCINATE 40 MG/ML
40 INJECTION, POWDER, LYOPHILIZED, FOR SOLUTION INTRAMUSCULAR; INTRAVENOUS EVERY 12 HOURS
Status: DISCONTINUED | OUTPATIENT
Start: 2022-05-20 | End: 2022-05-24

## 2022-05-20 RX ADMIN — INSULIN GLARGINE 80 UNITS: 100 INJECTION, SOLUTION SUBCUTANEOUS at 19:11

## 2022-05-20 RX ADMIN — ALPRAZOLAM 0.5 MG: 0.5 TABLET ORAL at 07:51

## 2022-05-20 RX ADMIN — SODIUM CHLORIDE, PRESERVATIVE FREE 10 ML: 5 INJECTION INTRAVENOUS at 05:07

## 2022-05-20 RX ADMIN — PANTOPRAZOLE SODIUM 40 MG: 40 TABLET, DELAYED RELEASE ORAL at 19:10

## 2022-05-20 RX ADMIN — HYDROMORPHONE HYDROCHLORIDE 0.5 MG: 1 INJECTION, SOLUTION INTRAMUSCULAR; INTRAVENOUS; SUBCUTANEOUS at 13:38

## 2022-05-20 RX ADMIN — POTASSIUM CHLORIDE 20 MEQ: 20 TABLET, EXTENDED RELEASE ORAL at 08:17

## 2022-05-20 RX ADMIN — METHYLPREDNISOLONE SODIUM SUCCINATE 40 MG: 40 INJECTION, POWDER, FOR SOLUTION INTRAMUSCULAR; INTRAVENOUS at 08:21

## 2022-05-20 RX ADMIN — IPRATROPIUM BROMIDE AND ALBUTEROL SULFATE 1 AMPULE: 2.5; .5 SOLUTION RESPIRATORY (INHALATION) at 14:46

## 2022-05-20 RX ADMIN — INSULIN LISPRO 12 UNITS: 100 INJECTION, SOLUTION INTRAVENOUS; SUBCUTANEOUS at 18:01

## 2022-05-20 RX ADMIN — BUMETANIDE 2 MG: 1 TABLET ORAL at 08:16

## 2022-05-20 RX ADMIN — CLINDAMYCIN HYDROCHLORIDE 150 MG: 150 CAPSULE ORAL at 18:01

## 2022-05-20 RX ADMIN — BUDESONIDE 500 MCG: 0.5 SUSPENSION RESPIRATORY (INHALATION) at 08:01

## 2022-05-20 RX ADMIN — SODIUM CHLORIDE, PRESERVATIVE FREE 10 ML: 5 INJECTION INTRAVENOUS at 21:43

## 2022-05-20 RX ADMIN — ATORVASTATIN CALCIUM 80 MG: 80 TABLET, FILM COATED ORAL at 19:10

## 2022-05-20 RX ADMIN — PANTOPRAZOLE SODIUM 40 MG: 40 TABLET, DELAYED RELEASE ORAL at 06:50

## 2022-05-20 RX ADMIN — DULOXETINE 60 MG: 60 CAPSULE, DELAYED RELEASE ORAL at 19:10

## 2022-05-20 RX ADMIN — CLINDAMYCIN HYDROCHLORIDE 150 MG: 150 CAPSULE ORAL at 05:57

## 2022-05-20 RX ADMIN — DULOXETINE 60 MG: 60 CAPSULE, DELAYED RELEASE ORAL at 08:17

## 2022-05-20 RX ADMIN — QUETIAPINE FUMARATE 150 MG: 100 TABLET ORAL at 19:10

## 2022-05-20 RX ADMIN — ISOSORBIDE MONONITRATE 30 MG: 30 TABLET, EXTENDED RELEASE ORAL at 08:17

## 2022-05-20 RX ADMIN — HYDROMORPHONE HYDROCHLORIDE 0.5 MG: 1 INJECTION, SOLUTION INTRAMUSCULAR; INTRAVENOUS; SUBCUTANEOUS at 21:41

## 2022-05-20 RX ADMIN — MAGNESIUM OXIDE TAB 400 MG (241.3 MG ELEMENTAL MG) 400 MG: 400 (241.3 MG) TAB at 08:17

## 2022-05-20 RX ADMIN — HYDROMORPHONE HYDROCHLORIDE 0.5 MG: 1 INJECTION, SOLUTION INTRAMUSCULAR; INTRAVENOUS; SUBCUTANEOUS at 09:33

## 2022-05-20 RX ADMIN — Medication 2 PUFF: at 05:10

## 2022-05-20 RX ADMIN — IPRATROPIUM BROMIDE AND ALBUTEROL SULFATE 1 AMPULE: 2.5; .5 SOLUTION RESPIRATORY (INHALATION) at 19:55

## 2022-05-20 RX ADMIN — COLCHICINE 0.6 MG: 0.6 TABLET ORAL at 21:42

## 2022-05-20 RX ADMIN — METHYLPREDNISOLONE SODIUM SUCCINATE 40 MG: 40 INJECTION, POWDER, FOR SOLUTION INTRAMUSCULAR; INTRAVENOUS at 21:41

## 2022-05-20 RX ADMIN — GUAIFENESIN DEXTROMETHORPHAN HYDROBROMIDE ORAL SOLUTION 5 ML: 200; 20 SOLUTION ORAL at 13:38

## 2022-05-20 RX ADMIN — HYDROMORPHONE HYDROCHLORIDE 0.5 MG: 1 INJECTION, SOLUTION INTRAMUSCULAR; INTRAVENOUS; SUBCUTANEOUS at 05:06

## 2022-05-20 RX ADMIN — AZITHROMYCIN MONOHYDRATE 500 MG: 500 INJECTION, POWDER, LYOPHILIZED, FOR SOLUTION INTRAVENOUS at 13:43

## 2022-05-20 RX ADMIN — CLINDAMYCIN HYDROCHLORIDE 150 MG: 150 CAPSULE ORAL at 00:28

## 2022-05-20 RX ADMIN — INSULIN LISPRO 3 UNITS: 100 INJECTION, SOLUTION INTRAVENOUS; SUBCUTANEOUS at 08:11

## 2022-05-20 RX ADMIN — CARVEDILOL 12.5 MG: 12.5 TABLET, FILM COATED ORAL at 19:10

## 2022-05-20 RX ADMIN — INSULIN LISPRO 4 UNITS: 100 INJECTION, SOLUTION INTRAVENOUS; SUBCUTANEOUS at 11:16

## 2022-05-20 RX ADMIN — MAGNESIUM OXIDE TAB 400 MG (241.3 MG ELEMENTAL MG) 400 MG: 400 (241.3 MG) TAB at 21:42

## 2022-05-20 RX ADMIN — INSULIN GLARGINE 80 UNITS: 100 INJECTION, SOLUTION SUBCUTANEOUS at 08:11

## 2022-05-20 RX ADMIN — IPRATROPIUM BROMIDE AND ALBUTEROL SULFATE 1 AMPULE: 2.5; .5 SOLUTION RESPIRATORY (INHALATION) at 07:33

## 2022-05-20 RX ADMIN — CARVEDILOL 12.5 MG: 12.5 TABLET, FILM COATED ORAL at 06:50

## 2022-05-20 RX ADMIN — SODIUM CHLORIDE, PRESERVATIVE FREE 10 ML: 5 INJECTION INTRAVENOUS at 08:19

## 2022-05-20 RX ADMIN — COLCHICINE 0.6 MG: 0.6 TABLET ORAL at 08:16

## 2022-05-20 RX ADMIN — CLOPIDOGREL BISULFATE 75 MG: 75 TABLET ORAL at 19:12

## 2022-05-20 RX ADMIN — INSULIN LISPRO 2 UNITS: 100 INJECTION, SOLUTION INTRAVENOUS; SUBCUTANEOUS at 21:51

## 2022-05-20 RX ADMIN — CLINDAMYCIN HYDROCHLORIDE 150 MG: 150 CAPSULE ORAL at 11:16

## 2022-05-20 RX ADMIN — GUAIFENESIN DEXTROMETHORPHAN HYDROBROMIDE ORAL SOLUTION 5 ML: 200; 20 SOLUTION ORAL at 05:14

## 2022-05-20 ASSESSMENT — PAIN DESCRIPTION - ORIENTATION
ORIENTATION: LEFT;RIGHT
ORIENTATION: RIGHT;LEFT
ORIENTATION: RIGHT;LEFT

## 2022-05-20 ASSESSMENT — PAIN DESCRIPTION - LOCATION
LOCATION: LEG
LOCATION: LEG
LOCATION: HEAD;LEG;FOOT
LOCATION: LEG;FOOT
LOCATION: LEG

## 2022-05-20 ASSESSMENT — PAIN SCALES - GENERAL
PAINLEVEL_OUTOF10: 0
PAINLEVEL_OUTOF10: 10
PAINLEVEL_OUTOF10: 10
PAINLEVEL_OUTOF10: 9
PAINLEVEL_OUTOF10: 8
PAINLEVEL_OUTOF10: 7
PAINLEVEL_OUTOF10: 10
PAINLEVEL_OUTOF10: 10

## 2022-05-20 ASSESSMENT — ENCOUNTER SYMPTOMS: COUGH: 1

## 2022-05-20 NOTE — FLOWSHEET NOTE
05/20/22 1653   Encounter Summary   Encounter Overview/Reason  Spiritual/Emotional Needs   Service Provided For: Patient   Referral/Consult From: Rounding   Complexity of Encounter Low   Spiritual/Emotional needs   Type Spiritual Support   Assessment/Intervention/Outcome   Assessment Unable to assess  (patient sleeping)   Intervention Prayer (assurance of)/[place]

## 2022-05-20 NOTE — PROGRESS NOTES
Patient lost IV access while receiving azithromycin. RN attempted to restart IV, and also had 2 other RNs attempt. All unsuccessful. Dr. Marvie Bumpers to request order for midline through access RN.

## 2022-05-20 NOTE — PROGRESS NOTES
Progress Note    Patient Name:  Marco Sanchez    :  1975 9:42 AM      SUBJECTIVE       Ms. Unger   Continues to complain of cough. Imdur started yesterday is helping with her pain however she complains of mild headache. OBJECTIVE     Vital signs:    /64   Pulse 68   Temp 97.9 °F (36.6 °C)   Resp 20   Ht 5' 8\" (1.727 m)   Wt 267 lb 13.7 oz (121.5 kg)   SpO2 93%   BMI 40.73 kg/m²  3 L/min      Admit Weight:  248 lb (112.5 kg)    Last 3 weights: Wt Readings from Last 3 Encounters:   22 267 lb 13.7 oz (121.5 kg)   22 250 lb (113.4 kg)   22 250 lb (113.4 kg)       BMI: Body mass index is 40.73 kg/m². Input/Output:       Intake/Output Summary (Last 24 hours) at 2022 0942  Last data filed at 2022 0935  Gross per 24 hour   Intake 1589 ml   Output 2900 ml   Net -1311 ml       Date 22 0000 - 22 2359   Shift 3549-7281 8475-9142 5037-7295 24 Hour Total   INTAKE   P.O.(mL/kg/hr) 500(0.5) 150  650   I. V.(mL/kg) 0(0)   0(0)   Shift Total(mL/kg) 500(4.1) 150(1.2)  650(5.3)   OUTPUT   Urine(mL/kg/hr)  400  400   Shift Total(mL/kg)  400(3.3)  400(3.3)   Weight (kg) 121.5 121.5 121.5 121.5     Exam:     General appearance: awake and alert moves all ext   Lungs: no rhonchi, no wheezes, no rales  Heart: S1 and S2 no murmur  Abdomen: positive bowel sounds, no bruits, no masses  Extremities: warm and dry, no cyanosis, no clubbing        Laboratory Studies:     CBC:   Recent Labs     22  1136 22  0522   WBC 9.5 9.5   HGB 14.1 13.7   HCT 42.1 41.9   MCV 99.7 102.3*    255     BMP:   Recent Labs     22  0522 22  0519 22  0521    141 142   K 3.6* 3.6* 3.6*    98 99   CO2 30 33* 31   BUN 14 22* 21*   CREATININE 0.59 0.81 0.79     PT/INR: No results for input(s): PROTIME, INR in the last 72 hours. APTT: No results for input(s): APTT in the last 72 hours.   MAG:   Recent Labs     22  0522 22  2144 22  0521   MG 1.7 1.5* 1.7     D Dimer: No results for input(s): DDIMER in the last 72 hours. Troponin  No results for input(s): TROPONINI in the last 72 hours. No results for input(s): TROPONINT in the last 72 hours. BNP No results for input(s): BNP in the last 72 hours. Recent Labs     22  1136 22  0521   PROBNP 315* 56         Pulse Ox: SpO2  Av %  Min: 90 %  Max: 94 %  Supplemental O2: O2 Flow Rate (L/min): 3 L/min     Current Meds:    methylPREDNISolone  40 mg IntraVENous Q12H    isosorbide mononitrate  30 mg Oral Daily    bumetanide  2 mg Oral Daily    potassium chloride  20 mEq Oral Daily    magnesium oxide  400 mg Oral BID    diclofenac sodium  2 g Topical BID    colchicine  0.6 mg Oral BID    azithromycin  500 mg IntraVENous Q24H    clindamycin  150 mg Oral 4 times per day    budesonide  0.5 mg Nebulization BID    sodium chloride flush  5-40 mL IntraVENous 2 times per day    atorvastatin  80 mg Oral Nightly    carvedilol  12.5 mg Oral BID WC    clopidogrel  75 mg Oral Daily    DULoxetine  60 mg Oral BID    ipratropium-albuterol  1 ampule Inhalation Q4H WA    QUEtiapine  150 mg Oral Nightly    vilazodone HCl  20 mg Oral Daily    enoxaparin  30 mg SubCUTAneous BID    insulin glargine  80 Units SubCUTAneous BID    pantoprazole  40 mg Oral BID    insulin lispro  0-18 Units SubCUTAneous TID WC    insulin lispro  0-9 Units SubCUTAneous Nightly     Continuous Infusions:    sodium chloride      dextrose              ASSESSMENT     Principal Problem:    Acute on chronic systolic (congestive) heart failure ([facility])  Active Problems: Moderate left ventricular systolic dysfunction    Bilateral lower extremity edema    COPD (chronic obstructive pulmonary disease) ([facility])    Tobacco abuse    Pulmonary HTN ([facility] [address]    Uncontrolled type 2 diabetes mellitus with hyperglycemia ([facility])  Resolved Problems:    * No resolved hospital problems. *    1.    Shortness of breath/ chest pain   2. Acute on chronic decompensated systolic congestive heart failure   3. Ischemic and nonischemic cardiomyopathy   4. Hypertension   5. Dyslipidemia   6. Tobacco dependence   7. Obesity, BMI 40    PLAN      continue oral diuresis. Continue Imdur 30. Patient requested colchicine as this helped her in the past.      No further recommendation from cardiac standpoint. Would schedule outpatient follow-up visit in 3-4 weeks. Cardiology will sign off.       Electronically signed by Nela Nance MD on 5/20/2022 at 9:42 AM

## 2022-05-20 NOTE — CARE COORDINATION
ONGOING DISCHARGE PLAN:    Patient is alert and oriented x4. Spoke with patient regarding discharge plan and patient confirms that plan is still home without any needs    DME: home oxygen from [facility]    IV Zithro, Solu 40/ Q12 hours    Will need cab ride home. Will continue to follow for additional discharge needs.     Electronically signed by Martina Lombardo RN on 5/20/2022 at 11:55 AM

## 2022-05-20 NOTE — PROGRESS NOTES
Progress Note  Date:2022       Room:[facility]  Patient Jas Chavez     YOB: 1975     Age:47 y.o. Subjective    Subjective:  Symptoms:  Stable. She reports cough. Diet:  Adequate intake. Review of Systems   Respiratory: Positive for cough. Objective         Vitals Last 24 Hours:  TEMPERATURE:  Temp  Av °F (36.7 °C)  Min: 97.5 °F (36.4 °C)  Max: 98.5 °F (36.9 °C)  RESPIRATIONS RANGE: Resp  Av.3  Min: 14  Max: 18  PULSE OXIMETRY RANGE: SpO2  Av %  Min: 90 %  Max: 94 %  PULSE RANGE: Pulse  Av.2  Min: 68  Max: 81  BLOOD PRESSURE RANGE: Systolic (02IFX), UVK:282 , Min:111 , GJZ:856   ; Diastolic (15CAN), UKF:89, Min:64, Max:88    I/O (24Hr): Intake/Output Summary (Last 24 hours) at 2022 0802  Last data filed at 2022 0537  Gross per 24 hour   Intake 1439 ml   Output 2500 ml   Net -1061 ml     Objective:  General Appearance:  Comfortable. Vital signs: (most recent): Blood pressure 122/64, pulse 68, temperature 97.9 °F (36.6 °C), resp. rate 16, height 5' 8\" (1.727 m), weight 267 lb 13.7 oz (121.5 kg), SpO2 93 %. Vital signs are normal.    Lungs:  Normal respiratory rate and increased effort. There are decreased breath sounds. (Cough productive of brown/green sputum)    Labs/Imaging/Diagnostics    Labs:  CBC:  Recent Labs     22  1136 22  0522   WBC 9.5 9.5   RBC 4.22 4.10   HGB 14.1 13.7   HCT 42.1 41.9   MCV 99.7 102.3*   RDW 13.0 13.1    255     CHEMISTRIES:  Recent Labs     22  0522 22  0519 22  0521    141 142   K 3.6* 3.6* 3.6*    98 99   CO2 30 33* 31   BUN 14 22* 21*   CREATININE 0.59 0.81 0.79   GLUCOSE 144* 338* 130*   MG 1.7 1.5* 1.7     PT/INR:No results for input(s): PROTIME, INR in the last 72 hours. APTT:No results for input(s): APTT in the last 72 hours. LIVER PROFILE:No results for input(s): AST, ALT, BILIDIR, BILITOT, ALKPHOS in the last 72 hours.     Imaging Last 24 Hours: No results found. Assessment//Plan           Hospital Problems           Last Modified POA    * (Principal) Acute on chronic systolic (congestive) heart failure ([facility] [address] 5/18/2022 Yes    Moderate left ventricular systolic dysfunction 7/87/8302 Yes    Bilateral lower extremity edema 5/18/2022 Yes    COPD (chronic obstructive pulmonary disease) ([facility]) (Chronic) 5/18/2022 Yes    Tobacco abuse 5/18/2022 Yes    Pulmonary HTN ([facility] [address] 5/18/2022 Yes    Uncontrolled type 2 diabetes mellitus with hyperglycemia ([facility] [address] 5/18/2022 Yes        Assessment & Plan    Acute on chronic CHF - mild diuresis. Pneumonia - clindamycin/azithromycin per pulmonology. Gram negative rods on sputum culture.        Electronically signed by Laura Rogers MD on 5/20/22 at 8:02 AM EDT

## 2022-05-20 NOTE — PLAN OF CARE
Problem: Pain  Goal: Verbalizes/displays adequate comfort level or baseline comfort level  5/20/2022 0347 by Ilene Munguia RN  Outcome: Not Progressing  Note: Pt medicated with pain medication prn. Assessed all pain characteristics including level, type, location, frequency, and onset. Non-pharmacologic interventions offered to pt as well. Will continue to monitor. Problem: ABCDS Injury Assessment  Goal: Absence of physical injury  5/20/2022 0347 by Ilene Munguia RN  Outcome: Adequate for Discharge  Note: Skin assessment complete. Turned and repositioned every two hours per self. Area kept free from moisture. Proper nourishment and fluids encouraged, as appropriate. Will continue to monitor for additional needs and changes in skin breakdown. Problem: Safety - Adult  Goal: Free from fall injury  5/20/2022 0347 by Ilene Munguia RN  Outcome: Adequate for Discharge  Note: No falls noted this shift. Patient ambulates per self without difficulty. Bed kept in low position. Safe environment maintained. Bedside table & call light in reach. Uses call light appropriately when needing assistance.

## 2022-05-20 NOTE — PROGRESS NOTES
Patient requested female RN. Report given to [employer]. All questions answered.

## 2022-05-20 NOTE — PROGRESS NOTES
PULMONARY PROGRESS NOTE:    REASON FOR VISIT:COPD exac  Interval History:    Shortness of Breath: yes  Cough: yes, thick mucous with bloody tinge to it  Sputum: no          Hemoptysis: no  Chest Pain: better today  Fever: no                   Swelling Feet: no  Headache: no                                           Nausea, Emesis, Abdominal Pain: no  Diarrhea: no         Constipation: no    Events since last visit: no acute overnight events    PAST MEDICAL HISTORY:      Scheduled Meds:   isosorbide mononitrate  30 mg Oral Daily    bumetanide  2 mg Oral Daily    potassium chloride  20 mEq Oral Daily    magnesium oxide  400 mg Oral BID    diclofenac sodium  2 g Topical BID    colchicine  0.6 mg Oral BID    azithromycin  500 mg IntraVENous Q24H    clindamycin  150 mg Oral 4 times per day    budesonide  0.5 mg Nebulization BID    sodium chloride flush  5-40 mL IntraVENous 2 times per day    atorvastatin  80 mg Oral Nightly    carvedilol  12.5 mg Oral BID WC    clopidogrel  75 mg Oral Daily    DULoxetine  60 mg Oral BID    ipratropium-albuterol  1 ampule Inhalation Q4H WA    QUEtiapine  150 mg Oral Nightly    vilazodone HCl  20 mg Oral Daily    enoxaparin  30 mg SubCUTAneous BID    insulin glargine  80 Units SubCUTAneous BID    pantoprazole  40 mg Oral BID    insulin lispro  0-18 Units SubCUTAneous TID WC    insulin lispro  0-9 Units SubCUTAneous Nightly     Continuous Infusions:   sodium chloride      dextrose       PRN Meds:potassium chloride **OR** potassium alternative oral replacement **OR** potassium chloride, dextromethorphan-guaiFENesin, sodium chloride flush, sodium chloride, polyethylene glycol, acetaminophen **OR** acetaminophen, albuterol sulfate HFA, ALPRAZolam, nitroGLYCERIN, oxyCODONE-acetaminophen, HYDROmorphone, glucose, dextrose bolus **OR** dextrose bolus, glucagon (rDNA), dextrose        PHYSICAL EXAMINATION:  /64   Pulse 68   Temp 97.9 °F (36.6 °C)   Resp 16   Ht 5' 8\" (1.727 m)   Wt 267 lb 13.7 oz (121.5 kg)   SpO2 93%   BMI 40.73 kg/m²     General : Awake, alert, oriented x 3   Neck  supple, no lymphadenopathy, JVD not raised  Heart  regular rhythm, S1 and S2 normal; no additional sounds heard  Lungs  poor Entry- fair bilaterally; breath sounds : expiratory wheezing, 93% on 3 l nc  Abdomen  soft, no tenderness  Upper Extremities  - no cyanosis, mottling; edema : absent  Lower Extremities: no cyanosis, mottling; edema : mild BLE edema    Current Laboratory, Radiologic, Microbiologic, and Diagnostic studies reviewed  Data ReviewCBC:   Recent Labs     05/17/22  1136 05/18/22  0522   WBC 9.5 9.5   RBC 4.22 4.10   HGB 14.1 13.7   HCT 42.1 41.9    255     BMP:   Recent Labs     05/18/22  0522 05/19/22  0519 05/20/22  0521   GLUCOSE 144* 338* 130*    141 142   K 3.6* 3.6* 3.6*   BUN 14 22* 21*   CREATININE 0.59 0.81 0.79   CALCIUM 9.4 9.3 9.4     ABGs: No results for input(s): PHART, PO2ART, ZTG9MZD, CWC3CDW, BEART, O2GBYDGP, DJH7ZLJ in the last 72 hours. PT/INR:  No results found for: PTINR    ASSESSMENT / PLAN:    · Acute on chronic systolic heart failure- cardiology consulted   ? IV diuresis   · COPD exac-BD, pulm hygiene, add solumedrol  · Sputum culture GNR - await ID and susceptibilities  · Possible pneumonia  ? Patient has numerous antibiotic allergies which would severely limit antibiotic selection. ? Check procal- 0.03  ? Abx added  · Nicotine dependence- encourage smoking cessation   · Hx DM  · CXR - in AM    This is a late note on patient seen by me earlier today.   Electronically signed by Kyle Song MD on 05/20/22 at 6:09 PM

## 2022-05-21 ENCOUNTER — APPOINTMENT (OUTPATIENT)
Dept: GENERAL RADIOLOGY | Age: 47
DRG: 291 | End: 2022-05-21
Payer: COMMERCIAL

## 2022-05-21 LAB
ANION GAP SERPL CALCULATED.3IONS-SCNC: 11 MMOL/L (ref 9–17)
BUN BLDV-MCNC: 20 MG/DL (ref 6–20)
CALCIUM SERPL-MCNC: 9.6 MG/DL (ref 8.6–10.4)
CHLORIDE BLD-SCNC: 98 MMOL/L (ref 98–107)
CO2: 31 MMOL/L (ref 20–31)
CREAT SERPL-MCNC: 0.69 MG/DL (ref 0.5–0.9)
CULTURE: ABNORMAL
DIRECT EXAM: ABNORMAL
GFR AFRICAN AMERICAN: >60 ML/MIN
GFR NON-AFRICAN AMERICAN: >60 ML/MIN
GFR SERPL CREATININE-BSD FRML MDRD: ABNORMAL ML/MIN/{1.73_M2}
GLUCOSE BLD-MCNC: 258 MG/DL (ref 65–105)
GLUCOSE BLD-MCNC: 271 MG/DL (ref 65–105)
GLUCOSE BLD-MCNC: 290 MG/DL (ref 65–105)
GLUCOSE BLD-MCNC: 299 MG/DL (ref 70–99)
GLUCOSE BLD-MCNC: 328 MG/DL (ref 65–105)
MAGNESIUM: 1.8 MG/DL (ref 1.6–2.6)
POTASSIUM SERPL-SCNC: 4.3 MMOL/L (ref 3.7–5.3)
REASON FOR REJECTION: NORMAL
SODIUM BLD-SCNC: 140 MMOL/L (ref 135–144)
SPECIMEN DESCRIPTION: ABNORMAL
ZZ NTE CLEAN UP: ORDERED TEST: NORMAL
ZZ NTE WITH NAME CLEAN UP: SPECIMEN SOURCE: NORMAL

## 2022-05-21 PROCEDURE — 2580000003 HC RX 258: Performed by: FAMILY MEDICINE

## 2022-05-21 PROCEDURE — 2580000003 HC RX 258: Performed by: INTERNAL MEDICINE

## 2022-05-21 PROCEDURE — 6370000000 HC RX 637 (ALT 250 FOR IP): Performed by: FAMILY MEDICINE

## 2022-05-21 PROCEDURE — 6370000000 HC RX 637 (ALT 250 FOR IP): Performed by: NURSE PRACTITIONER

## 2022-05-21 PROCEDURE — 99232 SBSQ HOSP IP/OBS MODERATE 35: CPT | Performed by: FAMILY MEDICINE

## 2022-05-21 PROCEDURE — 94761 N-INVAS EAR/PLS OXIMETRY MLT: CPT

## 2022-05-21 PROCEDURE — 94640 AIRWAY INHALATION TREATMENT: CPT

## 2022-05-21 PROCEDURE — 36415 COLL VENOUS BLD VENIPUNCTURE: CPT

## 2022-05-21 PROCEDURE — 6360000002 HC RX W HCPCS: Performed by: INTERNAL MEDICINE

## 2022-05-21 PROCEDURE — 6360000002 HC RX W HCPCS: Performed by: FAMILY MEDICINE

## 2022-05-21 PROCEDURE — 82947 ASSAY GLUCOSE BLOOD QUANT: CPT

## 2022-05-21 PROCEDURE — 2060000000 HC ICU INTERMEDIATE R&B

## 2022-05-21 PROCEDURE — 2700000000 HC OXYGEN THERAPY PER DAY

## 2022-05-21 PROCEDURE — 6360000002 HC RX W HCPCS: Performed by: NURSE PRACTITIONER

## 2022-05-21 PROCEDURE — 6370000000 HC RX 637 (ALT 250 FOR IP): Performed by: INTERNAL MEDICINE

## 2022-05-21 PROCEDURE — 83735 ASSAY OF MAGNESIUM: CPT

## 2022-05-21 PROCEDURE — 80048 BASIC METABOLIC PNL TOTAL CA: CPT

## 2022-05-21 PROCEDURE — 71046 X-RAY EXAM CHEST 2 VIEWS: CPT

## 2022-05-21 RX ADMIN — COLCHICINE 0.6 MG: 0.6 TABLET ORAL at 21:52

## 2022-05-21 RX ADMIN — INSULIN LISPRO 9 UNITS: 100 INJECTION, SOLUTION INTRAVENOUS; SUBCUTANEOUS at 17:47

## 2022-05-21 RX ADMIN — GUAIFENESIN DEXTROMETHORPHAN HYDROBROMIDE ORAL SOLUTION 5 ML: 200; 20 SOLUTION ORAL at 23:32

## 2022-05-21 RX ADMIN — INSULIN GLARGINE 80 UNITS: 100 INJECTION, SOLUTION SUBCUTANEOUS at 08:22

## 2022-05-21 RX ADMIN — CLINDAMYCIN HYDROCHLORIDE 150 MG: 150 CAPSULE ORAL at 12:24

## 2022-05-21 RX ADMIN — CLINDAMYCIN HYDROCHLORIDE 150 MG: 150 CAPSULE ORAL at 01:20

## 2022-05-21 RX ADMIN — QUETIAPINE FUMARATE 150 MG: 100 TABLET ORAL at 18:38

## 2022-05-21 RX ADMIN — IPRATROPIUM BROMIDE AND ALBUTEROL SULFATE 1 AMPULE: 2.5; .5 SOLUTION RESPIRATORY (INHALATION) at 11:45

## 2022-05-21 RX ADMIN — SODIUM CHLORIDE, PRESERVATIVE FREE 10 ML: 5 INJECTION INTRAVENOUS at 01:56

## 2022-05-21 RX ADMIN — IPRATROPIUM BROMIDE AND ALBUTEROL SULFATE 1 AMPULE: 2.5; .5 SOLUTION RESPIRATORY (INHALATION) at 15:17

## 2022-05-21 RX ADMIN — PANTOPRAZOLE SODIUM 40 MG: 40 TABLET, DELAYED RELEASE ORAL at 06:40

## 2022-05-21 RX ADMIN — ALPRAZOLAM 0.5 MG: 0.5 TABLET ORAL at 20:48

## 2022-05-21 RX ADMIN — HYDROMORPHONE HYDROCHLORIDE 0.5 MG: 1 INJECTION, SOLUTION INTRAMUSCULAR; INTRAVENOUS; SUBCUTANEOUS at 01:56

## 2022-05-21 RX ADMIN — GUAIFENESIN DEXTROMETHORPHAN HYDROBROMIDE ORAL SOLUTION 5 ML: 200; 20 SOLUTION ORAL at 12:24

## 2022-05-21 RX ADMIN — DULOXETINE 60 MG: 60 CAPSULE, DELAYED RELEASE ORAL at 18:38

## 2022-05-21 RX ADMIN — SODIUM CHLORIDE: 9 INJECTION, SOLUTION INTRAVENOUS at 12:27

## 2022-05-21 RX ADMIN — COLCHICINE 0.6 MG: 0.6 TABLET ORAL at 08:28

## 2022-05-21 RX ADMIN — SODIUM CHLORIDE, PRESERVATIVE FREE 10 ML: 5 INJECTION INTRAVENOUS at 20:49

## 2022-05-21 RX ADMIN — HYDROMORPHONE HYDROCHLORIDE 0.5 MG: 1 INJECTION, SOLUTION INTRAMUSCULAR; INTRAVENOUS; SUBCUTANEOUS at 10:51

## 2022-05-21 RX ADMIN — CARVEDILOL 12.5 MG: 12.5 TABLET, FILM COATED ORAL at 18:38

## 2022-05-21 RX ADMIN — MAGNESIUM OXIDE TAB 400 MG (241.3 MG ELEMENTAL MG) 400 MG: 400 (241.3 MG) TAB at 08:28

## 2022-05-21 RX ADMIN — INSULIN LISPRO 9 UNITS: 100 INJECTION, SOLUTION INTRAVENOUS; SUBCUTANEOUS at 08:22

## 2022-05-21 RX ADMIN — AZITHROMYCIN MONOHYDRATE 500 MG: 500 INJECTION, POWDER, LYOPHILIZED, FOR SOLUTION INTRAVENOUS at 12:29

## 2022-05-21 RX ADMIN — CARVEDILOL 12.5 MG: 12.5 TABLET, FILM COATED ORAL at 06:41

## 2022-05-21 RX ADMIN — METHYLPREDNISOLONE SODIUM SUCCINATE 40 MG: 40 INJECTION, POWDER, FOR SOLUTION INTRAMUSCULAR; INTRAVENOUS at 08:27

## 2022-05-21 RX ADMIN — SODIUM CHLORIDE, PRESERVATIVE FREE 10 ML: 5 INJECTION INTRAVENOUS at 08:29

## 2022-05-21 RX ADMIN — ISOSORBIDE MONONITRATE 30 MG: 30 TABLET, EXTENDED RELEASE ORAL at 08:29

## 2022-05-21 RX ADMIN — HYDROMORPHONE HYDROCHLORIDE 0.5 MG: 1 INJECTION, SOLUTION INTRAMUSCULAR; INTRAVENOUS; SUBCUTANEOUS at 15:08

## 2022-05-21 RX ADMIN — IPRATROPIUM BROMIDE AND ALBUTEROL SULFATE 1 AMPULE: 2.5; .5 SOLUTION RESPIRATORY (INHALATION) at 19:01

## 2022-05-21 RX ADMIN — IPRATROPIUM BROMIDE AND ALBUTEROL SULFATE 1 AMPULE: 2.5; .5 SOLUTION RESPIRATORY (INHALATION) at 08:17

## 2022-05-21 RX ADMIN — MAGNESIUM OXIDE TAB 400 MG (241.3 MG ELEMENTAL MG) 400 MG: 400 (241.3 MG) TAB at 21:53

## 2022-05-21 RX ADMIN — MEROPENEM 1000 MG: 1 INJECTION, POWDER, FOR SOLUTION INTRAVENOUS at 23:34

## 2022-05-21 RX ADMIN — SODIUM CHLORIDE, PRESERVATIVE FREE 10 ML: 5 INJECTION INTRAVENOUS at 06:38

## 2022-05-21 RX ADMIN — METHYLPREDNISOLONE SODIUM SUCCINATE 40 MG: 40 INJECTION, POWDER, FOR SOLUTION INTRAMUSCULAR; INTRAVENOUS at 20:48

## 2022-05-21 RX ADMIN — HYDROMORPHONE HYDROCHLORIDE 0.5 MG: 1 INJECTION, SOLUTION INTRAMUSCULAR; INTRAVENOUS; SUBCUTANEOUS at 19:17

## 2022-05-21 RX ADMIN — BUMETANIDE 2 MG: 1 TABLET ORAL at 08:28

## 2022-05-21 RX ADMIN — CLINDAMYCIN HYDROCHLORIDE 150 MG: 150 CAPSULE ORAL at 06:40

## 2022-05-21 RX ADMIN — HYDROMORPHONE HYDROCHLORIDE 0.5 MG: 1 INJECTION, SOLUTION INTRAMUSCULAR; INTRAVENOUS; SUBCUTANEOUS at 23:30

## 2022-05-21 RX ADMIN — POTASSIUM CHLORIDE 20 MEQ: 20 TABLET, EXTENDED RELEASE ORAL at 08:28

## 2022-05-21 RX ADMIN — GUAIFENESIN DEXTROMETHORPHAN HYDROBROMIDE ORAL SOLUTION 5 ML: 200; 20 SOLUTION ORAL at 17:51

## 2022-05-21 RX ADMIN — DULOXETINE 60 MG: 60 CAPSULE, DELAYED RELEASE ORAL at 08:29

## 2022-05-21 RX ADMIN — INSULIN LISPRO 6 UNITS: 100 INJECTION, SOLUTION INTRAVENOUS; SUBCUTANEOUS at 21:50

## 2022-05-21 RX ADMIN — INSULIN GLARGINE 80 UNITS: 100 INJECTION, SOLUTION SUBCUTANEOUS at 18:39

## 2022-05-21 RX ADMIN — HYDROMORPHONE HYDROCHLORIDE 0.5 MG: 1 INJECTION, SOLUTION INTRAMUSCULAR; INTRAVENOUS; SUBCUTANEOUS at 06:38

## 2022-05-21 RX ADMIN — MEROPENEM 1000 MG: 1 INJECTION, POWDER, FOR SOLUTION INTRAVENOUS at 15:15

## 2022-05-21 RX ADMIN — SODIUM CHLORIDE: 9 INJECTION, SOLUTION INTRAVENOUS at 15:14

## 2022-05-21 RX ADMIN — ATORVASTATIN CALCIUM 80 MG: 80 TABLET, FILM COATED ORAL at 18:38

## 2022-05-21 RX ADMIN — PANTOPRAZOLE SODIUM 40 MG: 40 TABLET, DELAYED RELEASE ORAL at 18:38

## 2022-05-21 RX ADMIN — INSULIN LISPRO 9 UNITS: 100 INJECTION, SOLUTION INTRAVENOUS; SUBCUTANEOUS at 12:15

## 2022-05-21 RX ADMIN — CLOPIDOGREL BISULFATE 75 MG: 75 TABLET ORAL at 18:38

## 2022-05-21 ASSESSMENT — PAIN DESCRIPTION - DESCRIPTORS
DESCRIPTORS: STABBING
DESCRIPTORS: STABBING

## 2022-05-21 ASSESSMENT — PAIN SCALES - GENERAL
PAINLEVEL_OUTOF10: 9
PAINLEVEL_OUTOF10: 9
PAINLEVEL_OUTOF10: 10
PAINLEVEL_OUTOF10: 4
PAINLEVEL_OUTOF10: 9
PAINLEVEL_OUTOF10: 0
PAINLEVEL_OUTOF10: 0

## 2022-05-21 ASSESSMENT — PAIN DESCRIPTION - ORIENTATION
ORIENTATION: RIGHT;LEFT

## 2022-05-21 ASSESSMENT — PAIN DESCRIPTION - LOCATION
LOCATION: FOOT;LEG
LOCATION: FOOT;LEG
LOCATION: LEG

## 2022-05-21 ASSESSMENT — ENCOUNTER SYMPTOMS: COUGH: 1

## 2022-05-21 NOTE — CARE COORDINATION
ONGOING DISCHARGE PLAN:    Patient is alert and oriented x4. Spoke with patient regarding discharge plan and patient confirms that plan is still home. DME: oxygen has a nebulizer but it is broken    On IV Zithromax and SOlu 40 mg Q 12 hrs. Will continue to follow for additional discharge needs.     Electronically signed by Martina Lombardo RN on 5/21/2022 at 10:36 AM

## 2022-05-21 NOTE — PLAN OF CARE
Problem: Pain  Goal: Verbalizes/displays adequate comfort level or baseline comfort level  5/21/2022 0340 by George Alexis, RN  Outcome: Progressing  Note: Pt medicated with pain medication prn. Assessed all pain characteristics including level, type, location, frequency, and onset. Non-pharmacologic interventions offered to pt as well. Will continue to monitor. Problem: ABCDS Injury Assessment  Goal: Absence of physical injury  5/21/2022 0340 by George Alexis, RN  Outcome: Adequate for Discharge  Note: No falls noted this shift. Patient ambulates per self without difficulty. Bed kept in low position. Safe environment maintained. Bedside table & call light in reach. Uses call light appropriately when needing assistance.

## 2022-05-21 NOTE — PROGRESS NOTES
BRONCHOSPASM/BRONCHOCONSTRICTION     [x]         IMPROVE AERATION/BREATH SOUNDS  [x]   ADMINISTER BRONCHODILATOR THERAPY AS APPROPRIATE  [x]   ASSESS BREATH SOUNDS  [x]   IMPLEMENT AEROSOL/MDI PROTOCOL  [x]   PATIENT EDUCATION AS NEEDED    MOBILIZE SECRETIONS    [x]   ASSESS BREATH SOUNDS  [x]   ASSESS SPUTUM PRODUCTION  [x]   COUGH AND DEEP BREATHING  [x]  IMPLEMENT SECRETION MANAGEMENT PROTOCOL  [x]   PATIENT EDUCATION AS NEEDED    MOBILIZE SECRETIONS    []   ASSESS BREATH SOUNDS  []   ASSESS SPUTUM PRODUCTION  []   COUGH AND DEEP BREATHING  []  IMPLEMENT SECRETION MANAGEMENT PROTOCOL  []   PATIENT EDUCATION AS NEEDED

## 2022-05-21 NOTE — PROGRESS NOTES
PULMONARY PROGRESS NOTE:    REASON FOR VISIT:COPD exac  Interval History:    Shortness of Breath: yes  Cough: yes, thick mucous with bloody tinge to it  Sputum: no          Hemoptysis: no  Chest Pain: better today  Fever: no                   Swelling Feet: no  Headache: no                                           Nausea, Emesis, Abdominal Pain: no  Diarrhea: no         Constipation: no    Events since last visit: no acute overnight events    PAST MEDICAL HISTORY:      Scheduled Meds:   methylPREDNISolone  40 mg IntraVENous Q12H    isosorbide mononitrate  30 mg Oral Daily    bumetanide  2 mg Oral Daily    potassium chloride  20 mEq Oral Daily    magnesium oxide  400 mg Oral BID    diclofenac sodium  2 g Topical BID    colchicine  0.6 mg Oral BID    azithromycin  500 mg IntraVENous Q24H    clindamycin  150 mg Oral 4 times per day    budesonide  0.5 mg Nebulization BID    sodium chloride flush  5-40 mL IntraVENous 2 times per day    atorvastatin  80 mg Oral Nightly    carvedilol  12.5 mg Oral BID WC    clopidogrel  75 mg Oral Daily    DULoxetine  60 mg Oral BID    ipratropium-albuterol  1 ampule Inhalation Q4H WA    QUEtiapine  150 mg Oral Nightly    vilazodone HCl  20 mg Oral Daily    enoxaparin  30 mg SubCUTAneous BID    insulin glargine  80 Units SubCUTAneous BID    pantoprazole  40 mg Oral BID    insulin lispro  0-18 Units SubCUTAneous TID WC    insulin lispro  0-9 Units SubCUTAneous Nightly     Continuous Infusions:   sodium chloride      dextrose       PRN Meds:potassium chloride **OR** potassium alternative oral replacement **OR** potassium chloride, dextromethorphan-guaiFENesin, sodium chloride flush, sodium chloride, polyethylene glycol, acetaminophen **OR** acetaminophen, albuterol sulfate HFA, ALPRAZolam, nitroGLYCERIN, oxyCODONE-acetaminophen, HYDROmorphone, glucose, dextrose bolus **OR** dextrose bolus, glucagon (rDNA), dextrose        PHYSICAL EXAMINATION:  /62 Pulse 83   Temp 98.2 °F (36.8 °C) (Oral)   Resp 18   Ht 5' 8\" (1.727 m)   Wt 268 lb 11.9 oz (121.9 kg)   SpO2 93%   BMI 40.86 kg/m²     General : Awake, alert, oriented x 3   Neck  supple, no lymphadenopathy, JVD not raised  Heart  regular rhythm, S1 and S2 normal; no additional sounds heard  Lungs  poor Entry- fair bilaterally; breath sounds : expiratory wheezing, 93% on 3 l nc  Abdomen  soft, no tenderness  Upper Extremities  - no cyanosis, mottling; edema : absent  Lower Extremities: no cyanosis, mottling; edema : mild BLE edema    Current Laboratory, Radiologic, Microbiologic, and Diagnostic studies reviewed  Data ReviewCBC:   No results for input(s): WBC, RBC, HGB, HCT, PLT in the last 72 hours. BMP:   Recent Labs     05/19/22  0519 05/20/22  0521 05/21/22  0633   GLUCOSE 338* 130* 299*    142 140   K 3.6* 3.6* 4.3   BUN 22* 21* 20   CREATININE 0.81 0.79 0.69   CALCIUM 9.3 9.4 9.6     ABGs: No results for input(s): PHART, PO2ART, ESN5YSF, WTJ8KMW, BEART, H0JEOCPF, SLT9HGV in the last 72 hours. PT/INR:  No results found for: PTINR    ASSESSMENT / PLAN:    · Acute on chronic systolic heart failure- cardiology consulted   ? IV diuresis   · COPD exac-BD, pulm hygiene, add solumedrol  · Sputum culture GNR - await ID and susceptibilities  · Possible pneumonia  ? Patient has numerous antibiotic allergies which would severely limit antibiotic selection. ? Check procal- 0.03  ?  Abx added  · Nicotine dependence- encourage smoking cessation   · Hx DM  · CXR 5/21 - infiltrates +      Electronically signed by Yessica Choi MD on 05/21/22 at 10:41 AM

## 2022-05-21 NOTE — PROGRESS NOTES
Progress Note  Date:2022       Room:[facility]2097-  Patient Zulay Neil     YOB: 1975     Age:47 y.o. Subjective    Subjective:  Symptoms:  Stable. She reports cough. Diet:  Adequate intake. Review of Systems   Respiratory: Positive for cough. Objective         Vitals Last 24 Hours:  TEMPERATURE:  Temp  Av.5 °F (36.9 °C)  Min: 98.2 °F (36.8 °C)  Max: 98.6 °F (37 °C)  RESPIRATIONS RANGE: Resp  Av.4  Min: 18  Max: 20  PULSE OXIMETRY RANGE: SpO2  Av.1 %  Min: 90 %  Max: 94 %  PULSE RANGE: Pulse  Av.5  Min: 76  Max: 85  BLOOD PRESSURE RANGE: Systolic (12JJH), HIU:796 , Min:106 , OXP:238   ; Diastolic (06ZUT), BNK:29, Min:62, Max:76    I/O (24Hr): Intake/Output Summary (Last 24 hours) at 2022 0749  Last data filed at 2022 0601  Gross per 24 hour   Intake 1542 ml   Output 2150 ml   Net -608 ml     Objective:  General Appearance:  Comfortable. Vital signs: (most recent): Blood pressure 125/62, pulse 83, temperature 98.2 °F (36.8 °C), temperature source Oral, resp. rate 18, height 5' 8\" (1.727 m), weight 268 lb 11.9 oz (121.9 kg), SpO2 94 %. Vital signs are normal.    Output: Producing urine. Lungs:  Normal respiratory rate and increased effort. There are decreased breath sounds. (Cough productive of brown/green sputum)  Abdomen: Abdomen is soft. Bowel sounds are normal.     Skin:  Warm and dry. Labs/Imaging/Diagnostics    Labs:  CBC:  No results for input(s): WBC, RBC, HGB, HCT, MCV, RDW, PLT in the last 72 hours. CHEMISTRIES:  Recent Labs     22  0519 22  0521 22  0633    142 140   K 3.6* 3.6* 4.3   CL 98 99 98   CO2 33* 31 31   BUN 22* 21* 20   CREATININE 0.81 0.79 0.69   GLUCOSE 338* 130* 299*   MG 1.5* 1.7 1.8     PT/INR:No results for input(s): PROTIME, INR in the last 72 hours. APTT:No results for input(s): APTT in the last 72 hours.   LIVER PROFILE:No results for input(s): AST, ALT, BILIDIR, BILITOT, ALKPHOS in the last 72 hours. Imaging Last 24 Hours:  No results found. Assessment//Plan           Hospital Problems           Last Modified POA    * (Principal) Acute on chronic systolic (congestive) heart failure ([facility] [address] 5/18/2022 Yes    Moderate left ventricular systolic dysfunction 6/10/4078 Yes    Bilateral lower extremity edema 5/18/2022 Yes    COPD (chronic obstructive pulmonary disease) (HCC) (Chronic) 5/18/2022 Yes    Tobacco abuse 5/18/2022 Yes    Pulmonary HTN ([facility] [address] 5/18/2022 Yes    Uncontrolled type 2 diabetes mellitus with hyperglycemia ([facility] [address] 5/18/2022 Yes        Assessment & Plan    Acute on chronic CHF - mild diuresis. Pneumonia - clindamycin/azithromycin per pulmonology. Gram negative rods on sputum culture. Continue current treatments.     Electronically signed by Mony Winkler MD on 5/21/2022 at 10:06 PM

## 2022-05-22 LAB
ANION GAP SERPL CALCULATED.3IONS-SCNC: 11 MMOL/L (ref 9–17)
BUN BLDV-MCNC: 18 MG/DL (ref 6–20)
CALCIUM SERPL-MCNC: 8.9 MG/DL (ref 8.6–10.4)
CHLORIDE BLD-SCNC: 97 MMOL/L (ref 98–107)
CO2: 32 MMOL/L (ref 20–31)
CREAT SERPL-MCNC: 0.64 MG/DL (ref 0.5–0.9)
GFR AFRICAN AMERICAN: >60 ML/MIN
GFR NON-AFRICAN AMERICAN: >60 ML/MIN
GFR SERPL CREATININE-BSD FRML MDRD: ABNORMAL ML/MIN/{1.73_M2}
GLUCOSE BLD-MCNC: 108 MG/DL (ref 65–105)
GLUCOSE BLD-MCNC: 221 MG/DL (ref 65–105)
GLUCOSE BLD-MCNC: 228 MG/DL (ref 70–99)
GLUCOSE BLD-MCNC: 273 MG/DL (ref 65–105)
MAGNESIUM: 1.8 MG/DL (ref 1.6–2.6)
POTASSIUM SERPL-SCNC: 3.8 MMOL/L (ref 3.7–5.3)
SODIUM BLD-SCNC: 140 MMOL/L (ref 135–144)

## 2022-05-22 PROCEDURE — 83735 ASSAY OF MAGNESIUM: CPT

## 2022-05-22 PROCEDURE — 6370000000 HC RX 637 (ALT 250 FOR IP): Performed by: FAMILY MEDICINE

## 2022-05-22 PROCEDURE — 6360000002 HC RX W HCPCS: Performed by: NURSE PRACTITIONER

## 2022-05-22 PROCEDURE — 36415 COLL VENOUS BLD VENIPUNCTURE: CPT

## 2022-05-22 PROCEDURE — 2580000003 HC RX 258: Performed by: INTERNAL MEDICINE

## 2022-05-22 PROCEDURE — 2700000000 HC OXYGEN THERAPY PER DAY

## 2022-05-22 PROCEDURE — 6370000000 HC RX 637 (ALT 250 FOR IP): Performed by: NURSE PRACTITIONER

## 2022-05-22 PROCEDURE — 2580000003 HC RX 258: Performed by: FAMILY MEDICINE

## 2022-05-22 PROCEDURE — 80048 BASIC METABOLIC PNL TOTAL CA: CPT

## 2022-05-22 PROCEDURE — 6360000002 HC RX W HCPCS: Performed by: INTERNAL MEDICINE

## 2022-05-22 PROCEDURE — 94669 MECHANICAL CHEST WALL OSCILL: CPT

## 2022-05-22 PROCEDURE — 6360000002 HC RX W HCPCS: Performed by: FAMILY MEDICINE

## 2022-05-22 PROCEDURE — 99232 SBSQ HOSP IP/OBS MODERATE 35: CPT | Performed by: FAMILY MEDICINE

## 2022-05-22 PROCEDURE — 94761 N-INVAS EAR/PLS OXIMETRY MLT: CPT

## 2022-05-22 PROCEDURE — 82947 ASSAY GLUCOSE BLOOD QUANT: CPT

## 2022-05-22 PROCEDURE — 2060000000 HC ICU INTERMEDIATE R&B

## 2022-05-22 PROCEDURE — 94640 AIRWAY INHALATION TREATMENT: CPT

## 2022-05-22 PROCEDURE — 6370000000 HC RX 637 (ALT 250 FOR IP): Performed by: INTERNAL MEDICINE

## 2022-05-22 RX ADMIN — METHYLPREDNISOLONE SODIUM SUCCINATE 40 MG: 40 INJECTION, POWDER, FOR SOLUTION INTRAMUSCULAR; INTRAVENOUS at 08:37

## 2022-05-22 RX ADMIN — SODIUM CHLORIDE, PRESERVATIVE FREE 10 ML: 5 INJECTION INTRAVENOUS at 21:13

## 2022-05-22 RX ADMIN — PANTOPRAZOLE SODIUM 40 MG: 40 TABLET, DELAYED RELEASE ORAL at 06:30

## 2022-05-22 RX ADMIN — IPRATROPIUM BROMIDE AND ALBUTEROL SULFATE 1 AMPULE: 2.5; .5 SOLUTION RESPIRATORY (INHALATION) at 08:11

## 2022-05-22 RX ADMIN — HYDROMORPHONE HYDROCHLORIDE 0.5 MG: 1 INJECTION, SOLUTION INTRAMUSCULAR; INTRAVENOUS; SUBCUTANEOUS at 03:42

## 2022-05-22 RX ADMIN — COLCHICINE 0.6 MG: 0.6 TABLET ORAL at 21:10

## 2022-05-22 RX ADMIN — MAGNESIUM OXIDE TAB 400 MG (241.3 MG ELEMENTAL MG) 400 MG: 400 (241.3 MG) TAB at 08:37

## 2022-05-22 RX ADMIN — HYDROMORPHONE HYDROCHLORIDE 0.5 MG: 1 INJECTION, SOLUTION INTRAMUSCULAR; INTRAVENOUS; SUBCUTANEOUS at 21:04

## 2022-05-22 RX ADMIN — INSULIN GLARGINE 80 UNITS: 100 INJECTION, SOLUTION SUBCUTANEOUS at 18:37

## 2022-05-22 RX ADMIN — DICLOFENAC SODIUM 2 G: 10 GEL TOPICAL at 21:11

## 2022-05-22 RX ADMIN — CARVEDILOL 12.5 MG: 12.5 TABLET, FILM COATED ORAL at 06:30

## 2022-05-22 RX ADMIN — GUAIFENESIN DEXTROMETHORPHAN HYDROBROMIDE ORAL SOLUTION 5 ML: 200; 20 SOLUTION ORAL at 03:43

## 2022-05-22 RX ADMIN — COLCHICINE 0.6 MG: 0.6 TABLET ORAL at 08:37

## 2022-05-22 RX ADMIN — CLOPIDOGREL BISULFATE 75 MG: 75 TABLET ORAL at 18:37

## 2022-05-22 RX ADMIN — ISOSORBIDE MONONITRATE 30 MG: 30 TABLET, EXTENDED RELEASE ORAL at 08:37

## 2022-05-22 RX ADMIN — INSULIN LISPRO 6 UNITS: 100 INJECTION, SOLUTION INTRAVENOUS; SUBCUTANEOUS at 16:56

## 2022-05-22 RX ADMIN — BUMETANIDE 2 MG: 1 TABLET ORAL at 08:37

## 2022-05-22 RX ADMIN — QUETIAPINE FUMARATE 150 MG: 100 TABLET ORAL at 18:31

## 2022-05-22 RX ADMIN — PANTOPRAZOLE SODIUM 40 MG: 40 TABLET, DELAYED RELEASE ORAL at 18:31

## 2022-05-22 RX ADMIN — METHYLPREDNISOLONE SODIUM SUCCINATE 40 MG: 40 INJECTION, POWDER, FOR SOLUTION INTRAMUSCULAR; INTRAVENOUS at 21:08

## 2022-05-22 RX ADMIN — GUAIFENESIN DEXTROMETHORPHAN HYDROBROMIDE ORAL SOLUTION 5 ML: 200; 20 SOLUTION ORAL at 22:41

## 2022-05-22 RX ADMIN — INSULIN LISPRO 3 UNITS: 100 INJECTION, SOLUTION INTRAVENOUS; SUBCUTANEOUS at 21:04

## 2022-05-22 RX ADMIN — CARVEDILOL 12.5 MG: 12.5 TABLET, FILM COATED ORAL at 18:31

## 2022-05-22 RX ADMIN — DULOXETINE 60 MG: 60 CAPSULE, DELAYED RELEASE ORAL at 08:37

## 2022-05-22 RX ADMIN — MEROPENEM 1000 MG: 1 INJECTION, POWDER, FOR SOLUTION INTRAVENOUS at 15:10

## 2022-05-22 RX ADMIN — ATORVASTATIN CALCIUM 80 MG: 80 TABLET, FILM COATED ORAL at 18:31

## 2022-05-22 RX ADMIN — DULOXETINE 60 MG: 60 CAPSULE, DELAYED RELEASE ORAL at 18:31

## 2022-05-22 RX ADMIN — POTASSIUM CHLORIDE 20 MEQ: 20 TABLET, EXTENDED RELEASE ORAL at 08:36

## 2022-05-22 RX ADMIN — IPRATROPIUM BROMIDE AND ALBUTEROL SULFATE 1 AMPULE: 2.5; .5 SOLUTION RESPIRATORY (INHALATION) at 14:48

## 2022-05-22 RX ADMIN — INSULIN GLARGINE 80 UNITS: 100 INJECTION, SOLUTION SUBCUTANEOUS at 08:15

## 2022-05-22 RX ADMIN — MEROPENEM 1000 MG: 1 INJECTION, POWDER, FOR SOLUTION INTRAVENOUS at 06:34

## 2022-05-22 RX ADMIN — HYDROMORPHONE HYDROCHLORIDE 0.5 MG: 1 INJECTION, SOLUTION INTRAMUSCULAR; INTRAVENOUS; SUBCUTANEOUS at 12:52

## 2022-05-22 RX ADMIN — INSULIN LISPRO 6 UNITS: 100 INJECTION, SOLUTION INTRAVENOUS; SUBCUTANEOUS at 08:15

## 2022-05-22 RX ADMIN — GUAIFENESIN DEXTROMETHORPHAN HYDROBROMIDE ORAL SOLUTION 5 ML: 200; 20 SOLUTION ORAL at 12:30

## 2022-05-22 RX ADMIN — MEROPENEM 1000 MG: 1 INJECTION, POWDER, FOR SOLUTION INTRAVENOUS at 22:42

## 2022-05-22 RX ADMIN — IPRATROPIUM BROMIDE AND ALBUTEROL SULFATE 1 AMPULE: 2.5; .5 SOLUTION RESPIRATORY (INHALATION) at 20:02

## 2022-05-22 RX ADMIN — HYDROMORPHONE HYDROCHLORIDE 0.5 MG: 1 INJECTION, SOLUTION INTRAMUSCULAR; INTRAVENOUS; SUBCUTANEOUS at 17:01

## 2022-05-22 RX ADMIN — OXYCODONE HYDROCHLORIDE AND ACETAMINOPHEN 1 TABLET: 5; 325 TABLET ORAL at 18:37

## 2022-05-22 RX ADMIN — HYDROMORPHONE HYDROCHLORIDE 0.5 MG: 1 INJECTION, SOLUTION INTRAMUSCULAR; INTRAVENOUS; SUBCUTANEOUS at 08:36

## 2022-05-22 RX ADMIN — MAGNESIUM OXIDE TAB 400 MG (241.3 MG ELEMENTAL MG) 400 MG: 400 (241.3 MG) TAB at 21:10

## 2022-05-22 ASSESSMENT — PAIN SCALES - GENERAL
PAINLEVEL_OUTOF10: 7
PAINLEVEL_OUTOF10: 9
PAINLEVEL_OUTOF10: 6
PAINLEVEL_OUTOF10: 8
PAINLEVEL_OUTOF10: 10
PAINLEVEL_OUTOF10: 10
PAINLEVEL_OUTOF10: 6
PAINLEVEL_OUTOF10: 9
PAINLEVEL_OUTOF10: 10

## 2022-05-22 ASSESSMENT — PAIN DESCRIPTION - DESCRIPTORS: DESCRIPTORS: SHARP

## 2022-05-22 ASSESSMENT — PAIN DESCRIPTION - LOCATION: LOCATION: HEAD

## 2022-05-22 ASSESSMENT — ENCOUNTER SYMPTOMS: COUGH: 1

## 2022-05-22 NOTE — CARE COORDINATION
ONGOING DISCHARGE PLAN:    Patient is alert and oriented x4. Spoke with patient regarding discharge plan and patient confirms that plan is still home independent. DME: oxygen. **nebulizer is broken from [facility] wants to see if she qualifies for a new one. On IV Merrem Solu 40 Q12 + pneumonia    pulm following     Will continue to follow for additional discharge needs.     Electronically signed by Bee Cohn RN on 5/22/2022 at 12:43 PM

## 2022-05-22 NOTE — PROGRESS NOTES
Progress Note  Date:2022       Room:[facility]2097-  Patient Jessica Myles     YOB: 1975     Age:47 y.o. Subjective    Subjective:  Symptoms:  Stable. She reports cough. Diet:  Adequate intake. Review of Systems   Respiratory: Positive for cough. Review of Systems - General ROS: positive for  - obesity  Psychological ROS: positive for - anxiety, depression and mood swings  Endocrine ROS: positive for - hyperglycemia  Cardiovascular ROS: positive for - edema      Objective         Vitals Last 24 Hours:  TEMPERATURE:  Temp  Av °F (36.7 °C)  Min: 97.4 °F (36.3 °C)  Max: 98.5 °F (36.9 °C)  RESPIRATIONS RANGE: Resp  Av.4  Min: 15  Max: 18  PULSE OXIMETRY RANGE: SpO2  Av.2 %  Min: 90 %  Max: 95 %  PULSE RANGE: Pulse  Av  Min: 70  Max: 90  BLOOD PRESSURE RANGE: Systolic (29YQB), SDZ:056 , Min:102 , PKQ:507   ; Diastolic (15KWD), YJH:24, Min:48, Max:82    I/O (24Hr): Intake/Output Summary (Last 24 hours) at 2022 0739  Last data filed at 2022 0556  Gross per 24 hour   Intake 1780 ml   Output 1700 ml   Net 80 ml     Objective:  General Appearance:  Comfortable. Vital signs: (most recent): Blood pressure 136/63, pulse 75, temperature 98.3 °F (36.8 °C), temperature source Oral, resp. rate 15, height 5' 8\" (1.727 m), weight 268 lb 11.9 oz (121.9 kg), SpO2 93 %. Vital signs are normal.    Output: Producing urine. Lungs:  Normal respiratory rate and increased effort. There are decreased breath sounds. (Cough productive of brown/green sputum)  Abdomen: Abdomen is soft. Bowel sounds are normal.     Skin:  Warm and dry. Labs/Imaging/Diagnostics    Labs:  CBC:  No results for input(s): WBC, RBC, HGB, HCT, MCV, RDW, PLT in the last 72 hours.   CHEMISTRIES:  Recent Labs     22  0521 22  0633 22  0530    140 140   K 3.6* 4.3 3.8   CL 99 98 97*   CO2 31 31 32*   BUN 21* 20 18   CREATININE 0.79 0.69 0.64   GLUCOSE 130* 299* 228*   MG 1.7 1.8 1.8     PT/INR:No results for input(s): PROTIME, INR in the last 72 hours. APTT:No results for input(s): APTT in the last 72 hours. LIVER PROFILE:No results for input(s): AST, ALT, BILIDIR, BILITOT, ALKPHOS in the last 72 hours. Imaging Last 24 Hours:  No results found. Assessment//Plan           Hospital Problems           Last Modified POA    * (Principal) Acute on chronic systolic (congestive) heart failure ([facility] [address] 5/18/2022 Yes    Moderate left ventricular systolic dysfunction 3/74/5090 Yes    Bilateral lower extremity edema 5/18/2022 Yes    COPD (chronic obstructive pulmonary disease) (HCC) (Chronic) 5/18/2022 Yes    Tobacco abuse 5/18/2022 Yes    Pulmonary HTN ([facility] [address] 5/18/2022 Yes    Uncontrolled type 2 diabetes mellitus with hyperglycemia ([facility] [address] 5/18/2022 Yes        Assessment & Plan    Acute on chronic CHF - mild diuresis. Pneumonia - clindamycin/azithromycin per pulmonology. Gram negative rods on sputum culture. Continue current treatments.     Electronically signed by Brandie Hagan MD on 5/22/2022 at 7:39 AM

## 2022-05-22 NOTE — FLOWSHEET NOTE
Pt was calm but seemed sad as she said she was ok. Writer told her that although our keri backgrounds were different, [facility] would be glad to provide listening presence and support if pt would like.  Pt said she would think about it.      05/22/22 1359   Encounter Summary   Encounter Overview/Reason  Initial Encounter   Service Provided For: Patient   Referral/Consult From: Patient   Last Encounter  05/22/22   Complexity of Encounter Low   Encounter    Type Initial Screen/Assessment   Assessment/Intervention/Outcome   Assessment Sad;Calm   Intervention Active listening;Discussed illness injury and its impact;Sustaining Presence/Ministry of presence   Outcome Engaged in conversation;Expressed Gratitude

## 2022-05-22 NOTE — PLAN OF CARE
Problem: Discharge Planning  Goal: Discharge to home or other facility with appropriate resources  Outcome: Progressing     Problem: Pain  Goal: Verbalizes/displays adequate comfort level or baseline comfort level  Outcome: Progressing     Problem: ABCDS Injury Assessment  Goal: Absence of physical injury  Outcome: Progressing  Flowsheets (Taken 5/22/2022 0019)  Absence of Physical Injury: Implement safety measures based on patient assessment     Problem: Safety - Adult  Goal: Free from fall injury  Outcome: Progressing  Flowsheets (Taken 5/22/2022 0019)  Free From Fall Injury: Instruct family/caregiver on patient safety     Problem: Chronic Conditions and Co-morbidities  Goal: Patient's chronic conditions and co-morbidity symptoms are monitored and maintained or improved  Outcome: Progressing

## 2022-05-22 NOTE — PROGRESS NOTES
PULMONARY PROGRESS NOTE:    REASON FOR VISIT:COPD exac  Interval History:    Shortness of Breath: yes  Cough: yes, thick mucous with bloody tinge to it; difficult to expectorate  Sputum: no          Hemoptysis: no  Chest Pain: better today  Fever: no                   Swelling Feet: no  Headache: no                                           Nausea, Emesis, Abdominal Pain: no  Diarrhea: no         Constipation: no    Events since last visit: cannot expectorate    PAST MEDICAL HISTORY:      Scheduled Meds:   meropenem  1,000 mg IntraVENous Q8H    methylPREDNISolone  40 mg IntraVENous Q12H    isosorbide mononitrate  30 mg Oral Daily    bumetanide  2 mg Oral Daily    potassium chloride  20 mEq Oral Daily    magnesium oxide  400 mg Oral BID    diclofenac sodium  2 g Topical BID    colchicine  0.6 mg Oral BID    budesonide  0.5 mg Nebulization BID    sodium chloride flush  5-40 mL IntraVENous 2 times per day    atorvastatin  80 mg Oral Nightly    carvedilol  12.5 mg Oral BID WC    clopidogrel  75 mg Oral Daily    DULoxetine  60 mg Oral BID    ipratropium-albuterol  1 ampule Inhalation Q4H WA    QUEtiapine  150 mg Oral Nightly    vilazodone HCl  20 mg Oral Daily    enoxaparin  30 mg SubCUTAneous BID    insulin glargine  80 Units SubCUTAneous BID    pantoprazole  40 mg Oral BID    insulin lispro  0-18 Units SubCUTAneous TID WC    insulin lispro  0-9 Units SubCUTAneous Nightly     Continuous Infusions:   sodium chloride 15 mL/hr at 05/21/22 1514    dextrose       PRN Meds:potassium chloride **OR** potassium alternative oral replacement **OR** potassium chloride, dextromethorphan-guaiFENesin, sodium chloride flush, sodium chloride, polyethylene glycol, acetaminophen **OR** acetaminophen, albuterol sulfate HFA, ALPRAZolam, nitroGLYCERIN, oxyCODONE-acetaminophen, HYDROmorphone, glucose, dextrose bolus **OR** dextrose bolus, glucagon (rDNA), dextrose        PHYSICAL EXAMINATION:  BP (!) 141/92 Pulse 75   Temp 98.2 °F (36.8 °C) (Oral)   Resp 18   Ht 5' 8\" (1.727 m)   Wt 268 lb 11.9 oz (121.9 kg)   SpO2 91%   BMI 40.86 kg/m²     General : Awake, alert, oriented x 3   Neck  supple, no lymphadenopathy, JVD not raised  Heart  regular rhythm, S1 and S2 normal; no additional sounds heard  Lungs  poor Entry- fair bilaterally; breath sounds : expiratory wheezing, 93% on 3 l nc  Abdomen  soft, no tenderness  Upper Extremities  - no cyanosis, mottling; edema : absent  Lower Extremities: no cyanosis, mottling; edema : mild BLE edema    Current Laboratory, Radiologic, Microbiologic, and Diagnostic studies reviewed  Data ReviewCBC:   No results for input(s): WBC, RBC, HGB, HCT, PLT in the last 72 hours. BMP:   Recent Labs     05/20/22  0521 05/21/22  0633 05/22/22  0530   GLUCOSE 130* 299* 228*    140 140   K 3.6* 4.3 3.8   BUN 21* 20 18   CREATININE 0.79 0.69 0.64   CALCIUM 9.4 9.6 8.9     ABGs: No results for input(s): PHART, PO2ART, SFC8PJC, UFT5DXQ, BEART, S7IWBRCG, ZCU7GPL in the last 72 hours. PT/INR:  No results found for: PTINR    ASSESSMENT / PLAN:    · Acute on chronic systolic heart failure- cardiology consulted   ? IV diuresis   · COPD exac-BD, pulm hygiene, add solumedrol  · Sputum culture GNR - await ID and susceptibilities  · Possible pneumonia  ? Patient has numerous antibiotic allergies which would severely limit antibiotic selection. ? Check procal- 0.03  ?  Abx added  · Nicotine dependence- encourage smoking cessation   · Hx DM  · CXR 5/21 - infiltrates +  · Possible bronchoscopy 5/23/22- DW patient and is agreeable      Electronically signed by Peterson Chaudhry MD on 05/22/22 at 12:31 PM

## 2022-05-22 NOTE — PLAN OF CARE
Problem: Discharge Planning  Goal: Discharge to home or other facility with appropriate resources  5/22/2022 1613 by Claudeen Canes, RN  Outcome: Progressing     Problem: Pain  Goal: Verbalizes/displays adequate comfort level or baseline comfort level  5/22/2022 1613 by Claudeen Canes, RN  Outcome: Progressing     Problem: ABCDS Injury Assessment  Goal: Absence of physical injury  5/22/2022 1613 by Claudeen Canes, RN  Outcome: Progressing     Problem: Safety - Adult  Goal: Free from fall injury  5/22/2022 1613 by Claudeen Canes, RN  Outcome: Progressing

## 2022-05-23 ENCOUNTER — ANESTHESIA (OUTPATIENT)
Dept: ENDOSCOPY | Age: 47
DRG: 291 | End: 2022-05-23
Payer: COMMERCIAL

## 2022-05-23 ENCOUNTER — ANESTHESIA EVENT (OUTPATIENT)
Dept: ENDOSCOPY | Age: 47
DRG: 291 | End: 2022-05-23
Payer: COMMERCIAL

## 2022-05-23 LAB
GLUCOSE BLD-MCNC: 207 MG/DL (ref 65–105)
GLUCOSE BLD-MCNC: 224 MG/DL (ref 65–105)
GLUCOSE BLD-MCNC: 226 MG/DL (ref 65–105)
GLUCOSE BLD-MCNC: 341 MG/DL (ref 65–105)
GLUCOSE BLD-MCNC: 94 MG/DL (ref 65–105)
PRO-BNP: 185 PG/ML

## 2022-05-23 PROCEDURE — 3700000001 HC ADD 15 MINUTES (ANESTHESIA): Performed by: INTERNAL MEDICINE

## 2022-05-23 PROCEDURE — 6360000002 HC RX W HCPCS: Performed by: INTERNAL MEDICINE

## 2022-05-23 PROCEDURE — 0BC78ZZ EXTIRPATION OF MATTER FROM LEFT MAIN BRONCHUS, VIA NATURAL OR ARTIFICIAL OPENING ENDOSCOPIC: ICD-10-PCS | Performed by: INTERNAL MEDICINE

## 2022-05-23 PROCEDURE — 2060000000 HC ICU INTERMEDIATE R&B

## 2022-05-23 PROCEDURE — 7100000001 HC PACU RECOVERY - ADDTL 15 MIN: Performed by: INTERNAL MEDICINE

## 2022-05-23 PROCEDURE — 94761 N-INVAS EAR/PLS OXIMETRY MLT: CPT

## 2022-05-23 PROCEDURE — 7100000000 HC PACU RECOVERY - FIRST 15 MIN: Performed by: INTERNAL MEDICINE

## 2022-05-23 PROCEDURE — 94640 AIRWAY INHALATION TREATMENT: CPT

## 2022-05-23 PROCEDURE — 2500000003 HC RX 250 WO HCPCS: Performed by: INTERNAL MEDICINE

## 2022-05-23 PROCEDURE — 6370000000 HC RX 637 (ALT 250 FOR IP): Performed by: INTERNAL MEDICINE

## 2022-05-23 PROCEDURE — 6360000002 HC RX W HCPCS: Performed by: FAMILY MEDICINE

## 2022-05-23 PROCEDURE — 2580000003 HC RX 258: Performed by: NURSE ANESTHETIST, CERTIFIED REGISTERED

## 2022-05-23 PROCEDURE — 6360000002 HC RX W HCPCS: Performed by: NURSE ANESTHETIST, CERTIFIED REGISTERED

## 2022-05-23 PROCEDURE — 2709999900 HC NON-CHARGEABLE SUPPLY: Performed by: INTERNAL MEDICINE

## 2022-05-23 PROCEDURE — 2700000000 HC OXYGEN THERAPY PER DAY

## 2022-05-23 PROCEDURE — 0BC38ZZ EXTIRPATION OF MATTER FROM RIGHT MAIN BRONCHUS, VIA NATURAL OR ARTIFICIAL OPENING ENDOSCOPIC: ICD-10-PCS | Performed by: INTERNAL MEDICINE

## 2022-05-23 PROCEDURE — 3609027000 HC BRONCHOSCOPY: Performed by: INTERNAL MEDICINE

## 2022-05-23 PROCEDURE — 2580000003 HC RX 258: Performed by: INTERNAL MEDICINE

## 2022-05-23 PROCEDURE — 2500000003 HC RX 250 WO HCPCS: Performed by: NURSE ANESTHETIST, CERTIFIED REGISTERED

## 2022-05-23 PROCEDURE — 83880 ASSAY OF NATRIURETIC PEPTIDE: CPT

## 2022-05-23 PROCEDURE — 3700000000 HC ANESTHESIA ATTENDED CARE: Performed by: INTERNAL MEDICINE

## 2022-05-23 PROCEDURE — 36415 COLL VENOUS BLD VENIPUNCTURE: CPT

## 2022-05-23 PROCEDURE — 82947 ASSAY GLUCOSE BLOOD QUANT: CPT

## 2022-05-23 RX ORDER — LIDOCAINE HYDROCHLORIDE 10 MG/ML
INJECTION, SOLUTION EPIDURAL; INFILTRATION; INTRACAUDAL; PERINEURAL PRN
Status: DISCONTINUED | OUTPATIENT
Start: 2022-05-23 | End: 2022-05-23 | Stop reason: SDUPTHER

## 2022-05-23 RX ORDER — LIDOCAINE HYDROCHLORIDE 10 MG/ML
INJECTION, SOLUTION INFILTRATION; PERINEURAL PRN
Status: DISCONTINUED | OUTPATIENT
Start: 2022-05-23 | End: 2022-05-23 | Stop reason: ALTCHOICE

## 2022-05-23 RX ORDER — MIDAZOLAM HYDROCHLORIDE 1 MG/ML
INJECTION INTRAMUSCULAR; INTRAVENOUS PRN
Status: DISCONTINUED | OUTPATIENT
Start: 2022-05-23 | End: 2022-05-23 | Stop reason: SDUPTHER

## 2022-05-23 RX ORDER — LIDOCAINE HYDROCHLORIDE 40 MG/ML
4 INJECTION, SOLUTION RETROBULBAR; TOPICAL ONCE
Status: COMPLETED | OUTPATIENT
Start: 2022-05-23 | End: 2022-05-23

## 2022-05-23 RX ORDER — SODIUM CHLORIDE 9 MG/ML
INJECTION, SOLUTION INTRAVENOUS CONTINUOUS PRN
Status: DISCONTINUED | OUTPATIENT
Start: 2022-05-23 | End: 2022-05-23 | Stop reason: SDUPTHER

## 2022-05-23 RX ORDER — ALBUTEROL SULFATE 2.5 MG/3ML
2.5 SOLUTION RESPIRATORY (INHALATION) ONCE
Status: COMPLETED | OUTPATIENT
Start: 2022-05-23 | End: 2022-05-23

## 2022-05-23 RX ORDER — PROPOFOL 10 MG/ML
INJECTION, EMULSION INTRAVENOUS PRN
Status: DISCONTINUED | OUTPATIENT
Start: 2022-05-23 | End: 2022-05-23 | Stop reason: SDUPTHER

## 2022-05-23 RX ADMIN — HYDROMORPHONE HYDROCHLORIDE 0.5 MG: 1 INJECTION, SOLUTION INTRAMUSCULAR; INTRAVENOUS; SUBCUTANEOUS at 01:53

## 2022-05-23 RX ADMIN — MEROPENEM 1000 MG: 1 INJECTION, POWDER, FOR SOLUTION INTRAVENOUS at 06:32

## 2022-05-23 RX ADMIN — INSULIN LISPRO 6 UNITS: 100 INJECTION, SOLUTION INTRAVENOUS; SUBCUTANEOUS at 20:23

## 2022-05-23 RX ADMIN — ISOSORBIDE MONONITRATE 30 MG: 30 TABLET, EXTENDED RELEASE ORAL at 12:53

## 2022-05-23 RX ADMIN — INSULIN GLARGINE 80 UNITS: 100 INJECTION, SOLUTION SUBCUTANEOUS at 20:19

## 2022-05-23 RX ADMIN — HYDROMORPHONE HYDROCHLORIDE 0.5 MG: 1 INJECTION, SOLUTION INTRAMUSCULAR; INTRAVENOUS; SUBCUTANEOUS at 06:12

## 2022-05-23 RX ADMIN — IPRATROPIUM BROMIDE AND ALBUTEROL SULFATE 1 AMPULE: 2.5; .5 SOLUTION RESPIRATORY (INHALATION) at 15:23

## 2022-05-23 RX ADMIN — ALBUTEROL SULFATE 2.5 MG: 2.5 SOLUTION RESPIRATORY (INHALATION) at 11:01

## 2022-05-23 RX ADMIN — DICLOFENAC SODIUM 2 G: 10 GEL TOPICAL at 13:02

## 2022-05-23 RX ADMIN — MAGNESIUM OXIDE TAB 400 MG (241.3 MG ELEMENTAL MG) 400 MG: 400 (241.3 MG) TAB at 20:14

## 2022-05-23 RX ADMIN — QUETIAPINE FUMARATE 150 MG: 100 TABLET ORAL at 20:14

## 2022-05-23 RX ADMIN — COLCHICINE 0.6 MG: 0.6 TABLET ORAL at 12:53

## 2022-05-23 RX ADMIN — DULOXETINE 60 MG: 60 CAPSULE, DELAYED RELEASE ORAL at 12:53

## 2022-05-23 RX ADMIN — PROPOFOL 210 MG: 10 INJECTION, EMULSION INTRAVENOUS at 11:30

## 2022-05-23 RX ADMIN — LIDOCAINE HYDROCHLORIDE 3 ML: 40 INJECTION, SOLUTION RETROBULBAR; TOPICAL at 11:02

## 2022-05-23 RX ADMIN — CARVEDILOL 12.5 MG: 12.5 TABLET, FILM COATED ORAL at 12:52

## 2022-05-23 RX ADMIN — METHYLPREDNISOLONE SODIUM SUCCINATE 40 MG: 40 INJECTION, POWDER, FOR SOLUTION INTRAMUSCULAR; INTRAVENOUS at 20:15

## 2022-05-23 RX ADMIN — PANTOPRAZOLE SODIUM 40 MG: 40 TABLET, DELAYED RELEASE ORAL at 17:42

## 2022-05-23 RX ADMIN — ALPRAZOLAM 0.5 MG: 0.5 TABLET ORAL at 22:44

## 2022-05-23 RX ADMIN — METHYLPREDNISOLONE SODIUM SUCCINATE 40 MG: 40 INJECTION, POWDER, FOR SOLUTION INTRAMUSCULAR; INTRAVENOUS at 12:53

## 2022-05-23 RX ADMIN — HYDROMORPHONE HYDROCHLORIDE 0.5 MG: 1 INJECTION, SOLUTION INTRAMUSCULAR; INTRAVENOUS; SUBCUTANEOUS at 18:51

## 2022-05-23 RX ADMIN — MAGNESIUM OXIDE TAB 400 MG (241.3 MG ELEMENTAL MG) 400 MG: 400 (241.3 MG) TAB at 12:53

## 2022-05-23 RX ADMIN — COLCHICINE 0.6 MG: 0.6 TABLET ORAL at 20:14

## 2022-05-23 RX ADMIN — HYDROMORPHONE HYDROCHLORIDE 0.5 MG: 1 INJECTION, SOLUTION INTRAMUSCULAR; INTRAVENOUS; SUBCUTANEOUS at 22:39

## 2022-05-23 RX ADMIN — MIDAZOLAM 2 MG: 1 INJECTION INTRAMUSCULAR; INTRAVENOUS at 11:30

## 2022-05-23 RX ADMIN — BUMETANIDE 2 MG: 1 TABLET ORAL at 12:52

## 2022-05-23 RX ADMIN — SODIUM CHLORIDE, PRESERVATIVE FREE 10 ML: 5 INJECTION INTRAVENOUS at 13:02

## 2022-05-23 RX ADMIN — GUAIFENESIN DEXTROMETHORPHAN HYDROBROMIDE ORAL SOLUTION 5 ML: 200; 20 SOLUTION ORAL at 14:41

## 2022-05-23 RX ADMIN — ATORVASTATIN CALCIUM 80 MG: 80 TABLET, FILM COATED ORAL at 20:14

## 2022-05-23 RX ADMIN — MEROPENEM 1000 MG: 1 INJECTION, POWDER, FOR SOLUTION INTRAVENOUS at 17:43

## 2022-05-23 RX ADMIN — INSULIN LISPRO 6 UNITS: 100 INJECTION, SOLUTION INTRAVENOUS; SUBCUTANEOUS at 18:50

## 2022-05-23 RX ADMIN — CARVEDILOL 12.5 MG: 12.5 TABLET, FILM COATED ORAL at 20:14

## 2022-05-23 RX ADMIN — DULOXETINE 60 MG: 60 CAPSULE, DELAYED RELEASE ORAL at 20:14

## 2022-05-23 RX ADMIN — HYDROMORPHONE HYDROCHLORIDE 0.5 MG: 1 INJECTION, SOLUTION INTRAMUSCULAR; INTRAVENOUS; SUBCUTANEOUS at 14:48

## 2022-05-23 RX ADMIN — POTASSIUM CHLORIDE 20 MEQ: 20 TABLET, EXTENDED RELEASE ORAL at 12:52

## 2022-05-23 RX ADMIN — IPRATROPIUM BROMIDE AND ALBUTEROL SULFATE 1 AMPULE: 2.5; .5 SOLUTION RESPIRATORY (INHALATION) at 19:57

## 2022-05-23 RX ADMIN — SODIUM CHLORIDE: 9 INJECTION, SOLUTION INTRAVENOUS at 11:27

## 2022-05-23 RX ADMIN — CLOPIDOGREL BISULFATE 75 MG: 75 TABLET ORAL at 20:14

## 2022-05-23 RX ADMIN — PANTOPRAZOLE SODIUM 40 MG: 40 TABLET, DELAYED RELEASE ORAL at 13:00

## 2022-05-23 RX ADMIN — GUAIFENESIN DEXTROMETHORPHAN HYDROBROMIDE ORAL SOLUTION 5 ML: 200; 20 SOLUTION ORAL at 20:42

## 2022-05-23 RX ADMIN — LIDOCAINE HYDROCHLORIDE 40 MG: 10 INJECTION, SOLUTION EPIDURAL; INFILTRATION; INTRACAUDAL; PERINEURAL at 11:31

## 2022-05-23 RX ADMIN — DICLOFENAC SODIUM 2 G: 10 GEL TOPICAL at 20:18

## 2022-05-23 ASSESSMENT — LIFESTYLE VARIABLES: SMOKING_STATUS: 0

## 2022-05-23 ASSESSMENT — PAIN DESCRIPTION - LOCATION: LOCATION: LEG

## 2022-05-23 ASSESSMENT — ENCOUNTER SYMPTOMS
STRIDOR: 0
SHORTNESS OF BREATH: 1

## 2022-05-23 ASSESSMENT — PAIN SCALES - GENERAL
PAINLEVEL_OUTOF10: 10
PAINLEVEL_OUTOF10: 0
PAINLEVEL_OUTOF10: 4
PAINLEVEL_OUTOF10: 10
PAINLEVEL_OUTOF10: 0
PAINLEVEL_OUTOF10: 10
PAINLEVEL_OUTOF10: 10

## 2022-05-23 ASSESSMENT — COPD QUESTIONNAIRES: CAT_SEVERITY: NO INTERVAL CHANGE

## 2022-05-23 ASSESSMENT — PAIN DESCRIPTION - DESCRIPTORS: DESCRIPTORS: SHARP;PRESSURE

## 2022-05-23 ASSESSMENT — PAIN - FUNCTIONAL ASSESSMENT: PAIN_FUNCTIONAL_ASSESSMENT: 0-10

## 2022-05-23 NOTE — CARE COORDINATION
DISCHARGE PLANNING NOTE:    Attempted to speak with pt regarding discharge plans, however she was off the floor for bronch. Per previous d/c planner, plan is for patient to return home without needs. Left message for Melodie Rivera from [facility] to see if patient qualifies for a new nebulizer since hers is broken. Active order for IV Merrem and IV Solu-medrol 40mg every 12 hours. Will continue to follow for additional discharge needs. Electronically signed by Yamilet Monroy RN on 5/23/2022 at 11:44 AM    Spoke with Melodie Rivera from [facility] who states pt is not in their system. Melodie Rivera states to send in a new order for neb and face to face. Notified clin lead, Tammy Nino, of this.     Electronically signed by Yamilet Monroy RN on 5/23/2022 at 1:28 PM

## 2022-05-23 NOTE — ANESTHESIA PRE PROCEDURE
Department of Anesthesiology  Preprocedure Note       Name:  Ernesto Paez   Age:  50 y.o.  :  1975                                          MRN:  [id number]         Date:  2022      Surgeon: Adelaida Neri):  Hermila Thomas MD    Procedure: Procedure(s):  BRONCHOSCOPY    Medications prior to admission:   Prior to Admission medications    Medication Sig Start Date End Date Taking? Authorizing Provider   ALPRAZolam Abdirahman An) 0.5 MG tablet Take 0.5 mg by mouth 2 times daily as needed for Anxiety. Yes Historical Provider, MD   VILAZODONE HCL 20 MG Tablet TAKE 1 TABLET BY MOUTH EVERY DAY  Patient taking differently: New medication, hasn't started this medication yet 5/10/22   Ozzie Macedo MD   QUEtiapine (SEROQUEL) 100 MG tablet TAKE 1 AND 1/2 TABLETS BY MOUTH AT BEDTIME  Patient taking differently: TAKE 1 AND 1/2 TABLETS BY MOUTH AT BEDTIME  Patient has 50 mg tabs 22   Ozzie Macedo MD   nitroGLYCERIN (NITROSTAT) 0.4 MG SL tablet Place 1 tablet under the tongue every 5 minutes as needed for Chest pain up to max of 3 total doses.  If no relief after 1 dose, call 911. 22   Ozzie Macedo MD   DULoxetine (CYMBALTA) 60 MG extended release capsule TAKE 1 CAPSULE BY MOUTH 2 TIMES PER DAY 22   MARYANNE Ng NP   Insulin Degludec (TRESIBA FLEXTOUCH) 200 UNIT/ML SOPN Inject 60 Units into the skin in the morning and at bedtime If po intake poor, decrease to 20 units daily  Patient taking differently: Inject 60 Units into the skin in the morning and at bedtime 80 units in the morning, and 80 units in the evening 22   MARYANNE Ng NP   Insulin Pen Needle (B-D ULTRAFINE III SHORT PEN) 31G X 8 MM MISC Inject 1 each into the skin daily May substitute with any brand 22   MARYANNE Ng NP   blood glucose test strips (ONETOUCH ULTRA) strip USE TO TEST BLOOD SUGAR BEFORE MEALS, AT BEDTIME, AND AS NEEDED (up to 9 TIMES DAILY) 22   MARYANNE Ng NP ibuprofen (ADVIL;MOTRIN) 800 MG tablet TAKE 1 TABLET BY MOUTH EVERY 8 HOURS WITH FOOD AS NEEDED FOR PAIN  Patient not taking: Reported on 5/17/2022 2/28/22   Gus Lesch, MD   clopidogrel (PLAVIX) 75 MG tablet Take 75 mg by mouth daily nightly 1/10/22   Historical Provider, MD   pantoprazole (PROTONIX) 40 MG tablet Take 40 mg by mouth daily BID 1/23/22   Historical Provider, MD   atorvastatin (LIPITOR) 80 MG tablet TAKE 1 TABLET BY MOUTH NIGHTLY 2/1/22   MARYANNE Jordan NP   albuterol sulfate HFA (VENTOLIN HFA) 108 (90 Base) MCG/ACT inhaler INHALE 2 PUFFS INTO LUNGS EVERY 6 HOURS AS NEEDED FOR WHEEZING 1/3/22   Gus Lesch, MD   insulin lispro, 1 Unit Dial, (HUMALOG KWIKPEN) 100 UNIT/ML SOPN INJECT SUBCUTANEOUSLY PER SLIDING SCALE, 150-200 INJECT 3U, 201-250 INJECT 6U, 251-300 INJECT 9U, >300 INJECT 12U, MAX 30U/DAY 11/2/21   MARYANNE Jordan NP   ipratropium-albuterol (DUONEB) 0.5-2.5 (3) MG/3ML SOLN nebulizer solution INHALE 3 MLS (ONE VIAL) WITH NEBULIZER EVERY 6 HOURS AS NEEDED FOR SHORTNESS OF BREATH 11/2/21   MARYANNE Jordan NP   clotrimazole (LOTRIMIN) 1 % cream Apply topically 2 times daily. 11/2/21   MARYANNE Aceves NP   ergocalciferol (ERGOCALCIFEROL) 1.25 MG (16274 UT) capsule Take 50,000 Units by mouth once a week Indications: Saturdays     Historical Provider, MD   magnesium oxide (MAG-OX) 400 MG tablet Take 400 mg by mouth daily Afternoon 8/9/21   Historical Provider, MD   oxyCODONE-acetaminophen (PERCOCET) 5-325 MG per tablet Take 1 tablet by mouth every 4 hours as needed for Pain. Indications: last fill 2/27/22 with quantity 170 for 30 days One tablet at 0700, one tablet at 1900, one tablet in the afternoon prn pain 9/6/21   Historical Provider, MD PARKER SALINE NASAL SPRAY 0.65 % nasal spray 1 spray by Nasal route as needed for Congestion   Patient not taking: Reported on 5/17/2022 7/6/21   Historical Provider, MD   OXYGEN Inhale into the lungs Indications:  On 3 liters per n/c during the night.   Patient not taking: Reported on 4/28/2022    Historical Provider, MD   Multiple Vitamins-Minerals (MULTIVITAMIN Chanda Hives) CHEW Take 2 each by mouth daily     Historical Provider, MD   carvedilol (COREG) 12.5 MG tablet Take 12.5 mg by mouth 2 times daily (with meals)     Historical Provider, MD       Current medications:    Current Facility-Administered Medications   Medication Dose Route Frequency Provider Last Rate Last Admin    [MAR Hold] meropenem (MERREM) 1,000 mg in sodium chloride 0.9 % 100 mL IVPB (mini-bag)  1,000 mg IntraVENous Q8H Lizzy Mcgregor MD 33.3 mL/hr at 05/23/22 0632 1,000 mg at 05/23/22 0632    [MAR Hold] methylPREDNISolone sodium (SOLU-MEDROL) injection 40 mg  40 mg IntraVENous Q12H Ashtyn Ax APRN - CNP   40 mg at 05/22/22 2108    [MAR Hold] isosorbide mononitrate (IMDUR) extended release tablet 30 mg  30 mg Oral Daily Uriel Chavarria MD   30 mg at 05/22/22 0837    [MAR Hold] bumetanide (BUMEX) tablet 2 mg  2 mg Oral Daily Uriel Chavarria MD   2 mg at 05/22/22 0837    [MAR Hold] potassium chloride (KLOR-CON M) extended release tablet 20 mEq  20 mEq Oral Daily Uriel Chavarria MD   20 mEq at 05/22/22 0836    [MAR Hold] magnesium oxide (MAG-OX) tablet 400 mg  400 mg Oral BID Uriel Chavarria MD   400 mg at 05/22/22 2110    [MAR Hold] diclofenac sodium (VOLTAREN) 1 % gel 2 g  2 g Topical BID Bobbetta Koyanagi, APRN - CNP   2 g at 05/22/22 2111    [MAR Hold] colchicine (COLCRYS) tablet 0.6 mg  0.6 mg Oral BID Uriel Chavarria MD   0.6 mg at 05/22/22 2110    [MAR Hold] potassium chloride (KLOR-CON M) extended release tablet 40 mEq  40 mEq Oral PRN Laura Rogers MD        Or   [facility] Hold] potassium bicarb-citric acid (EFFER-K) effervescent tablet 40 mEq  40 mEq Oral PRN Laura Rogers MD        Or   Eliel [facility] Hold] potassium chloride 10 mEq/100 mL IVPB (Peripheral Line)  10 mEq IntraVENous PRN Laura Rogers MD        [facility] Hold] dextromethorphan-guaiFENesin (ROBITUSSIN-DM)  MG/5ML liquid 5 mL  5 mL Oral Q4H PRN Zach Palacios APRN - CNP   5 mL at 05/22/22 2241    [MAR Hold] budesonide (PULMICORT) nebulizer suspension 500 mcg  0.5 mg Nebulization BID Zach Palacios APRN - CNP   500 mcg at 05/20/22 0801    [MAR Hold] sodium chloride flush 0.9 % injection 5-40 mL  5-40 mL IntraVENous 2 times per day Anupama Centeno MD   10 mL at 05/22/22 2113    [MAR Hold] sodium chloride flush 0.9 % injection 5-40 mL  5-40 mL IntraVENous PRN Anupama Centeno MD   10 mL at 05/21/22 0638    [MAR Hold] 0.9 % sodium chloride infusion   IntraVENous PRN Anupama Centeno MD 15 mL/hr at 05/21/22 1514 New Bag at 05/21/22 1514    [MAR Hold] polyethylene glycol (GLYCOLAX) packet 17 g  17 g Oral Daily PRN Anupama Centeno MD        [facility] Hold] acetaminophen (TYLENOL) tablet 650 mg  650 mg Oral Q6H PRN Anupama Centeno MD        Or   Valerie Patrick [facility] Hold] acetaminophen (TYLENOL) suppository 650 mg  650 mg Rectal Q6H PRN Anupama Centeno MD        [facility] Hold] albuterol sulfate  (90 Base) MCG/ACT inhaler 2 puff  2 puff Inhalation Q4H PRN Anupama Centeno MD   2 puff at 05/20/22 0510    [MAR Hold] ALPRAZolam Deb Pies) tablet 0.5 mg  0.5 mg Oral BID PRN Anupama Centeno MD   0.5 mg at 05/21/22 2048    [MAR Hold] atorvastatin (LIPITOR) tablet 80 mg  80 mg Oral Nightly Anupama Centeno MD   80 mg at 05/22/22 1831    [MAR Hold] carvedilol (COREG) tablet 12.5 mg  12.5 mg Oral BID WC Anupama Centeno MD   12.5 mg at 05/22/22 1831    [MAR Hold] clopidogrel (PLAVIX) tablet 75 mg  75 mg Oral Daily Anupama Centeno MD   75 mg at 05/22/22 1837    [MAR Hold] DULoxetine (CYMBALTA) extended release capsule 60 mg  60 mg Oral BID Anupama Centeno MD   60 mg at 05/22/22 1831    [MAR Hold] ipratropium-albuterol (DUONEB) nebulizer solution 1 ampule  1 ampule Inhalation Q4H WA Anupama Centeno MD   1 ampule at 05/22/22 2002 Swelling    Doxycycline Anaphylaxis    Dye [Iodides] Other (See Comments)     Seizures    Flexeril [Cyclobenzaprine] Anaphylaxis    Gabapentin Anaphylaxis    Losartan Shortness Of Breath    Morphine Itching     Makes patient want to \"scratch out her eyes\". Can take if given with benadryl    Nsaids Anaphylaxis and Hives     Pt states she can take ibuprofen    Pcn [Penicillins] Anaphylaxis and Hives    Reglan [Metoclopramide Hcl] Swelling     Agitation, anger    Shellfish-Derived Products Anaphylaxis    Sulfa Antibiotics Hives, Shortness Of Breath, Itching and Swelling    Toradol [Ketorolac Tromethamine] Hives     Patient states she has a reaction.  Vancomycin Anaphylaxis    Zofran Anaphylaxis    Zyvox [Linezolid] Anaphylaxis    Acyclovir Swelling and Rash    Bactrim [Sulfamethoxazole-Trimethoprim] Hives    Betadine [Povidone Iodine] Hives    Ceclor [Cefaclor] Hives    Codeine Itching and Rash    Macrolides And Ketolides Hives     Pt states she can take Azithromycin    Novolin R [Insulin] Hives    Novolog [Insulin Aspart] Hives    Phenothiazines Swelling    Tape [Adhesive Tape] Rash     OK to use paper tape and tegaderm per patient    Banana     Compazine [Prochlorperazine] Other (See Comments)     Agitation, anger, \"makes me jerk\"    Fentanyl Itching     Pt states doesn't work and makes patient itch    Kiwi Extract     Tamiflu [Oseltamivir Phosphate] Hives    Clindamycin/Lincomycin Nausea Only    Sotalol Other (See Comments)     Pt verbalized that she developed a prolonged QT and had an asthma attack with medication.         Problem List:    Patient Active Problem List   Diagnosis Code    Diabetes mellitus type 2, controlled ([facility] E11.9    COPD (chronic obstructive pulmonary disease) (HCC) J44.9    Asthma J45.909    Acute bronchitis J20.9    KRISTIN (obstructive sleep apnea) G47.33    Adult body mass index 40 and over [id number]    Chronic right shoulder pain M25.511, G89.29    Neck pain M54.2    Radicular pain in right arm M79.2    Left leg pain M79.605    Noncompliance with therapeutic plan Z91.11    Precordial pain R07.2    Tobacco abuse Z72.0    Current moderate episode of major depressive disorder without prior episode ([facility]) F32.1    Adhesive capsulitis of left shoulder M75.02    Morbid obesity ([facility]) E66.01    Left bicipital tenosynovitis M75.22    Refused influenza vaccine Z28.21    Closed fracture of left ankle S82.892A    Atrial premature depolarization I49.1    Ventricular premature depolarization I49.3    Cardiomyopathy ([facility]) I42.9    Cigarette nicotine dependence with nicotine-induced disorder F17.219    Productive cough R05.8    DVT (deep venous thrombosis) ([facility]) I82.409    Endometriosis N80.9    GERD (gastroesophageal reflux disease) K21.9    HTN (hypertension) I10    Hypomagnesemia E83.42    Migraines G43.909    Mixed hyperlipidemia E78.2    Neurocardiogenic syncope R55    Nonrheumatic tricuspid (valve) insufficiency I36.1    Tricuspid valve disorders, non-rheumatic I36.9    Ovarian cyst N83.209    Encounter for monitoring sotalol therapy Z51.81, Z79.899    Pulmonary HTN ([facility]) I27.20    PVC's (premature ventricular contractions) I49.3    Schizophrenia ([facility]) F20.9    Shortness of breath R06.02    Acute exacerbation of chronic obstructive pulmonary disease ([facility]) J44.1    Bipolar disorder ([facility]) F31.9    Left sided abdominal pain of unknown cause R10.9    Leg swelling M79.89    Mastoiditis, acute H70.009    Steroid-induced hyperglycemia R73.9, T38.0X5A    Sclerosing adenosis of left breast N60.22    Tachycardia R00.0    Tremor R25.1    Rupture of right rotator cuff M75.101    Rotator cuff syndrome of left shoulder M75.102    Long term current use of insulin ([facility]) Z79.4    Lesion of ulnar nerve G56.20    Carpal tunnel syndrome G56.00    Acute upper respiratory infection J06.9    Cellulitis of right upper limb L03. [facility] Cla[facility]roph[facility] F40.240    Close exposure to 2019 novel coronavirus Z20.822    Congestive heart failure (HCC) I50.9    Diabetic neuropathy ([facility]) E11.40    Enthesopathy M77.9    Fibrocystic breast changes F64.91    Helicobacter pylori gastritis K29.70, B96.81    NSTEMI (non-ST elevated myocardial infarction) ([facility]) I21.4    Facial cellulitis L03. 80    Cellulitis L03.90    Recurrent moderate major depressive disorder with anxiety ([facility]) F33.1, F41.9    Uncontrolled type 2 diabetes mellitus with hyperglycemia ([facility]) E11.65    Cubital tunnel syndrome G56.20    Acute on chronic systolic (congestive) heart failure ([facility]) I50.23    Coronary artery disease involving native coronary artery of native heart without angina pectoris I25.10    Dizziness R42    Ovarian remnant syndrome N99.83    Moderate left ventricular systolic dysfunction H18.4    Palpitations R00.2    Acute congestive heart failure ([facility]) I50.9    Bilateral lower extremity edema R60.0       Past Medical History:        Diagnosis Date    Acute exacerbation of chronic obstructive pulmonary disease ([facility]) 3/21/2018    Adhesive capsulitis of left shoulder 9/25/2018    Asthma     Asthma exacerbation 7/24/2014    Atrial fibrillation ([facility])     placed on event monitor 9/25/18 for PVC's & A-fib    Atrial premature depolarization 7/19/2019    Bipolar 1 disorder ([address]     Bipolar disorder ([facility] 7/19/2019    Bulging disc     CAD (coronary artery disease)     Candida infection 2/23/2018    Cardiomyopathy ([facility]     Chest pain 6/13/2017    CHF (congestive heart failure) ([facility])     Chronic otitis media of both ears     rt>lt    Chronic right shoulder pain 3/14/2017    Cigarette nicotine dependence with nicotine-induced disorder 7/19/2019    Class 3 severe obesity due to excess calories with serious comorbidity and body mass index (BMI) of 40.0 to 44.9 in adult [facility]) 10/4/2018    Closed fracture of left ankle 6/25/2019    Clostridium difficile infection     COPD (chronic obstructive pulmonary disease) (HCC)     Cough, persistent 3/21/2018    Current moderate episode of major depressive disorder without prior episode ([address] 8/20/2018    Depression     Diabetes mellitus type 2, controlled ([address] 4/30/2014    Diarrhea     Diarrhea     Dizziness     DVT (deep venous thrombosis) (HCC)     after PICC line right arm    Encounter for monitoring sotalol therapy 2/20/2017    Encounter for screening mammogram for malignant neoplasm of breast 3/7/2018    Endometriosis     Fainting     GERD (gastroesophageal reflux disease)     hx of    GI bleeding     H. pylori infection     Helicobacter pylori (H. pylori)     [place] (hard of hearing)     both ears, no hearing aids    HTN (hypertension) 7/19/2019    Hyperlipidemia     Hypertension     Left bicipital tenosynovitis 10/17/2018    Left leg pain 5/16/2017    Left sided abdominal pain of unknown cause 7/19/2016    Leg swelling 9/21/2018    Mastoiditis     Mastoiditis, acute 4/30/2014    Migraines     Morbid obesity ([address]     Myocardial infarction ([address]     2005    Nausea     Neuropathy     On home oxygen therapy     3 L at night    Ovarian cyst     Passed out     hx of- negative tilt table    Pulmonary hypertension (HCC)     Pulmonary insufficiency     PVC (premature ventricular contraction)     Seasonal allergies     Tricuspid insufficiency     Type II or unspecified type diabetes mellitus without mention of complication, not stated as uncontrolled        Past Surgical History:        Procedure Laterality Date    ABDOMEN SURGERY      abcess    ABDOMINAL ADHESION SURGERY      ABDOMINAL EXPLORATION SURGERY      x 4    ABDOMINAL HERNIA REPAIR      with mesh    ABLATION OF DYSRHYTHMIC FOCUS  2016    ABLATION OF DYSRHYTHMIC FOCUS  09/08/2017    [facility].      ABSCESS DRAINAGE      left hip and chest    APPENDECTOMY      BREAST SURGERY Left 2007    I & D    CARDIAC CATHETERIZATION  2014 & 2000     no stenting    CARPAL TUNNEL RELEASE Right 12/19/2019    Ulnar nerve decompression, right elbow. Release of 1st and 2nd extensor compartments, right forearm.  CARPAL TUNNEL RELEASE  05/04/2020    Carpal tunnel release, left hand    CHOLECYSTECTOMY      COLONOSCOPY      FOOT SURGERY Left     bone fragment removed    FRACTURE SURGERY Right     closed reduction perc pinning ring & middle finger    GASTRIC FUNDOPLICATION  9834    HYSTERECTOMY      total    HYSTERECTOMY      HYSTEROSCOPY      tubal perfusion    MYRINGOTOMY Right 11/13/2015    Right myringotomy with placement of  T-tube on the right side. Removal of plugged ventilating tube, right side.  MYRINGOTOMY Left 07/14/2020    MYRINGOTOMY TUBE INSERTION performed by Kerri Hamilton MD at [facility] Right 06/05/2014    OTHER SURGICAL HISTORY Right 05/29/2014    removal ear tube rt ear    OTHER SURGICAL HISTORY  2009    LOOP recorder inserted and removed 3 mos.  later    OTHER SURGICAL HISTORY Right 08/11/2016    ear tube removal with patch    OTHER SURGICAL HISTORY      tubal perfusion, lysis of uterine adhesions    OTHER SURGICAL HISTORY      multiple PICC lines inserted and removed, it has affected circulation bilateral upper arms    OTHER SURGICAL HISTORY  10/19/2020    Revisiion to subcutaneous transposition of unlar nerve, right elbow    OTHER SURGICAL HISTORY Right 06/14/2021    REVISION TO SUBMUSCULAR TRANSPOSITION OF RIGHT ULNAR NERVE    OTHER SURGICAL HISTORY Left 03/24/2022    DECOMPRESSION OF ULNAR NERVE, LEFT ELBOW    MI MANIPULATN SHLDR JT W ANESTHESIA Right 03/01/2017    SHOULDER MANIPULATION RIGHT performed by Luz Marina Avendaño MD at [address] [facility] Left 10/17/2018    SHOULDER ARTHROSCOPY W/BICEPS TENDONESIS performed by Robi Centeno MD at [address] [facility] Left     foot    TONSILLECTOMY      TYMPANOSTOMY TUBE PLACEMENT      TYMPANOSTOMY TUBE PLACEMENT Left 03/12/2019    TYMPANOSTOMY TUBE PLACEMENT Right 12/08/2021    Right tympanostomy with tube placement of T-tube with operative microscope and general anesthesia. Right removal of right ear tube.  ULNAR TUNNEL RELEASE Right     UPPER GASTROINTESTINAL ENDOSCOPY      UPPER GASTROINTESTINAL ENDOSCOPY  07/10/2019    UPPER GASTROINTESTINAL ENDOSCOPY N/A 07/10/2019    EGD BIOPSY performed by Molly Conley MD at [address] Right 04/21/2022    Placement of right sided ventriculoperitoneal shunt Med[employer]onic programmable valve set at 1.5. Social History:    Social History     Tobacco Use    Smoking status: Current Every Day Smoker     Packs/day: 0.50     Years: 23.00     Pack years: 11.50     Types: Cigarettes    Smokeless tobacco: Never Used   Substance Use Topics    Alcohol use: No     Alcohol/week: 0.0 standard drinks                                Ready to quit: Not Answered  Counseling given: Not Answered      Vital Signs (Current):   Vitals:    05/23/22 0015 05/23/22 0645 05/23/22 1054 05/23/22 1103   BP: (!) 102/93 139/65 136/78    Pulse: 75 64 66    Resp: 16 18 18    Temp: 98.4 °F (36.9 °C) 98 °F (36.7 °C) 97.1 °F (36.2 °C)    TempSrc: Oral Oral Temporal    SpO2: 93% 92% 97% 97%   Weight: 268 lb 11.9 oz (121.9 kg)      Height:                                                  BP Readings from Last 3 Encounters:   05/23/22 136/78   04/21/22 (!) 140/80   03/14/22 130/80       NPO Status: Time of last liquid consumption: 2359                        Time of last solid consumption: 2359                        Date of last liquid consumption: 05/22/22                        Date of last solid food consumption: 05/22/22    BMI:   Wt Readings from Last 3 Encounters:   05/23/22 268 lb 11.9 oz (121.9 kg)   03/08/22 250 lb (113.4 kg)   03/06/22 250 lb (113.4 kg)     Body mass index is 40.86 kg/m².     CBC:   Lab Results Component Value Date    WBC 9.5 05/18/2022    RBC 4.10 05/18/2022    RBC 4.58 07/28/2021    HGB 13.7 05/18/2022    HCT 41.9 05/18/2022    .3 05/18/2022    RDW 13.1 05/18/2022     05/18/2022     03/20/2012     LR    CMP:   Lab Results   Component Value Date     05/22/2022    K 3.8 05/22/2022    CL 97 05/22/2022    CO2 32 05/22/2022    BUN 18 05/22/2022    CREATININE 0.64 05/22/2022    GFRAA >60 05/22/2022    LABGLOM >60 05/22/2022    GLUCOSE 228 05/22/2022    GLUCOSE 287 03/20/2012    PROT 7.5 02/21/2022    CALCIUM 8.9 05/22/2022    BILITOT 0.29 02/21/2022    ALKPHOS 117 02/21/2022    AST 17 02/21/2022    ALT 20 02/21/2022       POC Tests:   Recent Labs     05/23/22  5827   POCGLU 207*       Coags:   Lab Results   Component Value Date    PROTIME 12.6 01/04/2022    INR 0.9 01/04/2022    APTT 27.8 01/04/2022       HCG (If Applicable):   Lab Results   Component Value Date    PREGTESTUR NEGATIVE 11/26/2011        ABGs:   Lab Results   Component Value Date    PHART 7.41 10/16/2013    PO2ART 86 10/16/2013    MWP2CQL 42 10/16/2013    GBL0GVS 26.1 10/16/2013    H6ADJKHD 98.4 10/16/2013        Type & Screen (If Applicable):  No results found for: LABABO, LABRH    Drug/Infectious Status (If Applicable):  No results found for: HIV, HEPCAB    COVID-19 Screening (If Applicable):   Lab Results   Component Value Date    COVID19 Not Detected 02/21/2022    COVID19 Not Detected 07/10/2020           Anesthesia Evaluation  Patient summary reviewed and Nursing notes reviewed no history of anesthetic complications:   Airway: Mallampati: III  TM distance: >3 FB   Neck ROM: full  Mouth opening: > = 3 FB   Dental:    (+) poor dentition      Pulmonary:normal exam  breath sounds clear to auscultation  (+) COPD: no interval change,  shortness of breath: no interval change,  sleep apnea:  asthma: allergic asthma,     (-) pneumonia, recent URI, rhonchi, wheezes, rales, stridor, not a current smoker and no decreased breath sounds          Patient did not smoke on day of surgery. Cardiovascular:  Exercise tolerance: good (>4 METS),   (+) hypertension:, past MI: no interval change, CAD: no interval change, CHF: no interval change,     (-) pacemaker, valvular problems/murmurs, CABG/stent, dysrhythmias,  angina, orthopnea, PND,  RM, murmur, weak pulses,  friction rub, systolic click, carotid bruit,  JVD, peripheral edema, no pulmonary hypertension and no hyperlipidemia    ECG reviewed  Rhythm: regular  Rate: normal  Echocardiogram reviewed         Beta Blocker:  Dose within 24 Hrs         Neuro/Psych:   (+) neuromuscular disease:, headaches:, psychiatric history:   (-) seizures, TIA, CVA and depression/anxiety            GI/Hepatic/Renal:   (+) GERD: no interval change,      (-) hiatal hernia, PUD, hepatitis, liver disease, no renal disease, bowel prep and no morbid obesity       Endo/Other:    (+) DiabetesType II DM, no interval change, using insulin, no malignancy/cancer. (-) hypothyroidism, hyperthyroidism, blood dyscrasia, arthritis, no electrolyte abnormalities, no malignancy/cancer               Abdominal:             Vascular: negative vascular ROS. - PVD, DVT and PE. Other Findings:           Anesthesia Plan      general     ASA 3       Induction: intravenous. MIPS: Postoperative opioids intended and Prophylactic antiemetics administered. Anesthetic plan and risks discussed with patient. Plan discussed with CRNA.                   Julianne Sadler MD   5/23/2022

## 2022-05-23 NOTE — OP NOTE
Operative Note      PLANNED PROCEDURE:  BRONCHOSCOPY, WASHINGS, BRUSHINGS, BIOPSY    PREOP DIAGNOSIS: cough, wheezing    POST OP DIAGNOSIS: mucus plug    PREMEDICATION: NEBULIZED ALBUTEROL WITH LIDOCAINE    SEDATION: per anesthesia services    ANESTHESIA: Topical    PROCEDURE:  After obtaining topical anesthesia, fiberoptic bronchosocpe was introduced via oral cavity. Both vocal cords were moving and meeting in midline. Trachea was intubated with bronchoscope. Sirisha appeared sharp. Both right and left bronchial tree were examined to segmental level. Thick mucoid secretions noted; aspirated with difficulty. Patient developed significant hypoxia and so scope was withdrawn, patient was oxygenated and ventilated, and scope was reinserted to remove more mucus plugs. Patient tolerated procedure fair. Sheila Berry       PROCEDURE DONE: BRONCHOSCOPY,     ESTIMATED BLOOD LOSS: 0 ML    SPECIMENS OBTAINED:  none    Electronically signed by Josh Landrum MD on 05/23/22 at 11:48 AM.

## 2022-05-23 NOTE — PROGRESS NOTES
Progress Note  Date:2022       Room:[facility]209-  Patient Jagdish Reddy     YOB: 1975     Age:47 y.o. Subjective    Subjective:  Symptoms:  Stable. Diet:  NPO. Activity level: Returning to normal.    Pain:  She reports no pain. Review of Systems  Objective         Vitals Last 24 Hours:  TEMPERATURE:  Temp  Av.5 °F (36.9 °C)  Min: 98 °F (36.7 °C)  Max: 99.3 °F (37.4 °C)  RESPIRATIONS RANGE: Resp  Av.2  Min: 15  Max: 18  PULSE OXIMETRY RANGE: SpO2  Av.6 %  Min: 91 %  Max: 95 %  PULSE RANGE: Pulse  Av.5  Min: 64  Max: 75  BLOOD PRESSURE RANGE: Systolic (76IBV), MPE:531 , Min:102 , WHX:518   ; Diastolic (25GNW), XRQ:64, Min:65, Max:93    I/O (24Hr): Intake/Output Summary (Last 24 hours) at 2022 0745  Last data filed at 2022 2121  Gross per 24 hour   Intake 1440 ml   Output    Net 1440 ml     Objective:  General Appearance:  Comfortable. Vital signs: (most recent): Blood pressure 139/65, pulse 64, temperature 98 °F (36.7 °C), temperature source Oral, resp. rate 18, height 5' 8\" (1.727 m), weight 268 lb 11.9 oz (121.9 kg), SpO2 92 %. Vital signs are normal.    Lungs:  Normal effort and normal respiratory rate. Breath sounds clear to auscultation. Labs/Imaging/Diagnostics    Labs:  CBC:No results for input(s): WBC, RBC, HGB, HCT, MCV, RDW, PLT in the last 72 hours. CHEMISTRIES:  Recent Labs     22  0633 22  0530    140   K 4.3 3.8   CL 98 97*   CO2 31 32*   BUN 20 18   CREATININE 0.69 0.64   GLUCOSE 299* 228*   MG 1.8 1.8     PT/INR:No results for input(s): PROTIME, INR in the last 72 hours. APTT:No results for input(s): APTT in the last 72 hours. LIVER PROFILE:No results for input(s): AST, ALT, BILIDIR, BILITOT, ALKPHOS in the last 72 hours.     Imaging Last 24 Hours:  XR CHEST (2 VW)    Result Date: 2022  EXAMINATION: TWO XRAY VIEWS OF THE CHEST 2022 10:05 am COMPARISON: 2022 HISTORY: ORDERING SYSTEM PROVIDED HISTORY: pneumonia TECHNOLOGIST PROVIDED HISTORY: pneumonia FINDINGS: Decreased lung volumes relative to previous. Now bibasilar atelectasis or infiltrates with no pneumothorax or pleural effusion. Cardiac and mediastinal silhouettes unremarkable. No acute osseous abnormality. Low lung volumes limits evaluation. Bibasilar atelectasis or infiltrates suggesting pneumonia. Recommend follow-up to resolution.      Assessment//Plan           Hospital Problems           Last Modified POA    * (Principal) Acute on chronic systolic (congestive) heart failure ([address] 5/18/2022 Yes    Moderate left ventricular systolic dysfunction 6/98/2569 Yes    Bilateral lower extremity edema 5/18/2022 Yes    COPD (chronic obstructive pulmonary disease) (HCC) (Chronic) 5/18/2022 Yes    Tobacco abuse 5/18/2022 Yes    Pulmonary HTN ([address] 5/18/2022 Yes    Uncontrolled type 2 diabetes mellitus with hyperglycemia ([address] 5/18/2022 Yes        Assessment & Plan  Bronchoscopy today      Electronically signed by Rao Guzman MD on 5/23/22 at 7:45 AM EDT

## 2022-05-23 NOTE — PLAN OF CARE
Problem: Discharge Planning  Goal: Discharge to home or other facility with appropriate resources  5/23/2022 0321 by Jesica Rosenberg RN  Outcome: Progressing     Problem: Pain  Goal: Verbalizes/displays adequate comfort level or baseline comfort level  5/23/2022 0321 by Jesica Rosenberg RN  Outcome: Progressing  Note: Pt medicated with pain medication prn. Assessed all pain characteristics including level, type, location, frequency, and onset. Non-pharmacologic interventions offered to pt as well. Pt states pain is tolerable at this time. Will continue to monitor. Problem: Safety - Adult  Goal: Free from fall injury  5/23/2022 0321 by Jesica Rosenberg RN  Outcome: Progressing  Note: No falls noted this shift. Patient ambulates with x1 staff assistance without difficulty. Bed kept in low position. Safe environment maintained. Bedside table & call light in reach. Uses call light appropriately when needing assistance.         Problem: Chronic Conditions and Co-morbidities  Goal: Patient's chronic conditions and co-morbidity symptoms are monitored and maintained or improved  Outcome: Progressing

## 2022-05-23 NOTE — FLOWSHEET NOTE
Patient returned to room from PACU. Patient awake, alert and oriented x 4. Gag reflex present. VSS as charted. Call light within reach.

## 2022-05-23 NOTE — PLAN OF CARE
Problem: Pain  Goal: Verbalizes/displays adequate comfort level or baseline comfort level  5/23/2022 0321 by Itzel Rodriguez RN  Outcome: Progressing  Note: Pt medicated with pain medication prn. Assessed all pain characteristics including level, type, location, frequency, and onset. Non-pharmacologic interventions offered to pt as well. Pt states pain is tolerable at this time. Will continue to monitor. Problem: ABCDS Injury Assessment  Goal: Absence of physical injury  5/23/2022 0321 by Itzel Rodriguez RN  Outcome: Progressing     Problem: Safety - Adult  Goal: Free from fall injury  5/23/2022 0321 by Itzel Rodriguez RN  Outcome: Progressing  Note: No falls noted this shift. Patient ambulates with x1 staff assistance without difficulty. Bed kept in low position. Safe environment maintained. Bedside table & call light in reach. Uses call light appropriately when needing assistance.

## 2022-05-24 LAB
GLUCOSE BLD-MCNC: 137 MG/DL (ref 65–105)
GLUCOSE BLD-MCNC: 178 MG/DL (ref 65–105)
GLUCOSE BLD-MCNC: 181 MG/DL (ref 65–105)
GLUCOSE BLD-MCNC: 294 MG/DL (ref 65–105)

## 2022-05-24 PROCEDURE — 6360000002 HC RX W HCPCS: Performed by: INTERNAL MEDICINE

## 2022-05-24 PROCEDURE — 94640 AIRWAY INHALATION TREATMENT: CPT

## 2022-05-24 PROCEDURE — 2700000000 HC OXYGEN THERAPY PER DAY

## 2022-05-24 PROCEDURE — 6370000000 HC RX 637 (ALT 250 FOR IP): Performed by: INTERNAL MEDICINE

## 2022-05-24 PROCEDURE — 94761 N-INVAS EAR/PLS OXIMETRY MLT: CPT

## 2022-05-24 PROCEDURE — 99232 SBSQ HOSP IP/OBS MODERATE 35: CPT | Performed by: FAMILY MEDICINE

## 2022-05-24 PROCEDURE — 2060000000 HC ICU INTERMEDIATE R&B

## 2022-05-24 PROCEDURE — 2580000003 HC RX 258: Performed by: INTERNAL MEDICINE

## 2022-05-24 PROCEDURE — 82947 ASSAY GLUCOSE BLOOD QUANT: CPT

## 2022-05-24 PROCEDURE — 6370000000 HC RX 637 (ALT 250 FOR IP): Performed by: FAMILY MEDICINE

## 2022-05-24 RX ORDER — CALCIUM CARBONATE 200(500)MG
500 TABLET,CHEWABLE ORAL 3 TIMES DAILY PRN
Status: DISCONTINUED | OUTPATIENT
Start: 2022-05-24 | End: 2022-05-26 | Stop reason: HOSPADM

## 2022-05-24 RX ORDER — AZITHROMYCIN 250 MG/1
250 TABLET, FILM COATED ORAL DAILY
Status: DISCONTINUED | OUTPATIENT
Start: 2022-05-24 | End: 2022-05-26 | Stop reason: HOSPADM

## 2022-05-24 RX ORDER — PREDNISONE 20 MG/1
20 TABLET ORAL DAILY
Status: DISCONTINUED | OUTPATIENT
Start: 2022-05-24 | End: 2022-05-25

## 2022-05-24 RX ADMIN — CLOPIDOGREL BISULFATE 75 MG: 75 TABLET ORAL at 18:39

## 2022-05-24 RX ADMIN — ISOSORBIDE MONONITRATE 30 MG: 30 TABLET, EXTENDED RELEASE ORAL at 09:58

## 2022-05-24 RX ADMIN — AZITHROMYCIN 250 MG: 250 TABLET, FILM COATED ORAL at 09:58

## 2022-05-24 RX ADMIN — QUETIAPINE FUMARATE 150 MG: 100 TABLET ORAL at 18:38

## 2022-05-24 RX ADMIN — MAGNESIUM OXIDE TAB 400 MG (241.3 MG ELEMENTAL MG) 400 MG: 400 (241.3 MG) TAB at 09:58

## 2022-05-24 RX ADMIN — DULOXETINE 60 MG: 60 CAPSULE, DELAYED RELEASE ORAL at 09:59

## 2022-05-24 RX ADMIN — IPRATROPIUM BROMIDE AND ALBUTEROL SULFATE 1 AMPULE: 2.5; .5 SOLUTION RESPIRATORY (INHALATION) at 07:11

## 2022-05-24 RX ADMIN — CARVEDILOL 12.5 MG: 12.5 TABLET, FILM COATED ORAL at 06:23

## 2022-05-24 RX ADMIN — GUAIFENESIN DEXTROMETHORPHAN HYDROBROMIDE ORAL SOLUTION 5 ML: 200; 20 SOLUTION ORAL at 18:40

## 2022-05-24 RX ADMIN — HYDROMORPHONE HYDROCHLORIDE 0.5 MG: 1 INJECTION, SOLUTION INTRAMUSCULAR; INTRAVENOUS; SUBCUTANEOUS at 06:23

## 2022-05-24 RX ADMIN — GUAIFENESIN DEXTROMETHORPHAN HYDROBROMIDE ORAL SOLUTION 5 ML: 200; 20 SOLUTION ORAL at 14:46

## 2022-05-24 RX ADMIN — COLCHICINE 0.6 MG: 0.6 TABLET ORAL at 22:38

## 2022-05-24 RX ADMIN — HYDROMORPHONE HYDROCHLORIDE 0.5 MG: 1 INJECTION, SOLUTION INTRAMUSCULAR; INTRAVENOUS; SUBCUTANEOUS at 02:24

## 2022-05-24 RX ADMIN — DICLOFENAC SODIUM 2 G: 10 GEL TOPICAL at 22:38

## 2022-05-24 RX ADMIN — MEROPENEM 1000 MG: 1 INJECTION, POWDER, FOR SOLUTION INTRAVENOUS at 18:42

## 2022-05-24 RX ADMIN — HYDROMORPHONE HYDROCHLORIDE 0.5 MG: 1 INJECTION, SOLUTION INTRAMUSCULAR; INTRAVENOUS; SUBCUTANEOUS at 18:45

## 2022-05-24 RX ADMIN — IPRATROPIUM BROMIDE AND ALBUTEROL SULFATE 1 AMPULE: 2.5; .5 SOLUTION RESPIRATORY (INHALATION) at 19:46

## 2022-05-24 RX ADMIN — ANTACID TABLETS 500 MG: 500 TABLET, CHEWABLE ORAL at 14:51

## 2022-05-24 RX ADMIN — IPRATROPIUM BROMIDE AND ALBUTEROL SULFATE 1 AMPULE: 2.5; .5 SOLUTION RESPIRATORY (INHALATION) at 15:04

## 2022-05-24 RX ADMIN — COLCHICINE 0.6 MG: 0.6 TABLET ORAL at 09:59

## 2022-05-24 RX ADMIN — MEROPENEM 1000 MG: 1 INJECTION, POWDER, FOR SOLUTION INTRAVENOUS at 03:07

## 2022-05-24 RX ADMIN — PREDNISONE 20 MG: 20 TABLET ORAL at 22:38

## 2022-05-24 RX ADMIN — PANTOPRAZOLE SODIUM 40 MG: 40 TABLET, DELAYED RELEASE ORAL at 18:39

## 2022-05-24 RX ADMIN — ATORVASTATIN CALCIUM 80 MG: 80 TABLET, FILM COATED ORAL at 18:40

## 2022-05-24 RX ADMIN — HYDROMORPHONE HYDROCHLORIDE 0.5 MG: 1 INJECTION, SOLUTION INTRAMUSCULAR; INTRAVENOUS; SUBCUTANEOUS at 10:30

## 2022-05-24 RX ADMIN — PANTOPRAZOLE SODIUM 40 MG: 40 TABLET, DELAYED RELEASE ORAL at 06:23

## 2022-05-24 RX ADMIN — HYDROMORPHONE HYDROCHLORIDE 0.5 MG: 1 INJECTION, SOLUTION INTRAMUSCULAR; INTRAVENOUS; SUBCUTANEOUS at 14:47

## 2022-05-24 RX ADMIN — CARVEDILOL 12.5 MG: 12.5 TABLET, FILM COATED ORAL at 18:39

## 2022-05-24 RX ADMIN — ALPRAZOLAM 0.5 MG: 0.5 TABLET ORAL at 22:38

## 2022-05-24 RX ADMIN — INSULIN LISPRO 9 UNITS: 100 INJECTION, SOLUTION INTRAVENOUS; SUBCUTANEOUS at 10:01

## 2022-05-24 RX ADMIN — INSULIN GLARGINE 80 UNITS: 100 INJECTION, SOLUTION SUBCUTANEOUS at 18:51

## 2022-05-24 RX ADMIN — HYDROMORPHONE HYDROCHLORIDE 0.5 MG: 1 INJECTION, SOLUTION INTRAMUSCULAR; INTRAVENOUS; SUBCUTANEOUS at 22:38

## 2022-05-24 RX ADMIN — DULOXETINE 60 MG: 60 CAPSULE, DELAYED RELEASE ORAL at 18:38

## 2022-05-24 RX ADMIN — IPRATROPIUM BROMIDE AND ALBUTEROL SULFATE 1 AMPULE: 2.5; .5 SOLUTION RESPIRATORY (INHALATION) at 11:10

## 2022-05-24 RX ADMIN — MEROPENEM 1000 MG: 1 INJECTION, POWDER, FOR SOLUTION INTRAVENOUS at 10:11

## 2022-05-24 RX ADMIN — MAGNESIUM OXIDE TAB 400 MG (241.3 MG ELEMENTAL MG) 400 MG: 400 (241.3 MG) TAB at 22:38

## 2022-05-24 RX ADMIN — POTASSIUM CHLORIDE 20 MEQ: 20 TABLET, EXTENDED RELEASE ORAL at 10:16

## 2022-05-24 RX ADMIN — INSULIN GLARGINE 80 UNITS: 100 INJECTION, SOLUTION SUBCUTANEOUS at 10:02

## 2022-05-24 ASSESSMENT — PAIN SCALES - GENERAL
PAINLEVEL_OUTOF10: 10
PAINLEVEL_OUTOF10: 9
PAINLEVEL_OUTOF10: 10
PAINLEVEL_OUTOF10: 10
PAINLEVEL_OUTOF10: 9
PAINLEVEL_OUTOF10: 9

## 2022-05-24 ASSESSMENT — PAIN DESCRIPTION - LOCATION
LOCATION: LEG;HEAD
LOCATION: LEG;HEAD
LOCATION: LEG

## 2022-05-24 NOTE — PROGRESS NOTES
Progress Note  Date:2022       Room:[facility]/2097-  Patient Chetan Arauz     YOB: 1975     Age:47 y.o. Subjective    Subjective:  Symptoms:  (Bronchoscopy produced thick secretions yesterday. ). Activity level: Returning to normal (continues to go outside to smoke). Pain:  She reports no pain. Review of Systems  Objective         Vitals Last 24 Hours:  TEMPERATURE:  Temp  Av.9 °F (36.6 °C)  Min: 97.1 °F (36.2 °C)  Max: 98.9 °F (37.2 °C)  RESPIRATIONS RANGE: Resp  Av.6  Min: 13  Max: 20  PULSE OXIMETRY RANGE: SpO2  Av.8 %  Min: 92 %  Max: 99 %  PULSE RANGE: Pulse  Av.8  Min: 63  Max: 90  BLOOD PRESSURE RANGE: Systolic (25JHK), SMW:641 , Min:101 , IRB:906   ; Diastolic (62OLJ), ZFL:94, Min:56, Max:90    I/O (24Hr): Intake/Output Summary (Last 24 hours) at 2022 0735  Last data filed at 2022 1219  Gross per 24 hour   Intake 175 ml   Output    Net 175 ml     Objective:  General Appearance:  Comfortable. Vital signs: (most recent): Blood pressure (!) 121/56, pulse 65, temperature 97.6 °F (36.4 °C), temperature source Oral, resp. rate 20, height 5' 8\" (1.727 m), weight 268 lb 11.9 oz (121.9 kg), SpO2 97 %. Vital signs are normal.    Lungs: There are decreased breath sounds. Heart: Normal rate. Regular rhythm. S1 normal and S2 normal.      Labs/Imaging/Diagnostics    Labs:  CBC:No results for input(s): WBC, RBC, HGB, HCT, MCV, RDW, PLT in the last 72 hours. CHEMISTRIES:  Recent Labs     22  0530      K 3.8   CL 97*   CO2 32*   BUN 18   CREATININE 0.64   GLUCOSE 228*   MG 1.8     PT/INR:No results for input(s): PROTIME, INR in the last 72 hours. APTT:No results for input(s): APTT in the last 72 hours. LIVER PROFILE:No results for input(s): AST, ALT, BILIDIR, BILITOT, ALKPHOS in the last 72 hours. Imaging Last 24 Hours:  No results found.   Assessment//Plan           Hospital Problems           Last Modified POA    * (Principal) Acute on chronic systolic (congestive) heart failure ([facility] 5/18/2022 Yes    Moderate left ventricular systolic dysfunction 4/10/6083 Yes    Bilateral lower extremity edema 5/18/2022 Yes    COPD (chronic obstructive pulmonary disease) (HCC) (Chronic) 5/18/2022 Yes    Tobacco abuse 5/18/2022 Yes    Pulmonary HTN ([facility] [address] 5/18/2022 Yes    Uncontrolled type 2 diabetes mellitus with hyperglycemia ([facility] [facility] 5/18/2022 Yes        Assessment & Plan    Plan per pulmonology      Electronically signed by Randi Clark MD on 5/24/22 at 7:35 AM EDT

## 2022-05-24 NOTE — PLAN OF CARE
Problem: Discharge Planning  Goal: Discharge to home or other facility with appropriate resources  5/24/2022 0451 by Geo Caruso RN  Outcome: Progressing     Problem: Pain  Goal: Verbalizes/displays adequate comfort level or baseline comfort level  5/24/2022 0451 by Geo Caruso RN  Outcome: Progressing     Problem: ABCDS Injury Assessment  Goal: Absence of physical injury  5/24/2022 0451 by Geo Caruso RN  Outcome: Progressing     Problem: Safety - Adult  Goal: Free from fall injury  5/24/2022 0451 by Geo Caruso RN  Outcome: Progressing     Problem: Chronic Conditions and Co-morbidities  Goal: Patient's chronic conditions and co-morbidity symptoms are monitored and maintained or improved  5/24/2022 0451 by Geo Caruso RN  Outcome: Progressing

## 2022-05-24 NOTE — PROGRESS NOTES
BRONCHOSPASM/BRONCHOCONSTRICTION     [x]         IMPROVE AERATION/BREATH SOUNDS  [x]   ADMINISTER BRONCHODILATOR THERAPY AS APPROPRIATE  [x]   ASSESS BREATH SOUNDS  []   IMPLEMENT AEROSOL/MDI PROTOCOL  [x]   PATIENT EDUCATION AS NEEDED    PROVIDE ADEQUATE OXYGENATION WITH ACCEPTABLE SP02/ABG'S    [x]  IDENTIFY APPROPRIATE OXYGEN THERAPY  [x]   MONITOR SP02/ABG'S AS NEEDED   [x]   PATIENT EDUCATION AS NEEDED    Pt refusing pulmicort and metaneb at this time. Still RA during the day and 3L at night.

## 2022-05-24 NOTE — Clinical Note
BRONCHOSPASM/BRONCHOCONSTRICTION     []         IMPROVE AERATION/BREATH SOUNDS  []   ADMINISTER BRONCHODILATOR THERAPY AS APPROPRIATE  []   ASSESS BREATH SOUNDS  []   IMPLEMENT AEROSOL/MDI PROTOCOL  []   PATIENT EDUCATION AS NEEDED  NONINVASIVE VENTILATION

## 2022-05-24 NOTE — PLAN OF CARE
Problem: Discharge Planning  Goal: Discharge to home or other facility with appropriate resources  5/24/2022 1831 by Denis Weaver RN  Outcome: Progressing  5/24/2022 0451 by Marimar Soares RN  Outcome: Progressing     Problem: Pain  Goal: Verbalizes/displays adequate comfort level or baseline comfort level  5/24/2022 1831 by Denis Weaver RN  Outcome: Progressing  5/24/2022 0451 by Marimar Soares RN  Outcome: Progressing     Problem: ABCDS Injury Assessment  Goal: Absence of physical injury  5/24/2022 1831 by Denis Weaver RN  Outcome: Progressing  5/24/2022 0451 by Marimar Soares RN  Outcome: Progressing     Problem: Safety - Adult  Goal: Free from fall injury  5/24/2022 1831 by Denis Weaver RN  Outcome: Progressing  5/24/2022 0451 by Marimar Soares RN  Outcome: Progressing     Problem: Chronic Conditions and Co-morbidities  Goal: Patient's chronic conditions and co-morbidity symptoms are monitored and maintained or improved  5/24/2022 1831 by Denis Weaver RN  Outcome: Progressing  5/24/2022 0451 by Marimar Soares RN  Outcome: Progressing

## 2022-05-24 NOTE — PROGRESS NOTES
BRONCHOSPASM/BRONCHOCONSTRICTION     [x]         IMPROVE AERATION/BREATH SOUNDS  [x]   ADMINISTER BRONCHODILATOR THERAPY AS APPROPRIATE  [x]   ASSESS BREATH SOUNDS  []   IMPLEMENT AEROSOL/MDI PROTOCOL  [x]   PATIENT EDUCATION AS NEEDED    PROVIDE ADEQUATE OXYGENATION WITH ACCEPTABLE SP02/ABG'S    [x]  IDENTIFY APPROPRIATE OXYGEN THERAPY  [x]   MONITOR SP02/ABG'S AS NEEDED   [x]   PATIENT EDUCATION AS NEEDED    Pt refused metaneb at this time

## 2022-05-24 NOTE — PROGRESS NOTES
PULMONARY PROGRESS NOTE:    REASON FOR VISIT:COPD exac  Interval History:    Shortness of Breath: yes  Cough: better  Sputum: no          Hemoptysis: no  Chest Pain: better today  Fever: no                   Swelling Feet: no  Headache: no                                           Nausea, Emesis, Abdominal Pain: no  Diarrhea: no         Constipation: no    Events since last visit: bronch 5/23/22    PAST MEDICAL HISTORY:      Scheduled Meds:   meropenem  1,000 mg IntraVENous Q8H    methylPREDNISolone  40 mg IntraVENous Q12H    isosorbide mononitrate  30 mg Oral Daily    bumetanide  2 mg Oral Daily    potassium chloride  20 mEq Oral Daily    magnesium oxide  400 mg Oral BID    diclofenac sodium  2 g Topical BID    colchicine  0.6 mg Oral BID    budesonide  0.5 mg Nebulization BID    sodium chloride flush  5-40 mL IntraVENous 2 times per day    atorvastatin  80 mg Oral Nightly    carvedilol  12.5 mg Oral BID WC    clopidogrel  75 mg Oral Daily    DULoxetine  60 mg Oral BID    ipratropium-albuterol  1 ampule Inhalation Q4H WA    QUEtiapine  150 mg Oral Nightly    vilazodone HCl  20 mg Oral Daily    enoxaparin  30 mg SubCUTAneous BID    insulin glargine  80 Units SubCUTAneous BID    pantoprazole  40 mg Oral BID    insulin lispro  0-18 Units SubCUTAneous TID WC    insulin lispro  0-9 Units SubCUTAneous Nightly     Continuous Infusions:   sodium chloride 15 mL/hr at 05/21/22 1514    dextrose       PRN Meds:potassium chloride **OR** potassium alternative oral replacement **OR** potassium chloride, dextromethorphan-guaiFENesin, sodium chloride flush, sodium chloride, polyethylene glycol, acetaminophen **OR** acetaminophen, albuterol sulfate HFA, ALPRAZolam, nitroGLYCERIN, oxyCODONE-acetaminophen, HYDROmorphone, glucose, dextrose bolus **OR** dextrose bolus, glucagon (rDNA), dextrose        PHYSICAL EXAMINATION:  BP (!) 121/56   Pulse 65   Temp 97.6 °F (36.4 °C) (Oral)   Resp 20   Ht 5' 8\" (1.727 m)   Wt 268 lb 11.9 oz (121.9 kg)   SpO2 97%   BMI 40.86 kg/m²     General : Awake, alert, oriented x 3   Neck  supple, no lymphadenopathy, JVD not raised  Heart  regular rhythm, S1 and S2 normal; no additional sounds heard  Lungs  poor Entry- fair bilaterally; breath sounds : expiratory wheezing, 93% on 3 l nc  Abdomen  soft, no tenderness  Upper Extremities  - no cyanosis, mottling; edema : absent  Lower Extremities: no cyanosis, mottling; edema : mild BLE edema    Current Laboratory, Radiologic, Microbiologic, and Diagnostic studies reviewed  Data ReviewCBC:   No results for input(s): WBC, RBC, HGB, HCT, PLT in the last 72 hours. BMP:   Recent Labs     05/22/22  0530   GLUCOSE 228*      K 3.8   BUN 18   CREATININE 0.64   CALCIUM 8.9     ABGs: No results for input(s): PHART, PO2ART, FKF6YSY, NXM0KLC, BEART, L8OQDLGC, WRU9ASA in the last 72 hours. PT/INR:  No results found for: PTINR    ASSESSMENT / PLAN:    · Acute on chronic systolic heart failure- cardiology consulted   ? IV diuresis   · COPD exac-BD, pulm hygiene, add solumedrol - to po meds  · Sputum culture GNR -normal marielena  · Possible pneumonia  ? Patient has numerous antibiotic allergies which would severely limit antibiotic selection. ? Check procal- 0.03  ?  Abx added  · Nicotine dependence- encourage smoking cessation   · Hx DM  · CXR 5/21 - infiltrates +  · Bronch 5/23- thick mucus plugs  · Home nebulizer - F2F  · Home O2 evaluation      Electronically signed by Raj Jimenez MD on 05/24/22 at 9:20 AM

## 2022-05-24 NOTE — CARE COORDINATION
ONGOING DISCHARGE PLAN:    Patient is alert and oriented x4. Spoke with patient regarding discharge plan and patient confirms that plan is still home without needs. Declined VNS. Active order for PO Zithro and IV Merrem. Had bronch yesterday which showed mucus plug. Pt needs nebulizer for discharge - need DME order and face to face. Home O2 eval ordered prior to discharge. Will continue to follow for additional discharge needs. Electronically signed by Oleg Conley RN on 5/24/2022 at 1:18 PM    Home O2 eval done and pt does NOT qualify.     Electronically signed by Oleg Conley RN on 5/24/2022 at 3:38 PM

## 2022-05-24 NOTE — PROGRESS NOTES
Home Oxygen Evaluation    Home Oxygen Evaluation completed. Patient is on 3 liters per minute via NC. Resting SpO2 = 98%  Resting SpO2 on room air = 93%    SpO2 on room air with exercise = 90%  SpO2 on oxygen as above with exercise = 95Home Oxygen Evaluation    Room air SpO2 at Rest = 93%    Room air with exercise/exertion = 90%    Nocturnal Oximetry with patient on room air is recommended is SpO2 is between 89% and 95% (requires additional order).     Edyta Spencer RCP  2:01 PM

## 2022-05-25 ENCOUNTER — APPOINTMENT (OUTPATIENT)
Dept: GENERAL RADIOLOGY | Age: 47
DRG: 291 | End: 2022-05-25
Payer: COMMERCIAL

## 2022-05-25 LAB
GLUCOSE BLD-MCNC: 133 MG/DL (ref 65–105)
GLUCOSE BLD-MCNC: 153 MG/DL (ref 65–105)
GLUCOSE BLD-MCNC: 229 MG/DL (ref 65–105)
GLUCOSE BLD-MCNC: 79 MG/DL (ref 65–105)

## 2022-05-25 PROCEDURE — 2700000000 HC OXYGEN THERAPY PER DAY

## 2022-05-25 PROCEDURE — 82947 ASSAY GLUCOSE BLOOD QUANT: CPT

## 2022-05-25 PROCEDURE — 71046 X-RAY EXAM CHEST 2 VIEWS: CPT

## 2022-05-25 PROCEDURE — 6360000002 HC RX W HCPCS: Performed by: INTERNAL MEDICINE

## 2022-05-25 PROCEDURE — 6370000000 HC RX 637 (ALT 250 FOR IP): Performed by: INTERNAL MEDICINE

## 2022-05-25 PROCEDURE — 2580000003 HC RX 258: Performed by: INTERNAL MEDICINE

## 2022-05-25 PROCEDURE — 94640 AIRWAY INHALATION TREATMENT: CPT

## 2022-05-25 PROCEDURE — 94761 N-INVAS EAR/PLS OXIMETRY MLT: CPT

## 2022-05-25 PROCEDURE — 94762 N-INVAS EAR/PLS OXIMTRY CONT: CPT

## 2022-05-25 PROCEDURE — 2060000000 HC ICU INTERMEDIATE R&B

## 2022-05-25 RX ORDER — PREDNISONE 20 MG/1
40 TABLET ORAL DAILY
Status: DISCONTINUED | OUTPATIENT
Start: 2022-05-26 | End: 2022-05-26 | Stop reason: HOSPADM

## 2022-05-25 RX ADMIN — COLCHICINE 0.6 MG: 0.6 TABLET ORAL at 09:15

## 2022-05-25 RX ADMIN — BUMETANIDE 2 MG: 1 TABLET ORAL at 09:15

## 2022-05-25 RX ADMIN — MEROPENEM 1000 MG: 1 INJECTION, POWDER, FOR SOLUTION INTRAVENOUS at 17:50

## 2022-05-25 RX ADMIN — INSULIN LISPRO 3 UNITS: 100 INJECTION, SOLUTION INTRAVENOUS; SUBCUTANEOUS at 20:51

## 2022-05-25 RX ADMIN — INSULIN GLARGINE 80 UNITS: 100 INJECTION, SOLUTION SUBCUTANEOUS at 09:21

## 2022-05-25 RX ADMIN — POTASSIUM CHLORIDE 20 MEQ: 20 TABLET, EXTENDED RELEASE ORAL at 09:15

## 2022-05-25 RX ADMIN — IPRATROPIUM BROMIDE AND ALBUTEROL SULFATE 1 AMPULE: 2.5; .5 SOLUTION RESPIRATORY (INHALATION) at 11:25

## 2022-05-25 RX ADMIN — DULOXETINE 60 MG: 60 CAPSULE, DELAYED RELEASE ORAL at 17:48

## 2022-05-25 RX ADMIN — AZITHROMYCIN 250 MG: 250 TABLET, FILM COATED ORAL at 09:15

## 2022-05-25 RX ADMIN — SODIUM CHLORIDE, PRESERVATIVE FREE 10 ML: 5 INJECTION INTRAVENOUS at 09:17

## 2022-05-25 RX ADMIN — MEROPENEM 1000 MG: 1 INJECTION, POWDER, FOR SOLUTION INTRAVENOUS at 09:21

## 2022-05-25 RX ADMIN — DORNASE ALFA 2.5 MG: 1 SOLUTION RESPIRATORY (INHALATION) at 20:44

## 2022-05-25 RX ADMIN — CLOPIDOGREL BISULFATE 75 MG: 75 TABLET ORAL at 17:49

## 2022-05-25 RX ADMIN — HYDROMORPHONE HYDROCHLORIDE 0.5 MG: 1 INJECTION, SOLUTION INTRAMUSCULAR; INTRAVENOUS; SUBCUTANEOUS at 07:16

## 2022-05-25 RX ADMIN — CARVEDILOL 12.5 MG: 12.5 TABLET, FILM COATED ORAL at 17:48

## 2022-05-25 RX ADMIN — HYDROMORPHONE HYDROCHLORIDE 0.5 MG: 1 INJECTION, SOLUTION INTRAMUSCULAR; INTRAVENOUS; SUBCUTANEOUS at 11:27

## 2022-05-25 RX ADMIN — HYDROMORPHONE HYDROCHLORIDE 0.5 MG: 1 INJECTION, SOLUTION INTRAMUSCULAR; INTRAVENOUS; SUBCUTANEOUS at 03:00

## 2022-05-25 RX ADMIN — COLCHICINE 0.6 MG: 0.6 TABLET ORAL at 20:50

## 2022-05-25 RX ADMIN — INSULIN LISPRO 3 UNITS: 100 INJECTION, SOLUTION INTRAVENOUS; SUBCUTANEOUS at 11:40

## 2022-05-25 RX ADMIN — PANTOPRAZOLE SODIUM 40 MG: 40 TABLET, DELAYED RELEASE ORAL at 17:48

## 2022-05-25 RX ADMIN — PREDNISONE 20 MG: 20 TABLET ORAL at 09:15

## 2022-05-25 RX ADMIN — MAGNESIUM OXIDE TAB 400 MG (241.3 MG ELEMENTAL MG) 400 MG: 400 (241.3 MG) TAB at 20:50

## 2022-05-25 RX ADMIN — ISOSORBIDE MONONITRATE 30 MG: 30 TABLET, EXTENDED RELEASE ORAL at 09:15

## 2022-05-25 RX ADMIN — MAGNESIUM OXIDE TAB 400 MG (241.3 MG ELEMENTAL MG) 400 MG: 400 (241.3 MG) TAB at 09:16

## 2022-05-25 RX ADMIN — QUETIAPINE FUMARATE 150 MG: 100 TABLET ORAL at 17:48

## 2022-05-25 RX ADMIN — GUAIFENESIN DEXTROMETHORPHAN HYDROBROMIDE ORAL SOLUTION 5 ML: 200; 20 SOLUTION ORAL at 03:00

## 2022-05-25 RX ADMIN — IPRATROPIUM BROMIDE AND ALBUTEROL SULFATE 1 AMPULE: 2.5; .5 SOLUTION RESPIRATORY (INHALATION) at 07:37

## 2022-05-25 RX ADMIN — IPRATROPIUM BROMIDE AND ALBUTEROL SULFATE 1 AMPULE: 2.5; .5 SOLUTION RESPIRATORY (INHALATION) at 19:29

## 2022-05-25 RX ADMIN — IPRATROPIUM BROMIDE AND ALBUTEROL SULFATE 1 AMPULE: 2.5; .5 SOLUTION RESPIRATORY (INHALATION) at 15:33

## 2022-05-25 RX ADMIN — DULOXETINE 60 MG: 60 CAPSULE, DELAYED RELEASE ORAL at 09:15

## 2022-05-25 RX ADMIN — PANTOPRAZOLE SODIUM 40 MG: 40 TABLET, DELAYED RELEASE ORAL at 06:18

## 2022-05-25 RX ADMIN — DICLOFENAC SODIUM 2 G: 10 GEL TOPICAL at 20:53

## 2022-05-25 RX ADMIN — HYDROMORPHONE HYDROCHLORIDE 0.5 MG: 1 INJECTION, SOLUTION INTRAMUSCULAR; INTRAVENOUS; SUBCUTANEOUS at 23:47

## 2022-05-25 RX ADMIN — MEROPENEM 1000 MG: 1 INJECTION, POWDER, FOR SOLUTION INTRAVENOUS at 02:54

## 2022-05-25 RX ADMIN — HYDROMORPHONE HYDROCHLORIDE 0.5 MG: 1 INJECTION, SOLUTION INTRAMUSCULAR; INTRAVENOUS; SUBCUTANEOUS at 15:54

## 2022-05-25 RX ADMIN — HYDROMORPHONE HYDROCHLORIDE 0.5 MG: 1 INJECTION, SOLUTION INTRAMUSCULAR; INTRAVENOUS; SUBCUTANEOUS at 19:55

## 2022-05-25 RX ADMIN — ATORVASTATIN CALCIUM 80 MG: 80 TABLET, FILM COATED ORAL at 17:48

## 2022-05-25 RX ADMIN — GUAIFENESIN DEXTROMETHORPHAN HYDROBROMIDE ORAL SOLUTION 5 ML: 200; 20 SOLUTION ORAL at 11:27

## 2022-05-25 RX ADMIN — CARVEDILOL 12.5 MG: 12.5 TABLET, FILM COATED ORAL at 06:18

## 2022-05-25 ASSESSMENT — PAIN DESCRIPTION - LOCATION: LOCATION: OTHER (COMMENT);LEG

## 2022-05-25 ASSESSMENT — PAIN SCALES - GENERAL
PAINLEVEL_OUTOF10: 8
PAINLEVEL_OUTOF10: 10
PAINLEVEL_OUTOF10: 10
PAINLEVEL_OUTOF10: 9
PAINLEVEL_OUTOF10: 10

## 2022-05-25 NOTE — PROGRESS NOTES
Comprehensive Nutrition Assessment    Type and Reason for Visit:  Reassess    Nutrition Recommendations/Plan:   1. Continue current diet     Malnutrition Assessment:  Malnutrition Status: At risk for malnutrition (Comment) (05/18/22 9658)    Context:  Chronic Illness     Findings of the 6 clinical characteristics of malnutrition:  Energy Intake:  No significant decrease in energy intake  Weight Loss:  No significant weight loss     Body Fat Loss:  No significant body fat loss     Muscle Mass Loss:  No significant muscle mass loss    Fluid Accumulation:  Mild Extremities   Strength:  Not Performed    Nutrition Assessment:    Observed patient consumed about 50% of lunch, asked if she would like any supplements but patient refused. Per nursing flowsheet consuming % at most meals. Nutrition Related Findings:    Edema: +2 pitting BLE. Bowel sounds active. Labs and meds reviewed Wound Type: None       Current Nutrition Intake & Therapies:    Average Meal Intake: 26-50%,%  Average Supplements Intake: Refusing to take  ADULT DIET; Regular    Anthropometric Measures:  Height: 5' 8\" (172.7 cm)  Ideal Body Weight (IBW): 140 lbs (64 kg)    Admission Body Weight: 248 lb 0.3 oz (112.5 kg)  Current Body Weight: 276 lb 7.3 oz (125.4 kg) (Edema present), 177.2 % IBW.  Weight Source: Bed Scale  Current BMI (kg/m2): 42  Usual Body Weight: 252 lb 3.3 oz (114.4 kg) (12/21 bed scale)  % Weight Change (Calculated): -1.7  Weight Adjustment For: No Adjustment                 BMI Categories: Obese Class 2 (BMI 35.0 -39.9)    Estimated Daily Nutrient Needs:  Energy Requirements Based On: Formula  Weight Used for Energy Requirements: Current  Energy (kcal/day): 1800 kcal = mifflin * 1  Weight Used for Protein Requirements: Current  Protein (g/day): 115-135g (1-1.2g/kg)       Nutrition Diagnosis:   · Limited adherence to nutrition-related recommendations related to food and nutrition related knowledge deficit as evidenced by (Admission due to CHF)      Nutrition Interventions:   Food and/or Nutrient Delivery: Continue Current Diet  Nutrition Education/Counseling: Education completed (Heart healthy carb counting handouts from nutrition care manual)  Coordination of Nutrition Care: Continue to monitor while inpatient       Goals:             Nutrition Monitoring and Evaluation:      Food/Nutrient Intake Outcomes: Food and Nutrient Intake  Physical Signs/Symptoms Outcomes: Biochemical Data,Weight    Discharge Planning:    Continue current diet       Some areas of assessment may be incomplete due to COVID-19 precautions    Armando Medrano RD, LD  Office phone [phone]

## 2022-05-25 NOTE — CARE COORDINATION
ONGOING DISCHARGE PLAN:    Patient is alert and oriented x4. Spoke with patient regarding discharge plan and patient confirms that plan is still home without needs. Continues to decline VNS. Pt had oxygen concentrator for HS use, however states she moved out of her home and her ex changed the locks so she cannot get her oxygen back. She states it has been more than 5 years. Spoke with Dash Ambriz from [facility] who states that since it has been more than 5 years, pt will need to requalify and and can be restarted with new billing. Will need nocturnal home 02 eval.  Had daytime eval yesterday and did not qualify. Order for neb and face to face were faxed to [facility] this AM.    Active order for PO Zithro and IV Merrem. Will continue to follow for additional discharge needs.     Electronically signed by Amado Iqbal RN on 5/25/2022 at 12:26 PM

## 2022-05-25 NOTE — PROGRESS NOTES
BRONCHOSPASM/BRONCHOCONSTRICTION     [x]         IMPROVE AERATION/BREATH SOUNDS  [x]   ADMINISTER BRONCHODILATOR THERAPY AS APPROPRIATE  [x]   ASSESS BREATH SOUNDS  [x]   IMPLEMENT AEROSOL/MDI PROTOCOL  [x]   PATIENT EDUCATION AS NEEDED    MOBILIZE SECRETIONS    [x]   ASSESS BREATH SOUNDS  [x]   ASSESS SPUTUM PRODUCTION  [x]   COUGH AND DEEP BREATHING  [x]  IMPLEMENT SECRETION MANAGEMENT PROTOCOL  [x]   PATIENT EDUCATION AS NEEDED    Pt declines Metaneb and pulmicort

## 2022-05-25 NOTE — PLAN OF CARE
Problem: Discharge Planning  Goal: Discharge to home or other facility with appropriate resources  Outcome: Progressing     Problem: Pain  Goal: Verbalizes/displays adequate comfort level or baseline comfort level  Outcome: Progressing     Problem: ABCDS Injury Assessment  Goal: Absence of physical injury  Outcome: Progressing     Problem: Safety - Adult  Goal: Free from fall injury  Outcome: Progressing     Problem: Chronic Conditions and Co-morbidities  Goal: Patient's chronic conditions and co-morbidity symptoms are monitored and maintained or improved  Outcome: Progressing   Patient utilizes pharm and nonpharm pain management this shift. Patient ambulates in and out of room per self without injury.

## 2022-05-25 NOTE — PROGRESS NOTES
PULMONARY PROGRESS NOTE:    REASON FOR VISIT:COPD exac  Interval History:    Shortness of Breath: yes  Cough: ++  Sputum: no          Hemoptysis: no  Chest Pain: better today  Fever: no                   Swelling Feet: no  Headache: no                                           Nausea, Emesis, Abdominal Pain: no  Diarrhea: no         Constipation: no    Events since last visit: [address]- did not qualify for O2 in daytime    PAST MEDICAL HISTORY:      Scheduled Meds:   azithromycin  250 mg Oral Daily    predniSONE  20 mg Oral Daily    meropenem  1,000 mg IntraVENous Q8H    isosorbide mononitrate  30 mg Oral Daily    bumetanide  2 mg Oral Daily    potassium chloride  20 mEq Oral Daily    magnesium oxide  400 mg Oral BID    diclofenac sodium  2 g Topical BID    colchicine  0.6 mg Oral BID    budesonide  0.5 mg Nebulization BID    sodium chloride flush  5-40 mL IntraVENous 2 times per day    atorvastatin  80 mg Oral Nightly    carvedilol  12.5 mg Oral BID WC    clopidogrel  75 mg Oral Daily    DULoxetine  60 mg Oral BID    ipratropium-albuterol  1 ampule Inhalation Q4H WA    QUEtiapine  150 mg Oral Nightly    vilazodone HCl  20 mg Oral Daily    enoxaparin  30 mg SubCUTAneous BID    insulin glargine  80 Units SubCUTAneous BID    pantoprazole  40 mg Oral BID    insulin lispro  0-18 Units SubCUTAneous TID WC    insulin lispro  0-9 Units SubCUTAneous Nightly     Continuous Infusions:   sodium chloride 15 mL/hr at 05/21/22 1514    dextrose       PRN Meds:calcium carbonate, potassium chloride **OR** potassium alternative oral replacement **OR** potassium chloride, dextromethorphan-guaiFENesin, sodium chloride flush, sodium chloride, polyethylene glycol, acetaminophen **OR** acetaminophen, albuterol sulfate HFA, ALPRAZolam, nitroGLYCERIN, oxyCODONE-acetaminophen, HYDROmorphone, glucose, dextrose bolus **OR** dextrose bolus, glucagon (rDNA), dextrose        PHYSICAL EXAMINATION:  /61   Pulse 61 Temp 97.8 °F (36.6 °C) (Oral)   Resp 17   Ht 5' 8\" (1.727 m)   Wt 276 lb 7.3 oz (125.4 kg)   SpO2 97%   BMI 42.04 kg/m²     General : Awake, alert, oriented x 3   Neck  supple, no lymphadenopathy, JVD not raised  Heart  regular rhythm, S1 and S2 normal; no additional sounds heard  Lungs  poor Entry- fair bilaterally; breath sounds : expiratory wheezing, 93% on 3 l nc  Abdomen  soft, no tenderness  Upper Extremities  - no cyanosis, mottling; edema : absent  Lower Extremities: no cyanosis, mottling; edema : mild BLE edema    Current Laboratory, Radiologic, Microbiologic, and Diagnostic studies reviewed  Data ReviewCBC:   No results for input(s): WBC, RBC, HGB, HCT, PLT in the last 72 hours. BMP:   No results for input(s): GLUCOSE, NA, K, BUN, CREATININE, CALCIUM in the last 72 hours. ABGs: No results for input(s): PHART, PO2ART, HCP7ODQ, ZEJ4YVE, BEART, A5BSEZHW, XKJ7ZRW in the last 72 hours. PT/INR:  No results found for: PTINR    ASSESSMENT / PLAN:    · Acute on chronic systolic heart failure- cardiology consulted   ? IV diuresis   · COPD exac-BD, pulm hygiene, add solumedrol - to po meds  · Sputum culture GNR -normal marielena  · Possible pneumonia  ? Patient has numerous antibiotic allergies which would severely limit antibiotic selection. ? Check procal- 0.03  ?  Abx added  · Nicotine dependence- encourage smoking cessation   · Hx DM  · CXR 5/21 - infiltrates +  · Bronch 5/23- thick mucus plugs  · Home nebulizer - F2F  · Home O2 evaluation - NOX  · Adjust BD      Electronically signed by Lizzy Mcgregor MD on 05/25/22 at 11:47 AM

## 2022-05-25 NOTE — CARE COORDINATION
[facility] [address] Encounter Date/Time: 2022 1104    Hospital Account: [de-identified]    MRN: [id number]    Patient: Kenny Gillespie    Contact Serial #: [id number]      ENCOUNTER          Patient Class: I Private Enc? No Unit RM BD: Nancy Ford PROG    Hospital Service: FALGUNI   Encounter DX: Bilateral lower extremit*   ADM Provider: Luis Ventura MD   Procedure:     ATT Provider: Luis Ventura MD   REF Provider:        Admission DX: Bilateral lower extremity edema, Chest pain, unspecified type, Acute congestive heart failure, unspecified heart failure type ([facility] and DX codes: R60.0, R07.9, I50.9      PATIENT                 Name: Kenny Gillespie : 1975 (47 yrs)   Address: [address] [place] [address] Sex: Female   City: [place] [address]         Marital Status: Single   Employer: DISABLED         [employer]: [employer]   Primary Care Provider: Luis Ventura MD         Primary Phone: [phone]   Contact Name Legal Guardian? Relationship to Patient Home Phone Work Phone   1. INTTRA  2. *No Contact Specified*      Niece/Nephew    [phone]                 GUARANTOR            Guarantor: Kenny Gillespie     : 1975   Address: [address] [place], [place] [address]i [facility] Sex: Female   [facility] [address]     Relation to Patient: Self       Home Phone: [phone]   Guarantor ID: [id number]       Work Phone: [phone]   Guarantor Employer: DISABLED         Status: DISABLED      COVERAGE        PRIMARY INSURANCE   Payor: Malvin Garcia DUAL* Plan: Malvin Garcia DUAL   Payor Address: [place] [address]       Group Number: [id number] Insurance Type: INDEMNITY   Subscriber Name: Elliott Romario : 1975   Subscriber ID: [id number] Pat. Rel. to Sub: Self   SECONDARY INSURANCE   Payor:   Plan:     Payor Address:  ,           Group Number:   Insurance Type:     Subscriber Name:   Subscriber :     Subscriber ID:   Pat.  Rel. to Sub:             CSN: [id number]    CSN                                    Req/Control # [Problem retrieving Specimen ID]                                   Order Date:  May 25, 2022  [id number]                                          Patient Information      Name:  Myranda Earl  :  1975  Age:  50 y.o. Address:  [address]   Zip:  [address]  PCP: Stephane Hager MD Sex:  F  SSN: [id number]  Home Phone: [phone]  Work Phone:  [phone]  Patient MRN:  [id number]    Alt Patient ID:  [id number]  PCP Phone: [phone]       Authorizing Provider Information       AUTHORIZING PROVIDER: Sher Santiago MD  Physician ID: [id number]  NPI:  [id number]  Site:   Address: [address] [place] [address] 3, [address] [place] [address]  Phone: [phone]  Fax: [phone]             EQUIPMENT ORDERED  DME Order for Nebulizer as OP [[id number]] (ORD   #:   [id number]) Priority  Routine Class  Hospital Performed        Associated Diagnosis:  Panlobular emphysema ([facility] [address] (J43.1 [ICD-10-CM])        Comments: You must complete the order parameters below and add the medical necessity documentation for this DME in a separate note.     Nebulizer with compressor  Disposable Med Nebs 2 per month  Reusable Med Nebs 1 per 6 months  Aerosol Mask 1 per month  Replacement Filters 2 per month  All other related supplies as needed per month     Frequency:  Four times daily     Diagnosis: COPD     Length of Need: 12 months            Scheduling Instructions:                            Medications being used:  Other (Comment) (Pulmicort)     Specimen Source             Collection Date    Collection Time    Order Status    Expected Date                 Electronically Signed By  Sher Santiago MD Date  May 25, 2022  11:37 AM              Responsible Party Mamadou Rivera     Guar-ActID   Relationship Account Type Home Phone   Federico Baldemar Tequila [address]* [address] [facility]  Donnalorena [address] Self P/F [phone] Employer   Work Phone   DISABLED   [address] Information     Primary Insurance  Insurance/Subscriber ID:  [id number]  [address] Name:  Miles Campbellt              Relationship to Patient: SelfSigned ABN: N    Payor Name:  [facility] DUAL BENEFITS   Plan:  [facility] DUAL   Group: [id number]  Worker's Comp Date of Injury:

## 2022-05-25 NOTE — PROGRESS NOTES
Physician Progress Note      Ulisses Pérez  CSN #:                  [id number]  :                       1975  ADMIT DATE:       2022 11:04 AM  DISCH DATE:  RESPONDING  PROVIDER #:        Gabbi Pina MD          QUERY TEXT:    Pt admitted with acute on chronic systolic CHF. Pt noted to also have ischemic   cardiomyopathy. If possible, please document in progress notes and discharge   summary the etiology of CHF, if able to be determined. The medical record reflects the following:  Risk Factors: Hx HTN ICMP CAD  Clinical Indicators: admitted for CHF exacerbation in the setting of above   risk factors; Echo from -->Estimated LV EF 35-40%. Treatment: cardio consult, BNP level, IV Bumex then switched to PO,   monitoring, hospital admission  Options provided:  -- CHF due to Hypertensive Heart Disease  -- CHF due to Hypertensive Heart Disease and CAD  -- CHF not due to Hypertension but due to CAD  -- CHF due to Hypertensive Heart Disease and ICMP  -- CHF not due to Hypertension but due to ICMP  -- CHF due to HTN, CAD, and ICMP  -- Other - I will add my own diagnosis  -- Disagree - Not applicable / Not valid  -- Disagree - Clinically unable to determine / Unknown  -- Refer to Clinical Documentation Reviewer    PROVIDER RESPONSE TEXT:    Provider is clinically unable to determine a response to this query.     Query created by: Maria Marmolejo on 2022 11:20 AM      Electronically signed by:  Gabbi Pina MD 2022 10:29 AM

## 2022-05-25 NOTE — PROGRESS NOTES
Progress Note  Date:2022       Room:[facility]-  Patient Zoila Mchugh     YOB: 1975     Age:47 y.o. Subjective    Subjective:  Symptoms:  (Patient sleeping - did not awaken to verbal stimuli. ). Review of Systems  Objective         Vitals Last 24 Hours:  TEMPERATURE:  Temp  Av.8 °F (36.6 °C)  Min: 97.7 °F (36.5 °C)  Max: 97.9 °F (36.6 °C)  RESPIRATIONS RANGE: Resp  Av.2  Min: 16  Max: 18  PULSE OXIMETRY RANGE: SpO2  Av.3 %  Min: 94 %  Max: 98 %  PULSE RANGE: Pulse  Av.3  Min: 61  Max: 86  BLOOD PRESSURE RANGE: Systolic (13TIZ), UPY:603 , Min:98 , IC   ; Diastolic (98ABI), BAP:09, Min:47, Max:91    I/O (24Hr): Intake/Output Summary (Last 24 hours) at 2022 1030  Last data filed at 2022 0915  Gross per 24 hour   Intake 900 ml   Output    Net 900 ml     Objective  Labs/Imaging/Diagnostics    Labs:  CBC:No results for input(s): WBC, RBC, HGB, HCT, MCV, RDW, PLT in the last 72 hours. CHEMISTRIES:No results for input(s): NA, K, CL, CO2, BUN, CREATININE, GLUCOSE, CA, PHOS, MG in the last 72 hours. PT/INR:No results for input(s): PROTIME, INR in the last 72 hours. APTT:No results for input(s): APTT in the last 72 hours. LIVER PROFILE:No results for input(s): AST, ALT, BILIDIR, BILITOT, ALKPHOS in the last 72 hours. Imaging Last 24 Hours:  No results found.   Assessment//Plan           Hospital Problems           Last Modified POA    * (Principal) Acute on chronic systolic (congestive) heart failure ([address] 2022 Yes    Moderate left ventricular systolic dysfunction  Yes    Bilateral lower extremity edema 2022 Yes    COPD (chronic obstructive pulmonary disease) (HCC) (Chronic) 2022 Yes    Tobacco abuse 2022 Yes    Pulmonary HTN ([facility] [address] 2022 Yes    Uncontrolled type 2 diabetes mellitus with hyperglycemia ([facility] 2022 Yes        Assessment & Plan  Management per pulmonology - continues on meropenem, azithromycin.       Electronically signed by Severa Barge, MD on 5/25/22 at 10:30 AM EDT

## 2022-05-26 VITALS
SYSTOLIC BLOOD PRESSURE: 130 MMHG | RESPIRATION RATE: 19 BRPM | WEIGHT: 278.22 LBS | BODY MASS INDEX: 42.17 KG/M2 | OXYGEN SATURATION: 93 % | TEMPERATURE: 98.9 F | DIASTOLIC BLOOD PRESSURE: 75 MMHG | HEART RATE: 58 BPM | HEIGHT: 68 IN

## 2022-05-26 LAB
GLUCOSE BLD-MCNC: 104 MG/DL (ref 65–105)
GLUCOSE BLD-MCNC: 152 MG/DL (ref 65–105)
PRO-BNP: 132 PG/ML

## 2022-05-26 PROCEDURE — 36415 COLL VENOUS BLD VENIPUNCTURE: CPT

## 2022-05-26 PROCEDURE — 82947 ASSAY GLUCOSE BLOOD QUANT: CPT

## 2022-05-26 PROCEDURE — 94761 N-INVAS EAR/PLS OXIMETRY MLT: CPT

## 2022-05-26 PROCEDURE — 6370000000 HC RX 637 (ALT 250 FOR IP): Performed by: INTERNAL MEDICINE

## 2022-05-26 PROCEDURE — 94640 AIRWAY INHALATION TREATMENT: CPT

## 2022-05-26 PROCEDURE — 6360000002 HC RX W HCPCS: Performed by: INTERNAL MEDICINE

## 2022-05-26 PROCEDURE — 2580000003 HC RX 258: Performed by: INTERNAL MEDICINE

## 2022-05-26 PROCEDURE — 83880 ASSAY OF NATRIURETIC PEPTIDE: CPT

## 2022-05-26 RX ORDER — COLCHICINE 0.6 MG/1
0.6 TABLET ORAL 2 TIMES DAILY
Qty: 46 TABLET | Refills: 0 | Status: SHIPPED | OUTPATIENT
Start: 2022-05-26 | End: 2022-06-20

## 2022-05-26 RX ORDER — BUDESONIDE 0.5 MG/2ML
0.5 INHALANT ORAL 2 TIMES DAILY
Qty: 60 EACH | Refills: 11 | Status: SHIPPED | OUTPATIENT
Start: 2022-05-26

## 2022-05-26 RX ORDER — ISOSORBIDE MONONITRATE 30 MG/1
30 TABLET, EXTENDED RELEASE ORAL DAILY
Qty: 30 TABLET | Refills: 11 | Status: SHIPPED | OUTPATIENT
Start: 2022-05-26 | End: 2022-06-24

## 2022-05-26 RX ORDER — AZITHROMYCIN 250 MG/1
250 TABLET, FILM COATED ORAL DAILY
Qty: 3 TABLET | Refills: 0 | Status: SHIPPED | OUTPATIENT
Start: 2022-05-27 | End: 2022-05-30

## 2022-05-26 RX ORDER — BUMETANIDE 2 MG/1
2 TABLET ORAL DAILY
Qty: 30 TABLET | Refills: 11 | Status: SHIPPED | OUTPATIENT
Start: 2022-05-26 | End: 2022-06-24

## 2022-05-26 RX ORDER — PREDNISONE 20 MG/1
40 TABLET ORAL DAILY
Qty: 10 TABLET | Refills: 0 | Status: SHIPPED | OUTPATIENT
Start: 2022-05-27 | End: 2022-06-01

## 2022-05-26 RX ADMIN — DORNASE ALFA 2.5 MG: 1 SOLUTION RESPIRATORY (INHALATION) at 08:46

## 2022-05-26 RX ADMIN — PREDNISONE 40 MG: 20 TABLET ORAL at 09:46

## 2022-05-26 RX ADMIN — INSULIN LISPRO 3 UNITS: 100 INJECTION, SOLUTION INTRAVENOUS; SUBCUTANEOUS at 09:08

## 2022-05-26 RX ADMIN — HYDROMORPHONE HYDROCHLORIDE 0.5 MG: 1 INJECTION, SOLUTION INTRAMUSCULAR; INTRAVENOUS; SUBCUTANEOUS at 04:33

## 2022-05-26 RX ADMIN — CARVEDILOL 12.5 MG: 12.5 TABLET, FILM COATED ORAL at 06:00

## 2022-05-26 RX ADMIN — MAGNESIUM OXIDE TAB 400 MG (241.3 MG ELEMENTAL MG) 400 MG: 400 (241.3 MG) TAB at 09:46

## 2022-05-26 RX ADMIN — ISOSORBIDE MONONITRATE 30 MG: 30 TABLET, EXTENDED RELEASE ORAL at 09:46

## 2022-05-26 RX ADMIN — HYDROMORPHONE HYDROCHLORIDE 0.5 MG: 1 INJECTION, SOLUTION INTRAMUSCULAR; INTRAVENOUS; SUBCUTANEOUS at 09:06

## 2022-05-26 RX ADMIN — IPRATROPIUM BROMIDE AND ALBUTEROL SULFATE 1 AMPULE: 2.5; .5 SOLUTION RESPIRATORY (INHALATION) at 08:47

## 2022-05-26 RX ADMIN — POTASSIUM CHLORIDE 20 MEQ: 20 TABLET, EXTENDED RELEASE ORAL at 09:46

## 2022-05-26 RX ADMIN — MEROPENEM 1000 MG: 1 INJECTION, POWDER, FOR SOLUTION INTRAVENOUS at 03:02

## 2022-05-26 RX ADMIN — SODIUM CHLORIDE, PRESERVATIVE FREE 10 ML: 5 INJECTION INTRAVENOUS at 09:06

## 2022-05-26 RX ADMIN — AZITHROMYCIN 250 MG: 250 TABLET, FILM COATED ORAL at 09:46

## 2022-05-26 RX ADMIN — BUMETANIDE 2 MG: 1 TABLET ORAL at 09:46

## 2022-05-26 RX ADMIN — DULOXETINE 60 MG: 60 CAPSULE, DELAYED RELEASE ORAL at 09:46

## 2022-05-26 RX ADMIN — COLCHICINE 0.6 MG: 0.6 TABLET ORAL at 09:46

## 2022-05-26 RX ADMIN — PANTOPRAZOLE SODIUM 40 MG: 40 TABLET, DELAYED RELEASE ORAL at 06:00

## 2022-05-26 RX ADMIN — INSULIN GLARGINE 80 UNITS: 100 INJECTION, SOLUTION SUBCUTANEOUS at 09:09

## 2022-05-26 ASSESSMENT — PAIN SCALES - GENERAL
PAINLEVEL_OUTOF10: 10
PAINLEVEL_OUTOF10: 8
PAINLEVEL_OUTOF10: 9

## 2022-05-26 ASSESSMENT — PAIN DESCRIPTION - LOCATION: LOCATION: LEG

## 2022-05-26 NOTE — PLAN OF CARE
BRONCHOSPASM/BRONCHOCONSTRICTION   [x]  IMPROVE AERATION/BREATH SOUNDS  [x]   ADMINISTER BRONCHODILATOR THERAPY AS APPROPRIATE  [x]   ASSESS BREATH SOUNDS  []   IMPLEMENT AEROSOL/MDI PROTOCOL  []   PATIENT EDUCATION AS NEEDED    PROVIDE ADEQUATE OXYGENATION WITH ACCEPTABLE SP02/ABG'S  [x]  IDENTIFY APPROPRIATE OXYGEN THERAPY  [x]   MONITOR SP02/ABG'S AS NEEDED   [x]   PATIENT EDUCATION AS NEEDED      MOBILIZE SECRETIONS  [x]   ASSESS BREATH SOUNDS  [x]   ASSESS SPUTUM PRODUCTION  [x]   COUGH AND DEEP BREATHING  []  IMPLEMENT SECRETION MANAGEMENT PROTOCOL  [x]   PATIENT EDUCATION AS NEEDED

## 2022-05-26 NOTE — PROGRESS NOTES
RN spoke with RT and requested they educate her on her O2 needs after her home O2 studies. RN also attempted to set pt up with IS, pt verbalized understanding of education but refuses to utilize.

## 2022-05-26 NOTE — PROGRESS NOTES
PULMONARY PROGRESS NOTE:    REASON FOR VISIT:COPD exac  Interval History:    Shortness of Breath: baseline   Cough: yes   Sputum: thick, yellow   Hemoptysis: no  Chest Pain: no  Fever: no                   Swelling Feet: no  Headache: no                                           Nausea, Emesis, Abdominal Pain: no  Diarrhea: no         Constipation: no    Events since last visit: Did not qualify for o2 on NOX     PAST MEDICAL HISTORY:      Scheduled Meds:   predniSONE  40 mg Oral Daily    dornase alpha  2.5 mg Inhalation BID    azithromycin  250 mg Oral Daily    meropenem  1,000 mg IntraVENous Q8H    isosorbide mononitrate  30 mg Oral Daily    bumetanide  2 mg Oral Daily    potassium chloride  20 mEq Oral Daily    magnesium oxide  400 mg Oral BID    diclofenac sodium  2 g Topical BID    colchicine  0.6 mg Oral BID    budesonide  0.5 mg Nebulization BID    sodium chloride flush  5-40 mL IntraVENous 2 times per day    atorvastatin  80 mg Oral Nightly    carvedilol  12.5 mg Oral BID WC    clopidogrel  75 mg Oral Daily    DULoxetine  60 mg Oral BID    ipratropium-albuterol  1 ampule Inhalation Q4H WA    QUEtiapine  150 mg Oral Nightly    vilazodone HCl  20 mg Oral Daily    enoxaparin  30 mg SubCUTAneous BID    insulin glargine  80 Units SubCUTAneous BID    pantoprazole  40 mg Oral BID    insulin lispro  0-18 Units SubCUTAneous TID WC    insulin lispro  0-9 Units SubCUTAneous Nightly     Continuous Infusions:   sodium chloride 30 mL/hr at 05/24/22 0307    dextrose       PRN Meds:calcium carbonate, potassium chloride **OR** potassium alternative oral replacement **OR** potassium chloride, dextromethorphan-guaiFENesin, sodium chloride flush, sodium chloride, polyethylene glycol, acetaminophen **OR** acetaminophen, albuterol sulfate HFA, ALPRAZolam, nitroGLYCERIN, oxyCODONE-acetaminophen, HYDROmorphone, glucose, dextrose bolus **OR** dextrose bolus, glucagon (rDNA), dextrose        PHYSICAL EXAMINATION:  /71 Pulse 65   Temp 98.3 °F (36.8 °C) (Oral)   Resp 18   Ht 5' 8\" (1.727 m)   Wt 278 lb 3.5 oz (126.2 kg)   SpO2 96%   BMI 42.30 kg/m²     General : Awake, alert,   Neck - supple, no lymphadenopathy, JVD not raised  Heart - regular rhythm, S1 and S2 normal; no additional sounds heard  Lungs - Air Entry- poor bilaterally; breath sounds : vesicular;   rales/crackles - absent, +rhonchi  Abdomen - soft, no tenderness  Upper Extremities  - no cyanosis, mottling; edema : absent  Lower Extremities: no cyanosis, mottling; edema : absent    Current Laboratory, Radiologic, Microbiologic, and Diagnostic studies reviewed  Data ReviewCBC: No results for input(s): WBC, RBC, HGB, HCT, PLT in the last 72 hours. BMP: No results for input(s): GLUCOSE, NA, K, BUN, CREATININE, CALCIUM in the last 72 hours. ABGs: No results for input(s): PHART, PO2ART, KEM1IRC, LWQ2SLM, BEART, Q3NNFEOX, PMR1HUN in the last 72 hours. PT/INR:  No results found for: PTINR    ASSESSMENT / PLAN:    Acute on chronic systolic heart failure- cardiology consulted   IV diuresis   COPD exac-BD, pulm hygiene, add solumedrol - to po meds  Sputum culture GNR -normal marielena  Possible pneumonia  Patient has numerous antibiotic allergies which would severely limit antibiotic selection. Check procal- 0.03  Abx added  Nicotine dependence- encourage smoking cessation   Hx DM  CXR 5/21 - infiltrates +  Bronch 5/23- thick mucus plugs  Home nebulizer - F2F  Home O2 evaluation - NOX did not qualify   Adjust BD  No objection to discharge. Follow up in 1 week      This is a late note on patient seen by me earlier today.   Electronically signed by Cleveland Villa MD on 05/26/22 at 7:57 PM

## 2022-05-26 NOTE — PLAN OF CARE
Problem: Discharge Planning  Goal: Discharge to home or other facility with appropriate resources  5/26/2022 0620 by Karl Aguilera RN  Outcome: Progressing     Problem: Pain  Goal: Verbalizes/displays adequate comfort level or baseline comfort level  5/26/2022 0620 by Karl Aguilera RN  Outcome: Progressing     Problem: ABCDS Injury Assessment  Goal: Absence of physical injury  5/26/2022 0620 by Karl Aguilera RN  Outcome: Progressing     Problem: Safety - Adult  Goal: Free from fall injury  5/26/2022 0620 by Karl Aguilera RN  Outcome: Progressing     Problem: Chronic Conditions and Co-morbidities  Goal: Patient's chronic conditions and co-morbidity symptoms are monitored and maintained or improved  5/26/2022 0620 by Karl Aguilera RN  Outcome: Progressing

## 2022-05-26 NOTE — PROGRESS NOTES
Pt is on continuous pulse ox d/t nocturnal study. Pt took off pulse ox temporarily to go outside and smoke from 12:22 a.m and returned at 12:46 a.m.

## 2022-05-26 NOTE — PROGRESS NOTES
Rounded with Dr. Jeremie Harmon. [address] Leha Small for pt to discharge. RN to remove midline, no need for merrem. RN informed Dr. Markus Lino.

## 2022-05-26 NOTE — PROGRESS NOTES
Progress Note  Date:2022       Room:7-  Patient Nevaeh Mosquera     YOB: 1975     Age:47 y.o. Subjective    Subjective:  Symptoms:  (Patient sleeping on exam, resps unlabored. ). Activity level: Normal.       Review of Systems  Objective         Vitals Last 24 Hours:  TEMPERATURE:  Temp  Av.2 °F (36.8 °C)  Min: 98 °F (36.7 °C)  Max: 98.4 °F (36.9 °C)  RESPIRATIONS RANGE: Resp  Av.3  Min: 18  Max: 20  PULSE OXIMETRY RANGE: SpO2  Av.6 %  Min: 92 %  Max: 99 %  PULSE RANGE: Pulse  Av.4  Min: 62  Max: 68  BLOOD PRESSURE RANGE: Systolic (25YNZ), CQN:301 , Min:99 , MMY:995   ; Diastolic (70FCH), LYL:76, Min:58, Max:80    I/O (24Hr): Intake/Output Summary (Last 24 hours) at 2022 0804  Last data filed at 2022 1835  Gross per 24 hour   Intake 1410 ml   Output    Net 1410 ml     Objective:  General Appearance:  Comfortable. Vital signs: (most recent): Blood pressure 119/71, pulse 65, temperature 98.3 °F (36.8 °C), temperature source Oral, resp. rate 18, height 5' 8\" (1.727 m), weight 278 lb 3.5 oz (126.2 kg), SpO2 94 %. Vital signs are normal.      Labs/Imaging/Diagnostics    Labs:  CBC:No results for input(s): WBC, RBC, HGB, HCT, MCV, RDW, PLT in the last 72 hours. CHEMISTRIES:No results for input(s): NA, K, CL, CO2, BUN, CREATININE, GLUCOSE, CA, PHOS, MG in the last 72 hours. PT/INR:No results for input(s): PROTIME, INR in the last 72 hours. APTT:No results for input(s): APTT in the last 72 hours. LIVER PROFILE:No results for input(s): AST, ALT, BILIDIR, BILITOT, ALKPHOS in the last 72 hours.     Imaging Last 24 Hours:  XR CHEST (2 VW)    Result Date: 2022  EXAMINATION: TWO XRAY VIEWS OF THE CHEST 2022 12:39 pm COMPARISON: Chest x-ray dated 21 May 2022 HISTORY: ORDERING SYSTEM PROVIDED HISTORY: pneumonia TECHNOLOGIST PROVIDED HISTORY: pneumonia Reason for Exam: pneumonia, p/t states cough, sob FINDINGS: Persistent airspace disease in the right lower lobe. Previously seen left basilar airspace disease has resolved. No pneumothorax or pleural effusion. Stable cardiomediastinal silhouette. Persistent airspace disease in the right lower lobe. This could represent atelectasis or pneumonia.      Assessment//Plan           Hospital Problems           Last Modified POA    * (Principal) Acute on chronic systolic (congestive) heart failure ([address] 5/18/2022 Yes    Moderate left ventricular systolic dysfunction 2/28/4758 Yes    Bilateral lower extremity edema 5/18/2022 Yes    COPD (chronic obstructive pulmonary disease) (HCC) (Chronic) 5/18/2022 Yes    Tobacco abuse 5/18/2022 Yes    Pulmonary HTN ([address] 5/18/2022 Yes    Uncontrolled type 2 diabetes mellitus with hyperglycemia ([address] 5/18/2022 Yes        Assessment & Plan    Continue pulmonology management     Discharge planning      Electronically signed by Maryetta Seip, MD on 5/26/22 at 8:04 AM EDT

## 2022-05-26 NOTE — PROGRESS NOTES
Patient educated on discharge instructions which include: medication schedule, reasons for new medications and some side effects and need to follow-up. Patient given new prescriptions during teaching. Patient verbalizes understanding of discharge and states readiness for discharge. All belongings were gathered by patient and in patient's possession. No distress noted at this time.      Current vital signs:  /75   Pulse 58   Temp 98.9 °F (37.2 °C) (Oral)   Resp 19   Ht 5' 8\" (1.727 m)   Wt 278 lb 3.5 oz (126.2 kg)   SpO2 93%   BMI 42.30 kg/m²                MEWS Score: 1

## 2022-05-26 NOTE — PROGRESS NOTES
Dr. Beatris Fuentes and Dr. Nick Carrillo notified that pt is c/o burning and itching around midline site when flushed and with atb infusion. No redness at site but redness appearing to be bruising around area/under dressing. Pt c/o tenderness at shoulder. Dr. Beatris Fuentes deferred to pul as they are managing IV atb. Dr. Nick Carrillo notified that merrem was due at [place] [address]. Dr. Nick Carrillo to round and assess pt. RN awaiting further instruction from Dr. Nick Carrillo.  Electronically signed by [place] RN on 5/26/2022 at 11:36 AM

## 2022-06-22 NOTE — DISCHARGE SUMMARY
Family Medicine Discharge Summary    Josee Blackmon  :  1975  MRN:  [id number]    Admit date:  2022  Discharge date:  2022    Admitting Physician:  Wallace Miranda MD    Discharge Diagnoses:    Patient Active Problem List   Diagnosis    Diabetes mellitus type 2, controlled ([address]    COPD (chronic obstructive pulmonary disease) ([address]    Asthma    Acute bronchitis    KRISTIN (obstructive sleep apnea)    Adult body mass index 40 and over    Chronic right shoulder pain    Neck pain    Radicular pain in right arm    Left leg pain    Noncompliance with therapeutic plan    Precordial pain    Tobacco abuse    Current moderate episode of major depressive disorder without prior episode (HCC)    Adhesive capsulitis of left shoulder    Morbid obesity ([address]    Left bicipital tenosynovitis    Refused influenza vaccine    Closed fracture of left ankle    Atrial premature depolarization    Ventricular premature depolarization    Cardiomyopathy ([address]    Cigarette nicotine dependence with nicotine-induced disorder    Productive cough    DVT (deep venous thrombosis) (HCC)    Endometriosis    GERD (gastroesophageal reflux disease)    HTN (hypertension)    Hypomagnesemia    Migraines    Mixed hyperlipidemia    Neurocardiogenic syncope    Nonrheumatic tricuspid (valve) insufficiency    Tricuspid valve disorders, non-rheumatic    Ovarian cyst    Encounter for monitoring sotalol therapy    Pulmonary HTN ([address]    PVC's (premature ventricular contractions)    Schizophrenia (HCC)    Shortness of breath    Acute exacerbation of chronic obstructive pulmonary disease (HCC)    Bipolar disorder ([address]    Left sided abdominal pain of unknown cause    Leg swelling    Mastoiditis, acute    Steroid-induced hyperglycemia    Sclerosing adenosis of left breast    Tachycardia    Tremor    Rupture of right rotator cuff    Rotator cuff syndrome of left shoulder    Long term current use of insulin (HCC)    Lesion of ulnar nerve    Carpal tunnel syndrome    Acute upper respiratory infection    Cellulitis of right upper limb    Claustrophobia    Close exposure to 2019 novel coronavirus    Congestive heart failure (HCC)    Diabetic neuropathy (HCC)    Enthesopathy    Fibrocystic breast changes    Helicobacter pylori gastritis    NSTEMI (non-ST elevated myocardial infarction) (HCC)    Facial cellulitis    Cellulitis    Recurrent moderate major depressive disorder with anxiety (HCC)    Uncontrolled type 2 diabetes mellitus with hyperglycemia (HCC)    Cubital tunnel syndrome    Acute on chronic systolic (congestive) heart failure (HCC)    Coronary artery disease involving native coronary artery of native heart without angina pectoris    Dizziness    Ovarian remnant syndrome    Moderate left ventricular systolic dysfunction    Palpitations    Acute congestive heart failure (HCC)    Bilateral lower extremity edema       Admission Condition:  guarded  Discharged Condition:  fair    Hospital Course:   51 yo with multiple medical problems complaining of chest pain and tightness worsening over the past 2 days. She was diagnosed with acute on chronic systolic CHF and cardiology was consulted. She was also diagnosed with pneumonia and pulmonology was consulted. She was placed on Imdur and colchicine by cardiology.      Discharge Medications:         Medication List      START taking these medications    budesonide 0.5 MG/2ML nebulizer suspension  Commonly known as: PULMICORT  Take 2 mLs by nebulization 2 times daily     bumetanide 2 MG tablet  Commonly known as: BUMEX  Take 1 tablet by mouth daily     colchicine 0.6 MG tablet  Commonly known as: COLCRYS  Take 1 tablet by mouth 2 times daily for 46 doses     dornase alpha 2.5 MG/2.5ML nebulizer solution  Commonly known as: PULMOZYME  Inhale 2.5 mg into the lungs 2 times daily     isosorbide mononitrate 30 MG extended release tablet  Commonly known as: IMDUR  Take 1 tablet by mouth daily        CHANGE how you take these medications    pantoprazole 40 MG tablet  Commonly known as: PROTONIX  What changed: Another medication with the same name was removed. Continue taking this medication, and follow the directions you see here. QUEtiapine 100 MG tablet  Commonly known as: SEROQUEL  TAKE 1 AND 1/2 TABLETS BY MOUTH AT BEDTIME  What changed: additional instructions     Tresiba FlexTouch 200 UNIT/ML Sopn  Generic drug: Insulin Degludec  Inject 60 Units into the skin in the morning and at bedtime If po intake poor, decrease to 20 units daily  What changed: additional instructions     vilazodone hcl 20 MG Tabs  Generic drug: vilazodone HCl  TAKE 1 TABLET BY MOUTH EVERY DAY  What changed: See the new instructions. CONTINUE taking these medications    albuterol sulfate  (90 Base) MCG/ACT inhaler  Commonly known as: Ventolin HFA  INHALE 2 PUFFS INTO LUNGS EVERY 6 HOURS AS NEEDED FOR WHEEZING     atorvastatin 80 MG tablet  Commonly known as: LIPITOR  TAKE 1 TABLET BY MOUTH NIGHTLY     B-D ULTRAFINE III SHORT PEN 31G X 8 MM Misc  Generic drug: Insulin Pen Needle  Inject 1 each into the skin daily May substitute with any brand     clopidogrel 75 MG tablet  Commonly known as: PLAVIX     clotrimazole 1 % cream  Commonly known as: LOTRIMIN  Apply topically 2 times daily.      Coreg 12.5 MG tablet  Generic drug: carvedilol     DULoxetine 60 MG extended release capsule  Commonly known as: CYMBALTA  TAKE 1 CAPSULE BY MOUTH 2 TIMES PER DAY     insulin lispro (1 Unit Dial) 100 UNIT/ML Sopn  Commonly known as: HumaLOG KwikPen  INJECT SUBCUTANEOUSLY PER SLIDING SCALE, 150-200 INJECT 3U, 201-250 INJECT 6U, 251-300 INJECT 9U, >300 INJECT 12U, MAX 30U/DAY     ipratropium-albuterol 0.5-2.5 (3) MG/3ML Soln nebulizer solution  Commonly known as: DUONEB  INHALE 3 MLS (ONE VIAL) WITH NEBULIZER EVERY 6 HOURS AS NEEDED FOR SHORTNESS OF BREATH     magnesium oxide 400 MG tablet  Commonly known as: MAG-OX     Multivitamin Gummies Womens Chew     OneTouch Ultra strip  Generic drug: blood glucose test strips  USE TO TEST BLOOD SUGAR BEFORE MEALS, AT BEDTIME, AND AS NEEDED (up to 9 TIMES DAILY)     oxyCODONE-acetaminophen 5-325 MG per tablet  Commonly known as: PERCOCET        ASK your doctor about these medications    azithromycin 250 MG tablet  Commonly known as: ZITHROMAX  Take 1 tablet by mouth daily for 3 days  Ask about: Should I take this medication?     predniSONE 20 MG tablet  Commonly known as: DELTASONE  Take 2 tablets by mouth daily for 5 days  Ask about: Should I take this medication? Where to Get Your Medications      These medications were sent to [address] [place] [address]    Hours: 24-hours Phone: [phone]   · azithromycin 250 MG tablet  · budesonide 0.5 MG/2ML nebulizer suspension  · bumetanide 2 MG tablet  · colchicine 0.6 MG tablet  · dornase alpha 2.5 MG/2.5ML nebulizer solution  · isosorbide mononitrate 30 MG extended release tablet  · predniSONE 20 MG tablet         Consults:  Cardiology, pulmonology    Significant Diagnostic Studies:  labs    Treatments:   Supportive therapies    Disposition:   home  Follow up with Randi Clark MD in 1-2 weeks. Signed:   Ke Moon MD, FAAFP  6/22/2022, 2:41 PM

## 2022-07-27 ENCOUNTER — HOSPITAL ENCOUNTER (EMERGENCY)
Age: 47
Discharge: HOME | End: 2022-07-27
Attending: EMERGENCY MEDICINE
Payer: COMMERCIAL

## 2022-07-27 ENCOUNTER — APPOINTMENT (OUTPATIENT)
Dept: GENERAL RADIOLOGY | Age: 47
End: 2022-07-27
Payer: COMMERCIAL

## 2022-07-27 ENCOUNTER — APPOINTMENT (OUTPATIENT)
Dept: CT IMAGING | Age: 47
End: 2022-07-27
Payer: COMMERCIAL

## 2022-07-27 VITALS
RESPIRATION RATE: 16 BRPM | DIASTOLIC BLOOD PRESSURE: 80 MMHG | OXYGEN SATURATION: 95 % | SYSTOLIC BLOOD PRESSURE: 129 MMHG | HEART RATE: 92 BPM | TEMPERATURE: 99 F

## 2022-07-27 DIAGNOSIS — R52 BODY ACHES: Primary | ICD-10-CM

## 2022-07-27 DIAGNOSIS — R11.0 NAUSEA: ICD-10-CM

## 2022-07-27 DIAGNOSIS — R10.84 GENERALIZED ABDOMINAL PAIN: ICD-10-CM

## 2022-07-27 LAB
ABSOLUTE EOS #: 0.4 K/UL (ref 0–0.4)
ABSOLUTE LYMPH #: 4.9 K/UL (ref 1–4.8)
ABSOLUTE MONO #: 0.9 K/UL (ref 0.1–1.3)
ALBUMIN SERPL-MCNC: 3.9 G/DL (ref 3.5–5.2)
ALP BLD-CCNC: 101 U/L (ref 35–104)
ALT SERPL-CCNC: 14 U/L (ref 5–33)
ANION GAP SERPL CALCULATED.3IONS-SCNC: 13 MMOL/L (ref 9–17)
AST SERPL-CCNC: 14 U/L
BASOPHILS # BLD: 1 % (ref 0–2)
BASOPHILS ABSOLUTE: 0.1 K/UL (ref 0–0.2)
BILIRUB SERPL-MCNC: 0.25 MG/DL (ref 0.3–1.2)
BUN BLDV-MCNC: 18 MG/DL (ref 6–20)
CALCIUM SERPL-MCNC: 9.8 MG/DL (ref 8.6–10.4)
CHLORIDE BLD-SCNC: 97 MMOL/L (ref 98–107)
CO2: 27 MMOL/L (ref 20–31)
CREAT SERPL-MCNC: 0.66 MG/DL (ref 0.5–0.9)
EOSINOPHILS RELATIVE PERCENT: 3 % (ref 0–4)
GFR AFRICAN AMERICAN: >60 ML/MIN
GFR NON-AFRICAN AMERICAN: >60 ML/MIN
GFR SERPL CREATININE-BSD FRML MDRD: ABNORMAL ML/MIN/{1.73_M2}
GLUCOSE BLD-MCNC: 148 MG/DL (ref 70–99)
HCT VFR BLD CALC: 43.2 % (ref 36–46)
HEMOGLOBIN: 14.5 G/DL (ref 12–16)
INFLUENZA A: NOT DETECTED
INFLUENZA B: NOT DETECTED
LIPASE: 18 U/L (ref 13–60)
LYMPHOCYTES # BLD: 36 % (ref 24–44)
MCH RBC QN AUTO: 33.7 PG (ref 26–34)
MCHC RBC AUTO-ENTMCNC: 33.5 G/DL (ref 31–37)
MCV RBC AUTO: 100.7 FL (ref 80–100)
MONOCYTES # BLD: 7 % (ref 1–7)
PDW BLD-RTO: 13.7 % (ref 11.5–14.9)
PLATELET # BLD: 291 K/UL (ref 150–450)
PMV BLD AUTO: 8.4 FL (ref 6–12)
POTASSIUM SERPL-SCNC: 3.7 MMOL/L (ref 3.7–5.3)
RBC # BLD: 4.29 M/UL (ref 4–5.2)
SARS-COV-2 RNA, RT PCR: NOT DETECTED
SEG NEUTROPHILS: 53 % (ref 36–66)
SEGMENTED NEUTROPHILS ABSOLUTE COUNT: 7.4 K/UL (ref 1.3–9.1)
SODIUM BLD-SCNC: 137 MMOL/L (ref 135–144)
SOURCE: NORMAL
SPECIMEN DESCRIPTION: NORMAL
TOTAL PROTEIN: 6.7 G/DL (ref 6.4–8.3)
WBC # BLD: 13.8 K/UL (ref 3.5–11)

## 2022-07-27 PROCEDURE — 87636 SARSCOV2 & INF A&B AMP PRB: CPT

## 2022-07-27 PROCEDURE — 6370000000 HC RX 637 (ALT 250 FOR IP): Performed by: PHYSICIAN ASSISTANT

## 2022-07-27 PROCEDURE — 80053 COMPREHEN METABOLIC PANEL: CPT

## 2022-07-27 PROCEDURE — 85025 COMPLETE CBC W/AUTO DIFF WBC: CPT

## 2022-07-27 PROCEDURE — 71045 X-RAY EXAM CHEST 1 VIEW: CPT

## 2022-07-27 PROCEDURE — 99284 EMERGENCY DEPT VISIT MOD MDM: CPT

## 2022-07-27 PROCEDURE — 83690 ASSAY OF LIPASE: CPT

## 2022-07-27 PROCEDURE — 36415 COLL VENOUS BLD VENIPUNCTURE: CPT

## 2022-07-27 PROCEDURE — 2580000003 HC RX 258: Performed by: PHYSICIAN ASSISTANT

## 2022-07-27 PROCEDURE — 74176 CT ABD & PELVIS W/O CONTRAST: CPT

## 2022-07-27 RX ORDER — 0.9 % SODIUM CHLORIDE 0.9 %
1000 INTRAVENOUS SOLUTION INTRAVENOUS ONCE
Status: COMPLETED | OUTPATIENT
Start: 2022-07-27 | End: 2022-07-27

## 2022-07-27 RX ORDER — DICYCLOMINE HYDROCHLORIDE 10 MG/ML
20 INJECTION INTRAMUSCULAR ONCE
Status: DISCONTINUED | OUTPATIENT
Start: 2022-07-27 | End: 2022-07-27 | Stop reason: HOSPADM

## 2022-07-27 RX ORDER — PROMETHAZINE HYDROCHLORIDE 25 MG/1
25 TABLET ORAL ONCE
Status: COMPLETED | OUTPATIENT
Start: 2022-07-27 | End: 2022-07-27

## 2022-07-27 RX ADMIN — PROMETHAZINE HYDROCHLORIDE 25 MG: 25 TABLET ORAL at 17:50

## 2022-07-27 RX ADMIN — SODIUM CHLORIDE 1000 ML: 9 INJECTION, SOLUTION INTRAVENOUS at 19:19

## 2022-07-27 NOTE — ED PROVIDER NOTES
EMERGENCY DEPARTMENT ENCOUNTER   ATTENDING ATTESTATION     Pt Name: Ambrosio Solis  MRN: [id number]  Armstrongfumm 1975  Date of evaluation: 7/27/22   Ambrosio Solis is a 50 y.o. female with CC: Concern For COVID-23   MDM: 42-year-old female presenting for multiple complaints including nausea, fatigue, body aches and abdominal pain, concern for COVID. On initial exam patient in no acute distress, vitals are stable, abdomen is soft with generalized tenderness, check labs and imaging    Labs reviewed COVID was negative, patient with mild elevation of her white blood cell count, x-ray and CT were negative for acute process    Patient was reevaluated, discussed results with patient, discussed need for follow-up with her PCP and return precautions, patient voiced understanding is comfortable with plan and discharge home    Patient/Guardian was informed of their diagnosis and told to follow up with PCP  in 1-3 days. Patient demonstrates understanding and agreement with the plan. They were given the opportunity to ask questions and those questions were answered to the best of our ability with the available information. Patient/Guardian told to return to the ED for any new, worsening, changing or persistent symptoms. This dictation was prepared using [employer] voice recognition software. This visit was performed by both a physician and an APC. I personally evaluated and examined the patient. I performed all aspects of the MDM as documented. CRITICAL CARE:       EKG: All EKG's are interpreted by the Emergency Department Physician who either signs or Co-signs this chart in the absence of a cardiologist.      RADIOLOGY:All plain film, CT, MRI, and formal ultrasound images (except ED bedside ultrasound) are read by the radiologist, see reports below, unless otherwise noted in MDM or here. CT ABDOMEN PELVIS WO CONTRAST Additional Contrast? None   Final Result   1.  No acute intra-abdominal abnormality. 2. Small pericardial effusion. XR CHEST PORTABLE   Final Result   No acute process. LABS: All lab results were reviewed by myself, and all abnormals are listed below. Labs Reviewed   CBC WITH AUTO DIFFERENTIAL - Abnormal; Notable for the following components:       Result Value    WBC 13.8 (*)     .7 (*)     Absolute Lymph # 4.90 (*)     All other components within normal limits   COMPREHENSIVE METABOLIC PANEL W/ REFLEX TO MG FOR LOW K - Abnormal; Notable for the following components:    Glucose 148 (*)     Chloride 97 (*)     Total Bilirubin 0.25 (*)     All other components within normal limits   COVID-19 & INFLUENZA COMBO   LIPASE   URINALYSIS WITH REFLEX TO CULTURE     CONSULTS:  None  FINAL IMPRESSION      1. Body aches    2. Generalized abdominal pain    3.  Nausea            PASTMEDICAL HISTORY     Past Medical History:   Diagnosis Date    Acute exacerbation of chronic obstructive pulmonary disease ([address] 3/21/2018    Adhesive capsulitis of left shoulder 9/25/2018    Asthma     Asthma exacerbation 7/24/2014    Atrial fibrillation ([address]     placed on event monitor 9/25/18 for PVC's & A-fib    Atrial premature depolarization 7/19/2019    Bipolar 1 disorder ([facility])     Bipolar disorder ([address] 7/19/2019    Bulging disc     CAD (coronary artery disease)     Candida infection 2/23/2018    Cardiomyopathy ([address]     Chest pain 6/13/2017    CHF (congestive heart failure) ([facility])     Chronic otitis media of both ears     rt>lt    Chronic right shoulder pain 3/14/2017    Cigarette nicotine dependence with nicotine-induced disorder 7/19/2019    Class 3 severe obesity due to excess calories with serious comorbidity and body mass index (BMI) of 40.0 to 44.9 in adult [facility]) 10/4/2018    Closed fracture of left ankle 6/25/2019    Clostridium difficile infection     COPD (chronic obstructive pulmonary disease) ([facility])     Cough, persistent 3/21/2018    Current moderate episode of major depressive disorder without prior episode ([address] 8/20/2018    Depression     Diabetes mellitus type 2, controlled ([address] 4/30/2014    Diarrhea     Diarrhea     Dizziness     DVT (deep venous thrombosis) (HCC)     after PICC line right arm    Encounter for monitoring sotalol therapy 2/20/2017    Encounter for screening mammogram for malignant neoplasm of breast 3/7/2018    Endometriosis     Fainting     GERD (gastroesophageal reflux disease)     hx of    GI bleeding     H. pylori infection     Helicobacter pylori (H. pylori)     [place] (hard of hearing)     both ears, no hearing aids    HTN (hypertension) 7/19/2019    Hyperlipidemia     Hypertension     Left bicipital tenosynovitis 10/17/2018    Left leg pain 5/16/2017    Left sided abdominal pain of unknown cause 7/19/2016    Leg swelling 9/21/2018    Mastoiditis     Mastoiditis, acute 4/30/2014    Migraines     Morbid obesity ([address]     Myocardial infarction ([address]     2005    Nausea     Neuropathy     On home oxygen therapy     3 L at night    Ovarian cyst     Passed out     hx of- negative tilt table    Pulmonary hypertension ([address]     Pulmonary insufficiency     PVC (premature ventricular contraction)     Seasonal allergies     Tricuspid insufficiency     Type II or unspecified type diabetes mellitus without mention of complication, not stated as uncontrolled      SURGICAL HISTORY       Past Surgical History:   Procedure Laterality Date    ABDOMEN SURGERY      abcess    ABDOMINAL ADHESION SURGERY      ABDOMINAL EXPLORATION SURGERY      x 4    ABDOMINAL HERNIA REPAIR      with mesh    ABLATION OF DYSRHYTHMIC FOCUS  2016    ABLATION OF DYSRHYTHMIC FOCUS  09/08/2017    Done at the [facility].      ABSCESS DRAINAGE      left hip and chest    APPENDECTOMY      BREAST SURGERY Left 2007    I & D    BRONCHOSCOPY N/A 5/23/2022    BRONCHOSCOPY performed by Liya Coronado MD at [address] [place] [address]  2014 & 2000     no stenting    CARPAL TUNNEL RELEASE Right 12/19/2019 Ulnar nerve decompression, right elbow. Release of 1st and 2nd extensor compartments, right forearm. CARPAL TUNNEL RELEASE  05/04/2020    Carpal tunnel release, left hand    CHOLECYSTECTOMY      COLONOSCOPY      FOOT SURGERY Left     bone fragment removed    FRACTURE SURGERY Right     closed reduction perc pinning ring & middle finger    GASTRIC FUNDOPLICATION  4030    HYSTERECTOMY (CERVIX STATUS UNKNOWN)      total    HYSTERECTOMY (CERVIX STATUS UNKNOWN)      HYSTEROSCOPY      tubal perfusion    MYRINGOTOMY Right 11/13/2015    Right myringotomy with placement of  T-tube on the right side. Removal of plugged ventilating tube, right side. MYRINGOTOMY Left 07/14/2020    MYRINGOTOMY TUBE INSERTION performed by Sim Gabriel MD at  [address] Right 06/05/2014    OTHER SURGICAL HISTORY Right 05/29/2014    removal ear tube rt ear    OTHER SURGICAL HISTORY  2009    LOOP recorder inserted and removed 3 mos.  later    OTHER SURGICAL HISTORY Right 08/11/2016    ear tube removal with patch    OTHER SURGICAL HISTORY      tubal perfusion, lysis of uterine adhesions    OTHER SURGICAL HISTORY      multiple PICC lines inserted and removed, it has affected circulation bilateral upper arms    OTHER SURGICAL HISTORY  10/19/2020    Revisiion to subcutaneous transposition of unlar nerve, right elbow    OTHER SURGICAL HISTORY Right 06/14/2021    REVISION TO SUBMUSCULAR TRANSPOSITION OF RIGHT ULNAR NERVE    OTHER SURGICAL HISTORY Left 03/24/2022    DECOMPRESSION OF ULNAR NERVE, LEFT ELBOW    WI MANIPULARAÚL BATES JT W ANESTHESIA Right 03/01/2017    SHOULDER MANIPULATION RIGHT performed by Millie Rhodes MD at [facility] Left 10/17/2018    SHOULDER ARTHROSCOPY W/BICEPS TENDONESIS performed by Marlen Drew MD at [address] [facility] Left     foot    TONSILLECTOMY      TYMPANOSTOMY TUBE PLACEMENT      TYMPANOSTOMY TUBE PLACEMENT Left 03/12/2019 TYMPANOSTOMY TUBE PLACEMENT Right 12/08/2021    Right tympanostomy with tube placement of T-tube with operative microscope and general anesthesia. Right removal of right ear tube. ULNAR TUNNEL RELEASE Right     UPPER GASTROINTESTINAL ENDOSCOPY      UPPER GASTROINTESTINAL ENDOSCOPY  07/10/2019    UPPER GASTROINTESTINAL ENDOSCOPY N/A 07/10/2019    EGD BIOPSY performed by Zachary Tony MD at [address] Right 04/21/2022    Placement of right sided ventriculoperitoneal shunt Medtronic programmable valve set at 1.5. CURRENT MEDICATIONS       Discharge Medication List as of 7/27/2022  8:41 PM        CONTINUE these medications which have NOT CHANGED    Details   QUEtiapine (SEROQUEL) 100 MG tablet TAKE 1 AND 1/2 TABLETS BY MOUTH AT BEDTIME Patient has 50 mg tabs, Disp-45 tablet, R-11DX Code Needed  . Normal      bumetanide (BUMEX) 2 MG tablet TAKE 1 TABLET BY MOUTH EVERY DAY, Disp-30 tablet, R-11Normal      isosorbide mononitrate (IMDUR) 30 MG extended release tablet TAKE 1 TABLET BY MOUTH EVERY DAY, Disp-30 tablet, R-11Normal      budesonide (PULMICORT) 0.5 MG/2ML nebulizer suspension Take 2 mLs by nebulization 2 times daily, Disp-60 each, R-11Normal      colchicine (COLCRYS) 0.6 MG tablet Take 1 tablet by mouth 2 times daily for 46 doses, Disp-46 tablet, R-0Normal      dornase alpha (PULMOZYME) 2.5 MG/2.5ML nebulizer solution Inhale 2.5 mg into the lungs 2 times daily, Disp-75 mL, R-11Normal      VILAZODONE HCL 20 MG Tablet TAKE 1 TABLET BY MOUTH EVERY DAY, Disp-30 tablet, R-11Normal      DULoxetine (CYMBALTA) 60 MG extended release capsule TAKE 1 CAPSULE BY MOUTH 2 TIMES PER DAY, Disp-180 capsule, R-2Normal      Insulin Degludec (TRESIBA FLEXTOUCH) 200 UNIT/ML SOPN Inject 60 Units into the skin in the morning and at bedtime If po intake poor, decrease to 20 units daily, Disp-12 pen, R-11NO PRINT      Insulin Pen Needle (B-D ULTRAFINE III SHORT PEN) 31G X 8 MM MISC DAILY Starting Thu 4/14/2022, Disp-100 each, R-11, NormalMay substitute with any brand      blood glucose test strips (ONETOUCH ULTRA) strip USE TO TEST BLOOD SUGAR BEFORE MEALS, AT BEDTIME, AND AS NEEDED (up to 9 TIMES DAILY), Disp-200 strip, R-11Normal      clopidogrel (PLAVIX) 75 MG tablet Take 75 mg by mouth daily nightlyHistorical Med      pantoprazole (PROTONIX) 40 MG tablet Take 40 mg by mouth daily BIDHistorical Med      atorvastatin (LIPITOR) 80 MG tablet TAKE 1 TABLET BY MOUTH NIGHTLY, Disp-90 tablet, R-3Normal      albuterol sulfate HFA (VENTOLIN HFA) 108 (90 Base) MCG/ACT inhaler INHALE 2 PUFFS INTO LUNGS EVERY 6 HOURS AS NEEDED FOR WHEEZING, Disp-18 g, R-11May sub covered productNormal      insulin lispro, 1 Unit Dial, (HUMALOG KWIKPEN) 100 UNIT/ML SOPN INJECT SUBCUTANEOUSLY PER SLIDING SCALE, 150-200 INJECT 3U, 201-250 INJECT 6U, 251-300 INJECT 9U, >300 INJECT 12U, MAX 30U/DAY, Disp-5 pen, R-11Normal      ipratropium-albuterol (DUONEB) 0.5-2.5 (3) MG/3ML SOLN nebulizer solution INHALE 3 MLS (ONE VIAL) WITH NEBULIZER EVERY 6 HOURS AS NEEDED FOR SHORTNESS OF BREATH, Disp-360 each, R-3Normal      clotrimazole (LOTRIMIN) 1 % cream Apply topically 2 times daily. , Disp-45 g, R-11, Normal      magnesium oxide (MAG-OX) 400 MG tablet Take 400 mg by mouth daily AfternoonHistorical Med      oxyCODONE-acetaminophen (PERCOCET) 5-325 MG per tablet Take 1 tablet by mouth every 4 hours as needed for Pain.  Indications: last fill 2/27/22 with quantity 170 for 30 days One tablet at 0700, one tablet at 1900, one tablet in the afternoon prn painHistorical Med      Multiple Vitamins-Minerals (MULTIVITAMIN GUMMIES WOMENS) CHEW Take 2 each by mouth daily       carvedilol (COREG) 12.5 MG tablet Take 12.5 mg by mouth 2 times daily (with meals) Historical Med           ALLERGIES     is allergic to latex, aspirin, avelox [moxifloxacin], chantix [varenicline], doxycycline, dye [iodides], flexeril [cyclobenzaprine], gabapentin, losartan, morphine, nsaids, pcn [penicillins], reglan [metoclopramide hcl], shellfish-derived products, sulfa antibiotics, toradol [ketorolac tromethamine], vancomycin, zofran, zyvox [linezolid], acyclovir, bactrim [sulfamethoxazole-trimethoprim], betadine [povidone iodine], ceclor [cefaclor], codeine, macrolides and ketolides, novolin r [insulin], novolog [insulin aspart], phenothiazines, tape [adhesive tape], banana, compazine [prochlorperazine], fentanyl, kiwi extract, tamiflu [oseltamivir phosphate], clindamycin/lincomycin, and sotalol. FAMILY HISTORY     She indicated that the status of her mother is unknown. She indicated that the status of her father is unknown. She indicated that the status of her sister is unknown. She indicated that the status of her maternal grandmother is unknown. She indicated that the status of her maternal grandfather is unknown. She indicated that the status of her paternal grandmother is unknown. She indicated that the status of her paternal grandfather is unknown. She indicated that the status of her maternal aunt is unknown. She indicated that the status of her maternal uncle is unknown. She indicated that the status of her paternal aunt is unknown. She indicated that the status of her paternal uncle is unknown. SOCIAL HISTORY       Social History     Tobacco Use    Smoking status: Every Day     Packs/day: 0.50     Years: 23.00     Pack years: 11.50     Types: Cigarettes    Smokeless tobacco: Never   Vaping Use    Vaping Use: Never used   Substance Use Topics    Alcohol use: No     Alcohol/week: 0.0 standard drinks    Drug use: No          Trell Weinstein DO  The care is provided during an unprecedented national emergency due to the novel coronavirus, COVID 19.   Attending Emergency Physician         Trell Weinstein DO  07/27/22 3725

## 2022-07-27 NOTE — ED PROVIDER NOTES
[address]  eMERGENCY dEPARTMENT eNCOUnter      Pt Name: Esdras Rey  MRN: [id number]  [place] 1975  Date of evaluation: 7/27/2022  Provider: Mike Napier PA-C    CHIEF COMPLAINT       Chief Complaint   Patient presents with    Concern For COVID-19           HISTORY OF PRESENT ILLNESS  (Location/Symptom, Timing/Onset, Context/Setting, Quality, Duration, Modifying Factors, Severity.)   Esdras Rey is a 50 y.o. female who presents to the emergency department for evaluation of fever, headache, fatigue, body aches, cough, shortness of breath, chest pain with cough, nausea. States she is having a lot of cramping in her stomach but denies vomiting, diarrhea. Reports spasms throughout body. No sore throat. Pt has taken tylenol. Was at a wedding on Saturday. She is a smoker. No other complaints. Nursing Notes were reviewed. REVIEW OF SYSTEMS    (2-9 systems for level 4, 10 or more for level 5)     Review of Systems   fever, headache, fatigue, body aches, cough, shortness of breath, chest pain with cough, nausea. Except as noted above the remainder of the review of systems was reviewed and negative.        PAST MEDICAL HISTORY     Past Medical History:   Diagnosis Date    Acute exacerbation of chronic obstructive pulmonary disease ([address] 3/21/2018    Adhesive capsulitis of left shoulder 9/25/2018    Asthma     Asthma exacerbation 7/24/2014    Atrial fibrillation ([address]     placed on event monitor 9/25/18 for PVC's & A-fib    Atrial premature depolarization 7/19/2019    Bipolar 1 disorder (HCC)     Bipolar disorder ([address] 7/19/2019    Bulging disc     CAD (coronary artery disease)     Candida infection 2/23/2018    Cardiomyopathy ([address]     Chest pain 6/13/2017    CHF (congestive heart failure) (HCC)     Chronic otitis media of both ears     rt>lt    Chronic right shoulder pain 3/14/2017    Cigarette nicotine dependence with nicotine-induced disorder 7/19/2019    Class 3 severe obesity due to excess calories with serious comorbidity and body mass index (BMI) of 40.0 to 44.9 in adult [facility]) 10/4/2018    Closed fracture of left ankle 6/25/2019    Clostridium difficile infection     COPD (chronic obstructive pulmonary disease) (HCC)     Cough, persistent 3/21/2018    Current moderate episode of major depressive disorder without prior episode ([address] 8/20/2018    Depression     Diabetes mellitus type 2, controlled ([address] 4/30/2014    Diarrhea     Diarrhea     Dizziness     DVT (deep venous thrombosis) (HCC)     after PICC line right arm    Encounter for monitoring sotalol therapy 2/20/2017    Encounter for screening mammogram for malignant neoplasm of breast 3/7/2018    Endometriosis     Fainting     GERD (gastroesophageal reflux disease)     hx of    GI bleeding     H. pylori infection     Helicobacter pylori (H. pylori)     [place] (hard of hearing)     both ears, no hearing aids    HTN (hypertension) 7/19/2019    Hyperlipidemia     Hypertension     Left bicipital tenosynovitis 10/17/2018    Left leg pain 5/16/2017    Left sided abdominal pain of unknown cause 7/19/2016    Leg swelling 9/21/2018    Mastoiditis     Mastoiditis, acute 4/30/2014    Migraines     Morbid obesity ([address]     Myocardial infarction ([address]     2005    Nausea     Neuropathy     On home oxygen therapy     3 L at night    Ovarian cyst     Passed out     hx of- negative tilt table    Pulmonary hypertension (HCC)     Pulmonary insufficiency     PVC (premature ventricular contraction)     Seasonal allergies     Tricuspid insufficiency     Type II or unspecified type diabetes mellitus without mention of complication, not stated as uncontrolled      None otherwise stated in nurses notes    SURGICAL HISTORY       Past Surgical History:   Procedure Laterality Date    ABDOMEN SURGERY      abcess    ABDOMINAL ADHESION SURGERY      ABDOMINAL EXPLORATION SURGERY      x 4    ABDOMINAL HERNIA REPAIR      with mesh    ABLATION OF DYSRHYTHMIC FOCUS  2016 ABLATION OF DYSRHYTHMIC FOCUS  09/08/2017    Done at the [facility] [place]. ABSCESS DRAINAGE      left hip and chest    APPENDECTOMY      BREAST SURGERY Left 2007    I & D    BRONCHOSCOPY N/A 5/23/2022    BRONCHOSCOPY performed by Manny Mcclain MD at [address] [place] [address]  2014 & 2000     no stenting    CARPAL TUNNEL RELEASE Right 12/19/2019    Ulnar nerve decompression, right elbow. Release of 1st and 2nd extensor compartments, right forearm. CARPAL TUNNEL RELEASE  05/04/2020    Carpal tunnel release, left hand    CHOLECYSTECTOMY      COLONOSCOPY      FOOT SURGERY Left     bone fragment removed    FRACTURE SURGERY Right     closed reduction perc pinning ring & middle finger    GASTRIC FUNDOPLICATION  2728    HYSTERECTOMY (CERVIX STATUS UNKNOWN)      total    HYSTERECTOMY (CERVIX STATUS UNKNOWN)      HYSTEROSCOPY      tubal perfusion    MYRINGOTOMY Right 11/13/2015    Right myringotomy with placement of  T-tube on the right side. Removal of plugged ventilating tube, right side. MYRINGOTOMY Left 07/14/2020    MYRINGOTOMY TUBE INSERTION performed [facility]  Right 06/05/2014    OTHER SURGICAL HISTORY Right 05/29/2014    removal ear tube rt ear    OTHER SURGICAL HISTORY  2009    LOOP recorder inserted and removed 3 mos.  later    OTHER SURGICAL HISTORY Right 08/11/2016    ear tube removal with patch    OTHER SURGICAL HISTORY      tubal perfusion, lysis of uterine adhesions    OTHER SURGICAL HISTORY      multiple PICC lines inserted and removed, it has affected circulation bilateral upper arms    OTHER SURGICAL HISTORY  10/19/2020    Revisiion to subcutaneous transposition of unlar nerve, right elbow    OTHER SURGICAL HISTORY Right 06/14/2021    REVISION TO SUBMUSCULAR TRANSPOSITION OF RIGHT ULNAR NERVE    OTHER SURGICAL HISTORY Left 03/24/2022    DECOMPRESSION OF ULNAR NERVE, LEFT ELBOW    BECKY LOFTONT W ANESTHESIA Right 03/01/2017    SHOULDER MANIPULATION RIGHT performed by Shahrzad Peoples MD at [facility] Left 10/17/2018    SHOULDER ARTHROSCOPY W/BICEPS TENDONESIS performed by Mian Garcia MD at [facility] Left     foot    TONSILLECTOMY      TYMPANOSTOMY TUBE PLACEMENT      TYMPANOSTOMY TUBE PLACEMENT Left 03/12/2019    TYMPANOSTOMY TUBE PLACEMENT Right 12/08/2021    Right tympanostomy with tube placement of T-tube with operative microscope and general anesthesia. Right removal of right ear tube. ULNAR TUNNEL RELEASE Right     UPPER GASTROINTESTINAL ENDOSCOPY      UPPER GASTROINTESTINAL ENDOSCOPY  07/10/2019    UPPER GASTROINTESTINAL ENDOSCOPY N/A 07/10/2019    EGD BIOPSY performed by Antonio Grant MD at [address] Right 04/21/2022    Placement of right sided ventriculoperitoneal shunt Med[employer] programmable valve set at 1.5. None otherwise stated in nurses notes    CURRENT MEDICATIONS       Discharge Medication List as of 7/27/2022  8:41 PM        CONTINUE these medications which have NOT CHANGED    Details   QUEtiapine (SEROQUEL) 100 MG tablet TAKE 1 AND 1/2 TABLETS BY MOUTH AT BEDTIME Patient has 50 mg tabs, Disp-45 tablet, R-11DX Code Needed  . Normal      bumetanide (BUMEX) 2 MG tablet TAKE 1 TABLET BY MOUTH EVERY DAY, Disp-30 tablet, R-11Normal      isosorbide mononitrate (IMDUR) 30 MG extended release tablet TAKE 1 TABLET BY MOUTH EVERY DAY, Disp-30 tablet, R-11Normal      budesonide (PULMICORT) 0.5 MG/2ML nebulizer suspension Take 2 mLs by nebulization 2 times daily, Disp-60 each, R-11Normal      colchicine (COLCRYS) 0.6 MG tablet Take 1 tablet by mouth 2 times daily for 46 doses, Disp-46 tablet, R-0Normal      dornase alpha (PULMOZYME) 2.5 MG/2.5ML nebulizer solution Inhale 2.5 mg into the lungs 2 times daily, Disp-75 mL, R-11Normal      VILAZODONE HCL 20 MG Tablet TAKE 1 TABLET BY MOUTH EVERY DAY, Disp-30 tablet, R-11Normal DULoxetine (CYMBALTA) 60 MG extended release capsule TAKE 1 CAPSULE BY MOUTH 2 TIMES PER DAY, Disp-180 capsule, R-2Normal      Insulin Degludec (TRESIBA FLEXTOUCH) 200 UNIT/ML SOPN Inject 60 Units into the skin in the morning and at bedtime If po intake poor, decrease to 20 units daily, Disp-12 pen, R-11NO PRINT      Insulin Pen Needle (B-D ULTRAFINE III SHORT PEN) 31G X 8 MM MISC DAILY Starting Thu 4/14/2022, Disp-100 each, R-11, NormalMay substitute with any brand      blood glucose test strips (ONETOUCH ULTRA) strip USE TO TEST BLOOD SUGAR BEFORE MEALS, AT BEDTIME, AND AS NEEDED (up to 9 TIMES DAILY), Disp-200 strip, R-11Normal      clopidogrel (PLAVIX) 75 MG tablet Take 75 mg by mouth daily nightlyHistorical Med      pantoprazole (PROTONIX) 40 MG tablet Take 40 mg by mouth daily BIDHistorical Med      atorvastatin (LIPITOR) 80 MG tablet TAKE 1 TABLET BY MOUTH NIGHTLY, Disp-90 tablet, R-3Normal      albuterol sulfate HFA (VENTOLIN HFA) 108 (90 Base) MCG/ACT inhaler INHALE 2 PUFFS INTO LUNGS EVERY 6 HOURS AS NEEDED FOR WHEEZING, Disp-18 g, R-11May sub covered productNormal      insulin lispro, 1 Unit Dial, (HUMALOG KWIKPEN) 100 UNIT/ML SOPN INJECT SUBCUTANEOUSLY PER SLIDING SCALE, 150-200 INJECT 3U, 201-250 INJECT 6U, 251-300 INJECT 9U, >300 INJECT 12U, MAX 30U/DAY, Disp-5 pen, R-11Normal      ipratropium-albuterol (DUONEB) 0.5-2.5 (3) MG/3ML SOLN nebulizer solution INHALE 3 MLS (ONE VIAL) WITH NEBULIZER EVERY 6 HOURS AS NEEDED FOR SHORTNESS OF BREATH, Disp-360 each, R-3Normal      clotrimazole (LOTRIMIN) 1 % cream Apply topically 2 times daily. , Disp-45 g, R-11, Normal      magnesium oxide (MAG-OX) 400 MG tablet Take 400 mg by mouth daily AfternoonHistorical Med      oxyCODONE-acetaminophen (PERCOCET) 5-325 MG per tablet Take 1 tablet by mouth every 4 hours as needed for Pain.  Indications: last fill 2/27/22 with quantity 170 for 30 days One tablet at 0700, one tablet at 1900, one tablet in the afternoon prn Grandmother     Heart Disease Paternal Grandmother     High Blood Pressure Paternal Grandmother     Diabetes Mother     Asthma Mother     Heart Disease Mother     High Blood Pressure Mother     Cancer Sister     Heart Disease Maternal Uncle     High Blood Pressure Maternal Uncle     Heart Disease Paternal Uncle     High Blood Pressure Paternal Uncle     Diabetes Paternal Grandfather     Heart Disease Paternal Grandfather     High Blood Pressure Paternal Grandfather      Family Status   Relation Name Status    Father  (Not Specified)    MAunt  (Not Specified)    PAunt  (Not Specified)    MGM  (Not Specified)    MGF  (Not Specified)    PGM  (Not Specified)    Mother  (Not Specified)    Sister  (Not Specified)    MUnc  (Not Specified)    PUnc  (Not Specified)    PGF  (Not Specified)      None otherwise stated in nurses notes    SOCIAL HISTORY      reports that she has been smoking cigarettes. She has a 11.50 pack-year smoking history. She has never used smokeless tobacco. She reports that she does not drink alcohol and does not use drugs. lives at home with others     PHYSICAL EXAM    (up to 7 for level 4, 8 or more for level 5)     ED Triage Vitals   BP Temp Temp src Pulse Resp SpO2 Height Weight   -- -- -- -- -- -- -- --       Physical Exam   Nursing note and vitals reviewed. Constitutional: Oriented to person, place, and time and well-developed, well-nourished. Head: Normocephalic and atraumatic. Ear: External ears normal.   Nose: Nose normal and midline. Eyes: Conjunctivae and EOM are normal. Pupils are equal, round, and reactive to light. Neck: Normal range of motion. Neck supple. Throat: mask not removed. No chest pain. Cardiovascular: Normal rate, regular rhythm, normal heart sounds and intact distal pulses. Pulmonary/Chest: Effort normal.  . No respiratory distress. mild wheezes. No rales. No chest tenderness. Abdominal: Soft. No distension and no mass.  There is minimal generalized tenderness. There is no rebound and no guarding. No rigidity. No RLQ tenderness. Musculoskeletal: Normal range of motion. Neurological: Alert and oriented to person, place, and time. GCS score is 15. Skin: Skin is warm and dry. No rash noted. No erythema. No pallor. Psychiatric: Mood, memory, affect and judgment normal.           DIAGNOSTIC RESULTS     EKG: All EKG's are interpreted by the Emergency Department Physician who either signs or Co-signs this chart in the absence of a cardiologist.        RADIOLOGY:   All plain film, CT, MRI, and formal ultrasound images (except ED bedside ultrasound) are read by the radiologist, see reports below, unless otherwise noted in MDM or here. CT ABDOMEN PELVIS WO CONTRAST Additional Contrast? None   Final Result   1. No acute intra-abdominal abnormality. 2. Small pericardial effusion. XR CHEST PORTABLE   Final Result   No acute process. No results found. LABS:  Labs Reviewed   CBC WITH AUTO DIFFERENTIAL - Abnormal; Notable for the following components:       Result Value    WBC 13.8 (*)     .7 (*)     Absolute Lymph # 4.90 (*)     All other components within normal limits   COMPREHENSIVE METABOLIC PANEL W/ REFLEX TO MG FOR LOW K - Abnormal; Notable for the following components:    Glucose 148 (*)     Chloride 97 (*)     Total Bilirubin 0.25 (*)     All other components within normal limits   COVID-19 & INFLUENZA COMBO   LIPASE   URINALYSIS WITH REFLEX TO CULTURE       All other labs were within normal range or not returned as of this dictation.     EMERGENCY DEPARTMENT COURSE and DIFFERENTIAL DIAGNOSIS/MDM:   Vitals:    Vitals:    07/27/22 1756   BP: 129/80   Pulse: 92   Resp: 16   Temp: 99 °F (37.2 °C)   SpO2: 95%         Patient instructed to return to the emergency room if symptoms worsen, return, or any other concern right away which is agreed by the patient    ED MEDS:  Orders Placed This Encounter   Medications promethazine (PHENERGAN) tablet 25 mg    0.9 % sodium chloride bolus    DISCONTD: dicyclomine (BENTYL) injection 20 mg         CONSULTS:  None    PROCEDURES:  None      FINAL IMPRESSION      1. Body aches    2. Generalized abdominal pain    3. Nausea          DISPOSITION/PLAN   DISPOSITION Decision To Discharge    PATIENT REFERRED TO:  Leslie Farfan, [address]  [address] [facility] [address]  [phone]    Schedule an appointment as soon as possible for a visit       [facility] ED  [address] [address] [phone]    As needed, If symptoms worsen    DISCHARGE MEDICATIONS:  Discharge Medication List as of 7/27/2022  8:41 PM            Summation      Patient Course:      fever, headache, fatigue, body aches, cough, shortness of breath, chest pain with cough, nausea and stomach cramping x 2 days. No emesis. Suspect possible COVID. Was at wedding 2 days ago. Will check covid + cxr. Cxr is unremarkable. Covid/ flu negative. Pt is stating she is having bad cramping in her stomach. Will check labs, UA. Bentyl. Nurses working on IV. Will sign out to Dr. Kirk Schneider. The care is provided during an unprecedented national emergency due to the novel coronavirus, COVID-19. ED Medications administered this visit:    Medications   promethazine (PHENERGAN) tablet 25 mg (25 mg Oral Given 7/27/22 1750)   0.9 % sodium chloride bolus (0 mLs IntraVENous Stopped 7/27/22 2052)       New Prescriptions from this visit:    Discharge Medication List as of 7/27/2022  8:41 PM          Follow-up:  MD [address]  [address] [facility] [address]  [phone]    Schedule an appointment as soon as possible for a visit       [facility] ED  [address]  [phone]    As needed, If symptoms worsen      Final Impression:   1. Body aches    2. Generalized abdominal pain    3.  Nausea               (Please note that portions of this note were completed with a voice recognition program )        Madisyn Mcdowell [address]  07/27/22 [facility] Gordon Gloria PA-C  07/28/22 1119

## 2022-08-07 ENCOUNTER — HOSPITAL ENCOUNTER (EMERGENCY)
Age: 47
Discharge: HOME | End: 2022-08-07
Attending: EMERGENCY MEDICINE
Payer: COMMERCIAL

## 2022-08-07 ENCOUNTER — APPOINTMENT (OUTPATIENT)
Dept: GENERAL RADIOLOGY | Age: 47
End: 2022-08-07
Payer: COMMERCIAL

## 2022-08-07 VITALS
OXYGEN SATURATION: 96 % | HEART RATE: 84 BPM | SYSTOLIC BLOOD PRESSURE: 141 MMHG | RESPIRATION RATE: 24 BRPM | TEMPERATURE: 99 F | DIASTOLIC BLOOD PRESSURE: 78 MMHG

## 2022-08-07 DIAGNOSIS — R07.9 CHEST PAIN, UNSPECIFIED TYPE: Primary | ICD-10-CM

## 2022-08-07 DIAGNOSIS — Z53.21 ELOPED FROM EMERGENCY DEPARTMENT: ICD-10-CM

## 2022-08-07 LAB
ABSOLUTE EOS #: 0.3 K/UL (ref 0–0.4)
ABSOLUTE LYMPH #: 3.4 K/UL (ref 1–4.8)
ABSOLUTE MONO #: 0.8 K/UL (ref 0.1–1.3)
ALBUMIN SERPL-MCNC: 3.9 G/DL (ref 3.5–5.2)
ALP BLD-CCNC: 104 U/L (ref 35–104)
ALT SERPL-CCNC: 18 U/L (ref 5–33)
ANION GAP SERPL CALCULATED.3IONS-SCNC: 11 MMOL/L (ref 9–17)
AST SERPL-CCNC: 16 U/L
BASOPHILS # BLD: 1 % (ref 0–2)
BASOPHILS ABSOLUTE: 0.1 K/UL (ref 0–0.2)
BILIRUB SERPL-MCNC: 0.28 MG/DL (ref 0.3–1.2)
BUN BLDV-MCNC: 15 MG/DL (ref 6–20)
CALCIUM SERPL-MCNC: 9.4 MG/DL (ref 8.6–10.4)
CHLORIDE BLD-SCNC: 100 MMOL/L (ref 98–107)
CO2: 27 MMOL/L (ref 20–31)
CREAT SERPL-MCNC: 0.67 MG/DL (ref 0.5–0.9)
D-DIMER QUANTITATIVE: 0.36 MG/L FEU (ref 0–0.59)
EOSINOPHILS RELATIVE PERCENT: 3 % (ref 0–4)
GFR AFRICAN AMERICAN: >60 ML/MIN
GFR NON-AFRICAN AMERICAN: >60 ML/MIN
GFR SERPL CREATININE-BSD FRML MDRD: ABNORMAL ML/MIN/{1.73_M2}
GLUCOSE BLD-MCNC: 310 MG/DL (ref 70–99)
HCT VFR BLD CALC: 43.1 % (ref 36–46)
HEMOGLOBIN: 14 G/DL (ref 12–16)
INR BLD: 0.9
LIPASE: 20 U/L (ref 13–60)
LYMPHOCYTES # BLD: 28 % (ref 24–44)
MAGNESIUM: 1.5 MG/DL (ref 1.6–2.6)
MCH RBC QN AUTO: 33.1 PG (ref 26–34)
MCHC RBC AUTO-ENTMCNC: 32.6 G/DL (ref 31–37)
MCV RBC AUTO: 101.5 FL (ref 80–100)
MONOCYTES # BLD: 7 % (ref 1–7)
PDW BLD-RTO: 13.8 % (ref 11.5–14.9)
PLATELET # BLD: 240 K/UL (ref 150–450)
PMV BLD AUTO: 8.7 FL (ref 6–12)
POTASSIUM SERPL-SCNC: 3.7 MMOL/L (ref 3.7–5.3)
PRO-BNP: 179 PG/ML
PROTHROMBIN TIME: 12.2 SEC (ref 11.8–14.6)
RBC # BLD: 4.25 M/UL (ref 4–5.2)
SARS-COV-2, RAPID: NOT DETECTED
SEG NEUTROPHILS: 61 % (ref 36–66)
SEGMENTED NEUTROPHILS ABSOLUTE COUNT: 7.7 K/UL (ref 1.3–9.1)
SODIUM BLD-SCNC: 138 MMOL/L (ref 135–144)
SPECIMEN DESCRIPTION: NORMAL
TOTAL PROTEIN: 6.6 G/DL (ref 6.4–8.3)
TROPONIN, HIGH SENSITIVITY: 12 NG/L (ref 0–14)
WBC # BLD: 12.3 K/UL (ref 3.5–11)

## 2022-08-07 PROCEDURE — 83735 ASSAY OF MAGNESIUM: CPT

## 2022-08-07 PROCEDURE — 6360000002 HC RX W HCPCS

## 2022-08-07 PROCEDURE — 93005 ELECTROCARDIOGRAM TRACING: CPT | Performed by: EMERGENCY MEDICINE

## 2022-08-07 PROCEDURE — 85610 PROTHROMBIN TIME: CPT

## 2022-08-07 PROCEDURE — 71045 X-RAY EXAM CHEST 1 VIEW: CPT

## 2022-08-07 PROCEDURE — 36415 COLL VENOUS BLD VENIPUNCTURE: CPT

## 2022-08-07 PROCEDURE — 84484 ASSAY OF TROPONIN QUANT: CPT

## 2022-08-07 PROCEDURE — 96365 THER/PROPH/DIAG IV INF INIT: CPT

## 2022-08-07 PROCEDURE — 96372 THER/PROPH/DIAG INJ SC/IM: CPT

## 2022-08-07 PROCEDURE — 87635 SARS-COV-2 COVID-19 AMP PRB: CPT

## 2022-08-07 PROCEDURE — 80053 COMPREHEN METABOLIC PANEL: CPT

## 2022-08-07 PROCEDURE — 85025 COMPLETE CBC W/AUTO DIFF WBC: CPT

## 2022-08-07 PROCEDURE — 99285 EMERGENCY DEPT VISIT HI MDM: CPT

## 2022-08-07 PROCEDURE — 85379 FIBRIN DEGRADATION QUANT: CPT

## 2022-08-07 PROCEDURE — 83690 ASSAY OF LIPASE: CPT

## 2022-08-07 PROCEDURE — 83880 ASSAY OF NATRIURETIC PEPTIDE: CPT

## 2022-08-07 RX ORDER — ACETAMINOPHEN 325 MG/1
650 TABLET ORAL ONCE
Status: DISCONTINUED | OUTPATIENT
Start: 2022-08-07 | End: 2022-08-07 | Stop reason: HOSPADM

## 2022-08-07 RX ORDER — MAGNESIUM SULFATE 1 G/100ML
1000 INJECTION INTRAVENOUS ONCE
Status: COMPLETED | OUTPATIENT
Start: 2022-08-07 | End: 2022-08-07

## 2022-08-07 RX ORDER — PROMETHAZINE HYDROCHLORIDE 25 MG/ML
12.5 INJECTION, SOLUTION INTRAMUSCULAR; INTRAVENOUS ONCE
Status: COMPLETED | OUTPATIENT
Start: 2022-08-07 | End: 2022-08-07

## 2022-08-07 RX ADMIN — MAGNESIUM SULFATE HEPTAHYDRATE 1000 MG: 1 INJECTION, SOLUTION INTRAVENOUS at 18:49

## 2022-08-07 RX ADMIN — PROMETHAZINE HYDROCHLORIDE 12.5 MG: 25 INJECTION INTRAMUSCULAR; INTRAVENOUS at 18:50

## 2022-08-07 ASSESSMENT — HEART SCORE: ECG: 1

## 2022-08-07 ASSESSMENT — ENCOUNTER SYMPTOMS
VOMITING: 0
ABDOMINAL PAIN: 0
BACK PAIN: 0
SHORTNESS OF BREATH: 1
PHOTOPHOBIA: 0
NAUSEA: 0
COUGH: 1
RHINORRHEA: 1

## 2022-08-07 NOTE — ED PROVIDER NOTES
EMERGENCY DEPARTMENT ENCOUNTER   ATTENDING ATTESTATION     Pt Name: Marci Wiseman  MRN: [id number]  [place] 1975  Date of evaluation: 8/7/22       Marci Wiseman is a 50 y.o. female who presents with Chest Pain and Shortness of Breath      MDM: 57-year-old female presents with complaints of chest pain, shortness of breath and right leg pain. On initial exam patient in no acute distress, vitals are stable, lungs are clear, heart regular rate and rhythm, does not have some nonpitting edema in the right lower extremity tenderness to palpation in the anterior and posterior extremity, pulses are intact and detectable with Doppler, will check labs and chest x-ray    Wells DVT Criteria:  [x]YES  []NO :History of previous DVT (If yes 1 point)  []YES  [x]NO :Active cancer treatment ongoing or within the previous six months or palliative (If yes 1 point)  []YES  [x]NO :Paralysis, paresis, or recent plaster immobilization of the lower extremities  (If yes 1 point)  []YES  [x]NO :Recently bedridden for more than three days, or major surgery within twelve weeks (If yes 1 point)  [x]YES  []NO :Localized tenderness along the distribution of the deep venous system (If yes 1 point)  []YES  [x]NO :Entire leg swollen (If yes 1 point)  []YES  [x]NO :Calf swelling by more than 3 cm when compared to the asymptomatic leg measured 10 cm below tibial tuberosity (If yes 1 point)  []YES  [x]NO :Pitting edema in the symptomatic leg (If yes 1 point)  []YES  [x]NO :Collateral superficial veins (nonvaricose) (If yes 1 point)  []YES  [x]NO :Alternative diagnosis as likely or more likely than that of deep venous thrombosis (-2 if yes)    Wells DVT Score Criteria Below TOTAL =  2    If Wells score is 2 or less, then d-dimer testing is recommended.        Labs reviewed and unremarkable, given that patient had a negative D-dimer well score for DVT is low do not feel further work-up needed for DVT, patient has a appointment coming up for evaluation for claudication of the right lower extremity, chest x-ray was negative    Patient eloped from emergency department prior to completion of remaining lab work    Vitals:   Vitals:    08/07/22 1528 08/07/22 1910   BP: (!) 141/78    Pulse: (!) 103 84   Resp: 24    Temp: 99 °F (37.2 °C)    SpO2: 96%          I personally saw and examined the patient. I have reviewed and agree with the resident's findings, including all diagnostic interpretations and treatment plan as written. I was present for the key portions of any procedures performed and the inclusive time noted for any critical care statement. The care is provided during an unprecedented national emergency due to the novel coronavirus, COVID 19.   [facility],   Attending Emergency Physician           [facility],   08/08/22 0001

## 2022-08-07 NOTE — ED TRIAGE NOTES
Mode of arrival (squad #, walk in, police, etc) : walk in        Chief complaint(s): chest pain, SOB        Arrival Note (brief scenario, treatment PTA, etc). : Pt states she developed sob with chest pain this afternoon while she was sitting outside in the sun. Pt states she has had worsening leg swelling and she took an extra 4mg of bumex this afternoon per her cardiologist recommendation. C= \"Have you ever felt that you should Cut down on your drinking? \"  No  A= \"Have people Annoyed you by criticizing your drinking? \"  No  G= \"Have you ever felt bad or Guilty about your drinking? \"  No  E= \"Have you ever had a drink as an Eye-opener first thing in the morning to steady your nerves or to help a hangover? \"  No      Deferred []      Reason for deferring: N/A    *If yes to two or more: probable alcohol abuse. *

## 2022-08-07 NOTE — ED PROVIDER NOTES
[address]  Emergency Department Encounter  Emergency Medicine Resident     Pt Chao Napier  MRN: [id number]  [place] 1975  Date of evaluation: 8/7/22  PCP:  Ward Salvador MD      CHIEF COMPLAINT       Chief Complaint   Patient presents with    Chest Pain    Shortness of Breath       HISTORY OF PRESENT ILLNESS  (Location/Symptom, Timing/Onset, Context/Setting, Quality, Duration, Modifying Factors, Severity.)      Toni Hunter is a 50 y.o. female who presents with complaints of chest pain, midsternal and radiates over the left side and shortness of breath that started yesterday when she was sitting on her porch with a fan blowing on her. Notes pain has been constant. Also notes increased swelling in the lower extremities over the past few days and took 4 mg of Bumex today. Notes her swelling is worse and is ever been in her lower legs. Noted a fever yesterday of 102. Has had some URI symptoms over the past few days as well. Is not COVID vaccinated. Did take Tylenol around 1400. Does note some nausea but no vomiting. Note she has been taking Phenergan at home.     PAST MEDICAL / SURGICAL / SOCIAL / FAMILY HISTORY      has a past medical history of Acute exacerbation of chronic obstructive pulmonary disease (HCC), Adhesive capsulitis of left shoulder, Asthma, Asthma exacerbation, Atrial fibrillation (HCC), Atrial premature depolarization, Bipolar 1 disorder ([address] Bipolar disorder ([address] Bulging disc, CAD (coronary artery disease), Candida infection, Cardiomyopathy ([address] Chest pain, CHF (congestive heart failure) ([address] Chronic otitis media of both ears, Chronic right shoulder pain, Cigarette nicotine dependence with nicotine-induced disorder, Class 3 severe obesity due to excess calories with serious comorbidity and body mass index (BMI) of 40.0 to 44.9 in adult [facility]), Closed fracture of left ankle, Clostridium difficile infection, COPD (chronic obstructive pulmonary disease) ([address] Cough, persistent, Current moderate episode of major depressive disorder without prior episode ([address] Depression, Diabetes mellitus type 2, controlled ([address] Diarrhea, Diarrhea, Dizziness, DVT (deep venous thrombosis) ([address] Encounter for monitoring sotalol therapy, Encounter for screening mammogram for malignant neoplasm of breast, Endometriosis, Fainting, GERD (gastroesophageal reflux disease), GI bleeding, H. pylori infection, Helicobacter pylori (H. pylori), [place] (hard of hearing), HTN (hypertension), Hyperlipidemia, Hypertension, Left bicipital tenosynovitis, Left leg pain, Left sided abdominal pain of unknown cause, Leg swelling, Mastoiditis, Mastoiditis, acute, Migraines, Morbid obesity ([address] Myocardial infarction ([address] Nausea, Neuropathy, On home oxygen therapy, Ovarian cyst, Passed out, Pulmonary hypertension ([address] Pulmonary insufficiency, PVC (premature ventricular contraction), Seasonal allergies, Tricuspid insufficiency, and Type II or unspecified type diabetes mellitus without mention of complication, not stated as uncontrolled. Reviewed with patient     has a past surgical history that includes Hysterectomy; Cholecystectomy; Appendectomy; Colonoscopy; Upper gastrointestinal endoscopy; Abdomen surgery; Abdominal adhesion surgery; Abdominal exploration surgery; Tympanostomy tube placement; Tonsillectomy; Foot surgery (Left); Abdominal hernia repair; hysteroscopy; Gastric fundoplication (9899); Abscess Drainage; Scaphoid fracture surgery (Left); other surgical history (Right, 05/29/2014); Myringotomy Tympanostomy Tube Placement (Right, 06/05/2014); myringotomy (Right, 11/13/2015); Hysterectomy; Cardiac catheterization (2014 & 2000); other surgical history (2009);  Breast surgery (Left, 2007); fracture surgery (Right); other surgical history (Right, 08/11/2016); ablation of dysrhythmic focus (2016); other surgical history; other surgical history; pr [address] ford jt w anesthesia (Right, 03/01/2017); ablation of dysrhythmic focus (09/08/2017); pr shoulder scope bone shaving (Left, 10/17/2018); Tympanostomy tube placement (Left, 03/12/2019); Upper gastrointestinal endoscopy (07/10/2019); Upper gastrointestinal endoscopy (N/A, 07/10/2019); Carpal tunnel release (Right, 12/19/2019); Carpal tunnel release (05/04/2020); Ulnar tunnel release (Right); myringotomy (Left, 07/14/2020); other surgical history (10/19/2020); other surgical history (Right, 06/14/2021); Tympanostomy tube placement (Right, 12/08/2021); other surgical history (Left, 03/24/2022); Ventriculoperitoneal shunt (Right, 04/21/2022); and bronchoscopy (N/A, 5/23/2022).   Reviewed with patient    Social History     Socioeconomic History    Marital status: Single     Spouse name: Not on file    Number of children: Not on file    Years of education: Not on file    Highest education level: Not on file   Occupational History     Employer: NONE   Tobacco Use    Smoking status: Every Day     Packs/day: 0.50     Years: 23.00     Pack years: 11.50     Types: Cigarettes    Smokeless tobacco: Never   Vaping Use    Vaping Use: Never used   Substance and Sexual Activity    Alcohol use: No     Alcohol/week: 0.0 standard drinks    Drug use: No    Sexual activity: Not on file   Other Topics Concern    Not on file   Social History Narrative    Not on file     Social Determinants of Health     Financial Resource Strain: Not on file   Food Insecurity: Not on file   Transportation Needs: Not on file   Physical Activity: Not on file   Stress: Not on file   Social Connections: Not on file   Intimate Partner Violence: Not on file   Housing Stability: Not on file       Family History   Problem Relation Age of Onset    Ulcerative Colitis Father     Liver Disease Father 61        hep c and b    Diabetes Father     Asthma Father     Heart Disease Father     High Blood Pressure Father     Breast Cancer Maternal Aunt     Cancer Maternal Aunt     Diabetes Maternal Aunt     Heart Disease Maternal Aunt     High Blood Pressure Maternal Aunt     Breast Cancer Paternal Aunt     Heart Disease Paternal Aunt     High Blood Pressure Paternal Aunt     Breast Cancer Maternal Grandmother     Cervical Cancer Maternal Grandmother     Cancer Maternal Grandmother     Diabetes Maternal Grandmother     Asthma Maternal Grandmother     Heart Disease Maternal Grandmother     High Blood Pressure Maternal Grandmother     Lung Cancer Maternal Grandfather     Diabetes Maternal Grandfather     Asthma Maternal Grandfather     Heart Disease Maternal Grandfather     High Blood Pressure Maternal Grandfather     Cervical Cancer Paternal Grandmother     Cancer Paternal Grandmother     Diabetes Paternal Grandmother     Heart Disease Paternal Grandmother     High Blood Pressure Paternal Grandmother     Diabetes Mother     Asthma Mother     Heart Disease Mother     High Blood Pressure Mother     Cancer Sister     Heart Disease Maternal Uncle     High Blood Pressure Maternal Uncle     Heart Disease Paternal Uncle     High Blood Pressure Paternal Uncle     Diabetes Paternal Grandfather     Heart Disease Paternal Grandfather     High Blood Pressure Paternal Grandfather        Allergies:  Latex, Aspirin, Avelox [moxifloxacin], Chantix [varenicline], Doxycycline, Dye [iodides], Flexeril [cyclobenzaprine], Gabapentin, Losartan, Morphine, Nsaids, Pcn [penicillins], Reglan [metoclopramide hcl], Shellfish-derived products, Sulfa antibiotics, Toradol [ketorolac tromethamine], Vancomycin, Zofran, Zyvox [linezolid], Acyclovir, Bactrim [sulfamethoxazole-trimethoprim], Betadine [povidone iodine], Ceclor [cefaclor], Codeine, Macrolides and ketolides, Novolin r [insulin], Novolog [insulin aspart], Phenothiazines, Tape [adhesive tape], Banana, Compazine [prochlorperazine], Fentanyl, Kiwi extract, Tamiflu [oseltamivir phosphate], Clindamycin/lincomycin, and Sotalol    Home Medications:  Prior to Admission medications Medication Sig Start Date End Date Taking?  Authorizing Provider   QUEtiapine (SEROQUEL) 100 MG tablet TAKE 1 AND 1/2 TABLETS BY MOUTH AT BEDTIME Patient has 50 mg tabs 7/7/22   Chante Gold MD   bumetanide (BUMEX) 2 MG tablet TAKE 1 TABLET BY MOUTH EVERY DAY 6/24/22   Chante Gold MD   isosorbide mononitrate (IMDUR) 30 MG extended release tablet TAKE 1 TABLET BY MOUTH EVERY DAY 6/24/22   Chante Gold MD   budesonide (PULMICORT) 0.5 MG/2ML nebulizer suspension Take 2 mLs by nebulization 2 times daily 5/26/22   Chante Gold MD   colchicine (COLCRYS) 0.6 MG tablet Take 1 tablet by mouth 2 times daily for 46 doses 5/26/22 6/20/22  Chante Gold MD   dornase alpha (PULMOZYME) 2.5 MG/2.5ML nebulizer solution Inhale 2.5 mg into the lungs 2 times daily 5/26/22   Chante Gold MD   VILAZODONE HCL 20 MG Tablet TAKE 1 TABLET BY MOUTH EVERY DAY  Patient taking differently: New medication, hasn't started this medication yet 5/10/22   Chante Gold MD   DULoxetine (CYMBALTA) 60 MG extended release capsule TAKE 1 CAPSULE BY MOUTH 2 TIMES PER DAY 4/22/22   MARYANNE Ceballos NP   Insulin Degludec (TRESIBA FLEXTOUCH) 200 UNIT/ML SOPN Inject 60 Units into the skin in the morning and at bedtime If po intake poor, decrease to 20 units daily  Patient taking differently: Inject 60 Units into the skin in the morning and at bedtime 80 units in the morning, and 80 units in the evening 4/21/22   MARYANNE Ceballos NP   Insulin Pen Needle (B-D ULTRAFINE III SHORT PEN) 31G X 8 MM MISC Inject 1 each into the skin daily May substitute with any brand 4/14/22   MARYANNE Ceballos NP   blood glucose test strips (ONETOUCH ULTRA) strip USE TO TEST BLOOD SUGAR BEFORE MEALS, AT BEDTIME, AND AS NEEDED (up to 9 TIMES DAILY) 4/5/22   MARYANNE Ceballos NP   clopidogrel (PLAVIX) 75 MG tablet Take 75 mg by mouth daily nightly 1/10/22   Historical Provider, MD   pantoprazole (PROTONIX) 40 MG tablet Take 40 mg by mouth daily BID 1/23/22   Historical Provider, MD   atorvastatin (LIPITOR) 80 MG tablet TAKE 1 TABLET BY MOUTH NIGHTLY 2/1/22   MARYANNE Jordan NP   albuterol sulfate HFA (VENTOLIN HFA) 108 (90 Base) MCG/ACT inhaler INHALE 2 PUFFS INTO LUNGS EVERY 6 HOURS AS NEEDED FOR WHEEZING 1/3/22   Teja Peters MD   insulin lispro, 1 Unit Dial, (HUMALOG KWIKPEN) 100 UNIT/ML SOPN INJECT SUBCUTANEOUSLY PER SLIDING SCALE, 150-200 INJECT 3U, 201-250 INJECT 6U, 251-300 INJECT 9U, >300 INJECT 12U, MAX 30U/DAY 11/2/21   MARYANNE Jordan NP   ipratropium-albuterol (DUONEB) 0.5-2.5 (3) MG/3ML SOLN nebulizer solution INHALE 3 MLS (ONE VIAL) WITH NEBULIZER EVERY 6 HOURS AS NEEDED FOR SHORTNESS OF BREATH 11/2/21   MARYANNE Jordan NP   clotrimazole (LOTRIMIN) 1 % cream Apply topically 2 times daily. 11/2/21   Prudence MARYANNE Christina NP   magnesium oxide (MAG-OX) 400 MG tablet Take 400 mg by mouth daily Afternoon 8/9/21   Historical Provider, MD   oxyCODONE-acetaminophen (PERCOCET) 5-325 MG per tablet Take 1 tablet by mouth every 4 hours as needed for Pain. Indications: last fill 2/27/22 with quantity 170 for 30 days One tablet at 0700, one tablet at 1900, one tablet in the afternoon prn pain 9/6/21   Historical Provider, MD   Multiple Vitamins-Minerals (MULTIVITAMIN GUMMIES WOMENS) CHEW Take 2 each by mouth daily     Historical Provider, MD   carvedilol (COREG) 12.5 MG tablet Take 12.5 mg by mouth 2 times daily (with meals)     Historical Provider, MD       REVIEW OF SYSTEMS    (2-9 systems for level 4, 10 or more for level 5)      Review of Systems   Constitutional:  Positive for chills and fever. HENT:  Positive for congestion and rhinorrhea. Eyes:  Negative for photophobia and visual disturbance. Respiratory:  Positive for cough and shortness of breath. Cardiovascular:  Positive for chest pain and leg swelling. Gastrointestinal:  Negative for abdominal pain, nausea and vomiting. Genitourinary:  Negative for dysuria and frequency. Musculoskeletal:  Negative for back pain and neck pain. Skin:  Negative for rash and wound. Neurological:  Negative for dizziness and headaches. PHYSICAL EXAM   (up to 7 for level 4, 8 or more for level 5)      INITIAL VITALS:   BP (!) 141/78   Pulse 84   Temp 99 °F (37.2 °C)   Resp 24   SpO2 96%     Physical Exam  Vitals and nursing note reviewed. Constitutional:       General: She is not in acute distress. HENT:      Head: Atraumatic. Right Ear: External ear normal.      Left Ear: External ear normal.      Nose: Nose normal.      Mouth/Throat:      Mouth: Mucous membranes are moist.      Pharynx: Oropharynx is clear. Eyes:      Pupils: Pupils are equal, round, and reactive to light. Cardiovascular:      Rate and Rhythm: Regular rhythm. Tachycardia present. Pulses: Normal pulses. Pulmonary:      Effort: Pulmonary effort is normal.      Breath sounds: Normal breath sounds. Abdominal:      Palpations: Abdomen is soft. Tenderness: There is no abdominal tenderness. Musculoskeletal:         General: Normal range of motion. Cervical back: Normal range of motion. Right lower leg: Edema present. Left lower leg: Edema present. Skin:     General: Skin is warm and dry. Capillary Refill: Capillary refill takes less than 2 seconds. Neurological:      General: No focal deficit present. Mental Status: She is alert and oriented to person, place, and time.        DIFFERENTIAL  DIAGNOSIS     PLAN (LABS / IMAGING / EKG):  Orders Placed This Encounter   Procedures    COVID-19, Rapid    XR CHEST PORTABLE    CBC with Auto Differential    CMP    D-Dimer, Quantitative    Lipase    Magnesium    Protime-INR    Brain Natriuretic Peptide    EKG 12 Lead       MEDICATIONS ORDERED:  Orders Placed This Encounter   Medications    promethazine (PHENERGAN) injection 12.5 mg    magnesium sulfate 1000 mg in dextrose 5% 100 mL IVPB    DISCONTD: acetaminophen (TYLENOL) tablet 650 mg       DDX: ACS, arrhythmia, PE, DVT, CHF exacerbation, COVID, pneumonia,     DIAGNOSTIC RESULTS / EMERGENCY DEPARTMENT COURSE / MDM   LAB RESULTS:  Results for orders placed or performed during the hospital encounter of 08/07/22   COVID-19, Rapid    Specimen: Nasopharyngeal Swab   Result Value Ref Range    Specimen Description . NASOPHARYNGEAL SWAB     SARS-CoV-2, Rapid Not Detected Not Detected   CBC with Auto Differential   Result Value Ref Range    WBC 12.3 (H) 3.5 - 11.0 k/uL    RBC 4.25 4.0 - 5.2 m/uL    Hemoglobin 14.0 12.0 - 16.0 g/dL    Hematocrit 43.1 36 - 46 %    .5 (H) 80 - 100 fL    MCH 33.1 26 - 34 pg    MCHC 32.6 31 - 37 g/dL    RDW 13.8 11.5 - 14.9 %    Platelets 442 939 - 766 k/uL    MPV 8.7 6.0 - 12.0 fL    Seg Neutrophils 61 36 - 66 %    Lymphocytes 28 24 - 44 %    Monocytes 7 1 - 7 %    Eosinophils % 3 0 - 4 %    Basophils 1 0 - 2 %    Segs Absolute 7.70 1.3 - 9.1 k/uL    Absolute Lymph # 3.40 1.0 - 4.8 k/uL    Absolute Mono # 0.80 0.1 - 1.3 k/uL    Absolute Eos # 0.30 0.0 - 0.4 k/uL    Basophils Absolute 0.10 0.0 - 0.2 k/uL   CMP   Result Value Ref Range    Glucose 310 (H) 70 - 99 mg/dL    BUN 15 6 - 20 mg/dL    Creatinine 0.67 0.50 - 0.90 mg/dL    Calcium 9.4 8.6 - 10.4 mg/dL    Sodium 138 135 - 144 mmol/L    Potassium 3.7 3.7 - 5.3 mmol/L    Chloride 100 98 - 107 mmol/L    CO2 27 20 - 31 mmol/L    Anion Gap 11 9 - 17 mmol/L    Alkaline Phosphatase 104 35 - 104 U/L    ALT 18 5 - 33 U/L    AST 16 <32 U/L    Total Bilirubin 0.28 (L) 0.3 - 1.2 mg/dL    Total Protein 6.6 6.4 - 8.3 g/dL    Albumin 3.9 3.5 - 5.2 g/dL    GFR Non-African American >60 >60 mL/min    GFR African American >60 >60 mL/min    GFR Comment         D-Dimer, Quantitative   Result Value Ref Range    D-Dimer, Quant 0.36 0.00 - 0.59 mg/L FEU   Lipase   Result Value Ref Range    Lipase 20 13 - 60 U/L   Magnesium   Result Value Ref Range    Magnesium 1.5 (L) 1.6 - 2.6 mg/dL Protime-INR   Result Value Ref Range    Protime 12.2 11.8 - 14.6 sec    INR 0.9    Troponin   Result Value Ref Range    Troponin, High Sensitivity 12 0 - 14 ng/L   Brain Natriuretic Peptide   Result Value Ref Range    Pro- <300 pg/mL       IMPRESSION: Chest pain, shortness of breath-eloped prior to work-up complete    RADIOLOGY:  XR CHEST PORTABLE   Final Result   No acute process. EKG  No ST changes    All EKG's are interpreted by the Emergency Department Physician who either signs or Co-signs this chart in the absence of a cardiologist.    EMERGENCY DEPARTMENT COURSE:  77-year-old female, history of DVT provoked, CHF, diabetes, CAD, HTN, HLD, obesity, presented to ED with complaints of URI symptoms over the past few days, chest pain or shortness of breath that started yesterday at rest but has been constant, increased swelling in lower extremities with history of CHF and has taken increased amount of her Bumex for a total of 4 mg of Bumex. Patient not currently on any blood thinners outside of Plavix and has anaphylaxis allergy to aspirin. Plan to get cardiac work-up, D-dimer, chest x-ray to rule out pneumonia, COVID. Ordered replacement of mag and Phenergan for nausea. Patient with multiple allergies so limited looking give patient for pain. Patient eloped prior to treatment completed but troponin less than baseline initially. D-dimer negative. ED Course as of 08/08/22 0419   Sun Aug 07, 2022   1821 WBC(!): 12.3 [AR]   1821 D-Dimer, Quant: 0.36 [AR]   4921 SARS-CoV-2, Rapid: Not Detected [AR]   1835 Magnesium(!): 1.5 [AR]   1835 Troponin, High Sensitivity: 12 [AR]   1836 Trope less than baseline [AR]      ED Course User Index  [AR] Morenita Goncalves MD       No notes of EC Admission Criteria type on file.     PROCEDURES:  None    CONSULTS:  None    CRITICAL CARE:  None    ED Course as of 08/08/22 0419   Sun Aug 07, 2022   1821 WBC(!): 12.3 [AR]   1821 D-Dimer, Quant: 0.36 [AR] 1821 SARS-CoV-2, Rapid: Not Detected [AR]   1835 Magnesium(!): 1.5 [AR]   1835 Troponin, High Sensitivity: 12 [AR]   1836 Trope less than baseline [AR]      ED Course User Index  [AR] Ness Pinto MD       FINAL IMPRESSION      1. Chest pain, unspecified type    2. Eloped from emergency department          DISPOSITION / [address] - Left Before Treatment Complete 08/07/2022 08:16:18 PM      PATIENT REFERRED TO:  No follow-up provider specified.     DISCHARGE MEDICATIONS:  Discharge Medication List as of 8/7/2022  8:19 PM          David Huang MD  Emergency Medicine Resident    (Please note that portions of thisnote were completed with a voice recognition program.  Efforts were made to edit the dictations but occasionally words are mis-transcribed.)      Ness Pinto MD  Resident  08/08/22 [id number]

## 2022-08-07 NOTE — ED NOTES
Pt being verbally abusive towards RN and stating that \"we have not done anything to help her\". Pt refusing to put on a face mask and pulled out IV on her own.      [place]  08/07/22 [id number]

## 2022-08-08 LAB
EKG ATRIAL RATE: 99 BPM
EKG P AXIS: 49 DEGREES
EKG P-R INTERVAL: 136 MS
EKG Q-T INTERVAL: 354 MS
EKG QRS DURATION: 74 MS
EKG QTC CALCULATION (BAZETT): 454 MS
EKG R AXIS: 41 DEGREES
EKG T AXIS: 40 DEGREES
EKG VENTRICULAR RATE: 99 BPM

## 2022-08-11 NOTE — ANESTHESIA PRE PROCEDURE
[address]    Anesthesia Evaluation  Patient summary reviewed and Nursing notes reviewed  Airway: Mallampati: II  TM distance: >3 FB   Neck ROM: full  Mouth opening: > = 3 FB Dental: normal exam   (+) lower dentures      Pulmonary:normal exam  breath sounds clear to auscultation                             Cardiovascular:  Exercise tolerance: good (>4 METS),           Rhythm: regular  Rate: normal                    Neuro/Psych:               GI/Hepatic/Renal:             Endo/Other:                     Abdominal:       Abdomen: soft. Vascular:                                        Anesthesia Plan      general     ASA 3       Induction: intravenous. MIPS: Prophylactic antiemetics administered. Anesthetic plan and risks discussed with patient. Use of blood products discussed with patient whom consented to blood products. Plan discussed with attending and CRNA.     Attending anesthesiologist reviewed and agrees with Ángela Ascencio MD   7/10/2019
11-Aug-2022 19:35

## 2022-08-25 ENCOUNTER — HOSPITAL ENCOUNTER (EMERGENCY)
Age: 47
Discharge: HOME | End: 2022-08-25
Attending: EMERGENCY MEDICINE
Payer: COMMERCIAL

## 2022-08-25 ENCOUNTER — APPOINTMENT (OUTPATIENT)
Dept: GENERAL RADIOLOGY | Age: 47
End: 2022-08-25
Payer: COMMERCIAL

## 2022-08-25 VITALS
HEIGHT: 64 IN | DIASTOLIC BLOOD PRESSURE: 104 MMHG | BODY MASS INDEX: 46.1 KG/M2 | HEART RATE: 78 BPM | WEIGHT: 270 LBS | SYSTOLIC BLOOD PRESSURE: 147 MMHG | OXYGEN SATURATION: 99 % | TEMPERATURE: 98.2 F | RESPIRATION RATE: 16 BRPM

## 2022-08-25 DIAGNOSIS — J06.9 ACUTE UPPER RESPIRATORY INFECTION: ICD-10-CM

## 2022-08-25 DIAGNOSIS — J45.41 MODERATE PERSISTENT ASTHMA WITH EXACERBATION: Primary | ICD-10-CM

## 2022-08-25 LAB
INFLUENZA A: NOT DETECTED
INFLUENZA B: NOT DETECTED
S PYO AG THROAT QL: NEGATIVE
SARS-COV-2 RNA, RT PCR: NOT DETECTED
SOURCE: NORMAL
SOURCE: NORMAL
SPECIMEN DESCRIPTION: NORMAL

## 2022-08-25 PROCEDURE — 71045 X-RAY EXAM CHEST 1 VIEW: CPT

## 2022-08-25 PROCEDURE — 99284 EMERGENCY DEPT VISIT MOD MDM: CPT

## 2022-08-25 PROCEDURE — 87636 SARSCOV2 & INF A&B AMP PRB: CPT

## 2022-08-25 PROCEDURE — 87880 STREP A ASSAY W/OPTIC: CPT

## 2022-08-25 RX ORDER — PREDNISONE 10 MG/1
40 TABLET ORAL DAILY
Qty: 20 TABLET | Refills: 0 | Status: ON HOLD | OUTPATIENT
Start: 2022-08-25 | End: 2022-09-04 | Stop reason: HOSPADM

## 2022-08-25 RX ORDER — BENZONATATE 100 MG/1
100 CAPSULE ORAL 3 TIMES DAILY PRN
Qty: 30 CAPSULE | Refills: 0 | Status: ON HOLD | OUTPATIENT
Start: 2022-08-25 | End: 2022-09-04 | Stop reason: HOSPADM

## 2022-08-25 ASSESSMENT — ENCOUNTER SYMPTOMS
SORE THROAT: 1
WHEEZING: 1
TROUBLE SWALLOWING: 0
SHORTNESS OF BREATH: 1
RHINORRHEA: 1
COUGH: 1

## 2022-08-25 ASSESSMENT — PAIN - FUNCTIONAL ASSESSMENT: PAIN_FUNCTIONAL_ASSESSMENT: NONE - DENIES PAIN

## 2022-08-25 NOTE — ED PROVIDER NOTES
EMERGENCY DEPARTMENT ENCOUNTER    Pt Name: Nini Sutton  MRN: [id number]  [place] 1975  Date of evaluation: 8/25/22  CHIEF COMPLAINT       Chief Complaint   Patient presents with    Pharyngitis    Shortness of Breath     HISTORY OF PRESENT ILLNESS     URI  Presenting symptoms: congestion, cough, rhinorrhea and sore throat    Severity:  Moderate  Onset quality:  Gradual  Duration:  3 days  Timing:  Constant  Progression:  Worsening  Chronicity:  New  Relieved by:  Nothing  Worsened by:  Nothing  Ineffective treatments: put on zpack, not working. Associated symptoms: wheezing          REVIEW OF SYSTEMS     Review of Systems   HENT:  Positive for congestion, rhinorrhea and sore throat. Negative for trouble swallowing. Respiratory:  Positive for cough, shortness of breath and wheezing. All other systems reviewed and are negative.   PASTMEDICAL HISTORY     Past Medical History:   Diagnosis Date    Acute exacerbation of chronic obstructive pulmonary disease ([address] 3/21/2018    Adhesive capsulitis of left shoulder 9/25/2018    Asthma     Asthma exacerbation 7/24/2014    Atrial fibrillation ([address]     placed on event monitor 9/25/18 for PVC's & A-fib    Atrial premature depolarization 7/19/2019    Bipolar 1 disorder ([facility])     Bipolar disorder ([address] 7/19/2019    Bulging disc     CAD (coronary artery disease)     Candida infection 2/23/2018    Cardiomyopathy ([address]     Chest pain 6/13/2017    CHF (congestive heart failure) ([facility])     Chronic otitis media of both ears     rt>lt    Chronic right shoulder pain 3/14/2017    Cigarette nicotine dependence with nicotine-induced disorder 7/19/2019    Class 3 severe obesity due to excess calories with serious comorbidity and body mass index (BMI) of 40.0 to 44.9 in adult [facility]) 10/4/2018    Closed fracture of left ankle 6/25/2019    Clostridium difficile infection     COPD (chronic obstructive pulmonary disease) ([facility])     Cough, persistent 3/21/2018    Current moderate episode of major depressive disorder without prior episode ([facility] 8/20/2018    Depression     Diabetes mellitus type 2, controlled ([facility] 4/30/2014    Diarrhea     Diarrhea     Dizziness     DVT (deep venous thrombosis) (HCC)     after PICC line right arm    Encounter for monitoring sotalol therapy 2/20/2017    Encounter for screening mammogram for malignant neoplasm of breast 3/7/2018    Endometriosis     Fainting     GERD (gastroesophageal reflux disease)     hx of    GI bleeding     H. pylori infection     Helicobacter pylori (H. pylori)     [place] (hard of hearing)     both ears, no hearing aids    HTN (hypertension) 7/19/2019    Hyperlipidemia     Hypertension     Left bicipital tenosynovitis 10/17/2018    Left leg pain 5/16/2017    Left sided abdominal pain of unknown cause 7/19/2016    Leg swelling 9/21/2018    Mastoiditis     Mastoiditis, acute 4/30/2014    Migraines     Morbid obesity ([facility]     Myocardial infarction ([facility]     2005    Nausea     Neuropathy     On home oxygen therapy     3 L at night    Ovarian cyst     Passed out     hx of- negative tilt table    Pulmonary hypertension (HCC)     Pulmonary insufficiency     PVC (premature ventricular contraction)     Seasonal allergies     Tricuspid insufficiency     Type II or unspecified type diabetes mellitus without mention of complication, not stated as uncontrolled      Past Problem List  Patient Active Problem List   Diagnosis Code    Diabetes mellitus type 2, controlled ([facility] E11.9    COPD (chronic obstructive pulmonary disease) ([facility] J44.9    Asthma J45.909    Acute bronchitis J20.9    KRISTIN (obstructive sleep apnea) G47.33    Adult body mass index 40 and over [id number]    Chronic right shoulder pain M25.511, G89.29    Neck pain M54.2    Radicular pain in right arm M79.2    Left leg pain M79.605    Noncompliance with therapeutic plan Z91.11    Precordial pain R07.2    Tobacco abuse Z72.0    Current moderate episode of major depressive disorder without prior episode ([facility] F32.1    Adhesive capsulitis of left shoulder M75.02    Morbid obesity ([facility]) E66.01    Left bicipital tenosynovitis M75.22    Refused influenza vaccine Z28.21    Closed fracture of left ankle S82.892A    Atrial premature depolarization I49.1    Ventricular premature depolarization I49.3    Cardiomyopathy ([facility]) I42.9    Cigarette nicotine dependence with nicotine-induced disorder F17.219    Productive cough R05.8    DVT (deep venous thrombosis) ([facility]) I82.409    Endometriosis N80.9    GERD (gastroesophageal reflux disease) K21.9    HTN (hypertension) I10    Hypomagnesemia E83.42    Migraines G43.909    Mixed hyperlipidemia E78.2    Neurocardiogenic syncope R55    Nonrheumatic tricuspid (valve) insufficiency I36.1    Tricuspid valve disorders, non-rheumatic I36.9    Ovarian cyst N83.209    Encounter for monitoring sotalol therapy Z51.81, Z79.899    Pulmonary HTN ([facility]) I27.20    PVC's (premature ventricular contractions) I49.3    Schizophrenia ([facility]) F20.9    Shortness of breath R06.02    Acute exacerbation of chronic obstructive pulmonary disease ([facility]) J44.1    Bipolar disorder ([facility]) F31.9    Left sided abdominal pain of unknown cause R10.9    Leg swelling M79.89    Mastoiditis, acute H70.009    Steroid-induced hyperglycemia R73.9, T38.0X5A    Sclerosing adenosis of left breast N60.22    Tachycardia R00.0    Tremor R25.1    Rupture of right rotator cuff M75.101    Rotator cuff syndrome of left shoulder M75.102    Long term current use of insulin ([facility]) Z79.4    Lesion of ulnar nerve G56.20    Carpal tunnel syndrome G56.00    Acute upper respiratory infection J06.9    Cellulitis of right upper limb L03. 80    Claustrophobia F40.240    Close exposure to 2019 novel coronavirus Z20.822    Congestive heart failure ([facility]) I50.9    Diabetic neuropathy ([facility]) E11.40    Enthesopathy M77.9    Fibrocystic breast changes H23.12    Helicobacter pylori gastritis K29.70, B96.81    NSTEMI (non-ST elevated myocardial infarction) INHALE 2 PUFFS INTO LUNGS EVERY 6 HOURS AS NEEDED FOR WHEEZING    ATORVASTATIN (LIPITOR) 80 MG TABLET    TAKE 1 TABLET BY MOUTH NIGHTLY    AZITHROMYCIN (ZITHROMAX) 250 MG TABLET    2 tablets now then 1 daily until gone. BLOOD GLUCOSE TEST STRIPS (ONETOUCH ULTRA) STRIP    USE TO TEST BLOOD SUGAR BEFORE MEALS, AT BEDTIME, AND AS NEEDED (up to 9 TIMES DAILY)    BUDESONIDE (PULMICORT) 0.5 MG/2ML NEBULIZER SUSPENSION    Take 2 mLs by nebulization 2 times daily    BUMETANIDE (BUMEX) 2 MG TABLET    TAKE 1 TABLET BY MOUTH EVERY DAY    CARVEDILOL (COREG) 12.5 MG TABLET    Take 12.5 mg by mouth 2 times daily (with meals)     CETIRIZINE (ZYRTEC) 10 MG TABLET    Take 1 tablet by mouth in the morning. CLOPIDOGREL (PLAVIX) 75 MG TABLET    Take 75 mg by mouth daily nightly    CLOTRIMAZOLE (LOTRIMIN) 1 % CREAM    Apply topically 2 times daily.     COLCHICINE (COLCRYS) 0.6 MG TABLET    Take 1 tablet by mouth 2 times daily for 46 doses    DORNASE ALPHA (PULMOZYME) 2.5 MG/2.5ML NEBULIZER SOLUTION    Inhale 2.5 mg into the lungs 2 times daily    DULOXETINE (CYMBALTA) 60 MG EXTENDED RELEASE CAPSULE    TAKE 1 CAPSULE BY MOUTH 2 TIMES PER DAY    INSULIN DEGLUDEC (TRESIBA FLEXTOUCH) 200 UNIT/ML SOPN    Inject 60 Units into the skin in the morning and at bedtime If po intake poor, decrease to 20 units daily    INSULIN LISPRO, 1 UNIT DIAL, (HUMALOG KWIKPEN) 100 UNIT/ML SOPN    INJECT SUBCUTANEOUSLY PER SLIDING SCALE, 150-200 INJECT 3U, 201-250 INJECT 6U, 251-300 INJECT 9U, >300 INJECT 12U, MAX 30U/DAY    INSULIN PEN NEEDLE (B-D ULTRAFINE III SHORT PEN) 31G X 8 MM MISC    Inject 1 each into the skin daily May substitute with any brand    IPRATROPIUM-ALBUTEROL (DUONEB) 0.5-2.5 (3) MG/3ML SOLN NEBULIZER SOLUTION    INHALE 3 MLS (ONE VIAL) WITH NEBULIZER EVERY 6 HOURS AS NEEDED FOR SHORTNESS OF BREATH    ISOSORBIDE MONONITRATE (IMDUR) 30 MG EXTENDED RELEASE TABLET    TAKE 1 TABLET BY MOUTH EVERY DAY    MAGNESIUM OXIDE (MAG-OX) 400 MG TABLET    Take 400 mg by mouth daily Afternoon    MULTIPLE VITAMINS-MINERALS (MULTIVITAMIN GUMMIES WOMENS) CHEW    Take 2 each by mouth daily     OXYCODONE-ACETAMINOPHEN (PERCOCET) 5-325 MG PER TABLET    Take 1 tablet by mouth every 4 hours as needed for Pain. Indications: last fill 2/27/22 with quantity 170 for 30 days One tablet at 0700, one tablet at 1900, one tablet in the afternoon prn pain    PANTOPRAZOLE (PROTONIX) 40 MG TABLET    Take 40 mg by mouth daily BID    QUETIAPINE (SEROQUEL) 100 MG TABLET    TAKE 1 AND 1/2 TABLETS BY MOUTH AT BEDTIME Patient has 50 mg tabs    VILAZODONE HCL 20 MG TABLET    TAKE 1 TABLET BY MOUTH EVERY DAY     ALLERGIES     is allergic to latex, aspirin, avelox [moxifloxacin], chantix [varenicline], doxycycline, dye [iodides], flexeril [cyclobenzaprine], gabapentin, losartan, morphine, nsaids, pcn [penicillins], reglan [metoclopramide hcl], shellfish-derived products, sulfa antibiotics, toradol [ketorolac tromethamine], vancomycin, zofran, zyvox [linezolid], acyclovir, bactrim [sulfamethoxazole-trimethoprim], betadine [povidone iodine], ceclor [cefaclor], codeine, macrolides and ketolides, novolin r [insulin], novolog [insulin aspart], phenothiazines, tape [adhesive tape], banana, compazine [prochlorperazine], fentanyl, kiwi extract, tamiflu [oseltamivir phosphate], clindamycin/lincomycin, and sotalol. FAMILY HISTORY     She indicated that the status of her mother is unknown. She indicated that the status of her father is unknown. She indicated that the status of her sister is unknown. She indicated that the status of her maternal grandmother is unknown. She indicated that the status of her maternal grandfather is unknown. She indicated that the status of her paternal grandmother is unknown. She indicated that the status of her paternal grandfather is unknown. She indicated that the status of her maternal aunt is unknown.  She indicated that the status of her maternal uncle is wheezing or rales. Abdominal:      Palpations: Abdomen is soft. Tenderness: There is no abdominal tenderness. There is no guarding or rebound. Musculoskeletal:         General: No tenderness or deformity. Normal range of motion. Cervical back: Normal range of motion and neck supple. Skin:     General: Skin is warm and dry. Capillary Refill: Capillary refill takes less than 2 seconds. Findings: No erythema or rash. Neurological:      General: No focal deficit present. Mental Status: She is alert and oriented to person, place, and time. Coordination: Coordination normal.   Psychiatric:         Mood and Affect: Mood normal.         Behavior: Behavior normal.         Thought Content: Thought content normal.         Judgment: Judgment normal.       MEDICAL DECISION MAKING:       ED Course as of 08/25/22 0958   Thu Aug 25, 2022   0826 Card cath done this year:    Non dominant RCA not injected known occluded  3. Left coronary artery with minimal luminal irregularities  4. LVEDP 33 mmHg   [WM]      ED Course User Index  [WM] Jimi Zhu MD     Do not suspect PE or ami or acs or chf or pneumonia  Rx pred  Discussed with patient anticipatory guidance, discharge instructions, follow up PCP 24 hours  She has albuterol at home    CRITICAL CARE:       PROCEDURES:    Procedures    DIAGNOSTIC RESULTS   EKG:All EKG's are interpreted by the Emergency Department Physician who either signs or Co-signs this chart in the absence of a cardiologist.        RADIOLOGY:All plain film, CT, MRI, and formal ultrasound images (except ED bedside ultrasound) are read by the radiologist, see reports below, unless otherwisenoted in MDM or here. XR CHEST PORTABLE   Final Result   No acute cardiopulmonary process. LABS: All lab results were reviewed by myself, and all abnormals are listed below.   Labs Reviewed   COVID-19 & INFLUENZA COMBO   STREP SCREEN GROUP A THROAT       EMERGENCY DEPARTMENTCOURSE:         Vitals:    Vitals:    08/25/22 0806   BP: (!) 147/104   Pulse: 78   Resp: 16   Temp: 98.2 °F (36.8 °C)   TempSrc: Oral   SpO2: 99%   Weight: 270 lb (122.5 kg)   Height: 5' 4\" (1.626 m)       The patient was given the following medications while in the emergency department:  Orders Placed This Encounter   Medications    benzonatate (TESSALON) 100 MG capsule     Sig: Take 1 capsule by mouth 3 times daily as needed for Cough     Dispense:  30 capsule     Refill:  0    predniSONE (DELTASONE) 10 MG tablet     Sig: Take 4 tablets by mouth daily for 5 days     Dispense:  20 tablet     Refill:  0   :  None    FINAL IMPRESSION      1. Moderate persistent asthma with exacerbation    2. Acute upper respiratory infection          DISPOSITION/PLAN   DISPOSITION Decision To Discharge 08/25/2022 09:53:33 AM      PATIENT REFERRED TO:  Chante Gold, [address] [facility]  [address] [address]  [phone]    Schedule an appointment as soon as possible for a visit in 1 day    DISCHARGE MEDICATIONS:  New Prescriptions    BENZONATATE (TESSALON) 100 MG CAPSULE    Take 1 capsule by mouth 3 times daily as needed for Cough    PREDNISONE (DELTASONE) 10 MG TABLET    Take 4 tablets by mouth daily for 5 days     The care is provided during an unprecedented national emergency due to the novel coronavirus, COVID 19.   MD Negrito Edwards MD  08/25/22 8713

## 2022-08-25 NOTE — ED NOTES
Mode of arrival (squad #, walk in, police, etc) : walk in         Chief complaint(s): Headache sore throat shortness of breath         Arrival Note (brief scenario, treatment PTA, etc). : Pt states has not been feeling well x a couple days. Called family doctor who called in a rx for zithromax. C= \"Have you ever felt that you should Cut down on your drinking? \"  No  A= \"Have people Annoyed you by criticizing your drinking? \"  No  G= \"Have you ever felt bad or Guilty about your drinking? \"  No  E= \"Have you ever had a drink as an Eye-opener first thing in the morning to steady your nerves or to help a hangover? \"  No      Deferred []      Reason for deferring: N/A    *If yes to two or more: probable alcohol abuse. Laura Vasques RN  08/25/22 [id number]

## 2022-08-29 ENCOUNTER — HOSPITAL ENCOUNTER (INPATIENT)
Age: 47
LOS: 5 days | Discharge: HOME | DRG: 177 | End: 2022-09-04
Attending: EMERGENCY MEDICINE | Admitting: FAMILY MEDICINE
Payer: COMMERCIAL

## 2022-08-29 ENCOUNTER — APPOINTMENT (OUTPATIENT)
Dept: GENERAL RADIOLOGY | Age: 47
DRG: 177 | End: 2022-08-29
Payer: COMMERCIAL

## 2022-08-29 DIAGNOSIS — U07.1 COVID-19: Primary | ICD-10-CM

## 2022-08-29 LAB
INFLUENZA A: NOT DETECTED
INFLUENZA B: NOT DETECTED
SARS-COV-2 RNA, RT PCR: DETECTED
SOURCE: ABNORMAL
SPECIMEN DESCRIPTION: ABNORMAL

## 2022-08-29 PROCEDURE — 6360000002 HC RX W HCPCS: Performed by: STUDENT IN AN ORGANIZED HEALTH CARE EDUCATION/TRAINING PROGRAM

## 2022-08-29 PROCEDURE — 84484 ASSAY OF TROPONIN QUANT: CPT

## 2022-08-29 PROCEDURE — 87636 SARSCOV2 & INF A&B AMP PRB: CPT

## 2022-08-29 PROCEDURE — 96374 THER/PROPH/DIAG INJ IV PUSH: CPT

## 2022-08-29 PROCEDURE — 71045 X-RAY EXAM CHEST 1 VIEW: CPT

## 2022-08-29 PROCEDURE — 6370000000 HC RX 637 (ALT 250 FOR IP): Performed by: STUDENT IN AN ORGANIZED HEALTH CARE EDUCATION/TRAINING PROGRAM

## 2022-08-29 PROCEDURE — 36415 COLL VENOUS BLD VENIPUNCTURE: CPT

## 2022-08-29 PROCEDURE — 99285 EMERGENCY DEPT VISIT HI MDM: CPT

## 2022-08-29 PROCEDURE — 96361 HYDRATE IV INFUSION ADD-ON: CPT

## 2022-08-29 PROCEDURE — 96372 THER/PROPH/DIAG INJ SC/IM: CPT

## 2022-08-29 PROCEDURE — 80048 BASIC METABOLIC PNL TOTAL CA: CPT

## 2022-08-29 PROCEDURE — 85025 COMPLETE CBC W/AUTO DIFF WBC: CPT

## 2022-08-29 RX ORDER — ACETAMINOPHEN 500 MG
500 TABLET ORAL ONCE
Status: COMPLETED | OUTPATIENT
Start: 2022-08-29 | End: 2022-08-29

## 2022-08-29 RX ORDER — 0.9 % SODIUM CHLORIDE 0.9 %
1000 INTRAVENOUS SOLUTION INTRAVENOUS ONCE
Status: COMPLETED | OUTPATIENT
Start: 2022-08-29 | End: 2022-08-30

## 2022-08-29 RX ORDER — PROMETHAZINE HYDROCHLORIDE 25 MG/ML
12.5 INJECTION, SOLUTION INTRAMUSCULAR; INTRAVENOUS ONCE
Status: COMPLETED | OUTPATIENT
Start: 2022-08-29 | End: 2022-08-29

## 2022-08-29 RX ADMIN — ACETAMINOPHEN 500 MG: 500 TABLET ORAL at 23:25

## 2022-08-29 RX ADMIN — PROMETHAZINE HYDROCHLORIDE 12.5 MG: 25 INJECTION INTRAMUSCULAR; INTRAVENOUS at 23:31

## 2022-08-29 ASSESSMENT — PAIN SCALES - GENERAL: PAINLEVEL_OUTOF10: 10

## 2022-08-29 ASSESSMENT — PAIN DESCRIPTION - LOCATION: LOCATION: HEAD

## 2022-08-29 ASSESSMENT — PAIN - FUNCTIONAL ASSESSMENT: PAIN_FUNCTIONAL_ASSESSMENT: 0-10

## 2022-08-30 PROBLEM — I50.22 CHRONIC SYSTOLIC CHF (CONGESTIVE HEART FAILURE) (HCC): Status: ACTIVE | Noted: 2022-08-30

## 2022-08-30 PROBLEM — U07.1 COVID-19: Status: ACTIVE | Noted: 2022-08-30

## 2022-08-30 PROBLEM — M77.9 ENTHESOPATHY: Status: RESOLVED | Noted: 2020-11-16 | Resolved: 2022-08-30

## 2022-08-30 PROBLEM — L03.211 FACIAL CELLULITIS: Status: RESOLVED | Noted: 2022-03-08 | Resolved: 2022-08-30

## 2022-08-30 LAB
ABSOLUTE EOS #: 0 K/UL (ref 0–0.4)
ABSOLUTE LYMPH #: 1.5 K/UL (ref 1–4.8)
ABSOLUTE MONO #: 0.8 K/UL (ref 0.1–1.3)
ANION GAP SERPL CALCULATED.3IONS-SCNC: 11 MMOL/L (ref 9–17)
BASOPHILS # BLD: 1 % (ref 0–2)
BASOPHILS ABSOLUTE: 0 K/UL (ref 0–0.2)
BUN BLDV-MCNC: 10 MG/DL (ref 6–20)
C-REACTIVE PROTEIN: 9.3 MG/L (ref 0–5)
CALCIUM SERPL-MCNC: 8.9 MG/DL (ref 8.6–10.4)
CHLORIDE BLD-SCNC: 100 MMOL/L (ref 98–107)
CO2: 26 MMOL/L (ref 20–31)
CREAT SERPL-MCNC: 0.81 MG/DL (ref 0.5–0.9)
D-DIMER QUANTITATIVE: 0.69 MG/L FEU (ref 0–0.59)
EKG ATRIAL RATE: 83 BPM
EKG P AXIS: 42 DEGREES
EKG P-R INTERVAL: 150 MS
EKG Q-T INTERVAL: 390 MS
EKG QRS DURATION: 88 MS
EKG QTC CALCULATION (BAZETT): 458 MS
EKG R AXIS: 40 DEGREES
EKG T AXIS: 37 DEGREES
EKG VENTRICULAR RATE: 83 BPM
EOSINOPHILS RELATIVE PERCENT: 1 % (ref 0–4)
ESTIMATED AVERAGE GLUCOSE: 269 MG/DL
FERRITIN: 234 NG/ML (ref 13–150)
GFR AFRICAN AMERICAN: >60 ML/MIN
GFR NON-AFRICAN AMERICAN: >60 ML/MIN
GFR SERPL CREATININE-BSD FRML MDRD: ABNORMAL ML/MIN/{1.73_M2}
GLUCOSE BLD-MCNC: 115 MG/DL (ref 70–99)
GLUCOSE BLD-MCNC: 324 MG/DL (ref 65–105)
HBA1C MFR BLD: 11 % (ref 4–6)
HCT VFR BLD CALC: 39.6 % (ref 36–46)
HEMOGLOBIN: 12.9 G/DL (ref 12–16)
LACTATE DEHYDROGENASE: 218 U/L (ref 135–214)
LYMPHOCYTES # BLD: 24 % (ref 24–44)
MCH RBC QN AUTO: 33.6 PG (ref 26–34)
MCHC RBC AUTO-ENTMCNC: 32.4 G/DL (ref 31–37)
MCV RBC AUTO: 103.5 FL (ref 80–100)
MONOCYTES # BLD: 13 % (ref 1–7)
PDW BLD-RTO: 13.8 % (ref 11.5–14.9)
PLATELET # BLD: 211 K/UL (ref 150–450)
PMV BLD AUTO: 8.1 FL (ref 6–12)
POTASSIUM SERPL-SCNC: 3.4 MMOL/L (ref 3.7–5.3)
RBC # BLD: 3.83 M/UL (ref 4–5.2)
SEG NEUTROPHILS: 61 % (ref 36–66)
SEGMENTED NEUTROPHILS ABSOLUTE COUNT: 4.1 K/UL (ref 1.3–9.1)
SODIUM BLD-SCNC: 137 MMOL/L (ref 135–144)
TROPONIN, HIGH SENSITIVITY: 19 NG/L (ref 0–14)
TROPONIN, HIGH SENSITIVITY: 19 NG/L (ref 0–14)
WBC # BLD: 6.5 K/UL (ref 3.5–11)

## 2022-08-30 PROCEDURE — 2060000000 HC ICU INTERMEDIATE R&B

## 2022-08-30 PROCEDURE — 93005 ELECTROCARDIOGRAM TRACING: CPT | Performed by: STUDENT IN AN ORGANIZED HEALTH CARE EDUCATION/TRAINING PROGRAM

## 2022-08-30 PROCEDURE — 36415 COLL VENOUS BLD VENIPUNCTURE: CPT

## 2022-08-30 PROCEDURE — 2580000003 HC RX 258: Performed by: FAMILY MEDICINE

## 2022-08-30 PROCEDURE — 6360000002 HC RX W HCPCS: Performed by: FAMILY MEDICINE

## 2022-08-30 PROCEDURE — 6370000000 HC RX 637 (ALT 250 FOR IP): Performed by: EMERGENCY MEDICINE

## 2022-08-30 PROCEDURE — XW0DXM6 INTRODUCTION OF BARICITINIB INTO MOUTH AND PHARYNX, EXTERNAL APPROACH, NEW TECHNOLOGY GROUP 6: ICD-10-PCS | Performed by: STUDENT IN AN ORGANIZED HEALTH CARE EDUCATION/TRAINING PROGRAM

## 2022-08-30 PROCEDURE — 82728 ASSAY OF FERRITIN: CPT

## 2022-08-30 PROCEDURE — 6370000000 HC RX 637 (ALT 250 FOR IP): Performed by: INTERNAL MEDICINE

## 2022-08-30 PROCEDURE — 85379 FIBRIN DEGRADATION QUANT: CPT

## 2022-08-30 PROCEDURE — 90792 PSYCH DIAG EVAL W/MED SRVCS: CPT | Performed by: PSYCHIATRY & NEUROLOGY

## 2022-08-30 PROCEDURE — 86140 C-REACTIVE PROTEIN: CPT

## 2022-08-30 PROCEDURE — 82947 ASSAY GLUCOSE BLOOD QUANT: CPT

## 2022-08-30 PROCEDURE — 83615 LACTATE (LD) (LDH) ENZYME: CPT

## 2022-08-30 PROCEDURE — 6370000000 HC RX 637 (ALT 250 FOR IP): Performed by: FAMILY MEDICINE

## 2022-08-30 PROCEDURE — 83036 HEMOGLOBIN GLYCOSYLATED A1C: CPT

## 2022-08-30 PROCEDURE — 6360000002 HC RX W HCPCS: Performed by: STUDENT IN AN ORGANIZED HEALTH CARE EDUCATION/TRAINING PROGRAM

## 2022-08-30 PROCEDURE — 93010 ELECTROCARDIOGRAM REPORT: CPT | Performed by: INTERNAL MEDICINE

## 2022-08-30 PROCEDURE — 2580000003 HC RX 258: Performed by: STUDENT IN AN ORGANIZED HEALTH CARE EDUCATION/TRAINING PROGRAM

## 2022-08-30 RX ORDER — OXYCODONE HYDROCHLORIDE AND ACETAMINOPHEN 5; 325 MG/1; MG/1
1 TABLET ORAL ONCE
Status: COMPLETED | OUTPATIENT
Start: 2022-08-30 | End: 2022-08-30

## 2022-08-30 RX ORDER — M-VIT,TX,IRON,MINS/CALC/FOLIC 27MG-0.4MG
1 TABLET ORAL DAILY
Status: DISCONTINUED | OUTPATIENT
Start: 2022-08-30 | End: 2022-09-04 | Stop reason: HOSPADM

## 2022-08-30 RX ORDER — IPRATROPIUM BROMIDE AND ALBUTEROL SULFATE 2.5; .5 MG/3ML; MG/3ML
1 SOLUTION RESPIRATORY (INHALATION)
Status: DISCONTINUED | OUTPATIENT
Start: 2022-08-30 | End: 2022-09-04 | Stop reason: HOSPADM

## 2022-08-30 RX ORDER — CALCIUM CITRATE/VITAMIN D3 200MG-6.25
2 TABLET ORAL DAILY
Status: DISCONTINUED | OUTPATIENT
Start: 2022-08-30 | End: 2022-08-30 | Stop reason: CLARIF

## 2022-08-30 RX ORDER — DEXAMETHASONE SODIUM PHOSPHATE 10 MG/ML
6 INJECTION, SOLUTION INTRAMUSCULAR; INTRAVENOUS ONCE
Status: COMPLETED | OUTPATIENT
Start: 2022-08-30 | End: 2022-08-30

## 2022-08-30 RX ORDER — SODIUM CHLORIDE 0.9 % (FLUSH) 0.9 %
5-40 SYRINGE (ML) INJECTION EVERY 12 HOURS SCHEDULED
Status: DISCONTINUED | OUTPATIENT
Start: 2022-08-30 | End: 2022-09-04 | Stop reason: HOSPADM

## 2022-08-30 RX ORDER — MAGNESIUM OXIDE 400 MG/1
400 TABLET ORAL DAILY
Status: DISCONTINUED | OUTPATIENT
Start: 2022-08-30 | End: 2022-08-30 | Stop reason: RX

## 2022-08-30 RX ORDER — ALBUTEROL SULFATE 90 UG/1
2 AEROSOL, METERED RESPIRATORY (INHALATION)
Status: DISCONTINUED | OUTPATIENT
Start: 2022-08-30 | End: 2022-09-04 | Stop reason: HOSPADM

## 2022-08-30 RX ORDER — BUMETANIDE 1 MG/1
2 TABLET ORAL DAILY
Status: DISCONTINUED | OUTPATIENT
Start: 2022-08-30 | End: 2022-09-04 | Stop reason: HOSPADM

## 2022-08-30 RX ORDER — ISOSORBIDE MONONITRATE 30 MG/1
30 TABLET, EXTENDED RELEASE ORAL DAILY
Status: DISCONTINUED | OUTPATIENT
Start: 2022-08-30 | End: 2022-09-04 | Stop reason: HOSPADM

## 2022-08-30 RX ORDER — SODIUM CHLORIDE 0.9 % (FLUSH) 0.9 %
5-40 SYRINGE (ML) INJECTION PRN
Status: DISCONTINUED | OUTPATIENT
Start: 2022-08-30 | End: 2022-09-04 | Stop reason: HOSPADM

## 2022-08-30 RX ORDER — POLYETHYLENE GLYCOL 3350 17 G/17G
17 POWDER, FOR SOLUTION ORAL DAILY PRN
Status: DISCONTINUED | OUTPATIENT
Start: 2022-08-30 | End: 2022-09-04 | Stop reason: HOSPADM

## 2022-08-30 RX ORDER — ACETAMINOPHEN 325 MG/1
650 TABLET ORAL EVERY 6 HOURS PRN
Status: DISCONTINUED | OUTPATIENT
Start: 2022-08-30 | End: 2022-09-04 | Stop reason: HOSPADM

## 2022-08-30 RX ORDER — LANOLIN ALCOHOL/MO/W.PET/CERES
400 CREAM (GRAM) TOPICAL DAILY
Status: DISCONTINUED | OUTPATIENT
Start: 2022-08-30 | End: 2022-09-04 | Stop reason: HOSPADM

## 2022-08-30 RX ORDER — DEXAMETHASONE 6 MG/1
6 TABLET ORAL DAILY
Status: DISCONTINUED | OUTPATIENT
Start: 2022-08-30 | End: 2022-09-04 | Stop reason: HOSPADM

## 2022-08-30 RX ORDER — SODIUM CHLORIDE 9 MG/ML
INJECTION, SOLUTION INTRAVENOUS PRN
Status: DISCONTINUED | OUTPATIENT
Start: 2022-08-30 | End: 2022-09-04 | Stop reason: HOSPADM

## 2022-08-30 RX ORDER — NALBUPHINE HCL 10 MG/ML
10 AMPUL (ML) INJECTION EVERY 8 HOURS PRN
Status: DISCONTINUED | OUTPATIENT
Start: 2022-08-30 | End: 2022-08-31

## 2022-08-30 RX ORDER — DEXTROSE MONOHYDRATE 100 MG/ML
INJECTION, SOLUTION INTRAVENOUS CONTINUOUS PRN
Status: DISCONTINUED | OUTPATIENT
Start: 2022-08-30 | End: 2022-09-04 | Stop reason: HOSPADM

## 2022-08-30 RX ORDER — CLOPIDOGREL BISULFATE 75 MG/1
75 TABLET ORAL DAILY
Status: DISCONTINUED | OUTPATIENT
Start: 2022-08-30 | End: 2022-09-04 | Stop reason: HOSPADM

## 2022-08-30 RX ORDER — BENZONATATE 100 MG/1
100 CAPSULE ORAL 3 TIMES DAILY PRN
Status: DISCONTINUED | OUTPATIENT
Start: 2022-08-30 | End: 2022-08-30

## 2022-08-30 RX ORDER — ENOXAPARIN SODIUM 100 MG/ML
30 INJECTION SUBCUTANEOUS 2 TIMES DAILY
Status: DISCONTINUED | OUTPATIENT
Start: 2022-08-30 | End: 2022-08-30

## 2022-08-30 RX ORDER — VILAZODONE HYDROCHLORIDE 10 MG/1
20 TABLET ORAL DAILY
Status: DISCONTINUED | OUTPATIENT
Start: 2022-08-30 | End: 2022-09-04 | Stop reason: HOSPADM

## 2022-08-30 RX ORDER — BENZONATATE 100 MG/1
100 CAPSULE ORAL 3 TIMES DAILY PRN
Status: DISCONTINUED | OUTPATIENT
Start: 2022-08-30 | End: 2022-08-30 | Stop reason: SDUPTHER

## 2022-08-30 RX ORDER — GUAIFENESIN DEXTROMETHORPHAN HYDROBROMIDE ORAL SOLUTION 10; 100 MG/5ML; MG/5ML
5 SOLUTION ORAL EVERY 4 HOURS PRN
Status: DISCONTINUED | OUTPATIENT
Start: 2022-08-30 | End: 2022-09-04 | Stop reason: HOSPADM

## 2022-08-30 RX ORDER — INSULIN LISPRO 100 [IU]/ML
0-4 INJECTION, SOLUTION INTRAVENOUS; SUBCUTANEOUS NIGHTLY
Status: DISCONTINUED | OUTPATIENT
Start: 2022-08-30 | End: 2022-09-04 | Stop reason: HOSPADM

## 2022-08-30 RX ORDER — CETIRIZINE HYDROCHLORIDE 10 MG/1
10 TABLET ORAL DAILY
Status: DISCONTINUED | OUTPATIENT
Start: 2022-08-30 | End: 2022-09-04 | Stop reason: HOSPADM

## 2022-08-30 RX ORDER — PROMETHAZINE HYDROCHLORIDE 25 MG/ML
6.25 INJECTION, SOLUTION INTRAMUSCULAR; INTRAVENOUS EVERY 6 HOURS PRN
Status: DISCONTINUED | OUTPATIENT
Start: 2022-08-30 | End: 2022-09-04 | Stop reason: HOSPADM

## 2022-08-30 RX ORDER — INSULIN LISPRO 100 [IU]/ML
0-16 INJECTION, SOLUTION INTRAVENOUS; SUBCUTANEOUS
Status: DISCONTINUED | OUTPATIENT
Start: 2022-08-30 | End: 2022-09-04 | Stop reason: HOSPADM

## 2022-08-30 RX ORDER — BENZONATATE 100 MG/1
100 CAPSULE ORAL 3 TIMES DAILY
Status: DISCONTINUED | OUTPATIENT
Start: 2022-08-30 | End: 2022-08-31

## 2022-08-30 RX ORDER — OXYCODONE HYDROCHLORIDE AND ACETAMINOPHEN 5; 325 MG/1; MG/1
1 TABLET ORAL EVERY 4 HOURS PRN
Status: DISCONTINUED | OUTPATIENT
Start: 2022-08-30 | End: 2022-09-04 | Stop reason: HOSPADM

## 2022-08-30 RX ORDER — PANTOPRAZOLE SODIUM 40 MG/1
40 TABLET, DELAYED RELEASE ORAL DAILY
Status: DISCONTINUED | OUTPATIENT
Start: 2022-08-30 | End: 2022-09-04 | Stop reason: HOSPADM

## 2022-08-30 RX ORDER — ATORVASTATIN CALCIUM 80 MG/1
80 TABLET, FILM COATED ORAL NIGHTLY
Status: DISCONTINUED | OUTPATIENT
Start: 2022-08-30 | End: 2022-09-04 | Stop reason: HOSPADM

## 2022-08-30 RX ORDER — QUETIAPINE FUMARATE 100 MG/1
150 TABLET, FILM COATED ORAL NIGHTLY
Status: DISCONTINUED | OUTPATIENT
Start: 2022-08-30 | End: 2022-08-31

## 2022-08-30 RX ORDER — ACETAMINOPHEN 650 MG/1
650 SUPPOSITORY RECTAL EVERY 6 HOURS PRN
Status: DISCONTINUED | OUTPATIENT
Start: 2022-08-30 | End: 2022-09-04 | Stop reason: HOSPADM

## 2022-08-30 RX ORDER — BUDESONIDE 0.5 MG/2ML
0.5 INHALANT ORAL 2 TIMES DAILY
Status: DISCONTINUED | OUTPATIENT
Start: 2022-08-30 | End: 2022-08-30

## 2022-08-30 RX ORDER — CARVEDILOL 12.5 MG/1
12.5 TABLET ORAL 2 TIMES DAILY WITH MEALS
Status: DISCONTINUED | OUTPATIENT
Start: 2022-08-30 | End: 2022-09-04 | Stop reason: HOSPADM

## 2022-08-30 RX ORDER — INSULIN GLARGINE 100 [IU]/ML
48 INJECTION, SOLUTION SUBCUTANEOUS 2 TIMES DAILY
Status: DISCONTINUED | OUTPATIENT
Start: 2022-08-30 | End: 2022-08-31

## 2022-08-30 RX ADMIN — OXYCODONE HYDROCHLORIDE AND ACETAMINOPHEN 1 TABLET: 5; 325 TABLET ORAL at 02:31

## 2022-08-30 RX ADMIN — NALBUPHINE HYDROCHLORIDE 10 MG: 10 INJECTION, SOLUTION INTRAMUSCULAR; INTRAVENOUS; SUBCUTANEOUS at 23:26

## 2022-08-30 RX ADMIN — SODIUM CHLORIDE, PRESERVATIVE FREE 10 ML: 5 INJECTION INTRAVENOUS at 08:05

## 2022-08-30 RX ADMIN — DEXAMETHASONE 6 MG: 6 TABLET ORAL at 08:04

## 2022-08-30 RX ADMIN — INSULIN LISPRO 16 UNITS: 100 INJECTION, SOLUTION INTRAVENOUS; SUBCUTANEOUS at 12:30

## 2022-08-30 RX ADMIN — DEXTROMETHORPHAN HYDROBROMIDE, GUAIFENESIN 5 ML: 10; 100 LIQUID ORAL at 08:04

## 2022-08-30 RX ADMIN — SODIUM CHLORIDE, PRESERVATIVE FREE 10 ML: 5 INJECTION INTRAVENOUS at 22:45

## 2022-08-30 RX ADMIN — DEXTROMETHORPHAN HYDROBROMIDE, GUAIFENESIN 5 ML: 10; 100 LIQUID ORAL at 22:23

## 2022-08-30 RX ADMIN — SODIUM CHLORIDE 1000 ML: 9 INJECTION, SOLUTION INTRAVENOUS at 00:12

## 2022-08-30 RX ADMIN — MULTIPLE VITAMINS W/ MINERALS TAB 1 TABLET: TAB at 08:04

## 2022-08-30 RX ADMIN — BENZONATATE 100 MG: 100 CAPSULE ORAL at 15:16

## 2022-08-30 RX ADMIN — BENZONATATE 100 MG: 100 CAPSULE ORAL at 08:04

## 2022-08-30 RX ADMIN — ATORVASTATIN CALCIUM 80 MG: 80 TABLET, FILM COATED ORAL at 22:19

## 2022-08-30 RX ADMIN — NALBUPHINE HYDROCHLORIDE 10 MG: 10 INJECTION, SOLUTION INTRAMUSCULAR; INTRAVENOUS; SUBCUTANEOUS at 15:18

## 2022-08-30 RX ADMIN — OXYCODONE AND ACETAMINOPHEN 1 TABLET: 5; 325 TABLET ORAL at 17:30

## 2022-08-30 RX ADMIN — CETIRIZINE HYDROCHLORIDE 10 MG: 10 TABLET, FILM COATED ORAL at 08:05

## 2022-08-30 RX ADMIN — Medication 400 MG: at 08:04

## 2022-08-30 RX ADMIN — CARVEDILOL 12.5 MG: 12.5 TABLET, FILM COATED ORAL at 18:56

## 2022-08-30 RX ADMIN — OXYCODONE AND ACETAMINOPHEN 1 TABLET: 5; 325 TABLET ORAL at 06:29

## 2022-08-30 RX ADMIN — INSULIN LISPRO 16 UNITS: 100 INJECTION, SOLUTION INTRAVENOUS; SUBCUTANEOUS at 17:05

## 2022-08-30 RX ADMIN — DEXAMETHASONE SODIUM PHOSPHATE 6 MG: 10 INJECTION, SOLUTION INTRAMUSCULAR; INTRAVENOUS at 01:10

## 2022-08-30 RX ADMIN — INSULIN LISPRO 12 UNITS: 100 INJECTION, SOLUTION INTRAVENOUS; SUBCUTANEOUS at 08:33

## 2022-08-30 RX ADMIN — BENZONATATE 100 MG: 100 CAPSULE ORAL at 22:24

## 2022-08-30 RX ADMIN — INSULIN GLARGINE 48 UNITS: 100 INJECTION, SOLUTION SUBCUTANEOUS at 22:31

## 2022-08-30 ASSESSMENT — ENCOUNTER SYMPTOMS
SHORTNESS OF BREATH: 1
NAUSEA: 0
TROUBLE SWALLOWING: 0
VOMITING: 0
COLOR CHANGE: 0
DIARRHEA: 0
COUGH: 0
ABDOMINAL PAIN: 0
BACK PAIN: 1
CHEST TIGHTNESS: 0

## 2022-08-30 ASSESSMENT — PAIN SCALES - GENERAL
PAINLEVEL_OUTOF10: 10
PAINLEVEL_OUTOF10: 8
PAINLEVEL_OUTOF10: 8
PAINLEVEL_OUTOF10: 7

## 2022-08-30 ASSESSMENT — PAIN DESCRIPTION - LOCATION
LOCATION: HEAD

## 2022-08-30 NOTE — PROGRESS NOTES
Patient refusing eliquis, patient educated on use for preventing blood clots, due to her elevated d-dimer. Dr. Keary Holstein notified.

## 2022-08-30 NOTE — PROGRESS NOTES
Patient wanting to leave AMA, educated that patient cannot do so unless cleared by psych. Patient states she will wait for psych to see her. Dr. Mian Lares notified of reason for consult.

## 2022-08-30 NOTE — PROGRESS NOTES
Pt arrived to unit from ED. Pt placed on telemetry. Call light given and pt oriented to room. Bed locked and in lowest position. Bed alarm on and safety measures in place.
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
## 2022-08-30 NOTE — PROGRESS NOTES
Patient states that she takes nubain at home for headaches and fills script through [address]. RN verified prescription with pharmacist there and received order for PRN nubain from Dr. [place].

## 2022-08-30 NOTE — PROGRESS NOTES
Patient became very upset upon being told pain medication could not be increased. Stated that she would then overdose on her own pills and took out pill box and took what she stated was a percocet in front of RN. RN educated patient on safety of only taking medication given by RN. Dr. Sudha Alanis updated on events, 1 to 1 sitter initiated and security called.
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
## 2022-08-30 NOTE — H&P
Family Medicine Admit Note    PCP: Blessing Bai MD    Date of Admission: 8/29/2022    Date of Service: Pt seen/examined on 8/30/2022 and Admitted to Inpatient     Chief Complaint:        History Of Present Illness: The patient is a 50 y.o. female who presents to [facility] with cought, congestion, chest pressure, myalgias and headache for one week. Niece that she lives with has COVID-19. On exam, she complains of headache. Lying on right side.      Past Medical History:        Diagnosis Date    Acute exacerbation of chronic obstructive pulmonary disease ([address] 3/21/2018    Adhesive capsulitis of left shoulder 9/25/2018    Asthma     Asthma exacerbation 7/24/2014    Atrial fibrillation ([address]     placed on event monitor 9/25/18 for PVC's & A-fib    Atrial premature depolarization 7/19/2019    Bipolar 1 disorder (HCC)     Bipolar disorder ([address] 7/19/2019    Bulging disc     CAD (coronary artery disease)     Candida infection 2/23/2018    Cardiomyopathy ([address]     Chest pain 6/13/2017    CHF (congestive heart failure) ([facility])     Chronic otitis media of both ears     rt>lt    Chronic right shoulder pain 3/14/2017    Cigarette nicotine dependence with nicotine-induced disorder 7/19/2019    Class 3 severe obesity due to excess calories with serious comorbidity and body mass index (BMI) of 40.0 to 44.9 in adult [facility]) 10/4/2018    Closed fracture of left ankle 6/25/2019    Clostridium difficile infection     COPD (chronic obstructive pulmonary disease) (HCC)     Cough, persistent 3/21/2018    Current moderate episode of major depressive disorder without prior episode ([address] 8/20/2018    Depression     Diabetes mellitus type 2, controlled ([address] 4/30/2014    Diarrhea     Diarrhea     Dizziness     DVT (deep venous thrombosis) ([address]     after PICC line right arm    Encounter for monitoring sotalol therapy 2/20/2017    Encounter for screening mammogram for malignant neoplasm of breast 3/7/2018 Endometriosis     Fainting     GERD (gastroesophageal reflux disease)     hx of    GI bleeding     H. pylori infection     Helicobacter pylori (H. pylori)     [place] (hard of hearing)     both ears, no hearing aids    HTN (hypertension) 7/19/2019    Hyperlipidemia     Hypertension     Left bicipital tenosynovitis 10/17/2018    Left leg pain 5/16/2017    Left sided abdominal pain of unknown cause 7/19/2016    Leg swelling 9/21/2018    Mastoiditis     Mastoiditis, acute 4/30/2014    Migraines     Morbid obesity ([address]     Myocardial infarction ([address]     2005    Nausea     Neuropathy     On home oxygen therapy     3 L at night    Ovarian cyst     Passed out     hx of- negative tilt table    Pulmonary hypertension ([address]     Pulmonary insufficiency     PVC (premature ventricular contraction)     Seasonal allergies     Tricuspid insufficiency     Type II or unspecified type diabetes mellitus without mention of complication, not stated as uncontrolled        Past Surgical History:        Procedure Laterality Date    ABDOMEN SURGERY      abcess    ABDOMINAL ADHESION SURGERY      ABDOMINAL EXPLORATION SURGERY      x 4    ABDOMINAL HERNIA REPAIR      with mesh    ABLATION OF DYSRHYTHMIC FOCUS  2016    ABLATION OF DYSRHYTHMIC FOCUS  09/08/2017    Done at [facility]. ABSCESS DRAINAGE      left hip and chest    APPENDECTOMY      BREAST SURGERY Left 2007    I & D    BRONCHOSCOPY N/A 5/23/2022    BRONCHOSCOPY performed by Joselyn Purcell MD at [facility]  2014 & 2000     no stenting    CARPAL TUNNEL RELEASE Right 12/19/2019    Ulnar nerve decompression, right elbow. Release of 1st and 2nd extensor compartments, right forearm.     CARPAL TUNNEL RELEASE  05/04/2020    Carpal tunnel release, left hand    CHOLECYSTECTOMY      COLONOSCOPY      FOOT SURGERY Left     bone fragment removed    FRACTURE SURGERY Right     closed reduction perc pinning ring & middle finger    GASTRIC FUNDOPLICATION  0702 HYSTERECTOMY (CERVIX STATUS UNKNOWN)      total    HYSTERECTOMY (CERVIX STATUS UNKNOWN)      HYSTEROSCOPY      tubal perfusion    MYRINGOTOMY Right 11/13/2015    Right myringotomy with placement of  T-tube on the right side. Removal of plugged ventilating tube, right side. MYRINGOTOMY Left 07/14/2020    MYRINGOTOMY TUBE INSERTION [facility]  Right 06/05/2014    OTHER SURGICAL HISTORY Right 05/29/2014    removal ear tube rt ear    OTHER SURGICAL HISTORY  2009    LOOP recorder inserted and removed 3 mos. later    OTHER SURGICAL HISTORY Right 08/11/2016    ear tube removal with patch    OTHER SURGICAL HISTORY      tubal perfusion, lysis of uterine adhesions    OTHER SURGICAL HISTORY      multiple PICC lines inserted and removed, it has affected circulation bilateral upper arms    OTHER SURGICAL HISTORY  10/19/2020    Revisiion to subcutaneous transposition of unlar nerve, right elbow    OTHER SURGICAL HISTORY Right 06/14/2021    REVISION TO SUBMUSCULAR TRANSPOSITION OF RIGHT ULNAR NERVE    OTHER SURGICAL HISTORY Left 03/24/2022    DECOMPRESSION OF ULNAR NERVE, LEFT ELBOW    LA MANIPULATN SHLDR JT W ANESTHESIA Right 03/01/2017    SHOULDER MANIPULATION RIGHT performed by Cecy Pederson MD at [facility] Left 10/17/2018    SHOULDER ARTHROSCOPY W/BICEPS TENDONESIS performed by Roopa Edmonds MD at [address] Left     foot    TONSILLECTOMY      TYMPANOSTOMY TUBE PLACEMENT      TYMPANOSTOMY TUBE PLACEMENT Left 03/12/2019    TYMPANOSTOMY TUBE PLACEMENT Right 12/08/2021    Right tympanostomy with tube placement of T-tube with operative microscope and general anesthesia. Right removal of right ear tube.     ULNAR TUNNEL RELEASE Right     UPPER GASTROINTESTINAL ENDOSCOPY      UPPER GASTROINTESTINAL ENDOSCOPY  07/10/2019    UPPER GASTROINTESTINAL ENDOSCOPY N/A 07/10/2019    EGD BIOPSY performed by in the morning and at bedtime If po intake poor, decrease to 20 units daily  Patient taking differently: Inject 60 Units into the skin in the morning and at bedtime 80 units in the morning, and 80 units in the evening 4/21/22   MARYANNE Amaya NP   Insulin Pen Needle (B-D ULTRAFINE III SHORT PEN) 31G X 8 MM MISC Inject 1 each into the skin daily May substitute with any brand 4/14/22   MARYANNE Amaya NP   blood glucose test strips (ONETOUCH ULTRA) strip USE TO TEST BLOOD SUGAR BEFORE MEALS, AT BEDTIME, AND AS NEEDED (up to 9 TIMES DAILY) 4/5/22   MARYANNE Amaya NP   clopidogrel (PLAVIX) 75 MG tablet Take 75 mg by mouth daily nightly 1/10/22   Historical Provider, MD   pantoprazole (PROTONIX) 40 MG tablet Take 40 mg by mouth daily BID 1/23/22   Historical Provider, MD   atorvastatin (LIPITOR) 80 MG tablet TAKE 1 TABLET BY MOUTH NIGHTLY 2/1/22   MARYANNE Jordan NP   albuterol sulfate HFA (VENTOLIN HFA) 108 (90 Base) MCG/ACT inhaler INHALE 2 PUFFS INTO LUNGS EVERY 6 HOURS AS NEEDED FOR WHEEZING 1/3/22   Breann Green MD   insulin lispro, 1 Unit Dial, (HUMALOG KWIKPEN) 100 UNIT/ML SOPN INJECT SUBCUTANEOUSLY PER SLIDING SCALE, 150-200 INJECT 3U, 201-250 INJECT 6U, 251-300 INJECT 9U, >300 INJECT 12U, MAX 30U/DAY 11/2/21   MARYANNE Jordan NP   ipratropium-albuterol (DUONEB) 0.5-2.5 (3) MG/3ML SOLN nebulizer solution INHALE 3 MLS (ONE VIAL) WITH NEBULIZER EVERY 6 HOURS AS NEEDED FOR SHORTNESS OF BREATH 11/2/21   MARYANNE Jordan NP   clotrimazole (LOTRIMIN) 1 % cream Apply topically 2 times daily. 11/2/21   MARYANNE Amaya NP   magnesium oxide (MAG-OX) 400 MG tablet Take 400 mg by mouth daily Afternoon 8/9/21   Historical Provider, MD   oxyCODONE-acetaminophen (PERCOCET) 5-325 MG per tablet Take 1 tablet by mouth every 4 hours as needed for Pain.  Indications: last fill 2/27/22 with quantity 170 for 30 days One tablet at 0700, one tablet at 1900, one tablet in the afternoon prn pain 9/6/21   Historical Provider, MD   Multiple Vitamins-Minerals (MULTIVITAMIN GUMMIES WOMENS) CHEW Take 2 each by mouth daily     Historical Provider, MD   carvedilol (COREG) 12.5 MG tablet Take 12.5 mg by mouth 2 times daily (with meals)     Historical Provider, MD       Allergies:  Latex, Aspirin, Avelox [moxifloxacin], Chantix [varenicline], Doxycycline, Dye [iodides], Flexeril [cyclobenzaprine], Gabapentin, Losartan, Morphine, Nsaids, Pcn [penicillins], Reglan [metoclopramide hcl], Shellfish-derived products, Sulfa antibiotics, Toradol [ketorolac tromethamine], Vancomycin, Zofran, Zyvox [linezolid], Acyclovir, Bactrim [sulfamethoxazole-trimethoprim], Betadine [povidone iodine], Ceclor [cefaclor], Codeine, Macrolides and ketolides, Novolin r [insulin], Novolog [insulin aspart], Phenothiazines, Tape [adhesive tape], Banana, Compazine [prochlorperazine], Fentanyl, Kiwi extract, Tamiflu [oseltamivir phosphate], Clindamycin/lincomycin, and Sotalol    Social History:  The patient currently lives at home    TOBACCO:   reports that she has been smoking cigarettes. She has a 11.50 pack-year smoking history. She has never used smokeless tobacco.  ETOH:   reports no history of alcohol use.       Family History:          Problem Relation Age of Onset    Ulcerative Colitis Father     Liver Disease Father 61        hep c and b    Diabetes Father     Asthma Father     Heart Disease Father     High Blood Pressure Father     Breast Cancer Maternal Aunt     Cancer Maternal Aunt     Diabetes Maternal Aunt     Heart Disease Maternal Aunt     High Blood Pressure Maternal Aunt     Breast Cancer Paternal Aunt     Heart Disease Paternal Aunt     High Blood Pressure Paternal Aunt     Breast Cancer Maternal Grandmother     Cervical Cancer Maternal Grandmother     Cancer Maternal Grandmother     Diabetes Maternal Grandmother     Asthma Maternal Grandmother     Heart Disease Maternal Grandmother     High Blood Pressure Maternal Grandmother Lung Cancer Maternal Grandfather     Diabetes Maternal Grandfather     Asthma Maternal Grandfather     Heart Disease Maternal Grandfather     High Blood Pressure Maternal Grandfather     Cervical Cancer Paternal Grandmother     Cancer Paternal Grandmother     Diabetes Paternal Grandmother     Heart Disease Paternal Grandmother     High Blood Pressure Paternal Grandmother     Diabetes Mother     Asthma Mother     Heart Disease Mother     High Blood Pressure Mother     Cancer Sister     Heart Disease Maternal Uncle     High Blood Pressure Maternal Uncle     Heart Disease Paternal Uncle     High Blood Pressure Paternal Uncle     Diabetes Paternal Grandfather     Heart Disease Paternal Grandfather     High Blood Pressure Paternal Grandfather        PHYSICAL EXAM:    BP (!) 142/78   Pulse 78   Temp 98.3 °F (36.8 °C) (Oral)   Resp 22   Ht 5' 4\" (1.626 m)   Wt 275 lb 9.2 oz (125 kg)   SpO2 90%   BMI 47.30 kg/m²     General appearance: No apparent distress appears stated age and cooperative. HEENT Normal cephalic, atraumatic without obvious deformity. Neck: Supple, No jugular venous distention/bruits. Lungs: diminished throughout  Heart: Regular rate and rhythm with Normal S1/S2 without murmurs, rubs or gallops  Mental status: Alert, oriented, thought content appropriate. CXR:  I have reviewed the CXR with the following interpretation:   Prominent bibasilar interstitial changes with some bronchial thickening which   can be seen with peribronchial and dependent edema, though a similar pattern   can be seen with bronchitis and bronchopneumonia.      EKG:  I have reviewed the EKG with the following interpretation: not scanned into record    CBC   Recent Labs     08/29/22  0012   WBC 6.5   HGB 12.9   HCT 39.6         RENAL  Recent Labs     08/29/22  0012      K 3.4*      CO2 26   BUN 10   CREATININE 0.81     LFT'S  No results for input(s): AST, ALT, ALB, BILIDIR, BILITOT, ALKPHOS in the last 72 hours. COAG  No results for input(s): INR in the last 72 hours. CARDIAC ENZYMES  No results for input(s): CKTOTAL, CKMB, CKMBINDEX, TROPONINI in the last 72 hours. U/A:    Lab Results   Component Value Date/Time    COLORU Yellow 11/11/2021 10:56 PM    WBCUA 2 TO 5 07/17/2019 08:00 PM    RBCUA 0 TO 2 07/17/2019 08:00 PM    MUCUS 2+ 07/17/2019 08:00 PM    BACTERIA MANY 07/17/2019 08:00 PM    SPECGRAV 1.019 11/11/2021 10:56 PM    LEUKOCYTESUR NEGATIVE 11/11/2021 10:56 PM    GLUCOSEU NEGATIVE 11/11/2021 10:56 PM    AMORPHOUS NOT REPORTED 07/17/2019 08:00 PM       ABG    Lab Results   Component Value Date/Time    CEZ7YZQ 26.1 10/16/2013 05:16 PM    M7JHHPHU 98.4 10/16/2013 05:16 PM    PHART 7.41 10/16/2013 05:16 PM    SWP1BIH 42 10/16/2013 05:16 PM    PO2ART 86 10/16/2013 05:16 PM           Active Hospital Problems    Diagnosis Date Noted    COVID-19 [U07.1] 08/30/2022     Priority: Medium    Chronic systolic CHF (congestive heart failure) ([facility] [I50.22] 08/30/2022     Priority: Medium    Moderate left ventricular systolic dysfunction [Q07.7] 01/07/2022     Priority: Medium    Uncontrolled type 2 diabetes mellitus with hyperglycemia ([facility] [E11.65] 04/05/2022    Recurrent moderate major depressive disorder with anxiety (HCC) [F33.1, F41.9] 04/05/2022    Pulmonary HTN ([facility] [I27.20] 07/19/2019    KRISTIN (obstructive sleep apnea) [G47.33] 07/24/2014    Diabetes mellitus type 2, controlled ([facility] [E11.9] 04/30/2014    COPD (chronic obstructive pulmonary disease) ([facility] [J44.9] 04/30/2014         ASSESSMENT/PLAN:  COVID-19 - consult pulmonology, ID. Decadron, oxygen as needed. Diabetes - home meds, scale insulin as needed    DVT Prophylaxis: enoxaparin  Diet: ADULT DIET;  Regular; 5 carb choices (75 gm/meal)  Code Status: Full Code      Dispo - admitted      July Morgan MD, FAAFP  8/30/2022, 8:09 AM

## 2022-08-30 NOTE — ED NOTES
Patient Spo2 on room air is about  90%,   Patient put on 02 liter oxygen as ordered by Dr Bita Peres .    Oxygen  Sats improved to 94% with 02 liter  Oxygen wih nasal cannula      Lynda Miranda RN  08/30/22 [facility]  08/30/22 [facility], RN  08/30/22 [id number]

## 2022-08-30 NOTE — ED PROVIDER NOTES
EMERGENCY DEPARTMENT ENCOUNTER   ATTENDING ATTESTATION     Pt Name: Mike Leonardo  MRN: [id number]  [place] 1975  Date of evaluation: 8/30/22       Mike Leonardo is a 50 y.o. female who presents with Concern For COVID-19      MDM:   Fatigue, myalgias, cough, congestion chest sore, sore throat  Niece has covid, they live together  Do not suspect PE or AMI or sepsis or acs    Vitals:   Vitals:    08/29/22 2217   BP: (!) 107/57   Pulse: 90   Resp: 18   Temp: 100.4 °F (38 °C)   TempSrc: Oral   SpO2: 90%   Weight: 270 lb (122.5 kg)   Height: 5' 4\" (1.626 m)         I personally saw and examined the patient. I have reviewed and agree with the resident's findings, including all diagnostic interpretations and treatment plan as written. I was present for the key portions of any procedures performed and the inclusive time noted for any critical care statement. The care is provided during an unprecedented national emergency due to the novel coronavirus, COVID 19.   Jose Logan MD  Attending Emergency Physician           Jose Logan MD  08/30/22 4215    DW Dr Fozia Hilton, she is admitting her and will consult Dr Keary Holstein, she is writing orders     Jose Logan MD  08/30/22 [id number]

## 2022-08-30 NOTE — PROGRESS NOTES
Writer informed by patients nurse that patient had her own medication box with percocet in it and that patient states \" she will take her own medication and just overdose herself. \" Writer informed nurse that she would now need to be on suicidal precaution for suicide watch. Patient refused to hand medication box over to the nurse. Security called to the unit to remove the patient belongings. Patient then proceeded to state \"we can not legally take her belongings\" security removed her personal items from room. Stated that she was going to leave AMA. Informed patient that she has to stay here until psych is able to come and see her and clear her from these precautions. She stated that she would like her cell phone and [facility] is okay with her having that. Patient currently has a one on one at bedside with her. Stated she is not going to take any medications while she is waiting to be cleared and then she will be leaving AMA. During this time patient begin to throw her mask and some belongings at [facility], saying \"since your taking everything, here take this and this\" Writer informed RN in charge of patient the situation and she will keep in touch with the doctors about patient care.

## 2022-08-30 NOTE — ED PROVIDER NOTES
[facility]  Emergency Department Encounter  EmergencyMedicine Resident     Pt Juanita Pro  MRN: [id number]  Panch[place]fumm 1975  Date of evaluation: 8/29/22  PCP:  Raulito Batres MD    This patient was evaluated in the Emergency Department for symptoms described in the history of present illness. The patient was evaluated in the context of the global COVID-19 pandemic, which necessitated consideration that the patient might be at risk for infection with the SARS-CoV-2 virus that causes COVID-19. Institutional protocols and algorithms that pertain to the evaluation of patients at risk for COVID-19 are in a state of rapid change based on information released by regulatory bodies including the CDC and federal and state organizations. These policies and algorithms were followed during the patient's care in the ED. CHIEF COMPLAINT       Chief Complaint   Patient presents with    Concern For COVID-19       HISTORY OF PRESENT ILLNESS  (Location/Symptom, Timing/Onset, Context/Setting, Quality, Duration, Modifying Factors, Severity.)      Ambrosio Solis is a 50 y.o. female who presents with history of COPD, coronary artery disease, CHF presents with cough congestion chest pressure, myalgias and headache for the past week worsening over the past couple of days. Patient states that she has a family member who has tested positive for COVID. Patient states that she \"feels like she got hit by a vehicle. \"  Patient is currently febrile to 100.4, reports fevers up to 101-102 at home. Patient does not normally wear oxygen at baseline at home.       PAST MEDICAL / SURGICAL / SOCIAL / FAMILY HISTORY      has a past medical history of Acute exacerbation of chronic obstructive pulmonary disease (HCC), Adhesive capsulitis of left shoulder, Asthma, Asthma exacerbation, Atrial fibrillation (HCC), Atrial premature depolarization, Bipolar 1 disorder ([address] Bipolar disorder ([address] Bulging disc, CAD (coronary artery disease), Candida infection, Cardiomyopathy ([address] Chest pain, CHF (congestive heart failure) ([facility]), Chronic otitis media of both ears, Chronic right shoulder pain, Cigarette nicotine dependence with nicotine-induced disorder, Class 3 severe obesity due to excess calories with serious comorbidity and body mass index (BMI) of 40.0 to 44.9 in [facility]), Closed fracture of left ankle, Clostridium difficile infection, COPD (chronic obstructive pulmonary disease) (HCC), Cough, persistent, Current moderate episode of major depressive disorder without prior episode ([address] Depression, Diabetes mellitus type 2, controlled ([address] Diarrhea, Diarrhea, Dizziness, DVT (deep venous thrombosis) ([address] Encounter for monitoring sotalol therapy, Encounter for screening mammogram for malignant neoplasm of breast, Endometriosis, Fainting, GERD (gastroesophageal reflux disease), GI bleeding, H. pylori infection, Helicobacter pylori (H. pylori), Lime (hard of hearing), HTN (hypertension), Hyperlipidemia, Hypertension, Left bicipital tenosynovitis, Left leg pain, Left sided abdominal pain of unknown cause, Leg swelling, Mastoiditis, Mastoiditis, acute, Migraines, Morbid obesity ([address] Myocardial infarction ([address] Nausea, Neuropathy, On home oxygen therapy, Ovarian cyst, Passed out, Pulmonary hypertension ([address] Pulmonary insufficiency, PVC (premature ventricular contraction), Seasonal allergies, Tricuspid insufficiency, and Type II or unspecified type diabetes mellitus without mention of complication, not stated as uncontrolled. has a past surgical history that includes Hysterectomy; Cholecystectomy; Appendectomy; Colonoscopy; Upper gastrointestinal endoscopy; Abdomen surgery; Abdominal adhesion surgery; Abdominal exploration surgery; Tympanostomy tube placement; Tonsillectomy; Foot surgery (Left); Abdominal hernia repair; hysteroscopy; Gastric fundoplication (2392);  Abscess Drainage; Scaphoid fracture surgery (Left); other surgical history (Right, 05/29/2014); Myringotomy Tympanostomy Tube Placement (Right, 06/05/2014); myringotomy (Right, 11/13/2015); Hysterectomy; Cardiac catheterization (2014 & 2000); other surgical history (2009); Breast surgery (Left, 2007); fracture surgery (Right); other surgical history (Right, 08/11/2016); ablation of dysrhythmic focus (2016); other surgical history; other surgical history; pr manipulatn razr jt w anesthesia (Right, 03/01/2017); ablation of dysrhythmic focus (09/08/2017); pr shoulder scope bone shaving (Left, 10/17/2018); Tympanostomy tube placement (Left, 03/12/2019); Upper gastrointestinal endoscopy (07/10/2019); Upper gastrointestinal endoscopy (N/A, 07/10/2019); Carpal tunnel release (Right, 12/19/2019); Carpal tunnel release (05/04/2020); Ulnar tunnel release (Right); myringotomy (Left, 07/14/2020); other surgical history (10/19/2020); other surgical history (Right, 06/14/2021); Tympanostomy tube placement (Right, 12/08/2021); other surgical history (Left, 03/24/2022); Ventriculoperitoneal shunt (Right, 04/21/2022); and bronchoscopy (N/A, 5/23/2022).       Social History     Socioeconomic History    Marital status: Single     Spouse name: Not on file    Number of children: Not on file    Years of education: Not on file    Highest education level: Not on file   Occupational History     Employer: NONE   Tobacco Use    Smoking status: Every Day     Packs/day: 0.50     Years: 23.00     Pack years: 11.50     Types: Cigarettes    Smokeless tobacco: Never   Vaping Use    Vaping Use: Never used   Substance and Sexual Activity    Alcohol use: No     Alcohol/week: 0.0 standard drinks    Drug use: No    Sexual activity: Not on file   Other Topics Concern    Not on file   Social History Narrative    Not on file     Social Determinants of Health     Financial Resource Strain: Not on file   Food Insecurity: Not on file   Transportation Needs: Not on file   Physical Activity: Not on file   Stress: Not on file   Social Connections: Not on file   Intimate Partner Violence: Not on file   Housing Stability: Not on file       Family History   Problem Relation Age of Onset    Ulcerative Colitis Father     Liver Disease Father 61        hep c and b    Diabetes Father     Asthma Father     Heart Disease Father     High Blood Pressure Father     Breast Cancer Maternal Aunt     Cancer Maternal Aunt     Diabetes Maternal Aunt     Heart Disease Maternal Aunt     High Blood Pressure Maternal Aunt     Breast Cancer Paternal Aunt     Heart Disease Paternal Aunt     High Blood Pressure Paternal Aunt     Breast Cancer Maternal Grandmother     Cervical Cancer Maternal Grandmother     Cancer Maternal Grandmother     Diabetes Maternal Grandmother     Asthma Maternal Grandmother     Heart Disease Maternal Grandmother     High Blood Pressure Maternal Grandmother     Lung Cancer Maternal Grandfather     Diabetes Maternal Grandfather     Asthma Maternal Grandfather     Heart Disease Maternal Grandfather     High Blood Pressure Maternal Grandfather     Cervical Cancer Paternal Grandmother     Cancer Paternal Grandmother     Diabetes Paternal Grandmother     Heart Disease Paternal Grandmother     High Blood Pressure Paternal Grandmother     Diabetes Mother     Asthma Mother     Heart Disease Mother     High Blood Pressure Mother     Cancer Sister     Heart Disease Maternal Uncle     High Blood Pressure Maternal Uncle     Heart Disease Paternal Uncle     High Blood Pressure Paternal Uncle     Diabetes Paternal Grandfather     Heart Disease Paternal Grandfather     High Blood Pressure Paternal Grandfather        Allergies:  Latex, Aspirin, Avelox [moxifloxacin], Chantix [varenicline], Doxycycline, Dye [iodides], Flexeril [cyclobenzaprine], Gabapentin, Losartan, Morphine, Nsaids, Pcn [penicillins], Reglan [metoclopramide hcl], Shellfish-derived products, Sulfa antibiotics, Toradol [ketorolac tromethamine], Vancomycin, Zofran, Zyvox [linezolid], Acyclovir, Bactrim [sulfamethoxazole-trimethoprim], Betadine [povidone iodine], Ceclor [cefaclor], Codeine, Macrolides and ketolides, Novolin r [insulin], Novolog [insulin aspart], Phenothiazines, Tape [adhesive tape], Banana, Compazine [prochlorperazine], Fentanyl, Kiwi extract, Tamiflu [oseltamivir phosphate], Clindamycin/lincomycin, and Sotalol    Home Medications:  Prior to Admission medications    Medication Sig Start Date End Date Taking? Authorizing Provider   benzonatate (TESSALON) 100 MG capsule Take 1 capsule by mouth 3 times daily as needed for Cough 8/25/22 9/1/22  Holland Smith MD   predniSONE (DELTASONE) 10 MG tablet Take 4 tablets by mouth daily for 5 days 8/25/22 8/30/22  Holland Smith MD   azithromycin (ZITHROMAX) 250 MG tablet 2 tablets now then 1 daily until gone. 8/23/22 9/2/22  Brody Fagan MD   cetirizine (ZYRTEC) 10 MG tablet Take 1 tablet by mouth in the morning.  8/8/22 9/7/22  Brody Fagan MD   QUEtiapine (SEROQUEL) 100 MG tablet TAKE 1 AND 1/2 TABLETS BY MOUTH AT BEDTIME Patient has 50 mg tabs 7/7/22   Brody Fagan MD   bumetanide (BUMEX) 2 MG tablet TAKE 1 TABLET BY MOUTH EVERY DAY 6/24/22   Brody Fagan MD   isosorbide mononitrate (IMDUR) 30 MG extended release tablet TAKE 1 TABLET BY MOUTH EVERY DAY 6/24/22   Brody Fagan MD   budesonide (PULMICORT) 0.5 MG/2ML nebulizer suspension Take 2 mLs by nebulization 2 times daily 5/26/22   Brody Fagan MD   colchicine (COLCRYS) 0.6 MG tablet Take 1 tablet by mouth 2 times daily for 46 doses 5/26/22 6/20/22  Brody Fagan MD   dornase alpha (PULMOZYME) 2.5 MG/2.5ML nebulizer solution Inhale 2.5 mg into the lungs 2 times daily 5/26/22   Brody Fagan MD   VILAZODONE HCL 20 MG Tablet TAKE 1 TABLET BY MOUTH EVERY DAY  Patient taking differently: New medication, hasn't started this medication yet 5/10/22   Brody Fagan MD   DULoxetine (CYMBALTA) 60 MG extended release capsule TAKE 1 CAPSULE BY MOUTH 2 TIMES PER DAY 4/22/22   MARYANNE Chavez NP   Insulin Degludec (TRESIBA FLEXTOUCH) 200 UNIT/ML SOPN Inject 60 Units into the skin in the morning and at bedtime If po intake poor, decrease to 20 units daily  Patient taking differently: Inject 60 Units into the skin in the morning and at bedtime 80 units in the morning, and 80 units in the evening 4/21/22   MARYANNE Chavez NP   Insulin Pen Needle (B-D ULTRAFINE III SHORT PEN) 31G X 8 MM MISC Inject 1 each into the skin daily May substitute with any brand 4/14/22   MARYANNE Chavez NP   blood glucose test strips (ONETOUCH ULTRA) strip USE TO TEST BLOOD SUGAR BEFORE MEALS, AT BEDTIME, AND AS NEEDED (up to 9 TIMES DAILY) 4/5/22   MARYANNE Chavez NP   clopidogrel (PLAVIX) 75 MG tablet Take 75 mg by mouth daily nightly 1/10/22   Historical Provider, MD   pantoprazole (PROTONIX) 40 MG tablet Take 40 mg by mouth daily BID 1/23/22   Historical Provider, MD   atorvastatin (LIPITOR) 80 MG tablet TAKE 1 TABLET BY MOUTH NIGHTLY 2/1/22   MARYANNE Jordan NP   albuterol sulfate HFA (VENTOLIN HFA) 108 (90 Base) MCG/ACT inhaler INHALE 2 PUFFS INTO LUNGS EVERY 6 HOURS AS NEEDED FOR WHEEZING 1/3/22   Leslie Farfan MD   insulin lispro, 1 Unit Dial, (HUMALOG KWIKPEN) 100 UNIT/ML SOPN INJECT SUBCUTANEOUSLY PER SLIDING SCALE, 150-200 INJECT 3U, 201-250 INJECT 6U, 251-300 INJECT 9U, >300 INJECT 12U, MAX 30U/DAY 11/2/21   MARYANNE Jordan NP   ipratropium-albuterol (DUONEB) 0.5-2.5 (3) MG/3ML SOLN nebulizer solution INHALE 3 MLS (ONE VIAL) WITH NEBULIZER EVERY 6 HOURS AS NEEDED FOR SHORTNESS OF BREATH 11/2/21   MARYANNE Jordan NP   clotrimazole (LOTRIMIN) 1 % cream Apply topically 2 times daily.  11/2/21   MARYANNE Chavez NP   magnesium oxide (MAG-OX) 400 MG tablet Take 400 mg by mouth daily Afternoon 8/9/21   Historical Provider, MD   oxyCODONE-acetaminophen (PERCOCET) 5-325 MG per tablet Take 1 tablet by mouth every 4 hours as needed for Pain. Indications: last fill 2/27/22 with quantity 170 for 30 days One tablet at 0700, one tablet at 1900, one tablet in the afternoon prn pain 9/6/21   Historical Provider, MD   Multiple Vitamins-Minerals (MULTIVITAMIN GUMMIES WOMENS) CHEW Take 2 each by mouth daily     Historical Provider, MD   carvedilol (COREG) 12.5 MG tablet Take 12.5 mg by mouth 2 times daily (with meals)     Historical Provider, MD       REVIEW OF SYSTEMS    (2-9 systems for level 4, 10 or more for level 5)      Review of Systems   Constitutional:  Positive for fatigue and fever. Negative for chills. HENT:  Negative for congestion and trouble swallowing. Eyes:  Negative for visual disturbance. Respiratory:  Positive for shortness of breath. Negative for cough and chest tightness. Cardiovascular:  Positive for chest pain. Gastrointestinal:  Negative for abdominal pain, diarrhea, nausea and vomiting. Endocrine: Negative for polyuria. Genitourinary:  Negative for dysuria, flank pain, hematuria and urgency. Musculoskeletal:  Positive for arthralgias, back pain and myalgias. Skin:  Negative for color change. Allergic/Immunologic: Negative for immunocompromised state. Neurological:  Positive for dizziness and light-headedness. Negative for syncope, weakness and headaches. PHYSICAL EXAM   (up to 7 for level 4, 8 or more for level 5)      INITIAL VITALS:   BP (!) 142/78   Pulse 78   Temp 98.3 °F (36.8 °C) (Oral)   Resp 22   Ht 5' 4\" (1.626 m)   Wt 275 lb 9.2 oz (125 kg)   SpO2 90%   BMI 47.30 kg/m²     Physical Exam  Constitutional:       General: She is not in acute distress. Appearance: Normal appearance. She is obese. She is ill-appearing. She is not toxic-appearing. HENT:      Head: Normocephalic and atraumatic. Nose: No congestion.       Mouth/Throat:      Mouth: Mucous membranes are moist.   Eyes:      Conjunctiva/sclera: Conjunctivae normal.   Cardiovascular: Rate and Rhythm: Normal rate and regular rhythm. Pulses: Normal pulses. Heart sounds: Normal heart sounds. No murmur heard. No gallop. Pulmonary:      Effort: Pulmonary effort is normal. No respiratory distress. Breath sounds: Normal breath sounds. No stridor. No wheezing or rhonchi. Chest:      Chest wall: No tenderness. Abdominal:      General: Abdomen is flat. There is no distension. Palpations: There is no mass. Tenderness: There is no abdominal tenderness. There is no guarding or rebound. Musculoskeletal:         General: No swelling, tenderness or deformity. Skin:     General: Skin is warm. Coloration: Skin is not jaundiced. Findings: No bruising. Neurological:      General: No focal deficit present. Mental Status: She is alert. DIFFERENTIAL  DIAGNOSIS     PLAN (LABS / IMAGING / EKG):  Orders Placed This Encounter   Procedures    COVID-19 & Influenza Combo    C DIFF TOXIN/ANTIGEN    XR CHEST PORTABLE    Troponin    CBC with Auto Differential    Basic Metabolic Panel    Troponin    Hemoglobin A1c    Vitamin D 25 Hydroxy    CBC with Auto Differential    Comprehensive Metabolic Panel w/ Reflex to MG    APTT    Protime-INR    Fibrinogen    Procalcitonin    C-Reactive Protein    Lactate Dehydrogenase    Ferritin    D-Dimer, Quantitative    Troponin    Lactic Acid    ADULT DIET;  Regular; 5 carb choices (75 gm/meal)    Notify Provider    HYPOGLYCEMIA TREATMENT: blood glucose LESS THAN 70 mg/dL and patient ALERT and TOLERATING PO    HYPOGLYCEMIA TREATMENT: blood glucose LESS THAN 70 mg/dL and patient NOT ALERT or NPO    Vital signs per unit routine    PPE Instructions    Up as tolerated    Prone Positioning (as tolerated)    Telemetry monitoring - continuous duration    Full Code    Inpatient consult to Primary Care Provider    Consult to Infectious Disease    Consult to Pulmonology    Inpatient consult to Psychiatry    Droplet Plus Isolation    Droplet Plus Isolation    C diff contact isolation    Initiate Oxygen Therapy Protocol    POCT glucose    POCT Glucose    EKG 12 Lead    ADMIT TO INPATIENT       MEDICATIONS ORDERED:  Orders Placed This Encounter   Medications    acetaminophen (TYLENOL) tablet 500 mg    promethazine (PHENERGAN) injection 12.5 mg    0.9 % sodium chloride bolus    dexamethasone (PF) (DECADRON) injection 6 mg    oxyCODONE-acetaminophen (PERCOCET) 5-325 MG per tablet 1 tablet    DISCONTD: benzonatate (TESSALON) capsule 100 mg    budesonide (PULMICORT) nebulizer suspension 500 mcg    bumetanide (BUMEX) tablet 2 mg    carvedilol (COREG) tablet 12.5 mg    cetirizine (ZYRTEC) tablet 10 mg    clopidogrel (PLAVIX) tablet 75 mg    DISCONTD: dornase alpha (PULMOZYME) nebulizer solution 2.5 mg    DISCONTD: Insulin Degludec SOPN 60 Units    ipratropium-albuterol (DUONEB) nebulizer solution 1 ampule     Order Specific Question:   Initiate RT Bronchodilator Protocol     Answer:   Yes - Inpatient Protocol    isosorbide mononitrate (IMDUR) extended release tablet 30 mg    DISCONTD: magnesium oxide (MAG-OX) tablet 400 mg    DISCONTD: Multivitamin Gummies Womens CHEW 2 each    pantoprazole (PROTONIX) tablet 40 mg    oxyCODONE-acetaminophen (PERCOCET) 5-325 MG per tablet 1 tablet    QUEtiapine (SEROQUEL) tablet 150 mg    vilazodone HCl (VIIBRYD) TABS 20 mg    glucose chewable tablet 16 g    OR Linked Order Group     dextrose bolus 10% 125 mL     dextrose bolus 10% 250 mL    glucagon (rDNA) injection 1 mg    dextrose 10 % infusion    sodium chloride flush 0.9 % injection 5-40 mL    sodium chloride flush 0.9 % injection 5-40 mL    0.9 % sodium chloride infusion    DISCONTD: enoxaparin Sodium (LOVENOX) injection 30 mg     Order Specific Question:   Indication of Use     Answer:   Prophylaxis-DVT/PE    polyethylene glycol (GLYCOLAX) packet 17 g    OR Linked Order Group     acetaminophen (TYLENOL) tablet 650 mg     acetaminophen (TYLENOL) suppository 650 mg promethazine (PHENERGAN) injection 6.25 mg    insulin lispro (HUMALOG) injection vial 0-16 Units    insulin lispro (HUMALOG) injection vial 0-4 Units    dextromethorphan-guaiFENesin (ROBITUSSIN-DM)  MG/5ML liquid 5 mL    dexamethasone (DECADRON) tablet 6 mg    DISCONTD: benzonatate (TESSALON) capsule 100 mg    atorvastatin (LIPITOR) tablet 80 mg    magnesium oxide (MAG-OX) tablet 400 mg    therapeutic multivitamin-minerals 1 tablet    insulin glargine (LANTUS) injection vial 48 Units    benzonatate (TESSALON) capsule 100 mg    DISCONTD: apixaban (ELIQUIS) tablet 10 mg     Order Specific Question:   Indication of Use     Answer:   Treatment-DVT/PE    DISCONTD: apixaban (ELIQUIS) tablet 5 mg     Order Specific Question:   Indication of Use     Answer:   Treatment-DVT/PE    apixaban (ELIQUIS) tablet 5 mg     Order Specific Question:   Indication of Use     Answer:   Treatment-DVT/PE    albuterol sulfate HFA (PROVENTIL;VENTOLIN;PROAIR) 108 (90 Base) MCG/ACT inhaler 2 puff     Order Specific Question:   Initiate RT Bronchodilator Protocol     Answer:   Yes - Inpatient Protocol    ipratropium (ATROVENT HFA) 17 MCG/ACT inhaler 2 puff     Order Specific Question:   Initiate RT Bronchodilator Protocol     Answer:   Yes - Inpatient Protocol    nalbuphine (NUBAIN) injection 10 mg           DIAGNOSTIC RESULTS / EMERGENCY DEPARTMENT COURSE / MDM   LAB RESULTS:  Results for orders placed or performed during the hospital encounter of 08/29/22   COVID-19 & Influenza Combo    Specimen: Nasopharyngeal Swab   Result Value Ref Range    Specimen Description . NASOPHARYNGEAL SWAB     Source . NASOPHARYNGEAL SWAB     SARS-CoV-2 RNA, RT PCR DETECTED (A) Not Detected    INFLUENZA A Not Detected Not Detected    INFLUENZA B Not Detected Not Detected   Troponin   Result Value Ref Range    Troponin, High Sensitivity 19 (H) 0 - 14 ng/L   CBC with Auto Differential   Result Value Ref Range    WBC 6.5 3.5 - 11.0 k/uL    RBC 3.83 (L) 4.0 - 5.2 m/uL    Hemoglobin 12.9 12.0 - 16.0 g/dL    Hematocrit 39.6 36 - 46 %    .5 (H) 80 - 100 fL    MCH 33.6 26 - 34 pg    MCHC 32.4 31 - 37 g/dL    RDW 13.8 11.5 - 14.9 %    Platelets 262 847 - 817 k/uL    MPV 8.1 6.0 - 12.0 fL    Seg Neutrophils 61 36 - 66 %    Lymphocytes 24 24 - 44 %    Monocytes 13 (H) 1 - 7 %    Eosinophils % 1 0 - 4 %    Basophils 1 0 - 2 %    Segs Absolute 4.10 1.3 - 9.1 k/uL    Absolute Lymph # 1.50 1.0 - 4.8 k/uL    Absolute Mono # 0.80 0.1 - 1.3 k/uL    Absolute Eos # 0.00 0.0 - 0.4 k/uL    Basophils Absolute 0.00 0.0 - 0.2 k/uL   Basic Metabolic Panel   Result Value Ref Range    Glucose 115 (H) 70 - 99 mg/dL    BUN 10 6 - 20 mg/dL    Creatinine 0.81 0.50 - 0.90 mg/dL    Calcium 8.9 8.6 - 10.4 mg/dL    Sodium 137 135 - 144 mmol/L    Potassium 3.4 (L) 3.7 - 5.3 mmol/L    Chloride 100 98 - 107 mmol/L    CO2 26 20 - 31 mmol/L    Anion Gap 11 9 - 17 mmol/L    GFR Non-African American >60 >60 mL/min    GFR African American >60 >60 mL/min    GFR Comment         Troponin   Result Value Ref Range    Troponin, High Sensitivity 19 (H) 0 - 14 ng/L   Hemoglobin A1c   Result Value Ref Range    Hemoglobin A1C 11.0 (H) 4.0 - 6.0 %    Estimated Avg Glucose 269 mg/dL   C-Reactive Protein   Result Value Ref Range    CRP 9.3 (H) 0.0 - 5.0 mg/L   Lactate Dehydrogenase   Result Value Ref Range     (H) 135 - 214 U/L   Ferritin   Result Value Ref Range    Ferritin 234 (H) 13 - 150 ng/mL   D-Dimer, Quantitative   Result Value Ref Range    D-Dimer, Quant 0.69 (H) 0.00 - 0.59 mg/L FEU   EKG 12 Lead   Result Value Ref Range    Ventricular Rate 83 BPM    Atrial Rate 83 BPM    P-R Interval 150 ms    QRS Duration 88 ms    Q-T Interval 390 ms    QTc Calculation (Bazett) 458 ms    P Axis 42 degrees    R Axis 40 degrees    T Axis 37 degrees         RADIOLOGY:  XR CHEST PORTABLE    Result Date: 8/29/2022  Prominent bibasilar interstitial changes with some bronchial thickening which can be seen with peribronchial and dependent edema, though a similar pattern can be seen with bronchitis and bronchopneumonia. XR CHEST PORTABLE    Result Date: 8/25/2022  No acute cardiopulmonary process. XR CHEST PORTABLE    Result Date: 8/7/2022  No acute process. EMERGENCY DEPARTMENT COURSE:  ED Course as of 08/30/22 1610   Mon Aug 29, 2022   2308 Patient presenting with COVID-like symptoms, history of CHF, will start cardiac work-up, treat symptomatically with Tylenol fluid resuscitation, Phenergan. Patient has several allergies, will avoid medications that she has marked anaphylaxis as the reaction [KK]   2309 Patient tested positive for COVID [KK]      ED Course User Index  301 Wallace Terrell DO         CONSULTS:  IP CONSULT TO PRIMARY CARE PROVIDER  IP CONSULT TO INFECTIOUS DISEASES  IP CONSULT TO PULMONOLOGY  IP CONSULT TO PSYCHIATRY      Assessment/Plan: 49-year-old female with CHF morbid obesity COPD presenting with myalgias, tested positive for COVID, have new oxygen requirement she was descending down to low 90%. Patient will be admitted for new oxygen requirement in the setting of COVID-19. Patient was given 1 dose of steroids here in the ER, symptomatic control of pain and headaches. Patient admitted to PCP. FINAL IMPRESSION      1. COVID-19          DISPOSITION / PLAN     DISPOSITION Admitted 08/30/2022 02:42:12 AM      PATIENT REFERRED TO:  No follow-up provider specified.     DISCHARGE MEDICATIONS:  Current Discharge Medication List          Jefferson Santiago DO  Emergency Medicine Resident    (Please note that portions of thisnote were completed with a voice recognition program.  Efforts were made to edit the dictations but occasionally words are mis-transcribed.)       Jaye Crowe DO  Resident  08/30/22 4430

## 2022-08-30 NOTE — CONSULTS
PULMONARY  CONSULT NOTE      Date of Admission: 8/29/2022 10:25 PM    Reason for Consult: COPD, COVID    Referring Physician: DR Ayo Cid  PCP: Masood Gunderson MD     History of Present Illness:     50 y.o. female who presents with history of COPD, coronary artery disease, CHF presents with cough congestion chest pressure, myalgias and headache for the past week worsening over the past couple of days. Patient states that she has a family member who has tested positive for COVID. Patient states that she \"feels like she got hit by a vehicle. \"  Patient is currently febrile to 100.4, reports fevers up to 101-102 at home. Patient does not normally wear oxygen at baseline at home.       Still has cough++; in previous hospital admission required bronchoscopy    Problem:  Principal Problem:     PMH:   Past Medical History:   Diagnosis Date    Acute exacerbation of chronic obstructive pulmonary disease ([facility] [address] 3/21/2018    Adhesive capsulitis of left shoulder 9/25/2018    Asthma     Asthma exacerbation 7/24/2014    Atrial fibrillation (HCC)     placed on event monitor 9/25/18 for PVC's & A-fib    Atrial premature depolarization 7/19/2019    Bipolar 1 disorder (HCC)     Bipolar disorder ([address] 7/19/2019    Bulging disc     CAD (coronary artery disease)     Candida infection 2/23/2018    Cardiomyopathy ([address]     Chest pain 6/13/2017    CHF (congestive heart failure) ([facility])     Chronic otitis media of both ears     rt>lt    Chronic right shoulder pain 3/14/2017    Cigarette nicotine dependence with nicotine-induced disorder 7/19/2019    Class 3 severe obesity due to excess calories with serious comorbidity and body mass index (BMI) of 40.0 to 44.9 in adult [facility]) 10/4/2018    Closed fracture of left ankle 6/25/2019    Clostridium difficile infection     COPD (chronic obstructive pulmonary disease) ([facility])     Cough, persistent 3/21/2018    Current moderate episode of major depressive disorder without prior episode ([address] 8/20/2018    Depression     Diabetes mellitus type 2, controlled ([address] 4/30/2014    Diarrhea     Diarrhea     Dizziness     DVT (deep venous thrombosis) (HCC)     after PICC line right arm    Encounter for monitoring sotalol therapy 2/20/2017    Encounter for screening mammogram for malignant neoplasm of breast 3/7/2018    Endometriosis     Fainting     GERD (gastroesophageal reflux disease)     hx of    GI bleeding     H. pylori infection     Helicobacter pylori (H. pylori)     [place] (hard of hearing)     both ears, no hearing aids    HTN (hypertension) 7/19/2019    Hyperlipidemia     Hypertension     Left bicipital tenosynovitis 10/17/2018    Left leg pain 5/16/2017    Left sided abdominal pain of unknown cause 7/19/2016    Leg swelling 9/21/2018    Mastoiditis     Mastoiditis, acute 4/30/2014    Migraines     Morbid obesity ([address]     Myocardial infarction ([address]     2005    Nausea     Neuropathy     On home oxygen therapy     3 L at night    Ovarian cyst     Passed out     hx of- negative tilt table    Pulmonary hypertension ([address]     Pulmonary insufficiency     PVC (premature ventricular contraction)     Seasonal allergies     Tricuspid insufficiency     Type II or unspecified type diabetes mellitus without mention of complication, not stated as uncontrolled        PSH:   Past Surgical History:   Procedure Laterality Date    ABDOMEN SURGERY      abcess    ABDOMINAL ADHESION SURGERY      ABDOMINAL EXPLORATION SURGERY      x 4    ABDOMINAL HERNIA REPAIR      with mesh    ABLATION OF DYSRHYTHMIC FOCUS  2016    ABLATION OF DYSRHYTHMIC FOCUS  09/08/2017    [place] at [place] [facility] [place] [facility] ABSCESS DRAINAGE      left hip and chest    APPENDECTOMY      BREAST SURGERY Left 2007    I & D    BRONCHOSCOPY N/A 5/23/2022    BRONCHOSCOPY performed by Debra Ramírez MD at [facility]  2014 & 2000     no stenting    CARPAL TUNNEL RELEASE Right 12/19/2019    Ulnar nerve decompression, right elbow.  Release of 1st and 2nd extensor compartments, right forearm. CARPAL TUNNEL RELEASE  05/04/2020    Carpal tunnel release, left hand    CHOLECYSTECTOMY      COLONOSCOPY      FOOT SURGERY Left     bone fragment removed    FRACTURE SURGERY Right     closed reduction perc pinning ring & middle finger    GASTRIC FUNDOPLICATION  6003    HYSTERECTOMY (CERVIX STATUS UNKNOWN)      total    HYSTERECTOMY (CERVIX STATUS UNKNOWN)      HYSTEROSCOPY      tubal perfusion    MYRINGOTOMY Right 11/13/2015    Right myringotomy with placement of  T-tube on the right side. Removal of plugged ventilating tube, right side. MYRINGOTOMY Left 07/14/2020    MYRINGOTOMY TUBE INSERTION performed by Jorge Erazo MD at [address] [place] [address] Right 06/05/2014    OTHER SURGICAL HISTORY Right 05/29/2014    removal ear tube rt ear    OTHER SURGICAL HISTORY  2009    LOOP recorder inserted and removed 3 mos.  later    OTHER SURGICAL HISTORY Right 08/11/2016    ear tube removal with patch    OTHER SURGICAL HISTORY      tubal perfusion, lysis of uterine adhesions    OTHER SURGICAL HISTORY      multiple PICC lines inserted and removed, it has affected circulation bilateral upper arms    OTHER SURGICAL HISTORY  10/19/2020    Revisiion to subcutaneous transposition of unlar nerve, right elbow    OTHER SURGICAL HISTORY Right 06/14/2021    REVISION TO SUBMUSCULAR TRANSPOSITION OF RIGHT ULNAR NERVE    OTHER SURGICAL HISTORY Left 03/24/2022    DECOMPRESSION OF ULNAR NERVE, LEFT ELBOW    UT MANIPULATN SHCHANTELLR JT W ANESTHESIA Right 03/01/2017    SHOULDER MANIPULATION RIGHT performed by Js Mason MD at [facility] Left 10/17/2018    SHOULDER ARTHROSCOPY W/BICEPS TENDONESIS performed by Jennyfer Leroy MD at [address] [facility] Left     foot    TONSILLECTOMY      TYMPANOSTOMY TUBE PLACEMENT      TYMPANOSTOMY TUBE PLACEMENT Left 03/12/2019    TYMPANOSTOMY TUBE PLACEMENT Right 12/08/2021 Right tympanostomy with tube placement of T-tube with operative microscope and general anesthesia. Right removal of right ear tube. ULNAR TUNNEL RELEASE Right     UPPER GASTROINTESTINAL ENDOSCOPY      UPPER GASTROINTESTINAL ENDOSCOPY  07/10/2019    UPPER GASTROINTESTINAL ENDOSCOPY N/A 07/10/2019    EGD BIOPSY performed by Kimberly Pollard MD at [address] Right 04/21/2022    Placement of right sided ventriculoperitoneal shunt [employer] programmable valve set at 1.5. Allergies: Allergies   Allergen Reactions    Latex Hives    Aspirin Anaphylaxis    Avelox [Moxifloxacin] Anaphylaxis    Chantix [Varenicline] Swelling    Doxycycline Anaphylaxis    Dye [Iodides] Other (See Comments)     Seizures    Flexeril [Cyclobenzaprine] Anaphylaxis    Gabapentin Anaphylaxis    Losartan Shortness Of Breath    Morphine Itching     Makes patient want to \"scratch out her eyes\". Can take if given with benadryl    Nsaids Anaphylaxis and Hives     Pt states she can take ibuprofen    Pcn [Penicillins] Anaphylaxis and Hives    Reglan [Metoclopramide Hcl] Swelling     Agitation, anger    Shellfish-Derived Products Anaphylaxis    Sulfa Antibiotics Hives, Shortness Of Breath, Itching and Swelling    Toradol [Ketorolac Tromethamine] Hives     Patient states she has a reaction.      Vancomycin Anaphylaxis    Zofran Anaphylaxis    Zyvox [Linezolid] Anaphylaxis    Acyclovir Swelling and Rash    Bactrim [Sulfamethoxazole-Trimethoprim] Hives    Betadine [Povidone Iodine] Hives    Ceclor [Cefaclor] Hives    Codeine Itching and Rash    Macrolides And Ketolides Hives     Pt states she can take Azithromycin    Novolin R [Insulin] Hives    Novolog [Insulin Aspart] Hives    Phenothiazines Swelling    Tape [Adhesive Tape] Rash     OK to use paper tape and tegaderm per patient    Banana     Compazine [Prochlorperazine] Other (See Comments)     Agitation, anger, \"makes me jerk\"    Fentanyl Itching     Pt states doesn't work and makes patient itch    Kiwi Extract     Tamiflu [Oseltamivir Phosphate] Hives    Clindamycin/Lincomycin Nausea Only    Sotalol Other (See Comments)     Pt verbalized that she developed a prolonged QT and had an asthma attack with medication. Home Meds:  Medications Prior to Admission: benzonatate (TESSALON) 100 MG capsule, Take 1 capsule by mouth 3 times daily as needed for Cough  predniSONE (DELTASONE) 10 MG tablet, Take 4 tablets by mouth daily for 5 days  azithromycin (ZITHROMAX) 250 MG tablet, 2 tablets now then 1 daily until gone. cetirizine (ZYRTEC) 10 MG tablet, Take 1 tablet by mouth in the morning.   QUEtiapine (SEROQUEL) 100 MG tablet, TAKE 1 AND 1/2 TABLETS BY MOUTH AT BEDTIME Patient has 50 mg tabs  bumetanide (BUMEX) 2 MG tablet, TAKE 1 TABLET BY MOUTH EVERY DAY  isosorbide mononitrate (IMDUR) 30 MG extended release tablet, TAKE 1 TABLET BY MOUTH EVERY DAY  budesonide (PULMICORT) 0.5 MG/2ML nebulizer suspension, Take 2 mLs by nebulization 2 times daily  colchicine (COLCRYS) 0.6 MG tablet, Take 1 tablet by mouth 2 times daily for 46 doses  dornase alpha (PULMOZYME) 2.5 MG/2.5ML nebulizer solution, Inhale 2.5 mg into the lungs 2 times daily  VILAZODONE HCL 20 MG Tablet, TAKE 1 TABLET BY MOUTH EVERY DAY (Patient taking differently: New medication, hasn't started this medication yet)  DULoxetine (CYMBALTA) 60 MG extended release capsule, TAKE 1 CAPSULE BY MOUTH 2 TIMES PER DAY  Insulin Degludec (TRESIBA FLEXTOUCH) 200 UNIT/ML SOPN, Inject 60 Units into the skin in the morning and at bedtime If po intake poor, decrease to 20 units daily (Patient taking differently: Inject 60 Units into the skin in the morning and at bedtime 80 units in the morning, and 80 units in the evening)  Insulin Pen Needle (B-D ULTRAFINE III SHORT PEN) 31G X 8 MM MISC, Inject 1 each into the skin daily May substitute with any brand  blood glucose test strips (ONETOUCH ULTRA) strip, USE TO TEST BLOOD SUGAR BEFORE MEALS, AT BEDTIME, AND AS NEEDED (up to 9 TIMES DAILY)  clopidogrel (PLAVIX) 75 MG tablet, Take 75 mg by mouth daily nightly  pantoprazole (PROTONIX) 40 MG tablet, Take 40 mg by mouth daily BID  atorvastatin (LIPITOR) 80 MG tablet, TAKE 1 TABLET BY MOUTH NIGHTLY  albuterol sulfate HFA (VENTOLIN HFA) 108 (90 Base) MCG/ACT inhaler, INHALE 2 PUFFS INTO LUNGS EVERY 6 HOURS AS NEEDED FOR WHEEZING  insulin lispro, 1 Unit Dial, (HUMALOG KWIKPEN) 100 UNIT/ML SOPN, INJECT SUBCUTANEOUSLY PER SLIDING SCALE, 150-200 INJECT 3U, 201-250 INJECT 6U, 251-300 INJECT 9U, >300 INJECT 12U, MAX 30U/DAY  ipratropium-albuterol (DUONEB) 0.5-2.5 (3) MG/3ML SOLN nebulizer solution, INHALE 3 MLS (ONE VIAL) WITH NEBULIZER EVERY 6 HOURS AS NEEDED FOR SHORTNESS OF BREATH  clotrimazole (LOTRIMIN) 1 % cream, Apply topically 2 times daily. magnesium oxide (MAG-OX) 400 MG tablet, Take 400 mg by mouth daily Afternoon  oxyCODONE-acetaminophen (PERCOCET) 5-325 MG per tablet, Take 1 tablet by mouth every 4 hours as needed for Pain.  Indications: last fill 2/27/22 with quantity 170 for 30 days One tablet at 0700, one tablet at 1900, one tablet in the afternoon prn pain  Multiple Vitamins-Minerals (MULTIVITAMIN GUMMIES WOMENS) CHEW, Take 2 each by mouth daily   carvedilol (COREG) 12.5 MG tablet, Take 12.5 mg by mouth 2 times daily (with meals)     Social History:   Social History     Socioeconomic History    Marital status: Single     Spouse name: Not on file    Number of children: Not on file    Years of education: Not on file    Highest education level: Not on file   Occupational History     Employer: NONE   Tobacco Use    Smoking status: Every Day     Packs/day: 0.50     Years: 23.00     Pack years: 11.50     Types: Cigarettes    Smokeless tobacco: Never   Vaping Use    Vaping Use: Never used   Substance and Sexual Activity    Alcohol use: No     Alcohol/week: 0.0 standard drinks    Drug use: No    Sexual activity: Not on file   Other Topics Concern    Not on file   Social History Narrative    Not on file     Social Determinants of Health     Financial Resource Strain: Not on file   Food Insecurity: Not on file   Transportation Needs: Not on file   Physical Activity: Not on file   Stress: Not on file   Social Connections: Not on file   Intimate Partner Violence: Not on file   Housing Stability: Not on file       Family History:   Family History   Problem Relation Age of Onset    Ulcerative Colitis Father     Liver Disease Father 61        hep c and b    Diabetes Father     Asthma Father     Heart Disease Father     High Blood Pressure Father     Breast Cancer Maternal Aunt     Cancer Maternal Aunt     Diabetes Maternal Aunt     Heart Disease Maternal Aunt     High Blood Pressure Maternal Aunt     Breast Cancer Paternal Aunt     Heart Disease Paternal Aunt     High Blood Pressure Paternal Aunt     Breast Cancer Maternal Grandmother     Cervical Cancer Maternal Grandmother     Cancer Maternal Grandmother     Diabetes Maternal Grandmother     Asthma Maternal Grandmother     Heart Disease Maternal Grandmother     High Blood Pressure Maternal Grandmother     Lung Cancer Maternal Grandfather     Diabetes Maternal Grandfather     Asthma Maternal Grandfather     Heart Disease Maternal Grandfather     High Blood Pressure Maternal Grandfather     Cervical Cancer Paternal Grandmother     Cancer Paternal Grandmother     Diabetes Paternal Grandmother     Heart Disease Paternal Grandmother     High Blood Pressure Paternal Grandmother     Diabetes Mother     Asthma Mother     Heart Disease Mother     High Blood Pressure Mother     Cancer Sister     Heart Disease Maternal Uncle     High Blood Pressure Maternal Uncle     Heart Disease Paternal Uncle     High Blood Pressure Paternal Uncle     Diabetes Paternal Grandfather     Heart Disease Paternal Grandfather     High Blood Pressure Paternal Grandfather        Review of Systems  Fever/ chills - no  Chest pain -   Cough -   Expectoration / hemoptysis - no  shortness of breath -   Headache - no  Sinus drainage/ sore throat - no  abdominal pain - no  Swelling feet -   Nausea/ vomiting/ diarrhea/ constipation - no    Physical Exam  Vital Signs: BP (!) 142/78   Pulse 78   Temp 98.3 °F (36.8 °C) (Oral)   Resp 22   Ht 5' 4\" (1.626 m)   Wt 275 lb 9.2 oz (125 kg)   SpO2 90%   BMI 47.30 kg/m²       Admission Weight: Weight: 270 lb (122.5 kg)    General Appearance: Healthy, alert, active, cooperative, and in no distress  Head: Normocephalic, without obvious abnormality, atraumatic  Neck: no adenopathy, no JVD, supple, symmetrical, trachea midline\"thyroid not enlarged, symmetric, no tenderness/mass/nodule  Lungs: fair air entry bilaterally; breath sounds- vesicular; rhonchi- absent; rales/ crackles- absent  Heart: : regular rate and rhythm, S1, S2 normal, no murmur, click, rub or gallop  Abdomen: soft, non-tender; bowel sounds normal; no masses,  no organomegaly  Extremities: extremities normal, atraumatic, no cyanosis or edema  Skin: skin color, texture, turgor normal. No rashes or lesions  Neurologic: Grossly normal    [unfilled]    Recent labs, Imaging studies reviewed      Data ReviewCBC:   Recent Labs     08/29/22  0012   WBC 6.5   RBC 3.83*   HGB 12.9   HCT 39.6        BMP:   Recent Labs     08/29/22  0012   GLUCOSE 115*      K 3.4*   BUN 10   CREATININE 0.81   CALCIUM 8.9     ABGs: No results for input(s): PHART, PO2ART, MOR7FYY, HRH5CTG, BEART, Q3BISKOW, ULY2XAM in the last 72 hours. PT/INR:  No results found for: PTINR      ASSESSMENT / PLAN:    COPD - BD  COVID - steroid, cough suppressant  Cough - cough suppressant  Acute hypoxic resp failure - O2  Fever- symptomatic Rx  Elevated d dimer - ?  Related to COVID, cannot do CTA due to contrast allergy; start empiric eliquis  Electronically signed by Grey Fu MD on 08/30/22 at 9:25 AM.

## 2022-08-31 PROBLEM — F43.10 PTSD (POST-TRAUMATIC STRESS DISORDER): Status: ACTIVE | Noted: 2022-08-31

## 2022-08-31 LAB
ABSOLUTE EOS #: 0 K/UL (ref 0–0.4)
ABSOLUTE LYMPH #: 1.3 K/UL (ref 1–4.8)
ABSOLUTE MONO #: 0.7 K/UL (ref 0.1–1.3)
ALBUMIN SERPL-MCNC: 3.6 G/DL (ref 3.5–5.2)
ALP BLD-CCNC: 99 U/L (ref 35–104)
ALT SERPL-CCNC: 18 U/L (ref 5–33)
ANION GAP SERPL CALCULATED.3IONS-SCNC: 9 MMOL/L (ref 9–17)
AST SERPL-CCNC: 21 U/L
BASOPHILS # BLD: 1 % (ref 0–2)
BASOPHILS ABSOLUTE: 0 K/UL (ref 0–0.2)
BILIRUB SERPL-MCNC: <0.15 MG/DL (ref 0.3–1.2)
BUN BLDV-MCNC: 14 MG/DL (ref 6–20)
C-REACTIVE PROTEIN: 10.8 MG/L (ref 0–5)
CALCIUM SERPL-MCNC: 9.3 MG/DL (ref 8.6–10.4)
CHLORIDE BLD-SCNC: 100 MMOL/L (ref 98–107)
CO2: 29 MMOL/L (ref 20–31)
CREAT SERPL-MCNC: 0.68 MG/DL (ref 0.5–0.9)
D-DIMER QUANTITATIVE: 0.53 MG/L FEU (ref 0–0.59)
EOSINOPHILS RELATIVE PERCENT: 0 % (ref 0–4)
FIBRINOGEN: 460 MG/DL (ref 210–530)
GFR AFRICAN AMERICAN: >60 ML/MIN
GFR NON-AFRICAN AMERICAN: >60 ML/MIN
GFR SERPL CREATININE-BSD FRML MDRD: ABNORMAL ML/MIN/{1.73_M2}
GLUCOSE BLD-MCNC: 272 MG/DL (ref 65–105)
GLUCOSE BLD-MCNC: 286 MG/DL (ref 65–105)
GLUCOSE BLD-MCNC: 289 MG/DL (ref 65–105)
GLUCOSE BLD-MCNC: 301 MG/DL (ref 65–105)
GLUCOSE BLD-MCNC: 337 MG/DL (ref 70–99)
GLUCOSE BLD-MCNC: 352 MG/DL (ref 65–105)
GLUCOSE BLD-MCNC: 370 MG/DL (ref 65–105)
GLUCOSE BLD-MCNC: 373 MG/DL (ref 65–105)
HCT VFR BLD CALC: 41.2 % (ref 36–46)
HEMOGLOBIN: 13.4 G/DL (ref 12–16)
INR BLD: 0.9
LACTIC ACID: 3.3 MMOL/L (ref 0.5–2.2)
LYMPHOCYTES # BLD: 17 % (ref 24–44)
MCH RBC QN AUTO: 33.6 PG (ref 26–34)
MCHC RBC AUTO-ENTMCNC: 32.5 G/DL (ref 31–37)
MCV RBC AUTO: 103.3 FL (ref 80–100)
MONOCYTES # BLD: 10 % (ref 1–7)
PARTIAL THROMBOPLASTIN TIME: 29 SEC (ref 24–36)
PDW BLD-RTO: 13.8 % (ref 11.5–14.9)
PLATELET # BLD: 207 K/UL (ref 150–450)
PMV BLD AUTO: 8.5 FL (ref 6–12)
POTASSIUM SERPL-SCNC: 4.5 MMOL/L (ref 3.7–5.3)
PROTHROMBIN TIME: 12.6 SEC (ref 11.8–14.6)
RBC # BLD: 3.98 M/UL (ref 4–5.2)
SEG NEUTROPHILS: 72 % (ref 36–66)
SEGMENTED NEUTROPHILS ABSOLUTE COUNT: 5.4 K/UL (ref 1.3–9.1)
SODIUM BLD-SCNC: 138 MMOL/L (ref 135–144)
TOTAL PROTEIN: 6.4 G/DL (ref 6.4–8.3)
TROPONIN, HIGH SENSITIVITY: 11 NG/L (ref 0–14)
VITAMIN D 25-HYDROXY: 24.9 NG/ML
WBC # BLD: 7.5 K/UL (ref 3.5–11)

## 2022-08-31 PROCEDURE — 82306 VITAMIN D 25 HYDROXY: CPT

## 2022-08-31 PROCEDURE — 85025 COMPLETE CBC W/AUTO DIFF WBC: CPT

## 2022-08-31 PROCEDURE — 94640 AIRWAY INHALATION TREATMENT: CPT

## 2022-08-31 PROCEDURE — 6370000000 HC RX 637 (ALT 250 FOR IP): Performed by: PSYCHIATRY & NEUROLOGY

## 2022-08-31 PROCEDURE — 83605 ASSAY OF LACTIC ACID: CPT

## 2022-08-31 PROCEDURE — 85610 PROTHROMBIN TIME: CPT

## 2022-08-31 PROCEDURE — 2700000000 HC OXYGEN THERAPY PER DAY

## 2022-08-31 PROCEDURE — 93005 ELECTROCARDIOGRAM TRACING: CPT | Performed by: FAMILY MEDICINE

## 2022-08-31 PROCEDURE — 2060000000 HC ICU INTERMEDIATE R&B

## 2022-08-31 PROCEDURE — 99232 SBSQ HOSP IP/OBS MODERATE 35: CPT | Performed by: FAMILY MEDICINE

## 2022-08-31 PROCEDURE — 36415 COLL VENOUS BLD VENIPUNCTURE: CPT

## 2022-08-31 PROCEDURE — 6370000000 HC RX 637 (ALT 250 FOR IP): Performed by: INTERNAL MEDICINE

## 2022-08-31 PROCEDURE — 99232 SBSQ HOSP IP/OBS MODERATE 35: CPT | Performed by: PSYCHIATRY & NEUROLOGY

## 2022-08-31 PROCEDURE — 2580000003 HC RX 258: Performed by: FAMILY MEDICINE

## 2022-08-31 PROCEDURE — 80053 COMPREHEN METABOLIC PANEL: CPT

## 2022-08-31 PROCEDURE — 99222 1ST HOSP IP/OBS MODERATE 55: CPT | Performed by: INTERNAL MEDICINE

## 2022-08-31 PROCEDURE — 85379 FIBRIN DEGRADATION QUANT: CPT

## 2022-08-31 PROCEDURE — 6370000000 HC RX 637 (ALT 250 FOR IP): Performed by: FAMILY MEDICINE

## 2022-08-31 PROCEDURE — 85384 FIBRINOGEN ACTIVITY: CPT

## 2022-08-31 PROCEDURE — 94761 N-INVAS EAR/PLS OXIMETRY MLT: CPT

## 2022-08-31 PROCEDURE — 84484 ASSAY OF TROPONIN QUANT: CPT

## 2022-08-31 PROCEDURE — 6360000002 HC RX W HCPCS: Performed by: FAMILY MEDICINE

## 2022-08-31 PROCEDURE — 86140 C-REACTIVE PROTEIN: CPT

## 2022-08-31 PROCEDURE — 85730 THROMBOPLASTIN TIME PARTIAL: CPT

## 2022-08-31 PROCEDURE — 6370000000 HC RX 637 (ALT 250 FOR IP): Performed by: NURSE PRACTITIONER

## 2022-08-31 RX ORDER — BENZONATATE 200 MG/1
200 CAPSULE ORAL 3 TIMES DAILY
Status: DISCONTINUED | OUTPATIENT
Start: 2022-08-31 | End: 2022-09-04 | Stop reason: HOSPADM

## 2022-08-31 RX ORDER — DULOXETIN HYDROCHLORIDE 60 MG/1
60 CAPSULE, DELAYED RELEASE ORAL 2 TIMES DAILY
Status: DISCONTINUED | OUTPATIENT
Start: 2022-08-31 | End: 2022-09-04 | Stop reason: HOSPADM

## 2022-08-31 RX ORDER — NALBUPHINE HCL 10 MG/ML
10 AMPUL (ML) INJECTION EVERY 6 HOURS PRN
Status: DISCONTINUED | OUTPATIENT
Start: 2022-08-31 | End: 2022-09-04 | Stop reason: HOSPADM

## 2022-08-31 RX ORDER — DEXTROMETHORPHAN POLISTIREX 30 MG/5ML
30 SUSPENSION ORAL EVERY 12 HOURS SCHEDULED
Status: DISCONTINUED | OUTPATIENT
Start: 2022-08-31 | End: 2022-09-04 | Stop reason: HOSPADM

## 2022-08-31 RX ORDER — QUETIAPINE FUMARATE 200 MG/1
200 TABLET, FILM COATED ORAL NIGHTLY
Status: DISCONTINUED | OUTPATIENT
Start: 2022-08-31 | End: 2022-09-01

## 2022-08-31 RX ORDER — INSULIN GLARGINE 100 [IU]/ML
70 INJECTION, SOLUTION SUBCUTANEOUS 2 TIMES DAILY
Status: DISCONTINUED | OUTPATIENT
Start: 2022-08-31 | End: 2022-09-01

## 2022-08-31 RX ADMIN — ISOSORBIDE MONONITRATE 30 MG: 30 TABLET, EXTENDED RELEASE ORAL at 08:30

## 2022-08-31 RX ADMIN — BENZONATATE 100 MG: 100 CAPSULE ORAL at 08:29

## 2022-08-31 RX ADMIN — NALBUPHINE HYDROCHLORIDE 10 MG: 10 INJECTION, SOLUTION INTRAMUSCULAR; INTRAVENOUS; SUBCUTANEOUS at 15:36

## 2022-08-31 RX ADMIN — ALBUTEROL SULFATE 2 PUFF: 90 AEROSOL, METERED RESPIRATORY (INHALATION) at 20:39

## 2022-08-31 RX ADMIN — PROMETHAZINE HYDROCHLORIDE 6.25 MG: 25 INJECTION INTRAMUSCULAR; INTRAVENOUS at 18:01

## 2022-08-31 RX ADMIN — DEXTROMETHORPHAN HYDROBROMIDE, GUAIFENESIN 5 ML: 10; 100 LIQUID ORAL at 15:27

## 2022-08-31 RX ADMIN — PANTOPRAZOLE SODIUM 40 MG: 40 TABLET, DELAYED RELEASE ORAL at 08:31

## 2022-08-31 RX ADMIN — ATORVASTATIN CALCIUM 80 MG: 80 TABLET, FILM COATED ORAL at 21:00

## 2022-08-31 RX ADMIN — INSULIN GLARGINE 70 UNITS: 100 INJECTION, SOLUTION SUBCUTANEOUS at 21:10

## 2022-08-31 RX ADMIN — CARVEDILOL 12.5 MG: 12.5 TABLET, FILM COATED ORAL at 08:30

## 2022-08-31 RX ADMIN — BENZONATATE 200 MG: 200 CAPSULE ORAL at 15:27

## 2022-08-31 RX ADMIN — BUMETANIDE 2 MG: 1 TABLET ORAL at 08:29

## 2022-08-31 RX ADMIN — INSULIN LISPRO 8 UNITS: 100 INJECTION, SOLUTION INTRAVENOUS; SUBCUTANEOUS at 08:41

## 2022-08-31 RX ADMIN — SODIUM CHLORIDE, PRESERVATIVE FREE 10 ML: 5 INJECTION INTRAVENOUS at 21:00

## 2022-08-31 RX ADMIN — DEXAMETHASONE 6 MG: 6 TABLET ORAL at 08:30

## 2022-08-31 RX ADMIN — SODIUM CHLORIDE, PRESERVATIVE FREE 10 ML: 5 INJECTION INTRAVENOUS at 08:38

## 2022-08-31 RX ADMIN — CARVEDILOL 12.5 MG: 12.5 TABLET, FILM COATED ORAL at 17:14

## 2022-08-31 RX ADMIN — INSULIN LISPRO 16 UNITS: 100 INJECTION, SOLUTION INTRAVENOUS; SUBCUTANEOUS at 17:12

## 2022-08-31 RX ADMIN — APIXABAN 5 MG: 5 TABLET, FILM COATED ORAL at 21:00

## 2022-08-31 RX ADMIN — Medication 400 MG: at 08:30

## 2022-08-31 RX ADMIN — INSULIN LISPRO 8 UNITS: 100 INJECTION, SOLUTION INTRAVENOUS; SUBCUTANEOUS at 12:03

## 2022-08-31 RX ADMIN — CETIRIZINE HYDROCHLORIDE 10 MG: 10 TABLET, FILM COATED ORAL at 08:30

## 2022-08-31 RX ADMIN — ALBUTEROL SULFATE 2 PUFF: 90 AEROSOL, METERED RESPIRATORY (INHALATION) at 14:37

## 2022-08-31 RX ADMIN — DULOXETINE 60 MG: 60 CAPSULE, DELAYED RELEASE ORAL at 12:03

## 2022-08-31 RX ADMIN — OXYCODONE AND ACETAMINOPHEN 1 TABLET: 5; 325 TABLET ORAL at 21:09

## 2022-08-31 RX ADMIN — BENZONATATE 200 MG: 200 CAPSULE ORAL at 21:00

## 2022-08-31 RX ADMIN — DULOXETINE 60 MG: 60 CAPSULE, DELAYED RELEASE ORAL at 21:00

## 2022-08-31 RX ADMIN — APIXABAN 5 MG: 5 TABLET, FILM COATED ORAL at 08:29

## 2022-08-31 RX ADMIN — DEXTROMETHORPHAN HYDROBROMIDE, GUAIFENESIN 5 ML: 10; 100 LIQUID ORAL at 10:04

## 2022-08-31 RX ADMIN — OXYCODONE AND ACETAMINOPHEN 1 TABLET: 5; 325 TABLET ORAL at 13:36

## 2022-08-31 RX ADMIN — INSULIN GLARGINE 48 UNITS: 100 INJECTION, SOLUTION SUBCUTANEOUS at 08:39

## 2022-08-31 RX ADMIN — DEXTROMETHORPHAN HYDROBROMIDE, GUAIFENESIN 5 ML: 10; 100 LIQUID ORAL at 21:09

## 2022-08-31 RX ADMIN — NALBUPHINE HYDROCHLORIDE 10 MG: 10 INJECTION, SOLUTION INTRAMUSCULAR; INTRAVENOUS; SUBCUTANEOUS at 23:26

## 2022-08-31 RX ADMIN — QUETIAPINE FUMARATE 200 MG: 200 TABLET ORAL at 17:14

## 2022-08-31 RX ADMIN — NALBUPHINE HYDROCHLORIDE 10 MG: 10 INJECTION, SOLUTION INTRAMUSCULAR; INTRAVENOUS; SUBCUTANEOUS at 08:34

## 2022-08-31 ASSESSMENT — PAIN DESCRIPTION - PAIN TYPE: TYPE: ACUTE PAIN

## 2022-08-31 ASSESSMENT — PAIN DESCRIPTION - LOCATION
LOCATION: CHEST
LOCATION: CHEST;HEAD
LOCATION: CHEST;RIB CAGE
LOCATION: CHEST;RIB CAGE

## 2022-08-31 ASSESSMENT — PAIN DESCRIPTION - DESCRIPTORS
DESCRIPTORS: DISCOMFORT
DESCRIPTORS: DISCOMFORT;SHARP

## 2022-08-31 ASSESSMENT — PAIN SCALES - GENERAL
PAINLEVEL_OUTOF10: 10
PAINLEVEL_OUTOF10: 7
PAINLEVEL_OUTOF10: 10
PAINLEVEL_OUTOF10: 0
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
## 2022-08-31 NOTE — PROGRESS NOTES
PULMONARY PROGRESS NOTE:    Interval History: COPD, COVID    Shortness of Breath: yes  Cough: ++ nonproductive  Sputum: no  Hemoptysis: no  Chest Pain: no  Fever:+  Swelling Feet: no  Headache: yes  Nausea, Emesis, Abdominal Pain: no  Diarrhea: no  Constipation: no    Events since last visit: low grade fevers continue    PAST MEDICAL HISTORY:    Smoking: current / declines patch - adhesive allergy and cardiologist advised against it    PHYSICAL EXAMINATION:  Tmax 100.4  General : Awake, alert, oriented to time, place, and person  Neck - supple, no lymphadenopathy, JVD not raised  Heart - regular rhythm, S1 and S2 normal; no additional sounds heard  Lungs - Air Entry- poor bilaterally; breath sounds : diminished t/o, 96% on 3 l nc  Abdomen - soft, no tenderness  Upper Extremities  - no cyanosis, mottling; edema : absent  Lower Extremities: no cyanosis, mottling; edema : absent    Current Laboratory, Radiologic, Microbiologic, and Diagnostic studies reviewed    ASSESSMENT / PLAN:    COPD - BD  COVID - steroid, cough suppressant  Cough     Add delsym    Increase tessalon   Acute hypoxic resp failure - O2  Fever- symptomatic Rx  Elevated d dimer - ?  Related to COVID, cannot do CTA due to contrast allergy; empiric eliquis    Plan of care discussed with Dr Caro Reeder, APRN - CNP

## 2022-08-31 NOTE — PROGRESS NOTES
08/31/22 1452   Encounter Summary   Encounter Overview/Reason  Initial Encounter   Service Provided For: Patient   Referral/Consult From: Rounding   Complexity of Encounter Low   Spiritual/Emotional needs   Type Spiritual Support   Assessment/Intervention/Outcome   Assessment Unable to assess  (covid isolation)   Intervention Prayer (assurance of)/[place]

## 2022-08-31 NOTE — PROGRESS NOTES
Patient has been abrasive with staff from entrance to ED thru admission to floor and at this time. Patient has requested to be allowed to walk off unit and smoke but was educated on neccessity to stay in room d/t + covid infection and risk for spread. Patient also stating that she has been asking for breathing treatments all day and has been denied them. Patient has had active orders for inhalers but has been refusing them all day and saying she needs nebulizers but they are substituted for inhalers d/t her + covid infection.

## 2022-08-31 NOTE — PLAN OF CARE
Problem: Discharge Planning  Goal: Discharge to home or other facility with appropriate resources  Outcome: Progressing  Flowsheets  Taken 8/31/2022 0800 by Mathew Dangelo RN  Discharge to home or other facility with appropriate resources: Identify barriers to discharge with patient and caregiver  Taken 8/30/2022 2120 by Chelsea Ngo RN  Discharge to home or other facility with appropriate resources:   Identify barriers to discharge with patient and caregiver   Identify discharge learning needs (meds, wound care, etc)     Problem: Pain  Goal: Verbalizes/displays adequate comfort level or baseline comfort level  Outcome: Progressing    Problem: Respiratory - Adult  Goal: Achieves optimal ventilation and oxygenation  Outcome: Progressing    Problem: Chronic Conditions and Co-morbidities  Goal: Patient's chronic conditions and co-morbidity symptoms are monitored and maintained or improved  Outcome: Progressing  Flowsheets  Taken 8/31/2022 0800 by Kishor Flores 34 - Patient's Chronic Conditions and Co-Morbidity Symptoms are Monitored and Maintained or Improved: Monitor and assess patient's chronic conditions and comorbid symptoms for stability, deterioration, or improvement

## 2022-08-31 NOTE — PROGRESS NOTES
Serious Secondary Infections  Avoid in patients with active tuberculosis   Ordering provider type Not restricted   Route of Administration Oral, Gastrostomy Tube (G-tube), Nasogastric Tube (NG-tube)*   Dosing Patients ? 5years of age  eGFR ?61: 4 mg once daily  eGFR 30 to <60: 2 mg once daily  eGFR 15 to <30: 1 mg once daily if potential benefit outweighs risk  eGFR ? 15: not recommended  On dialysis, ESRD, or APRIL: not recommended  Patients age 2 to <9 years  eGFR ?60: 2 mg once daily  eGFR 30 to <60: 1 mg once daily  eGFR <30: not recommended     Lack of Information Has not been studied with other REMY inhibitors or biologic [DMARDs] disease modifying anti-rheumatic drugs (biologics targeting cytokines, B- cells, or T-cells). Keep in mind, corticosteroids are technically DMARDs and are a prerequisite to baricitinib administration. Drug Interactions Strong OAT3 inhibitors like probenecid and benzylpenicillin  In event patient is on these medicines, cut baricitinib dose in half. If patient is already on only 1 mg, stop OAT3 inhibitor. Absolute Lymphocyte Count (ALC) ? 200 cells/mcL: maintain current dose  <200 cells/mcL: consider interruption until ALC ?200 cells/mcL   Absolute Neutrophil  Count ([facility]) ? 500 cells/mcL: maintain current dose  <500 cells/mcL: consider interruption until [facility] ?500 cells/mcL   Aminotransferases If ALT or AST is elevated and DRUG-INDUCED LIVER INJURY IS SUSPECTED, interrupt baricitinib until drug-induced liver injury is  excluded. Preparing Alternative Administration   GIVEN WITH OR WITHOUT FOOD   Dispersed Tablets in Water    For patients who are unable to swallow whole tablets, 1-mg and/or 2-mg baricitinib tablet(s), or any combination of tablets necessary to achieve the desired dose up to 4-mg: o May be placed in a container with approximately 10 mL (5 mL minimum) of room temperature water,   o Dispersed by gently swirling the tablet(s) and   o Immediately taken orally.    o The container should be rinsed with an additional 10 mL (5 mL minimum) of room temperature water and the entire contents swallowed by the patient. Adam Lynch. [address] in-patient pharmacy

## 2022-08-31 NOTE — CONSULTS
[facility] -   Infectious diseases evaluation  admission date 8/29/2022    reason for consultation:   COVID-19 infection    Impression :   Current:  COVID-19 infection  Acute hypoxic respiratory failure  COPD  Cardiomyopathy  Atrial fibrillation  Elevated D-dimer  Multiple antibiotics allergy  History of C. difficile colitis  Diabetes mellitus  History of DVT      Recommendations   Decadron   Barcitinib  On Eliquis  Follow inflammatory markers and chest x-ray  Supportive care  Droplet plus isolation    History of Present Illness:   Initial history:  Isabelle Boyle is a 50y.o.-year-old female presented to hospital with worsening shortness of breath associated with nonproductive cough, chest pressure, myalgia and headache for 1 week prior to admission. The patient had fever, temperature max of 102 reportedly. At the ER temperature was 100.4  The patient was hypoxic, was placed on oxygen per nasal cannula. She does not use home oxygen, did use it in the past.  The patient had family member was tested positive for COVID-19 recently. She is not vaccinated for COVID-19. Chest x-ray 8/25/2022 showed no acute process  D-dimer was elevated  CTA was not done due to contrast allergy. Initial labs showed troponin of 19, WBC 6.5   Interval changes  8/30/2022     Patient Vitals for the past 8 hrs:   BP Temp Temp src Pulse Resp SpO2   08/30/22 1630 (!) 151/96 97.5 °F (36.4 °C) Axillary 81 20 94 %             I have personally reviewed the past medical history, past surgical history, medications, social history, and family history, and I haveupdated the database accordingly.       Allergies:   Latex, Aspirin, Avelox [moxifloxacin], Chantix [varenicline], Doxycycline, Dye [iodides], Flexeril [cyclobenzaprine], Gabapentin, Losartan, Morphine, Nsaids, Pcn [penicillins], Reglan [metoclopramide hcl], Shellfish-derived products, Sulfa antibiotics, Toradol [ketorolac tromethamine], Vancomycin, Zofran, Zyvox [linezolid], Acyclovir, Bactrim [sulfamethoxazole-trimethoprim], Betadine [povidone iodine], Ceclor [cefaclor], Codeine, Macrolides and ketolides, Novolin r [insulin], Novolog [insulin aspart], Phenothiazines, Tape [adhesive tape], Banana, Compazine [prochlorperazine], Fentanyl, Kiwi extract, Tamiflu [oseltamivir phosphate], Clindamycin/lincomycin, and Sotalol     Review of Systems:     Review of Systems  As per history of present illness, other than above 12 system review was negative  Physical Examination :       Physical Exam  Constitutional:       General: She is not in acute distress. HENT:      Head: Normocephalic and atraumatic. Right Ear: External ear normal.      Left Ear: External ear normal.   Eyes:      General: No scleral icterus. Conjunctiva/sclera: Conjunctivae normal.   Cardiovascular:      Rate and Rhythm: Normal rate and regular rhythm. Pulmonary:      Breath sounds: Rhonchi present. No wheezing or rales. Abdominal:      General: There is no distension. Tenderness: There is no abdominal tenderness. Musculoskeletal:      Cervical back: Neck supple. No rigidity. Right lower leg: No edema. Left lower leg: No edema. Skin:     Findings: No bruising or erythema. Neurological:      General: No focal deficit present. Mental Status: She is alert and oriented to person, place, and time.        Past Medical History:     Past Medical History:   Diagnosis Date    Acute exacerbation of chronic obstructive pulmonary disease ([address] 3/21/2018    Adhesive capsulitis of left shoulder 9/25/2018    Asthma     Asthma exacerbation 7/24/2014    Atrial fibrillation ([address]     placed on event monitor 9/25/18 for PVC's & A-fib    Atrial premature depolarization 7/19/2019    Bipolar 1 disorder (HCC)     Bipolar disorder ([address] 7/19/2019    Bulging disc     CAD (coronary artery disease)     Candida infection 2/23/2018    Cardiomyopathy ([address]     Chest pain 6/13/2017 CHF (congestive heart failure) (HCC)     Chronic otitis media of both ears     rt>lt    Chronic right shoulder pain 3/14/2017    Cigarette nicotine dependence with nicotine-induced disorder 7/19/2019    Class 3 severe obesity due to excess calories with serious comorbidity and body mass index (BMI) of 40.0 to 44.9 in adult [facility]) 10/4/2018    Closed fracture of left ankle 6/25/2019    Clostridium difficile infection     COPD (chronic obstructive pulmonary disease) (HCC)     Cough, persistent 3/21/2018    Current moderate episode of major depressive disorder without prior episode ([address] 8/20/2018    Depression     Diabetes mellitus type 2, controlled ([address] 4/30/2014    Diarrhea     Diarrhea     Dizziness     DVT (deep venous thrombosis) ([facility])     after PICC line right arm    Encounter for monitoring sotalol therapy 2/20/2017    Encounter for screening mammogram for malignant neoplasm of breast 3/7/2018    Endometriosis     Fainting     GERD (gastroesophageal reflux disease)     hx of    GI bleeding     H. pylori infection     Helicobacter pylori (H. pylori)     [place] (hard of hearing)     both ears, no hearing aids    HTN (hypertension) 7/19/2019    Hyperlipidemia     Hypertension     Left bicipital tenosynovitis 10/17/2018    Left leg pain 5/16/2017    Left sided abdominal pain of unknown cause 7/19/2016    Leg swelling 9/21/2018    Mastoiditis     Mastoiditis, acute 4/30/2014    Migraines     Morbid obesity ([address]     Myocardial infarction ([address]     2005    Nausea     Neuropathy     On home oxygen therapy     3 L at night    Ovarian cyst     Passed out     hx of- negative tilt table    Pulmonary hypertension (HCC)     Pulmonary insufficiency     PVC (premature ventricular contraction)     Seasonal allergies     Tricuspid insufficiency     Type II or unspecified type diabetes mellitus without mention of complication, not stated as uncontrolled        Past Surgical  History:     Past Surgical History:   Procedure Laterality Date    ABDOMEN SURGERY      abcess    ABDOMINAL ADHESION SURGERY      ABDOMINAL EXPLORATION SURGERY      x 4    ABDOMINAL HERNIA REPAIR      with mesh    ABLATION OF DYSRHYTHMIC FOCUS  2016    ABLATION OF DYSRHYTHMIC FOCUS  09/08/2017    Done at [facility]. ABSCESS DRAINAGE      left hip and chest    APPENDECTOMY      BREAST SURGERY Left 2007    I & D    BRONCHOSCOPY N/A 5/23/2022    BRONCHOSCOPY performed by Liya Coronado MD at [address] [place] [address]  2014 & 2000     no stenting    CARPAL TUNNEL RELEASE Right 12/19/2019    Ulnar nerve decompression, right elbow. Release of 1st and 2nd extensor compartments, right forearm. CARPAL TUNNEL RELEASE  05/04/2020    Carpal tunnel release, left hand    CHOLECYSTECTOMY      COLONOSCOPY      FOOT SURGERY Left     bone fragment removed    FRACTURE SURGERY Right     closed reduction perc pinning ring & middle finger    GASTRIC FUNDOPLICATION  3193    HYSTERECTOMY (CERVIX STATUS UNKNOWN)      total    HYSTERECTOMY (CERVIX STATUS UNKNOWN)      HYSTEROSCOPY      tubal perfusion    MYRINGOTOMY Right 11/13/2015    Right myringotomy with placement of  T-tube on the right side. Removal of plugged ventilating tube, right side. MYRINGOTOMY Left 07/14/2020    MYRINGOTOMY TUBE INSERTION performed by Rd Stockton MD at [address] [place] [address] Dr Varghese 06/05/2014    OTHER SURGICAL HISTORY Right 05/29/2014    removal ear tube rt ear    OTHER SURGICAL HISTORY  2009    LOOP recorder inserted and removed 3 mos.  later    OTHER SURGICAL HISTORY Right 08/11/2016    ear tube removal with patch    OTHER SURGICAL HISTORY      tubal perfusion, lysis of uterine adhesions    OTHER SURGICAL HISTORY      multiple PICC lines inserted and removed, it has affected circulation bilateral upper arms    OTHER SURGICAL HISTORY  10/19/2020    Revisiion to subcutaneous transposition of unlar nerve, right elbow    OTHER SURGICAL HISTORY Right 06/14/2021    REVISION TO SUBMUSCULAR TRANSPOSITION OF RIGHT ULNAR NERVE    OTHER SURGICAL HISTORY Left 03/24/2022    DECOMPRESSION OF ULNAR NERVE, LEFT ELBOW    AK OLYA SHCHANTELLR JT W ANESTHESIA Right 03/01/2017    SHOULDER MANIPULATION RIGHT performed by Jyothi Shah MD at [facility] Left 10/17/2018    SHOULDER ARTHROSCOPY W/BICEPS TENDONESIS performed by Judah Boo MD at [facility] Left     foot    TONSILLECTOMY      TYMPANOSTOMY TUBE PLACEMENT      TYMPANOSTOMY TUBE PLACEMENT Left 03/12/2019    TYMPANOSTOMY TUBE PLACEMENT Right 12/08/2021    Right tympanostomy with tube placement of T-tube with operative microscope and general anesthesia. Right removal of right ear tube. ULNAR TUNNEL RELEASE Right     UPPER GASTROINTESTINAL ENDOSCOPY      UPPER GASTROINTESTINAL ENDOSCOPY  07/10/2019    UPPER GASTROINTESTINAL ENDOSCOPY N/A 07/10/2019    EGD BIOPSY performed by Ronit Everett MD at [address] Right 04/21/2022    Placement of right sided ventriculoperitoneal shunt [employer] programmable valve set at 1.5.        Medications:      bumetanide  2 mg Oral Daily    carvedilol  12.5 mg Oral BID     cetirizine  10 mg Oral Daily    clopidogrel  75 mg Oral Daily    [Held by provider] ipratropium-albuterol  1 ampule Inhalation Q4H WA    isosorbide mononitrate  30 mg Oral Daily    pantoprazole  40 mg Oral Daily    QUEtiapine  150 mg Oral Nightly    vilazodone HCl  20 mg Oral Daily    sodium chloride flush  5-40 mL IntraVENous 2 times per day    insulin lispro  0-16 Units SubCUTAneous TID     insulin lispro  0-4 Units SubCUTAneous Nightly    dexamethasone  6 mg Oral Daily    atorvastatin  80 mg Oral Nightly    magnesium oxide  400 mg Oral Daily    multivitamin  1 tablet Oral Daily    insulin glargine  48 Units SubCUTAneous BID    benzonatate  100 mg Oral TID    apixaban  5 mg Oral BID    albuterol sulfate HFA  2 puff Inhalation Q4H WA    ipratropium  2 puff Inhalation 4x daily       Social History:     Social History     Socioeconomic History    Marital status: Single     Spouse name: Not on file    Number of children: Not on file    Years of education: Not on file    Highest education level: Not on file   Occupational History     Employer: NONE   Tobacco Use    Smoking status: Every Day     Packs/day: 0.50     Years: 23.00     Pack years: 11.50     Types: Cigarettes    Smokeless tobacco: Never   Vaping Use    Vaping Use: Never used   Substance and Sexual Activity    Alcohol use: No     Alcohol/week: 0.0 standard drinks    Drug use: No    Sexual activity: Not on file   Other Topics Concern    Not on file   Social History Narrative    Not on file     Social Determinants of Health     Financial Resource Strain: Not on file   Food Insecurity: Not on file   Transportation Needs: Not on file   Physical Activity: Not on file   Stress: Not on file   Social Connections: Not on file   Intimate Partner Violence: Not on file   Housing Stability: Not on file       Family History:     Family History   Problem Relation Age of Onset    Ulcerative Colitis Father     Liver Disease Father 61        hep c and b    Diabetes Father     Asthma Father     Heart Disease Father     High Blood Pressure Father     Breast Cancer Maternal Aunt     Cancer Maternal Aunt     Diabetes Maternal Aunt     Heart Disease Maternal Aunt     High Blood Pressure Maternal Aunt     Breast Cancer Paternal Aunt     Heart Disease Paternal Aunt     High Blood Pressure Paternal Aunt     Breast Cancer Maternal Grandmother     Cervical Cancer Maternal Grandmother     Cancer Maternal Grandmother     Diabetes Maternal Grandmother     Asthma Maternal Grandmother     Heart Disease Maternal Grandmother     High Blood Pressure Maternal Grandmother     Lung Cancer Maternal Grandfather     Diabetes Maternal Grandfather     Asthma Maternal Grandfather     Heart Disease Maternal Grandfather     High Blood Pressure Maternal Grandfather     Cervical Cancer Paternal Grandmother     Cancer Paternal Grandmother     Diabetes Paternal Grandmother     Heart Disease Paternal Grandmother     High Blood Pressure Paternal Grandmother     Diabetes Mother     Asthma Mother     Heart Disease Mother     High Blood Pressure Mother     Cancer Sister     Heart Disease Maternal Uncle     High Blood Pressure Maternal Uncle     Heart Disease Paternal Uncle     High Blood Pressure Paternal Uncle     Diabetes Paternal Grandfather     Heart Disease Paternal Grandfather     High Blood Pressure Paternal Grandfather       Medical Decision Making:   I have independently reviewed/ordered the following labs:    CBC with Differential:   Recent Labs     08/29/22  0012   WBC 6.5   HGB 12.9   HCT 39.6      LYMPHOPCT 24   MONOPCT 13*     BMP:  Recent Labs     08/29/22 0012      K 3.4*      CO2 26   BUN 10   CREATININE 0.81     Hepatic Function Panel: No results for input(s): PROT, LABALBU, BILIDIR, IBILI, BILITOT, ALKPHOS, ALT, AST in the last 72 hours. No results for input(s): RPR in the last 72 hours. No results for input(s): HIV in the last 72 hours. No results for input(s): BC in the last 72 hours. Lab Results   Component Value Date/Time    CREATININE 0.81 08/29/2022 12:12 AM    GLUCOSE 115 08/29/2022 12:12 AM    GLUCOSE 287 03/20/2012 11:08 AM       Detailed results: Thank you for allowing us to participate in the care of this patient. Please call with questions. This note is created with the assistance of a speech recognition program.  While intending to generate adocument that actually reflects the content of the visit, the document can still have some errors including those of syntax and sound a like substitutions which may escape proof reading. It such instances, actual meaningcan be extrapolated by contextual diversion.     Nura Solares MD  Office: [phone]  Perfect serve / office [phone]

## 2022-08-31 NOTE — CARE COORDINATION
ONGOING DISCHARGE PLAN:    COVID +, Remains Afebrile. Remains on 3LNC. Patient is alert and oriented x4. Spoke with patient regarding discharge plan and patient confirms that plan is still to return to home w/ Niece. Denies VNS. Follow for Home Oxygen. Pulm on board. Oral Decadron. Free 30 Day card for Eliquis on chart. Per Psych notes, No BHI. Denies other needs. Will continue to follow for additional discharge needs.     Electronically signed by Bud Wang RN on 8/31/2022 at 11:13 AM

## 2022-08-31 NOTE — PROGRESS NOTES
Progress Note  Date:2022       Room:/-51  Patient Chao Napier     YOB: 1975     Age:47 y.o. Subjective    Subjective:  Symptoms:  (Left arm pain at site of phenergan injection in EC. States she does not want to be on carb restricted diet, that she will control her carbs by \"not eating bread. \" Not satisfied with the required Lantus dose reduction to be equivalent to her Tresiba dose at home. Did consent to taking her Eliquis this morning. Reinstructed on why she cannot leave room to smoke - states she is \"withdrawing,\" though she told other caregivers she has been told not to use nicotine replacement by cardiology. States understanding on why she cannot use nebulizer treatments and must use inhalers. Asks how long she will need to be in [facility] isolation during hospital stay. ). Diet:  Adequate intake. Activity level: Impaired due to weakness. Review of Systems  Objective         Vitals Last 24 Hours:  TEMPERATURE:  Temp  Av.7 °F (36.5 °C)  Min: 97.2 °F (36.2 °C)  Max: 98 °F (36.7 °C)  RESPIRATIONS RANGE: Resp  Av.3  Min: 18  Max: 20  PULSE OXIMETRY RANGE: SpO2  Av.5 %  Min: 94 %  Max: 95 %  PULSE RANGE: Pulse  Av.5  Min: 61  Max: 88  BLOOD PRESSURE RANGE: Systolic (15PJC), XIMENA:853 , Min:127 , TDJ:461   ; Diastolic (16KKD), YTM:18, Min:64, Max:96    I/O (24Hr): No intake or output data in the 24 hours ending 22 1132  Objective:  General Appearance:  Comfortable. Vital signs: (most recent): Blood pressure 127/64, pulse 88, temperature 98 °F (36.7 °C), temperature source Oral, resp. rate 20, height 5' 4\" (1.626 m), weight 275 lb 9.2 oz (125 kg), SpO2 95 %.   Vital signs are normal.    Labs/Imaging/Diagnostics    Labs:  CBC:  Recent Labs     22  0012 22  0430   WBC 6.5 7.5   RBC 3.83* 3.98*   HGB 12.9 13.4   HCT 39.6 41.2   .5* 103.3*   RDW 13.8 13.8    207     CHEMISTRIES:  Recent Labs     22  0012 08/31/22 0430    138   K 3.4* 4.5    100   CO2 26 29   BUN 10 14   CREATININE 0.81 0.68   GLUCOSE 115* 337*     PT/INR:  Recent Labs     08/31/22 0430   PROTIME 12.6   INR 0.9     APTT:  Recent Labs     08/31/22 0430   APTT 29.0     LIVER PROFILE:  Recent Labs     08/31/22 0430   AST 21   ALT 18   BILITOT <0.15*   ALKPHOS 99       Imaging Last 24 Hours:  XR CHEST PORTABLE    Result Date: 8/29/2022  EXAMINATION: ONE XRAY VIEW OF THE CHEST 8/29/2022 11:19 pm COMPARISON: 08/25/2022 HISTORY: ORDERING SYSTEM PROVIDED HISTORY: CP hx of CHF TECHNOLOGIST PROVIDED HISTORY: CP hx of CHF Reason for Exam: PT CO cough with CP X several days. PT HX CHF FINDINGS: Cardiac size is at the upper limits of normal.  Bibasilar interstitial infiltrates are seen. Bronchial thickening is noted. No acute osseous abnormality is identified. Prominent bibasilar interstitial changes with some bronchial thickening which can be seen with peribronchial and dependent edema, though a similar pattern can be seen with bronchitis and bronchopneumonia.      Assessment//Plan           Hospital Problems             Last Modified POA    * (Principal) COVID-19 8/30/2022 Yes    Moderate left ventricular systolic dysfunction 7/48/4150 Yes    Chronic systolic CHF (congestive heart failure) ([address] 8/30/2022 Yes    Diabetes mellitus type 2, controlled ([address] 8/30/2022 Yes    COPD (chronic obstructive pulmonary disease) (HCC) (Chronic) 8/30/2022 Yes    KRISTIN (obstructive sleep apnea) 8/30/2022 Yes    Overview Addendum 7/19/2019  3:21 PM by Fahad Gregory     Intolerant to CPAP    Overview:   Overview:   Intolerant to CPAP         Pulmonary HTN ([address] 8/30/2022 Yes    Recurrent moderate major depressive disorder with anxiety ([address] 8/30/2022 Yes    Uncontrolled type 2 diabetes mellitus with hyperglycemia ([address] 8/30/2022 Yes     Assessment & Plan    COVID-19 with acute hypoxic respiratory failure - continue bronchodilators, decadron, cough suppressants, oxygen and DOAC. Diabetes - increase insulin.  Remove carb restriction from diet          Electronically signed by Antwon Mccrary MD on 8/31/22 at 11:32 AM EDT

## 2022-09-01 LAB
C-REACTIVE PROTEIN: 7.2 MG/L (ref 0–5)
D-DIMER QUANTITATIVE: 0.34 MG/L FEU (ref 0–0.59)
EKG ATRIAL RATE: 65 BPM
EKG P AXIS: 52 DEGREES
EKG P-R INTERVAL: 136 MS
EKG Q-T INTERVAL: 396 MS
EKG QRS DURATION: 86 MS
EKG QTC CALCULATION (BAZETT): 411 MS
EKG R AXIS: 46 DEGREES
EKG T AXIS: 44 DEGREES
EKG VENTRICULAR RATE: 65 BPM
GLUCOSE BLD-MCNC: 152 MG/DL (ref 65–105)
GLUCOSE BLD-MCNC: 171 MG/DL (ref 65–105)
GLUCOSE BLD-MCNC: 206 MG/DL (ref 65–105)
PROCALCITONIN: 0.03 NG/ML

## 2022-09-01 PROCEDURE — 2060000000 HC ICU INTERMEDIATE R&B

## 2022-09-01 PROCEDURE — 36415 COLL VENOUS BLD VENIPUNCTURE: CPT

## 2022-09-01 PROCEDURE — 2580000003 HC RX 258: Performed by: FAMILY MEDICINE

## 2022-09-01 PROCEDURE — 85379 FIBRIN DEGRADATION QUANT: CPT

## 2022-09-01 PROCEDURE — 99232 SBSQ HOSP IP/OBS MODERATE 35: CPT | Performed by: FAMILY MEDICINE

## 2022-09-01 PROCEDURE — 86140 C-REACTIVE PROTEIN: CPT

## 2022-09-01 PROCEDURE — 99232 SBSQ HOSP IP/OBS MODERATE 35: CPT | Performed by: PSYCHIATRY & NEUROLOGY

## 2022-09-01 PROCEDURE — 82947 ASSAY GLUCOSE BLOOD QUANT: CPT

## 2022-09-01 PROCEDURE — 6370000000 HC RX 637 (ALT 250 FOR IP): Performed by: FAMILY MEDICINE

## 2022-09-01 PROCEDURE — 6370000000 HC RX 637 (ALT 250 FOR IP): Performed by: NURSE PRACTITIONER

## 2022-09-01 PROCEDURE — 6360000002 HC RX W HCPCS: Performed by: FAMILY MEDICINE

## 2022-09-01 PROCEDURE — 6370000000 HC RX 637 (ALT 250 FOR IP): Performed by: PSYCHIATRY & NEUROLOGY

## 2022-09-01 PROCEDURE — 6370000000 HC RX 637 (ALT 250 FOR IP): Performed by: INTERNAL MEDICINE

## 2022-09-01 PROCEDURE — 84145 PROCALCITONIN (PCT): CPT

## 2022-09-01 PROCEDURE — 93010 ELECTROCARDIOGRAM REPORT: CPT | Performed by: INTERNAL MEDICINE

## 2022-09-01 RX ORDER — INSULIN GLARGINE 100 [IU]/ML
70 INJECTION, SOLUTION SUBCUTANEOUS 2 TIMES DAILY
Status: DISCONTINUED | OUTPATIENT
Start: 2022-09-01 | End: 2022-09-04 | Stop reason: HOSPADM

## 2022-09-01 RX ADMIN — ATORVASTATIN CALCIUM 80 MG: 80 TABLET, FILM COATED ORAL at 20:59

## 2022-09-01 RX ADMIN — APIXABAN 5 MG: 5 TABLET, FILM COATED ORAL at 20:49

## 2022-09-01 RX ADMIN — OXYCODONE AND ACETAMINOPHEN 1 TABLET: 5; 325 TABLET ORAL at 20:56

## 2022-09-01 RX ADMIN — OXYCODONE AND ACETAMINOPHEN 1 TABLET: 5; 325 TABLET ORAL at 06:28

## 2022-09-01 RX ADMIN — NALBUPHINE HYDROCHLORIDE 10 MG: 10 INJECTION, SOLUTION INTRAMUSCULAR; INTRAVENOUS; SUBCUTANEOUS at 21:30

## 2022-09-01 RX ADMIN — DEXTROMETHORPHAN HYDROBROMIDE, GUAIFENESIN 5 ML: 10; 100 LIQUID ORAL at 06:28

## 2022-09-01 RX ADMIN — PANTOPRAZOLE SODIUM 40 MG: 40 TABLET, DELAYED RELEASE ORAL at 07:00

## 2022-09-01 RX ADMIN — CARVEDILOL 12.5 MG: 12.5 TABLET, FILM COATED ORAL at 07:00

## 2022-09-01 RX ADMIN — ALBUTEROL SULFATE 2 PUFF: 90 AEROSOL, METERED RESPIRATORY (INHALATION) at 13:20

## 2022-09-01 RX ADMIN — BENZONATATE 200 MG: 200 CAPSULE ORAL at 07:57

## 2022-09-01 RX ADMIN — INSULIN GLARGINE 70 UNITS: 100 INJECTION, SOLUTION SUBCUTANEOUS at 08:02

## 2022-09-01 RX ADMIN — ISOSORBIDE MONONITRATE 30 MG: 30 TABLET, EXTENDED RELEASE ORAL at 07:00

## 2022-09-01 RX ADMIN — SODIUM CHLORIDE, PRESERVATIVE FREE 10 ML: 5 INJECTION INTRAVENOUS at 21:31

## 2022-09-01 RX ADMIN — INSULIN LISPRO 4 UNITS: 100 INJECTION, SOLUTION INTRAVENOUS; SUBCUTANEOUS at 08:02

## 2022-09-01 RX ADMIN — BUMETANIDE 2 MG: 1 TABLET ORAL at 07:00

## 2022-09-01 RX ADMIN — Medication 30 MG: at 07:55

## 2022-09-01 RX ADMIN — DULOXETINE 60 MG: 60 CAPSULE, DELAYED RELEASE ORAL at 07:56

## 2022-09-01 RX ADMIN — Medication 30 MG: at 20:59

## 2022-09-01 RX ADMIN — QUETIAPINE FUMARATE 250 MG: 200 TABLET ORAL at 20:58

## 2022-09-01 RX ADMIN — APIXABAN 5 MG: 5 TABLET, FILM COATED ORAL at 07:58

## 2022-09-01 RX ADMIN — CARVEDILOL 12.5 MG: 12.5 TABLET, FILM COATED ORAL at 17:18

## 2022-09-01 RX ADMIN — NALBUPHINE HYDROCHLORIDE 10 MG: 10 INJECTION, SOLUTION INTRAMUSCULAR; INTRAVENOUS; SUBCUTANEOUS at 07:59

## 2022-09-01 RX ADMIN — SODIUM CHLORIDE, PRESERVATIVE FREE 10 ML: 5 INJECTION INTRAVENOUS at 07:59

## 2022-09-01 RX ADMIN — NALBUPHINE HYDROCHLORIDE 10 MG: 10 INJECTION, SOLUTION INTRAMUSCULAR; INTRAVENOUS; SUBCUTANEOUS at 15:17

## 2022-09-01 RX ADMIN — DULOXETINE 60 MG: 60 CAPSULE, DELAYED RELEASE ORAL at 21:30

## 2022-09-01 RX ADMIN — DEXTROMETHORPHAN HYDROBROMIDE, GUAIFENESIN 5 ML: 10; 100 LIQUID ORAL at 17:17

## 2022-09-01 RX ADMIN — DEXAMETHASONE 6 MG: 6 TABLET ORAL at 07:57

## 2022-09-01 RX ADMIN — BENZONATATE 200 MG: 200 CAPSULE ORAL at 15:17

## 2022-09-01 RX ADMIN — Medication 400 MG: at 07:55

## 2022-09-01 RX ADMIN — BENZONATATE 200 MG: 200 CAPSULE ORAL at 20:56

## 2022-09-01 RX ADMIN — OXYCODONE AND ACETAMINOPHEN 1 TABLET: 5; 325 TABLET ORAL at 13:19

## 2022-09-01 RX ADMIN — INSULIN GLARGINE 70 UNITS: 100 INJECTION, SOLUTION SUBCUTANEOUS at 21:00

## 2022-09-01 RX ADMIN — CETIRIZINE HYDROCHLORIDE 10 MG: 10 TABLET, FILM COATED ORAL at 07:58

## 2022-09-01 RX ADMIN — PROMETHAZINE HYDROCHLORIDE 6.25 MG: 25 INJECTION INTRAMUSCULAR; INTRAVENOUS at 17:17

## 2022-09-01 ASSESSMENT — PAIN SCALES - GENERAL
PAINLEVEL_OUTOF10: 10
PAINLEVEL_OUTOF10: 4
PAINLEVEL_OUTOF10: 10
PAINLEVEL_OUTOF10: 10
PAINLEVEL_OUTOF10: 9
PAINLEVEL_OUTOF10: 6
PAINLEVEL_OUTOF10: 10

## 2022-09-01 ASSESSMENT — PAIN DESCRIPTION - FREQUENCY: FREQUENCY: CONTINUOUS

## 2022-09-01 ASSESSMENT — PAIN DESCRIPTION - ONSET: ONSET: ON-GOING

## 2022-09-01 ASSESSMENT — PAIN DESCRIPTION - DESCRIPTORS
DESCRIPTORS: DISCOMFORT
DESCRIPTORS: DISCOMFORT

## 2022-09-01 ASSESSMENT — PAIN DESCRIPTION - LOCATION
LOCATION: CHEST;HEAD;RIB CAGE
LOCATION: CHEST;HEAD;RIB CAGE
LOCATION: CHEST;HEAD
LOCATION: BACK;CHEST

## 2022-09-01 ASSESSMENT — PAIN - FUNCTIONAL ASSESSMENT: PAIN_FUNCTIONAL_ASSESSMENT: ACTIVITIES ARE NOT PREVENTED

## 2022-09-01 ASSESSMENT — PAIN DESCRIPTION - ORIENTATION: ORIENTATION: MID;RIGHT;LEFT

## 2022-09-01 ASSESSMENT — PAIN DESCRIPTION - PAIN TYPE: TYPE: ACUTE PAIN

## 2022-09-01 NOTE — PROGRESS NOTES
Writer asked pt who to contact for update. Pt states no need to update anyone as she will talk to them.

## 2022-09-01 NOTE — CARE COORDINATION
ONGOING DISCHARGE PLAN:    COVID +, Remains Afebrile. Remains on 3LNC. Patient is alert and oriented x4. Spoke with patient regarding discharge plan and patient confirms that plan is still  to return to home w/ Niece. Denies VNS. Follow for Home Oxygen. Pulm on board. Oral Decadron. Free 30 Day card for Eliquis on chart. Per Psych notes, No BHI. Denies other needs. Will continue to follow for additional discharge needs.     Electronically signed by Meghann Pascal RN on 9/1/2022 at 11:27 AM

## 2022-09-01 NOTE — PLAN OF CARE
Problem: Discharge Planning  Goal: Discharge to home or other facility with appropriate resources  9/1/2022 1048 by Yuridia Gonzalez RN  Outcome: Progressing  Flowsheets (Taken 9/1/2022 0800)  Discharge to home or other facility with appropriate resources: Identify barriers to discharge with patient and caregiver     Problem: Pain  Goal: Verbalizes/displays adequate comfort level or baseline comfort level  9/1/2022 1048 by Yuridia Gonzalez RN  Outcome: Progressing     Problem: Safety - Adult  Goal: Free from fall injury  9/1/2022 1048 by Yuridia Gonzalez RN  Outcome: Progressing     Problem: ABCDS Injury Assessment  Goal: Absence of physical injury  9/1/2022 1048 by Yuridia Gonzalez RN  Outcome: Progressing     Problem: Respiratory - Adult  Goal: Achieves optimal ventilation and oxygenation  9/1/2022 1048 by Yuridia Gonzalez RN  Outcome: Progressing  Flowsheets (Taken 9/1/2022 0800)  Achieves optimal ventilation and oxygenation: Assess for changes in respiratory status

## 2022-09-01 NOTE — PROGRESS NOTES
PULMONARY PROGRESS NOTE:    REASON FOR VISIT:COPD, COVID  Interval History:    Shortness of Breath: ++  Cough: ++nonproductive   Sputum: no          Hemoptysis: no  Chest Pain: no  Fever: no                   Swelling Feet: no  Headache: no                                           Nausea, Emesis, Abdominal Pain: no  Diarrhea: yes       Constipation: no    Events since last visit: none    PAST MEDICAL HISTORY:      Scheduled Meds:   insulin glargine  70 Units SubCUTAneous BID    benzonatate  200 mg Oral TID    dextromethorphan  30 mg Oral 2 times per day    DULoxetine  60 mg Oral BID    QUEtiapine  200 mg Oral Nightly    bumetanide  2 mg Oral Daily    carvedilol  12.5 mg Oral BID WC    cetirizine  10 mg Oral Daily    clopidogrel  75 mg Oral Daily    [Held by provider] ipratropium-albuterol  1 ampule Inhalation Q4H WA    isosorbide mononitrate  30 mg Oral Daily    pantoprazole  40 mg Oral Daily    vilazodone HCl  20 mg Oral Daily    sodium chloride flush  5-40 mL IntraVENous 2 times per day    insulin lispro  0-16 Units SubCUTAneous TID WC    insulin lispro  0-4 Units SubCUTAneous Nightly    dexamethasone  6 mg Oral Daily    atorvastatin  80 mg Oral Nightly    magnesium oxide  400 mg Oral Daily    multivitamin  1 tablet Oral Daily    apixaban  5 mg Oral BID    albuterol sulfate HFA  2 puff Inhalation Q4H WA    ipratropium  2 puff Inhalation 4x daily    baricitinib  4 mg Oral Daily     Continuous Infusions:   dextrose      sodium chloride       PRN Meds:nalbuphine, oxyCODONE-acetaminophen, glucose, dextrose bolus **OR** dextrose bolus, glucagon (rDNA), dextrose, sodium chloride flush, sodium chloride, polyethylene glycol, acetaminophen **OR** acetaminophen, promethazine, dextromethorphan-guaiFENesin        PHYSICAL EXAMINATION:  BP (!) 104/55 Comment: Pt already took morning BP medication  Pulse 59   Temp 98.2 °F (36.8 °C) (Oral)   Resp 18   Ht 5' 4\" (1.626 m)   Wt 275 lb 9.2 oz (125 kg)   SpO2 91%   BMI 47.30 kg/m²     General : Awake, alert,   Neck - supple, no lymphadenopathy, JVD not raised  Heart - regular rhythm, S1 and S2 normal; no additional sounds heard  Lungs - Air Entry- poor bilaterally; breath sounds : vesicular;   rales/crackles - absent, 91% on 3L  Abdomen - soft, no tenderness  Upper Extremities  - no cyanosis, mottling; edema : absent  Lower Extremities: no cyanosis, mottling; edema : absent    Current Laboratory, Radiologic, Microbiologic, and Diagnostic studies reviewed  Data ReviewCBC:   Recent Labs     08/31/22  0430   WBC 7.5   RBC 3.98*   HGB 13.4   HCT 41.2        BMP:   Recent Labs     08/31/22  0430   GLUCOSE 337*      K 4.5   BUN 14   CREATININE 0.68   CALCIUM 9.3     ABGs: No results for input(s): PHART, PO2ART, AHK3WCU, UDN9DQZ, BEART, M0BGKJEC, CSY8DYI in the last 72 hours. PT/INR:  No results found for: PTINR    ASSESSMENT / PLAN:    COPD - BD  COVID - steroid, cough suppressant  Cough     Add delsym    Increase tessalon   Acute hypoxic resp failure - O2  Fever- symptomatic Rx  Elevated d dimer - ?  Related to COVID, cannot do CTA due to contrast allergy; empiric eliquis  CXR tomorrow  Discussed with Dr. Gabby Jimenez     Electronically signed by MARYANNE Asif - CNP on 09/01/22 at 11:26 AM.

## 2022-09-01 NOTE — PROGRESS NOTES
Progress Note  Date:2022       [id number]  Patient Ralph Nelson     YOB: 1975     Age:47 y.o. Subjective    Subjective:  Symptoms:  (Sitting up in bed, taking meds. Complaining she was given less insulin than ordered last night. ). Diet:  Adequate intake. Activity level: Normal.     Review of Systems  Objective         Vitals Last 24 Hours:  TEMPERATURE:  Temp  Av.1 °F (36.7 °C)  Min: 98 °F (36.7 °C)  Max: 98.3 °F (36.8 °C)  RESPIRATIONS RANGE: Resp  Av.6  Min: 16  Max: 22  PULSE OXIMETRY RANGE: SpO2  Av.4 %  Min: 90 %  Max: 96 %  PULSE RANGE: Pulse  Av.5  Min: 50  Max: 79  BLOOD PRESSURE RANGE: Systolic (79AZG), ZXF:530 , Min:104 , MIY:004   ; Diastolic (39FXP), DLR:19, Min:55, Max:86    I/O (24Hr): No intake or output data in the 24 hours ending 22  Objective:  General Appearance:  Comfortable. Vital signs: (most recent): Blood pressure (!) 104/55, pulse 59, temperature 98.2 °F (36.8 °C), temperature source Oral, resp. rate 18, height 5' 4\" (1.626 m), weight 275 lb 9.2 oz (125 kg), SpO2 91 %. (BP low). Lungs:  Normal effort and normal respiratory rate. There are decreased breath sounds. Heart: Normal rate. Regular rhythm. S1 normal and S2 normal.    Labs/Imaging/Diagnostics    Labs:  CBC:  Recent Labs     22   WBC 7.5   RBC 3.98*   HGB 13.4   HCT 41.2   .3*   RDW 13.8        CHEMISTRIES:  Recent Labs     22      K 4.5      CO2 29   BUN 14   CREATININE 0.68   GLUCOSE 337*     PT/INR:  Recent Labs     22   PROTIME 12.6   INR 0.9     APTT:  Recent Labs     22   APTT 29.0     LIVER PROFILE:  Recent Labs     08/31/22  0430   AST 21   ALT 18   BILITOT <0.15*   ALKPHOS 99       Imaging Last 24 Hours:  No results found.   Assessment//Plan           Hospital Problems             Last Modified POA    * (Principal) COVID-19 2022 Yes    Moderate left ventricular systolic dysfunction 8/24/1466 Yes    Chronic systolic CHF (congestive heart failure) ([address] 8/30/2022 Yes    PTSD (post-traumatic stress disorder) 8/31/2022 Yes    Diabetes mellitus type 2, controlled ([address] 8/30/2022 Yes    COPD (chronic obstructive pulmonary disease) ([address] (Chronic) 8/30/2022 Yes    KRISTIN (obstructive sleep apnea) 8/30/2022 Yes    Overview Addendum 7/19/2019  3:21 PM by Verna Ash to CPAP    Overview:   Overview:   Intolerant to CPAP         Pulmonary HTN ([address] 8/30/2022 Yes    MDD (major depressive disorder), recurrent episode, moderate ([address] 8/31/2022 Yes    Uncontrolled type 2 diabetes mellitus with hyperglycemia ([address] 8/30/2022 Yes     Assessment & Plan    COVID-19- plan per pulmonology/ID    Diabetes - Lantus 70 units BID without reduction in dose.      Electronically signed by Kellie Barrios MD on 9/1/22 at 8:32 AM EDT

## 2022-09-01 NOTE — PLAN OF CARE
Problem: Discharge Planning  Goal: Discharge to home or other facility with appropriate resources  Outcome: Progressing     Problem: Pain  Goal: Verbalizes/displays adequate comfort level or baseline comfort level  Outcome: Progressing     Problem: Safety - Adult  Goal: Free from fall injury  Outcome: Progressing  Flowsheets (Taken 8/31/2022 2241)  Free From Fall Injury: Instruct family/caregiver on patient safety     Problem: ABCDS Injury Assessment  Goal: Absence of physical injury  Outcome: Progressing  Flowsheets (Taken 8/31/2022 2241)  Absence of Physical Injury: Implement safety measures based on patient assessment     Problem: Respiratory - Adult  Goal: Achieves optimal ventilation and oxygenation  Outcome: Progressing     Problem: Chronic Conditions and Co-morbidities  Goal: Patient's chronic conditions and co-morbidity symptoms are monitored and maintained or improved  Outcome: Progressing
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
## 2022-09-02 ENCOUNTER — APPOINTMENT (OUTPATIENT)
Dept: GENERAL RADIOLOGY | Age: 47
DRG: 177 | End: 2022-09-02
Payer: COMMERCIAL

## 2022-09-02 LAB
GLUCOSE BLD-MCNC: 105 MG/DL (ref 65–105)
GLUCOSE BLD-MCNC: 163 MG/DL (ref 65–105)
GLUCOSE BLD-MCNC: 169 MG/DL (ref 65–105)
GLUCOSE BLD-MCNC: 171 MG/DL (ref 65–105)
GLUCOSE BLD-MCNC: 207 MG/DL (ref 65–105)

## 2022-09-02 PROCEDURE — 2060000000 HC ICU INTERMEDIATE R&B

## 2022-09-02 PROCEDURE — 6360000002 HC RX W HCPCS: Performed by: FAMILY MEDICINE

## 2022-09-02 PROCEDURE — 99232 SBSQ HOSP IP/OBS MODERATE 35: CPT | Performed by: INTERNAL MEDICINE

## 2022-09-02 PROCEDURE — 2580000003 HC RX 258: Performed by: FAMILY MEDICINE

## 2022-09-02 PROCEDURE — 71045 X-RAY EXAM CHEST 1 VIEW: CPT

## 2022-09-02 PROCEDURE — 82947 ASSAY GLUCOSE BLOOD QUANT: CPT

## 2022-09-02 PROCEDURE — 6370000000 HC RX 637 (ALT 250 FOR IP): Performed by: PSYCHIATRY & NEUROLOGY

## 2022-09-02 PROCEDURE — 2700000000 HC OXYGEN THERAPY PER DAY

## 2022-09-02 PROCEDURE — 6370000000 HC RX 637 (ALT 250 FOR IP): Performed by: FAMILY MEDICINE

## 2022-09-02 PROCEDURE — 94761 N-INVAS EAR/PLS OXIMETRY MLT: CPT

## 2022-09-02 PROCEDURE — 94640 AIRWAY INHALATION TREATMENT: CPT

## 2022-09-02 PROCEDURE — 6370000000 HC RX 637 (ALT 250 FOR IP): Performed by: NURSE PRACTITIONER

## 2022-09-02 PROCEDURE — 6370000000 HC RX 637 (ALT 250 FOR IP): Performed by: INTERNAL MEDICINE

## 2022-09-02 PROCEDURE — 94664 DEMO&/EVAL PT USE INHALER: CPT

## 2022-09-02 RX ADMIN — ALBUTEROL SULFATE 2 PUFF: 90 AEROSOL, METERED RESPIRATORY (INHALATION) at 08:21

## 2022-09-02 RX ADMIN — CARVEDILOL 12.5 MG: 12.5 TABLET, FILM COATED ORAL at 20:28

## 2022-09-02 RX ADMIN — QUETIAPINE FUMARATE 250 MG: 200 TABLET ORAL at 20:28

## 2022-09-02 RX ADMIN — CETIRIZINE HYDROCHLORIDE 10 MG: 10 TABLET, FILM COATED ORAL at 09:45

## 2022-09-02 RX ADMIN — NALBUPHINE HYDROCHLORIDE 10 MG: 10 INJECTION, SOLUTION INTRAMUSCULAR; INTRAVENOUS; SUBCUTANEOUS at 04:05

## 2022-09-02 RX ADMIN — INSULIN GLARGINE 70 UNITS: 100 INJECTION, SOLUTION SUBCUTANEOUS at 20:23

## 2022-09-02 RX ADMIN — SODIUM CHLORIDE, PRESERVATIVE FREE 10 ML: 5 INJECTION INTRAVENOUS at 20:30

## 2022-09-02 RX ADMIN — ALBUTEROL SULFATE 2 PUFF: 90 AEROSOL, METERED RESPIRATORY (INHALATION) at 20:11

## 2022-09-02 RX ADMIN — SODIUM CHLORIDE, PRESERVATIVE FREE 10 ML: 5 INJECTION INTRAVENOUS at 09:43

## 2022-09-02 RX ADMIN — OXYCODONE AND ACETAMINOPHEN 1 TABLET: 5; 325 TABLET ORAL at 20:27

## 2022-09-02 RX ADMIN — APIXABAN 5 MG: 5 TABLET, FILM COATED ORAL at 20:29

## 2022-09-02 RX ADMIN — INSULIN GLARGINE 70 UNITS: 100 INJECTION, SOLUTION SUBCUTANEOUS at 09:48

## 2022-09-02 RX ADMIN — NALBUPHINE HYDROCHLORIDE 10 MG: 10 INJECTION, SOLUTION INTRAMUSCULAR; INTRAVENOUS; SUBCUTANEOUS at 22:09

## 2022-09-02 RX ADMIN — DEXAMETHASONE 6 MG: 6 TABLET ORAL at 09:47

## 2022-09-02 RX ADMIN — BENZONATATE 200 MG: 200 CAPSULE ORAL at 20:29

## 2022-09-02 RX ADMIN — Medication 400 MG: at 09:43

## 2022-09-02 RX ADMIN — ALBUTEROL SULFATE 2 PUFF: 90 AEROSOL, METERED RESPIRATORY (INHALATION) at 11:16

## 2022-09-02 RX ADMIN — DEXTROMETHORPHAN HYDROBROMIDE, GUAIFENESIN 5 ML: 10; 100 LIQUID ORAL at 20:28

## 2022-09-02 RX ADMIN — BENZONATATE 200 MG: 200 CAPSULE ORAL at 09:45

## 2022-09-02 RX ADMIN — PANTOPRAZOLE SODIUM 40 MG: 40 TABLET, DELAYED RELEASE ORAL at 06:24

## 2022-09-02 RX ADMIN — DULOXETINE 60 MG: 60 CAPSULE, DELAYED RELEASE ORAL at 20:29

## 2022-09-02 RX ADMIN — OXYCODONE AND ACETAMINOPHEN 1 TABLET: 5; 325 TABLET ORAL at 02:02

## 2022-09-02 RX ADMIN — ATORVASTATIN CALCIUM 80 MG: 80 TABLET, FILM COATED ORAL at 20:29

## 2022-09-02 RX ADMIN — APIXABAN 5 MG: 5 TABLET, FILM COATED ORAL at 09:43

## 2022-09-02 RX ADMIN — Medication 30 MG: at 20:48

## 2022-09-02 RX ADMIN — PROMETHAZINE HYDROCHLORIDE 6.25 MG: 25 INJECTION INTRAMUSCULAR; INTRAVENOUS at 16:04

## 2022-09-02 RX ADMIN — NALBUPHINE HYDROCHLORIDE 10 MG: 10 INJECTION, SOLUTION INTRAMUSCULAR; INTRAVENOUS; SUBCUTANEOUS at 10:01

## 2022-09-02 RX ADMIN — NALBUPHINE HYDROCHLORIDE 10 MG: 10 INJECTION, SOLUTION INTRAMUSCULAR; INTRAVENOUS; SUBCUTANEOUS at 16:04

## 2022-09-02 RX ADMIN — DULOXETINE 60 MG: 60 CAPSULE, DELAYED RELEASE ORAL at 09:45

## 2022-09-02 RX ADMIN — Medication 30 MG: at 09:47

## 2022-09-02 RX ADMIN — BENZONATATE 200 MG: 200 CAPSULE ORAL at 14:35

## 2022-09-02 RX ADMIN — OXYCODONE AND ACETAMINOPHEN 1 TABLET: 5; 325 TABLET ORAL at 06:24

## 2022-09-02 RX ADMIN — ALBUTEROL SULFATE 2 PUFF: 90 AEROSOL, METERED RESPIRATORY (INHALATION) at 15:35

## 2022-09-02 RX ADMIN — DEXTROMETHORPHAN HYDROBROMIDE, GUAIFENESIN 5 ML: 10; 100 LIQUID ORAL at 02:03

## 2022-09-02 ASSESSMENT — PAIN SCALES - GENERAL
PAINLEVEL_OUTOF10: 0
PAINLEVEL_OUTOF10: 0
PAINLEVEL_OUTOF10: 9
PAINLEVEL_OUTOF10: 0
PAINLEVEL_OUTOF10: 7
PAINLEVEL_OUTOF10: 0
PAINLEVEL_OUTOF10: 8
PAINLEVEL_OUTOF10: 0
PAINLEVEL_OUTOF10: 8
PAINLEVEL_OUTOF10: 8

## 2022-09-02 ASSESSMENT — PAIN DESCRIPTION - LOCATION
LOCATION: BACK;CHEST;HEAD
LOCATION: BACK;CHEST
LOCATION: BACK;HEAD;CHEST

## 2022-09-02 ASSESSMENT — PAIN DESCRIPTION - DESCRIPTORS: DESCRIPTORS: ACHING;DISCOMFORT

## 2022-09-02 ASSESSMENT — PAIN DESCRIPTION - ORIENTATION: ORIENTATION: MID

## 2022-09-02 ASSESSMENT — PAIN - FUNCTIONAL ASSESSMENT: PAIN_FUNCTIONAL_ASSESSMENT: PREVENTS OR INTERFERES SOME ACTIVE ACTIVITIES AND ADLS

## 2022-09-02 ASSESSMENT — PAIN DESCRIPTION - ONSET: ONSET: ON-GOING

## 2022-09-02 ASSESSMENT — PAIN DESCRIPTION - FREQUENCY: FREQUENCY: CONTINUOUS

## 2022-09-02 ASSESSMENT — PAIN DESCRIPTION - PAIN TYPE: TYPE: ACUTE PAIN

## 2022-09-02 NOTE — PROGRESS NOTES
PULMONARY PROGRESS NOTE:    REASON FOR VISIT:COPD, COVID  Interval History:    Shortness of Breath: ++  Cough: ++nonproductive   Sputum: no          Hemoptysis: no  Chest Pain: no  Fever: no                   Swelling Feet: no  Headache: no                                           Nausea, Emesis, Abdominal Pain: no  Diarrhea: yes       Constipation: no    Events since last visit: none    PAST MEDICAL HISTORY:      Scheduled Meds:   insulin glargine  70 Units SubCUTAneous BID    QUEtiapine  250 mg Oral Nightly    benzonatate  200 mg Oral TID    dextromethorphan  30 mg Oral 2 times per day    DULoxetine  60 mg Oral BID    bumetanide  2 mg Oral Daily    carvedilol  12.5 mg Oral BID WC    cetirizine  10 mg Oral Daily    clopidogrel  75 mg Oral Daily    [Held by provider] ipratropium-albuterol  1 ampule Inhalation Q4H WA    isosorbide mononitrate  30 mg Oral Daily    pantoprazole  40 mg Oral Daily    vilazodone HCl  20 mg Oral Daily    sodium chloride flush  5-40 mL IntraVENous 2 times per day    insulin lispro  0-16 Units SubCUTAneous TID     insulin lispro  0-4 Units SubCUTAneous Nightly    dexamethasone  6 mg Oral Daily    atorvastatin  80 mg Oral Nightly    magnesium oxide  400 mg Oral Daily    multivitamin  1 tablet Oral Daily    apixaban  5 mg Oral BID    albuterol sulfate HFA  2 puff Inhalation Q4H WA    ipratropium  2 puff Inhalation 4x daily    baricitinib  4 mg Oral Daily     Continuous Infusions:   dextrose      sodium chloride       PRN Meds:nalbuphine, oxyCODONE-acetaminophen, glucose, dextrose bolus **OR** dextrose bolus, glucagon (rDNA), dextrose, sodium chloride flush, sodium chloride, polyethylene glycol, acetaminophen **OR** acetaminophen, promethazine, dextromethorphan-guaiFENesin        PHYSICAL EXAMINATION:  BP (!) 140/72   Pulse 88   Temp 98 °F (36.7 °C) (Oral)   Resp 16   Ht 5' 4\" (1.626 m)   Wt 275 lb 9.2 oz (125 kg)   SpO2 97%   BMI 47.30 kg/m²     General : Awake, alert,   Neck - supple, no lymphadenopathy, JVD not raised  Heart - regular rhythm, S1 and S2 normal; no additional sounds heard  Lungs - Air Entry- poor bilaterally; breath sounds : vesicular;   rales/crackles - absent, 91% on 3L  Abdomen - soft, no tenderness  Upper Extremities  - no cyanosis, mottling; edema : absent  Lower Extremities: no cyanosis, mottling; edema : absent    Current Laboratory, Radiologic, Microbiologic, and Diagnostic studies reviewed  Data ReviewCBC:   Recent Labs     08/31/22  0430   WBC 7.5   RBC 3.98*   HGB 13.4   HCT 41.2          BMP:   Recent Labs     08/31/22  0430   GLUCOSE 337*      K 4.5   BUN 14   CREATININE 0.68   CALCIUM 9.3       ABGs: No results for input(s): PHART, PO2ART, KUR9PMY, SLH6VYV, BEART, F6UEWAEQ, OGV8WXX in the last 72 hours. PT/INR:  No results found for: PTINR    ASSESSMENT / PLAN:    COPD - BD  COVID - steroid, cough suppressant  Cough     Add delsym    Increase tessalon   Acute hypoxic resp failure - O2  Fever- symptomatic Rx  Elevated d dimer - ?  Related to COVID, cannot do CTA due to contrast allergy; empiric eliquis  Home O2 evaluation    Electronically signed by Mateo Traylor MD on 09/02/22 at 11:59 AM.

## 2022-09-02 NOTE — PLAN OF CARE
Problem: Discharge Planning  Goal: Discharge to home or other facility with appropriate resources  Outcome: Progressing  Flowsheets  Taken 9/2/2022 1600  Discharge to home or other facility with appropriate resources: Refer to discharge planning if patient needs post-hospital services based on physician order or complex needs related to functional status, cognitive ability or social support system  Taken 9/2/2022 1230  Discharge to home or other facility with appropriate resources: Refer to discharge planning if patient needs post-hospital services based on physician order or complex needs related to functional status, cognitive ability or social support system  Taken 9/2/2022 0930  Discharge to home or other facility with appropriate resources: Refer to discharge planning if patient needs post-hospital services based on physician order or complex needs related to functional status, cognitive ability or social support system     Problem: Pain  Goal: Verbalizes/displays adequate comfort level or baseline comfort level  Outcome: Progressing  Flowsheets  Taken 9/2/2022 1600  Verbalizes/displays adequate comfort level or baseline comfort level:   Encourage patient to monitor pain and request assistance   Assess pain using appropriate pain scale  Taken 9/2/2022 1200  Verbalizes/displays adequate comfort level or baseline comfort level:   Encourage patient to monitor pain and request assistance   Assess pain using appropriate pain scale   Administer analgesics based on type and severity of pain and evaluate response  Taken 9/2/2022 0930  Verbalizes/displays adequate comfort level or baseline comfort level:   Encourage patient to monitor pain and request assistance   Assess pain using appropriate pain scale     Problem: Safety - Adult  Goal: Free from fall injury  Outcome: Progressing  Flowsheets (Taken 9/2/2022 1338)  Free From Fall Injury: Instruct family/caregiver on patient safety     Problem: Respiratory - Adult  Goal: Achieves optimal ventilation and oxygenation  Outcome: Progressing  Flowsheets  Taken 9/2/2022 1600  Achieves optimal ventilation and oxygenation:   Assess for changes in respiratory status   Assess for changes in mentation and behavior  Taken 9/2/2022 1230  Achieves optimal ventilation and oxygenation:   Assess for changes in respiratory status   Assess for changes in mentation and behavior   Position to facilitate oxygenation and minimize respiratory effort  Taken 9/2/2022 0930  Achieves optimal ventilation and oxygenation:   Assess for changes in respiratory status   Assess for changes in mentation and behavior   Position to facilitate oxygenation and minimize respiratory effort

## 2022-09-02 NOTE — PROGRESS NOTES
[facility] -   Infectious diseases evaluation  admission date 8/29/2022    reason for consultation:   COVID-19 infection    Impression :   Current:  COVID-19 infection  Acute hypoxic respiratory failure  COPD  Cardiomyopathy  Atrial fibrillation  Elevated D-dimer  Multiple antibiotics allergy  History of C. difficile colitis  Diabetes mellitus  History of DVT      Recommendations   Decadron for 10 days  Barcitinib for 14 days or until discharge  On Eliquis  Follow inflammatory markers and chest x-ray  Supportive care  Droplet plus isolation    History of Present Illness:   Initial history:  Jayla Arias is a 50y.o.-year-old female presented to hospital with worsening shortness of breath associated with nonproductive cough, chest pressure, myalgia and headache for 1 week prior to admission. The patient had fever, temperature max of 102 reportedly. At the ER temperature was 100.4  The patient was hypoxic, was placed on oxygen per nasal cannula. She does not use home oxygen, did use it in the past.  The patient had family member was tested positive for COVID-19 recently. She is not vaccinated for COVID-19. Chest x-ray 8/25/2022 showed no acute process  D-dimer was elevated  CTA was not done due to contrast allergy. Initial labs showed troponin of 19, WBC 6.5   Interval changes  9/2/2022   She remains short of breath, on oxygen per nasal cannula, complaining of chest discomfort and cough. Denied vomiting but she does have some nausea, no new complaints.   Patient Vitals for the past 8 hrs:   BP Temp Temp src Pulse Resp SpO2   09/02/22 1230 -- -- -- 51 -- --   09/02/22 1200 115/62 98.6 °F (37 °C) Oral 50 16 95 %   09/02/22 1116 -- -- -- 88 16 97 %   09/02/22 0930 (!) 140/72 98 °F (36.7 °C) Oral 52 12 91 %   09/02/22 0821 -- -- -- 94 18 96 %               I have personally reviewed the past medical history, past surgical history, medications, social history, and family history, and I haveupdated the database accordingly. Allergies:   Latex, Aspirin, Avelox [moxifloxacin], Chantix [varenicline], Doxycycline, Dye [iodides], Flexeril [cyclobenzaprine], Gabapentin, Losartan, Morphine, Nsaids, Pcn [penicillins], Reglan [metoclopramide hcl], Shellfish-derived products, Sulfa antibiotics, Toradol [ketorolac tromethamine], Vancomycin, Zofran, Zyvox [linezolid], Acyclovir, Bactrim [sulfamethoxazole-trimethoprim], Betadine [povidone iodine], Ceclor [cefaclor], Codeine, Macrolides and ketolides, Novolin r [insulin], Novolog [insulin aspart], Phenothiazines, Tape [adhesive tape], Banana, Compazine [prochlorperazine], Fentanyl, Kiwi extract, Tamiflu [oseltamivir phosphate], Clindamycin/lincomycin, and Sotalol     Review of Systems:     Review of Systems  As per history of present illness, other than above 12 system review was negative  Physical Examination :       Physical Exam  Constitutional:       General: She is not in acute distress. HENT:      Head: Normocephalic and atraumatic. Right Ear: External ear normal.      Left Ear: External ear normal.   Eyes:      General: No scleral icterus. Conjunctiva/sclera: Conjunctivae normal.   Cardiovascular:      Rate and Rhythm: Normal rate and regular rhythm. Pulmonary:      Breath sounds: Rhonchi present. No wheezing or rales. Abdominal:      General: There is no distension. Tenderness: There is no abdominal tenderness. Musculoskeletal:      Cervical back: Neck supple. No rigidity. Right lower leg: No edema. Left lower leg: No edema. Skin:     Findings: No bruising or erythema. Neurological:      General: No focal deficit present. Mental Status: She is alert and oriented to person, place, and time.        Past Medical History:     Past Medical History:   Diagnosis Date    Acute exacerbation of chronic obstructive pulmonary disease ([facility] [address] 3/21/2018    Adhesive capsulitis of left shoulder 9/25/2018 Asthma     Asthma exacerbation 7/24/2014    Atrial fibrillation ([address]     placed on event monitor 9/25/18 for PVC's & A-fib    Atrial premature depolarization 7/19/2019    Bipolar 1 disorder (HCC)     Bipolar disorder ([address] 7/19/2019    Bulging disc     CAD (coronary artery disease)     Candida infection 2/23/2018    Cardiomyopathy ([address]     Chest pain 6/13/2017    CHF (congestive heart failure) ([facility])     Chronic otitis media of both ears     rt>lt    Chronic right shoulder pain 3/14/2017    Cigarette nicotine dependence with nicotine-induced disorder 7/19/2019    Class 3 severe obesity due to excess calories with serious comorbidity and body mass index (BMI) of 40.0 to 44.9 in adult [facility]) 10/4/2018    Closed fracture of left ankle 6/25/2019    Clostridium difficile infection     COPD (chronic obstructive pulmonary disease) ([facility])     Cough, persistent 3/21/2018    Current moderate episode of major depressive disorder without prior episode ([address] 8/20/2018    Depression     Diabetes mellitus type 2, controlled ([address] 4/30/2014    Diarrhea     Diarrhea     Dizziness     DVT (deep venous thrombosis) ([facility])     after PICC line right arm    Encounter for monitoring sotalol therapy 2/20/2017    Encounter for screening mammogram for malignant neoplasm of breast 3/7/2018    Endometriosis     Fainting     GERD (gastroesophageal reflux disease)     hx of    GI bleeding     H. pylori infection     Helicobacter pylori (H. pylori)     [place] (hard of hearing)     both ears, no hearing aids    HTN (hypertension) 7/19/2019    Hyperlipidemia     Hypertension     Left bicipital tenosynovitis 10/17/2018    Left leg pain 5/16/2017    Left sided abdominal pain of unknown cause 7/19/2016    Leg swelling 9/21/2018    Mastoiditis     Mastoiditis, acute 4/30/2014    Migraines     Morbid obesity ([address]     Myocardial infarction ([address]     2005    Nausea     Neuropathy     On home oxygen therapy     3 L at night    Ovarian cyst     Passed out     hx removal with patch    OTHER SURGICAL HISTORY      tubal perfusion, lysis of uterine adhesions    OTHER SURGICAL HISTORY      multiple PICC lines inserted and removed, it has affected circulation bilateral upper arms    OTHER SURGICAL HISTORY  10/19/2020    Revisiion to subcutaneous transposition of unlar nerve, right elbow    OTHER SURGICAL HISTORY Right 06/14/2021    REVISION TO SUBMUSCULAR TRANSPOSITION OF RIGHT ULNAR NERVE    OTHER SURGICAL HISTORY Left 03/24/2022    DECOMPRESSION OF ULNAR NERVE, LEFT ELBOW    CO MANIPULATN SHLDR JT W ANESTHESIA Right 03/01/2017    SHOULDER MANIPULATION RIGHT performed by Georgi Dance, MD at [facility] Left 10/17/2018    SHOULDER ARTHROSCOPY W/BICEPS TENDONESIS performed by Ankur Michelle MD at [facility] Left     foot    TONSILLECTOMY      TYMPANOSTOMY TUBE PLACEMENT      TYMPANOSTOMY TUBE PLACEMENT Left 03/12/2019    TYMPANOSTOMY TUBE PLACEMENT Right 12/08/2021    Right tympanostomy with tube placement of T-tube with operative microscope and general anesthesia. Right removal of right ear tube. ULNAR TUNNEL RELEASE Right     UPPER GASTROINTESTINAL ENDOSCOPY      UPPER GASTROINTESTINAL ENDOSCOPY  07/10/2019    UPPER GASTROINTESTINAL ENDOSCOPY N/A 07/10/2019    EGD BIOPSY performed by Kellie Maguire MD at [address] Right 04/21/2022    Placement of right sided ventriculoperitoneal shunt [employer] programmable valve set at 1.5.        Medications:      insulin glargine  70 Units SubCUTAneous BID    QUEtiapine  250 mg Oral Nightly    benzonatate  200 mg Oral TID    dextromethorphan  30 mg Oral 2 times per day    DULoxetine  60 mg Oral BID    bumetanide  2 mg Oral Daily    carvedilol  12.5 mg Oral BID WC    cetirizine  10 mg Oral Daily    clopidogrel  75 mg Oral Daily    [Held by provider] ipratropium-albuterol  1 ampule Inhalation Q4H WA    isosorbide mononitrate  30 mg Oral Daily    pantoprazole 40 mg Oral Daily    vilazodone HCl  20 mg Oral Daily    sodium chloride flush  5-40 mL IntraVENous 2 times per day    insulin lispro  0-16 Units SubCUTAneous TID WC    insulin lispro  0-4 Units SubCUTAneous Nightly    dexamethasone  6 mg Oral Daily    atorvastatin  80 mg Oral Nightly    magnesium oxide  400 mg Oral Daily    multivitamin  1 tablet Oral Daily    apixaban  5 mg Oral BID    albuterol sulfate HFA  2 puff Inhalation Q4H WA    ipratropium  2 puff Inhalation 4x daily    baricitinib  4 mg Oral Daily       Social History:     Social History     Socioeconomic History    Marital status: Single     Spouse name: Not on file    Number of children: Not on file    Years of education: Not on file    Highest education level: Not on file   Occupational History     Employer: NONE   Tobacco Use    Smoking status: Every Day     Packs/day: 0.50     Years: 23.00     Pack years: 11.50     Types: Cigarettes    Smokeless tobacco: Never   Vaping Use    Vaping Use: Never used   Substance and Sexual Activity    Alcohol use: No     Alcohol/week: 0.0 standard drinks    Drug use: No    Sexual activity: Not on file   Other Topics Concern    Not on file   Social History Narrative    Not on file     Social Determinants of Health     Financial Resource Strain: Not on file   Food Insecurity: Not on file   Transportation Needs: Not on file   Physical Activity: Not on file   Stress: Not on file   Social Connections: Not on file   Intimate Partner Violence: Not on file   Housing Stability: Not on file       Family History:     Family History   Problem Relation Age of Onset    Ulcerative Colitis Father     Liver Disease Father 61        hep c and b    Diabetes Father     Asthma Father     Heart Disease Father     High Blood Pressure Father     Breast Cancer Maternal Aunt     Cancer Maternal Aunt     Diabetes Maternal Aunt     Heart Disease Maternal Aunt     High Blood Pressure Maternal Aunt     Breast Cancer Paternal Aunt     Heart Disease Paternal Aunt     High Blood Pressure Paternal Aunt     Breast Cancer Maternal Grandmother     Cervical Cancer Maternal Grandmother     Cancer Maternal Grandmother     Diabetes Maternal Grandmother     Asthma Maternal Grandmother     Heart Disease Maternal Grandmother     High Blood Pressure Maternal Grandmother     Lung Cancer Maternal Grandfather     Diabetes Maternal Grandfather     Asthma Maternal Grandfather     Heart Disease Maternal Grandfather     High Blood Pressure Maternal Grandfather     Cervical Cancer Paternal Grandmother     Cancer Paternal Grandmother     Diabetes Paternal Grandmother     Heart Disease Paternal Grandmother     High Blood Pressure Paternal Grandmother     Diabetes Mother     Asthma Mother     Heart Disease Mother     High Blood Pressure Mother     Cancer Sister     Heart Disease Maternal Uncle     High Blood Pressure Maternal Uncle     Heart Disease Paternal Uncle     High Blood Pressure Paternal Uncle     Diabetes Paternal Grandfather     Heart Disease Paternal Grandfather     High Blood Pressure Paternal Grandfather       Medical Decision Making:   I have independently reviewed/ordered the following labs:    CBC with Differential:   Recent Labs     08/31/22  0430   WBC 7.5   HGB 13.4   HCT 41.2      LYMPHOPCT 17*   MONOPCT 10*       BMP:  Recent Labs     08/31/22  0430      K 4.5      CO2 29   BUN 14   CREATININE 0.68       Hepatic Function Panel:   Recent Labs     08/31/22  0430   PROT 6.4   LABALBU 3.6   BILITOT <0.15*   ALKPHOS 99   ALT 18   AST 21     No results for input(s): RPR in the last 72 hours. No results for input(s): HIV in the last 72 hours. No results for input(s): BC in the last 72 hours. Lab Results   Component Value Date/Time    CREATININE 0.68 08/31/2022 04:30 AM    GLUCOSE 337 08/31/2022 04:30 AM    GLUCOSE 287 03/20/2012 11:08 AM       Detailed results: Thank you for allowing us to participate in the care of this patient. Please call

## 2022-09-02 NOTE — CARE COORDINATION
Writer notified, Haresh Larry, from [facility] [place], to follow for possible Home Oxygen. She can be reached at [phone], if Oxygen, is needed on the Weekend.

## 2022-09-02 NOTE — PROGRESS NOTES
Progress Note  Date:2022       [id number]  Patient Hilario Rosenbaum     YOB: 1975     Age:47 y.o. Subjective    Subjective:  Symptoms:  (Patient sleeping at time of rounds. Not awakened. Notes reviewed. ). Activity level: Impaired due to weakness. Review of Systems  Objective         Vitals Last 24 Hours:  TEMPERATURE:  Temp  Av.2 °F (36.8 °C)  Min: 98.1 °F (36.7 °C)  Max: 98.3 °F (36.8 °C)  RESPIRATIONS RANGE: Resp  Av  Min: 16  Max: 20  PULSE OXIMETRY RANGE: SpO2  Av.5 %  Min: 94 %  Max: 97 %  PULSE RANGE: Pulse  Av.7  Min: 55  Max: 66  BLOOD PRESSURE RANGE: Systolic (88SMY), LWF:115 , Min:105 , AHQ:449   ; Diastolic (11KUF), VSX:93, Min:50, Max:64    I/O (24Hr): No intake or output data in the 24 hours ending 22 1969  Objective:  General Appearance:  Comfortable. Vital signs: (most recent): Blood pressure 138/64, pulse 55, temperature 98.2 °F (36.8 °C), resp. rate 18, height 5' 4\" (1.626 m), weight 275 lb 9.2 oz (125 kg), SpO2 97 %. Vital signs are normal.    Labs/Imaging/Diagnostics    Labs:  CBC:  Recent Labs     22   WBC 7.5   RBC 3.98*   HGB 13.4   HCT 41.2   .3*   RDW 13.8        CHEMISTRIES:  Recent Labs     22      K 4.5      CO2 29   BUN 14   CREATININE 0.68   GLUCOSE 337*     PT/INR:  Recent Labs     22   PROTIME 12.6   INR 0.9     APTT:  Recent Labs     22   APTT 29.0     LIVER PROFILE:  Recent Labs     22   AST 21   ALT 18   BILITOT <0.15*   ALKPHOS 99       Imaging Last 24 Hours:  No results found.   Assessment//Plan           Hospital Problems             Last Modified POA    * (Principal) COVID-19 2022 Yes    Moderate left ventricular systolic dysfunction 3/92/2254 Yes    Chronic systolic CHF (congestive heart failure) ([facility] 2022 Yes    PTSD (post-traumatic stress disorder) 2022 Yes    Diabetes mellitus type 2, controlled ([facility] 8/30/2022 Yes    COPD (chronic obstructive pulmonary disease) (HCC) (Chronic) 8/30/2022 Yes    KRISTIN (obstructive sleep apnea) 8/30/2022 Yes    Overview Addendum 7/19/2019  3:21 PM by Rodney Miranda to CPAP    Overview:   Overview:   Intolerant to CPAP         Pulmonary HTN ([address] 8/30/2022 Yes    MDD (major depressive disorder), recurrent episode, moderate ([address] 8/31/2022 Yes    Uncontrolled type 2 diabetes mellitus with hyperglycemia ([facility] 8/30/2022 Yes     Assessment & Plan    Continue current management    D/c planning per pulmonology      Electronically signed by Masood Gunderson MD on 9/2/22 at 7:53 AM EDT

## 2022-09-03 LAB
GLUCOSE BLD-MCNC: 152 MG/DL (ref 65–105)
GLUCOSE BLD-MCNC: 174 MG/DL (ref 65–105)
GLUCOSE BLD-MCNC: 242 MG/DL (ref 65–105)
GLUCOSE BLD-MCNC: 257 MG/DL (ref 65–105)
PROCALCITONIN: <0.02 NG/ML

## 2022-09-03 PROCEDURE — 6370000000 HC RX 637 (ALT 250 FOR IP): Performed by: NURSE PRACTITIONER

## 2022-09-03 PROCEDURE — 94640 AIRWAY INHALATION TREATMENT: CPT

## 2022-09-03 PROCEDURE — 2580000003 HC RX 258: Performed by: FAMILY MEDICINE

## 2022-09-03 PROCEDURE — 6370000000 HC RX 637 (ALT 250 FOR IP): Performed by: FAMILY MEDICINE

## 2022-09-03 PROCEDURE — 6370000000 HC RX 637 (ALT 250 FOR IP): Performed by: INTERNAL MEDICINE

## 2022-09-03 PROCEDURE — 6360000002 HC RX W HCPCS: Performed by: FAMILY MEDICINE

## 2022-09-03 PROCEDURE — 36415 COLL VENOUS BLD VENIPUNCTURE: CPT

## 2022-09-03 PROCEDURE — 84145 PROCALCITONIN (PCT): CPT

## 2022-09-03 PROCEDURE — 82947 ASSAY GLUCOSE BLOOD QUANT: CPT

## 2022-09-03 PROCEDURE — 2060000000 HC ICU INTERMEDIATE R&B

## 2022-09-03 PROCEDURE — 6370000000 HC RX 637 (ALT 250 FOR IP): Performed by: PSYCHIATRY & NEUROLOGY

## 2022-09-03 PROCEDURE — 94761 N-INVAS EAR/PLS OXIMETRY MLT: CPT

## 2022-09-03 PROCEDURE — 99232 SBSQ HOSP IP/OBS MODERATE 35: CPT | Performed by: STUDENT IN AN ORGANIZED HEALTH CARE EDUCATION/TRAINING PROGRAM

## 2022-09-03 PROCEDURE — 2700000000 HC OXYGEN THERAPY PER DAY

## 2022-09-03 PROCEDURE — 99232 SBSQ HOSP IP/OBS MODERATE 35: CPT | Performed by: INTERNAL MEDICINE

## 2022-09-03 RX ADMIN — APIXABAN 5 MG: 5 TABLET, FILM COATED ORAL at 08:28

## 2022-09-03 RX ADMIN — DULOXETINE 60 MG: 60 CAPSULE, DELAYED RELEASE ORAL at 20:37

## 2022-09-03 RX ADMIN — ALBUTEROL SULFATE 2 PUFF: 90 AEROSOL, METERED RESPIRATORY (INHALATION) at 07:52

## 2022-09-03 RX ADMIN — ATORVASTATIN CALCIUM 80 MG: 80 TABLET, FILM COATED ORAL at 20:37

## 2022-09-03 RX ADMIN — BENZONATATE 200 MG: 200 CAPSULE ORAL at 20:36

## 2022-09-03 RX ADMIN — OXYCODONE AND ACETAMINOPHEN 1 TABLET: 5; 325 TABLET ORAL at 12:34

## 2022-09-03 RX ADMIN — DEXAMETHASONE 6 MG: 6 TABLET ORAL at 08:28

## 2022-09-03 RX ADMIN — SODIUM CHLORIDE, PRESERVATIVE FREE 10 ML: 5 INJECTION INTRAVENOUS at 08:27

## 2022-09-03 RX ADMIN — APIXABAN 5 MG: 5 TABLET, FILM COATED ORAL at 20:37

## 2022-09-03 RX ADMIN — NALBUPHINE HYDROCHLORIDE 10 MG: 10 INJECTION, SOLUTION INTRAMUSCULAR; INTRAVENOUS; SUBCUTANEOUS at 04:09

## 2022-09-03 RX ADMIN — INSULIN GLARGINE 70 UNITS: 100 INJECTION, SOLUTION SUBCUTANEOUS at 20:36

## 2022-09-03 RX ADMIN — SODIUM CHLORIDE, PRESERVATIVE FREE 10 ML: 5 INJECTION INTRAVENOUS at 20:54

## 2022-09-03 RX ADMIN — Medication 30 MG: at 08:29

## 2022-09-03 RX ADMIN — PROMETHAZINE HYDROCHLORIDE 6.25 MG: 25 INJECTION INTRAMUSCULAR; INTRAVENOUS at 16:01

## 2022-09-03 RX ADMIN — ALBUTEROL SULFATE 2 PUFF: 90 AEROSOL, METERED RESPIRATORY (INHALATION) at 19:52

## 2022-09-03 RX ADMIN — NALBUPHINE HYDROCHLORIDE 10 MG: 10 INJECTION, SOLUTION INTRAMUSCULAR; INTRAVENOUS; SUBCUTANEOUS at 16:02

## 2022-09-03 RX ADMIN — Medication 400 MG: at 08:27

## 2022-09-03 RX ADMIN — OXYCODONE AND ACETAMINOPHEN 1 TABLET: 5; 325 TABLET ORAL at 08:29

## 2022-09-03 RX ADMIN — Medication 30 MG: at 20:36

## 2022-09-03 RX ADMIN — OXYCODONE AND ACETAMINOPHEN 1 TABLET: 5; 325 TABLET ORAL at 17:26

## 2022-09-03 RX ADMIN — DULOXETINE 60 MG: 60 CAPSULE, DELAYED RELEASE ORAL at 08:28

## 2022-09-03 RX ADMIN — BENZONATATE 200 MG: 200 CAPSULE ORAL at 08:27

## 2022-09-03 RX ADMIN — QUETIAPINE FUMARATE 250 MG: 200 TABLET ORAL at 20:37

## 2022-09-03 RX ADMIN — BENZONATATE 200 MG: 200 CAPSULE ORAL at 16:00

## 2022-09-03 RX ADMIN — NALBUPHINE HYDROCHLORIDE 10 MG: 10 INJECTION, SOLUTION INTRAMUSCULAR; INTRAVENOUS; SUBCUTANEOUS at 22:48

## 2022-09-03 RX ADMIN — ALBUTEROL SULFATE 2 PUFF: 90 AEROSOL, METERED RESPIRATORY (INHALATION) at 15:28

## 2022-09-03 RX ADMIN — ALBUTEROL SULFATE 2 PUFF: 90 AEROSOL, METERED RESPIRATORY (INHALATION) at 11:35

## 2022-09-03 RX ADMIN — INSULIN LISPRO 4 UNITS: 100 INJECTION, SOLUTION INTRAVENOUS; SUBCUTANEOUS at 17:26

## 2022-09-03 RX ADMIN — INSULIN GLARGINE 70 UNITS: 100 INJECTION, SOLUTION SUBCUTANEOUS at 08:30

## 2022-09-03 RX ADMIN — CETIRIZINE HYDROCHLORIDE 10 MG: 10 TABLET, FILM COATED ORAL at 08:28

## 2022-09-03 RX ADMIN — OXYCODONE AND ACETAMINOPHEN 1 TABLET: 5; 325 TABLET ORAL at 03:06

## 2022-09-03 RX ADMIN — NALBUPHINE HYDROCHLORIDE 10 MG: 10 INJECTION, SOLUTION INTRAMUSCULAR; INTRAVENOUS; SUBCUTANEOUS at 10:09

## 2022-09-03 ASSESSMENT — PAIN SCALES - GENERAL
PAINLEVEL_OUTOF10: 8
PAINLEVEL_OUTOF10: 10
PAINLEVEL_OUTOF10: 9
PAINLEVEL_OUTOF10: 8
PAINLEVEL_OUTOF10: 10
PAINLEVEL_OUTOF10: 8
PAINLEVEL_OUTOF10: 9
PAINLEVEL_OUTOF10: 8

## 2022-09-03 ASSESSMENT — PAIN DESCRIPTION - LOCATION
LOCATION: CHEST;HEAD;LEG
LOCATION: CHEST;HEAD
LOCATION: CHEST;HEAD;LEG
LOCATION: LEG

## 2022-09-03 ASSESSMENT — ENCOUNTER SYMPTOMS
NAUSEA: 1
SHORTNESS OF BREATH: 1
COUGH: 1
ABDOMINAL PAIN: 1
DIARRHEA: 1

## 2022-09-03 ASSESSMENT — PAIN DESCRIPTION - ORIENTATION
ORIENTATION: RIGHT
ORIENTATION: RIGHT

## 2022-09-03 ASSESSMENT — PAIN DESCRIPTION - DESCRIPTORS: DESCRIPTORS: ACHING

## 2022-09-03 NOTE — PROGRESS NOTES
[facility] -   Infectious diseases evaluation  admission date 8/29/2022    reason for consultation:   COVID-19 infection    Impression :   Current:  COVID-19 infection  Acute hypoxic respiratory failure  COPD  Cardiomyopathy  Atrial fibrillation  Elevated D-dimer  Multiple antibiotics allergy  History of C. difficile colitis  Diabetes mellitus  History of DVT      Recommendations   Decadron for 10 days  Barcitinib for 14 days or until discharge  On Eliquis  Droplet plus isolation    History of Present Illness:   Initial history:  Alejo Osgood is a 50y.o.-year-old female presented to hospital with worsening shortness of breath associated with nonproductive cough, chest pressure, myalgia and headache for 1 week prior to admission. The patient had fever, temperature max of 102 reportedly. At the ER temperature was 100.4  The patient was hypoxic, was placed on oxygen per nasal cannula. She does not use home oxygen, did use it in the past.  The patient had family member was tested positive for COVID-19 recently. She is not vaccinated for COVID-19. Chest x-ray 8/25/2022 showed no acute process  D-dimer was elevated  CTA was not done due to contrast allergy. Initial labs showed troponin of 19, WBC 6.5   Interval changes  9/3/2022   She remains on oxygen per nasal cannula, still complaining of cough and shortness of breath, no new complaints. Patient Vitals for the past 8 hrs:   BP Temp Temp src Pulse Resp SpO2   09/03/22 1559 109/69 98.6 °F (37 °C) Oral 59 16 --   09/03/22 1528 -- -- -- 55 20 93 %   09/03/22 1136 -- -- -- 58 18 96 %               I have personally reviewed the past medical history, past surgical history, medications, social history, and family history, and I haveupdated the database accordingly.       Allergies:   Latex, Aspirin, Avelox [moxifloxacin], Chantix [varenicline], Doxycycline, Dye [iodides], Flexeril [cyclobenzaprine], Gabapentin, Losartan, Morphine, Nsaids, Pcn [penicillins], Reglan [metoclopramide hcl], Shellfish-derived products, Sulfa antibiotics, Toradol [ketorolac tromethamine], Vancomycin, Zofran, Zyvox [linezolid], Acyclovir, Bactrim [sulfamethoxazole-trimethoprim], Betadine [povidone iodine], Ceclor [cefaclor], Codeine, Macrolides and ketolides, Novolin r [insulin], Novolog [insulin aspart], Phenothiazines, Tape [adhesive tape], Banana, Compazine [prochlorperazine], Fentanyl, Kiwi extract, Tamiflu [oseltamivir phosphate], Clindamycin/lincomycin, and Sotalol     Review of Systems:     Review of Systems  As per history of present illness, other than above 12 system review was negative  Physical Examination :       Physical Exam  Constitutional:       General: She is not in acute distress. HENT:      Head: Normocephalic and atraumatic. Right Ear: External ear normal.      Left Ear: External ear normal.   Eyes:      General: No scleral icterus. Conjunctiva/sclera: Conjunctivae normal.   Cardiovascular:      Rate and Rhythm: Normal rate and regular rhythm. Pulmonary:      Breath sounds: Rhonchi present. No wheezing or rales. Abdominal:      General: There is no distension. Tenderness: There is no abdominal tenderness. Musculoskeletal:      Cervical back: Neck supple. No rigidity. Right lower leg: No edema. Left lower leg: No edema. Skin:     Findings: No bruising or erythema. Neurological:      General: No focal deficit present. Mental Status: She is alert and oriented to person, place, and time.        Past Medical History:     Past Medical History:   Diagnosis Date    Acute exacerbation of chronic obstructive pulmonary disease ([address] 3/21/2018    Adhesive capsulitis of left shoulder 9/25/2018    Asthma     Asthma exacerbation 7/24/2014    Atrial fibrillation ([address]     placed on event monitor 9/25/18 for PVC's & A-fib    Atrial premature depolarization 7/19/2019    Bipolar 1 disorder ([address] Bipolar disorder ([address] 7/19/2019    Bulging disc     CAD (coronary artery disease)     Candida infection 2/23/2018    Cardiomyopathy ([address]     Chest pain 6/13/2017    CHF (congestive heart failure) ([facility])     Chronic otitis media of both ears     rt>lt    Chronic right shoulder pain 3/14/2017    Cigarette nicotine dependence with nicotine-induced disorder 7/19/2019    Class 3 severe obesity due to excess calories with serious comorbidity and body mass index (BMI) of 40.0 to 44.9 in adult [facility]) 10/4/2018    Closed fracture of left ankle 6/25/2019    Clostridium difficile infection     COPD (chronic obstructive pulmonary disease) ([facility])     Cough, persistent 3/21/2018    Current moderate episode of major depressive disorder without prior episode ([address] 8/20/2018    Depression     Diabetes mellitus type 2, controlled ([address] 4/30/2014    Diarrhea     Diarrhea     Dizziness     DVT (deep venous thrombosis) ([facility])     after PICC line right arm    Encounter for monitoring sotalol therapy 2/20/2017    Encounter for screening mammogram for malignant neoplasm of breast 3/7/2018    Endometriosis     Fainting     GERD (gastroesophageal reflux disease)     hx of    GI bleeding     H. pylori infection     Helicobacter pylori (H. pylori)     [place] (hard of hearing)     both ears, no hearing aids    HTN (hypertension) 7/19/2019    Hyperlipidemia     Hypertension     Left bicipital tenosynovitis 10/17/2018    Left leg pain 5/16/2017    Left sided abdominal pain of unknown cause 7/19/2016    Leg swelling 9/21/2018    Mastoiditis     Mastoiditis, acute 4/30/2014    Migraines     Morbid obesity ([address]     Myocardial infarction ([address]     2005    Nausea     Neuropathy     On home oxygen therapy     3 L at night    Ovarian cyst     Passed out     hx of- negative tilt table    Pulmonary hypertension (HCC)     Pulmonary insufficiency     PVC (premature ventricular contraction)     Seasonal allergies     Tricuspid insufficiency     Type II or unspecified type diabetes mellitus without mention of complication, not stated as uncontrolled        Past Surgical  History:     Past Surgical History:   Procedure Laterality Date    ABDOMEN SURGERY      abcess    ABDOMINAL ADHESION SURGERY      ABDOMINAL EXPLORATION SURGERY      x 4    ABDOMINAL HERNIA REPAIR      with mesh    ABLATION OF DYSRHYTHMIC FOCUS  2016    ABLATION OF DYSRHYTHMIC FOCUS  09/08/2017    Done at the [facility]. ABSCESS DRAINAGE      left hip and chest    APPENDECTOMY      BREAST SURGERY Left 2007    I & D    BRONCHOSCOPY N/A 5/23/2022    BRONCHOSCOPY performed by Meryle Bi, MD at [address] [place] [address]  2014 & 2000     no stenting    CARPAL TUNNEL RELEASE Right 12/19/2019    Ulnar nerve decompression, right elbow. Release of 1st and 2nd extensor compartments, right forearm. CARPAL TUNNEL RELEASE  05/04/2020    Carpal tunnel release, left hand    CHOLECYSTECTOMY      COLONOSCOPY      FOOT SURGERY Left     bone fragment removed    FRACTURE SURGERY Right     closed reduction perc pinning ring & middle finger    GASTRIC FUNDOPLICATION  9272    HYSTERECTOMY (CERVIX STATUS UNKNOWN)      total    HYSTERECTOMY (CERVIX STATUS UNKNOWN)      HYSTEROSCOPY      tubal perfusion    MYRINGOTOMY Right 11/13/2015    Right myringotomy with placement of  T-tube on the right side. Removal of plugged ventilating tube, right side. MYRINGOTOMY Left 07/14/2020    MYRINGOTOMY TUBE INSERTION performed by Eloy Quintanilla MD at  Hospital  Right 06/05/2014    OTHER SURGICAL HISTORY Right 05/29/2014    removal ear tube rt ear    OTHER SURGICAL HISTORY  2009    LOOP recorder inserted and removed 3 mos.  later    OTHER SURGICAL HISTORY Right 08/11/2016    ear tube removal with patch    OTHER SURGICAL HISTORY      tubal perfusion, lysis of uterine adhesions    OTHER SURGICAL HISTORY      multiple PICC lines inserted and removed, it has affected circulation bilateral upper arms    OTHER SURGICAL HISTORY  10/19/2020    Revisiion to subcutaneous transposition of unlar nerve, right elbow    OTHER SURGICAL HISTORY Right 06/14/2021    REVISION TO SUBMUSCULAR TRANSPOSITION OF RIGHT ULNAR NERVE    OTHER SURGICAL HISTORY Left 03/24/2022    DECOMPRESSION OF ULNAR NERVE, LEFT ELBOW    MO LAVERNEULATN SHCHANTELLR JT W ANESTHESIA Right 03/01/2017    SHOULDER MANIPULATION RIGHT performed by Adarsh Austin MD at [facility] Left 10/17/2018    SHOULDER ARTHROSCOPY W/BICEPS TENDONESIS performed by Wilner Rivera MD at [address] Left     foot    TONSILLECTOMY      TYMPANOSTOMY TUBE PLACEMENT      TYMPANOSTOMY TUBE PLACEMENT Left 03/12/2019    TYMPANOSTOMY TUBE PLACEMENT Right 12/08/2021    Right tympanostomy with tube placement of T-tube with operative microscope and general anesthesia. Right removal of right ear tube. ULNAR TUNNEL RELEASE Right     UPPER GASTROINTESTINAL ENDOSCOPY      UPPER GASTROINTESTINAL ENDOSCOPY  07/10/2019    UPPER GASTROINTESTINAL ENDOSCOPY N/A 07/10/2019    EGD BIOPSY performed by Laureen Hawley MD at [address] Right 04/21/2022    Placement of right sided ventriculoperitoneal shunt [employer] programmable valve set at 1.5.        Medications:      insulin glargine  70 Units SubCUTAneous BID    QUEtiapine  250 mg Oral Nightly    benzonatate  200 mg Oral TID    dextromethorphan  30 mg Oral 2 times per day    DULoxetine  60 mg Oral BID    bumetanide  2 mg Oral Daily    carvedilol  12.5 mg Oral BID WC    cetirizine  10 mg Oral Daily    clopidogrel  75 mg Oral Daily    [Held by provider] ipratropium-albuterol  1 ampule Inhalation Q4H WA    isosorbide mononitrate  30 mg Oral Daily    pantoprazole  40 mg Oral Daily    vilazodone HCl  20 mg Oral Daily    sodium chloride flush  5-40 mL IntraVENous 2 times per day    insulin lispro  0-16 Units SubCUTAneous TID WC    insulin lispro 0-4 Units SubCUTAneous Nightly    dexamethasone  6 mg Oral Daily    atorvastatin  80 mg Oral Nightly    magnesium oxide  400 mg Oral Daily    multivitamin  1 tablet Oral Daily    apixaban  5 mg Oral BID    albuterol sulfate HFA  2 puff Inhalation Q4H WA    ipratropium  2 puff Inhalation 4x daily    baricitinib  4 mg Oral Daily       Social History:     Social History     Socioeconomic History    Marital status: Single     Spouse name: Not on file    Number of children: Not on file    Years of education: Not on file    Highest education level: Not on file   Occupational History     Employer: NONE   Tobacco Use    Smoking status: Every Day     Packs/day: 0.50     Years: 23.00     Pack years: 11.50     Types: Cigarettes    Smokeless tobacco: Never   Vaping Use    Vaping Use: Never used   Substance and Sexual Activity    Alcohol use: No     Alcohol/week: 0.0 standard drinks    Drug use: No    Sexual activity: Not on file   Other Topics Concern    Not on file   Social History Narrative    Not on file     Social Determinants of Health     Financial Resource Strain: Not on file   Food Insecurity: Not on file   Transportation Needs: Not on file   Physical Activity: Not on file   Stress: Not on file   Social Connections: Not on file   Intimate Partner Violence: Not on file   Housing Stability: Not on file       Family History:     Family History   Problem Relation Age of Onset    Ulcerative Colitis Father     Liver Disease Father 61        hep c and b    Diabetes Father     Asthma Father     Heart Disease Father     High Blood Pressure Father     Breast Cancer Maternal Aunt     Cancer Maternal Aunt     Diabetes Maternal Aunt     Heart Disease Maternal Aunt     High Blood Pressure Maternal Aunt     Breast Cancer Paternal Aunt     Heart Disease Paternal Aunt     High Blood Pressure Paternal Aunt     Breast Cancer Maternal Grandmother     Cervical Cancer Maternal Grandmother     Cancer Maternal Grandmother     Diabetes Maternal Grandmother     Asthma Maternal Grandmother     Heart Disease Maternal Grandmother     High Blood Pressure Maternal Grandmother     Lung Cancer Maternal Grandfather     Diabetes Maternal Grandfather     Asthma Maternal Grandfather     Heart Disease Maternal Grandfather     High Blood Pressure Maternal Grandfather     Cervical Cancer Paternal Grandmother     Cancer Paternal Grandmother     Diabetes Paternal Grandmother     Heart Disease Paternal Grandmother     High Blood Pressure Paternal Grandmother     Diabetes Mother     Asthma Mother     Heart Disease Mother     High Blood Pressure Mother     Cancer Sister     Heart Disease Maternal Uncle     High Blood Pressure Maternal Uncle     Heart Disease Paternal Uncle     High Blood Pressure Paternal Uncle     Diabetes Paternal Grandfather     Heart Disease Paternal Grandfather     High Blood Pressure Paternal Grandfather       Medical Decision Making:   I have independently reviewed/ordered the following labs:    CBC with Differential:   No results for input(s): WBC, HGB, HCT, PLT, SEGSPCT, BANDSPCT, LYMPHOPCT, MONOPCT, EOSPCT in the last 72 hours. BMP:  No results for input(s): NA, K, CL, CO2, BUN, CREATININE, CA, MG in the last 72 hours. Hepatic Function Panel: No results for input(s): PROT, LABALBU, BILIDIR, IBILI, BILITOT, ALKPHOS, ALT, AST in the last 72 hours. No results for input(s): RPR in the last 72 hours. No results for input(s): HIV in the last 72 hours. No results for input(s): BC in the last 72 hours. Lab Results   Component Value Date/Time    CREATININE 0.68 08/31/2022 04:30 AM    GLUCOSE 337 08/31/2022 04:30 AM    GLUCOSE 287 03/20/2012 11:08 AM       Detailed results: Thank you for allowing us to participate in the care of this patient. Please call with questions.     This note is created with the assistance of a speech recognition program.  While intending to generate adocument that actually reflects the content of the visit, the document can still have some errors including those of syntax and sound a like substitutions which may escape proof reading. It such instances, actual meaningcan be extrapolated by contextual diversion.     Cheo Gillespie MD  Office: [phone]  [employer] / office [phone]

## 2022-09-03 NOTE — PROGRESS NOTES
PULMONARY PROGRESS NOTE:    REASON FOR VISIT:COPD, COVID  Interval History:    Shortness of Breath: ++  Cough: improving  Sputum: no          Hemoptysis: no  Chest Pain: no  Fever: no                   Swelling Feet: no  Headache: no                                           Nausea, Emesis, Abdominal Pain: no  Diarrhea: yes       Constipation: no    Events since last visit: cough is better, bringing up dk beige phlegm    PAST MEDICAL HISTORY:      Scheduled Meds:   insulin glargine  70 Units SubCUTAneous BID    QUEtiapine  250 mg Oral Nightly    benzonatate  200 mg Oral TID    dextromethorphan  30 mg Oral 2 times per day    DULoxetine  60 mg Oral BID    bumetanide  2 mg Oral Daily    carvedilol  12.5 mg Oral BID WC    cetirizine  10 mg Oral Daily    clopidogrel  75 mg Oral Daily    [Held by provider] ipratropium-albuterol  1 ampule Inhalation Q4H WA    isosorbide mononitrate  30 mg Oral Daily    pantoprazole  40 mg Oral Daily    vilazodone HCl  20 mg Oral Daily    sodium chloride flush  5-40 mL IntraVENous 2 times per day    insulin lispro  0-16 Units SubCUTAneous TID     insulin lispro  0-4 Units SubCUTAneous Nightly    dexamethasone  6 mg Oral Daily    atorvastatin  80 mg Oral Nightly    magnesium oxide  400 mg Oral Daily    multivitamin  1 tablet Oral Daily    apixaban  5 mg Oral BID    albuterol sulfate HFA  2 puff Inhalation Q4H WA    ipratropium  2 puff Inhalation 4x daily    baricitinib  4 mg Oral Daily     Continuous Infusions:   dextrose      sodium chloride       PRN Meds:nalbuphine, oxyCODONE-acetaminophen, glucose, dextrose bolus **OR** dextrose bolus, glucagon (rDNA), dextrose, sodium chloride flush, sodium chloride, polyethylene glycol, acetaminophen **OR** acetaminophen, promethazine, dextromethorphan-guaiFENesin        PHYSICAL EXAMINATION:  /65   Pulse (!) 48   Temp 98.1 °F (36.7 °C) (Oral)   Resp 16   Ht 5' 4\" (1.626 m)   Wt 275 lb 9.2 oz (125 kg)   SpO2 95%   BMI 47.30 kg/m²

## 2022-09-03 NOTE — PROGRESS NOTES
[facility]        Progress Note      Date:   9/3/2022  Patient name:  Mike Leonardo  Date of admission:  8/29/2022 10:25 PM  MRN:   [id number]  YOB: 1975    SUBJECTIVE:     Patient was seen and examined at bedside. No acute events overnight. Patient had numerous complaints today, including left calf pain, hand pain, chest pain (chronic), cough, headache. Case was discussed with nursing staff. All consultant notes reviewed. Review of Systems   Constitutional:  Positive for fever. HENT: Negative. Respiratory:  Positive for cough and shortness of breath. Cardiovascular:  Positive for chest pain and leg swelling. Gastrointestinal:  Positive for abdominal pain, diarrhea and nausea. Genitourinary: Negative. Musculoskeletal:  Positive for arthralgias and myalgias. Neurological:  Positive for headaches. OBJECTIVE:     /65   Pulse (!) 48   Temp 98.1 °F (36.7 °C) (Oral)   Resp 16   Ht 5' 4\" (1.626 m)   Wt 275 lb 9.2 oz (125 kg)   SpO2 95%   BMI 47.30 kg/m²      Physical Exam  Vitals reviewed. Constitutional:       Appearance: Normal appearance. Eyes:      Extraocular Movements: Extraocular movements intact. Conjunctiva/sclera: Conjunctivae normal.   Cardiovascular:      Rate and Rhythm: Normal rate and regular rhythm. Pulses: Normal pulses. Heart sounds: Normal heart sounds. Pulmonary:      Effort: Pulmonary effort is normal.      Breath sounds: No wheezing or rales. Comments: Coarse breath sounds, coughing with deep inspiration  Abdominal:      Palpations: Abdomen is soft. Tenderness: There is no abdominal tenderness. There is no guarding. Musculoskeletal:         General: Tenderness present. Comments: Right calf tenderness  Right wrist tenderness   Neurological:      General: No focal deficit present. Mental Status: She is alert and oriented to person, place, and time. Intake/Output:    Intake/Output Summary (Last 24 hours) at 9/3/2022 1040  Last data filed at 9/2/2022 1343  Gross per 24 hour   Intake 250 ml   Output --   Net 250 ml         Laboratory Testing:  CBC: No results for input(s): WBC, HGB, PLT in the last 72 hours. BMP:  No results for input(s): NA, K, CL, CO2, BUN, CREATININE, GLUCOSE in the last 72 hours. Magnesium:   Lab Results   Component Value Date/Time    MG 1.5 08/07/2022 05:55 PM     Phosphorus:   Lab Results   Component Value Date/Time    PHOS 3.0 07/06/2012 12:30 PM     Ionized Calcium: No results found for: CAION   PT/INR:    Lab Results   Component Value Date/Time    PROTIME 12.6 08/31/2022 04:30 AM    INR 0.9 08/31/2022 04:30 AM     PTT:    Lab Results   Component Value Date/Time    APTT 29.0 08/31/2022 04:30 AM       ASSESSMENT:     Principal Problem:    COVID-19  Active Problems: Moderate left ventricular systolic dysfunction    Chronic systolic CHF (congestive heart failure) (HCC)    PTSD (post-traumatic stress disorder)    Diabetes mellitus type 2, controlled ([facility])    COPD (chronic obstructive pulmonary disease) ([facility])    KRISTIN (obstructive sleep apnea)    Pulmonary HTN ([facility])    MDD (major depressive disorder), recurrent episode, moderate ([facility]    Uncontrolled type 2 diabetes mellitus with hyperglycemia ([facility]  Resolved Problems:    * No resolved hospital problems.  *      PLAN:     Acute hypoxic respiratory failure secondary Covid 19 infection  -ID, pulm on board  -decadron, barcitinib  - elevated d dimer, eliquis emperically per pulm as CTA can not be done due to allergy  -delsym, tessalon  -home o2 eval    T2DM  -lantus 70 units BID  -High dose sliding scale  -hypoglycemia protocol, POCT glucose  -    Depression  -cybalta, seroquel, viibryd    HFrEF  -coreg, bumex, imdur, lipitor    DVT:eliquis  GI ppx: protonix  Code: full      Philip Castro MD   9/3/2022 10:40 AM

## 2022-09-03 NOTE — PROGRESS NOTES
Pt states home O2 eval was already done and that she dropped to 82%. There is no note saying this was done appropriately. I know it was attempted this AM but pt was non compliant. Pt said she will try when she gets her pain med's in her. We will keep attempting to get this done. Pt is very adamant about having her O2 on at all times.

## 2022-09-04 VITALS
WEIGHT: 275.57 LBS | SYSTOLIC BLOOD PRESSURE: 140 MMHG | BODY MASS INDEX: 47.05 KG/M2 | RESPIRATION RATE: 20 BRPM | OXYGEN SATURATION: 93 % | HEIGHT: 64 IN | DIASTOLIC BLOOD PRESSURE: 95 MMHG | TEMPERATURE: 97.9 F | HEART RATE: 56 BPM

## 2022-09-04 LAB
GLUCOSE BLD-MCNC: 135 MG/DL (ref 65–105)
GLUCOSE BLD-MCNC: 172 MG/DL (ref 65–105)

## 2022-09-04 PROCEDURE — 94640 AIRWAY INHALATION TREATMENT: CPT

## 2022-09-04 PROCEDURE — 82947 ASSAY GLUCOSE BLOOD QUANT: CPT

## 2022-09-04 PROCEDURE — 99239 HOSP IP/OBS DSCHRG MGMT >30: CPT | Performed by: STUDENT IN AN ORGANIZED HEALTH CARE EDUCATION/TRAINING PROGRAM

## 2022-09-04 PROCEDURE — 2700000000 HC OXYGEN THERAPY PER DAY

## 2022-09-04 PROCEDURE — 6360000002 HC RX W HCPCS: Performed by: FAMILY MEDICINE

## 2022-09-04 PROCEDURE — 99231 SBSQ HOSP IP/OBS SF/LOW 25: CPT | Performed by: INTERNAL MEDICINE

## 2022-09-04 PROCEDURE — 6370000000 HC RX 637 (ALT 250 FOR IP): Performed by: FAMILY MEDICINE

## 2022-09-04 PROCEDURE — 6370000000 HC RX 637 (ALT 250 FOR IP): Performed by: NURSE PRACTITIONER

## 2022-09-04 PROCEDURE — 6370000000 HC RX 637 (ALT 250 FOR IP): Performed by: INTERNAL MEDICINE

## 2022-09-04 RX ORDER — BENZONATATE 200 MG/1
200 CAPSULE ORAL 3 TIMES DAILY
Qty: 21 CAPSULE | Refills: 0 | Status: SHIPPED | OUTPATIENT
Start: 2022-09-04 | End: 2022-09-11

## 2022-09-04 RX ORDER — GUAIFENESIN DEXTROMETHORPHAN HYDROBROMIDE ORAL SOLUTION 10; 100 MG/5ML; MG/5ML
5 SOLUTION ORAL EVERY 4 HOURS PRN
Qty: 1 EACH | Refills: 1 | Status: SHIPPED | OUTPATIENT
Start: 2022-09-04

## 2022-09-04 RX ORDER — DEXAMETHASONE 6 MG/1
6 TABLET ORAL DAILY
Qty: 3 TABLET | Refills: 0 | Status: SHIPPED | OUTPATIENT
Start: 2022-09-05 | End: 2022-09-08

## 2022-09-04 RX ADMIN — Medication 400 MG: at 09:33

## 2022-09-04 RX ADMIN — ALBUTEROL SULFATE 2 PUFF: 90 AEROSOL, METERED RESPIRATORY (INHALATION) at 07:34

## 2022-09-04 RX ADMIN — PANTOPRAZOLE SODIUM 40 MG: 40 TABLET, DELAYED RELEASE ORAL at 07:46

## 2022-09-04 RX ADMIN — DEXAMETHASONE 6 MG: 6 TABLET ORAL at 09:34

## 2022-09-04 RX ADMIN — NALBUPHINE HYDROCHLORIDE 10 MG: 10 INJECTION, SOLUTION INTRAMUSCULAR; INTRAVENOUS; SUBCUTANEOUS at 05:36

## 2022-09-04 RX ADMIN — CETIRIZINE HYDROCHLORIDE 10 MG: 10 TABLET, FILM COATED ORAL at 09:33

## 2022-09-04 RX ADMIN — ISOSORBIDE MONONITRATE 30 MG: 30 TABLET, EXTENDED RELEASE ORAL at 07:46

## 2022-09-04 RX ADMIN — DULOXETINE 60 MG: 60 CAPSULE, DELAYED RELEASE ORAL at 09:33

## 2022-09-04 RX ADMIN — NALBUPHINE HYDROCHLORIDE 10 MG: 10 INJECTION, SOLUTION INTRAMUSCULAR; INTRAVENOUS; SUBCUTANEOUS at 14:30

## 2022-09-04 RX ADMIN — ALBUTEROL SULFATE 2 PUFF: 90 AEROSOL, METERED RESPIRATORY (INHALATION) at 12:00

## 2022-09-04 RX ADMIN — APIXABAN 5 MG: 5 TABLET, FILM COATED ORAL at 09:33

## 2022-09-04 RX ADMIN — BENZONATATE 200 MG: 200 CAPSULE ORAL at 09:33

## 2022-09-04 RX ADMIN — Medication 30 MG: at 09:33

## 2022-09-04 RX ADMIN — INSULIN GLARGINE 70 UNITS: 100 INJECTION, SOLUTION SUBCUTANEOUS at 09:00

## 2022-09-04 RX ADMIN — CARVEDILOL 12.5 MG: 12.5 TABLET, FILM COATED ORAL at 07:46

## 2022-09-04 ASSESSMENT — PAIN SCALES - GENERAL: PAINLEVEL_OUTOF10: 10

## 2022-09-04 NOTE — PROGRESS NOTES
RN updated primary care through [facility] regarding patient's home o2 evaluation. Awaiting response.

## 2022-09-04 NOTE — PLAN OF CARE
Problem: Discharge Planning  Goal: Discharge to home or other facility with appropriate resources  9/4/2022 0505 by Felicita Krishna RN  Outcome: Progressing  Flowsheets (Taken 9/2/2022 2000 by Sara Bates RN)  Discharge to home or other facility with appropriate resources: Identify barriers to discharge with patient and caregiver  9/4/2022 0505 by Felicita Krishna RN  Outcome: Progressing     Problem: Pain  Goal: Verbalizes/displays adequate comfort level or baseline comfort level  9/4/2022 0505 by Felicita Krisnha RN  Outcome: Progressing  Flowsheets (Taken 9/2/2022 1600 by Dean Ramirez RN)  Verbalizes/displays adequate comfort level or baseline comfort level:   Encourage patient to monitor pain and request assistance   Assess pain using appropriate pain scale  9/4/2022 0505 by Felicita Krishna RN  Outcome: Progressing     Problem: Safety - Adult  Goal: Free from fall injury  9/4/2022 0505 by Felicita Krishna RN  Outcome: Progressing  9/4/2022 0505 by Felicita Krishna RN  Outcome: Progressing     Problem: ABCDS Injury Assessment  Goal: Absence of physical injury  9/4/2022 0505 by Felicita Krishna RN  Outcome: Progressing  [address] (Taken 9/2/2022 1338 by Dean Ramirez RN)  Absence of Physical Injury: Implement safety measures based on patient assessment  9/4/2022 0505 by Felicita Krishna RN  Outcome: Progressing     Problem: Respiratory - Adult  Goal: Achieves optimal ventilation and oxygenation  9/4/2022 0505 by Felicita Krishna RN  Outcome: Progressing  Flowsheets (Taken 9/2/2022 2000 by Sara Bates RN)  Achieves optimal ventilation and oxygenation: Assess for changes in respiratory status  9/4/2022 0505 by Felicita Krishna RN  Outcome: Progressing     Problem: Chronic Conditions and Co-morbidities  Goal: Patient's chronic conditions and co-morbidity symptoms are monitored and maintained or improved  9/4/2022 0505 by Felicita Krishna RN  Outcome: Progressing  9/4/2022 0505 by Felicita Krishna RN  Outcome: Progressing

## 2022-09-04 NOTE — PROGRESS NOTES
Home Oxygen Evaluation    Home Oxygen Evaluation completed. Patient is on 2 liters per minute via nasal cannula. Resting SpO2 = 99%  Resting SpO2 on room air = 95%    SpO2 on room air with exercise = 93%      Nocturnal Oximetry with patient on room air is recommended is SpO2 is between 89% and 95% (requires additional order). Naa Rodriguez, P  11:15 AM     Giacomo Singer she was on room air with a sat of 93%. was forced coughing then sat back in bed and stated she felt short of breath and dizzy. Got back up and walked a few feet and stumbled a bit and stopped this RT grabbed her she then kept walking she then stumbled into the wall tried to fall this RT and RN grabbed her made her stay there until she got her balance and walked back to bed. The whole time the patient had a sat of 93-95% on room air.

## 2022-09-04 NOTE — DISCHARGE SUMMARY
[address] [facility]  Family Medicine      Discharge Summary      NAME:  Viry Anthony  :  1975  MRN:  [id number]    Admit date:  2022  Discharge date:  2022    Admitting Physician:  Teja Peters MD    Primary Diagnosis on Admission:   Present on Admission:   COVID-19   Uncontrolled type 2 diabetes mellitus with hyperglycemia ([facility]   Pulmonary HTN ([facility]   MDD (major depressive disorder), recurrent episode, moderate (HCC)   KRISTIN (obstructive sleep apnea)   Moderate left ventricular systolic dysfunction   Diabetes mellitus type 2, controlled (HCC)   COPD (chronic obstructive pulmonary disease) (HCC)   Chronic systolic CHF (congestive heart failure) (HCC)   PTSD (post-traumatic stress disorder)      Secondary Diagnoses:  does not have any pertinent problems on file. Admission Condition:  fair     Discharged Condition: good      Hospital Course: The patient was admitted for the management of respiratory distress secondary to COVID 19 infection. Patient also had chest pressure and a fever. Pt was hypoxic, placed on nasal cannula. D-dimer as elevated, CTA could not be done due to allergy, pt was started on empiric eliquis. Patient was treated with decadron and barcitinib. Pt also had psych consult and seroquel was increased. Pt complained of dyspnea and home o2 eval was ordered but patient did not comply with home o2 evaluation several days in a row. She had o2 sat in mid 90s while laying in bed on room air. Pulm OK with dc without o2 as patient declined testing. Pt now on:    Eliquis 5mg BID  Seroquel increased to 250mg nightly  Decadron 6mg daily for 3 more days        Today on day of discharge pt feels better. Vitals and Labs are at pts baseline. All consultants involved during this admission are agreeable to d/c.     Consults:  pulmonary/intensive care, ID, and psychiatry    Significant Diagnostic/theraputic interventions: steroids, barcitinib, oxygen,      Disposition:   home    Instructions to Patient: follow with PCP, take all medications as prescribed      Follow up with Lincoln Root MD in  1 week    Discharge Medications:       Medication List        START taking these medications      apixaban 5 MG Tabs tablet  Commonly known as: ELIQUIS  Take 1 tablet by mouth 2 times daily     dexamethasone 6 MG tablet  Commonly known as: DECADRON  Take 1 tablet by mouth daily for 3 doses  Start taking on: September 5, 2022     dextromethorphan-guaiFENesin  MG/5ML syrup  Commonly known as: ROBITUSSIN-DM  Take 5 mLs by mouth every 4 hours as needed for Cough            CHANGE how you take these medications      benzonatate 200 MG capsule  Commonly known as: TESSALON  Take 1 capsule by mouth in the morning, at noon, and at bedtime for 7 days  What changed:   medication strength  how much to take  when to take this  reasons to take this            CONTINUE taking these medications      albuterol sulfate  (90 Base) MCG/ACT inhaler  Commonly known as: Ventolin HFA  INHALE 2 PUFFS INTO LUNGS EVERY 6 HOURS AS NEEDED FOR WHEEZING     atorvastatin 80 MG tablet  Commonly known as: LIPITOR  TAKE 1 TABLET BY MOUTH NIGHTLY     B-D ULTRAFINE III SHORT PEN 31G X 8 MM Misc  Generic drug: Insulin Pen Needle  Inject 1 each into the skin daily May substitute with any brand     budesonide 0.5 MG/2ML nebulizer suspension  Commonly known as: PULMICORT  Take 2 mLs by nebulization 2 times daily     bumetanide 2 MG tablet  Commonly known as: BUMEX  TAKE 1 TABLET BY MOUTH EVERY DAY     cetirizine 10 MG tablet  Commonly known as: ZYRTEC  Take 1 tablet by mouth in the morning. clopidogrel 75 MG tablet  Commonly known as: PLAVIX     clotrimazole 1 % cream  Commonly known as: LOTRIMIN  Apply topically 2 times daily.      colchicine 0.6 MG tablet  Commonly known as: COLCRYS  Take 1 tablet by mouth 2 times daily for 46 doses     Coreg 12.5 MG tablet  Generic drug: carvedilol     dornase alpha 2.5 MG/2.5ML nebulizer solution  Commonly known as: PULMOZYME  Inhale 2.5 mg into the lungs 2 times daily     DULoxetine 60 MG extended release capsule  Commonly known as: CYMBALTA  TAKE 1 CAPSULE BY MOUTH 2 TIMES PER DAY     insulin lispro (1 Unit Dial) 100 UNIT/ML Sopn  Commonly known as: HumaLOG KwikPen  INJECT SUBCUTANEOUSLY PER SLIDING SCALE, 150-200 INJECT 3U, 201-250 INJECT 6U, 251-300 INJECT 9U, >300 INJECT 12U, MAX 30U/DAY     ipratropium-albuterol 0.5-2.5 (3) MG/3ML Soln nebulizer solution  Commonly known as: DUONEB  INHALE 3 MLS (ONE VIAL) WITH NEBULIZER EVERY 6 HOURS AS NEEDED FOR SHORTNESS OF BREATH     isosorbide mononitrate 30 MG extended release tablet  Commonly known as: IMDUR  TAKE 1 TABLET BY MOUTH EVERY DAY     magnesium oxide 400 MG tablet  Commonly known as: MAG-OX     Multivitamin Gummies Womens Chew     OneTouch Ultra strip  Generic drug: blood glucose test strips  USE TO TEST BLOOD SUGAR BEFORE MEALS, AT BEDTIME, AND AS NEEDED (up to 9 TIMES DAILY)     oxyCODONE-acetaminophen 5-325 MG per tablet  Commonly known as: PERCOCET     pantoprazole 40 MG tablet  Commonly known as: PROTONIX     QUEtiapine 100 MG tablet  Commonly known as: SEROQUEL  TAKE 1 AND 1/2 TABLETS BY MOUTH AT BEDTIME Patient has 50 mg tabs            STOP taking these medications      azithromycin 250 MG tablet  Commonly known as: Zithromax     predniSONE 10 MG tablet  Commonly known as: DELTASONE            ASK your doctor about these medications      Tresiba FlexTouch 200 UNIT/ML Sopn  Generic drug: Insulin Degludec  Inject 60 Units into the skin in the morning and at bedtime If po intake poor, decrease to 20 units daily     vilazodone hcl 20 MG Tabs  Generic drug: vilazodone HCl  TAKE 1 TABLET BY MOUTH EVERY DAY               Where to Get Your Medications        These medications were sent to [facility]/pharmacy [place], [address]  [address], [place] [address]      Hours: 24-hours Phone: [phone]   apixaban 5 MG Tabs tablet  benzonatate 200 MG capsule  dexamethasone 6 MG tablet  dextromethorphan-guaiFENesin  MG/5ML syrup         Send Copies to: Jono Solano MD,      Note that over 30 minutes was spent in preparing discharge papers, discussing discharge with patient and family, medication review, etc.    Signed:   Malathi Reyes MD   9/4/2022, 2:18 PM

## 2022-09-04 NOTE — PROGRESS NOTES
PULMONARY PROGRESS NOTE:    REASON FOR VISIT:COPD, COVID  Interval History:    Shortness of Breath: ++  Cough: improving  Sputum: no          Hemoptysis: no  Chest Pain: no  Fever: no                   Swelling Feet: no  Headache: no                                           Nausea, Emesis, Abdominal Pain: no  Diarrhea: yes       Constipation: no    Events since last visit: cough is better, bringing up dk beige phlegm; was off O2 overnight    PAST MEDICAL HISTORY:      Scheduled Meds:   insulin glargine  70 Units SubCUTAneous BID    QUEtiapine  250 mg Oral Nightly    benzonatate  200 mg Oral TID    dextromethorphan  30 mg Oral 2 times per day    DULoxetine  60 mg Oral BID    bumetanide  2 mg Oral Daily    carvedilol  12.5 mg Oral BID WC    cetirizine  10 mg Oral Daily    clopidogrel  75 mg Oral Daily    [Held by provider] ipratropium-albuterol  1 ampule Inhalation Q4H WA    isosorbide mononitrate  30 mg Oral Daily    pantoprazole  40 mg Oral Daily    vilazodone HCl  20 mg Oral Daily    sodium chloride flush  5-40 mL IntraVENous 2 times per day    insulin lispro  0-16 Units SubCUTAneous TID     insulin lispro  0-4 Units SubCUTAneous Nightly    dexamethasone  6 mg Oral Daily    atorvastatin  80 mg Oral Nightly    magnesium oxide  400 mg Oral Daily    multivitamin  1 tablet Oral Daily    apixaban  5 mg Oral BID    albuterol sulfate HFA  2 puff Inhalation Q4H WA    ipratropium  2 puff Inhalation 4x daily    baricitinib  4 mg Oral Daily     Continuous Infusions:   dextrose      sodium chloride       PRN Meds:[Held by provider] nalbuphine, [Held by provider] oxyCODONE-acetaminophen, glucose, dextrose bolus **OR** dextrose bolus, glucagon (rDNA), dextrose, sodium chloride flush, sodium chloride, polyethylene glycol, acetaminophen **OR** acetaminophen, promethazine, dextromethorphan-guaiFENesin        PHYSICAL EXAMINATION:  BP (!) 140/95   Pulse 56   Temp 97.9 °F (36.6 °C) (Axillary)   Resp 20   Ht 5' 4\" (1.626 m) Wt 275 lb 9.2 oz (125 kg)   SpO2 93%   BMI 47.30 kg/m²     General : Awake, alert,   Neck - supple, no lymphadenopathy, JVD not raised  Heart - regular rhythm, S1 and S2 normal; no additional sounds heard  Lungs - Air Entry- poor bilaterally; breath sounds : vesicular;   rales/crackles - absent, 95% on 2.5L  Abdomen - soft, no tenderness  Upper Extremities  - no cyanosis, mottling; edema : absent  Lower Extremities: no cyanosis, mottling; edema : absent    Current Laboratory, Radiologic, Microbiologic, and Diagnostic studies reviewed  Data ReviewCBC:   No results for input(s): WBC, RBC, HGB, HCT, PLT in the last 72 hours. BMP:   No results for input(s): GLUCOSE, NA, K, BUN, CREATININE, CALCIUM in the last 72 hours. ABGs: No results for input(s): PHART, PO2ART, GQU6VOX, DAS5UNH, BEART, A2KWTQMB, CBP0WBZ in the last 72 hours. PT/INR:  No results found for: PTINR    ASSESSMENT / PLAN:    COPD - BD  COVID - steroid, cough suppressant  Cough     delsym    tessalon   Acute hypoxic resp failure - O2  Fever- symptomatic Rx  Elevated d dimer - ?  Related to COVID, cannot do CTA due to contrast allergy; empiric eliquis  Home O2 evaluation - no desat at rest; could not be tested with activity  Stable for DC from Pulmonary      Electronically signed by Dominic Watts MD on 09/04/22 at 12:37 PM.

## 2022-09-04 NOTE — PROGRESS NOTES
[facility] -   Infectious diseases evaluation  admission date 8/29/2022    reason for consultation:   COVID-19 infection    Impression :   Current:  COVID-19 infection  Acute hypoxic respiratory failure  COPD  Cardiomyopathy  Atrial fibrillation  Elevated D-dimer  Multiple antibiotics allergy  History of C. difficile colitis  Diabetes mellitus  History of DVT      Recommendations   Decadron for 10 days  Barcitinib for 14 days or until discharge  On Eliquis  Home O2 evaluation   Droplet plus isolation  No objection for discharge from infectious disease point of view  Discussed with nursing staff    History of Present Illness:   Initial history:  Farzana Kapadia is a 50y.o.-year-old female presented to hospital with worsening shortness of breath associated with nonproductive cough, chest pressure, myalgia and headache for 1 week prior to admission. The patient had fever, temperature max of 102 reportedly. At the ER temperature was 100.4  The patient was hypoxic, was placed on oxygen per nasal cannula. She does not use home oxygen, did use it in the past.  The patient had family member was tested positive for COVID-19 recently. She is not vaccinated for COVID-19. Chest x-ray 8/25/2022 showed no acute process  D-dimer was elevated  CTA was not done due to contrast allergy.   Initial labs showed troponin of 19, WBC 6.5   Interval changes  9/4/2022   The patient was off oxygen overnight, O2 sat in the 90s on room air, complaining of pain all over, requesting pain medication, no significant cough, no other complaints  Patient Vitals for the past 8 hrs:   BP Temp Temp src Pulse Resp SpO2   09/04/22 1112 -- -- -- 56 20 93 %   09/04/22 0755 (!) 140/95 97.9 °F (36.6 °C) Axillary 56 20 --   09/04/22 0734 -- -- -- 55 20 93 %   09/04/22 0606 -- -- -- -- 21 --   09/04/22 0532 139/64 98.7 °F (37.1 °C) Oral 74 18 94 %               I have personally reviewed the past medical history, past surgical history, medications, social history, and family history, and I haveupdated the database accordingly. Allergies:   Latex, Aspirin, Avelox [moxifloxacin], Chantix [varenicline], Doxycycline, Dye [iodides], Flexeril [cyclobenzaprine], Gabapentin, Losartan, Morphine, Nsaids, Pcn [penicillins], Reglan [metoclopramide hcl], Shellfish-derived products, Sulfa antibiotics, Toradol [ketorolac tromethamine], Vancomycin, Zofran, Zyvox [linezolid], Acyclovir, Bactrim [sulfamethoxazole-trimethoprim], Betadine [povidone iodine], Ceclor [cefaclor], Codeine, Macrolides and ketolides, Novolin r [insulin], Novolog [insulin aspart], Phenothiazines, Tape [adhesive tape], Banana, Compazine [prochlorperazine], Fentanyl, Kiwi extract, Tamiflu [oseltamivir phosphate], Clindamycin/lincomycin, and Sotalol     Review of Systems:     Review of Systems  As per history of present illness, other than above 12 system review was negative  Physical Examination :       Physical Exam  Constitutional:       General: She is not in acute distress. HENT:      Head: Normocephalic and atraumatic. Right Ear: External ear normal.      Left Ear: External ear normal.   Eyes:      General: No scleral icterus. Conjunctiva/sclera: Conjunctivae normal.   Cardiovascular:      Rate and Rhythm: Normal rate and regular rhythm. Pulmonary:      Breath sounds: Rhonchi present. No wheezing or rales. Abdominal:      General: There is no distension. Tenderness: There is no abdominal tenderness. Musculoskeletal:      Cervical back: Neck supple. No rigidity. Right lower leg: No edema. Left lower leg: No edema. Skin:     Findings: No bruising or erythema. Neurological:      General: No focal deficit present. Mental Status: She is alert and oriented to person, place, and time.        Past Medical History:     Past Medical History:   Diagnosis Date    Acute exacerbation of chronic obstructive pulmonary disease ([address] 3/21/2018    Adhesive capsulitis of left shoulder 9/25/2018    Asthma     Asthma exacerbation 7/24/2014    Atrial fibrillation ([address]     placed on event monitor 9/25/18 for PVC's & A-fib    Atrial premature depolarization 7/19/2019    Bipolar 1 disorder ([address]     Bipolar disorder ([address] 7/19/2019    Bulging disc     CAD (coronary artery disease)     Candida infection 2/23/2018    Cardiomyopathy ([address]     Chest pain 6/13/2017    CHF (congestive heart failure) ([facility])     Chronic otitis media of both ears     rt>lt    Chronic right shoulder pain 3/14/2017    Cigarette nicotine dependence with nicotine-induced disorder 7/19/2019    Class 3 severe obesity due to excess calories with serious comorbidity and body mass index (BMI) of 40.0 to 44.9 in adult [facility]) 10/4/2018    Closed fracture of left ankle 6/25/2019    Clostridium difficile infection     COPD (chronic obstructive pulmonary disease) ([facility])     Cough, persistent 3/21/2018    Current moderate episode of major depressive disorder without prior episode ([address] 8/20/2018    Depression     Diabetes mellitus type 2, controlled ([address] 4/30/2014    Diarrhea     Diarrhea     Dizziness     DVT (deep venous thrombosis) ([facility])     after PICC line right arm    Encounter for monitoring sotalol therapy 2/20/2017    Encounter for screening mammogram for malignant neoplasm of breast 3/7/2018    Endometriosis     Fainting     GERD (gastroesophageal reflux disease)     hx of    GI bleeding     H. pylori infection     Helicobacter pylori (H. pylori)     [place] (hard of hearing)     both ears, no hearing aids    HTN (hypertension) 7/19/2019    Hyperlipidemia     Hypertension     Left bicipital tenosynovitis 10/17/2018    Left leg pain 5/16/2017    Left sided abdominal pain of unknown cause 7/19/2016    Leg swelling 9/21/2018    Mastoiditis     Mastoiditis, acute 4/30/2014    Migraines     Morbid obesity ([address]     Myocardial infarction ([address]     2005    Nausea     Neuropathy     On home oxygen therapy     3 L at night    Ovarian cyst     Passed out     hx of- negative tilt table    Pulmonary hypertension (HCC)     Pulmonary insufficiency     PVC (premature ventricular contraction)     Seasonal allergies     Tricuspid insufficiency     Type II or unspecified type diabetes mellitus without mention of complication, not stated as uncontrolled        Past Surgical  History:     Past Surgical History:   Procedure Laterality Date    ABDOMEN SURGERY      abcess    ABDOMINAL ADHESION SURGERY      ABDOMINAL EXPLORATION SURGERY      x 4    ABDOMINAL HERNIA REPAIR      with mesh    ABLATION OF DYSRHYTHMIC FOCUS  2016    ABLATION OF DYSRHYTHMIC FOCUS  09/08/2017    [facility]. ABSCESS DRAINAGE      left hip and chest    APPENDECTOMY      BREAST SURGERY Left 2007    I & D    BRONCHOSCOPY N/A 5/23/2022    BRONCHOSCOPY performed by Treasure Galarza MD at [address] [place] [address]  2014 & 2000     no stenting    CARPAL TUNNEL RELEASE Right 12/19/2019    Ulnar nerve decompression, right elbow. Release of 1st and 2nd extensor compartments, right forearm. CARPAL TUNNEL RELEASE  05/04/2020    Carpal tunnel release, left hand    CHOLECYSTECTOMY      COLONOSCOPY      FOOT SURGERY Left     bone fragment removed    FRACTURE SURGERY Right     closed reduction perc pinning ring & middle finger    GASTRIC FUNDOPLICATION  8064    HYSTERECTOMY (CERVIX STATUS UNKNOWN)      total    HYSTERECTOMY (CERVIX STATUS UNKNOWN)      HYSTEROSCOPY      tubal perfusion    MYRINGOTOMY Right 11/13/2015    Right myringotomy with placement of  T-tube on the right side. Removal of plugged ventilating tube, right side.     MYRINGOTOMY Left 07/14/2020    MYRINGOTOMY TUBE INSERTION performed by Pelon Wilburn MD at  [facility] 06/05/2014    OTHER SURGICAL HISTORY Right 05/29/2014    removal ear tube rt ear    OTHER SURGICAL HISTORY  2009    LOOP recorder inserted and removed 3 mos. later    OTHER SURGICAL HISTORY Right 08/11/2016    ear tube removal with patch    OTHER SURGICAL HISTORY      tubal perfusion, lysis of uterine adhesions    OTHER SURGICAL HISTORY      multiple PICC lines inserted and removed, it has affected circulation bilateral upper arms    OTHER SURGICAL HISTORY  10/19/2020    Revisiion to subcutaneous transposition of unlar nerve, right elbow    OTHER SURGICAL HISTORY Right 06/14/2021    REVISION TO SUBMUSCULAR TRANSPOSITION OF RIGHT ULNAR NERVE    OTHER SURGICAL HISTORY Left 03/24/2022    DECOMPRESSION OF ULNAR NERVE, LEFT ELBOW    FL LAVERNEULATGURPREET BATES JT W ANESTHESIA Right 03/01/2017    SHOULDER MANIPULATION RIGHT performed by Kelly Rinaldi MD at [address] Left 10/17/2018    SHOULDER ARTHROSCOPY W/BICEPS TENDONESIS performed by Dilip Zamarripa MD at [address] [facility] Left     foot    TONSILLECTOMY      TYMPANOSTOMY TUBE PLACEMENT      TYMPANOSTOMY TUBE PLACEMENT Left 03/12/2019    TYMPANOSTOMY TUBE PLACEMENT Right 12/08/2021    Right tympanostomy with tube placement of T-tube with operative microscope and general anesthesia. Right removal of right ear tube. ULNAR TUNNEL RELEASE Right     UPPER GASTROINTESTINAL ENDOSCOPY      UPPER GASTROINTESTINAL ENDOSCOPY  07/10/2019    UPPER GASTROINTESTINAL ENDOSCOPY N/A 07/10/2019    EGD BIOPSY performed by Tejas Doshi MD at [address] Right 04/21/2022    Placement of right sided ventriculoperitoneal shunt Medtronic programmable valve set at 1.5.        Medications:      insulin glargine  70 Units SubCUTAneous BID    QUEtiapine  250 mg Oral Nightly    benzonatate  200 mg Oral TID    dextromethorphan  30 mg Oral 2 times per day    DULoxetine  60 mg Oral BID    bumetanide  2 mg Oral Daily    carvedilol  12.5 mg Oral BID WC    cetirizine  10 mg Oral Daily    clopidogrel  75 mg Oral Daily    [Held by provider] ipratropium-albuterol  1 ampule Inhalation Q4H WA    isosorbide mononitrate  30 mg Oral Daily    pantoprazole  40 mg Oral Daily    vilazodone HCl  20 mg Oral Daily    sodium chloride flush  5-40 mL IntraVENous 2 times per day    insulin lispro  0-16 Units SubCUTAneous TID WC    insulin lispro  0-4 Units SubCUTAneous Nightly    dexamethasone  6 mg Oral Daily    atorvastatin  80 mg Oral Nightly    magnesium oxide  400 mg Oral Daily    multivitamin  1 tablet Oral Daily    apixaban  5 mg Oral BID    albuterol sulfate HFA  2 puff Inhalation Q4H WA    ipratropium  2 puff Inhalation 4x daily    baricitinib  4 mg Oral Daily       Social History:     Social History     Socioeconomic History    Marital status: Single     Spouse name: Not on file    Number of children: Not on file    Years of education: Not on file    Highest education level: Not on file   Occupational History     Employer: NONE   Tobacco Use    Smoking status: Every Day     Packs/day: 0.50     Years: 23.00     Pack years: 11.50     Types: Cigarettes    Smokeless tobacco: Never   Vaping Use    Vaping Use: Never used   Substance and Sexual Activity    Alcohol use: No     Alcohol/week: 0.0 standard drinks    Drug use: No    Sexual activity: Not on file   Other Topics Concern    Not on file   Social History Narrative    Not on file     Social Determinants of Health     Financial Resource Strain: Not on file   Food Insecurity: Not on file   Transportation Needs: Not on file   Physical Activity: Not on file   Stress: Not on file   Social Connections: Not on file   Intimate Partner Violence: Not on file   Housing Stability: Not on file       Family History:     Family History   Problem Relation Age of Onset    Ulcerative Colitis Father     Liver Disease Father 61        hep c and b    Diabetes Father     Asthma Father     Heart Disease Father     High Blood Pressure Father     Breast Cancer Maternal Aunt     Cancer Maternal Aunt     Diabetes Maternal Aunt     Heart Disease Maternal Aunt     High Blood Pressure Maternal Aunt     Breast Cancer Paternal Aunt     Heart Disease Paternal Aunt     High Blood Pressure Paternal Aunt     Breast Cancer Maternal Grandmother     Cervical Cancer Maternal Grandmother     Cancer Maternal Grandmother     Diabetes Maternal Grandmother     Asthma Maternal Grandmother     Heart Disease Maternal Grandmother     High Blood Pressure Maternal Grandmother     Lung Cancer Maternal Grandfather     Diabetes Maternal Grandfather     Asthma Maternal Grandfather     Heart Disease Maternal Grandfather     High Blood Pressure Maternal Grandfather     Cervical Cancer Paternal Grandmother     Cancer Paternal Grandmother     Diabetes Paternal Grandmother     Heart Disease Paternal Grandmother     High Blood Pressure Paternal Grandmother     Diabetes Mother     Asthma Mother     Heart Disease Mother     High Blood Pressure Mother     Cancer Sister     Heart Disease Maternal Uncle     High Blood Pressure Maternal Uncle     Heart Disease Paternal Uncle     High Blood Pressure Paternal Uncle     Diabetes Paternal Grandfather     Heart Disease Paternal Grandfather     High Blood Pressure Paternal Grandfather       Medical Decision Making:   I have independently reviewed/ordered the following labs:    CBC with Differential:   No results for input(s): WBC, HGB, HCT, PLT, SEGSPCT, BANDSPCT, LYMPHOPCT, MONOPCT, EOSPCT in the last 72 hours. BMP:  No results for input(s): NA, K, CL, CO2, BUN, CREATININE, CA, MG in the last 72 hours. Hepatic Function Panel: No results for input(s): PROT, LABALBU, BILIDIR, IBILI, BILITOT, ALKPHOS, ALT, AST in the last 72 hours. No results for input(s): RPR in the last 72 hours. No results for input(s): HIV in the last 72 hours. No results for input(s): BC in the last 72 hours. Lab Results   Component Value Date/Time    CREATININE 0.68 08/31/2022 04:30 AM    GLUCOSE 337 08/31/2022 04:30 AM    GLUCOSE 287 03/20/2012 11:08 AM       Detailed results:         Thank you for allowing us to participate in the care of this patient. Please call with questions. This note is created with the assistance of a speech recognition program.  While intending to generate adocument that actually reflects the content of the visit, the document can still have some errors including those of syntax and sound a like substitutions which may escape proof reading. It such instances, actual meaningcan be extrapolated by contextual diversion.     Dwight Jay MD  Office: [phone]  Perfect serve / office [phone]

## 2022-11-01 ENCOUNTER — APPOINTMENT (OUTPATIENT)
Dept: CT IMAGING | Age: 47
End: 2022-11-01
Payer: COMMERCIAL

## 2022-11-01 ENCOUNTER — HOSPITAL ENCOUNTER (EMERGENCY)
Age: 47
Discharge: HOME | End: 2022-11-01
Attending: EMERGENCY MEDICINE
Payer: COMMERCIAL

## 2022-11-01 VITALS
BODY MASS INDEX: 47.8 KG/M2 | TEMPERATURE: 98.9 F | HEART RATE: 88 BPM | RESPIRATION RATE: 18 BRPM | DIASTOLIC BLOOD PRESSURE: 71 MMHG | SYSTOLIC BLOOD PRESSURE: 134 MMHG | HEIGHT: 64 IN | OXYGEN SATURATION: 97 % | WEIGHT: 280 LBS

## 2022-11-01 DIAGNOSIS — H92.03 OTALGIA OF BOTH EARS: Primary | ICD-10-CM

## 2022-11-01 DIAGNOSIS — H66.001 NON-RECURRENT ACUTE SUPPURATIVE OTITIS MEDIA OF RIGHT EAR WITHOUT SPONTANEOUS RUPTURE OF TYMPANIC MEMBRANE: ICD-10-CM

## 2022-11-01 PROCEDURE — 70480 CT ORBIT/EAR/FOSSA W/O DYE: CPT

## 2022-11-01 PROCEDURE — 99284 EMERGENCY DEPT VISIT MOD MDM: CPT

## 2022-11-01 RX ORDER — AZITHROMYCIN 250 MG/1
TABLET, FILM COATED ORAL
Qty: 1 PACKET | Refills: 0 | Status: SHIPPED | OUTPATIENT
Start: 2022-11-01 | End: 2022-11-05

## 2022-11-01 SDOH — ECONOMIC STABILITY: FOOD INSECURITY: WITHIN THE PAST 12 MONTHS, THE FOOD YOU BOUGHT JUST DIDN'T LAST AND YOU DIDN'T HAVE MONEY TO GET MORE.: NEVER TRUE

## 2022-11-01 ASSESSMENT — PAIN DESCRIPTION - ORIENTATION: ORIENTATION: RIGHT;LEFT

## 2022-11-01 ASSESSMENT — PAIN - FUNCTIONAL ASSESSMENT: PAIN_FUNCTIONAL_ASSESSMENT: 0-10

## 2022-11-01 ASSESSMENT — PAIN DESCRIPTION - LOCATION: LOCATION: EAR

## 2022-11-01 ASSESSMENT — PAIN SCALES - GENERAL: PAINLEVEL_OUTOF10: 10

## 2022-11-01 ASSESSMENT — LIFESTYLE VARIABLES: HOW OFTEN DO YOU HAVE A DRINK CONTAINING ALCOHOL: NEVER

## 2022-11-01 NOTE — ED TRIAGE NOTES
Mode of arrival (squad #, walk in, police, etc) : Medical Cab        Chief complaint(s): Earache, Headache and Neck Pain        Arrival Note (brief scenario, treatment PTA, etc). : Per pt she had tubes placed in her ears December 2021 and on Saturday, 10/29, she began to experience swelling, pain and drainage. Pt reports she called her ENT, Dr. Sylwia Bustillos, and was told to come to the ER.         C= \"Have you ever felt that you should Cut down on your drinking? \"  No  A= \"Have people Annoyed you by criticizing your drinking? \"  No  G= \"Have you ever felt bad or Guilty about your drinking? \"  No  E= \"Have you ever had a drink as an Eye-opener first thing in the morning to steady your nerves or to help a hangover? \"  No      Deferred []      Reason for deferring: N/A    *If yes to two or more: probable alcohol abuse. *

## 2022-11-01 NOTE — ED PROVIDER NOTES
[address] ED  eMERGENCY dEPARTMENT eNCOUnter      Pt Name: Magdalena Avila  MRN: [id number]  [place] 1975  Date of evaluation: 11/1/2022  Provider: Mary Anne Moreno PA-C    CHIEF COMPLAINT       Chief Complaint   Patient presents with    Otalgia    Neck Pain    Ear Drainage           HISTORY OF PRESENT ILLNESS  (Location/Symptom, Timing/Onset, Context/Setting, Quality, Duration, Modifying Factors, Severity.)   Magdalena Avila is a 50 y.o. female who presents to the emergency department for evaluation of bilateral ear pain x several days. Pt states pain is worse in the left ear. Reports pain around ear radiating down side of neck. Pt reports pain and brown drainage from right ear. She does have tympanostomy tubes in place. Reports fever of 102 several days ago. Denies cough, cp, sob, nausea, emesis, abd pain. Pt does seen ENT. Has hx of mastoiditis. No other complaints. Nursing Notes were reviewed. REVIEW OF SYSTEMS    (2-9 systems for level 4, 10 or more for level 5)     Review of Systems   Ear pain  Ear drainage  Neck pain     Except as noted above the remainder of the review of systems was reviewed and negative.        PAST MEDICAL HISTORY     Past Medical History:   Diagnosis Date    Acute exacerbation of chronic obstructive pulmonary disease ([address] 3/21/2018    Adhesive capsulitis of left shoulder 9/25/2018    Asthma     Asthma exacerbation 7/24/2014    Atrial fibrillation ([address]     placed on event monitor 9/25/18 for PVC's & A-fib    Atrial premature depolarization 7/19/2019    Bipolar 1 disorder (HCC)     Bipolar disorder ([address] 7/19/2019    Bulging disc     CAD (coronary artery disease)     Candida infection 2/23/2018    Cardiomyopathy ([address]     Chest pain 6/13/2017    CHF (congestive heart failure) (HCC)     Chronic otitis media of both ears     rt>lt    Chronic right shoulder pain 3/14/2017    Cigarette nicotine dependence with nicotine-induced disorder 7/19/2019    Class 3 severe obesity due to excess calories with serious comorbidity and body mass index (BMI) of 40.0 to 44.9 in adult [facility]) 10/4/2018    Closed fracture of left ankle 6/25/2019    Clostridium difficile infection     COPD (chronic obstructive pulmonary disease) (HCC)     Cough, persistent 3/21/2018    Current moderate episode of major depressive disorder without prior episode ([address] 8/20/2018    Depression     Diabetes mellitus type 2, controlled ([address] 4/30/2014    Diarrhea     Diarrhea     Dizziness     DVT (deep venous thrombosis) (HCC)     after PICC line right arm    Encounter for monitoring sotalol therapy 2/20/2017    Encounter for screening mammogram for malignant neoplasm of breast 3/7/2018    Endometriosis     Fainting     GERD (gastroesophageal reflux disease)     hx of    GI bleeding     H. pylori infection     Helicobacter pylori (H. pylori)     [place] (hard of hearing)     both ears, no hearing aids    HTN (hypertension) 7/19/2019    Hyperlipidemia     Hypertension     Left bicipital tenosynovitis 10/17/2018    Left leg pain 5/16/2017    Left sided abdominal pain of unknown cause 7/19/2016    Leg swelling 9/21/2018    Mastoiditis     Mastoiditis, acute 4/30/2014    Migraines     Morbid obesity ([address]     Myocardial infarction ([address]     2005    Nausea     Neuropathy     On home oxygen therapy     3 L at night    Ovarian cyst     Passed out     hx of- negative tilt table    Pulmonary hypertension (HCC)     Pulmonary insufficiency     PVC (premature ventricular contraction)     Seasonal allergies     Tricuspid insufficiency     Type II or unspecified type diabetes mellitus without mention of complication, not stated as uncontrolled      None otherwise stated in nurses notes    SURGICAL HISTORY       Past Surgical History:   Procedure Laterality Date    ABDOMEN SURGERY      abcess    ABDOMINAL ADHESION SURGERY      ABDOMINAL EXPLORATION SURGERY      x 4    ABDOMINAL HERNIA REPAIR      with mesh    ABLATION OF DYSRHYTHMIC FOCUS  2016    ABLATION OF DYSRHYTHMIC FOCUS  09/08/2017    Done at the [facility]. ABSCESS DRAINAGE      left hip and chest    APPENDECTOMY      BREAST SURGERY Left 2007    I & D    BRONCHOSCOPY N/A 5/23/2022    BRONCHOSCOPY performed by Nurys Carroll MD at [address] [place] [address]  2014 & 2000     no stenting    CARPAL TUNNEL RELEASE Right 12/19/2019    Ulnar nerve decompression, right elbow. Release of 1st and 2nd extensor compartments, right forearm. CARPAL TUNNEL RELEASE  05/04/2020    Carpal tunnel release, left hand    CHOLECYSTECTOMY      COLONOSCOPY      FOOT SURGERY Left     bone fragment removed    FRACTURE SURGERY Right     closed reduction perc pinning ring & middle finger    GASTRIC FUNDOPLICATION  6258    HYSTERECTOMY (CERVIX STATUS UNKNOWN)      total    HYSTERECTOMY (CERVIX STATUS UNKNOWN)      HYSTEROSCOPY      tubal perfusion    MYRINGOTOMY Right 11/13/2015    Right myringotomy with placement of  T-tube on the right side. Removal of plugged ventilating tube, right side. MYRINGOTOMY Left 07/14/2020    MYRINGOTOMY TUBE INSERTION performed [facility]  Right 06/05/2014    OTHER SURGICAL HISTORY Right 05/29/2014    removal ear tube rt ear    OTHER SURGICAL HISTORY  2009    LOOP recorder inserted and removed 3 mos.  later    OTHER SURGICAL HISTORY Right 08/11/2016    ear tube removal with patch    OTHER SURGICAL HISTORY      tubal perfusion, lysis of uterine adhesions    OTHER SURGICAL HISTORY      multiple PICC lines inserted and removed, it has affected circulation bilateral upper arms    OTHER SURGICAL HISTORY  10/19/2020    Revisiion to subcutaneous transposition of unlar nerve, right elbow    OTHER SURGICAL HISTORY Right 06/14/2021    REVISION TO SUBMUSCULAR TRANSPOSITION OF RIGHT ULNAR NERVE    OTHER SURGICAL HISTORY Left 03/24/2022    DECOMPRESSION OF ULNAR NERVE, LEFT ELBOW    BECKY ANAYA ANESTHESIA Right 03/01/2017    SHOULDER MANIPULATION RIGHT performed by Isaiah Elder MD at [facility] Left 10/17/2018    SHOULDER ARTHROSCOPY W/BICEPS TENDONESIS performed by Maxine Infante MD at [address] Left     foot    TONSILLECTOMY      TYMPANOSTOMY TUBE PLACEMENT      TYMPANOSTOMY TUBE PLACEMENT Left 03/12/2019    TYMPANOSTOMY TUBE PLACEMENT Right 12/08/2021    Right tympanostomy with tube placement of T-tube with operative microscope and general anesthesia. Right removal of right ear tube. ULNAR TUNNEL RELEASE Right     UPPER GASTROINTESTINAL ENDOSCOPY      UPPER GASTROINTESTINAL ENDOSCOPY  07/10/2019    UPPER GASTROINTESTINAL ENDOSCOPY N/A 07/10/2019    EGD BIOPSY performed by Susanna Burks MD at [address] Right 04/21/2022    Placement of right sided ventriculoperitoneal shunt Medtronic programmable valve set at 1.5.      None otherwise stated in nurses notes    CURRENT MEDICATIONS       Discharge Medication List as of 11/1/2022  3:27 PM        CONTINUE these medications which have NOT CHANGED    Details   QUEtiapine (SEROQUEL) 100 MG tablet TAKE 2 AND 1/2 TABLETS BY MOUTH AT BEDTIME, Disp-75 tablet, R-11Normal      cetirizine (ZYRTEC) 10 MG tablet TAKE 1 TABLET BY MOUTH EVERY DAY IN THE MORNING, Disp-30 tablet, R-11Normal      apixaban (ELIQUIS) 5 MG TABS tablet Take 1 tablet by mouth 2 times daily, Disp-60 tablet, R-1Normal      dextromethorphan-guaiFENesin (ROBITUSSIN-DM)  MG/5ML syrup Take 5 mLs by mouth every 4 hours as needed for Cough, Disp-1 each, R-1Normal      bumetanide (BUMEX) 2 MG tablet TAKE 1 TABLET BY MOUTH EVERY DAY, Disp-30 tablet, R-11Normal      isosorbide mononitrate (IMDUR) 30 MG extended release tablet TAKE 1 TABLET BY MOUTH EVERY DAY, Disp-30 tablet, R-11Normal      budesonide (PULMICORT) 0.5 MG/2ML nebulizer suspension Take 2 mLs by nebulization 2 times daily, Disp-60 each, R-11Normal colchicine (COLCRYS) 0.6 MG tablet Take 1 tablet by mouth 2 times daily for 46 doses, Disp-46 tablet, R-0Normal      dornase alpha (PULMOZYME) 2.5 MG/2.5ML nebulizer solution Inhale 2.5 mg into the lungs 2 times daily, Disp-75 mL, R-11Normal      VILAZODONE HCL 20 MG Tablet TAKE 1 TABLET BY MOUTH EVERY DAY, Disp-30 tablet, R-11Normal      DULoxetine (CYMBALTA) 60 MG extended release capsule TAKE 1 CAPSULE BY MOUTH 2 TIMES PER DAY, Disp-180 capsule, R-2Normal      Insulin Degludec (TRESIBA FLEXTOUCH) 200 UNIT/ML SOPN Inject 60 Units into the skin in the morning and at bedtime If po intake poor, decrease to 20 units daily, Disp-12 pen, R-11NO PRINT      Insulin Pen Needle (B-D ULTRAFINE III SHORT PEN) 31G X 8 MM MISC DAILY Starting u 4/14/2022, Disp-100 each, R-11, NormalMay substitute with any brand      blood glucose test strips (ONETOUCH ULTRA) strip USE TO TEST BLOOD SUGAR BEFORE MEALS, AT BEDTIME, AND AS NEEDED (up to 9 TIMES DAILY), Disp-200 strip, R-11Normal      clopidogrel (PLAVIX) 75 MG tablet Take 75 mg by mouth daily nightlyHistorical Med      pantoprazole (PROTONIX) 40 MG tablet Take 40 mg by mouth daily BIDHistorical Med      atorvastatin (LIPITOR) 80 MG tablet TAKE 1 TABLET BY MOUTH NIGHTLY, Disp-90 tablet, R-3Normal      albuterol sulfate HFA (VENTOLIN HFA) 108 (90 Base) MCG/ACT inhaler INHALE 2 PUFFS INTO LUNGS EVERY 6 HOURS AS NEEDED FOR WHEEZING, Disp-18 g, R-11May sub covered productNormal      insulin lispro, 1 Unit Dial, (HUMALOG KWIKPEN) 100 UNIT/ML SOPN INJECT SUBCUTANEOUSLY PER SLIDING SCALE, 150-200 INJECT 3U, 201-250 INJECT 6U, 251-300 INJECT 9U, >300 INJECT 12U, MAX 30U/DAY, Disp-5 pen, R-11Normal      ipratropium-albuterol (DUONEB) 0.5-2.5 (3) MG/3ML SOLN nebulizer solution INHALE 3 MLS (ONE VIAL) WITH NEBULIZER EVERY 6 HOURS AS NEEDED FOR SHORTNESS OF BREATH, Disp-360 each, R-3Normal      clotrimazole (LOTRIMIN) 1 % cream Apply topically 2 times daily. , Disp-45 g, R-11, Normal magnesium oxide (MAG-OX) 400 MG tablet Take 400 mg by mouth daily AfternoonHistorical Med      oxyCODONE-acetaminophen (PERCOCET) 5-325 MG per tablet Take 1 tablet by mouth every 4 hours as needed for Pain.  Indications: last fill 2/27/22 with quantity 170 for 30 days One tablet at 0700, one tablet at 1900, one tablet in the afternoon prn painHistorical Med      Multiple Vitamins-Minerals (MULTIVITAMIN GUMMIES WOMENS) CHEW Take 2 each by mouth daily Historical Med      carvedilol (COREG) 12.5 MG tablet Take 12.5 mg by mouth 2 times daily (with meals) Historical Med             ALLERGIES     Latex, Aspirin, Avelox [moxifloxacin], Chantix [varenicline], Doxycycline, Dye [iodides], Flexeril [cyclobenzaprine], Gabapentin, Losartan, Morphine, Nsaids, Pcn [penicillins], Reglan [metoclopramide hcl], Shellfish-derived products, Sulfa antibiotics, Toradol [ketorolac tromethamine], Vancomycin, Zofran, Zyvox [linezolid], Acyclovir, Bactrim [sulfamethoxazole-trimethoprim], Betadine [povidone iodine], Ceclor [cefaclor], Codeine, Macrolides and ketolides, Novolin r [insulin], Novolog [insulin aspart], Phenothiazines, Tape [adhesive tape], Banana, Compazine [prochlorperazine], Fentanyl, Kiwi extract, Tamiflu [oseltamivir phosphate], Clindamycin/lincomycin, and Sotalol    FAMILY HISTORY           Problem Relation Age of Onset    Ulcerative Colitis Father     Liver Disease Father 61        hep c and b    Diabetes Father     Asthma Father     Heart Disease Father     High Blood Pressure Father     Breast Cancer Maternal Aunt     Cancer Maternal Aunt     Diabetes Maternal Aunt     Heart Disease Maternal Aunt     High Blood Pressure Maternal Aunt     Breast Cancer Paternal Aunt     Heart Disease Paternal Aunt     High Blood Pressure Paternal Aunt     Breast Cancer Maternal Grandmother     Cervical Cancer Maternal Grandmother     Cancer Maternal Grandmother     Diabetes Maternal Grandmother     Asthma Maternal Grandmother     Heart Disease Maternal Grandmother     High Blood Pressure Maternal Grandmother     Lung Cancer Maternal Grandfather     Diabetes Maternal Grandfather     Asthma Maternal Grandfather     Heart Disease Maternal Grandfather     High Blood Pressure Maternal Grandfather     Cervical Cancer Paternal Grandmother     Cancer Paternal Grandmother     Diabetes Paternal Grandmother     Heart Disease Paternal Grandmother     High Blood Pressure Paternal Grandmother     Diabetes Mother     Asthma Mother     Heart Disease Mother     High Blood Pressure Mother     Cancer Sister     Heart Disease Maternal Uncle     High Blood Pressure Maternal Uncle     Heart Disease Paternal Uncle     High Blood Pressure Paternal Uncle     Diabetes Paternal Grandfather     Heart Disease Paternal Grandfather     High Blood Pressure Paternal Grandfather      Family Status   Relation Name Status    Father  (Not Specified)    MAunt  (Not Specified)    PAunt  (Not Specified)    MGM  (Not Specified)    MGF  (Not Specified)    PGM  (Not Specified)    Mother  (Not Specified)    Sister  (Not Specified)    MUnc  (Not Specified)    PUnc  (Not Specified)    PGF  (Not Specified)      None otherwise stated in nurses notes    SOCIAL HISTORY      reports that she has been smoking cigarettes. She has a 11.50 pack-year smoking history. She has never used smokeless tobacco. She reports that she does not drink alcohol and does not use drugs. lives at home with others     PHYSICAL EXAM    (up to 7 for level 4, 8 or more for level 5)     ED Triage Vitals [11/01/22 1257]   BP Temp Temp Source Heart Rate Resp SpO2 Height Weight   134/71 98.9 °F (37.2 °C) Oral 88 18 97 % 5' 4\" (1.626 m) 280 lb (127 kg)       Physical Exam  Vitals reviewed. Constitutional:       Appearance: Normal appearance. She is obese. HENT:      Head: Normocephalic and atraumatic. Right Ear: Ear canal normal. No decreased hearing noted. Tenderness present. No drainage or swelling.  No middle ear effusion. There is no impacted cerumen. There is mastoid tenderness. A PE tube is present. Tympanic membrane is not erythematous. Left Ear: Ear canal and external ear normal. No decreased hearing noted. Tenderness present. No drainage or swelling. No middle ear effusion. There is no impacted cerumen. No mastoid tenderness. A PE tube is present. Tympanic membrane is erythematous. Ears:      Comments: Tenderness over the left mastoid. No erythema, swelling or tenderness. No drainage. Nose: Nose normal.   Eyes:      Pupils: Pupils are equal, round, and reactive to light. Cardiovascular:      Rate and Rhythm: Normal rate and regular rhythm. Pulses: Normal pulses. Heart sounds: Normal heart sounds. Pulmonary:      Effort: Pulmonary effort is normal.      Breath sounds: Normal breath sounds. Abdominal:      General: Abdomen is flat. Tenderness: There is no abdominal tenderness. Skin:     General: Skin is warm. Capillary Refill: Capillary refill takes less than 2 seconds. Neurological:      General: No focal deficit present. Mental Status: She is alert and oriented to person, place, and time. Mental status is at baseline. Psychiatric:         Behavior: Behavior is cooperative. DIAGNOSTIC RESULTS     EKG: All EKG's are interpreted by the Emergency Department Physician who either signs or Co-signs this chart in the absence of a cardiologist.        RADIOLOGY:   All plain film, CT, MRI, and formal ultrasound images (except ED bedside ultrasound) are read by the radiologist, see reports below, unless otherwise noted in MDM or here. CT IAC POSTERIOR FOSSA WO CONTRAST   Final Result   Unremarkable appearance of the temporal bones without acute abnormality.              CT IAC POSTERIOR FOSSA WO CONTRAST    Result Date: 11/1/2022  EXAMINATION: CT OF THE INTERNAL AUDITORY CANAL WITHOUT CONTRAST 11/1/2022 2:24 pm: TECHNIQUE: CT of the internal auditory canal was performed without contrast was performed without the administration of intravenous contrast. Multiplanar reformatted images are provided for review. Automated exposure control, iterative reconstruction, and/or weight based adjustment of the mA/kV was utilized to reduce the radiation dose to as low as reasonably achievable. COMPARISON: Facial CT performed 03/08/2022. HISTORY: ORDERING SYSTEM PROVIDED HISTORY: tenderness over left mastoid TECHNOLOGIST PROVIDED HISTORY: tenderness over left mastoid Decision Support Exception - unselect if not a suspected or confirmed emergency medical condition->Emergency Medical Condition (MA) Is the patient pregnant?->No Reason for Exam: tenderness over left mastoid Additional signs and symptoms: Pt c/o left ear pain and pressure. Hx of chronic ear infections FINDINGS: RIGHT TEMPORAL BONE:  The external auditory canal is clear without evidence of bony erosion. The scutum is intact. The middle ear cavity is clear. The ossicular chain is intact. The mastoid air cells are clear. The inner ear structures appear unremarkable. Normal mineralization of the otic capsule. The internal auditory canal and vestibular aqueduct appear unremarkable. The carotid canal is normal in appearance. The jugular bulb is unremarkable. LEFT TEMPORAL BONE: The external auditory canal is clear without evidence of bony erosion. The scutum is intact. The middle ear cavity is clear. The ossicular chain is intact. The mastoid air cells are clear. The inner ear structures appear unremarkable. Normal mineralization of the otic capsule. The internal auditory canal and vestibular aqueduct appear unremarkable. The carotid canal is normal in appearance. The jugular bulb is unremarkable. BRAIN: The visualized portion of the intracranial contents appear unremarkable. ORBITS: The visualized portion of the orbits demonstrate no acute abnormality. SINUSES: The visualized paranasal sinuses are clear. Unremarkable appearance of the temporal bones without acute abnormality. LABS:  Labs Reviewed - No data to display    All other labs were within normal range or not returned as of this dictation. EMERGENCY DEPARTMENT COURSE and DIFFERENTIAL DIAGNOSIS/MDM:   Vitals:    Vitals:    11/01/22 1257   BP: 134/71   Pulse: 88   Resp: 18   Temp: 98.9 °F (37.2 °C)   TempSrc: Oral   SpO2: 97%   Weight: 280 lb (127 kg)   Height: 5' 4\" (1.626 m)         Patient instructed to return to the emergency room if symptoms worsen, return, or any other concern right away which is agreed by the patient    ED MEDS:  Orders Placed This Encounter   Medications    azithromycin (ZITHROMAX Z-FREDDY) 250 MG tablet     Sig: Take 2 tablets (500 mg) on Day 1, and then take 1 tablet (250 mg) on days 2 through 5. Dispense:  1 packet     Refill:  0         CONSULTS:  None    PROCEDURES:  None      FINAL IMPRESSION      1. Otalgia of both ears    2. Non-recurrent acute suppurative otitis media of right ear without spontaneous rupture of tympanic membrane          DISPOSITION/PLAN   DISPOSITION Decision To Discharge    PATIENT REFERRED TO:  Chad Henson, [address] [facility]  [address] [facility] [address]  [address] [place] [id number]  [phone]        DISCHARGE MEDICATIONS:  Discharge Medication List as of 11/1/2022  3:27 PM        START taking these medications    Details   azithromycin (ZITHROMAX Z-FREDDY) 250 MG tablet Take 2 tablets (500 mg) on Day 1, and then take 1 tablet (250 mg) on days 2 through 5., Disp-1 packet, R-0Normal               Summation      Patient Course:    Bilateral ear pain. Tympanostomy tubes in place. Pain is worse on left. Tenderness over the  mastoid. No erythema, swelling, redness, fluctuance or induration. Right TM is erythematous. No drainage.      Due to mastoid tenderness and hx of mastoiditis ct scan ordered Which was unremarkable. Will start on abx. Due to allergies has to start on zpak. Recommend follow up with ENT>   Discussed results and plan with the pt. They expressed appropriate understanding. Pt given close follow up, supportive care instructions and strict return instructions at the bedside. The care is provided during an unprecedented national emergency due to the novel coronavirus, COVID-19. ED Medications administered this visit:  Medications - No data to display    New Prescriptions from this visit:    Discharge Medication List as of 11/1/2022  3:27 PM        START taking these medications    Details   azithromycin (ZITHROMAX Z-FREDDY) 250 MG tablet Take 2 tablets (500 mg) on Day 1, and then take 1 tablet (250 mg) on days 2 through 5., Disp-1 packet, R-0Normal             Follow-up:  Victoria Miguel MD  [address] [address]  [address] [place] [address]  [phone]          Final Impression:   1. Otalgia of both ears    2.  Non-recurrent acute suppurative otitis media of right ear without spontaneous rupture of tympanic membrane               (Please note that portions of this note were completed with a voice recognition program )        TREVER Balbuena PA-C  11/01/22 2634

## 2022-11-01 NOTE — ED PROVIDER NOTES
eMERGENCY dEPARTMENT eNCOUnter   Independent Attestation     Pt Name: Usama Weaver  MRN: [id number]  [place] 1975  Date of evaluation: 11/1/22     Usama Weaver is a 50 y.o. female with CC: Otalgia, Neck Pain, and Ear Drainage      Based on the medical record the care appears appropriate. I was personally available for consultation in the Emergency Department. The care is provided during an unprecedented national emergency due to the novel coronavirus, COVID 19.     Ghanshyam Brambila DO  Attending Emergency Physician                 Ghanshyam Brambila DO  11/01/22 2032

## 2022-12-19 DIAGNOSIS — M79.671 RIGHT FOOT PAIN: Primary | ICD-10-CM

## 2023-01-09 DIAGNOSIS — M79.671 RIGHT FOOT PAIN: Primary | ICD-10-CM

## 2023-01-19 ENCOUNTER — HOSPITAL ENCOUNTER (OUTPATIENT)
Age: 48
Setting detail: OBSERVATION
Discharge: LEFT BEFORE DISCHARGE | End: 2023-01-20
Attending: EMERGENCY MEDICINE | Admitting: FAMILY MEDICINE
Payer: COMMERCIAL

## 2023-01-19 ENCOUNTER — APPOINTMENT (OUTPATIENT)
Dept: CT IMAGING | Age: 48
End: 2023-01-19
Payer: COMMERCIAL

## 2023-01-19 ENCOUNTER — APPOINTMENT (OUTPATIENT)
Dept: GENERAL RADIOLOGY | Age: 48
End: 2023-01-19
Payer: COMMERCIAL

## 2023-01-19 VITALS
DIASTOLIC BLOOD PRESSURE: 60 MMHG | HEIGHT: 64 IN | SYSTOLIC BLOOD PRESSURE: 153 MMHG | TEMPERATURE: 99.2 F | OXYGEN SATURATION: 94 % | WEIGHT: 270 LBS | BODY MASS INDEX: 46.1 KG/M2 | RESPIRATION RATE: 18 BRPM | HEART RATE: 90 BPM

## 2023-01-19 DIAGNOSIS — R07.9 CHEST PAIN, UNSPECIFIED TYPE: Primary | ICD-10-CM

## 2023-01-19 LAB
ABSOLUTE EOS #: 0.2 K/UL (ref 0–0.4)
ABSOLUTE LYMPH #: 3.5 K/UL (ref 1–4.8)
ABSOLUTE MONO #: 0.7 K/UL (ref 0.1–1.3)
ALBUMIN SERPL-MCNC: 3.9 G/DL (ref 3.5–5.2)
ALP BLD-CCNC: 123 U/L (ref 35–104)
ALT SERPL-CCNC: 18 U/L (ref 5–33)
ANION GAP SERPL CALCULATED.3IONS-SCNC: 11 MMOL/L (ref 9–17)
AST SERPL-CCNC: 19 U/L
BASOPHILS # BLD: 1 % (ref 0–2)
BASOPHILS ABSOLUTE: 0.1 K/UL (ref 0–0.2)
BILIRUB SERPL-MCNC: 0.4 MG/DL (ref 0.3–1.2)
BUN BLDV-MCNC: 15 MG/DL (ref 6–20)
CALCIUM SERPL-MCNC: 9.5 MG/DL (ref 8.6–10.4)
CHLORIDE BLD-SCNC: 97 MMOL/L (ref 98–107)
CO2: 30 MMOL/L (ref 20–31)
CREAT SERPL-MCNC: 0.68 MG/DL (ref 0.5–0.9)
D-DIMER QUANTITATIVE: <0.27 MG/L FEU (ref 0–0.59)
EOSINOPHILS RELATIVE PERCENT: 2 % (ref 0–4)
GFR SERPL CREATININE-BSD FRML MDRD: >60 ML/MIN/1.73M2
GLUCOSE BLD-MCNC: 341 MG/DL (ref 70–99)
HCT VFR BLD CALC: 47 % (ref 36–46)
HEMOGLOBIN: 15.5 G/DL (ref 12–16)
LIPASE: 13 U/L (ref 13–60)
LYMPHOCYTES # BLD: 31 % (ref 24–44)
MCH RBC QN AUTO: 33.3 PG (ref 26–34)
MCHC RBC AUTO-ENTMCNC: 33 G/DL (ref 31–37)
MCV RBC AUTO: 100.8 FL (ref 80–100)
MONOCYTES # BLD: 6 % (ref 1–7)
PDW BLD-RTO: 13.4 % (ref 11.5–14.9)
PLATELET # BLD: 258 K/UL (ref 150–450)
PMV BLD AUTO: 8.8 FL (ref 6–12)
POTASSIUM SERPL-SCNC: 4.4 MMOL/L (ref 3.7–5.3)
PRO-BNP: 74 PG/ML
RBC # BLD: 4.66 M/UL (ref 4–5.2)
SEG NEUTROPHILS: 60 % (ref 36–66)
SEGMENTED NEUTROPHILS ABSOLUTE COUNT: 6.9 K/UL (ref 1.3–9.1)
SODIUM BLD-SCNC: 138 MMOL/L (ref 135–144)
TOTAL PROTEIN: 7.3 G/DL (ref 6.4–8.3)
TROPONIN, HIGH SENSITIVITY: 13 NG/L (ref 0–14)
TSH SERPL DL<=0.05 MIU/L-ACNC: 0.98 UIU/ML (ref 0.3–5)
WBC # BLD: 11.4 K/UL (ref 3.5–11)

## 2023-01-19 PROCEDURE — 74176 CT ABD & PELVIS W/O CONTRAST: CPT

## 2023-01-19 PROCEDURE — 84443 ASSAY THYROID STIM HORMONE: CPT

## 2023-01-19 PROCEDURE — 85379 FIBRIN DEGRADATION QUANT: CPT

## 2023-01-19 PROCEDURE — 80053 COMPREHEN METABOLIC PANEL: CPT

## 2023-01-19 PROCEDURE — 71045 X-RAY EXAM CHEST 1 VIEW: CPT

## 2023-01-19 PROCEDURE — 6360000002 HC RX W HCPCS: Performed by: STUDENT IN AN ORGANIZED HEALTH CARE EDUCATION/TRAINING PROGRAM

## 2023-01-19 PROCEDURE — 96374 THER/PROPH/DIAG INJ IV PUSH: CPT

## 2023-01-19 PROCEDURE — 96376 TX/PRO/DX INJ SAME DRUG ADON: CPT

## 2023-01-19 PROCEDURE — 99285 EMERGENCY DEPT VISIT HI MDM: CPT

## 2023-01-19 PROCEDURE — 83880 ASSAY OF NATRIURETIC PEPTIDE: CPT

## 2023-01-19 PROCEDURE — 36415 COLL VENOUS BLD VENIPUNCTURE: CPT

## 2023-01-19 PROCEDURE — 83690 ASSAY OF LIPASE: CPT

## 2023-01-19 PROCEDURE — 84484 ASSAY OF TROPONIN QUANT: CPT

## 2023-01-19 PROCEDURE — 85025 COMPLETE CBC W/AUTO DIFF WBC: CPT

## 2023-01-19 PROCEDURE — 93005 ELECTROCARDIOGRAM TRACING: CPT | Performed by: STUDENT IN AN ORGANIZED HEALTH CARE EDUCATION/TRAINING PROGRAM

## 2023-01-19 RX ADMIN — HYDROMORPHONE HYDROCHLORIDE 0.5 MG: 1 INJECTION, SOLUTION INTRAMUSCULAR; INTRAVENOUS; SUBCUTANEOUS at 19:53

## 2023-01-19 RX ADMIN — HYDROMORPHONE HYDROCHLORIDE 1 MG: 1 INJECTION, SOLUTION INTRAMUSCULAR; INTRAVENOUS; SUBCUTANEOUS at 23:17

## 2023-01-19 ASSESSMENT — ENCOUNTER SYMPTOMS
CONSTIPATION: 0
SHORTNESS OF BREATH: 1
NAUSEA: 1
COUGH: 0
ABDOMINAL PAIN: 1
RHINORRHEA: 0
CHEST TIGHTNESS: 1
DIARRHEA: 1
BLOOD IN STOOL: 0
BACK PAIN: 0
VOMITING: 1

## 2023-01-19 ASSESSMENT — PAIN SCALES - GENERAL
PAINLEVEL_OUTOF10: 10

## 2023-01-19 ASSESSMENT — PAIN DESCRIPTION - LOCATION
LOCATION: CHEST
LOCATION: CHEST;ABDOMEN

## 2023-01-19 ASSESSMENT — PAIN - FUNCTIONAL ASSESSMENT: PAIN_FUNCTIONAL_ASSESSMENT: 0-10

## 2023-01-19 ASSESSMENT — LIFESTYLE VARIABLES
HOW MANY STANDARD DRINKS CONTAINING ALCOHOL DO YOU HAVE ON A TYPICAL DAY: PATIENT DOES NOT DRINK
HOW OFTEN DO YOU HAVE A DRINK CONTAINING ALCOHOL: NEVER

## 2023-01-19 NOTE — ED PROVIDER NOTES
[address]  Emergency Department Encounter  EmergencyMedicine Resident     Pt Dov Juarez  MRN: [id number]  [place] 1975  Date of evaluation: 1/19/23  PCP:  Anne Marie Soto MD    This patient was evaluated in the Emergency Department for symptoms described in the history of present illness. CHIEF COMPLAINT       Chief Complaint   Patient presents with    Chest Pain       HISTORY OF PRESENT ILLNESS  (Location/Symptom, Timing/Onset, Context/Setting, Quality, Duration, Modifying Factors, Severity.)      Argelia James is a 50 y.o. female with history of DVT/PE not on blood thinners, diabetes, hypertension, hyperlipidemia who presents with chest pain, shortness of breath, leg swelling. Patient states that for the past several days she has been experiencing chest pain. Describes it as left-sided and radiates to her left shoulder and left shoulder blade. States that she has experienced this in the past.  States that the pain is worse when she takes a deep breath in.  Pain is not associated with rest or exertion. Patient endorsing feeling nauseous. No vomiting. Patient does have a history of Nissen fundoplication. Patient also endorsing bilateral lower extremity swelling, worse on the right. Endorsing right posterior calf pain. Denies fever/chills, recent illnesses. Endorsing diffuse abdominal pain.     PAST MEDICAL / SURGICAL / SOCIAL / FAMILY HISTORY      has a past medical history of Acute exacerbation of chronic obstructive pulmonary disease (HCC), Adhesive capsulitis of left shoulder, Asthma, Asthma exacerbation, Atrial fibrillation (HCC), Atrial premature depolarization, Bipolar 1 disorder ([address] Bipolar disorder ([address] Bulging disc, CAD (coronary artery disease), Candida infection, Cardiomyopathy ([address] Chest pain, CHF (congestive heart failure) ([address] Chronic otitis media of both ears, Chronic right shoulder pain, Cigarette nicotine dependence with nicotine-induced disorder, Class 3 severe obesity due to excess calories with serious comorbidity and body mass index (BMI) of 40.0 to 44.9 in adult [facility]), Closed fracture of left ankle, Clostridium difficile infection, COPD (chronic obstructive pulmonary disease) (HCC), Cough, persistent, Current moderate episode of major depressive disorder without prior episode ([address] Depression, Diabetes mellitus type 2, controlled ([address] Diarrhea, Diarrhea, Dizziness, DVT (deep venous thrombosis) ([address] Encounter for monitoring sotalol therapy, Encounter for screening mammogram for malignant neoplasm of breast, Endometriosis, Fainting, GERD (gastroesophageal reflux disease), GI bleeding, H. pylori infection, Helicobacter pylori (H. pylori), [place] (hard of hearing), HTN (hypertension), Hyperlipidemia, Hypertension, Left bicipital tenosynovitis, Left leg pain, Left sided abdominal pain of unknown cause, Leg swelling, Mastoiditis, Mastoiditis, acute, Migraines, Morbid obesity ([address] Myocardial infarction ([address] Nausea, Neuropathy, On home oxygen therapy, Ovarian cyst, Passed out, Pulmonary hypertension ([address] Pulmonary insufficiency, PVC (premature ventricular contraction), Seasonal allergies, Tricuspid insufficiency, and Type II or unspecified type diabetes mellitus without mention of complication, not stated as uncontrolled. has a past surgical history that includes Hysterectomy; Cholecystectomy; Appendectomy; Colonoscopy; Upper gastrointestinal endoscopy; Abdomen surgery; Abdominal adhesion surgery; Abdominal exploration surgery; Tympanostomy tube placement; Tonsillectomy; Foot surgery (Left); Abdominal hernia repair; hysteroscopy; Gastric fundoplication (2367); Abscess Drainage; Scaphoid fracture surgery (Left); other surgical history (Right, 05/29/2014); Myringotomy Tympanostomy Tube Placement (Right, 06/05/2014); myringotomy (Right, 11/13/2015); Hysterectomy; Cardiac catheterization (2014 & 2000); other surgical history (2009);  Breast surgery (Left, 2007); fracture surgery (Right); other surgical history (Right, 08/11/2016); ablation of dysrhythmic focus (2016); other surgical history; other surgical history; pr mnpj w/anes shoulder jt appl fixation apparatus (Right, 03/01/2017); ablation of dysrhythmic focus (09/08/2017); pr surgical arthroscopy torie w/coracoacrm ligm rls (Left, 10/17/2018); Tympanostomy tube placement (Left, 03/12/2019); Upper gastrointestinal endoscopy (07/10/2019); Upper gastrointestinal endoscopy (N/A, 07/10/2019); Carpal tunnel release (Right, 12/19/2019); Carpal tunnel release (05/04/2020); Ulnar tunnel release (Right); myringotomy (Left, 07/14/2020); other surgical history (10/19/2020); other surgical history (Right, 06/14/2021); Tympanostomy tube placement (Right, 12/08/2021); other surgical history (Left, 03/24/2022); Ventriculoperitoneal shunt (Right, 04/21/2022); and bronchoscopy (N/A, 5/23/2022).     Social History     Socioeconomic History    Marital status: Single     Spouse name: Not on file    Number of children: Not on file    Years of education: Not on file    Highest education level: Not on file   Occupational History     Employer: NONE   Tobacco Use    Smoking status: Every Day     Packs/day: 0.50     Years: 23.00     Pack years: 11.50     Types: Cigarettes    Smokeless tobacco: Never   Vaping Use    Vaping Use: Never used   Substance and Sexual Activity    Alcohol use: No     Alcohol/week: 0.0 standard drinks    Drug use: No    Sexual activity: Not on file   Other Topics Concern    Not on file   Social History Narrative    Not on file     Social Determinants of Health     Financial Resource Strain: Not on file   Food Insecurity: Not on file   Transportation Needs: Not on file   Physical Activity: Not on file   Stress: Not on file   Social Connections: Not on file   Intimate Partner Violence: Not on file   Housing Stability: Not on file       Family History   Problem Relation Age of Onset    Ulcerative Colitis Father     Liver Disease Father 61        hep c and b    Diabetes Father     Asthma Father     Heart Disease Father     High Blood Pressure Father     Breast Cancer Maternal Aunt     Cancer Maternal Aunt     Diabetes Maternal Aunt     Heart Disease Maternal Aunt     High Blood Pressure Maternal Aunt     Breast Cancer Paternal Aunt     Heart Disease Paternal Aunt     High Blood Pressure Paternal Aunt     Breast Cancer Maternal Grandmother     Cervical Cancer Maternal Grandmother     Cancer Maternal Grandmother     Diabetes Maternal Grandmother     Asthma Maternal Grandmother     Heart Disease Maternal Grandmother     High Blood Pressure Maternal Grandmother     Lung Cancer Maternal Grandfather     Diabetes Maternal Grandfather     Asthma Maternal Grandfather     Heart Disease Maternal Grandfather     High Blood Pressure Maternal Grandfather     Cervical Cancer Paternal Grandmother     Cancer Paternal Grandmother     Diabetes Paternal Grandmother     Heart Disease Paternal Grandmother     High Blood Pressure Paternal Grandmother     Diabetes Mother     Asthma Mother     Heart Disease Mother     High Blood Pressure Mother     Cancer Sister     Heart Disease Maternal Uncle     High Blood Pressure Maternal Uncle     Heart Disease Paternal Uncle     High Blood Pressure Paternal Uncle     Diabetes Paternal Grandfather     Heart Disease Paternal Grandfather     High Blood Pressure Paternal Grandfather        Allergies:    Latex, Aspirin, Avelox [moxifloxacin], Chantix [varenicline], Doxycycline, Dye [iodides], Flexeril [cyclobenzaprine], Gabapentin, Losartan, Morphine, Nsaids, Pcn [penicillins], Reglan [metoclopramide hcl], Shellfish-derived products, Sulfa antibiotics, Toradol [ketorolac tromethamine], Vancomycin, Zofran, Zyvox [linezolid], Acyclovir, Bactrim [sulfamethoxazole-trimethoprim], Betadine [povidone iodine], Ceclor [cefaclor], Codeine, Macrolides and ketolides, Novolin r [insulin], Novolog [insulin aspart], Phenothiazines, Tape [adhesive tape], Banana, Compazine [prochlorperazine], Fentanyl, Kiwi extract, Tamiflu [oseltamivir phosphate], Clindamycin/lincomycin, and Sotalol    Home Medications:  Prior to Admission medications    Medication Sig Start Date End Date Taking? Authorizing Provider   atorvastatin (LIPITOR) 80 MG tablet Take 1 tablet by mouth nightly 12/12/22   Luis Ventura MD   blood glucose test strips (ONETOUCH ULTRA) strip USE TO TEST BLOOD SUGAR BEFORE MEALS, AT BEDTIME, AND AS NEEDED (up to 9 TIMES DAILY) 12/12/22   Luis Ventura MD   clopidogrel (PLAVIX) 75 MG tablet Take 1 tablet by mouth daily nightly 12/12/22   Luis Ventura MD   clotrimazole (LOTRIMIN) 1 % cream Apply topically 2 times daily.  12/12/22   Luis Ventura MD   DULoxetine (CYMBALTA) 60 MG extended release capsule Take 1 capsule by mouth 2 times daily 12/12/22   Luis Ventura MD   Insulin Degludec (TRESIBA FLEXTOUCH) 200 UNIT/ML SOPN Inject 60 Units into the skin in the morning and at bedtime If po intake poor, decrease to 20 units daily 12/12/22   Luis Ventura MD   insulin lispro, 1 Unit Dial, (HUMALOG KWIKPEN) 100 UNIT/ML SOPN INJECT SUBCUTANEOUSLY PER SLIDING SCALE, 150-200 INJECT 3U, 201-250 INJECT 6U, 251-300 INJECT 9U, >300 INJECT 12U, MAX 30U/DAY 12/12/22   Luis Ventura MD   ipratropium-albuterol (DUONEB) 0.5-2.5 (3) MG/3ML SOLN nebulizer solution INHALE 3 MLS (ONE VIAL) WITH NEBULIZER EVERY 6 HOURS AS NEEDED FOR SHORTNESS OF BREATH 12/12/22   Luis Ventura MD   magnesium oxide (MAG-OX) 400 MG tablet Take 1 tablet by mouth daily Afternoon 12/12/22   Luis Ventura MD   cetirizine (ZYRTEC) 10 MG tablet Take 1 tablet by mouth daily 12/12/22   Luis Ventura MD   pantoprazole (PROTONIX) 40 MG tablet Take 1 tablet by mouth in the morning and at bedtime BID 12/12/22   Luis Ventura MD   albuterol sulfate HFA (PROVENTIL;VENTOLIN;PROAIR) 108 (90 Base) MCG/ACT inhaler INAHLE 2 PUFFS BY MOUTH INTO THE LUNGS EVERY 6 HOURS AS NEEDED FOR WHEEZING 1/16/23   Anne Marie Soto MD   insulin aspart (NOVOLOG FLEXPEN) 100 UNIT/ML injection pen INJECT SUBCUTANEOUSLY PER SLIDING SCALE, 150-200 INJECT 3U, 201-250 INJECT 6U, 251-300 INJECT 9U, >300 INJECT 12U, MAX 30U/DAY 12/15/22   Anne Marie Soto MD   QUEtiapine (SEROQUEL) 100 MG tablet TAKE 2 AND 1/2 TABLETS BY MOUTH AT BEDTIME 10/24/22   Anne Marie Soto MD   apixaban (ELIQUIS) 5 MG TABS tablet Take 1 tablet by mouth 2 times daily 9/4/22   Sharda Arzate MD   dextromethorphan-guaiFENesin (ROBITUSSIN-DM)  MG/5ML syrup Take 5 mLs by mouth every 4 hours as needed for Cough 9/4/22   Sharda Arzate MD   bumetanide (BUMEX) 2 MG tablet TAKE 1 TABLET BY MOUTH EVERY DAY 6/24/22   Anne Marie Soto MD   isosorbide mononitrate (IMDUR) 30 MG extended release tablet TAKE 1 TABLET BY MOUTH EVERY DAY 6/24/22   Anne Marie Soto MD   budesonide (PULMICORT) 0.5 MG/2ML nebulizer suspension Take 2 mLs by nebulization 2 times daily 5/26/22   Anne Marie Soto MD   colchicine (COLCRYS) 0.6 MG tablet Take 1 tablet by mouth 2 times daily for 46 doses 5/26/22 6/20/22  Anne Marie Soto MD   dornase alpha (PULMOZYME) 2.5 MG/2.5ML nebulizer solution Inhale 2.5 mg into the lungs 2 times daily 5/26/22   Anne Marie Soto MD   VILAZODONE HCL 20 MG Tablet TAKE 1 TABLET BY MOUTH EVERY DAY  Patient taking differently: New medication, hasn't started this medication yet 5/10/22   Anne Marie Soto MD   Insulin Pen Needle (B-D ULTRAFINE III SHORT PEN) 31G X 8 MM MISC Inject 1 each into the skin daily May substitute with any brand 4/14/22   Melania Gutierres, APRN - NP   oxyCODONE-acetaminophen (PERCOCET) 5-325 MG per tablet Take 1 tablet by mouth every 4 hours as needed for Pain.  Indications: last fill 2/27/22 with quantity 170 for 30 days One tablet at 0700, one tablet at 1900, one tablet in the afternoon prn pain 9/6/21 Historical Provider, MD   Multiple Vitamins-Minerals (MULTIVITAMIN GUMMIES WOMENS) CHEW Take 2 each by mouth daily     Historical Provider, MD   carvedilol (COREG) 12.5 MG tablet Take 12.5 mg by mouth 2 times daily (with meals)     Historical Provider, MD       REVIEW OF SYSTEMS    (2-9 systems for level 4, 10 or more for level 5)    Review of Systems   Constitutional:  Negative for chills and fever. HENT:  Negative for congestion and rhinorrhea. Eyes:  Negative for visual disturbance. Respiratory:  Positive for chest tightness and shortness of breath. Negative for cough. Cardiovascular:  Positive for chest pain. Gastrointestinal:  Positive for abdominal pain, diarrhea, nausea and vomiting. Negative for blood in stool and constipation. Genitourinary:  Negative for difficulty urinating, dysuria, flank pain, hematuria, vaginal bleeding and vaginal discharge. Musculoskeletal:  Negative for back pain. Skin:  Negative for wound. Neurological:  Negative for weakness, numbness and headaches. PHYSICAL EXAM   (up to 7 for level 4, 8 or more for level 5)    INITIAL VITALS:   BP (!) 153/60   Pulse 90   Temp 99.2 °F (37.3 °C) (Oral)   Resp 18   Ht 5' 4\" (1.626 m)   Wt 270 lb (122.5 kg)   SpO2 94%   BMI 46.35 kg/m²   I have reviewed the triage vital signs. Const: Well nourished, well developed, appears stated age, no acute distress, nontoxic  Eyes: PERRL, no conjunctival injection  HENT: NCAT, Neck supple without meningismus   CV: RRR, Warm, well-perfused extremities  RESP: CTAB, Unlabored respiratory effort  GI: soft, non-tender, non-distended, no masses  MSK: No gross deformities appreciated  Skin: Warm, dry. No rashes  Neuro: Alert and oriented 4, GCS 15, CNs II-XII grossly intact. Sensation and motor function of extremities grossly intact. Psych: Appropriate mood and affect.       DIFFERENTIAL  DIAGNOSIS   DDX:  ACS/MI, pneumonia, pneumothorax, pulmonary embolism, pancreatitis, hepatitis, gastroenteritis, cholecystitis, diverticulitis, appendicitis, thyroid abnormality, electrolyte disturbance, UTI, pyelonephritis     Initial MDM:  68-year-old female with history of DVT/PE not on blood thinners, diabetes, hypertension, hyperlipidemia presents with chest pain, shortness of breath, leg swelling. Patient also experiencing abdominal pain. Afebrile. Vital signs stable. Patient has anaphylactic reactions to multiple different pain Medications, nausea medications as well as contrast dye. We will get D-dimer. We will get CT abdomen pelvis without contrast.  Will get chest x-ray. Will get EKG. We will get CBC, CMP, lipase, urinalysis, troponin, BNP, TSH. Will treat pain. Will reevaluate.     PLAN (LABS / IMAGING / EKG):  Orders Placed This Encounter   Procedures    XR CHEST PORTABLE    CT ABDOMEN PELVIS WO CONTRAST Additional Contrast? None    CBC with Auto Differential    Comprehensive Metabolic Panel w/ Reflex to MG    Lipase    Urinalysis with Reflex to Culture    Troponin    Brain Natriuretic Peptide    TSH w/reflex to FT4    D-Dimer, Quantitative    Inpatient consult to Internal Medicine    EKG 12 Lead       MEDICATIONS ORDERED:  Orders Placed This Encounter   Medications    HYDROmorphone (DILAUDID) injection 0.5 mg    HYDROmorphone (DILAUDID) injection 1 mg         DIAGNOSTIC RESULTS / EMERGENCY DEPARTMENT COURSE / MDM   LAB RESULTS:  Results for orders placed or performed during the hospital encounter of 01/19/23   CBC with Auto Differential   Result Value Ref Range    WBC 11.4 (H) 3.5 - 11.0 k/uL    RBC 4.66 4.0 - 5.2 m/uL    Hemoglobin 15.5 12.0 - 16.0 g/dL    Hematocrit 47.0 (H) 36 - 46 %    .8 (H) 80 - 100 fL    MCH 33.3 26 - 34 pg    MCHC 33.0 31 - 37 g/dL    RDW 13.4 11.5 - 14.9 %    Platelets 312 561 - 011 k/uL    MPV 8.8 6.0 - 12.0 fL    Seg Neutrophils 60 36 - 66 %    Lymphocytes 31 24 - 44 %    Monocytes 6 1 - 7 %    Eosinophils % 2 0 - 4 %    Basophils 1 0 - 2 %    Segs Absolute 6.90 1.3 - 9.1 k/uL    Absolute Lymph # 3.50 1.0 - 4.8 k/uL    Absolute Mono # 0.70 0.1 - 1.3 k/uL    Absolute Eos # 0.20 0.0 - 0.4 k/uL    Basophils Absolute 0.10 0.0 - 0.2 k/uL   Comprehensive Metabolic Panel w/ Reflex to MG   Result Value Ref Range    Glucose 341 (H) 70 - 99 mg/dL    BUN 15 6 - 20 mg/dL    Creatinine 0.68 0.50 - 0.90 mg/dL    Est, Glom Filt Rate >60 >60 mL/min/1.73m2    Calcium 9.5 8.6 - 10.4 mg/dL    Sodium 138 135 - 144 mmol/L    Potassium 4.4 3.7 - 5.3 mmol/L    Chloride 97 (L) 98 - 107 mmol/L    CO2 30 20 - 31 mmol/L    Anion Gap 11 9 - 17 mmol/L    Alkaline Phosphatase 123 (H) 35 - 104 U/L    ALT 18 5 - 33 U/L    AST 19 <32 U/L    Total Bilirubin 0.4 0.3 - 1.2 mg/dL    Total Protein 7.3 6.4 - 8.3 g/dL    Albumin 3.9 3.5 - 5.2 g/dL   Lipase   Result Value Ref Range    Lipase 13 13 - 60 U/L   Troponin   Result Value Ref Range    Troponin, High Sensitivity 13 0 - 14 ng/L   Troponin   Result Value Ref Range    Troponin, High Sensitivity 15 (H) 0 - 14 ng/L   Brain Natriuretic Peptide   Result Value Ref Range    Pro-BNP 74 <300 pg/mL   TSH w/reflex to FT4   Result Value Ref Range    TSH 0.98 0.30 - 5.00 uIU/mL   D-Dimer, Quantitative   Result Value Ref Range    D-Dimer, Quant <0.27 0.00 - 0.59 mg/L FEU       Leukocytosis of 11.4, no infection found. Elevated glucose of 341. No anion gap. Rest of lab work unremarkable. RADIOLOGY:  CT ABDOMEN PELVIS WO CONTRAST Additional Contrast? None    Result Date: 1/19/2023  EXAMINATION: CT OF THE ABDOMEN AND PELVIS WITHOUT CONTRAST 1/19/2023 8:50 pm TECHNIQUE: CT of the abdomen and pelvis was performed without the administration of intravenous contrast. Multiplanar reformatted images are provided for review. Automated exposure control, iterative reconstruction, and/or weight based adjustment of the mA/kV was utilized to reduce the radiation dose to as low as reasonably achievable.  COMPARISON: 07/27/2022 HISTORY: ORDERING SYSTEM PROVIDED HISTORY: LUQ abdominal pain TECHNOLOGIST PROVIDED HISTORY: LUQ abdominal pain Decision Support Exception - unselect if not a suspected or confirmed emergency medical condition->Emergency Medical Condition (MA) Is the patient pregnant?->No FINDINGS: LOWER CHEST:  Visualized portion of the lower chest demonstrates no acute abnormality. Unchanged minimal pericardial effusion. Unchanged chronic fracture deformity of the lateral aspect of bilateral 11 ribs. KIDNEYS AND URINARY TRACT: No renal calculi are identified. There is no evidence for hydronephrosis. The ureters are of normal course and caliber. ORGANS: Visualized portions of the liver, spleen, pancreas, and adrenal glands demonstrate no acute abnormality. Status post cholecystectomy. GI/BOWEL: No bowel obstruction. No evidence of acute appendicitis. PELVIS: The bladder and pelvic organs are unremarkable. PERITONEUM/RETROPERITONEUM: No free air or free fluid is noted. No pathologically enlarged lymphadenopathy. The vasculature do not demonstrate acute abnormality. BONES/SOFT TISSUES: The osseous structures demonstrate no acute abnormality. No acute pathology within the abdomen and pelvis. No obstructive uropathy. Status post cholecystectomy. Unchanged chronic fracture deformity of the lateral aspect of bilateral 11 ribs. Unchanged minimal pericardial effusion. XR CHEST PORTABLE    Result Date: 1/19/2023  EXAMINATION: ONE XRAY VIEW OF THE CHEST 1/19/2023 5:27 pm COMPARISON: 09/02/2022. HISTORY: ORDERING SYSTEM PROVIDED HISTORY: chest pain TECHNOLOGIST PROVIDED HISTORY: chest pain Reason for Exam: chest pain FINDINGS: The lungs and costophrenic angles are clear. The cardiomediastinal silhouette and pulmonary vessels appear normal.     No radiographic evidence of an acute cardiopulmonary process.         EKG  Rhythm: normal sinus   Rate: normal  Axis: normal  Ectopy: none  Conduction: normal  ST Segments: no acute change  T Waves: no acute change  Q Waves: none    EKG  Impression: non-specific EKG     All EKG's are interpreted by the Emergency Department Physician who either signs or Co-signs this chart in the absence of a cardiologist.    Heart score  History: 1  EC  Patient Age: 1  *Risk factors for Atherosclerotic disease: Coronary Artery Disease; Obesity; Hypertension  Risk Factors: 2  Troponin: 1  Heart Score Total: 5      CONSULTS:  IP CONSULT TO INTERNAL MEDICINE    MDM/EMERGENCY DEPARTMENT COURSE:  Upon reevaluation patient still endorsing chest pain. Vital signs stable. Internal medicine consult. Internal medicine to admit patient. Patient understands and agrees with the plan. CRITICAL CARE:  Please see Attending Note    FINAL IMPRESSION      1. Chest pain, unspecified type          DISPOSITION / PLAN     DISPOSITION Decision To Admit 2023 11:47:01 PM    PATIENT REFERRED TO:  No follow-up provider specified.     DISCHARGE MEDICATIONS:  New Prescriptions    No medications on file       Tano Nascimento DO  Emergency Medicine Resident, PGY 2    (Please note that portions of this note were completed with a voice recognition program.  Efforts were made to edit the dictations but occasionally words are mis-transcribed.)       Tano Nascimento DO  Resident  [phone]

## 2023-01-19 NOTE — ED PROVIDER NOTES
EMERGENCY DEPARTMENT ENCOUNTER   ATTENDING ATTESTATION     Pt Name: Wil Prieto  MRN: [id number]  [place] 1975  Date of evaluation: 1/19/23       Wil Prieto is a 50 y.o. female who presents with Chest Pain  PMH Of CAD and VTE not on [facility]. Several days of constant pleuritic chest pain and right leg swelling and tenderness. Associated with nausea. MDM:   Admit for cardiology evaluation. Vitals:   Vitals:    01/19/23 1723   BP: (!) 145/87   Pulse: 90   Resp: 18   Temp: 99.2 °F (37.3 °C)   TempSrc: Oral   SpO2: 94%   Weight: 270 lb (122.5 kg)   Height: 5' 4\" (1.626 m)         I personally saw and examined the patient. I have reviewed and agree with the resident's findings, including all diagnostic interpretations and treatment plan as written. I was present for the key portions of any procedures performed and the inclusive time noted for any critical care statement. The care is provided during an unprecedented national emergency due to the novel coronavirus, COVID 19.   Melvin Maier MD  Attending Emergency Physician            Mj Mckeon MD  01/19/23 [id number]

## 2023-01-19 NOTE — ED NOTES
Mode of arrival (squad #, walk in, police, etc) : Walk in        Chief complaint(s): Chest pain        Arrival Note (brief scenario, treatment PTA, etc). : pt has midsternal chest pain on arrival with no relief from 1 nitro and 1 percocet taken PTA. Pt reports she has an extensive cardiac history including CHF and cardiomyopathy. Pt appears diaphoretic and states she is SOB. EKG taken in triage and provided to MD         C= [facility] you ever felt that you should Cut down on your drinking? \"  No  A= \"Have people Annoyed you by criticizing your drinking? \"  No  G= \"Have you ever felt bad or Guilty about your drinking? \"  No  E= \"Have you ever had a drink as an Eye-opener first thing in the morning to steady your nerves or to help a hangover? \"  No      Deferred []      Reason for deferring: N/A    *If yes to two or more: probable alcohol abuse. Gabriela Hodge RN  01/19/23 0036

## 2023-01-20 PROBLEM — R07.9 CHEST PAIN: Status: ACTIVE | Noted: 2023-01-20

## 2023-01-20 LAB — TROPONIN, HIGH SENSITIVITY: 15 NG/L (ref 0–14)

## 2023-01-20 PROCEDURE — G0378 HOSPITAL OBSERVATION PER HR: HCPCS

## 2023-01-20 RX ORDER — GUAIFENESIN DEXTROMETHORPHAN HYDROBROMIDE ORAL SOLUTION 10; 100 MG/5ML; MG/5ML
5 SOLUTION ORAL EVERY 4 HOURS PRN
Status: CANCELLED | OUTPATIENT
Start: 2023-01-20

## 2023-01-20 RX ORDER — IPRATROPIUM BROMIDE AND ALBUTEROL SULFATE 2.5; .5 MG/3ML; MG/3ML
1 SOLUTION RESPIRATORY (INHALATION) 4 TIMES DAILY
Status: CANCELLED | OUTPATIENT
Start: 2023-01-20

## 2023-01-20 RX ORDER — ACETAMINOPHEN 325 MG/1
650 TABLET ORAL EVERY 6 HOURS PRN
Status: CANCELLED | OUTPATIENT
Start: 2023-01-20

## 2023-01-20 RX ORDER — LANOLIN ALCOHOL/MO/W.PET/CERES
400 CREAM (GRAM) TOPICAL DAILY
Status: DISCONTINUED | OUTPATIENT
Start: 2023-01-20 | End: 2023-01-20 | Stop reason: HOSPADM

## 2023-01-20 RX ORDER — PANTOPRAZOLE SODIUM 40 MG/1
40 TABLET, DELAYED RELEASE ORAL 2 TIMES DAILY
Status: DISCONTINUED | OUTPATIENT
Start: 2023-01-20 | End: 2023-01-20 | Stop reason: HOSPADM

## 2023-01-20 RX ORDER — SODIUM CHLORIDE 0.9 % (FLUSH) 0.9 %
5-40 SYRINGE (ML) INJECTION PRN
Status: CANCELLED | OUTPATIENT
Start: 2023-01-20

## 2023-01-20 RX ORDER — VILAZODONE HYDROCHLORIDE 20 MG/1
20 TABLET ORAL DAILY
Status: DISCONTINUED | OUTPATIENT
Start: 2023-01-20 | End: 2023-01-20 | Stop reason: HOSPADM

## 2023-01-20 RX ORDER — POLYETHYLENE GLYCOL 3350 17 G/17G
17 POWDER, FOR SOLUTION ORAL DAILY PRN
Status: CANCELLED | OUTPATIENT
Start: 2023-01-20

## 2023-01-20 RX ORDER — BUDESONIDE 0.5 MG/2ML
0.5 INHALANT ORAL 2 TIMES DAILY
Status: DISCONTINUED | OUTPATIENT
Start: 2023-01-20 | End: 2023-01-20 | Stop reason: HOSPADM

## 2023-01-20 RX ORDER — ONDANSETRON 4 MG/1
4 TABLET, ORALLY DISINTEGRATING ORAL EVERY 8 HOURS PRN
Status: CANCELLED | OUTPATIENT
Start: 2023-01-20

## 2023-01-20 RX ORDER — CETIRIZINE HYDROCHLORIDE 10 MG/1
10 TABLET ORAL DAILY
Status: DISCONTINUED | OUTPATIENT
Start: 2023-01-20 | End: 2023-01-20 | Stop reason: HOSPADM

## 2023-01-20 RX ORDER — SODIUM CHLORIDE 0.9 % (FLUSH) 0.9 %
5-40 SYRINGE (ML) INJECTION EVERY 12 HOURS SCHEDULED
Status: CANCELLED | OUTPATIENT
Start: 2023-01-20

## 2023-01-20 RX ORDER — MAGNESIUM OXIDE 400 MG/1
400 TABLET ORAL DAILY
Status: DISCONTINUED | OUTPATIENT
Start: 2023-01-20 | End: 2023-01-20 | Stop reason: SDUPTHER

## 2023-01-20 RX ORDER — ACETAMINOPHEN 650 MG/1
650 SUPPOSITORY RECTAL EVERY 6 HOURS PRN
Status: CANCELLED | OUTPATIENT
Start: 2023-01-20

## 2023-01-20 RX ORDER — OXYCODONE HYDROCHLORIDE AND ACETAMINOPHEN 5; 325 MG/1; MG/1
1 TABLET ORAL EVERY 4 HOURS PRN
Status: DISCONTINUED | OUTPATIENT
Start: 2023-01-20 | End: 2023-01-20 | Stop reason: HOSPADM

## 2023-01-20 RX ORDER — ALBUTEROL SULFATE 90 UG/1
2 AEROSOL, METERED RESPIRATORY (INHALATION) EVERY 6 HOURS PRN
Status: CANCELLED | OUTPATIENT
Start: 2023-01-20

## 2023-01-20 RX ORDER — CARVEDILOL 12.5 MG/1
12.5 TABLET ORAL 2 TIMES DAILY WITH MEALS
Status: DISCONTINUED | OUTPATIENT
Start: 2023-01-20 | End: 2023-01-20 | Stop reason: HOSPADM

## 2023-01-20 RX ORDER — INSULIN GLARGINE 100 [IU]/ML
48 INJECTION, SOLUTION SUBCUTANEOUS 2 TIMES DAILY
Status: DISCONTINUED | OUTPATIENT
Start: 2023-01-20 | End: 2023-01-20 | Stop reason: HOSPADM

## 2023-01-20 RX ORDER — CLOPIDOGREL BISULFATE 75 MG/1
75 TABLET ORAL DAILY
Status: DISCONTINUED | OUTPATIENT
Start: 2023-01-20 | End: 2023-01-20 | Stop reason: HOSPADM

## 2023-01-20 RX ORDER — DULOXETIN HYDROCHLORIDE 60 MG/1
60 CAPSULE, DELAYED RELEASE ORAL 2 TIMES DAILY
Status: DISCONTINUED | OUTPATIENT
Start: 2023-01-20 | End: 2023-01-20 | Stop reason: HOSPADM

## 2023-01-20 RX ORDER — ONDANSETRON 2 MG/ML
4 INJECTION INTRAMUSCULAR; INTRAVENOUS EVERY 6 HOURS PRN
Status: CANCELLED | OUTPATIENT
Start: 2023-01-20

## 2023-01-20 RX ORDER — ISOSORBIDE MONONITRATE 30 MG/1
30 TABLET, EXTENDED RELEASE ORAL DAILY
Status: DISCONTINUED | OUTPATIENT
Start: 2023-01-20 | End: 2023-01-20 | Stop reason: HOSPADM

## 2023-01-20 RX ORDER — M-VIT,TX,IRON,MINS/CALC/FOLIC 27MG-0.4MG
1 TABLET ORAL DAILY
Status: DISCONTINUED | OUTPATIENT
Start: 2023-01-20 | End: 2023-01-20 | Stop reason: HOSPADM

## 2023-01-20 RX ORDER — ATORVASTATIN CALCIUM 80 MG/1
80 TABLET, FILM COATED ORAL NIGHTLY
Status: DISCONTINUED | OUTPATIENT
Start: 2023-01-20 | End: 2023-01-20 | Stop reason: HOSPADM

## 2023-01-20 RX ORDER — BUMETANIDE 1 MG/1
2 TABLET ORAL DAILY
Status: DISCONTINUED | OUTPATIENT
Start: 2023-01-20 | End: 2023-01-20 | Stop reason: HOSPADM

## 2023-01-20 RX ORDER — SODIUM CHLORIDE 9 MG/ML
INJECTION, SOLUTION INTRAVENOUS PRN
Status: CANCELLED | OUTPATIENT
Start: 2023-01-20

## 2023-01-20 ASSESSMENT — HEART SCORE: ECG: 0

## 2023-01-20 NOTE — ED NOTES
Pt not willing to board in ED. Pt informed we will bring in hospital bed. Pt refusing stating she is \"not that girl\". Pt. Asking to leave AMA unwilling to sign paperwork. House supervisor and admitting physician notified.      Sherry Elder RN  01/20/23 [id number]

## 2023-01-21 LAB
EKG ATRIAL RATE: 85 BPM
EKG P AXIS: 57 DEGREES
EKG P-R INTERVAL: 146 MS
EKG Q-T INTERVAL: 376 MS
EKG QRS DURATION: 84 MS
EKG QTC CALCULATION (BAZETT): 447 MS
EKG R AXIS: 38 DEGREES
EKG T AXIS: 45 DEGREES
EKG VENTRICULAR RATE: 85 BPM

## 2023-01-21 PROCEDURE — 93010 ELECTROCARDIOGRAM REPORT: CPT | Performed by: INTERNAL MEDICINE

## 2023-03-03 DIAGNOSIS — M25.571 RIGHT ANKLE PAIN, UNSPECIFIED CHRONICITY: Primary | ICD-10-CM

## 2023-03-06 ENCOUNTER — OFFICE VISIT (OUTPATIENT)
Dept: ORTHOPEDIC SURGERY | Age: 48
End: 2023-03-06

## 2023-03-06 VITALS — OXYGEN SATURATION: 100 % | RESPIRATION RATE: 16 BRPM | WEIGHT: 270 LBS | BODY MASS INDEX: 46.1 KG/M2 | HEIGHT: 64 IN

## 2023-03-06 DIAGNOSIS — S86.011A PARTIAL TEAR OF RIGHT ACHILLES TENDON, INITIAL ENCOUNTER: Primary | ICD-10-CM

## 2023-03-06 RX ORDER — LIDOCAINE 4 G/G
PATCH TOPICAL
Qty: 14 PATCH | Refills: 0 | Status: SHIPPED | OUTPATIENT
Start: 2023-03-06

## 2023-03-06 NOTE — PROGRESS NOTES
[facility] ORTHOPEDICS  [address] [place] [address]  Dept: [phone]    Ambulatory Orthopedic Consult      CHIEF COMPLAINT:    Chief Complaint   Patient presents with    Ankle Pain     Right       HISTORY OF PRESENT ILLNESS:      The patient is a 48 y.o. female who is being seen for evaluation of the above, which began around 12/6/2022, and became worse on 2/25/2023, secondary to an injury (reports that she fell down about 3 steps as her initial injury, and then reports that she stepped on a dog toy, rolling her ankle for her subsequent injury on 2/25/2023)  . At today's visit, she is using no brace/assistive device.     History is obtained today from:   [x]  the patient     [x]  EMR     []  one family member/friend    []  multiple family members/friends    []  other:          REVIEW OF SYSTEMS:  Musculoskeletal: See HPI for pertinent positives     Past Medical History:    She  has a past medical history of Acute exacerbation of chronic obstructive pulmonary disease ([facility]) (3/21/2018), Adhesive capsulitis of left shoulder (9/25/2018), Asthma, Asthma exacerbation (7/24/2014), Atrial fibrillation ([facility]), Atrial premature depolarization (7/19/2019), Bipolar 1 disorder ([facility]), Bipolar disorder ([facility]) (7/19/2019), Bulging disc, CAD (coronary artery disease), Candida infection (2/23/2018), Cardiomyopathy ([facility]), Chest pain (6/13/2017), CHF (congestive heart failure) ([facility]), Chronic otitis media of both ears, Chronic right shoulder pain (3/14/2017), Cigarette nicotine dependence with nicotine-induced disorder (7/19/2019), Class 3 severe obesity due to excess calories with serious comorbidity and body mass index (BMI) of 40.0 to 44.9 in adult ([facility]) (10/4/2018), Closed fracture of left ankle (6/25/2019), Clostridium difficile infection, COPD (chronic obstructive pulmonary disease) ([facility]), Cough, persistent (3/21/2018), Current moderate episode of  major depressive disorder without prior episode ([facility] (8/20/2018), Depression, Diabetes mellitus type 2, controlled ([address] (4/30/2014), Diarrhea, Diarrhea, Dizziness, DVT (deep venous thrombosis) ([address] Encounter for monitoring sotalol therapy (2/20/2017), Encounter for screening mammogram for malignant neoplasm of breast (3/7/2018), Endometriosis, Fainting, GERD (gastroesophageal reflux disease), GI bleeding, H. pylori infection, Helicobacter pylori (H. pylori), [place] (hard of hearing), HTN (hypertension) (7/19/2019), Hyperlipidemia, Hypertension, Left bicipital tenosynovitis (10/17/2018), Left leg pain (5/16/2017), Left sided abdominal pain of unknown cause (7/19/2016), Leg swelling (9/21/2018), Mastoiditis, Mastoiditis, acute (4/30/2014), Migraines, Morbid obesity ([address] Myocardial infarction ([address] Nausea, Neuropathy, On home oxygen therapy, Ovarian cyst, Passed out, Pulmonary hypertension ([address] Pulmonary insufficiency, PVC (premature ventricular contraction), Seasonal allergies, Tricuspid insufficiency, and Type II or unspecified type diabetes mellitus without mention of complication, not stated as uncontrolled. Past Surgical History:    She  has a past surgical history that includes Hysterectomy; Cholecystectomy; Appendectomy; Colonoscopy; Upper gastrointestinal endoscopy; Abdomen surgery; Abdominal adhesion surgery; Abdominal exploration surgery; Tympanostomy tube placement; Tonsillectomy; Foot surgery (Left); Abdominal hernia repair; hysteroscopy; Gastric fundoplication (4698); Abscess Drainage; Scaphoid fracture surgery (Left); other surgical history (Right, 05/29/2014); Myringotomy Tympanostomy Tube Placement (Right, 06/05/2014); myringotomy (Right, 11/13/2015); Hysterectomy; Cardiac catheterization (2014 & 2000); other surgical history (2009);  Breast surgery (Left, 2007); fracture surgery (Right); other surgical history (Right, 08/11/2016); ablation of dysrhythmic focus (2016); other surgical history; other surgical history; pr mnpj w/anes shoulder jt appl fixation apparatus (Right, 03/01/2017); ablation of dysrhythmic focus (09/08/2017); pr surgical arthroscopy torie w/coracoacrm ligm rls (Left, 10/17/2018); Tympanostomy tube placement (Left, 03/12/2019); Upper gastrointestinal endoscopy (07/10/2019); Upper gastrointestinal endoscopy (N/A, 07/10/2019); Carpal tunnel release (Right, 12/19/2019); Carpal tunnel release (05/04/2020); Ulnar tunnel release (Right); myringotomy (Left, 07/14/2020); other surgical history (10/19/2020); other surgical history (Right, 06/14/2021); Tympanostomy tube placement (Right, 12/08/2021); other surgical history (Left, 03/24/2022); Ventriculoperitoneal shunt (Right, 04/21/2022); and bronchoscopy (N/A, 5/23/2022). Current Medications:     Current Outpatient Medications:     atorvastatin (LIPITOR) 80 MG tablet, Take 1 tablet by mouth nightly, Disp: 90 tablet, Rfl: 3    blood glucose test strips (ONETOUCH ULTRA) strip, USE TO TEST BLOOD SUGAR BEFORE MEALS, AT BEDTIME, AND AS NEEDED (up to 9 TIMES DAILY), Disp: 200 strip, Rfl: 11    clopidogrel (PLAVIX) 75 MG tablet, Take 1 tablet by mouth daily nightly, Disp: 90 tablet, Rfl: 3    clotrimazole (LOTRIMIN) 1 % cream, Apply topically 2 times daily. , Disp: 45 g, Rfl: 11    DULoxetine (CYMBALTA) 60 MG extended release capsule, Take 1 capsule by mouth 2 times daily, Disp: 180 capsule, Rfl: 3    Insulin Degludec (TRESIBA FLEXTOUCH) 200 UNIT/ML SOPN, Inject 60 Units into the skin in the morning and at bedtime If po intake poor, decrease to 20 units daily, Disp: 18 Adjustable Dose Pre-filled Pen Syringe, Rfl: 3    ipratropium-albuterol (DUONEB) 0.5-2.5 (3) MG/3ML SOLN nebulizer solution, INHALE 3 MLS (ONE VIAL) WITH NEBULIZER EVERY 6 HOURS AS NEEDED FOR SHORTNESS OF BREATH, Disp: 360 each, Rfl: 3    magnesium oxide (MAG-OX) 400 MG tablet, Take 1 tablet by mouth daily Afternoon, Disp: 90 tablet, Rfl: 3    cetirizine (ZYRTEC) 10 MG tablet, Take 1 tablet by mouth daily, Disp: 90 tablet, Rfl: 3    pantoprazole (PROTONIX) 40 MG tablet, Take 1 tablet by mouth in the morning and at bedtime BID, Disp: 180 tablet, Rfl: 3    TECHLITE PEN NEEDLES 32G X 4 MM MISC, USE AS DIRECTED TO INJECT INSULINS 2 TIMES DAILY, Disp: 100 each, Rfl: 11    QUEtiapine (SEROQUEL) 100 MG tablet, Take 2.5 tablets by mouth at bedtime, Disp: 75 tablet, Rfl: 11    insulin lispro, 1 Unit Dial, (HUMALOG KWIKPEN) 100 UNIT/ML SOPN, INJECT SUBCUTANEOUSLY PER SLIDING SCALE, 150-200 INJECT 3U, 201-250 INJECT 6U, 251-300 INJECT 9U, >300 INJECT 12U, MAX 30U/DAY, Disp: 5 Adjustable Dose Pre-filled Pen Syringe, Rfl: 11    albuterol sulfate HFA (PROVENTIL;VENTOLIN;PROAIR) 108 (90 Base) MCG/ACT inhaler, INAHLE 2 PUFFS BY MOUTH INTO THE LUNGS EVERY 6 HOURS AS NEEDED FOR WHEEZING, Disp: 18 g, Rfl: 11    apixaban (ELIQUIS) 5 MG TABS tablet, Take 1 tablet by mouth 2 times daily, Disp: 60 tablet, Rfl: 1    dextromethorphan-guaiFENesin (ROBITUSSIN-DM)  MG/5ML syrup, Take 5 mLs by mouth every 4 hours as needed for Cough, Disp: 1 each, Rfl: 1    bumetanide (BUMEX) 2 MG tablet, TAKE 1 TABLET BY MOUTH EVERY DAY, Disp: 30 tablet, Rfl: 11    isosorbide mononitrate (IMDUR) 30 MG extended release tablet, TAKE 1 TABLET BY MOUTH EVERY DAY, Disp: 30 tablet, Rfl: 11    budesonide (PULMICORT) 0.5 MG/2ML nebulizer suspension, Take 2 mLs by nebulization 2 times daily, Disp: 60 each, Rfl: 11    colchicine (COLCRYS) 0.6 MG tablet, Take 1 tablet by mouth 2 times daily for 46 doses, Disp: 46 tablet, Rfl: 0    dornase alpha (PULMOZYME) 2.5 MG/2.5ML nebulizer solution, Inhale 2.5 mg into the lungs 2 times daily, Disp: 75 mL, Rfl: 11    VILAZODONE HCL 20 MG Tablet, TAKE 1 TABLET BY MOUTH EVERY DAY (Patient taking differently: New medication, hasn't started this medication yet), Disp: 30 tablet, Rfl: 11    oxyCODONE-acetaminophen (PERCOCET) 5-325 MG per tablet, Take 1 tablet by mouth every 4 hours as needed for Pain. Indications: last fill 2/27/22 with quantity 170 for 30 days One tablet at 0700, one tablet at 1900, one tablet in the afternoon prn pain, Disp: , Rfl:     Multiple Vitamins-Minerals (MULTIVITAMIN GUMMIES WOMENS) CHEW, Take 2 each by mouth daily , Disp: , Rfl:     carvedilol (COREG) 12.5 MG tablet, Take 12.5 mg by mouth 2 times daily (with meals) , Disp: , Rfl:      Allergies:    Latex, Aspirin, Avelox [moxifloxacin], Chantix [varenicline], Doxycycline, Dye [iodides], Flexeril [cyclobenzaprine], Gabapentin, Losartan, Morphine, Nsaids, Pcn [penicillins], Reglan [metoclopramide hcl], Shellfish-derived products, Sulfa antibiotics, Toradol [ketorolac tromethamine], Vancomycin, Zofran, Zyvox [linezolid], Acyclovir, Bactrim [sulfamethoxazole-trimethoprim], Betadine [povidone iodine], Ceclor [cefaclor], Codeine, Macrolides and ketolides, Novolin r [insulin], Novolog [insulin aspart], Phenothiazines, Tape [adhesive tape], Banana, Compazine [prochlorperazine], Fentanyl, Kiwi extract, Tamiflu [oseltamivir phosphate], Clindamycin/lincomycin, and Sotalol    Family History:  family history includes Asthma in her father, maternal grandfather, maternal grandmother, and mother; Breast Cancer in her maternal aunt, maternal grandmother, and paternal aunt;  Cancer in her maternal aunt, maternal grandmother, paternal grandmother, and sister; Cervical Cancer in her maternal grandmother and paternal grandmother; Diabetes in her father, maternal aunt, maternal grandfather, maternal grandmother, mother, paternal grandfather, and paternal grandmother; Heart Disease in her father, maternal aunt, maternal grandfather, maternal grandmother, maternal uncle, mother, paternal aunt, paternal grandfather, paternal grandmother, and paternal uncle; High Blood Pressure in her father, maternal aunt, maternal grandfather, maternal grandmother, maternal uncle, mother, paternal aunt, paternal grandfather, paternal grandmother, and paternal uncle; Liver Disease (age of onset: 61) in her father; Lashon Barer in her maternal grandfather; Ulcerative Colitis in her father. Social History:   Social History     Occupational History     Employer: NONE   Tobacco Use    Smoking status: Every Day     Packs/day: 0.50     Years: 23.00     Pack years: 11.50     Types: Cigarettes    Smokeless tobacco: Never   Vaping Use    Vaping Use: Never used   Substance and Sexual Activity    Alcohol use: No     Alcohol/week: 0.0 standard drinks    Drug use: No    Sexual activity: Not on file     Not working (on disability)    OBJECTIVE:  Resp 16   Ht 5' 4\" (1.626 m)   Wt 270 lb (122.5 kg)   SpO2 100%   BMI 46.35 kg/m²    Psych: awake, alert  Cardio:  well perfused extremities, no cyanosis  Resp:  normal respiratory effort  Musculoskeletal:    RLE:  Vascular: Limb well perfused, compartments soft/compressible. Skin: No erythema/ulcers. Intact. Neurovascular Status:  Grossly neurovascularly intact throughout   Tenderness to Palpation: Distal Achilles tendon/posterior heel diffusely/hindfoot diffusely  -Limited exam secondary to pain  -Antalgic gait  --Achilles:    -Normal resting tone of affected side compared to contralateral  -Mead test shows no loss of Achilles continuity  -Able to plantarflex through Achilles tendon  -No palpable defect in the Achilles tendon  -     LLE:  Vascular: Limb well perfused, compartments soft/compressible. Skin: No erythema/ulcers. Intact. Neurovascular Status:  Grossly neurovascularly intact throughout   Tenderness to Palpation:          RADIOLOGY:   3/6/2023 FINDINGS:  Three weightbearing views (AP, Mortise, and Lateral) of the right ankle and three weightbearing views (AP, Oblique, Lateral) of the right foot were obtained in the office today and reviewed, revealing no acute fracture, dislocation, or radiopaque foreign body/tumor. Evidence of prior fifth metatarsal fracture, that appears healed.   Calcification of the posterior aspect of the calcaneus, consistent with enthesopathy.    IMPRESSION:  No acute fracture/dislocation.     Electronically signed by Ever Cabrera MD      Relevant previous imaging reviewed, both imaging and report(s) as below:    No results found.       ASSESSMENT AND PLAN:  Body mass index is 46.35 kg/m².       She has a right foot/ankle injury, possibly secondary to a partial Achilles tendon injury.  She does have underlying insertional Achilles tendinopathy.     She has a history of a left distal fibula avulsion fracture, sustained on 6/7/2019.  She did develop a diffuse circumferential burning pain, that was associated with a numbness and tingling, that was likely neuropathic in nature.      Notably, she has a complex past medical history.  She has a history of morbid obesity (BMI greater than 46), history of C. difficile infection, KRISTIN, major depressive disorder, COPD, history of heart disease with 2 myocardial infarction's, a history of uncontrolled type 2 diabetes, and history of tobacco use (reports that she smokes 1/2 pack of cigarettes per day).  She also reports that she takes Percocet daily for chronic arm pain.  She is also anticoagulated on Plavix.    We had a discussion today about the likely diagnosis and its natural history, physical exam and imaging findings, as well as various treatment options in detail.    Surgically, we discussed a possible future surgical intervention, depending on her clinical course and imaging as below.    Orders/referrals were placed as below at today's visit.     The patient was placed into an Ace wrap and a CAM boot.  We discussed that the patient may weight-bear as tolerated, and may remove the boot at night to sleep.  She was ordered topical lidocaine patches to hopefully help with her pain.  She will continue her daily Plavix for DVT prophylaxis.    In order to know exactly how to proceed with treatment, an MRI was ordered today to evaluate the Achilles tendon. This is  medically necessary to evaluate the structures in this area, for both diagnosis and treatment. All questions were answered and the above plan was agreed upon. The patient will return to clinic after the MRI has been obtained without x-rays. At her next visit, we will review her MRI, and possibly begin a nonsurgical Achilles treatment protocol. At the patient's next visit, depending on how the patient is doing and/or new imaging/labs results, we may consider the following options:    []  Lace up ankle     []  CAM boot         []  removable wrist brace     []  PT:        []  Wean out immobilization         []  Adv activity      []  Footmind        []  Spenco       []  Custom Orthotic:               []  AZ brace                    []  Rocker Bottom      []  Night splint    []  Heel cups        []  Strap        []  Toe gizmos    []  Topl        []  NSAIDs         []  Cristal        []  Ref:         []  Stress Xray    []  CT        []  MRI  []  Inj:          []  Consider OR      []  Pick OR date    No follow-ups on file. No orders of the defined types were placed in this encounter. No orders of the defined types were placed in this encounter. Bon Curry MD  Orthopedic Surgery        Please excuse any typos/errors, as this note was created with the assistance of voice recognition software. While intending to generate a document that actually reflects the content of the visit, the document can still have some errors including those of syntax and sound-a-like substitutions which may escape proof reading. In such instances, actual meaning can be extrapolated by context.

## 2023-03-13 ENCOUNTER — OFFICE VISIT (OUTPATIENT)
Dept: ORTHOPEDIC SURGERY | Age: 48
End: 2023-03-13
Payer: COMMERCIAL

## 2023-03-13 VITALS — WEIGHT: 270 LBS | BODY MASS INDEX: 46.1 KG/M2 | HEIGHT: 64 IN | RESPIRATION RATE: 14 BRPM

## 2023-03-13 DIAGNOSIS — M76.61 ACHILLES TENDINITIS OF RIGHT LOWER EXTREMITY: Primary | ICD-10-CM

## 2023-03-13 DIAGNOSIS — S86.011D PARTIAL TEAR OF RIGHT ACHILLES TENDON, SUBSEQUENT ENCOUNTER: ICD-10-CM

## 2023-03-13 PROCEDURE — 99213 OFFICE O/P EST LOW 20 MIN: CPT | Performed by: ORTHOPAEDIC SURGERY

## 2023-03-13 PROCEDURE — G8484 FLU IMMUNIZE NO ADMIN: HCPCS | Performed by: ORTHOPAEDIC SURGERY

## 2023-03-13 PROCEDURE — G8427 DOCREV CUR MEDS BY ELIG CLIN: HCPCS | Performed by: ORTHOPAEDIC SURGERY

## 2023-03-13 PROCEDURE — 3017F COLORECTAL CA SCREEN DOC REV: CPT | Performed by: ORTHOPAEDIC SURGERY

## 2023-03-13 PROCEDURE — G8417 CALC BMI ABV UP PARAM F/U: HCPCS | Performed by: ORTHOPAEDIC SURGERY

## 2023-03-13 PROCEDURE — 4004F PT TOBACCO SCREEN RCVD TLK: CPT | Performed by: ORTHOPAEDIC SURGERY

## 2023-03-13 NOTE — PROGRESS NOTES
[facility]  [address]  Dept: [phone]    Ambulatory Orthopedic Consult      CHIEF COMPLAINT:    Chief Complaint   Patient presents with    Ankle Pain     right         HISTORY OF PRESENT ILLNESS:      The patient is a 46 y.o. female who is being seen for evaluation of the above, which began around 12/6/2022, and became worse on 2/25/2023, secondary to an injury (reports that she fell down about 3 steps as her initial injury, and then reports that she stepped on a dog toy, rolling her ankle for her subsequent injury on 2/25/2023)  . At today's visit, she is using no brace/assistive device. History is obtained today from:   [x]  the patient     [x]  EMR     []  one family member/friend    []  multiple family members/friends    []  other:      INTERVAL HISTORY 3/13/2023:  She is seen again today in the office for follow up of imaging as below. Since being seen last, the patient is doing worse. At today's visit, she is using a brace/boot. History is obtained today from:   [x]  the patient     [x]  EMR     []  one family member/friend    []  multiple family members/friends    []  other: At today's visit, she localizes her pain to the posterior heel greater than musculotendinous junction of the Achilles.     REVIEW OF SYSTEMS:  Musculoskeletal: See HPI for pertinent positives     Past Medical History:    She  has a past medical history of Acute exacerbation of chronic obstructive pulmonary disease ([facility] [address] (3/21/2018), Adhesive capsulitis of left shoulder (9/25/2018), Asthma, Asthma exacerbation (7/24/2014), Atrial fibrillation ([address] Atrial premature depolarization (7/19/2019), Bipolar 1 disorder ([facility] [address] Bipolar disorder ([facility] [address] (7/19/2019), Bulging disc, CAD (coronary artery disease), Candida infection (2/23/2018), Cardiomyopathy ([address] Chest pain (6/13/2017), CHF (congestive heart failure) ([address] Chronic otitis media of both ears, Chronic right shoulder pain (3/14/2017), Cigarette nicotine dependence with nicotine-induced disorder (7/19/2019), Class 3 severe obesity due to excess calories with serious comorbidity and body mass index (BMI) of 40.0 to 44.9 in adult [facility]) (10/4/2018), Closed fracture of left ankle (6/25/2019), Clostridium difficile infection, COPD (chronic obstructive pulmonary disease) ([address] Cough, persistent (3/21/2018), Current moderate episode of major depressive disorder without prior episode ([address] (8/20/2018), Depression, Diabetes mellitus type 2, controlled ([address] (4/30/2014), Diarrhea, Diarrhea, Dizziness, DVT (deep venous thrombosis) ([address] Encounter for monitoring sotalol therapy (2/20/2017), Encounter for screening mammogram for malignant neoplasm of breast (3/7/2018), Endometriosis, Fainting, GERD (gastroesophageal reflux disease), GI bleeding, H. pylori infection, Helicobacter pylori (H. pylori), [place] (hard of hearing), HTN (hypertension) (7/19/2019), Hyperlipidemia, Hypertension, Left bicipital tenosynovitis (10/17/2018), Left leg pain (5/16/2017), Left sided abdominal pain of unknown cause (7/19/2016), Leg swelling (9/21/2018), Mastoiditis, Mastoiditis, acute (4/30/2014), Migraines, Morbid obesity ([address] Myocardial infarction ([address] Nausea, Neuropathy, On home oxygen therapy, Ovarian cyst, Passed out, Pulmonary hypertension ([address] Pulmonary insufficiency, PVC (premature ventricular contraction), Seasonal allergies, Tricuspid insufficiency, and Type II or unspecified type diabetes mellitus without mention of complication, not stated as uncontrolled. Past Surgical History:    She  has a past surgical history that includes Hysterectomy; Cholecystectomy; Appendectomy; Colonoscopy; Upper gastrointestinal endoscopy; Abdomen surgery; Abdominal adhesion surgery; Abdominal exploration surgery; Tympanostomy tube placement; Tonsillectomy; Foot surgery (Left);  Abdominal hernia repair; hysteroscopy; Gastric fundoplication (0407); Abscess Drainage; Scaphoid fracture surgery (Left); other surgical history (Right, 05/29/2014); Myringotomy Tympanostomy Tube Placement (Right, 06/05/2014); myringotomy (Right, 11/13/2015); Hysterectomy; Cardiac catheterization (2014 & 2000); other surgical history (2009); Breast surgery (Left, 2007); fracture surgery (Right); other surgical history (Right, 08/11/2016); ablation of dysrhythmic focus (2016); other surgical history; other surgical history; pr mnpj w/anes shoulder jt appl fixation apparatus (Right, 03/01/2017); ablation of dysrhythmic focus (09/08/2017); pr surgical arthroscopy torie w/coracoacrm ligm rls (Left, 10/17/2018); Tympanostomy tube placement (Left, 03/12/2019); Upper gastrointestinal endoscopy (07/10/2019); Upper gastrointestinal endoscopy (N/A, 07/10/2019); Carpal tunnel release (Right, 12/19/2019); Carpal tunnel release (05/04/2020); Ulnar tunnel release (Right); myringotomy (Left, 07/14/2020); other surgical history (10/19/2020); other surgical history (Right, 06/14/2021); Tympanostomy tube placement (Right, 12/08/2021); other surgical history (Left, 03/24/2022); Ventriculoperitoneal shunt (Right, 04/21/2022); and bronchoscopy (N/A, 5/23/2022). Current Medications:     Current Outpatient Medications:     atorvastatin (LIPITOR) 80 MG tablet, Take 1 tablet by mouth nightly, Disp: 90 tablet, Rfl: 3    blood glucose test strips (ONETOUCH ULTRA) strip, USE TO TEST BLOOD SUGAR BEFORE MEALS, AT BEDTIME, AND AS NEEDED (up to 9 TIMES DAILY), Disp: 200 strip, Rfl: 11    clopidogrel (PLAVIX) 75 MG tablet, Take 1 tablet by mouth daily nightly, Disp: 90 tablet, Rfl: 3    clotrimazole (LOTRIMIN) 1 % cream, Apply topically 2 times daily. , Disp: 45 g, Rfl: 11    DULoxetine (CYMBALTA) 60 MG extended release capsule, Take 1 capsule by mouth 2 times daily, Disp: 180 capsule, Rfl: 3    Insulin Degludec (TRESIBA FLEXTOUCH) 200 UNIT/ML SOPN, Inject 60 Units into the skin in the morning and at bedtime If po intake poor, decrease to 20 units daily, Disp: 18 Adjustable Dose Pre-filled Pen Syringe, Rfl: 3    ipratropium-albuterol (DUONEB) 0.5-2.5 (3) MG/3ML SOLN nebulizer solution, INHALE 3 MLS (ONE VIAL) WITH NEBULIZER EVERY 6 HOURS AS NEEDED FOR SHORTNESS OF BREATH, Disp: 360 each, Rfl: 3    magnesium oxide (MAG-OX) 400 MG tablet, Take 1 tablet by mouth daily Afternoon, Disp: 90 tablet, Rfl: 3    cetirizine (ZYRTEC) 10 MG tablet, Take 1 tablet by mouth daily, Disp: 90 tablet, Rfl: 3    pantoprazole (PROTONIX) 40 MG tablet, Take 1 tablet by mouth in the morning and at bedtime BID, Disp: 180 tablet, Rfl: 3    lidocaine 4 % external patch, Apply to affected area; leave in place no longer than 12 hrs then remove the patch; use no longer than 12 hrs/day total, Disp: 14 patch, Rfl: 0    TECHLITE PEN NEEDLES 32G X 4 MM MISC, USE AS DIRECTED TO INJECT INSULINS 2 TIMES DAILY, Disp: 100 each, Rfl: 11    QUEtiapine (SEROQUEL) 100 MG tablet, Take 2.5 tablets by mouth at bedtime, Disp: 75 tablet, Rfl: 11    insulin lispro, 1 Unit Dial, (HUMALOG KWIKPEN) 100 UNIT/ML SOPN, INJECT SUBCUTANEOUSLY PER SLIDING SCALE, 150-200 INJECT 3U, 201-250 INJECT 6U, 251-300 INJECT 9U, >300 INJECT 12U, MAX 30U/DAY, Disp: 5 Adjustable Dose Pre-filled Pen Syringe, Rfl: 11    albuterol sulfate HFA (PROVENTIL;VENTOLIN;PROAIR) 108 (90 Base) MCG/ACT inhaler, INAHLE 2 PUFFS BY MOUTH INTO THE LUNGS EVERY 6 HOURS AS NEEDED FOR WHEEZING, Disp: 18 g, Rfl: 11    apixaban (ELIQUIS) 5 MG TABS tablet, Take 1 tablet by mouth 2 times daily, Disp: 60 tablet, Rfl: 1    dextromethorphan-guaiFENesin (ROBITUSSIN-DM)  MG/5ML syrup, Take 5 mLs by mouth every 4 hours as needed for Cough, Disp: 1 each, Rfl: 1    bumetanide (BUMEX) 2 MG tablet, TAKE 1 TABLET BY MOUTH EVERY DAY, Disp: 30 tablet, Rfl: 11    isosorbide mononitrate (IMDUR) 30 MG extended release tablet, TAKE 1 TABLET BY MOUTH EVERY DAY, Disp: 30 tablet, Rfl: 11 budesonide (PULMICORT) 0.5 MG/2ML nebulizer suspension, Take 2 mLs by nebulization 2 times daily, Disp: 60 each, Rfl: 11    colchicine (COLCRYS) 0.6 MG tablet, Take 1 tablet by mouth 2 times daily for 46 doses, Disp: 46 tablet, Rfl: 0    dornase alpha (PULMOZYME) 2.5 MG/2.5ML nebulizer solution, Inhale 2.5 mg into the lungs 2 times daily, Disp: 75 mL, Rfl: 11    VILAZODONE HCL 20 MG Tablet, TAKE 1 TABLET BY MOUTH EVERY DAY (Patient taking differently: New medication, hasn't started this medication yet), Disp: 30 tablet, Rfl: 11    oxyCODONE-acetaminophen (PERCOCET) 5-325 MG per tablet, Take 1 tablet by mouth every 4 hours as needed for Pain. Indications: last fill 2/27/22 with quantity 170 for 30 days One tablet at 0700, one tablet at 1900, one tablet in the afternoon prn pain, Disp: , Rfl:     Multiple Vitamins-Minerals (MULTIVITAMIN GUMMIES WOMENS) CHEW, Take 2 each by mouth daily , Disp: , Rfl:     carvedilol (COREG) 12.5 MG tablet, Take 12.5 mg by mouth 2 times daily (with meals) , Disp: , Rfl:      Allergies:    Latex, Aspirin, Avelox [moxifloxacin], Chantix [varenicline], Doxycycline, Dye [iodides], Flexeril [cyclobenzaprine], Gabapentin, Losartan, Morphine, Nsaids, Pcn [penicillins], Reglan [metoclopramide hcl], Shellfish-derived products, Sulfa antibiotics, Toradol [ketorolac tromethamine], Vancomycin, Zofran, Zyvox [linezolid], Acyclovir, Bactrim [sulfamethoxazole-trimethoprim], Betadine [povidone iodine], Ceclor [cefaclor], Codeine, Macrolides and ketolides, Novolin r [insulin], Novolog [insulin aspart], Phenothiazines, Tape [adhesive tape], Banana, Compazine [prochlorperazine], Fentanyl, Kiwi extract, Tamiflu [oseltamivir phosphate], Clindamycin/lincomycin, and Sotalol    Family History:  family history includes Asthma in her father, maternal grandfather, maternal grandmother, and mother; Breast Cancer in her maternal aunt, maternal grandmother, and paternal aunt;  Cancer in her maternal aunt, maternal grandmother, paternal grandmother, and sister; Cervical Cancer in her maternal grandmother and paternal grandmother; Diabetes in her father, maternal aunt, maternal grandfather, maternal grandmother, mother, paternal grandfather, and paternal grandmother; Heart Disease in her father, maternal aunt, maternal grandfather, maternal grandmother, maternal uncle, mother, paternal aunt, paternal grandfather, paternal grandmother, and paternal uncle; High Blood Pressure in her father, maternal aunt, maternal grandfather, maternal grandmother, maternal uncle, mother, paternal aunt, paternal grandfather, paternal grandmother, and paternal uncle; Liver Disease (age of onset: 61) in her father; Jayce Vadim in her maternal grandfather; Ulcerative Colitis in her father. Social History:   Social History     Occupational History     Employer: NONE   Tobacco Use    Smoking status: Every Day     Packs/day: 0.50     Years: 23.00     Pack years: 11.50     Types: Cigarettes    Smokeless tobacco: Never   Vaping Use    Vaping Use: Never used   Substance and Sexual Activity    Alcohol use: No     Alcohol/week: 0.0 standard drinks    Drug use: No    Sexual activity: Not on file     Not working (on disability)    OBJECTIVE:  Resp 14   Ht 5' 4\" (1.626 m)   Wt 270 lb (122.5 kg)   BMI 46.35 kg/m²    Psych: awake, alert  Cardio:  well perfused extremities, no cyanosis  Resp:  normal respiratory effort  Musculoskeletal:    RLE:  Vascular: Limb well perfused, compartments soft/compressible. Skin: No erythema/ulcers. Intact.    Neurovascular Status:  Grossly neurovascularly intact throughout   Tenderness to Palpation: Distal Achilles tendon/posterior heel diffusely diffusely  -Antalgic gait  --Achilles:    -Normal resting tone of affected side compared to contralateral  -Mead test shows no loss of Achilles continuity  -Able to plantarflex through Achilles tendon  -No palpable defect in the Achilles tendon  -     LLE:  Vascular: Limb well perfused, compartments soft/compressible. Skin: No erythema/ulcers. Intact. Neurovascular Status:  Grossly neurovascularly intact throughout   Tenderness to Palpation:          RADIOLOGY:   3/13/2023 Prior images reviewed for reference. MRI images and radiology report reviewed, as below: \"Findings consistent with tendinopathy in the Achilles distal segment at the insertion, trace fluid in the bursa and probable low-grade partial-thickness intrasubstance tear. There is some edema suggesting low-grade tear or strain at the proximal Achilles muscle tendon junction. There is no full-thickness or near full-thickness tear, rupture, avulsion or discrete fluid-filled gap or significant surrounding fluid otherwise. Small tibiotalar effusion and some fluid in the distal tibiofibular syndesmosis, without acute fracture or ligament rupture seen. No osteochondral impaction injury. Small amount of fluid along the posterior tibial tendon and peroneal tendon she is with mild tendinopathy, perhaps short segment split tear in the peroneus brevis without rupture or retinacular disruption. \"    -Independent interpretation: Somewhat limited study secondary to motion artifact. FINDINGS:  Three weightbearing views (AP, Mortise, and Lateral) of the right ankle and three weightbearing views (AP, Oblique, Lateral) of the right foot were obtained in the office today and reviewed, revealing no acute fracture, dislocation, or radiopaque foreign body/tumor. Evidence of prior fifth metatarsal fracture, that appears healed. Calcification of the posterior aspect of the calcaneus, consistent with enthesopathy. IMPRESSION:  No acute fracture/dislocation. Electronically signed by Milind Moreno MD      Relevant previous imaging reviewed, both imaging and report(s) as below:    No results found.        LABS:   Lab Results   Component Value Date    LABA1C 11.0 (H) 08/30/2022     Lab Results   Component Value Date    EAG 269 08/30/2022         ASSESSMENT AND PLAN:  Body mass index is 46.35 kg/m². She has a right foot/ankle injury, initially concerning for possible partial Achilles tendon injury, with underlying insertional Achilles tendinopathy, possibly occurring on 12/6/2022 versus 2/25/2023. A right ankle MRI on 3/9/2023 showed distal Achilles tendinopathy and probable low-grade partial-thickness intrasubstance tear, as well as possible low-grade tear/strain at the proximal Achilles musculotendinous junction. She has a history of a left distal fibula avulsion fracture, sustained on 6/7/2019. She did develop a diffuse circumferential burning pain, that was associated with a numbness and tingling, that was likely neuropathic in nature. Notably, she has a complex past medical history. She has a history of morbid obesity (BMI greater than 46), history of C. difficile infection, KRISTIN, major depressive disorder, COPD, history of heart disease with 2 myocardial infarction's, a history of uncontrolled type 2 diabetes, and history of tobacco use (reports that she smokes 1/2 pack of cigarettes per day, down from 2.5 packs of cigarettes per day). She also reports that she takes Percocet daily for chronic arm pain. She is also anticoagulated on Plavix. We had a discussion today about the likely diagnosis and its natural history, physical exam and imaging findings, as well as various treatment options in detail. Surgically, we discussed a possible future right Achilles tendon debridement with bony resection, depending on her clinical course. At today's visit, I did recommend conservative management. We discussed that she is at high risk for surgery, given her medical history as above, also in the context of her history of uncontrolled diabetes (hemoglobin A1c of 11.0 on 8/30/2022).   She does report that her COPD has improved, and that she is completely off of home oxygen, and down to 1/2 pack of cigarettes per day (from 2.5 packs of cigarettes per day). We have discussed the patient's history of tobacco use, and that tobacco use puts her at higher risk for complications. We have discussed the risks of wound issues/infection, delayed healing/nonunion, and DVT/pulmonary embolus in the setting of tobacco use. I did recommend tobacco cessation, and verbal understanding was expressed. Orders/referrals were placed as below at today's visit. The patient will continue to use her cam boot and was provided heel lifts. The patient was provided a night splint. The patient will wear the night splint and use the cam boot at all times, with heel lifts PRN, to avoid pain provoking activity. We did discuss the risk of rupture. I referred the patient to physical therapy for Achilles tendinopathy. She may continue to use her topical lidocaine patches for pain. She will continue her daily Plavix for DVT prophylaxis. All questions were answered and the above plan was agreed upon. The patient will return to clinic in 6 weeks without x-rays. At her next visit, I anticipate attempting to wean her out of the boot and progress her activity. At the patient's next visit, depending on how the patient is doing and/or new imaging/labs results, we may consider the following options:    []  Lace up ankle     []  CAM boot         []  removable wrist brace     []  PT:        []  Wean out immobilization         []  Adv activity      []  Footmind        []  Spenco       []  Custom Orthotic:               []  [place] brace                    []  Rocker Bottom      []  Night splint    []  Heel cups        []  Strap        []  Toe gizmos    []  Topl        []  NSAIDs         []  Cristal        []  Ref:         []  Stress Xray    []  CT        []  MRI  []  Inj:          []  Consider OR      []  Pick OR date    No follow-ups on file. No orders of the defined types were placed in this encounter.     No orders of the defined types were placed in this encounter. Flor Clark MD  Orthopedic Surgery        Please excuse any typos/errors, as this note was created with the assistance of voice recognition software. While intending to generate a document that actually reflects the content of the visit, the document can still have some errors including those of syntax and sound-a-like substitutions which may escape proof reading. In such instances, actual meaning can be extrapolated by context.

## 2023-03-14 ENCOUNTER — TELEPHONE (OUTPATIENT)
Dept: ORTHOPEDIC SURGERY | Age: 48
End: 2023-03-14

## 2023-03-14 NOTE — TELEPHONE ENCOUNTER
Dr. Deniz Gupta,     Are you OK with ordering lidocaine ointment? Her insurance will not cover the patches. Please advise, thank you.

## 2023-03-14 NOTE — TELEPHONE ENCOUNTER
Pt of Dr Florian Melissa - he recently wrote RX for 4% External Lidocaine patches. Per pharmacist - ins will not cover the patch, but they will cover the 4% Lidocaine ointment. Can the DR change up the order ?     Respectfully/bbbowen

## 2023-03-15 DIAGNOSIS — S86.011D PARTIAL TEAR OF RIGHT ACHILLES TENDON, SUBSEQUENT ENCOUNTER: Primary | ICD-10-CM

## 2023-03-17 ENCOUNTER — HOSPITAL ENCOUNTER (EMERGENCY)
Dept: VASCULAR LAB | Age: 48
Discharge: HOME | End: 2023-03-17
Payer: COMMERCIAL

## 2023-03-17 ENCOUNTER — APPOINTMENT (OUTPATIENT)
Dept: GENERAL RADIOLOGY | Age: 48
End: 2023-03-17
Payer: COMMERCIAL

## 2023-03-17 ENCOUNTER — HOSPITAL ENCOUNTER (OUTPATIENT)
Age: 48
Setting detail: OBSERVATION
Discharge: HOME | End: 2023-03-21
Attending: EMERGENCY MEDICINE | Admitting: FAMILY MEDICINE
Payer: COMMERCIAL

## 2023-03-17 ENCOUNTER — APPOINTMENT (OUTPATIENT)
Dept: INTERVENTIONAL RADIOLOGY/VASCULAR | Age: 48
End: 2023-03-17
Payer: COMMERCIAL

## 2023-03-17 DIAGNOSIS — R07.9 CHEST PAIN, UNSPECIFIED TYPE: Primary | ICD-10-CM

## 2023-03-17 DIAGNOSIS — E11.65 UNCONTROLLED TYPE 2 DIABETES MELLITUS WITH HYPERGLYCEMIA (HCC): ICD-10-CM

## 2023-03-17 DIAGNOSIS — F32.1 CURRENT MODERATE EPISODE OF MAJOR DEPRESSIVE DISORDER WITHOUT PRIOR EPISODE (HCC): Chronic | ICD-10-CM

## 2023-03-17 LAB
ABSOLUTE EOS #: 0.3 K/UL (ref 0–0.4)
ABSOLUTE LYMPH #: 3 K/UL (ref 1–4.8)
ABSOLUTE MONO #: 0.9 K/UL (ref 0.1–1.3)
ALBUMIN SERPL-MCNC: 4.1 G/DL (ref 3.5–5.2)
ALP SERPL-CCNC: 127 U/L (ref 35–104)
ALT SERPL-CCNC: 18 U/L (ref 5–33)
ANION GAP SERPL CALCULATED.3IONS-SCNC: 11 MMOL/L (ref 9–17)
AST SERPL-CCNC: 12 U/L
BASOPHILS # BLD: 1 % (ref 0–2)
BASOPHILS ABSOLUTE: 0.1 K/UL (ref 0–0.2)
BILIRUB SERPL-MCNC: 0.3 MG/DL (ref 0.3–1.2)
BNP SERPL-MCNC: 126 PG/ML
BUN SERPL-MCNC: 15 MG/DL (ref 6–20)
CALCIUM SERPL-MCNC: 9.3 MG/DL (ref 8.6–10.4)
CHLORIDE SERPL-SCNC: 98 MMOL/L (ref 98–107)
CO2 SERPL-SCNC: 30 MMOL/L (ref 20–31)
CREAT SERPL-MCNC: 0.75 MG/DL (ref 0.5–0.9)
D DIMER BLD IA.RAPID-MCNC: 0.3 MG/L FEU (ref 0–0.59)
EOSINOPHILS RELATIVE PERCENT: 2 % (ref 0–4)
EST. AVERAGE GLUCOSE BLD GHB EST-MCNC: 326 MG/DL
FLUAV RNA RESP QL NAA+PROBE: NOT DETECTED
FLUBV RNA RESP QL NAA+PROBE: NOT DETECTED
GFR SERPL CREATININE-BSD FRML MDRD: >60 ML/MIN/1.73M2
GLUCOSE BLD-MCNC: 262 MG/DL (ref 65–105)
GLUCOSE BLD-MCNC: 267 MG/DL (ref 65–105)
GLUCOSE BLD-MCNC: 313 MG/DL (ref 65–105)
GLUCOSE SERPL-MCNC: 408 MG/DL (ref 70–99)
HBA1C MFR BLD: 13 % (ref 4–6)
HCT VFR BLD AUTO: 45.1 % (ref 36–46)
HGB BLD-MCNC: 15.4 G/DL (ref 12–16)
LIPASE SERPL-CCNC: 16 U/L (ref 13–60)
LYMPHOCYTES # BLD: 24 % (ref 24–44)
MCH RBC QN AUTO: 33.8 PG (ref 26–34)
MCHC RBC AUTO-ENTMCNC: 34.1 G/DL (ref 31–37)
MCV RBC AUTO: 99.2 FL (ref 80–100)
MONOCYTES # BLD: 7 % (ref 1–7)
PDW BLD-RTO: 12.8 % (ref 11.5–14.9)
PLATELET # BLD AUTO: 224 K/UL (ref 150–450)
PMV BLD AUTO: 8.7 FL (ref 6–12)
POTASSIUM SERPL-SCNC: 4.1 MMOL/L (ref 3.7–5.3)
PROT SERPL-MCNC: 6.9 G/DL (ref 6.4–8.3)
RBC # BLD: 4.55 M/UL (ref 4–5.2)
SARS-COV-2 RNA RESP QL NAA+PROBE: NOT DETECTED
SEG NEUTROPHILS: 66 % (ref 36–66)
SEGMENTED NEUTROPHILS ABSOLUTE COUNT: 8.4 K/UL (ref 1.3–9.1)
SODIUM SERPL-SCNC: 139 MMOL/L (ref 135–144)
SOURCE: NORMAL
SPECIMEN DESCRIPTION: NORMAL
TROPONIN I SERPL DL<=0.01 NG/ML-MCNC: 12 NG/L (ref 0–14)
TROPONIN I SERPL DL<=0.01 NG/ML-MCNC: 13 NG/L (ref 0–14)
WBC # BLD AUTO: 12.7 K/UL (ref 3.5–11)

## 2023-03-17 PROCEDURE — 96376 TX/PRO/DX INJ SAME DRUG ADON: CPT

## 2023-03-17 PROCEDURE — 84484 ASSAY OF TROPONIN QUANT: CPT

## 2023-03-17 PROCEDURE — G0378 HOSPITAL OBSERVATION PER HR: HCPCS

## 2023-03-17 PROCEDURE — 6360000002 HC RX W HCPCS: Performed by: FAMILY MEDICINE

## 2023-03-17 PROCEDURE — 85379 FIBRIN DEGRADATION QUANT: CPT

## 2023-03-17 PROCEDURE — 99285 EMERGENCY DEPT VISIT HI MDM: CPT

## 2023-03-17 PROCEDURE — 2709999900 IR FLUORO GUIDED CVA DEVICE PLMT/REPLACE/REMOVAL

## 2023-03-17 PROCEDURE — 80053 COMPREHEN METABOLIC PANEL: CPT

## 2023-03-17 PROCEDURE — 94640 AIRWAY INHALATION TREATMENT: CPT

## 2023-03-17 PROCEDURE — 2580000003 HC RX 258: Performed by: EMERGENCY MEDICINE

## 2023-03-17 PROCEDURE — 93970 EXTREMITY STUDY: CPT

## 2023-03-17 PROCEDURE — 83036 HEMOGLOBIN GLYCOSYLATED A1C: CPT

## 2023-03-17 PROCEDURE — 82947 ASSAY GLUCOSE BLOOD QUANT: CPT

## 2023-03-17 PROCEDURE — 96374 THER/PROPH/DIAG INJ IV PUSH: CPT

## 2023-03-17 PROCEDURE — 93005 ELECTROCARDIOGRAM TRACING: CPT | Performed by: EMERGENCY MEDICINE

## 2023-03-17 PROCEDURE — 87636 SARSCOV2 & INF A&B AMP PRB: CPT

## 2023-03-17 PROCEDURE — 6360000002 HC RX W HCPCS: Performed by: EMERGENCY MEDICINE

## 2023-03-17 PROCEDURE — 71045 X-RAY EXAM CHEST 1 VIEW: CPT

## 2023-03-17 PROCEDURE — 6370000000 HC RX 637 (ALT 250 FOR IP): Performed by: FAMILY MEDICINE

## 2023-03-17 PROCEDURE — 2580000003 HC RX 258: Performed by: FAMILY MEDICINE

## 2023-03-17 PROCEDURE — 36410 VNPNXR 3YR/> PHY/QHP DX/THER: CPT

## 2023-03-17 PROCEDURE — 76937 US GUIDE VASCULAR ACCESS: CPT

## 2023-03-17 PROCEDURE — 85025 COMPLETE CBC W/AUTO DIFF WBC: CPT

## 2023-03-17 PROCEDURE — 83690 ASSAY OF LIPASE: CPT

## 2023-03-17 PROCEDURE — 36415 COLL VENOUS BLD VENIPUNCTURE: CPT

## 2023-03-17 PROCEDURE — 83880 ASSAY OF NATRIURETIC PEPTIDE: CPT

## 2023-03-17 RX ORDER — INSULIN LISPRO 100 [IU]/ML
0-4 INJECTION, SOLUTION INTRAVENOUS; SUBCUTANEOUS NIGHTLY
Status: DISCONTINUED | OUTPATIENT
Start: 2023-03-17 | End: 2023-03-21 | Stop reason: HOSPADM

## 2023-03-17 RX ORDER — DULOXETIN HYDROCHLORIDE 60 MG/1
60 CAPSULE, DELAYED RELEASE ORAL 2 TIMES DAILY
Status: DISCONTINUED | OUTPATIENT
Start: 2023-03-17 | End: 2023-03-17

## 2023-03-17 RX ORDER — POLYETHYLENE GLYCOL 3350 17 G/17G
17 POWDER, FOR SOLUTION ORAL DAILY PRN
Status: DISCONTINUED | OUTPATIENT
Start: 2023-03-17 | End: 2023-03-21 | Stop reason: HOSPADM

## 2023-03-17 RX ORDER — CARVEDILOL 12.5 MG/1
12.5 TABLET ORAL 2 TIMES DAILY WITH MEALS
Status: DISCONTINUED | OUTPATIENT
Start: 2023-03-17 | End: 2023-03-21 | Stop reason: HOSPADM

## 2023-03-17 RX ORDER — INSULIN LISPRO 100 [IU]/ML
0-16 INJECTION, SOLUTION INTRAVENOUS; SUBCUTANEOUS
Status: DISCONTINUED | OUTPATIENT
Start: 2023-03-17 | End: 2023-03-21 | Stop reason: HOSPADM

## 2023-03-17 RX ORDER — MAGNESIUM OXIDE 400 MG/1
400 TABLET ORAL DAILY
Status: DISCONTINUED | OUTPATIENT
Start: 2023-03-17 | End: 2023-03-17 | Stop reason: ALTCHOICE

## 2023-03-17 RX ORDER — IPRATROPIUM BROMIDE AND ALBUTEROL SULFATE 2.5; .5 MG/3ML; MG/3ML
1 SOLUTION RESPIRATORY (INHALATION) 4 TIMES DAILY
Status: DISCONTINUED | OUTPATIENT
Start: 2023-03-17 | End: 2023-03-21 | Stop reason: HOSPADM

## 2023-03-17 RX ORDER — BUMETANIDE 1 MG/1
1 TABLET ORAL DAILY PRN
Status: DISCONTINUED | OUTPATIENT
Start: 2023-03-17 | End: 2023-03-21 | Stop reason: HOSPADM

## 2023-03-17 RX ORDER — BUMETANIDE 1 MG/1
1 TABLET ORAL DAILY
Status: DISCONTINUED | OUTPATIENT
Start: 2023-03-17 | End: 2023-03-17

## 2023-03-17 RX ORDER — SODIUM CHLORIDE 0.9 % (FLUSH) 0.9 %
5-40 SYRINGE (ML) INJECTION PRN
Status: DISCONTINUED | OUTPATIENT
Start: 2023-03-17 | End: 2023-03-21 | Stop reason: HOSPADM

## 2023-03-17 RX ORDER — DULOXETIN HYDROCHLORIDE 60 MG/1
60 CAPSULE, DELAYED RELEASE ORAL DAILY
Status: DISCONTINUED | OUTPATIENT
Start: 2023-03-18 | End: 2023-03-21 | Stop reason: HOSPADM

## 2023-03-17 RX ORDER — SODIUM CHLORIDE 9 MG/ML
INJECTION, SOLUTION INTRAVENOUS PRN
Status: DISCONTINUED | OUTPATIENT
Start: 2023-03-17 | End: 2023-03-21 | Stop reason: HOSPADM

## 2023-03-17 RX ORDER — CALCIUM CITRATE/VITAMIN D3 200MG-6.25
2 TABLET ORAL DAILY
Status: DISCONTINUED | OUTPATIENT
Start: 2023-03-17 | End: 2023-03-17 | Stop reason: ALTCHOICE

## 2023-03-17 RX ORDER — PANTOPRAZOLE SODIUM 40 MG/1
40 TABLET, DELAYED RELEASE ORAL 2 TIMES DAILY
Status: DISCONTINUED | OUTPATIENT
Start: 2023-03-17 | End: 2023-03-21 | Stop reason: HOSPADM

## 2023-03-17 RX ORDER — DEXTROSE MONOHYDRATE 100 MG/ML
INJECTION, SOLUTION INTRAVENOUS CONTINUOUS PRN
Status: DISCONTINUED | OUTPATIENT
Start: 2023-03-17 | End: 2023-03-21 | Stop reason: HOSPADM

## 2023-03-17 RX ORDER — ACETAMINOPHEN 650 MG/1
650 SUPPOSITORY RECTAL EVERY 6 HOURS PRN
Status: DISCONTINUED | OUTPATIENT
Start: 2023-03-17 | End: 2023-03-21 | Stop reason: HOSPADM

## 2023-03-17 RX ORDER — ISOSORBIDE MONONITRATE 30 MG/1
30 TABLET, EXTENDED RELEASE ORAL DAILY
Status: DISCONTINUED | OUTPATIENT
Start: 2023-03-18 | End: 2023-03-21 | Stop reason: HOSPADM

## 2023-03-17 RX ORDER — ATORVASTATIN CALCIUM 80 MG/1
80 TABLET, FILM COATED ORAL EVERY 24 HOURS
Status: DISCONTINUED | OUTPATIENT
Start: 2023-03-17 | End: 2023-03-21 | Stop reason: HOSPADM

## 2023-03-17 RX ORDER — CETIRIZINE HYDROCHLORIDE 10 MG/1
10 TABLET ORAL DAILY
Status: DISCONTINUED | OUTPATIENT
Start: 2023-03-17 | End: 2023-03-21 | Stop reason: HOSPADM

## 2023-03-17 RX ORDER — CARVEDILOL 12.5 MG/1
12.5 TABLET ORAL 2 TIMES DAILY WITH MEALS
Status: DISCONTINUED | OUTPATIENT
Start: 2023-03-17 | End: 2023-03-17

## 2023-03-17 RX ORDER — INSULIN GLARGINE 100 [IU]/ML
48 INJECTION, SOLUTION SUBCUTANEOUS 2 TIMES DAILY
Status: DISCONTINUED | OUTPATIENT
Start: 2023-03-17 | End: 2023-03-17

## 2023-03-17 RX ORDER — LANOLIN ALCOHOL/MO/W.PET/CERES
400 CREAM (GRAM) TOPICAL EVERY 24 HOURS
Status: DISCONTINUED | OUTPATIENT
Start: 2023-03-17 | End: 2023-03-21 | Stop reason: HOSPADM

## 2023-03-17 RX ORDER — CLOPIDOGREL BISULFATE 75 MG/1
75 TABLET ORAL EVERY 24 HOURS
Status: DISCONTINUED | OUTPATIENT
Start: 2023-03-17 | End: 2023-03-21 | Stop reason: HOSPADM

## 2023-03-17 RX ORDER — OXYCODONE HYDROCHLORIDE AND ACETAMINOPHEN 5; 325 MG/1; MG/1
1 TABLET ORAL EVERY 4 HOURS PRN
Status: DISCONTINUED | OUTPATIENT
Start: 2023-03-17 | End: 2023-03-21 | Stop reason: HOSPADM

## 2023-03-17 RX ORDER — INSULIN GLARGINE 100 [IU]/ML
64 INJECTION, SOLUTION SUBCUTANEOUS 2 TIMES DAILY
Status: DISCONTINUED | OUTPATIENT
Start: 2023-03-17 | End: 2023-03-21 | Stop reason: HOSPADM

## 2023-03-17 RX ORDER — ISOSORBIDE MONONITRATE 30 MG/1
30 TABLET, EXTENDED RELEASE ORAL DAILY
Status: DISCONTINUED | OUTPATIENT
Start: 2023-03-17 | End: 2023-03-17

## 2023-03-17 RX ORDER — 0.9 % SODIUM CHLORIDE 0.9 %
1000 INTRAVENOUS SOLUTION INTRAVENOUS ONCE
Status: COMPLETED | OUTPATIENT
Start: 2023-03-17 | End: 2023-03-17

## 2023-03-17 RX ORDER — ATORVASTATIN CALCIUM 80 MG/1
80 TABLET, FILM COATED ORAL NIGHTLY
Status: DISCONTINUED | OUTPATIENT
Start: 2023-03-17 | End: 2023-03-17

## 2023-03-17 RX ORDER — SODIUM CHLORIDE 0.9 % (FLUSH) 0.9 %
5-40 SYRINGE (ML) INJECTION EVERY 12 HOURS SCHEDULED
Status: DISCONTINUED | OUTPATIENT
Start: 2023-03-17 | End: 2023-03-21 | Stop reason: HOSPADM

## 2023-03-17 RX ORDER — ACETAMINOPHEN 325 MG/1
650 TABLET ORAL EVERY 6 HOURS PRN
Status: DISCONTINUED | OUTPATIENT
Start: 2023-03-17 | End: 2023-03-21 | Stop reason: HOSPADM

## 2023-03-17 RX ORDER — ENOXAPARIN SODIUM 100 MG/ML
40 INJECTION SUBCUTANEOUS DAILY
Status: DISCONTINUED | OUTPATIENT
Start: 2023-03-17 | End: 2023-03-17 | Stop reason: ALTCHOICE

## 2023-03-17 RX ORDER — CLOPIDOGREL BISULFATE 75 MG/1
75 TABLET ORAL DAILY
Status: DISCONTINUED | OUTPATIENT
Start: 2023-03-17 | End: 2023-03-17

## 2023-03-17 RX ORDER — OXYCODONE HYDROCHLORIDE AND ACETAMINOPHEN 5; 325 MG/1; MG/1
1 TABLET ORAL EVERY 4 HOURS PRN
Status: DISCONTINUED | OUTPATIENT
Start: 2023-03-17 | End: 2023-03-17

## 2023-03-17 RX ADMIN — INSULIN LISPRO 8 UNITS: 100 INJECTION, SOLUTION INTRAVENOUS; SUBCUTANEOUS at 17:32

## 2023-03-17 RX ADMIN — SODIUM CHLORIDE 1000 ML: 9 INJECTION, SOLUTION INTRAVENOUS at 13:41

## 2023-03-17 RX ADMIN — HYDROMORPHONE HYDROCHLORIDE 0.5 MG: 1 INJECTION, SOLUTION INTRAMUSCULAR; INTRAVENOUS; SUBCUTANEOUS at 19:44

## 2023-03-17 RX ADMIN — IPRATROPIUM BROMIDE AND ALBUTEROL SULFATE 1 AMPULE: 2.5; .5 SOLUTION RESPIRATORY (INHALATION) at 20:31

## 2023-03-17 RX ADMIN — QUETIAPINE FUMARATE 250 MG: 200 TABLET ORAL at 18:53

## 2023-03-17 RX ADMIN — CARVEDILOL 12.5 MG: 12.5 TABLET, FILM COATED ORAL at 18:54

## 2023-03-17 RX ADMIN — HYDROMORPHONE HYDROCHLORIDE 1 MG: 1 INJECTION, SOLUTION INTRAMUSCULAR; INTRAVENOUS; SUBCUTANEOUS at 13:32

## 2023-03-17 RX ADMIN — INSULIN GLARGINE 64 UNITS: 100 INJECTION, SOLUTION SUBCUTANEOUS at 18:51

## 2023-03-17 RX ADMIN — ATORVASTATIN CALCIUM 80 MG: 80 TABLET, FILM COATED ORAL at 18:53

## 2023-03-17 RX ADMIN — HYDROMORPHONE HYDROCHLORIDE 1 MG: 1 INJECTION, SOLUTION INTRAMUSCULAR; INTRAVENOUS; SUBCUTANEOUS at 15:22

## 2023-03-17 RX ADMIN — MAGNESIUM OXIDE TAB 400 MG (241.3 MG ELEMENTAL MG) 400 MG: 400 (241.3 MG) TAB at 17:32

## 2023-03-17 RX ADMIN — HYDROMORPHONE HYDROCHLORIDE 0.5 MG: 1 INJECTION, SOLUTION INTRAMUSCULAR; INTRAVENOUS; SUBCUTANEOUS at 22:11

## 2023-03-17 RX ADMIN — SODIUM CHLORIDE, PRESERVATIVE FREE 10 ML: 5 INJECTION INTRAVENOUS at 22:11

## 2023-03-17 RX ADMIN — CLOPIDOGREL BISULFATE 75 MG: 75 TABLET ORAL at 18:53

## 2023-03-17 RX ADMIN — PANTOPRAZOLE SODIUM 40 MG: 40 TABLET, DELAYED RELEASE ORAL at 18:53

## 2023-03-17 RX ADMIN — HYDROMORPHONE HYDROCHLORIDE 0.5 MG: 1 INJECTION, SOLUTION INTRAMUSCULAR; INTRAVENOUS; SUBCUTANEOUS at 17:32

## 2023-03-17 ASSESSMENT — PAIN DESCRIPTION - ORIENTATION
ORIENTATION: MID
ORIENTATION: LEFT
ORIENTATION: MID

## 2023-03-17 ASSESSMENT — ENCOUNTER SYMPTOMS
CHEST TIGHTNESS: 0
DIARRHEA: 0
EYE REDNESS: 0
EYE PAIN: 0
NAUSEA: 1
COUGH: 0
CONSTIPATION: 0
SORE THROAT: 0
ABDOMINAL PAIN: 0
VOMITING: 0
BACK PAIN: 0
SHORTNESS OF BREATH: 0

## 2023-03-17 ASSESSMENT — PAIN DESCRIPTION - ONSET
ONSET: ON-GOING
ONSET: ON-GOING

## 2023-03-17 ASSESSMENT — PAIN DESCRIPTION - FREQUENCY
FREQUENCY: CONTINUOUS
FREQUENCY: CONTINUOUS

## 2023-03-17 ASSESSMENT — PAIN SCALES - GENERAL
PAINLEVEL_OUTOF10: 9
PAINLEVEL_OUTOF10: 10
PAINLEVEL_OUTOF10: 8
PAINLEVEL_OUTOF10: 5
PAINLEVEL_OUTOF10: 10

## 2023-03-17 ASSESSMENT — PAIN DESCRIPTION - LOCATION
LOCATION: CHEST
LOCATION: CHEST;SHOULDER;ARM
LOCATION: CHEST

## 2023-03-17 ASSESSMENT — PAIN DESCRIPTION - PAIN TYPE
TYPE: ACUTE PAIN
TYPE: ACUTE PAIN

## 2023-03-17 ASSESSMENT — PAIN DESCRIPTION - DIRECTION
RADIATING_TOWARDS: BACK OF LEFT ARM
RADIATING_TOWARDS: BACK OF LEFT ARM

## 2023-03-17 ASSESSMENT — PAIN - FUNCTIONAL ASSESSMENT
PAIN_FUNCTIONAL_ASSESSMENT: PREVENTS OR INTERFERES SOME ACTIVE ACTIVITIES AND ADLS
PAIN_FUNCTIONAL_ASSESSMENT: 0-10
PAIN_FUNCTIONAL_ASSESSMENT: PREVENTS OR INTERFERES SOME ACTIVE ACTIVITIES AND ADLS
PAIN_FUNCTIONAL_ASSESSMENT: 0-10

## 2023-03-17 ASSESSMENT — PAIN DESCRIPTION - DESCRIPTORS
DESCRIPTORS: SHARP;SQUEEZING
DESCRIPTORS: SHARP
DESCRIPTORS: ACHING;SQUEEZING

## 2023-03-17 NOTE — PROGRESS NOTES
Patient brought to IR suite, after consent obtained, positioned, prepped/draped per usual fashion. Time out performed and Allergies reviewed. Dr. Josefina Michaud placed a basilic single lumen midline 4.5 cm x 15 cm. Area was cleansed and dressing applied. Patient tolerated procedure well, no distress. Writer took patient back to ER-room 12.

## 2023-03-17 NOTE — PROGRESS NOTES
Medication History completed:    New medications: none    Medications discontinued: none    Medications flagged for review:   Viibryd - no longer taking  Lidocaine patches - no longer using  Dextromethorphan-guaifenesin - no longer taking  Colchicine - no longer taking  Budesonide nebulizer suspension - no longer taking  Eliquis - no longer taking    Changes to dosing:   Percocet changed to 1 tab every 4 hours as needed with a max of 5 tablets per day    Stated allergies: As listed    Other pertinent information: Medications confirmed with with [facility].      Thank you,  Helga Tan, PharmD, BCPS  [phone]

## 2023-03-17 NOTE — CARE COORDINATION
Case Management Assessment  Initial Evaluation    Date/Time of Evaluation: 3/17/2023 4:37 PM  Assessment Completed by: Cherise Morales RN    If patient is discharged prior to next notation, then this note serves as note for discharge by case management. Patient Name: Alyce Casillas                   YOB: 1975  Diagnosis: Chest pain [R07.9]  Chest pain, unspecified type [R07.9]                   Date / Time: 3/17/2023 11:24 AM    Patient Admission Status: Observation   Readmission Risk (Low < 19, Mod (19-27), High > 27): Readmission Risk Score: 18.9    Current PCP: Chante Gold MD  PCP verified by CM? Yes    Chart Reviewed: Yes      History Provided by: Patient  Patient Orientation: Alert and Oriented    Patient Cognition: Alert    Hospitalization in the last 30 days (Readmission):  No    If yes, Readmission Assessment in  Navigator will be completed. Advance Directives:      Code Status: Full Code   Patient's Primary Decision Maker is: Legal Next of Kin      Discharge Planning:    Patient lives with: Family Members Type of Home: House  Primary Care Giver: Self  Patient Support Systems include: Family Members   Current Financial resources: Medicare  Current community resources: None  Current services prior to admission: Durable Medical Equipment            Current DME: Glucometer            Type of Home Care services:  None    ADLS  Prior functional level: Independent in ADLs/IADLs  Current functional level: Independent in ADLs/IADLs    PT AM-PAC:   /24  OT AM-PAC:   /24    Family can provide assistance at DC: Yes  Would you like Case Management to discuss the discharge plan with any other family members/significant others, and if so, who? Yes  Plans to Return to Present Housing: Yes  Other Identified Issues/Barriers to RETURNING to current housing: No barriers to returning home with niece.   Potential Assistance needed at discharge: N/A            Potential DME:  NA  Patient expects to discharge to: [address] [place] [address] for transportation at discharge: Family    Financial    Payor: Sarah Parkinson / Plan: Shoaib Morales / Product Type: *No Product type* /     Does insurance require precert for SNF: Yes    Potential assistance Purchasing Medications: Yes  Meds-to-Beds request:        [facility]uan [facility] Quezada, [address] [place] [address] [phone] [place] [phone]  Sveta Sew[place] [address]  Phone: [phone] Fax: [phone]      Notes:    Factors facilitating achievement of predicted outcomes: Family support, Motivated, Cooperative, and Pleasant    Barriers to discharge: No barriers to discharge. Additional Case Management Notes: Patient is independent, lives with her niece currently as her spouse recently . DME: Glucometer and CAM walker boot for Achilles tendon rupture. VNS: Declines. Will follow for needs. The Plan for Transition of Care is related to the following treatment goals of Chest pain [R07.9]  Chest pain, unspecified type [V16.7]    IF APPLICABLE: The Patient and/or patient representative Sheron Greene and her family were provided with a choice of provider and agrees with the discharge plan. Freedom of choice list with basic dialogue that supports the patient's individualized plan of care/goals and shares the quality data associated with the providers was provided to: Patient (Declined)   Patient Representative Name:       The Patient and/or Patient Representative Agree with the Discharge Plan?  Yes    Loida Recio RN  Case Management Department  Ph: [phone] Fax: [phone]

## 2023-03-17 NOTE — ED NOTES
Report given to RN  Report method by phone   The following was reviewed with receiving RN:   Current vital signs:  BP (!) 145/102   Pulse 89   Temp 97.6 °F (36.4 °C) (Oral)   Resp 16   Ht 5' 4\" (1.626 m)   Wt 270 lb (122.5 kg)   SpO2 99%   BMI 46.35 kg/m²                MEWS Score: 1     Any medication or safety alerts were reviewed. Any pending diagnostics and notifications were also reviewed, as well as any safety concerns or issues, abnormal labs, abnormal imaging, and abnormal assessment findings. Questions were answered.           Arthur Light RN  03/17/23 [id number]

## 2023-03-17 NOTE — ED TRIAGE NOTES
Mode of arrival (squad #, walk in, police, etc) : Walk in        Chief complaint(s): Chest pain        Arrival Note (brief scenario, treatment PTA, etc). : Patient states chest pain has occurred since lase evening, is mid-sternal and radiates to the back of her left arm. EKG done in triage with provider reviewing. C= \"Have you ever felt that you should Cut down on your drinking? \"  No  A= \"Have people Annoyed you by criticizing your drinking? \"  No  G= \"Have you ever felt bad or Guilty about your drinking? \"  No  E= \"Have you ever had a drink as an Eye-opener first thing in the morning to steady your nerves or to help a hangover? \"  No      Deferred []      Reason for deferring: N/A    *If yes to two or more: probable alcohol abuse. *

## 2023-03-17 NOTE — ED NOTES
Selena Small updated on repeat POC glucose no orders received for blood sugar     Gita Ramírez RN  03/17/23 5440

## 2023-03-17 NOTE — BRIEF OP NOTE
Brief Postoperative Note    Miriam Evans  YOB: 1975  [id number]    Pre-operative Diagnosis: Chest pain    Post-operative Diagnosis: Same    Procedure: Midline venous catheter placement    Anesthesia: Local    Surgeons/Assistants: Placido    Estimated Blood Loss: less than 50     Complications: None    Specimens: Was Not Obtained     Electronically signed by Mary Carmen Quach MD on 3/17/2023 at 1:21 PM

## 2023-03-18 LAB
GLUCOSE BLD-MCNC: 133 MG/DL (ref 65–105)
GLUCOSE BLD-MCNC: 280 MG/DL (ref 65–105)
GLUCOSE BLD-MCNC: 296 MG/DL (ref 65–105)
GLUCOSE BLD-MCNC: 95 MG/DL (ref 65–105)
HCT VFR BLD AUTO: 41.4 % (ref 36–46)
HGB BLD-MCNC: 14.3 G/DL (ref 12–16)
MCH RBC QN AUTO: 33.9 PG (ref 26–34)
MCHC RBC AUTO-ENTMCNC: 34.5 G/DL (ref 31–37)
MCV RBC AUTO: 98.2 FL (ref 80–100)
PDW BLD-RTO: 12.8 % (ref 11.5–14.9)
PLATELET # BLD AUTO: 156 K/UL (ref 150–450)
PMV BLD AUTO: 9 FL (ref 6–12)
RBC # BLD: 4.22 M/UL (ref 4–5.2)
REASON FOR REJECTION: NORMAL
WBC # BLD AUTO: 12 K/UL (ref 3.5–11)
ZZ NTE CLEAN UP: ORDERED TEST: NORMAL
ZZ NTE WITH NAME CLEAN UP: SPECIMEN SOURCE: NORMAL

## 2023-03-18 PROCEDURE — 94761 N-INVAS EAR/PLS OXIMETRY MLT: CPT

## 2023-03-18 PROCEDURE — 85027 COMPLETE CBC AUTOMATED: CPT

## 2023-03-18 PROCEDURE — 99223 1ST HOSP IP/OBS HIGH 75: CPT | Performed by: FAMILY MEDICINE

## 2023-03-18 PROCEDURE — G0378 HOSPITAL OBSERVATION PER HR: HCPCS

## 2023-03-18 PROCEDURE — 6360000002 HC RX W HCPCS: Performed by: FAMILY MEDICINE

## 2023-03-18 PROCEDURE — 6370000000 HC RX 637 (ALT 250 FOR IP): Performed by: FAMILY MEDICINE

## 2023-03-18 PROCEDURE — 94664 DEMO&/EVAL PT USE INHALER: CPT

## 2023-03-18 PROCEDURE — 94640 AIRWAY INHALATION TREATMENT: CPT

## 2023-03-18 PROCEDURE — 2580000003 HC RX 258: Performed by: FAMILY MEDICINE

## 2023-03-18 PROCEDURE — 96376 TX/PRO/DX INJ SAME DRUG ADON: CPT

## 2023-03-18 PROCEDURE — 36415 COLL VENOUS BLD VENIPUNCTURE: CPT

## 2023-03-18 PROCEDURE — 82947 ASSAY GLUCOSE BLOOD QUANT: CPT

## 2023-03-18 RX ADMIN — INSULIN LISPRO 8 UNITS: 100 INJECTION, SOLUTION INTRAVENOUS; SUBCUTANEOUS at 12:01

## 2023-03-18 RX ADMIN — HYDROMORPHONE HYDROCHLORIDE 0.5 MG: 1 INJECTION, SOLUTION INTRAMUSCULAR; INTRAVENOUS; SUBCUTANEOUS at 05:46

## 2023-03-18 RX ADMIN — HYDROMORPHONE HYDROCHLORIDE 0.5 MG: 1 INJECTION, SOLUTION INTRAMUSCULAR; INTRAVENOUS; SUBCUTANEOUS at 00:51

## 2023-03-18 RX ADMIN — HYDROMORPHONE HYDROCHLORIDE 0.5 MG: 1 INJECTION, SOLUTION INTRAMUSCULAR; INTRAVENOUS; SUBCUTANEOUS at 15:04

## 2023-03-18 RX ADMIN — INSULIN GLARGINE 64 UNITS: 100 INJECTION, SOLUTION SUBCUTANEOUS at 07:05

## 2023-03-18 RX ADMIN — HYDROMORPHONE HYDROCHLORIDE 0.5 MG: 1 INJECTION, SOLUTION INTRAMUSCULAR; INTRAVENOUS; SUBCUTANEOUS at 21:08

## 2023-03-18 RX ADMIN — SODIUM CHLORIDE, PRESERVATIVE FREE 5 ML: 5 INJECTION INTRAVENOUS at 00:04

## 2023-03-18 RX ADMIN — IPRATROPIUM BROMIDE AND ALBUTEROL SULFATE 1 AMPULE: 2.5; .5 SOLUTION RESPIRATORY (INHALATION) at 07:26

## 2023-03-18 RX ADMIN — HYDROMORPHONE HYDROCHLORIDE 0.5 MG: 1 INJECTION, SOLUTION INTRAMUSCULAR; INTRAVENOUS; SUBCUTANEOUS at 23:28

## 2023-03-18 RX ADMIN — HYDROMORPHONE HYDROCHLORIDE 0.5 MG: 1 INJECTION, SOLUTION INTRAMUSCULAR; INTRAVENOUS; SUBCUTANEOUS at 12:02

## 2023-03-18 RX ADMIN — HYDROMORPHONE HYDROCHLORIDE 0.5 MG: 1 INJECTION, SOLUTION INTRAMUSCULAR; INTRAVENOUS; SUBCUTANEOUS at 08:56

## 2023-03-18 RX ADMIN — CARVEDILOL 12.5 MG: 12.5 TABLET, FILM COATED ORAL at 07:05

## 2023-03-18 RX ADMIN — DULOXETINE HYDROCHLORIDE 60 MG: 60 CAPSULE, DELAYED RELEASE ORAL at 09:38

## 2023-03-18 RX ADMIN — DORNASE ALFA 2.5 MG: 1 SOLUTION RESPIRATORY (INHALATION) at 08:48

## 2023-03-18 RX ADMIN — BUMETANIDE 1 MG: 1 TABLET ORAL at 05:59

## 2023-03-18 RX ADMIN — QUETIAPINE FUMARATE 250 MG: 200 TABLET ORAL at 18:27

## 2023-03-18 RX ADMIN — ISOSORBIDE MONONITRATE 30 MG: 30 TABLET, EXTENDED RELEASE ORAL at 07:04

## 2023-03-18 RX ADMIN — ATORVASTATIN CALCIUM 80 MG: 80 TABLET, FILM COATED ORAL at 18:27

## 2023-03-18 RX ADMIN — CARVEDILOL 12.5 MG: 12.5 TABLET, FILM COATED ORAL at 18:27

## 2023-03-18 RX ADMIN — PANTOPRAZOLE SODIUM 40 MG: 40 TABLET, DELAYED RELEASE ORAL at 05:46

## 2023-03-18 RX ADMIN — IPRATROPIUM BROMIDE AND ALBUTEROL SULFATE 1 AMPULE: 2.5; .5 SOLUTION RESPIRATORY (INHALATION) at 14:58

## 2023-03-18 RX ADMIN — CLOPIDOGREL BISULFATE 75 MG: 75 TABLET ORAL at 18:27

## 2023-03-18 RX ADMIN — HYDROMORPHONE HYDROCHLORIDE 0.5 MG: 1 INJECTION, SOLUTION INTRAMUSCULAR; INTRAVENOUS; SUBCUTANEOUS at 17:01

## 2023-03-18 RX ADMIN — CETIRIZINE HYDROCHLORIDE 10 MG: 10 TABLET, FILM COATED ORAL at 07:05

## 2023-03-18 RX ADMIN — INSULIN GLARGINE 64 UNITS: 100 INJECTION, SOLUTION SUBCUTANEOUS at 18:23

## 2023-03-18 RX ADMIN — SODIUM CHLORIDE, PRESERVATIVE FREE 10 ML: 5 INJECTION INTRAVENOUS at 09:38

## 2023-03-18 RX ADMIN — IPRATROPIUM BROMIDE AND ALBUTEROL SULFATE 1 AMPULE: 2.5; .5 SOLUTION RESPIRATORY (INHALATION) at 11:34

## 2023-03-18 RX ADMIN — SODIUM CHLORIDE, PRESERVATIVE FREE 10 ML: 5 INJECTION INTRAVENOUS at 21:09

## 2023-03-18 RX ADMIN — PANTOPRAZOLE SODIUM 40 MG: 40 TABLET, DELAYED RELEASE ORAL at 18:27

## 2023-03-18 RX ADMIN — INSULIN LISPRO 8 UNITS: 100 INJECTION, SOLUTION INTRAVENOUS; SUBCUTANEOUS at 09:36

## 2023-03-18 RX ADMIN — MAGNESIUM OXIDE TAB 400 MG (241.3 MG ELEMENTAL MG) 400 MG: 400 (241.3 MG) TAB at 18:29

## 2023-03-18 RX ADMIN — HYDROMORPHONE HYDROCHLORIDE 0.5 MG: 1 INJECTION, SOLUTION INTRAMUSCULAR; INTRAVENOUS; SUBCUTANEOUS at 19:11

## 2023-03-18 ASSESSMENT — PAIN DESCRIPTION - LOCATION
LOCATION: CHEST

## 2023-03-18 ASSESSMENT — PAIN DESCRIPTION - ORIENTATION
ORIENTATION: LEFT
ORIENTATION: LEFT

## 2023-03-18 ASSESSMENT — PAIN SCALES - GENERAL
PAINLEVEL_OUTOF10: 10
PAINLEVEL_OUTOF10: 9
PAINLEVEL_OUTOF10: 10
PAINLEVEL_OUTOF10: 5
PAINLEVEL_OUTOF10: 9
PAINLEVEL_OUTOF10: 10
PAINLEVEL_OUTOF10: 10
PAINLEVEL_OUTOF10: 8
PAINLEVEL_OUTOF10: 10
PAINLEVEL_OUTOF10: 8
PAINLEVEL_OUTOF10: 10
PAINLEVEL_OUTOF10: 10

## 2023-03-18 ASSESSMENT — PAIN DESCRIPTION - DESCRIPTORS
DESCRIPTORS: ACHING
DESCRIPTORS: ACHING
DESCRIPTORS: ACHING;TIGHTNESS;NUMBNESS

## 2023-03-18 ASSESSMENT — PAIN - FUNCTIONAL ASSESSMENT
PAIN_FUNCTIONAL_ASSESSMENT: ACTIVITIES ARE NOT PREVENTED
PAIN_FUNCTIONAL_ASSESSMENT: ACTIVITIES ARE NOT PREVENTED

## 2023-03-18 NOTE — CONSULTS
[facility]), Encounter for monitoring sotalol therapy, Encounter for screening mammogram for malignant neoplasm of breast, Endometriosis, Fainting, GERD (gastroesophageal reflux disease), GI bleeding, H. pylori infection, Helicobacter pylori (H. pylori), Solomon (hard of hearing), HTN (hypertension), Hyperlipidemia, Hypertension, Left bicipital tenosynovitis, Left leg pain, Left sided abdominal pain of unknown cause, Leg swelling, Mastoiditis, Mastoiditis, acute, Migraines, Morbid obesity ([address] Myocardial infarction ([address] Nausea, Neuropathy, On home oxygen therapy, Ovarian cyst, Passed out, Pulmonary hypertension ([address] Pulmonary insufficiency, PVC (premature ventricular contraction), Seasonal allergies, Tricuspid insufficiency, and Type II or unspecified type diabetes mellitus without mention of complication, not stated as uncontrolled. Past Surgical History:   has a past surgical history that includes Hysterectomy; Cholecystectomy; Appendectomy; Colonoscopy; Upper gastrointestinal endoscopy; Abdomen surgery; Abdominal adhesion surgery; Abdominal exploration surgery; Tympanostomy tube placement; Tonsillectomy; Foot surgery (Left); Abdominal hernia repair; hysteroscopy; Gastric fundoplication (7169); Abscess Drainage; Scaphoid fracture surgery (Left); other surgical history (Right, 05/29/2014); Myringotomy Tympanostomy Tube Placement (Right, 06/05/2014); myringotomy (Right, 11/13/2015); Hysterectomy; Cardiac catheterization (2014 & 2000); other surgical history (2009); Breast surgery (Left, 2007); fracture surgery (Right); other surgical history (Right, 08/11/2016); ablation of dysrhythmic focus (2016); other surgical history; other surgical history; pr mnpj w/anes shoulder jt appl fixation apparatus (Right, 03/01/2017); ablation of dysrhythmic focus (09/08/2017); pr surgical arthroscopy torie w/coracoacrm ligm rls (Left, 10/17/2018); Tympanostomy tube placement (Left, 03/12/2019);  Upper gastrointestinal endoscopy MD   colchicine (COLCRYS) 0.6 MG tablet Take 1 tablet by mouth 2 times daily for 46 doses 5/26/22 6/20/22  Robert Gillette MD   dornase alpha (PULMOZYME) 2.5 MG/2.5ML nebulizer solution Inhale 2.5 mg into the lungs 2 times daily 5/26/22   Robert Gillette MD   VILAZODONE HCL 20 MG Tablet TAKE 1 TABLET BY MOUTH EVERY DAY  Patient not taking: No sig reported 5/10/22   Robert Gillette MD   oxyCODONE-acetaminophen (PERCOCET) 5-325 MG per tablet Take 1 tablet by mouth every 4 hours as needed for Pain (max 5 tabs per day).  Indications: last filled 3/4/23 with quantity 150 tabs for 30 days 9/6/21   Historical Provider, MD   Multiple Vitamins-Minerals (MULTIVITAMIN GUMMIES WOMENS) CHEW Take 2 each by mouth daily     Historical Provider, MD   carvedilol (COREG) 12.5 MG tablet Take 12.5 mg by mouth 2 times daily (with meals)     Historical Provider, MD       Current Medications: Scheduled Meds:   cetirizine  10 mg Oral Daily    dornase alpha  2.5 mg Inhalation BID    ipratropium-albuterol  1 ampule Inhalation 4x daily    pantoprazole  40 mg Oral BID    QUEtiapine  250 mg Oral Q24H    sodium chloride flush  5-40 mL IntraVENous 2 times per day    insulin lispro  0-16 Units SubCUTAneous TID     insulin lispro  0-4 Units SubCUTAneous Nightly    isosorbide mononitrate  30 mg Oral Daily    magnesium oxide  400 mg Oral Q24H    atorvastatin  80 mg Oral Q24H    carvedilol  12.5 mg Oral BID     clopidogrel  75 mg Oral Q24H    DULoxetine  60 mg Oral Daily    insulin glargine  64 Units SubCUTAneous BID     Continuous Infusions:   sodium chloride      dextrose       PRN Meds:.sodium chloride flush, sodium chloride, acetaminophen **OR** acetaminophen, polyethylene glycol, glucose, dextrose bolus **OR** dextrose bolus, glucagon (rDNA), dextrose, HYDROmorphone, oxyCODONE-acetaminophen, bumetanide     Allergies:  Latex, Aspirin, Avelox [moxifloxacin], Chantix [varenicline], Doxycycline, Dye [iodides], Flexeril

## 2023-03-18 NOTE — PROGRESS NOTES
BRONCHOSPASM/BRONCHOCONSTRICTION   [x]         IMPROVE AERATION/BREATH SOUNDS  [x]   ADMINISTER BRONCHODILATOR THERAPY AS APPROPRIATE  [x]   ASSESS BREATH SOUNDS  []   IMPLEMENT AEROSOL/MDI PROTOCOL  [x]   PATIENT EDUCATION AS NEEDED     PROVIDE ADEQUATE OXYGENATION WITH ACCEPTABLE SP02/ABG'S  [x]  IDENTIFY APPROPRIATE OXYGEN THERAPY  [x]   MONITOR SP02/ABG'S AS NEEDED   [x]   PATIENT EDUCATION AS NEEDED

## 2023-03-18 NOTE — CARE COORDINATION
ONGOING DISCHARGE PLAN:    Patient is alert and oriented x4. Spoke with patient regarding discharge plan and patient confirms that plan is still to return home once medically cleared. DME: Glucometer and CAM walking boot. Cardiology recommending VQ scan to r/o PE. Will continue to follow for additional discharge needs. If patient is discharged prior to next notation, then this note serves as note for discharge by case management.     Electronically signed by Alfonso Nowak RN on 3/18/2023 at 4:28 PM

## 2023-03-18 NOTE — PROGRESS NOTES
Nutrition Note    Positive nutrition screen for food preferences, no pork, no seafood added to [facility]. Fran Westbrook RD, LD  [phone]    Some areas of assessment may be incomplete due to standard COVID-19 Precautions.

## 2023-03-18 NOTE — PROGRESS NOTES
Dr. Neel Greer notified of new consult. Dr. Neel Greer updated on patient and history. Nurse is to notify him if trop is higher than 13. Repeat trop 12.

## 2023-03-18 NOTE — PROGRESS NOTES
Patient has home meds in purse that is in the closet. Patient will not let this nurse lock meds up in nurse  insisting on keeping in purse. Patient states there are no narcotics with her currently. Night shift nurse and charge notified.

## 2023-03-18 NOTE — H&P
FIXATION APPARATUS Right 03/01/2017    SHOULDER MANIPULATION RIGHT performed by Elin Sandoval MD at [address] ARTHROSCOPY ROXANNE W/CORACOACRM LIGM RLS Left 10/17/2018    SHOULDER ARTHROSCOPY W/BICEPS TENDONESIS performed by Landry Ray MD at [address] [facility] Left     foot    TONSILLECTOMY      TYMPANOSTOMY TUBE PLACEMENT      TYMPANOSTOMY TUBE PLACEMENT Left 03/12/2019    TYMPANOSTOMY TUBE PLACEMENT Right 12/08/2021    Right tympanostomy with tube placement of T-tube with operative microscope and general anesthesia. Right removal of right ear tube. ULNAR TUNNEL RELEASE Right     UPPER GASTROINTESTINAL ENDOSCOPY      UPPER GASTROINTESTINAL ENDOSCOPY  07/10/2019    UPPER GASTROINTESTINAL ENDOSCOPY N/A 07/10/2019    EGD BIOPSY performed by Sol Morgan MD at [address] Right 04/21/2022    Placement of right sided ventriculoperitoneal shunt [employer] programmable valve set at 1.5. Medications Prior to Admission:    Prior to Admission medications    Medication Sig Start Date End Date Taking? Authorizing Provider   atorvastatin (LIPITOR) 80 MG tablet Take 1 tablet by mouth nightly 12/12/22   Maribel Xie MD   blood glucose test strips (ONETOUCH ULTRA) strip USE TO TEST BLOOD SUGAR BEFORE MEALS, AT BEDTIME, AND AS NEEDED (up to 9 TIMES DAILY) 12/12/22   Maribel Xie MD   clopidogrel (PLAVIX) 75 MG tablet Take 1 tablet by mouth daily nightly 12/12/22   Maribel Xie MD   clotrimazole (LOTRIMIN) 1 % cream Apply topically 2 times daily.  12/12/22   Maribel Xie MD   DULoxetine (CYMBALTA) 60 MG extended release capsule Take 1 capsule by mouth 2 times daily  Patient taking differently: Take 60 mg by mouth daily 12/12/22   Maribel Xie MD   Insulin Degludec (TRESIBA FLEXTOUCH) 200 UNIT/ML SOPN Inject 60 Units into the skin in the morning and at bedtime If po intake poor, decrease to 20 units daily  Patient taking Uncle     High Blood Pressure Maternal Uncle     Heart Disease Paternal Uncle     High Blood Pressure Paternal Uncle     Diabetes Paternal Grandfather     Heart Disease Paternal Grandfather     High Blood Pressure Paternal Grandfather        PHYSICAL EXAM:    /89   Pulse 84   Temp 97.6 °F (36.4 °C)   Resp 16   Ht 5' 4\" (1.626 m)   Wt 270 lb (122.5 kg)   SpO2 92%   BMI 46.35 kg/m²     General appearance: No apparent distress appears stated age and cooperative. HEENT Normal cephalic, atraumatic without obvious deformity. Pupils equal, round, and reactive to light. Extra ocular muscles intact. Conjunctivae/corneas clear. Neck: Supple, No jugular venous distention/bruits. Trachea midline without thyromegaly or adenopathy with full range of motion. Lungs: Clear to auscultation, bilaterally without Rales/Wheezes/Rhonchi with good respiratory effort. Heart: Regular rate and rhythm with Normal S1/S2 without murmurs, rubs or gallops, point of maximum impulse non-displaced  Abdomen: Soft, non-tender or non-distended without rigidity or guarding and positive bowel sounds all four quadrants. Extremities: No clubbing, cyanosis, or edema bilaterally. Full range of motion without deformity and normal gait intact. Skin: Skin color, texture, turgor normal.  No rashes or lesions. Neurologic: Alert and oriented X 3, neurovascularly intact with sensory/motor intact upper extremities/lower extremities, bilaterally. Cranial nerves: II-XII intact, grossly non-focal.  Mental status: Alert, oriented, thought content appropriate.     CXR:  I have reviewed the CXR with the following interpretation: no acute process  EKG:  I have reviewed the EKG with the following interpretation: NSR    CBC   Recent Labs     03/17/23  1235   WBC 12.7*   HGB 15.4   HCT 45.1         RENAL  Recent Labs     03/17/23  1235      K 4.1   CL 98   CO2 30   BUN 15   CREATININE 0.75     LFT'S  Recent Labs     03/17/23  1235   AST 12

## 2023-03-19 LAB — GLUCOSE BLD-MCNC: 309 MG/DL (ref 65–105)

## 2023-03-19 PROCEDURE — G0378 HOSPITAL OBSERVATION PER HR: HCPCS

## 2023-03-19 PROCEDURE — 6360000002 HC RX W HCPCS: Performed by: FAMILY MEDICINE

## 2023-03-19 PROCEDURE — 89220 SPUTUM SPECIMEN COLLECTION: CPT

## 2023-03-19 PROCEDURE — 96376 TX/PRO/DX INJ SAME DRUG ADON: CPT

## 2023-03-19 PROCEDURE — 94761 N-INVAS EAR/PLS OXIMETRY MLT: CPT

## 2023-03-19 PROCEDURE — 2580000003 HC RX 258: Performed by: FAMILY MEDICINE

## 2023-03-19 PROCEDURE — 2700000000 HC OXYGEN THERAPY PER DAY

## 2023-03-19 PROCEDURE — 6370000000 HC RX 637 (ALT 250 FOR IP): Performed by: FAMILY MEDICINE

## 2023-03-19 PROCEDURE — 94640 AIRWAY INHALATION TREATMENT: CPT

## 2023-03-19 PROCEDURE — 82947 ASSAY GLUCOSE BLOOD QUANT: CPT

## 2023-03-19 PROCEDURE — 99232 SBSQ HOSP IP/OBS MODERATE 35: CPT | Performed by: FAMILY MEDICINE

## 2023-03-19 RX ADMIN — IPRATROPIUM BROMIDE AND ALBUTEROL SULFATE 1 AMPULE: 2.5; .5 SOLUTION RESPIRATORY (INHALATION) at 15:08

## 2023-03-19 RX ADMIN — SODIUM CHLORIDE, PRESERVATIVE FREE 10 ML: 5 INJECTION INTRAVENOUS at 22:05

## 2023-03-19 RX ADMIN — HYDROMORPHONE HYDROCHLORIDE 0.5 MG: 1 INJECTION, SOLUTION INTRAMUSCULAR; INTRAVENOUS; SUBCUTANEOUS at 20:04

## 2023-03-19 RX ADMIN — HYDROMORPHONE HYDROCHLORIDE 0.5 MG: 1 INJECTION, SOLUTION INTRAMUSCULAR; INTRAVENOUS; SUBCUTANEOUS at 02:15

## 2023-03-19 RX ADMIN — CARVEDILOL 12.5 MG: 12.5 TABLET, FILM COATED ORAL at 07:20

## 2023-03-19 RX ADMIN — POLYETHYLENE GLYCOL 3350 17 G: 17 POWDER, FOR SOLUTION ORAL at 13:17

## 2023-03-19 RX ADMIN — IPRATROPIUM BROMIDE AND ALBUTEROL SULFATE 1 AMPULE: 2.5; .5 SOLUTION RESPIRATORY (INHALATION) at 11:24

## 2023-03-19 RX ADMIN — CARVEDILOL 12.5 MG: 12.5 TABLET, FILM COATED ORAL at 17:26

## 2023-03-19 RX ADMIN — HYDROMORPHONE HYDROCHLORIDE 0.5 MG: 1 INJECTION, SOLUTION INTRAMUSCULAR; INTRAVENOUS; SUBCUTANEOUS at 09:31

## 2023-03-19 RX ADMIN — HYDROMORPHONE HYDROCHLORIDE 0.5 MG: 1 INJECTION, SOLUTION INTRAMUSCULAR; INTRAVENOUS; SUBCUTANEOUS at 22:05

## 2023-03-19 RX ADMIN — HYDROMORPHONE HYDROCHLORIDE 0.5 MG: 1 INJECTION, SOLUTION INTRAMUSCULAR; INTRAVENOUS; SUBCUTANEOUS at 04:06

## 2023-03-19 RX ADMIN — PANTOPRAZOLE SODIUM 40 MG: 40 TABLET, DELAYED RELEASE ORAL at 17:26

## 2023-03-19 RX ADMIN — INSULIN GLARGINE 64 UNITS: 100 INJECTION, SOLUTION SUBCUTANEOUS at 07:17

## 2023-03-19 RX ADMIN — CLOPIDOGREL BISULFATE 75 MG: 75 TABLET ORAL at 17:25

## 2023-03-19 RX ADMIN — IPRATROPIUM BROMIDE AND ALBUTEROL SULFATE 1 AMPULE: 2.5; .5 SOLUTION RESPIRATORY (INHALATION) at 07:53

## 2023-03-19 RX ADMIN — DULOXETINE HYDROCHLORIDE 60 MG: 60 CAPSULE, DELAYED RELEASE ORAL at 09:30

## 2023-03-19 RX ADMIN — CETIRIZINE HYDROCHLORIDE 10 MG: 10 TABLET, FILM COATED ORAL at 09:30

## 2023-03-19 RX ADMIN — HYDROMORPHONE HYDROCHLORIDE 0.5 MG: 1 INJECTION, SOLUTION INTRAMUSCULAR; INTRAVENOUS; SUBCUTANEOUS at 07:16

## 2023-03-19 RX ADMIN — HYDROMORPHONE HYDROCHLORIDE 0.5 MG: 1 INJECTION, SOLUTION INTRAMUSCULAR; INTRAVENOUS; SUBCUTANEOUS at 15:33

## 2023-03-19 RX ADMIN — ISOSORBIDE MONONITRATE 30 MG: 30 TABLET, EXTENDED RELEASE ORAL at 07:20

## 2023-03-19 RX ADMIN — INSULIN GLARGINE 64 UNITS: 100 INJECTION, SOLUTION SUBCUTANEOUS at 17:26

## 2023-03-19 RX ADMIN — HYDROMORPHONE HYDROCHLORIDE 0.5 MG: 1 INJECTION, SOLUTION INTRAMUSCULAR; INTRAVENOUS; SUBCUTANEOUS at 11:30

## 2023-03-19 RX ADMIN — ATORVASTATIN CALCIUM 80 MG: 80 TABLET, FILM COATED ORAL at 17:26

## 2023-03-19 RX ADMIN — MAGNESIUM OXIDE TAB 400 MG (241.3 MG ELEMENTAL MG) 400 MG: 400 (241.3 MG) TAB at 17:26

## 2023-03-19 RX ADMIN — HYDROMORPHONE HYDROCHLORIDE 0.5 MG: 1 INJECTION, SOLUTION INTRAMUSCULAR; INTRAVENOUS; SUBCUTANEOUS at 13:30

## 2023-03-19 RX ADMIN — SODIUM CHLORIDE, PRESERVATIVE FREE 10 ML: 5 INJECTION INTRAVENOUS at 09:31

## 2023-03-19 RX ADMIN — QUETIAPINE FUMARATE 250 MG: 200 TABLET ORAL at 17:26

## 2023-03-19 RX ADMIN — HYDROMORPHONE HYDROCHLORIDE 0.5 MG: 1 INJECTION, SOLUTION INTRAMUSCULAR; INTRAVENOUS; SUBCUTANEOUS at 17:26

## 2023-03-19 RX ADMIN — OXYCODONE AND ACETAMINOPHEN 1 TABLET: 5; 325 TABLET ORAL at 11:07

## 2023-03-19 RX ADMIN — SODIUM CHLORIDE, PRESERVATIVE FREE 10 ML: 5 INJECTION INTRAVENOUS at 20:05

## 2023-03-19 RX ADMIN — INSULIN LISPRO 12 UNITS: 100 INJECTION, SOLUTION INTRAVENOUS; SUBCUTANEOUS at 09:30

## 2023-03-19 RX ADMIN — PANTOPRAZOLE SODIUM 40 MG: 40 TABLET, DELAYED RELEASE ORAL at 07:20

## 2023-03-19 ASSESSMENT — PAIN DESCRIPTION - ORIENTATION
ORIENTATION: LEFT
ORIENTATION: MID

## 2023-03-19 ASSESSMENT — PAIN DESCRIPTION - DESCRIPTORS
DESCRIPTORS: ACHING
DESCRIPTORS: ACHING;SQUEEZING;SHARP
DESCRIPTORS: ACHING
DESCRIPTORS: ACHING

## 2023-03-19 ASSESSMENT — PAIN SCALES - GENERAL
PAINLEVEL_OUTOF10: 10
PAINLEVEL_OUTOF10: 10
PAINLEVEL_OUTOF10: 9
PAINLEVEL_OUTOF10: 10
PAINLEVEL_OUTOF10: 10
PAINLEVEL_OUTOF10: 9
PAINLEVEL_OUTOF10: 10
PAINLEVEL_OUTOF10: 5
PAINLEVEL_OUTOF10: 9
PAINLEVEL_OUTOF10: 10
PAINLEVEL_OUTOF10: 10

## 2023-03-19 ASSESSMENT — PAIN DESCRIPTION - LOCATION
LOCATION: CHEST
LOCATION: ABDOMEN;FLANK
LOCATION: CHEST
LOCATION: CHEST;ELBOW
LOCATION: CHEST;ELBOW

## 2023-03-19 ASSESSMENT — PAIN SCALES - WONG BAKER: WONGBAKER_NUMERICALRESPONSE: 2

## 2023-03-19 NOTE — CARE COORDINATION
ONGOING DISCHARGE PLAN:     Patient is alert and oriented x4. Spoke with patient regarding discharge plan and patient confirms that plan is still to return home once medically cleared. DME: Glucometer and CAM walking boot. Cardiology recommending VQ scan to r/o PE. Will continue to follow for additional discharge needs. If patient is discharged prior to next notation, then this note serves as note for discharge by case management.     Electronically signed by Unique Sierra RN on 3/19/2023 at 5:16 PM

## 2023-03-19 NOTE — PLAN OF CARE
Problem: Discharge Planning  Goal: Discharge to home or other facility with appropriate resources  3/19/2023 0303 by Hermila Castanon RN  Outcome: Progressing     Problem: Pain  Goal: Verbalizes/displays adequate comfort level or baseline comfort level  3/19/2023 0303 by Hermila Castanon RN  Outcome: Progressing     Problem: ABCDS Injury Assessment  Goal: Absence of physical injury  3/19/2023 0303 by Hermila Castanon RN  Outcome: Progressing

## 2023-03-19 NOTE — PROGRESS NOTES
[facility]   Progress Note                   Date:   3/19/2023  Patient name: Tirso Jim  Date of admission:  3/17/2023 11:24 AM  MRN:   [id number]  YOB: 1975  PCP: Maria Elena Chavez MD    Reason for Admission:      Subjective:       Clinical Changes / Abnormalities: Patient still complaining of pain same  Tropes are negative      Medications:   Scheduled Meds:   cetirizine  10 mg Oral Daily    dornase alpha  2.5 mg Inhalation BID    ipratropium-albuterol  1 ampule Inhalation 4x daily    pantoprazole  40 mg Oral BID    QUEtiapine  250 mg Oral Q24H    sodium chloride flush  5-40 mL IntraVENous 2 times per day    insulin lispro  0-16 Units SubCUTAneous TID WC    insulin lispro  0-4 Units SubCUTAneous Nightly    isosorbide mononitrate  30 mg Oral Daily    magnesium oxide  400 mg Oral Q24H    atorvastatin  80 mg Oral Q24H    carvedilol  12.5 mg Oral BID WC    clopidogrel  75 mg Oral Q24H    DULoxetine  60 mg Oral Daily    insulin glargine  64 Units SubCUTAneous BID     Continuous Infusions:   sodium chloride      dextrose       CBC:   Recent Labs     03/17/23  1235 03/18/23  0841   WBC 12.7* 12.0*   HGB 15.4 14.3    156     BMP:    Recent Labs     03/17/23  1235      K 4.1   CL 98   CO2 30   BUN 15   CREATININE 0.75   GLUCOSE 408*     Hepatic:   Recent Labs     03/17/23  1235   AST 12   ALT 18   BILITOT 0.3   ALKPHOS 127*     Troponin: No results for input(s): TROPONINI in the last 72 hours. BNP: No results for input(s): BNP in the last 72 hours. Lipids: No results for input(s): CHOL, HDL in the last 72 hours. Invalid input(s): LDLCALCU  INR: No results for input(s): INR in the last 72 hours. Objective:   Vitals: /72   Pulse 78   Temp 97.6 °F (36.4 °C) (Oral)   Resp 20   Ht 5' 4\" (1.626 m)   Wt 270 lb (122.5 kg)   SpO2 91%   BMI 46.35 kg/m²   No intake/output data recorded. No intake/output data recorded.   Scheduled Meds:   cetirizine  10 mg Oral Fibrocystic breast changes     Helicobacter pylori gastritis     NSTEMI (non-ST elevated myocardial infarction) ([facility])     Cellulitis     MDD (major depressive disorder), recurrent episode, moderate (HCC)     Uncontrolled type 2 diabetes mellitus with hyperglycemia ([facility])     Cubital tunnel syndrome     Acute on chronic systolic (congestive) heart failure ([facility])     Coronary artery disease involving native coronary artery of native heart without angina pectoris     Dizziness     Ovarian remnant syndrome     Moderate left ventricular systolic dysfunction     Palpitations     Acute congestive heart failure ([facility])     Bilateral lower extremity edema     COVID-19     Chronic systolic CHF (congestive heart failure) ([facility])     PTSD (post-traumatic stress disorder)     Chest pain      Plan of Treatment:   CHF, LVEF 35 to 40% seems to be compensated  Atypical chest pain negative tropes normal EKG  Plan is to do Lexiscan stress test in a.m.   Continue rest  Zoe Melissa MD, [place] [id number] [facility]  [phone]

## 2023-03-19 NOTE — PROGRESS NOTES
Route Frequency Provider Last Rate Last Admin    cetirizine (ZYRTEC) tablet 10 mg  10 mg Oral Daily Galina Noriega MD   10 mg at 03/19/23 0930    dornase alpha (PULMOZYME) nebulizer solution 2.5 mg  2.5 mg Inhalation BID Galina Noriega MD   2.5 mg at 03/18/23 0848    ipratropium-albuterol (DUONEB) nebulizer solution 1 ampule  1 ampule Inhalation 4x daily Galina Noriega MD   1 ampule at 03/19/23 0753    pantoprazole (PROTONIX) tablet 40 mg  40 mg Oral BID Galina Noriega MD   40 mg at 03/19/23 0720    QUEtiapine (SEROQUEL) tablet 250 mg  250 mg Oral Q24H Galina Noriega MD   250 mg at 03/18/23 1827    sodium chloride flush 0.9 % injection 5-40 mL  5-40 mL IntraVENous 2 times per day Galina Noriega MD   10 mL at 03/19/23 0931    sodium chloride flush 0.9 % injection 5-40 mL  5-40 mL IntraVENous PRN Galina Noriega MD   10 mL at 03/17/23 2211    0.9 % sodium chloride infusion   IntraVENous PRN Galina Noriega MD        acetaminophen (TYLENOL) tablet 650 mg  650 mg Oral Q6H PRN Galina Noriega MD        Or    acetaminophen (TYLENOL) suppository 650 mg  650 mg Rectal Q6H PRN Galina Noriega MD        polyethylene glycol (GLYCOLAX) packet 17 g  17 g Oral Daily PRN Galina Noriega MD        glucose chewable tablet 16 g  4 tablet Oral PRN Galina Noriega MD        dextrose bolus 10% 125 mL  125 mL IntraVENous PRN Galina Noriega MD        Or    dextrose bolus 10% 250 mL  250 mL IntraVENous PRN Galina Noriega MD        glucagon (rDNA) injection 1 mg  1 mg SubCUTAneous PRN Galina Noriega MD        dextrose 10 % infusion   IntraVENous Continuous PRN Galina Noriega MD        insulin lispro (HUMALOG) injection vial 0-16 Units  0-16 Units SubCUTAneous TID WC Galina Noriega MD   12 Units at 03/19/23 0930    insulin lispro (HUMALOG) injection vial 0-4 Units  0-4 Units SubCUTAneous Nightly Galina Noriega MD        HYDROmorphone (DILAUDID) injection 0.5 mg  0.5 mg IntraVENous Q2H PRN Marianne Gonzalez MD   0.5 mg at 03/19/23 0931    isosorbide mononitrate (IMDUR) extended release tablet 30 mg  30 mg Oral Daily Marianne Gonzalez MD   30 mg at 03/19/23 0720    magnesium oxide (MAG-OX) tablet 400 mg  400 mg Oral Q24H Marianne Gonzalez MD   400 mg at 03/18/23 1829    atorvastatin (LIPITOR) tablet 80 mg  80 mg Oral Q24H Marianne Gonzalez MD   80 mg at 03/18/23 1827    carvedilol (COREG) tablet 12.5 mg  12.5 mg Oral BID WC Marianne Gonzalez MD   12.5 mg at 03/19/23 0720    clopidogrel (PLAVIX) tablet 75 mg  75 mg Oral Q24H Marianne Gonzalez MD   75 mg at 03/18/23 1827    oxyCODONE-acetaminophen (PERCOCET) 5-325 MG per tablet 1 tablet  1 tablet Oral Q4H PRN Marianne Gonzalez MD        bumetanide (BUMEX) tablet 1 mg  1 mg Oral Daily PRN Marianne Gonzalez MD   1 mg at 03/18/23 0559    DULoxetine (CYMBALTA) extended release capsule 60 mg  60 mg Oral Daily Marianne Gonzalez MD   60 mg at 03/19/23 0930    insulin glargine (LANTUS) injection vial 64 Units  64 Units SubCUTAneous BID Marianne Gonzalez MD   64 Units at 03/19/23 0717     No intake or output data in the 24 hours ending 03/19/23 1012  Recent Results (from the past 24 hour(s))   POC Glucose Fingerstick    Collection Time: 03/18/23 11:23 AM   Result Value Ref Range    POC Glucose 280 (H) 65 - 105 mg/dL   POC Glucose Fingerstick    Collection Time: 03/18/23  5:07 PM   Result Value Ref Range    POC Glucose 95 65 - 105 mg/dL   POC Glucose Fingerstick    Collection Time: 03/18/23  7:54 PM   Result Value Ref Range    POC Glucose 133 (H) 65 - 105 mg/dL   POC Glucose Fingerstick    Collection Time: 03/19/23  6:50 AM   Result Value Ref Range    POC Glucose 309 (H) 65 - 105 mg/dL     -----------------------------------------------------------------  RAD:  EKG:  Micro:     Assessment & Plan:    Patient Active Problem List:     Diabetes mellitus type 2, controlled ([facility]

## 2023-03-20 ENCOUNTER — APPOINTMENT (OUTPATIENT)
Dept: GENERAL RADIOLOGY | Age: 48
End: 2023-03-20
Payer: COMMERCIAL

## 2023-03-20 ENCOUNTER — APPOINTMENT (OUTPATIENT)
Dept: NUCLEAR MEDICINE | Age: 48
End: 2023-03-20
Payer: COMMERCIAL

## 2023-03-20 LAB
EKG ATRIAL RATE: 95 BPM
EKG P AXIS: 45 DEGREES
EKG P-R INTERVAL: 150 MS
EKG Q-T INTERVAL: 362 MS
EKG QRS DURATION: 80 MS
EKG QTC CALCULATION (BAZETT): 454 MS
EKG R AXIS: 57 DEGREES
EKG T AXIS: 39 DEGREES
EKG VENTRICULAR RATE: 95 BPM

## 2023-03-20 PROCEDURE — 82947 ASSAY GLUCOSE BLOOD QUANT: CPT

## 2023-03-20 PROCEDURE — 94761 N-INVAS EAR/PLS OXIMETRY MLT: CPT

## 2023-03-20 PROCEDURE — 3430000000 HC RX DIAGNOSTIC RADIOPHARMACEUTICAL: Performed by: FAMILY MEDICINE

## 2023-03-20 PROCEDURE — 78582 LUNG VENTILAT&PERFUS IMAGING: CPT

## 2023-03-20 PROCEDURE — 71045 X-RAY EXAM CHEST 1 VIEW: CPT

## 2023-03-20 PROCEDURE — 2580000003 HC RX 258: Performed by: FAMILY MEDICINE

## 2023-03-20 PROCEDURE — 99231 SBSQ HOSP IP/OBS SF/LOW 25: CPT | Performed by: FAMILY MEDICINE

## 2023-03-20 PROCEDURE — A9540 TC99M MAA: HCPCS | Performed by: FAMILY MEDICINE

## 2023-03-20 PROCEDURE — G0378 HOSPITAL OBSERVATION PER HR: HCPCS

## 2023-03-20 PROCEDURE — 94640 AIRWAY INHALATION TREATMENT: CPT

## 2023-03-20 PROCEDURE — 2700000000 HC OXYGEN THERAPY PER DAY

## 2023-03-20 PROCEDURE — 96376 TX/PRO/DX INJ SAME DRUG ADON: CPT

## 2023-03-20 PROCEDURE — 6360000002 HC RX W HCPCS: Performed by: FAMILY MEDICINE

## 2023-03-20 PROCEDURE — 6370000000 HC RX 637 (ALT 250 FOR IP): Performed by: FAMILY MEDICINE

## 2023-03-20 PROCEDURE — A9539 TC99M PENTETATE: HCPCS | Performed by: FAMILY MEDICINE

## 2023-03-20 RX ORDER — KIT FOR THE PREPARATION OF TECHNETIUM TC 99M PENTETATE 20 MG/1
40 INJECTION, POWDER, LYOPHILIZED, FOR SOLUTION INTRAVENOUS; RESPIRATORY (INHALATION)
Status: COMPLETED | OUTPATIENT
Start: 2023-03-20 | End: 2023-03-20

## 2023-03-20 RX ORDER — SODIUM CHLORIDE 0.9 % (FLUSH) 0.9 %
10 SYRINGE (ML) INJECTION PRN
Status: DISCONTINUED | OUTPATIENT
Start: 2023-03-20 | End: 2023-03-21 | Stop reason: HOSPADM

## 2023-03-20 RX ADMIN — PANTOPRAZOLE SODIUM 40 MG: 40 TABLET, DELAYED RELEASE ORAL at 06:31

## 2023-03-20 RX ADMIN — CARVEDILOL 12.5 MG: 12.5 TABLET, FILM COATED ORAL at 06:31

## 2023-03-20 RX ADMIN — SODIUM CHLORIDE, PRESERVATIVE FREE 10 ML: 5 INJECTION INTRAVENOUS at 12:41

## 2023-03-20 RX ADMIN — HYDROMORPHONE HYDROCHLORIDE 0.5 MG: 1 INJECTION, SOLUTION INTRAMUSCULAR; INTRAVENOUS; SUBCUTANEOUS at 06:33

## 2023-03-20 RX ADMIN — SODIUM CHLORIDE, PRESERVATIVE FREE 10 ML: 5 INJECTION INTRAVENOUS at 20:07

## 2023-03-20 RX ADMIN — INSULIN GLARGINE 64 UNITS: 100 INJECTION, SOLUTION SUBCUTANEOUS at 06:38

## 2023-03-20 RX ADMIN — HYDROMORPHONE HYDROCHLORIDE 0.5 MG: 1 INJECTION, SOLUTION INTRAMUSCULAR; INTRAVENOUS; SUBCUTANEOUS at 20:01

## 2023-03-20 RX ADMIN — HYDROMORPHONE HYDROCHLORIDE 0.5 MG: 1 INJECTION, SOLUTION INTRAMUSCULAR; INTRAVENOUS; SUBCUTANEOUS at 22:00

## 2023-03-20 RX ADMIN — CLOPIDOGREL BISULFATE 75 MG: 75 TABLET ORAL at 17:54

## 2023-03-20 RX ADMIN — PANTOPRAZOLE SODIUM 40 MG: 40 TABLET, DELAYED RELEASE ORAL at 17:54

## 2023-03-20 RX ADMIN — HYDROMORPHONE HYDROCHLORIDE 0.5 MG: 1 INJECTION, SOLUTION INTRAMUSCULAR; INTRAVENOUS; SUBCUTANEOUS at 03:59

## 2023-03-20 RX ADMIN — ATORVASTATIN CALCIUM 80 MG: 80 TABLET, FILM COATED ORAL at 17:54

## 2023-03-20 RX ADMIN — HYDROMORPHONE HYDROCHLORIDE 0.5 MG: 1 INJECTION, SOLUTION INTRAMUSCULAR; INTRAVENOUS; SUBCUTANEOUS at 15:45

## 2023-03-20 RX ADMIN — SODIUM CHLORIDE, PRESERVATIVE FREE 10 ML: 5 INJECTION INTRAVENOUS at 09:00

## 2023-03-20 RX ADMIN — IPRATROPIUM BROMIDE AND ALBUTEROL SULFATE 1 AMPULE: 2.5; .5 SOLUTION RESPIRATORY (INHALATION) at 07:40

## 2023-03-20 RX ADMIN — HYDROMORPHONE HYDROCHLORIDE 0.5 MG: 1 INJECTION, SOLUTION INTRAMUSCULAR; INTRAVENOUS; SUBCUTANEOUS at 08:48

## 2023-03-20 RX ADMIN — CETIRIZINE HYDROCHLORIDE 10 MG: 10 TABLET, FILM COATED ORAL at 08:48

## 2023-03-20 RX ADMIN — ISOSORBIDE MONONITRATE 30 MG: 30 TABLET, EXTENDED RELEASE ORAL at 06:31

## 2023-03-20 RX ADMIN — Medication 8.2 MILLICURIE: at 12:41

## 2023-03-20 RX ADMIN — HYDROMORPHONE HYDROCHLORIDE 0.5 MG: 1 INJECTION, SOLUTION INTRAMUSCULAR; INTRAVENOUS; SUBCUTANEOUS at 11:01

## 2023-03-20 RX ADMIN — POLYETHYLENE GLYCOL 3350 17 G: 17 POWDER, FOR SOLUTION ORAL at 11:04

## 2023-03-20 RX ADMIN — MAGNESIUM OXIDE TAB 400 MG (241.3 MG ELEMENTAL MG) 400 MG: 400 (241.3 MG) TAB at 16:48

## 2023-03-20 RX ADMIN — IPRATROPIUM BROMIDE AND ALBUTEROL SULFATE 1 AMPULE: 2.5; .5 SOLUTION RESPIRATORY (INHALATION) at 14:49

## 2023-03-20 RX ADMIN — CARVEDILOL 12.5 MG: 12.5 TABLET, FILM COATED ORAL at 17:53

## 2023-03-20 RX ADMIN — QUETIAPINE FUMARATE 250 MG: 200 TABLET ORAL at 17:54

## 2023-03-20 RX ADMIN — HYDROMORPHONE HYDROCHLORIDE 0.5 MG: 1 INJECTION, SOLUTION INTRAMUSCULAR; INTRAVENOUS; SUBCUTANEOUS at 13:42

## 2023-03-20 RX ADMIN — HYDROMORPHONE HYDROCHLORIDE 0.5 MG: 1 INJECTION, SOLUTION INTRAMUSCULAR; INTRAVENOUS; SUBCUTANEOUS at 00:00

## 2023-03-20 RX ADMIN — KIT FOR THE PREPARATION OF TECHNETIUM TC 99M PENTETATE 42.9 MILLICURIE: 20 INJECTION, POWDER, LYOPHILIZED, FOR SOLUTION INTRAVENOUS; RESPIRATORY (INHALATION) at 12:20

## 2023-03-20 RX ADMIN — HYDROMORPHONE HYDROCHLORIDE 0.5 MG: 1 INJECTION, SOLUTION INTRAMUSCULAR; INTRAVENOUS; SUBCUTANEOUS at 17:54

## 2023-03-20 RX ADMIN — DULOXETINE HYDROCHLORIDE 60 MG: 60 CAPSULE, DELAYED RELEASE ORAL at 08:48

## 2023-03-20 RX ADMIN — HYDROMORPHONE HYDROCHLORIDE 0.5 MG: 1 INJECTION, SOLUTION INTRAMUSCULAR; INTRAVENOUS; SUBCUTANEOUS at 02:11

## 2023-03-20 RX ADMIN — INSULIN GLARGINE 64 UNITS: 100 INJECTION, SOLUTION SUBCUTANEOUS at 17:54

## 2023-03-20 ASSESSMENT — PAIN DESCRIPTION - LOCATION
LOCATION: CHEST
LOCATION: CHEST;ELBOW
LOCATION: CHEST
LOCATION: CHEST;SHOULDER
LOCATION: CHEST

## 2023-03-20 ASSESSMENT — PAIN SCALES - GENERAL
PAINLEVEL_OUTOF10: 9
PAINLEVEL_OUTOF10: 10
PAINLEVEL_OUTOF10: 10
PAINLEVEL_OUTOF10: 9
PAINLEVEL_OUTOF10: 9
PAINLEVEL_OUTOF10: 4
PAINLEVEL_OUTOF10: 9
PAINLEVEL_OUTOF10: 7
PAINLEVEL_OUTOF10: 10
PAINLEVEL_OUTOF10: 4
PAINLEVEL_OUTOF10: 10

## 2023-03-20 ASSESSMENT — PAIN - FUNCTIONAL ASSESSMENT
PAIN_FUNCTIONAL_ASSESSMENT: ACTIVITIES ARE NOT PREVENTED
PAIN_FUNCTIONAL_ASSESSMENT: PREVENTS OR INTERFERES SOME ACTIVE ACTIVITIES AND ADLS

## 2023-03-20 ASSESSMENT — PAIN DESCRIPTION - ORIENTATION
ORIENTATION: LEFT
ORIENTATION: LEFT
ORIENTATION: RIGHT;MID
ORIENTATION: RIGHT
ORIENTATION: MID
ORIENTATION: MID;LEFT
ORIENTATION: LEFT
ORIENTATION: LEFT

## 2023-03-20 ASSESSMENT — PAIN DESCRIPTION - DESCRIPTORS
DESCRIPTORS: ACHING
DESCRIPTORS: ACHING
DESCRIPTORS: SHARP
DESCRIPTORS: ACHING
DESCRIPTORS: SHARP
DESCRIPTORS: SHARP;ACHING
DESCRIPTORS: SHARP
DESCRIPTORS: ACHING

## 2023-03-20 NOTE — PROGRESS NOTES
Migraines     Mixed hyperlipidemia     Neurocardiogenic syncope     Nonrheumatic tricuspid (valve) insufficiency     Tricuspid valve disorders, non-rheumatic     Ovarian cyst     Encounter for monitoring sotalol therapy     Pulmonary HTN (HCC)     PVC's (premature ventricular contractions)     Schizophrenia (HCC)     Shortness of breath     Acute exacerbation of chronic obstructive pulmonary disease (HCC)     Bipolar disorder (HCC)     Left sided abdominal pain of unknown cause     Leg swelling     Mastoiditis, acute     Steroid-induced hyperglycemia     Sclerosing adenosis of left breast     Tachycardia     Tremor     Rupture of right rotator cuff     Rotator cuff syndrome of left shoulder     Long term current use of insulin (HCC)     Lesion of ulnar nerve     Carpal tunnel syndrome     Acute upper respiratory infection     Cellulitis of right upper limb     Claustrophobia     Close exposure to 2019 novel coronavirus     Congestive heart failure (HCC)     Diabetic neuropathy (HCC)     Fibrocystic breast changes     Helicobacter pylori gastritis     NSTEMI (non-ST elevated myocardial infarction) ([facility]     Cellulitis     MDD (major depressive disorder), recurrent episode, moderate (HCC)     Uncontrolled type 2 diabetes mellitus with hyperglycemia (HCC)     Cubital tunnel syndrome     Acute on chronic systolic (congestive) heart failure (HCC)     Coronary artery disease involving native coronary artery of native heart without angina pectoris     Dizziness     Ovarian remnant syndrome     Moderate left ventricular systolic dysfunction     Palpitations     Acute congestive heart failure (HCC)     Bilateral lower extremity edema     COVID-19     Chronic systolic CHF (congestive heart failure) ([facility])     PTSD (post-traumatic stress disorder)     Chest pain      Plan of Treatment:     Continue current medical therapy with bumex, coreg and imdur (patient is apparently allergic to ACEI/ARB)   On plavix and statin  Plan for VQ scan today   No need for repeat stress test. Patient had cardiac cath in 2021 and 2022 with results as below.    Repeat limited echo to reassess Gatito Galicia MD [facility]  [phone]

## 2023-03-20 NOTE — PROGRESS NOTES
Progress Note  Date:3/20/2023       Room:[facility]  Patient Courtney Fierro     YOB: 1975     Age:48 y.o. Subjective    Subjective:  Symptoms:  Stable. (States symptoms have not changed - still having chest pain). Diet:  Adequate intake. Activity level: Returning to normal.    Pain:  She complains of pain that is moderate. Review of Systems  Objective         Vitals Last 24 Hours:  TEMPERATURE:  Temp  Av.2 °F (36.8 °C)  Min: 97.3 °F (36.3 °C)  Max: 98.6 °F (37 °C)  RESPIRATIONS RANGE: Resp  Av.9  Min: 16  Max: 20  PULSE OXIMETRY RANGE: SpO2  Av.7 %  Min: 93 %  Max: 98 %  PULSE RANGE: Pulse  Av.5  Min: 69  Max: 78  BLOOD PRESSURE RANGE: Systolic (66ZNI), YRV:684 , Min:105 , PEU:670   ; Diastolic (27HWE), PKJ:52, Min:64, Max:91    I/O (24Hr): No intake or output data in the 24 hours ending 23 0845  Objective:  General Appearance:  Comfortable. Vital signs: (most recent): Blood pressure 133/72, pulse 76, temperature 97.2 °F (36.2 °C), temperature source Oral, resp. rate 16, height 5' 4\" (1.626 m), weight 270 lb 1 oz (122.5 kg), SpO2 96 %. Vital signs are normal.    Lungs:  Normal effort and normal respiratory rate. There are decreased breath sounds. Heart: Normal rate. Regular rhythm. S1 normal and S2 normal.    Labs/Imaging/Diagnostics    Labs:  CBC:  Recent Labs     23  1235 23  0841   WBC 12.7* 12.0*   RBC 4.55 4.22   HGB 15.4 14.3   HCT 45.1 41.4   MCV 99.2 98.2   RDW 12.8 12.8    156     CHEMISTRIES:  Recent Labs     23  1235      K 4.1   CL 98   CO2 30   BUN 15   CREATININE 0.75   GLUCOSE 408*     PT/INR:No results for input(s): PROTIME, INR in the last 72 hours. APTT:No results for input(s): APTT in the last 72 hours. LIVER PROFILE:  Recent Labs     23  1235   AST 12   ALT 18   BILITOT 0.3   ALKPHOS 127*       Imaging Last 24 Hours:  No results found.   Assessment//Plan           Hospital Problems Last Modified POA    * (Principal) Chest pain 3/17/2023 Yes    Coronary artery disease involving native coronary artery of native heart without angina pectoris 3/18/2023 Yes    Moderate left ventricular systolic dysfunction 9/92/6522 Yes    Chronic systolic CHF (congestive heart failure) ([address] 3/18/2023 Yes    COPD (chronic obstructive pulmonary disease) (HCC) (Chronic) 3/18/2023 Yes    KRISTIN (obstructive sleep apnea) 3/18/2023 Yes    Overview Addendum 7/19/2019  3:21 PM by Tonia Gregory     Intolerant to CPAP    Overview:   Overview:   Intolerant to CPAP         Pulmonary HTN ([address] 3/18/2023 Yes    Schizophrenia ([address] 3/18/2023 Yes    Bipolar disorder ([address] 3/18/2023 Yes    Uncontrolled type 2 diabetes mellitus with hyperglycemia ([address] 3/18/2023 Yes     Assessment & Plan  V/Q today      Electronically signed by Teja Peters MD on 3/20/23 at 8:45 AM EDT

## 2023-03-20 NOTE — CARE COORDINATION
ONGOING DISCHARGE PLAN:    Patient is alert and oriented x4. Spoke with patient regarding discharge plan and patient confirms that plan is still to discharge to home with no needs    VQ scan ordered     Repeat echo     On Bumex, coreg and Imdur    No stress test ordered      Will continue to follow for additional discharge needs. If patient is discharged prior to next notation, then this note serves as note for discharge by case management.     Electronically signed by Ju Cast RN on 3/20/2023 at 3:38 PM

## 2023-03-20 NOTE — PLAN OF CARE
Problem: Discharge Planning  Goal: Discharge to home or other facility with appropriate resources  3/20/2023 0340 by Gulshan Roach RN  Outcome: Progressing     Problem: Pain  Goal: Verbalizes/displays adequate comfort level or baseline comfort level  3/20/2023 0340 by Gulshan Roach RN  Outcome: Progressing     Problem: ABCDS Injury Assessment  Goal: Absence of physical injury  3/20/2023 0340 by Gulshan Roach RN  Outcome: Progressing  3/19/2023 1705 by Erica Rashid RN  Outcome: Progressing

## 2023-03-21 ENCOUNTER — APPOINTMENT (OUTPATIENT)
Dept: NON INVASIVE DIAGNOSTICS | Age: 48
End: 2023-03-21
Payer: COMMERCIAL

## 2023-03-21 VITALS
HEART RATE: 76 BPM | HEIGHT: 64 IN | OXYGEN SATURATION: 96 % | DIASTOLIC BLOOD PRESSURE: 72 MMHG | WEIGHT: 270.06 LBS | RESPIRATION RATE: 18 BRPM | TEMPERATURE: 97.2 F | BODY MASS INDEX: 46.11 KG/M2 | SYSTOLIC BLOOD PRESSURE: 133 MMHG

## 2023-03-21 LAB
GLUCOSE BLD-MCNC: 123 MG/DL (ref 65–105)
GLUCOSE BLD-MCNC: 129 MG/DL (ref 65–105)
GLUCOSE BLD-MCNC: 143 MG/DL (ref 65–105)
GLUCOSE BLD-MCNC: 160 MG/DL (ref 65–105)
GLUCOSE BLD-MCNC: 161 MG/DL (ref 65–105)
GLUCOSE BLD-MCNC: 163 MG/DL (ref 65–105)
GLUCOSE BLD-MCNC: 202 MG/DL (ref 65–105)
GLUCOSE BLD-MCNC: 233 MG/DL (ref 65–105)
GLUCOSE BLD-MCNC: 273 MG/DL (ref 65–105)
GLUCOSE BLD-MCNC: 60 MG/DL (ref 65–105)
GLUCOSE BLD-MCNC: 74 MG/DL (ref 65–105)
GLUCOSE BLD-MCNC: 85 MG/DL (ref 65–105)
LV EF: 43 %
LVEF MODALITY: NORMAL

## 2023-03-21 PROCEDURE — 6370000000 HC RX 637 (ALT 250 FOR IP): Performed by: FAMILY MEDICINE

## 2023-03-21 PROCEDURE — 93306 TTE W/DOPPLER COMPLETE: CPT

## 2023-03-21 PROCEDURE — 6360000002 HC RX W HCPCS: Performed by: FAMILY MEDICINE

## 2023-03-21 PROCEDURE — G0378 HOSPITAL OBSERVATION PER HR: HCPCS

## 2023-03-21 PROCEDURE — 96376 TX/PRO/DX INJ SAME DRUG ADON: CPT

## 2023-03-21 PROCEDURE — 2580000003 HC RX 258: Performed by: FAMILY MEDICINE

## 2023-03-21 PROCEDURE — 99231 SBSQ HOSP IP/OBS SF/LOW 25: CPT | Performed by: FAMILY MEDICINE

## 2023-03-21 RX ORDER — DULOXETIN HYDROCHLORIDE 60 MG/1
60 CAPSULE, DELAYED RELEASE ORAL DAILY
Qty: 1 CAPSULE | Refills: 0
Start: 2023-03-21

## 2023-03-21 RX ORDER — INSULIN DEGLUDEC 200 U/ML
80 INJECTION, SOLUTION SUBCUTANEOUS 2 TIMES DAILY
Qty: 1 ADJUSTABLE DOSE PRE-FILLED PEN SYRINGE | Refills: 0
Start: 2023-03-21 | End: 2023-04-07

## 2023-03-21 RX ADMIN — DULOXETINE HYDROCHLORIDE 60 MG: 60 CAPSULE, DELAYED RELEASE ORAL at 09:00

## 2023-03-21 RX ADMIN — HYDROMORPHONE HYDROCHLORIDE 0.5 MG: 1 INJECTION, SOLUTION INTRAMUSCULAR; INTRAVENOUS; SUBCUTANEOUS at 00:02

## 2023-03-21 RX ADMIN — SODIUM CHLORIDE, PRESERVATIVE FREE 10 ML: 5 INJECTION INTRAVENOUS at 09:00

## 2023-03-21 RX ADMIN — CARVEDILOL 12.5 MG: 12.5 TABLET, FILM COATED ORAL at 05:25

## 2023-03-21 RX ADMIN — HYDROMORPHONE HYDROCHLORIDE 0.5 MG: 1 INJECTION, SOLUTION INTRAMUSCULAR; INTRAVENOUS; SUBCUTANEOUS at 12:07

## 2023-03-21 RX ADMIN — HYDROMORPHONE HYDROCHLORIDE 0.5 MG: 1 INJECTION, SOLUTION INTRAMUSCULAR; INTRAVENOUS; SUBCUTANEOUS at 05:25

## 2023-03-21 RX ADMIN — HYDROMORPHONE HYDROCHLORIDE 0.5 MG: 1 INJECTION, SOLUTION INTRAMUSCULAR; INTRAVENOUS; SUBCUTANEOUS at 08:58

## 2023-03-21 RX ADMIN — ISOSORBIDE MONONITRATE 30 MG: 30 TABLET, EXTENDED RELEASE ORAL at 05:25

## 2023-03-21 RX ADMIN — HYDROMORPHONE HYDROCHLORIDE 0.5 MG: 1 INJECTION, SOLUTION INTRAMUSCULAR; INTRAVENOUS; SUBCUTANEOUS at 01:58

## 2023-03-21 RX ADMIN — PANTOPRAZOLE SODIUM 40 MG: 40 TABLET, DELAYED RELEASE ORAL at 05:25

## 2023-03-21 RX ADMIN — CETIRIZINE HYDROCHLORIDE 10 MG: 10 TABLET, FILM COATED ORAL at 09:00

## 2023-03-21 RX ADMIN — INSULIN GLARGINE 64 UNITS: 100 INJECTION, SOLUTION SUBCUTANEOUS at 09:01

## 2023-03-21 ASSESSMENT — PAIN SCALES - GENERAL
PAINLEVEL_OUTOF10: 10
PAINLEVEL_OUTOF10: 9
PAINLEVEL_OUTOF10: 10
PAINLEVEL_OUTOF10: 6

## 2023-03-21 ASSESSMENT — PAIN DESCRIPTION - LOCATION
LOCATION: CHEST

## 2023-03-21 ASSESSMENT — PAIN DESCRIPTION - ORIENTATION: ORIENTATION: LEFT;OTHER (COMMENT)

## 2023-03-21 ASSESSMENT — PAIN DESCRIPTION - DESCRIPTORS: DESCRIPTORS: SHARP

## 2023-03-21 NOTE — PROGRESS NOTES
*Patient was visited by [facility] last night and  the patient stated that [facility] *Patient does not want a visit from [facility] at this time.    03/21/23 1117   Encounter Summary   Referral/Consult From: Nurse   Last Encounter  03/21/23

## 2023-03-21 NOTE — PROGRESS NOTES
Dr. Elver Santana rounding at bedside. Discussed patient's condition, plan of care. See physician progress note and orders.

## 2023-03-21 NOTE — PROGRESS NOTES
Progress Note  Date:3/21/2023       Room:[facility]  Patient Courtney Fierro     YOB: 1975     Age:48 y.o. Subjective    Subjective:  Symptoms:  (Patient sleeping, did not awaken). Review of Systems  Objective         Vitals Last 24 Hours:  TEMPERATURE:  Temp  Av.1 °F (36.7 °C)  Min: 97.2 °F (36.2 °C)  Max: 98.8 °F (37.1 °C)  RESPIRATIONS RANGE: Resp  Av.3  Min: 16  Max: 20  PULSE OXIMETRY RANGE: SpO2  Av.2 %  Min: 93 %  Max: 96 %  PULSE RANGE: Pulse  Av  Min: 74  Max: 99  BLOOD PRESSURE RANGE: Systolic (99QUE), TNO:989 , Min:114 , XUS:986   ; Diastolic (74MDK), NQK:05, Min:68, Max:82    I/O (24Hr): No intake or output data in the 24 hours ending 23 0830  Objective:  General Appearance:  Comfortable. Vital signs: (most recent): Blood pressure 133/72, pulse 76, temperature 97.2 °F (36.2 °C), temperature source Oral, resp. rate 16, height 5' 4\" (1.626 m), weight 270 lb 1 oz (122.5 kg), SpO2 96 %. Vital signs are normal.    Labs/Imaging/Diagnostics    Labs:  CBC:  Recent Labs     23  0841   WBC 12.0*   RBC 4.22   HGB 14.3   HCT 41.4   MCV 98.2   RDW 12.8        CHEMISTRIES:No results for input(s): NA, K, CL, CO2, BUN, CREATININE, GLUCOSE, CA, PHOS, MG in the last 72 hours. PT/INR:No results for input(s): PROTIME, INR in the last 72 hours. APTT:No results for input(s): APTT in the last 72 hours. LIVER PROFILE:No results for input(s): AST, ALT, BILIDIR, BILITOT, ALKPHOS in the last 72 hours. Imaging Last 24 Hours:  XR CHEST (SINGLE VIEW FRONTAL)    Result Date: 3/20/2023  EXAMINATION: ONE XRAY VIEW OF THE CHEST 3/20/2023 10:02 am COMPARISON: 2023. HISTORY: ORDERING SYSTEM PROVIDED HISTORY: prior to VQ scan TECHNOLOGIST PROVIDED HISTORY: prior to VQ scan Reason for Exam: To accompany lung scan FINDINGS: The lungs and costophrenic angles are clear. The cardiac silhouette is now mildly enlarged.   There is no vascular congestion or

## 2023-03-21 NOTE — PROGRESS NOTES
Patient educated on discharge instructions which include: medication schedule, reasons for new medications and some side effects and need to follow-up. Patient given new prescriptions during teaching. Patient verbalizes understanding of discharge and states readiness for discharge. All belongings were gathered by patient and in patient's possession. No distress noted at this time.      Current vital signs:  /72   Pulse 76   Temp 97.2 °F (36.2 °C) (Oral)   Resp 18   Ht 5' 4\" (1.626 m)   Wt 270 lb 1 oz (122.5 kg)   SpO2 96%   BMI 46.36 kg/m²                MEWS Score: 1    Pt walked down to main entrance where she was picked up and taken home by [employer] white taxi

## 2023-03-21 NOTE — PROGRESS NOTES
08/2022  Coronary Findings Diagnostic Dominance: Left   Left Main: The vessel is moderate in size. The vessel exhibits minimal luminal irregularities. Left Anterior Descending: The vessel is large. The vessel exhibits minimal luminal irregularities. Ramus Intermedius: The vessel is moderate in size. The vessel exhibits minimal luminal irregularities. Left Circumflex: The vessel is large. The vessel exhibits minimal luminal irregularities. Right Coronary Artery: The vessel was not injected. Right coronary artery is a small nondominant vessel which is totally occluded. Assessment / Acute Cardiac Problems:   Recurrent atypical chest pain, constant, no evidence of ACS  Mild nonobstructive CAD on cardiac cath in 2021 and 2022 (at [facility]). Nondominant RCA is occluded. Cardiomyopathy, nonischemic, last LVEF 35-40% in 2021   Bronchitis   Hx of PE- negative V/Q  Morbidly obese   Type II DM, uncontrolled   Depression   Copd       Plan of Treatment:     - Check 2d Echo - will order  - insure LVEF remains > 35-40%  - Continue current medical therapy with bumex, coreg and imdur (patient is apparently allergic to ACEI/ARB)   -On plavix and statin  -No need for repeat stress test. Patient had cardiac cath in 2021 and 2022 with results as below. Once echo show LVEF > 35%, okay for d/c, follow up in 2 weeks with primary cardiologist (per patient Dr. Shannon Bryant). Discussed with patient in detail at bedside. All questions answered. Patient agrees with plan as outlined above. Thank you for allowing me to participate in the care of this patient, please do not hesitate to call if you have any questions. Collette Dukes, DO, [facility], [address], [address] [facility]  [facility] [employer]  [phone]

## 2023-03-21 NOTE — PLAN OF CARE
Problem: Discharge Planning  Goal: Discharge to home or other facility with appropriate resources  3/21/2023 1121 by Lilly Smith RN  Outcome: Progressing  3/21/2023 0219 by Judy Caldera RN  Outcome: Progressing     Problem: Pain  Goal: Verbalizes/displays adequate comfort level or baseline comfort level  3/21/2023 1121 by Lilly Smith RN  Outcome: Progressing  3/21/2023 0219 by Judy Caldera RN  Outcome: Progressing     Problem: ABCDS Injury Assessment  Goal: Absence of physical injury  3/21/2023 1121 by Lilly Smith RN  Outcome: Progressing  3/21/2023 0219 by Judy Caldera RN  Outcome: Progressing     Problem: Chronic Conditions and Co-morbidities  Goal: Patient's chronic conditions and co-morbidity symptoms are monitored and maintained or improved  3/21/2023 1121 by Lilly Smith RN  Outcome: Progressing  Flowsheets (Taken 3/21/2023 0815)  Care Plan - Patient's Chronic Conditions and Co-Morbidity Symptoms are Monitored and Maintained or Improved:   Monitor and assess patient's chronic conditions and comorbid symptoms for stability, deterioration, or improvement   Collaborate with multidisciplinary team to address chronic and comorbid conditions and prevent exacerbation or deterioration   Update acute care plan with appropriate goals if chronic or comorbid symptoms are exacerbated and prevent overall improvement and discharge  3/21/2023 0219 by Judy Caldera RN  Outcome: Progressing     Problem: Safety - Adult  Goal: Free from fall injury  3/21/2023 1121 by Lilly Smith RN  Outcome: Progressing  3/21/2023 0219 by Judy Caldera RN  Outcome: Progressing

## 2023-03-22 ENCOUNTER — HOSPITAL ENCOUNTER (EMERGENCY)
Age: 48
Discharge: HOME | End: 2023-03-22
Attending: STUDENT IN AN ORGANIZED HEALTH CARE EDUCATION/TRAINING PROGRAM
Payer: COMMERCIAL

## 2023-03-22 ENCOUNTER — APPOINTMENT (OUTPATIENT)
Dept: GENERAL RADIOLOGY | Age: 48
End: 2023-03-22
Payer: COMMERCIAL

## 2023-03-22 VITALS
TEMPERATURE: 97.3 F | RESPIRATION RATE: 16 BRPM | HEART RATE: 96 BPM | SYSTOLIC BLOOD PRESSURE: 137 MMHG | OXYGEN SATURATION: 98 % | DIASTOLIC BLOOD PRESSURE: 70 MMHG

## 2023-03-22 DIAGNOSIS — J06.9 UPPER RESPIRATORY TRACT INFECTION, UNSPECIFIED TYPE: ICD-10-CM

## 2023-03-22 DIAGNOSIS — I82.619 BASILIC VEIN THROMBOSIS: Primary | ICD-10-CM

## 2023-03-22 PROCEDURE — 71045 X-RAY EXAM CHEST 1 VIEW: CPT

## 2023-03-22 PROCEDURE — 99284 EMERGENCY DEPT VISIT MOD MDM: CPT

## 2023-03-22 ASSESSMENT — PAIN SCALES - GENERAL: PAINLEVEL_OUTOF10: 8

## 2023-03-22 ASSESSMENT — PAIN DESCRIPTION - ORIENTATION: ORIENTATION: LEFT

## 2023-03-22 ASSESSMENT — ENCOUNTER SYMPTOMS
CHEST TIGHTNESS: 0
COUGH: 1
SHORTNESS OF BREATH: 1

## 2023-03-22 ASSESSMENT — PAIN - FUNCTIONAL ASSESSMENT: PAIN_FUNCTIONAL_ASSESSMENT: 0-10

## 2023-03-22 ASSESSMENT — PAIN DESCRIPTION - LOCATION: LOCATION: ARM

## 2023-03-22 NOTE — ED PROVIDER NOTES
& middle finger    GASTRIC FUNDOPLICATION  2209    HYSTERECTOMY (CERVIX STATUS UNKNOWN)      total    HYSTERECTOMY (CERVIX STATUS UNKNOWN)      HYSTEROSCOPY      tubal perfusion    MYRINGOTOMY Right 11/13/2015    Right myringotomy with placement of  T-tube on the right side. Removal of plugged ventilating tube, right side. MYRINGOTOMY Left 07/14/2020    MYRINGOTOMY TUBE INSERTION performed by Demario Meek MD at [address] Right 06/05/2014    OTHER SURGICAL HISTORY Right 05/29/2014    removal ear tube rt ear    OTHER SURGICAL HISTORY  2009    LOOP recorder inserted and removed 3 mos. later    OTHER SURGICAL HISTORY Right 08/11/2016    ear tube removal with patch    OTHER SURGICAL HISTORY      tubal perfusion, lysis of uterine adhesions    OTHER SURGICAL HISTORY      multiple PICC lines inserted and removed, it has affected circulation bilateral upper arms    OTHER SURGICAL HISTORY  10/19/2020    Revisiion to subcutaneous transposition of unlar nerve, right elbow    OTHER SURGICAL HISTORY Right 06/14/2021    REVISION TO SUBMUSCULAR TRANSPOSITION OF RIGHT ULNAR NERVE    OTHER SURGICAL HISTORY Left 03/24/2022    DECOMPRESSION OF ULNAR NERVE, LEFT ELBOW    MD MNPJ W/ANES SHOULDER JT APPL FIXATION APPARATUS Right 03/01/2017    SHOULDER MANIPULATION RIGHT performed by Tae Taylor MD at [address] [facility] [address] ARTHROSCOPY ROXANNE W/CORACOACRM LIGM RLS Left 10/17/2018    SHOULDER ARTHROSCOPY W/BICEPS TENDONESIS performed by Colt Noonan MD at [address] [facility] Left     foot    TONSILLECTOMY      TYMPANOSTOMY TUBE PLACEMENT      TYMPANOSTOMY TUBE PLACEMENT Left 03/12/2019    TYMPANOSTOMY TUBE PLACEMENT Right 12/08/2021    Right tympanostomy with tube placement of T-tube with operative microscope and general anesthesia. Right removal of right ear tube.     ULNAR TUNNEL RELEASE Right     UPPER GASTROINTESTINAL ENDOSCOPY      UPPER GASTROINTESTINAL ENDOSCOPY
Inhale 2.5 mg into the lungs 2 times daily, Disp-75 mL, R-11Normal      oxyCODONE-acetaminophen (PERCOCET) 5-325 MG per tablet Take 1 tablet by mouth every 4 hours as needed for Pain (max 5 tabs per day). Indications: last filled 3/4/23 with quantity 150 tabs for 30 daysHistorical Med      carvedilol (COREG) 12.5 MG tablet Take 12.5 mg by mouth 2 times daily (with meals) Historical Med           ALLERGIES     is allergic to latex, aspirin, avelox [moxifloxacin], chantix [varenicline], doxycycline, dye [iodides], flexeril [cyclobenzaprine], gabapentin, losartan, morphine, nsaids, pcn [penicillins], reglan [metoclopramide hcl], shellfish-derived products, sulfa antibiotics, toradol [ketorolac tromethamine], vancomycin, zofran, zyvox [linezolid], acyclovir, bactrim [sulfamethoxazole-trimethoprim], betadine [povidone iodine], ceclor [cefaclor], codeine, macrolides and ketolides, novolin r [insulin], novolog [insulin aspart], phenothiazines, tape [adhesive tape], banana, compazine [prochlorperazine], fentanyl, kiwi extract, tamiflu [oseltamivir phosphate], clindamycin/lincomycin, and sotalol. FAMILY HISTORY     She indicated that the status of her mother is unknown. She indicated that the status of her father is unknown. She indicated that the status of her sister is unknown. She indicated that the status of her maternal grandmother is unknown. She indicated that the status of her maternal grandfather is unknown. She indicated that the status of her paternal grandmother is unknown. She indicated that the status of her paternal grandfather is unknown. She indicated that the status of her maternal aunt is unknown. She indicated that the status of her maternal uncle is unknown. She indicated that the status of her paternal aunt is unknown. She indicated that the status of her paternal uncle is unknown.      SOCIAL HISTORY       Social History     Tobacco Use    Smoking status: Every Day     Packs/day: 0.50     Years: 23.00

## 2023-03-22 NOTE — ED TRIAGE NOTES
Mode of arrival (squad #, walk in, police, etc) : Walk in         Chief complaint(s): Arm Swelling         Arrival Note (brief scenario, treatment PTA, etc). : Patient was discharged yesterday and had midline removed from [facility], she complains of pain and swelling to the area today. C= \"Have you ever felt that you should Cut down on your drinking? \"  No  A= \"Have people Annoyed you by criticizing your drinking? \"  No  G= \"Have you ever felt bad or Guilty about your drinking? \"  No  E= \"Have you ever had a drink as an Eye-opener first thing in the morning to steady your nerves or to help a hangover? \"  No      Deferred []      Reason for deferring: N/A    *If yes to two or more: probable alcohol abuse. *

## 2023-03-22 NOTE — DISCHARGE INSTRUCTIONS
Please apply warm compresses to the area. Return to the ED if you develop chest pain, worsening shortness of breath, redness, warmth, swelling, coughing up blood.

## 2023-03-23 NOTE — DISCHARGE SUMMARY
Family Medicine Discharge Summary    Joann Handy  :  1975  MRN:  [id number]    Admit date:  3/17/2023  Discharge date:  3/21/2023    Admitting Physician:  Donne Skiff, MD    Discharge Diagnoses:    Patient Active Problem List   Diagnosis    Diabetes mellitus type 2, controlled ([facility]    COPD (chronic obstructive pulmonary disease) ([facility]    Asthma    Acute bronchitis    KRISTIN (obstructive sleep apnea)    Adult body mass index 40 and over    Chronic right shoulder pain    Neck pain    Radicular pain in right arm    Left leg pain    Noncompliance with therapeutic plan    Precordial pain    Tobacco abuse    Current moderate episode of major depressive disorder without prior episode (HCC)    Adhesive capsulitis of left shoulder    Morbid obesity (HCC)    Left bicipital tenosynovitis    Refused influenza vaccine    Closed fracture of left ankle    Atrial premature depolarization    Ventricular premature depolarization    Card[facility]omy[facility]    Cigarette nicotine dependence with nicotine-induced disorder    Productive cough    DVT (deep venous thrombosis) (HCC)    Endometriosis    GERD (gastroesophageal reflux disease)    HTN (hypertension)    Hypomagnesemia    Migraines    Mixed hyperlipidemia    Neurocardiogenic syncope    Nonrheumatic tricuspid (valve) insufficiency    Tricuspid valve disorders, non-rheumatic    Ovarian cyst    Encounter for monitoring sotalol therapy    Pulmonary HTN (HCC)    PVC's (premature ventricular contractions)    Schizophrenia (HCC)    Shortness of breath    Acute exacerbation of chronic obstructive pulmonary disease (HCC)    Bipolar disorder ([facility]    Left sided abdominal pain of unknown cause    Leg swelling    Mastoiditis, acute    Steroid-induced hyperglycemia    Sclerosing adenosis of left breast    Tachycardia    Tremor    Rupture of right rotator cuff    Rotator cuff syndrome of left shoulder    Long term current use of insulin (HCC)    Lesion of ulnar nerve    Carpal tunnel syndrome    Acute upper respiratory infection    Cellulitis of right upper limb    Claustrophobia    Close exposure to 2019 novel coronavirus    Congestive heart failure (HCC)    Diabetic neuropathy (HCC)    Fibrocystic breast changes    Helicobacter pylori gastritis    NSTEMI (non-ST elevated myocardial infarction) ([facility])    Cellulitis    MDD (major depressive disorder), recurrent episode, moderate (HCC)    Uncontrolled type 2 diabetes mellitus with hyperglycemia ([facility])    Cubital tunnel syndrome    Acute on chronic systolic (congestive) heart failure ([facility])    Coronary artery disease involving native coronary artery of native heart without angina pectoris    Dizziness    Ovarian remnant syndrome    Moderate left ventricular systolic dysfunction    Palpitations    Acute congestive heart failure (HCC)    Bilateral lower extremity edema    COVID-19    Chronic systolic CHF (congestive heart failure) ([facility])    PTSD (post-traumatic stress disorder)    Chest pain       Admission Condition:  fair  Discharged Condition:  fair    Hospital Course:   47 yo admitted with chest pain. V/Q scan was not suggestive of pulmonary embolism. 2D echo showed LVEF of 40-45 %. Discharge Medications:         Medication List        CHANGE how you take these medications      Lidocaine 4 % Oint  Apply 1 application topically in the morning, at noon, and at bedtime Apply thin layer to area three times daily as needed  What changed: Another medication with the same name was removed. Continue taking this medication, and follow the directions you see here.             CONTINUE taking these medications      albuterol sulfate  (90 Base) MCG/ACT inhaler  Commonly known as: PROVENTIL;VENTOLIN;PROAIR  INAHLE 2 PUFFS BY MOUTH INTO THE LUNGS EVERY 6 HOURS AS NEEDED FOR WHEEZING     atorvastatin 80 MG tablet  Commonly known as: LIPITOR  Take 1 tablet by mouth nightly     bumetanide 2 MG tablet  Commonly known as: BUMEX  TAKE 1 TABLET BY MOUTH EVERY

## 2023-04-28 ENCOUNTER — HOSPITAL ENCOUNTER (EMERGENCY)
Age: 48
Discharge: HOME | End: 2023-04-28
Attending: EMERGENCY MEDICINE
Payer: COMMERCIAL

## 2023-04-28 ENCOUNTER — APPOINTMENT (OUTPATIENT)
Dept: GENERAL RADIOLOGY | Age: 48
End: 2023-04-28
Payer: COMMERCIAL

## 2023-04-28 ENCOUNTER — APPOINTMENT (OUTPATIENT)
Dept: CT IMAGING | Age: 48
End: 2023-04-28
Payer: COMMERCIAL

## 2023-04-28 VITALS
SYSTOLIC BLOOD PRESSURE: 154 MMHG | TEMPERATURE: 99 F | WEIGHT: 250 LBS | BODY MASS INDEX: 42.91 KG/M2 | RESPIRATION RATE: 18 BRPM | DIASTOLIC BLOOD PRESSURE: 98 MMHG | HEART RATE: 97 BPM | OXYGEN SATURATION: 96 %

## 2023-04-28 DIAGNOSIS — M25.522 LEFT ELBOW PAIN: ICD-10-CM

## 2023-04-28 DIAGNOSIS — R05.1 ACUTE COUGH: ICD-10-CM

## 2023-04-28 DIAGNOSIS — R55 SYNCOPE AND COLLAPSE: Primary | ICD-10-CM

## 2023-04-28 LAB
ABSOLUTE EOS #: 0.28 K/UL (ref 0–0.44)
ABSOLUTE IMMATURE GRANULOCYTE: 0.08 K/UL (ref 0–0.3)
ABSOLUTE LYMPH #: 3.43 K/UL (ref 1.1–3.7)
ABSOLUTE MONO #: 0.85 K/UL (ref 0.1–1.2)
ALBUMIN SERPL-MCNC: 4 G/DL (ref 3.5–5.2)
ALP SERPL-CCNC: 121 U/L (ref 35–104)
ALT SERPL-CCNC: 17 U/L (ref 5–33)
ANION GAP SERPL CALCULATED.3IONS-SCNC: 10 MMOL/L (ref 9–17)
AST SERPL-CCNC: 12 U/L
BASOPHILS # BLD: 1 % (ref 0–2)
BASOPHILS ABSOLUTE: 0.1 K/UL (ref 0–0.2)
BILIRUB SERPL-MCNC: 0.3 MG/DL (ref 0.3–1.2)
BUN SERPL-MCNC: 17 MG/DL (ref 6–20)
BUN/CREAT BLD: 23 (ref 9–20)
CALCIUM SERPL-MCNC: 9.4 MG/DL (ref 8.6–10.4)
CHLORIDE SERPL-SCNC: 100 MMOL/L (ref 98–107)
CO2 SERPL-SCNC: 27 MMOL/L (ref 20–31)
CREAT SERPL-MCNC: 0.74 MG/DL (ref 0.5–0.9)
EOSINOPHILS RELATIVE PERCENT: 2 % (ref 1–4)
GFR SERPL CREATININE-BSD FRML MDRD: >60 ML/MIN/1.73M2
GLUCOSE SERPL-MCNC: 378 MG/DL (ref 70–99)
HCT VFR BLD AUTO: 45.8 % (ref 36.3–47.1)
HGB BLD-MCNC: 14.9 G/DL (ref 11.9–15.1)
IMMATURE GRANULOCYTES: 1 %
LYMPHOCYTES # BLD: 27 % (ref 24–43)
MCH RBC QN AUTO: 32.7 PG (ref 25.2–33.5)
MCHC RBC AUTO-ENTMCNC: 32.5 G/DL (ref 28.4–34.8)
MCV RBC AUTO: 100.4 FL (ref 82.6–102.9)
MONOCYTES # BLD: 7 % (ref 3–12)
NRBC AUTOMATED: 0 PER 100 WBC
PDW BLD-RTO: 11.9 % (ref 11.8–14.4)
PLATELET # BLD AUTO: 249 K/UL (ref 138–453)
PMV BLD AUTO: 10.8 FL (ref 8.1–13.5)
POTASSIUM SERPL-SCNC: 4.1 MMOL/L (ref 3.7–5.3)
PROT SERPL-MCNC: 6.9 G/DL (ref 6.4–8.3)
RBC # BLD: 4.56 M/UL (ref 3.95–5.11)
SEG NEUTROPHILS: 62 % (ref 36–65)
SEGMENTED NEUTROPHILS ABSOLUTE COUNT: 7.92 K/UL (ref 1.5–8.1)
SODIUM SERPL-SCNC: 137 MMOL/L (ref 135–144)
TROPONIN I SERPL DL<=0.01 NG/ML-MCNC: 10 NG/L (ref 0–14)
WBC # BLD AUTO: 12.7 K/UL (ref 3.5–11.3)

## 2023-04-28 PROCEDURE — 93005 ELECTROCARDIOGRAM TRACING: CPT | Performed by: EMERGENCY MEDICINE

## 2023-04-28 PROCEDURE — 73060 X-RAY EXAM OF HUMERUS: CPT

## 2023-04-28 PROCEDURE — 70450 CT HEAD/BRAIN W/O DYE: CPT

## 2023-04-28 PROCEDURE — 99285 EMERGENCY DEPT VISIT HI MDM: CPT

## 2023-04-28 PROCEDURE — 85025 COMPLETE CBC W/AUTO DIFF WBC: CPT

## 2023-04-28 PROCEDURE — 36415 COLL VENOUS BLD VENIPUNCTURE: CPT

## 2023-04-28 PROCEDURE — 71101 X-RAY EXAM UNILAT RIBS/CHEST: CPT

## 2023-04-28 PROCEDURE — 6370000000 HC RX 637 (ALT 250 FOR IP): Performed by: EMERGENCY MEDICINE

## 2023-04-28 PROCEDURE — 73080 X-RAY EXAM OF ELBOW: CPT

## 2023-04-28 PROCEDURE — 80053 COMPREHEN METABOLIC PANEL: CPT

## 2023-04-28 PROCEDURE — 84484 ASSAY OF TROPONIN QUANT: CPT

## 2023-04-28 RX ORDER — CARVEDILOL 3.12 MG/1
12.5 TABLET ORAL ONCE
Status: COMPLETED | OUTPATIENT
Start: 2023-04-28 | End: 2023-04-28

## 2023-04-28 RX ORDER — CODEINE PHOSPHATE AND GUAIFENESIN 10; 100 MG/5ML; MG/5ML
5 SOLUTION ORAL ONCE
Status: COMPLETED | OUTPATIENT
Start: 2023-04-28 | End: 2023-04-28

## 2023-04-28 RX ORDER — GUAIFENESIN AND CODEINE PHOSPHATE 100; 10 MG/5ML; MG/5ML
5 SOLUTION ORAL 3 TIMES DAILY PRN
Qty: 45 ML | Refills: 0 | Status: SHIPPED | OUTPATIENT
Start: 2023-04-28 | End: 2023-05-01

## 2023-04-28 RX ORDER — HYDROCODONE BITARTRATE AND ACETAMINOPHEN 5; 325 MG/1; MG/1
1 TABLET ORAL ONCE
Status: COMPLETED | OUTPATIENT
Start: 2023-04-28 | End: 2023-04-28

## 2023-04-28 RX ORDER — TRAMADOL HYDROCHLORIDE 50 MG/1
50 TABLET ORAL EVERY 4 HOURS PRN
Qty: 10 TABLET | Refills: 0 | Status: SHIPPED | OUTPATIENT
Start: 2023-04-28 | End: 2023-05-05

## 2023-04-28 RX ADMIN — HYDROCODONE BITARTRATE AND ACETAMINOPHEN 1 TABLET: 5; 325 TABLET ORAL at 19:30

## 2023-04-28 RX ADMIN — CARVEDILOL 12.5 MG: 3.12 TABLET, FILM COATED ORAL at 21:55

## 2023-04-28 RX ADMIN — GUAIFENESIN AND CODEINE PHOSPHATE 5 ML: 100; 10 SOLUTION ORAL at 21:55

## 2023-04-28 ASSESSMENT — PAIN DESCRIPTION - FREQUENCY: FREQUENCY: CONTINUOUS

## 2023-04-28 ASSESSMENT — PAIN DESCRIPTION - LOCATION
LOCATION: ARM;HEAD;RIB CAGE
LOCATION: ARM

## 2023-04-28 ASSESSMENT — PAIN DESCRIPTION - DESCRIPTORS: DESCRIPTORS: SHARP

## 2023-04-28 ASSESSMENT — PAIN SCALES - GENERAL
PAINLEVEL_OUTOF10: 10
PAINLEVEL_OUTOF10: 10

## 2023-04-28 ASSESSMENT — PAIN DESCRIPTION - ORIENTATION
ORIENTATION: LEFT
ORIENTATION: LEFT

## 2023-04-28 ASSESSMENT — PAIN - FUNCTIONAL ASSESSMENT: PAIN_FUNCTIONAL_ASSESSMENT: 0-10

## 2023-04-28 NOTE — ED NOTES
Patient states she was going down the stairs at home, coughed a couple of times and woke up at the base of the stairs. Patient c/o left arm pain from forearm to shoulder, has an abrasion on the posterior aspect of forearm, and left side ribs.       Toyin Jay, RN  18/84/20 1924

## 2023-04-29 NOTE — ED PROVIDER NOTES
instructions on not taking his medications at the same time. \"ED Course\" Notes From Epic Narrator:         CRITICAL CARE:       PROCEDURES:    Procedures      DATA FOR LAB AND RADIOLOGY TESTS ORDERED BELOW ARE REVIEWED BY THE ED CLINICIAN:    RADIOLOGY: All x-rays, CT, MRI, and formal ultrasound images (except ED bedside ultrasound) are read by the radiologist, see reports below, unless otherwise noted in MDM or here. Reports below are reviewed by myself. CT Head W/O Contrast   Final Result   No acute intracranial abnormality. No acute territorial infarction,   intracranial hemorrhage or mass lesion. XR HUMERUS LEFT (MIN 2 VIEWS)   Final Result   No acute osseous abnormality. XR ELBOW LEFT (MIN 3 VIEWS)   Final Result   No acute abnormality. XR RIBS LEFT INCLUDE CHEST (MIN 3 VIEWS)   Final Result   No acute process. Four views left ribs negative for fracture. LABS: Lab orders shown below, the results are reviewed by myself, and all abnormals are listed below. Labs Reviewed   CBC WITH AUTO DIFFERENTIAL - Abnormal; Notable for the following components:       Result Value    WBC 12.7 (*)     Immature Granulocytes 1 (*)     All other components within normal limits   COMPREHENSIVE METABOLIC PANEL - Abnormal; Notable for the following components:    Glucose 378 (*)     Bun/Cre Ratio 23 (*)     Alkaline Phosphatase 121 (*)     All other components within normal limits   TROPONIN       Vitals Reviewed:    Vitals:    04/28/23 1819   BP: (!) 154/98   Pulse: 97   Resp: 18   Temp: 99 °F (37.2 °C)   TempSrc: Oral   SpO2: 96%   Weight: 250 lb (113.4 kg)     MEDICATIONS GIVEN TO PATIENT THIS ENCOUNTER:  Orders Placed This Encounter   Medications    HYDROcodone-acetaminophen (NORCO) 5-325 MG per tablet 1 tablet    traMADol (ULTRAM) 50 MG tablet     Sig: Take 1 tablet by mouth every 4 hours as needed for Pain for up to 7 days. Intended supply: 7 days.  Take lowest dose possible to

## 2023-04-29 NOTE — DISCHARGE INSTRUCTIONS
I do not recommend taking the tramadol and cough syrup together. The medication should be taken 4 hours apart. Continue all medications. You may return to the ER at any time.   Please follow-up with your cardiologist.

## 2023-04-30 LAB
EKG ATRIAL RATE: 86 BPM
EKG P AXIS: 47 DEGREES
EKG P-R INTERVAL: 152 MS
EKG Q-T INTERVAL: 364 MS
EKG QRS DURATION: 84 MS
EKG QTC CALCULATION (BAZETT): 435 MS
EKG R AXIS: 43 DEGREES
EKG T AXIS: 46 DEGREES
EKG VENTRICULAR RATE: 86 BPM

## 2023-04-30 PROCEDURE — 93010 ELECTROCARDIOGRAM REPORT: CPT | Performed by: INTERNAL MEDICINE

## 2023-04-30 ASSESSMENT — ENCOUNTER SYMPTOMS
COUGH: 1
CHEST TIGHTNESS: 1

## 2023-05-25 ENCOUNTER — APPOINTMENT (OUTPATIENT)
Dept: GENERAL RADIOLOGY | Age: 48
End: 2023-05-25
Payer: COMMERCIAL

## 2023-05-25 ENCOUNTER — HOSPITAL ENCOUNTER (EMERGENCY)
Age: 48
Discharge: HOME | End: 2023-05-25
Attending: EMERGENCY MEDICINE
Payer: COMMERCIAL

## 2023-05-25 VITALS
TEMPERATURE: 97.8 F | HEIGHT: 64 IN | HEART RATE: 83 BPM | DIASTOLIC BLOOD PRESSURE: 90 MMHG | SYSTOLIC BLOOD PRESSURE: 156 MMHG | WEIGHT: 250 LBS | BODY MASS INDEX: 42.68 KG/M2 | RESPIRATION RATE: 22 BRPM | OXYGEN SATURATION: 95 %

## 2023-05-25 DIAGNOSIS — R19.7 DIARRHEA, UNSPECIFIED TYPE: ICD-10-CM

## 2023-05-25 DIAGNOSIS — J06.9 URI WITH COUGH AND CONGESTION: ICD-10-CM

## 2023-05-25 DIAGNOSIS — H10.33 ACUTE CONJUNCTIVITIS OF BOTH EYES, UNSPECIFIED ACUTE CONJUNCTIVITIS TYPE: Primary | ICD-10-CM

## 2023-05-25 LAB
FLUAV RNA RESP QL NAA+PROBE: NOT DETECTED
FLUBV RNA RESP QL NAA+PROBE: NOT DETECTED
S PYO AG THROAT QL: NEGATIVE
SARS-COV-2 RNA RESP QL NAA+PROBE: NOT DETECTED
SOURCE: NORMAL
SPECIMEN DESCRIPTION: NORMAL
SPECIMEN SOURCE: NORMAL

## 2023-05-25 PROCEDURE — 71045 X-RAY EXAM CHEST 1 VIEW: CPT

## 2023-05-25 PROCEDURE — 6360000002 HC RX W HCPCS: Performed by: PHYSICIAN ASSISTANT

## 2023-05-25 PROCEDURE — 96372 THER/PROPH/DIAG INJ SC/IM: CPT

## 2023-05-25 PROCEDURE — 87880 STREP A ASSAY W/OPTIC: CPT

## 2023-05-25 PROCEDURE — 99284 EMERGENCY DEPT VISIT MOD MDM: CPT

## 2023-05-25 PROCEDURE — 6370000000 HC RX 637 (ALT 250 FOR IP): Performed by: PHYSICIAN ASSISTANT

## 2023-05-25 PROCEDURE — 87636 SARSCOV2 & INF A&B AMP PRB: CPT

## 2023-05-25 RX ORDER — GUAIFENESIN 600 MG/1
600 TABLET, EXTENDED RELEASE ORAL 2 TIMES DAILY
Qty: 14 TABLET | Refills: 0 | Status: SHIPPED | OUTPATIENT
Start: 2023-05-25 | End: 2023-06-01

## 2023-05-25 RX ORDER — PROMETHAZINE HYDROCHLORIDE 25 MG/ML
12.5 INJECTION, SOLUTION INTRAMUSCULAR; INTRAVENOUS ONCE
Status: COMPLETED | OUTPATIENT
Start: 2023-05-25 | End: 2023-05-25

## 2023-05-25 RX ORDER — PREDNISONE 20 MG/1
40 TABLET ORAL DAILY
Qty: 8 TABLET | Refills: 0 | Status: SHIPPED | OUTPATIENT
Start: 2023-05-26 | End: 2023-05-30

## 2023-05-25 RX ORDER — PREDNISONE 20 MG/1
40 TABLET ORAL ONCE
Status: COMPLETED | OUTPATIENT
Start: 2023-05-25 | End: 2023-05-25

## 2023-05-25 RX ORDER — BENZONATATE 200 MG/1
200 CAPSULE ORAL ONCE
Status: COMPLETED | OUTPATIENT
Start: 2023-05-25 | End: 2023-05-25

## 2023-05-25 RX ORDER — BACITRACIN ZINC AND POLYMYXIN B SULFATE 500; 1000 [USP'U]/G; [USP'U]/G
OINTMENT TOPICAL
Qty: 15 G | Refills: 0 | Status: SHIPPED | OUTPATIENT
Start: 2023-05-25 | End: 2023-06-01

## 2023-05-25 RX ORDER — ACETAMINOPHEN 500 MG
1000 TABLET ORAL ONCE
Status: COMPLETED | OUTPATIENT
Start: 2023-05-25 | End: 2023-05-25

## 2023-05-25 RX ORDER — BENZONATATE 100 MG/1
100-200 CAPSULE ORAL 3 TIMES DAILY PRN
Qty: 30 CAPSULE | Refills: 0 | Status: SHIPPED | OUTPATIENT
Start: 2023-05-25

## 2023-05-25 RX ADMIN — PROMETHAZINE HYDROCHLORIDE 12.5 MG: 25 INJECTION INTRAMUSCULAR; INTRAVENOUS at 17:59

## 2023-05-25 RX ADMIN — BENZONATATE 200 MG: 200 CAPSULE ORAL at 19:33

## 2023-05-25 RX ADMIN — PREDNISONE 40 MG: 20 TABLET ORAL at 19:31

## 2023-05-25 RX ADMIN — ACETAMINOPHEN 1000 MG: 500 TABLET ORAL at 18:40

## 2023-05-25 ASSESSMENT — ENCOUNTER SYMPTOMS
NAUSEA: 1
RHINORRHEA: 0
SHORTNESS OF BREATH: 1
EYE REDNESS: 1
ABDOMINAL PAIN: 0
SORE THROAT: 1
DIARRHEA: 1
CONSTIPATION: 0
EYE DISCHARGE: 1
BLOOD IN STOOL: 0
COUGH: 1
VOMITING: 0
WHEEZING: 1
CHEST TIGHTNESS: 0

## 2023-05-25 ASSESSMENT — PAIN DESCRIPTION - LOCATION
LOCATION_2: EAR
LOCATION: GENERALIZED
LOCATION: HEAD

## 2023-05-25 ASSESSMENT — PAIN DESCRIPTION - ORIENTATION
ORIENTATION: LEFT;RIGHT
ORIENTATION_2: LEFT;RIGHT

## 2023-05-25 ASSESSMENT — PAIN - FUNCTIONAL ASSESSMENT
PAIN_FUNCTIONAL_ASSESSMENT: 0-10
PAIN_FUNCTIONAL_ASSESSMENT: 0-10

## 2023-05-25 ASSESSMENT — PAIN SCALES - GENERAL
PAINLEVEL_OUTOF10: 10
PAINLEVEL_OUTOF10: 10

## 2023-05-25 ASSESSMENT — PAIN DESCRIPTION - FREQUENCY: FREQUENCY: CONTINUOUS

## 2023-05-25 ASSESSMENT — PAIN DESCRIPTION - PAIN TYPE: TYPE: ACUTE PAIN

## 2023-05-25 ASSESSMENT — PAIN DESCRIPTION - DESCRIPTORS: DESCRIPTORS: ACHING

## 2023-05-25 ASSESSMENT — PAIN DESCRIPTION - INTENSITY: RATING_2: 10

## 2023-05-25 ASSESSMENT — VISUAL ACUITY: OU: 1

## 2023-05-25 ASSESSMENT — PAIN DESCRIPTION - ONSET: ONSET: ON-GOING

## 2023-05-25 NOTE — DISCHARGE INSTRUCTIONS
Please follow up with PCP tomorrow. Recommend return to the ED if you develop worsening cough, fever, shortness of breath, diarrhea, abdominal pain, vomiting, difficulty swallowing/ respiratory distress.

## 2023-05-25 NOTE — ED NOTES
NURSE REPORT:    Verbal report given to RAVINDER Gonzalez on [facility]      Report consisted of patient's Situation, Background, Assessment and   Recommendations(SBAR). Information from the following report(s) Nurse Handoff Report was reviewed with the receiving nurse. [place] Assessment: Presents to emergency department  because of falls (Syncope, seizure, or loss of consciousness): No, Age > 79: No, Altered Mental Status, Intoxication with alcohol or substance confusion (Disorientation, impaired judgment, poor safety awaremess, or inability to follow instructions): No, Impaired Mobility: Ambulates or transfers with assistive devices or assistance; Unable to ambulate or transer.: No, Nursing Judgement: No  Lines:       Opportunity for questions and clarification was provided.                 Orlinda Aase, RN  05/25/23 [id number]

## 2023-05-25 NOTE — ED NOTES
Discharge instructions reviewed with patient, noting all directions and education by provider. Patient verbalizes understanding of all information reviewed, gathered personal items, and transferred under own power off unit to lobby without incident.       Soumya Holloway RN  63/24/37 [id number]

## 2023-05-25 NOTE — ED PROVIDER NOTES
No respiratory distress. Breath sounds: Normal air entry. No stridor. Decreased breath sounds and wheezing present. No rhonchi or rales. Chest:      Chest wall: No tenderness. Comments: External telemetry device over left anterior chest wall. Abdominal:      Palpations: Abdomen is soft. Tenderness: There is no abdominal tenderness. There is no guarding or rebound. Musculoskeletal:         General: Normal range of motion. Cervical back: Full passive range of motion without pain and normal range of motion. Right lower leg: No edema. Left lower leg: No edema. Comments: No leg tenderness or edema. Skin:     General: Skin is warm. Capillary Refill: Capillary refill takes less than 2 seconds. Neurological:      General: No focal deficit present. Mental Status: She is alert and oriented to person, place, and time. Mental status is at baseline. Psychiatric:         Behavior: Behavior is cooperative. MEDICAL DECISION MAKING / ED COURSE:     URI with fever, cough, chest pain, shortness of breath, nausea, diarrhea, and headache x 3 days. Tmax 103. Wheezing noted on auscultation. Covid, flu, strep and chest xray pending. Covid, flu, strep are negative. CXR showing mild cardiomegaly and/ or pericardial effusion. Cardiomegaly noted on prior chest xray's. Pt does have hx of CHF. Discussed chest xray and patient presentation with Dr. Mely Banerjee. I have low concern for cardiac etiology such as MI, dissection, pericarditis. I also have low concern for PE. Pt is not hypoxic. She is on plavix. Pt is having symptoms of a viral URI including conjunctivitis, productive cough with yellow phlegm ( no hemoptysis), headache, ear pain, congestion, sore throat, diarrhea, nausea, fatigue, and fever. Pt does appear to not be feeling well. No recent surgeries, traveling, hormone use, extremity pain or swelling, IV drug use. Pt is agreeable.   States this

## 2023-07-10 ENCOUNTER — HOSPITAL ENCOUNTER (EMERGENCY)
Age: 48
Discharge: HOME | End: 2023-07-10
Attending: STUDENT IN AN ORGANIZED HEALTH CARE EDUCATION/TRAINING PROGRAM
Payer: COMMERCIAL

## 2023-07-10 ENCOUNTER — APPOINTMENT (OUTPATIENT)
Dept: GENERAL RADIOLOGY | Age: 48
End: 2023-07-10
Payer: COMMERCIAL

## 2023-07-10 VITALS
HEIGHT: 64 IN | DIASTOLIC BLOOD PRESSURE: 62 MMHG | OXYGEN SATURATION: 92 % | TEMPERATURE: 99.1 F | SYSTOLIC BLOOD PRESSURE: 111 MMHG | BODY MASS INDEX: 46.1 KG/M2 | RESPIRATION RATE: 16 BRPM | WEIGHT: 270 LBS | HEART RATE: 72 BPM

## 2023-07-10 DIAGNOSIS — R60.0 BILATERAL LOWER EXTREMITY EDEMA: Primary | ICD-10-CM

## 2023-07-10 LAB
ANION GAP SERPL CALCULATED.3IONS-SCNC: 9 MMOL/L (ref 9–17)
BASOPHILS # BLD: 0.1 K/UL (ref 0–0.2)
BASOPHILS NFR BLD: 1 % (ref 0–2)
BNP SERPL-MCNC: 221 PG/ML
BUN SERPL-MCNC: 10 MG/DL (ref 6–20)
CALCIUM SERPL-MCNC: 9.4 MG/DL (ref 8.6–10.4)
CHLORIDE SERPL-SCNC: 103 MMOL/L (ref 98–107)
CO2 SERPL-SCNC: 31 MMOL/L (ref 20–31)
CREAT SERPL-MCNC: 0.7 MG/DL (ref 0.5–0.9)
EOSINOPHIL # BLD: 0.3 K/UL (ref 0–0.4)
EOSINOPHILS RELATIVE PERCENT: 3 % (ref 0–4)
ERYTHROCYTE [DISTWIDTH] IN BLOOD BY AUTOMATED COUNT: 13.2 % (ref 11.5–14.9)
GFR SERPL CREATININE-BSD FRML MDRD: >60 ML/MIN/1.73M2
GLUCOSE SERPL-MCNC: 156 MG/DL (ref 70–99)
HCT VFR BLD AUTO: 42.9 % (ref 36–46)
HGB BLD-MCNC: 14.2 G/DL (ref 12–16)
LYMPHOCYTES # BLD: 27 % (ref 24–44)
LYMPHOCYTES NFR BLD: 2.6 K/UL (ref 1–4.8)
MCH RBC QN AUTO: 33.3 PG (ref 26–34)
MCHC RBC AUTO-ENTMCNC: 33.2 G/DL (ref 31–37)
MCV RBC AUTO: 100.2 FL (ref 80–100)
MONOCYTES NFR BLD: 0.7 K/UL (ref 0.1–1.3)
MONOCYTES NFR BLD: 7 % (ref 1–7)
NEUTROPHILS NFR BLD: 62 % (ref 36–66)
NEUTS SEG NFR BLD: 6.2 K/UL (ref 1.3–9.1)
PLATELET # BLD AUTO: 271 K/UL (ref 150–450)
PMV BLD AUTO: 8.2 FL (ref 6–12)
POTASSIUM SERPL-SCNC: 3.7 MMOL/L (ref 3.7–5.3)
RBC # BLD AUTO: 4.28 M/UL (ref 4–5.2)
SODIUM SERPL-SCNC: 143 MMOL/L (ref 135–144)
TROPONIN I SERPL HS-MCNC: 14 NG/L (ref 0–14)
WBC OTHER # BLD: 9.8 K/UL (ref 3.5–11)

## 2023-07-10 PROCEDURE — 71046 X-RAY EXAM CHEST 2 VIEWS: CPT

## 2023-07-10 PROCEDURE — 36415 COLL VENOUS BLD VENIPUNCTURE: CPT

## 2023-07-10 PROCEDURE — 85027 COMPLETE CBC AUTOMATED: CPT

## 2023-07-10 PROCEDURE — 96375 TX/PRO/DX INJ NEW DRUG ADDON: CPT

## 2023-07-10 PROCEDURE — 6360000002 HC RX W HCPCS: Performed by: STUDENT IN AN ORGANIZED HEALTH CARE EDUCATION/TRAINING PROGRAM

## 2023-07-10 PROCEDURE — 99285 EMERGENCY DEPT VISIT HI MDM: CPT

## 2023-07-10 PROCEDURE — 96372 THER/PROPH/DIAG INJ SC/IM: CPT

## 2023-07-10 PROCEDURE — 83880 ASSAY OF NATRIURETIC PEPTIDE: CPT

## 2023-07-10 PROCEDURE — 2500000003 HC RX 250 WO HCPCS: Performed by: STUDENT IN AN ORGANIZED HEALTH CARE EDUCATION/TRAINING PROGRAM

## 2023-07-10 PROCEDURE — 84484 ASSAY OF TROPONIN QUANT: CPT

## 2023-07-10 PROCEDURE — 80048 BASIC METABOLIC PNL TOTAL CA: CPT

## 2023-07-10 PROCEDURE — 96374 THER/PROPH/DIAG INJ IV PUSH: CPT

## 2023-07-10 RX ORDER — BUMETANIDE 0.25 MG/ML
2 INJECTION INTRAMUSCULAR; INTRAVENOUS ONCE
Status: COMPLETED | OUTPATIENT
Start: 2023-07-10 | End: 2023-07-10

## 2023-07-10 RX ORDER — DROPERIDOL 2.5 MG/ML
1.25 INJECTION, SOLUTION INTRAMUSCULAR; INTRAVENOUS ONCE
Status: COMPLETED | OUTPATIENT
Start: 2023-07-10 | End: 2023-07-10

## 2023-07-10 RX ADMIN — DROPERIDOL 1.25 MG: 2.5 INJECTION, SOLUTION INTRAMUSCULAR; INTRAVENOUS at 14:57

## 2023-07-10 RX ADMIN — HYDROMORPHONE HYDROCHLORIDE 0.25 MG: 1 INJECTION, SOLUTION INTRAMUSCULAR; INTRAVENOUS; SUBCUTANEOUS at 14:57

## 2023-07-10 RX ADMIN — BUMETANIDE 2 MG: 0.25 INJECTION INTRAMUSCULAR; INTRAVENOUS at 15:46

## 2023-07-10 ASSESSMENT — ENCOUNTER SYMPTOMS
ABDOMINAL PAIN: 0
SHORTNESS OF BREATH: 1
COUGH: 0
RHINORRHEA: 0
VOMITING: 0
NAUSEA: 0

## 2023-07-10 ASSESSMENT — PAIN DESCRIPTION - LOCATION: LOCATION: HEAD

## 2023-07-10 ASSESSMENT — PAIN SCALES - GENERAL
PAINLEVEL_OUTOF10: 10
PAINLEVEL_OUTOF10: 10

## 2023-07-10 NOTE — DISCHARGE INSTRUCTIONS
Please follow-up with your primary care provider and your cardiologist  Please continue take all home medication as prescribed although you do not need to take your Bumex today as you received in the emergency room  Please continue to wear your compression stockings  Return to the emergency room any severe worsening symptoms you cannot control at home

## 2023-07-10 NOTE — ED PROVIDER NOTES
[facility]  Emergency Department Encounter  Emergency Medicine Physician     Pt Name: Maria Isabel Mccann  MRN: [id number]  [address] 1975  Date of evaluation: 7/10/23  PCP:  Sonido Pichardo MD    CHIEF COMPLAINT       Chief Complaint   Patient presents with    Shortness of Breath       HISTORY OF PRESENT ILLNESS  (Location/Symptom, Timing/Onset, Context/Setting, Quality, Duration, Modifying Factors, Severity.)    Maria Isabel Mccann is a 46 y.o. female who presents with shortness of breath and worsening bilateral lower extremity edema over the past several days. Patient states that she has a longstanding history lower extremity edema. She also states has been having some shortness of breath but no chest pain. States that she has been taking her Bumex as prescribed however has not been working well for her. States that she is not having significant diuresis. Denies any abdominal pain. Denies any nausea or vomiting. Denies any recent illness. Denies any fevers or chills. States that she believes she is having a's \"slight migraine\" and some pain to the bilateral lower extremities.         PAST MEDICAL / SURGICAL / SOCIAL / FAMILY HISTORY    has a past medical history of Acute exacerbation of chronic obstructive pulmonary disease (HCC), Adhesive capsulitis of left shoulder, Asthma, Asthma exacerbation, Atrial fibrillation (HCC), Atrial premature depolarization, Bipolar 1 disorder ([address]), Bipolar disorder ([address]), Bulging disc, CAD (coronary artery disease), Candida infection, Cardiomyopathy ([address]), Chest pain, CHF (congestive heart failure) ([address]), Chronic otitis media of both ears, Chronic right shoulder pain, Cigarette nicotine dependence with nicotine-induced disorder, Class 3 severe obesity due to excess calories with serious comorbidity and body mass index (BMI) of 40.0 to 44.9 in adult [facility]), Closed fracture of left ankle, Clostridium difficile infection, COPD (chronic obstructive pulmonary

## 2023-07-14 LAB
EKG ATRIAL RATE: 74 BPM
EKG P AXIS: 44 DEGREES
EKG P-R INTERVAL: 142 MS
EKG Q-T INTERVAL: 424 MS
EKG QRS DURATION: 84 MS
EKG QTC CALCULATION (BAZETT): 470 MS
EKG R AXIS: 42 DEGREES
EKG T AXIS: 43 DEGREES
EKG VENTRICULAR RATE: 74 BPM

## 2023-09-19 ENCOUNTER — APPOINTMENT (OUTPATIENT)
Dept: CT IMAGING | Age: 48
End: 2023-09-19
Payer: COMMERCIAL

## 2023-09-19 ENCOUNTER — HOSPITAL ENCOUNTER (EMERGENCY)
Age: 48
Discharge: HOME | End: 2023-09-19
Attending: STUDENT IN AN ORGANIZED HEALTH CARE EDUCATION/TRAINING PROGRAM
Payer: COMMERCIAL

## 2023-09-19 VITALS
OXYGEN SATURATION: 94 % | BODY MASS INDEX: 46.44 KG/M2 | HEART RATE: 89 BPM | WEIGHT: 272 LBS | RESPIRATION RATE: 16 BRPM | HEIGHT: 64 IN | TEMPERATURE: 98.2 F | SYSTOLIC BLOOD PRESSURE: 123 MMHG | DIASTOLIC BLOOD PRESSURE: 107 MMHG

## 2023-09-19 DIAGNOSIS — R19.7 DIARRHEA, UNSPECIFIED TYPE: Primary | ICD-10-CM

## 2023-09-19 LAB
ALBUMIN SERPL-MCNC: 4.4 G/DL (ref 3.5–5.2)
ALP SERPL-CCNC: 132 U/L (ref 35–104)
ALT SERPL-CCNC: 18 U/L (ref 5–33)
ANION GAP SERPL CALCULATED.3IONS-SCNC: 11 MMOL/L (ref 9–17)
AST SERPL-CCNC: 15 U/L
BASOPHILS # BLD: 0.1 K/UL (ref 0–0.2)
BASOPHILS NFR BLD: 1 % (ref 0–2)
BILIRUB SERPL-MCNC: 0.6 MG/DL (ref 0.3–1.2)
BUN SERPL-MCNC: 19 MG/DL (ref 6–20)
CALCIUM SERPL-MCNC: 9.4 MG/DL (ref 8.6–10.4)
CHLORIDE SERPL-SCNC: 96 MMOL/L (ref 98–107)
CO2 SERPL-SCNC: 30 MMOL/L (ref 20–31)
CREAT SERPL-MCNC: 0.9 MG/DL (ref 0.5–0.9)
EOSINOPHIL # BLD: 0.2 K/UL (ref 0–0.4)
EOSINOPHILS RELATIVE PERCENT: 2 % (ref 0–4)
ERYTHROCYTE [DISTWIDTH] IN BLOOD BY AUTOMATED COUNT: 13.5 % (ref 11.5–14.9)
GFR SERPL CREATININE-BSD FRML MDRD: >60 ML/MIN/1.73M2
GLUCOSE SERPL-MCNC: 412 MG/DL (ref 70–99)
HCT VFR BLD AUTO: 49.5 % (ref 36–46)
HGB BLD-MCNC: 16.2 G/DL (ref 12–16)
LIPASE SERPL-CCNC: 24 U/L (ref 13–60)
LYMPHOCYTES NFR BLD: 2.9 K/UL (ref 1–4.8)
LYMPHOCYTES RELATIVE PERCENT: 29 % (ref 24–44)
MCH RBC QN AUTO: 33.1 PG (ref 26–34)
MCHC RBC AUTO-ENTMCNC: 32.8 G/DL (ref 31–37)
MCV RBC AUTO: 101 FL (ref 80–100)
MONOCYTES NFR BLD: 0.8 K/UL (ref 0.1–1.3)
MONOCYTES NFR BLD: 8 % (ref 1–7)
NEUTROPHILS NFR BLD: 60 % (ref 36–66)
NEUTS SEG NFR BLD: 6.2 K/UL (ref 1.3–9.1)
PLATELET # BLD AUTO: 250 K/UL (ref 150–450)
PMV BLD AUTO: 9.1 FL (ref 6–12)
POTASSIUM SERPL-SCNC: 4.3 MMOL/L (ref 3.7–5.3)
PROT SERPL-MCNC: 7.5 G/DL (ref 6.4–8.3)
RBC # BLD AUTO: 4.9 M/UL (ref 4–5.2)
SODIUM SERPL-SCNC: 137 MMOL/L (ref 135–144)
WBC OTHER # BLD: 10.2 K/UL (ref 3.5–11)

## 2023-09-19 PROCEDURE — 96372 THER/PROPH/DIAG INJ SC/IM: CPT

## 2023-09-19 PROCEDURE — 74176 CT ABD & PELVIS W/O CONTRAST: CPT

## 2023-09-19 PROCEDURE — 96374 THER/PROPH/DIAG INJ IV PUSH: CPT

## 2023-09-19 PROCEDURE — 6360000002 HC RX W HCPCS: Performed by: STUDENT IN AN ORGANIZED HEALTH CARE EDUCATION/TRAINING PROGRAM

## 2023-09-19 PROCEDURE — 99284 EMERGENCY DEPT VISIT MOD MDM: CPT

## 2023-09-19 PROCEDURE — 80053 COMPREHEN METABOLIC PANEL: CPT

## 2023-09-19 PROCEDURE — 36415 COLL VENOUS BLD VENIPUNCTURE: CPT

## 2023-09-19 PROCEDURE — 83690 ASSAY OF LIPASE: CPT

## 2023-09-19 PROCEDURE — 85025 COMPLETE CBC W/AUTO DIFF WBC: CPT

## 2023-09-19 RX ORDER — PROMETHAZINE HYDROCHLORIDE 25 MG/ML
6.25 INJECTION, SOLUTION INTRAMUSCULAR; INTRAVENOUS ONCE
Status: COMPLETED | OUTPATIENT
Start: 2023-09-19 | End: 2023-09-19

## 2023-09-19 RX ORDER — 0.9 % SODIUM CHLORIDE 0.9 %
1000 INTRAVENOUS SOLUTION INTRAVENOUS ONCE
Status: DISCONTINUED | OUTPATIENT
Start: 2023-09-19 | End: 2023-09-19

## 2023-09-19 RX ADMIN — PROMETHAZINE HYDROCHLORIDE 6.25 MG: 25 INJECTION INTRAMUSCULAR; INTRAVENOUS at 20:01

## 2023-09-19 RX ADMIN — HYDROMORPHONE HYDROCHLORIDE 0.5 MG: 1 INJECTION, SOLUTION INTRAMUSCULAR; INTRAVENOUS; SUBCUTANEOUS at 19:25

## 2023-09-19 ASSESSMENT — PAIN DESCRIPTION - DESCRIPTORS: DESCRIPTORS: ACHING

## 2023-09-19 ASSESSMENT — PAIN SCALES - GENERAL: PAINLEVEL_OUTOF10: 10

## 2023-09-19 ASSESSMENT — PAIN DESCRIPTION - LOCATION: LOCATION: ABDOMEN

## 2023-09-19 NOTE — ED NOTES
Patient presents to the ED after the onset of dark liquid stools last night. Patient states she has never had this before and patient also states she is having severe lower abdominal pain.      Kiran Martinez RN  09/19/23 1934

## 2023-09-19 NOTE — ED TRIAGE NOTES
Mode of arrival (squad #, walk in, police, etc) : walk-in        Chief complaint(s): rectal bleeding/dark stools        Arrival Note (brief scenario, treatment PTA, etc). : Pt reports above symptoms since yesterday. C= \"Have you ever felt that you should Cut down on your drinking? \"  No  A= \"Have people Annoyed you by criticizing your drinking? \"  No  G= \"Have you ever felt bad or Guilty about your drinking? \"  No  E= \"Have you ever had a drink as an Eye-opener first thing in the morning to steady your nerves or to help a hangover? \"  No      Deferred []      Reason for deferring: N/A    *If yes to two or more: probable alcohol abuse. *

## 2023-09-20 ASSESSMENT — ENCOUNTER SYMPTOMS
CONSTIPATION: 0
BACK PAIN: 0
SORE THROAT: 0
DIARRHEA: 1
WHEEZING: 0
SHORTNESS OF BREATH: 0
VOMITING: 0
SINUS PRESSURE: 0
NAUSEA: 0
ABDOMINAL PAIN: 1

## 2023-09-20 NOTE — ED NOTES
Pt was discharged from the ER. Discharge paperwork was reviewed with the patient. Patient had no further questions and showed an understanding of instructions.        Jayjay Pérez RN  09/19/23 [id number]

## 2023-09-20 NOTE — DISCHARGE INSTRUCTIONS
You are seen in emergency department for diarrhea. Laboratory studies are unremarkable. CT scan shows no acute abnormalities. You are given orders and supplies to provide a stool sample to send to the lab for possible infectious diarrhea. Please follow-up with a primary care provider. Please return the emergency department for any new or worsening symptoms including worsening pain, nausea, vomiting, or any symptoms deemed concerning.

## 2023-09-20 NOTE — ED PROVIDER NOTES
[address] [facility],6Th Floor ED  Emergency Department Encounter  Emergency Medicine Resident     Pt Millie Mendoza  MRN: [id number]  [address] 1975  Date of evaluation: 9/19/23  PCP:  Shalini Flores MD  Note Started: 8:24 PM EDT      CHIEF COMPLAINT       Chief Complaint   Patient presents with    Rectal Bleeding    Diarrhea       HISTORY OF PRESENT ILLNESS  (Location/Symptom, Timing/Onset, Context/Setting, Quality, Duration, Modifying Factors, Severity.)      Allegra Black is a 46 y.o. female who presents with abdominal pain, diarrhea. Patient states that she has had the symptoms for the past 2 to 3 days. She states she has multiple episodes of dark-colored diarrhea, watery. She reports abdominal pain in the right lower quadrant, rated 10/10 in severity, described as sharp. She denies any chest pain, shortness of breath, fevers, nausea, vomiting. Patient Pavithra Vidal any previous history of abdominal problems or surgeries. Patient denies any other complaints at this time.     PAST MEDICAL / SURGICAL / SOCIAL / FAMILY HISTORY      has a past medical history of Acute exacerbation of chronic obstructive pulmonary disease (HCC), Adhesive capsulitis of left shoulder, Asthma, Asthma exacerbation, Atrial fibrillation (HCC), Atrial premature depolarization, Bipolar 1 disorder ([address]), Bipolar disorder ([address]), Bulging disc, CAD (coronary artery disease), Candida infection, Cardiomyopathy ([address]), Chest pain, CHF (congestive heart failure) ([address]), Chronic otitis media of both ears, Chronic right shoulder pain, Cigarette nicotine dependence with nicotine-induced disorder, Class 3 severe obesity due to excess calories with serious comorbidity and body mass index (BMI) of 40.0 to 44.9 in adult [facility]), Closed fracture of left ankle, Clostridium difficile infection, COPD (chronic obstructive pulmonary disease) ([address]), Cough, persistent, Current moderate episode of major depressive disorder without prior episode ([address]), Depression,

## 2023-10-28 ENCOUNTER — HOSPITAL ENCOUNTER (EMERGENCY)
Age: 48
Discharge: HOME | End: 2023-10-28
Attending: EMERGENCY MEDICINE
Payer: COMMERCIAL

## 2023-10-28 ENCOUNTER — APPOINTMENT (OUTPATIENT)
Dept: CT IMAGING | Age: 48
End: 2023-10-28
Payer: COMMERCIAL

## 2023-10-28 VITALS
OXYGEN SATURATION: 91 % | SYSTOLIC BLOOD PRESSURE: 118 MMHG | WEIGHT: 260 LBS | BODY MASS INDEX: 44.63 KG/M2 | TEMPERATURE: 99 F | DIASTOLIC BLOOD PRESSURE: 70 MMHG | HEART RATE: 72 BPM | RESPIRATION RATE: 17 BRPM

## 2023-10-28 DIAGNOSIS — J06.9 VIRAL URI WITH COUGH: ICD-10-CM

## 2023-10-28 DIAGNOSIS — R07.89 OTHER CHEST PAIN: Primary | ICD-10-CM

## 2023-10-28 LAB
ALBUMIN SERPL-MCNC: 3.9 G/DL (ref 3.5–5.2)
ALP SERPL-CCNC: 109 U/L (ref 35–104)
ALT SERPL-CCNC: 12 U/L (ref 5–33)
ANION GAP SERPL CALCULATED.3IONS-SCNC: 12 MMOL/L (ref 9–17)
AST SERPL-CCNC: 12 U/L
BASOPHILS # BLD: 0.1 K/UL (ref 0–0.2)
BASOPHILS NFR BLD: 1 % (ref 0–2)
BILIRUB SERPL-MCNC: 0.6 MG/DL (ref 0.3–1.2)
BUN SERPL-MCNC: 12 MG/DL (ref 6–20)
CALCIUM SERPL-MCNC: 9.3 MG/DL (ref 8.6–10.4)
CHLORIDE SERPL-SCNC: 98 MMOL/L (ref 98–107)
CO2 SERPL-SCNC: 29 MMOL/L (ref 20–31)
CREAT SERPL-MCNC: 0.5 MG/DL (ref 0.5–0.9)
EOSINOPHIL # BLD: 0.2 K/UL (ref 0–0.4)
EOSINOPHILS RELATIVE PERCENT: 2 % (ref 0–4)
ERYTHROCYTE [DISTWIDTH] IN BLOOD BY AUTOMATED COUNT: 14.1 % (ref 11.5–14.9)
GFR SERPL CREATININE-BSD FRML MDRD: >60 ML/MIN/1.73M2
GLUCOSE SERPL-MCNC: 366 MG/DL (ref 70–99)
HCT VFR BLD AUTO: 46.3 % (ref 36–46)
HGB BLD-MCNC: 15.2 G/DL (ref 12–16)
LYMPHOCYTES NFR BLD: 2.3 K/UL (ref 1–4.8)
LYMPHOCYTES RELATIVE PERCENT: 22 % (ref 24–44)
MAGNESIUM SERPL-MCNC: 1.7 MG/DL (ref 1.6–2.6)
MCH RBC QN AUTO: 33.5 PG (ref 26–34)
MCHC RBC AUTO-ENTMCNC: 32.8 G/DL (ref 31–37)
MCV RBC AUTO: 102.2 FL (ref 80–100)
MONOCYTES NFR BLD: 0.5 K/UL (ref 0.1–1.3)
MONOCYTES NFR BLD: 5 % (ref 1–7)
NEUTROPHILS NFR BLD: 70 % (ref 36–66)
NEUTS SEG NFR BLD: 7.6 K/UL (ref 1.3–9.1)
PLATELET # BLD AUTO: 213 K/UL (ref 150–450)
PMV BLD AUTO: 8.6 FL (ref 6–12)
POTASSIUM SERPL-SCNC: 4.3 MMOL/L (ref 3.7–5.3)
PROT SERPL-MCNC: 6.8 G/DL (ref 6.4–8.3)
RBC # BLD AUTO: 4.53 M/UL (ref 4–5.2)
SODIUM SERPL-SCNC: 139 MMOL/L (ref 135–144)
TROPONIN I SERPL HS-MCNC: 11 NG/L (ref 0–14)
WBC OTHER # BLD: 10.8 K/UL (ref 3.5–11)

## 2023-10-28 PROCEDURE — 2580000003 HC RX 258: Performed by: EMERGENCY MEDICINE

## 2023-10-28 PROCEDURE — 80053 COMPREHEN METABOLIC PANEL: CPT

## 2023-10-28 PROCEDURE — 84484 ASSAY OF TROPONIN QUANT: CPT

## 2023-10-28 PROCEDURE — 6360000002 HC RX W HCPCS: Performed by: EMERGENCY MEDICINE

## 2023-10-28 PROCEDURE — 6360000004 HC RX CONTRAST MEDICATION: Performed by: EMERGENCY MEDICINE

## 2023-10-28 PROCEDURE — 85025 COMPLETE CBC W/AUTO DIFF WBC: CPT

## 2023-10-28 PROCEDURE — 36415 COLL VENOUS BLD VENIPUNCTURE: CPT

## 2023-10-28 PROCEDURE — 83735 ASSAY OF MAGNESIUM: CPT

## 2023-10-28 PROCEDURE — 93005 ELECTROCARDIOGRAM TRACING: CPT | Performed by: EMERGENCY MEDICINE

## 2023-10-28 PROCEDURE — 99285 EMERGENCY DEPT VISIT HI MDM: CPT

## 2023-10-28 PROCEDURE — 6370000000 HC RX 637 (ALT 250 FOR IP): Performed by: EMERGENCY MEDICINE

## 2023-10-28 PROCEDURE — 71260 CT THORAX DX C+: CPT

## 2023-10-28 PROCEDURE — 96374 THER/PROPH/DIAG INJ IV PUSH: CPT

## 2023-10-28 RX ORDER — 0.9 % SODIUM CHLORIDE 0.9 %
100 INTRAVENOUS SOLUTION INTRAVENOUS ONCE
Status: COMPLETED | OUTPATIENT
Start: 2023-10-28 | End: 2023-10-28

## 2023-10-28 RX ORDER — 0.9 % SODIUM CHLORIDE 0.9 %
100 INTRAVENOUS SOLUTION INTRAVENOUS ONCE
Status: CANCELLED | OUTPATIENT
Start: 2023-10-28 | End: 2023-10-28

## 2023-10-28 RX ORDER — SODIUM CHLORIDE 0.9 % (FLUSH) 0.9 %
10 SYRINGE (ML) INJECTION PRN
Status: DISCONTINUED | OUTPATIENT
Start: 2023-10-28 | End: 2023-10-28 | Stop reason: HOSPADM

## 2023-10-28 RX ORDER — SODIUM CHLORIDE 0.9 % (FLUSH) 0.9 %
10 SYRINGE (ML) INJECTION PRN
Status: CANCELLED | OUTPATIENT
Start: 2023-10-28

## 2023-10-28 RX ORDER — DIPHENHYDRAMINE HYDROCHLORIDE 50 MG/ML
50 INJECTION INTRAMUSCULAR; INTRAVENOUS ONCE
Status: COMPLETED | OUTPATIENT
Start: 2023-10-28 | End: 2023-10-28

## 2023-10-28 RX ADMIN — DIPHENHYDRAMINE HYDROCHLORIDE 50 MG: 50 INJECTION INTRAMUSCULAR; INTRAVENOUS at 16:17

## 2023-10-28 RX ADMIN — SODIUM CHLORIDE, PRESERVATIVE FREE 10 ML: 5 INJECTION INTRAVENOUS at 16:52

## 2023-10-28 RX ADMIN — PREDNISONE 50 MG: 20 TABLET ORAL at 16:17

## 2023-10-28 RX ADMIN — IOPAMIDOL 75 ML: 755 INJECTION, SOLUTION INTRAVENOUS at 16:56

## 2023-10-28 RX ADMIN — SODIUM CHLORIDE 100 ML: 9 INJECTION, SOLUTION INTRAVENOUS at 16:56

## 2023-10-28 NOTE — ED PROVIDER NOTES
X 5/8\" 1 ML MISC    USE IN CONJUNCTION WITH PREFERRED NEEDLE TIPS TO INJECT NALBUPHINE INTRAMUSCULARLY (INTO THE MUSCLE) UP TO ONCE DAILY    BENZONATATE (TESSALON) 100 MG CAPSULE    Take 1-2 capsules by mouth 3 times daily as needed for Cough    BLOOD GLUCOSE TEST STRIPS (ONETOUCH ULTRA) STRIP    USE TO TEST BLOOD SUGAR BEFORE MEALS, AT BEDTIME, AND AS NEEDED (up to 9 TIMES DAILY)    BUDESONIDE (PULMICORT) 0.5 MG/2ML NEBULIZER SUSPENSION    INHALE 2 ML VIA NEBULIZER TWICE A DAY FOR 30 DAYS    BUMETANIDE (BUMEX) 1 MG TABLET        BUMETANIDE (BUMEX) 2 MG TABLET    TAKE 1 TABLET BY MOUTH EVERY DAY    CALCIUM CARBONATE (TUMS) 500 MG CHEWABLE TABLET    Take 1 tablet by mouth daily    CARVEDILOL (COREG) 12.5 MG TABLET    Take 1 tablet by mouth 2 times daily (with meals)    CETIRIZINE (ZYRTEC) 10 MG TABLET    TAKE ONE TABLET BY MOUTH EVERY MORNING AS DIRECTED    CLINDAMYCIN (CLEOCIN) 150 MG CAPSULE        CLOPIDOGREL (PLAVIX) 75 MG TABLET    Take 1 tablet by mouth daily nightly    CLOTRIMAZOLE (LOTRIMIN) 1 % CREAM    Apply topically 2 times daily.     COLCHICINE (COLCRYS) 0.6 MG TABLET    Take 1 tablet by mouth 2 times daily    DEXTROMETHORPHAN-GUAIFENESIN (ROBITUSSIN-DM)  MG/5ML SYRUP    TAKE 10 MLS BY MOUTH EVERY 6 HOURS    DORNASE ALPHA (PULMOZYME) 2.5 MG/2.5ML NEBULIZER SOLUTION    Inhale 2.5 mg into the lungs 2 times daily    DULOXETINE (CYMBALTA) 60 MG EXTENDED RELEASE CAPSULE    Take 1 capsule by mouth daily    ERGOCALCIFEROL (ERGOCALCIFEROL) 1.25 MG (03365 UT) CAPSULE    TAKE 1 CAPSULE BY MOUTH WEEKLY    INSULIN DEGLUDEC (TRESIBA FLEXTOUCH) 200 UNIT/ML SOPN    Inject 60 Units into the skin in the morning and at bedtime    INSULIN LISPRO, 1 UNIT DIAL, (HUMALOG KWIKPEN) 100 UNIT/ML SOPN    INJECT SUBCUTANEOUSLY PER SLIDING SCALE, 150-200 INJECT 3U, 201-250 INJECT 6U, 251-300 INJECT 9U, >300 INJECT 12U, MAX 30U/DAY    IPRATROPIUM-ALBUTEROL (DUONEB) 0.5-2.5 (3) MG/3ML SOLN NEBULIZER SOLUTION    INHALE THE the lungs 2 times daily, Disp-75 mL, R-11Normal      oxyCODONE-acetaminophen (PERCOCET) 5-325 MG per tablet Take 1 tablet by mouth every 4 hours as needed for Pain (max 5 tabs per day). Indications: last filled 3/4/23 with quantity 150 tabs for 30 daysHistorical Med      carvedilol (COREG) 12.5 MG tablet Take 1 tablet by mouth 2 times daily (with meals)Historical Med       !! - Potential duplicate medications found. Please discuss with provider. ALLERGIES     is allergic to latex, aspirin, avelox [moxifloxacin], chantix [varenicline], doxycycline, dye [iodides], flexeril [cyclobenzaprine], gabapentin, losartan, morphine, nsaids, pcn [penicillins], reglan [metoclopramide hcl], shellfish-derived products, sulfa antibiotics, sulfasalazine, toradol [ketorolac tromethamine], vancomycin, zofran, zyvox [linezolid], acyclovir, bactrim [sulfamethoxazole-trimethoprim], betadine [povidone iodine], ceclor [cefaclor], codeine, macrolides and ketolides, novolin r [insulin], novolog [insulin aspart], phenothiazines, tape [adhesive tape], banana, compazine [prochlorperazine], fentanyl, glucose, kiwi extract, sodium chloride, tamiflu [oseltamivir phosphate], clindamycin/lincomycin, and sotalol. FAMILY HISTORY     She indicated that the status of her mother is unknown. She indicated that the status of her father is unknown. She indicated that the status of her sister is unknown. She indicated that the status of her maternal grandmother is unknown. She indicated that the status of her maternal grandfather is unknown. She indicated that the status of her paternal grandmother is unknown. She indicated that the status of her paternal grandfather is unknown. She indicated that the status of her maternal aunt is unknown. She indicated that the status of her maternal uncle is unknown. She indicated that the status of her paternal aunt is unknown. She indicated that the status of her paternal uncle is unknown.      SOCIAL HISTORY

## 2023-10-30 LAB
EKG ATRIAL RATE: 67 BPM
EKG P AXIS: 40 DEGREES
EKG P-R INTERVAL: 142 MS
EKG Q-T INTERVAL: 414 MS
EKG QRS DURATION: 86 MS
EKG QTC CALCULATION (BAZETT): 437 MS
EKG R AXIS: 38 DEGREES
EKG T AXIS: 30 DEGREES
EKG VENTRICULAR RATE: 67 BPM

## 2023-10-30 PROCEDURE — 93010 ELECTROCARDIOGRAM REPORT: CPT | Performed by: INTERNAL MEDICINE

## 2023-10-30 ASSESSMENT — ENCOUNTER SYMPTOMS
VOMITING: 0
ABDOMINAL PAIN: 0
SHORTNESS OF BREATH: 1
COUGH: 1
NAUSEA: 0

## 2023-10-31 ASSESSMENT — HEART SCORE
ECG: 0
ECG: 0

## 2023-11-16 DIAGNOSIS — M25.571 RIGHT ANKLE PAIN, UNSPECIFIED CHRONICITY: Primary | ICD-10-CM

## 2024-01-22 NOTE — PROGRESS NOTES
Patient shared she is tired of never feeling good and frustrated the causes don't seem to be found. Patient noted she is Jewish and doesn't feel comfortable talking with Buddhism . Writer explained that she could provide listening presence vs spiritual care if that would be helpful. Writer also offered to call her spiritual community/advisor; she declined all saying she didn't mean to be rude, but just wasn't comfortable. Writer responded she understood and left the room. 03/20/23 [address]   Encounter Summary   Encounter Overview/Reason  Spiritual/Emotional Needs   Service Provided For: Patient   Referral/Consult From: Howie   Last Encounter  03/20/23   Complexity of Encounter Moderate   Spiritual/Emotional needs   Type Emotional Distress   Assessment/Intervention/Outcome   Assessment Anxious; Impaired resilience   Intervention Active listening;Sustaining Presence/Ministry of presence; Explored/Affirmed feelings, thoughts, concerns   Outcome Engaged in conversation; Refused/Declined Unknown

## 2024-04-11 ENCOUNTER — HOSPITAL ENCOUNTER (EMERGENCY)
Age: 49
Discharge: HOME | End: 2024-04-11
Attending: EMERGENCY MEDICINE
Payer: COMMERCIAL

## 2024-04-11 ENCOUNTER — APPOINTMENT (OUTPATIENT)
Dept: GENERAL RADIOLOGY | Age: 49
End: 2024-04-11
Payer: COMMERCIAL

## 2024-04-11 VITALS
BODY MASS INDEX: 42.68 KG/M2 | WEIGHT: 250 LBS | TEMPERATURE: 98.7 F | SYSTOLIC BLOOD PRESSURE: 156 MMHG | HEIGHT: 64 IN | RESPIRATION RATE: 16 BRPM | HEART RATE: 74 BPM | OXYGEN SATURATION: 92 % | DIASTOLIC BLOOD PRESSURE: 81 MMHG

## 2024-04-11 DIAGNOSIS — J44.1 COPD WITH ACUTE EXACERBATION (HCC): ICD-10-CM

## 2024-04-11 DIAGNOSIS — H66.41 SUPPURATIVE OTITIS MEDIA OF RIGHT EAR, UNSPECIFIED CHRONICITY: Primary | ICD-10-CM

## 2024-04-11 LAB
BACTERIA URNS QL MICRO: ABNORMAL
BILIRUB UR QL STRIP: NEGATIVE
CASTS #/AREA URNS LPF: ABNORMAL /LPF
CLARITY UR: CLEAR
COLOR UR: YELLOW
EPI CELLS #/AREA URNS HPF: ABNORMAL /HPF
FLUAV RNA RESP QL NAA+PROBE: NOT DETECTED
FLUBV RNA RESP QL NAA+PROBE: NOT DETECTED
GLUCOSE UR STRIP-MCNC: ABNORMAL MG/DL
HGB UR QL STRIP.AUTO: NEGATIVE
KETONES UR STRIP-MCNC: NEGATIVE MG/DL
LEUKOCYTE ESTERASE UR QL STRIP: NEGATIVE
NITRITE UR QL STRIP: NEGATIVE
PH UR STRIP: 5.5 [PH] (ref 5–8)
PROT UR STRIP-MCNC: ABNORMAL MG/DL
RBC #/AREA URNS HPF: ABNORMAL /HPF
SARS-COV-2 RNA RESP QL NAA+PROBE: NOT DETECTED
SOURCE: NORMAL
SP GR UR STRIP: 1.04 (ref 1–1.03)
SPECIMEN DESCRIPTION: NORMAL
UROBILINOGEN UR STRIP-ACNC: NORMAL EU/DL (ref 0–1)
WBC #/AREA URNS HPF: ABNORMAL /HPF

## 2024-04-11 PROCEDURE — 81001 URINALYSIS AUTO W/SCOPE: CPT

## 2024-04-11 PROCEDURE — 71045 X-RAY EXAM CHEST 1 VIEW: CPT

## 2024-04-11 PROCEDURE — 87636 SARSCOV2 & INF A&B AMP PRB: CPT

## 2024-04-11 PROCEDURE — 99284 EMERGENCY DEPT VISIT MOD MDM: CPT

## 2024-04-11 PROCEDURE — 6370000000 HC RX 637 (ALT 250 FOR IP): Performed by: PHYSICIAN ASSISTANT

## 2024-04-11 RX ORDER — BENZONATATE 100 MG/1
100 CAPSULE ORAL 3 TIMES DAILY PRN
Qty: 30 CAPSULE | Refills: 0 | Status: SHIPPED | OUTPATIENT
Start: 2024-04-11 | End: 2024-04-21

## 2024-04-11 RX ORDER — PREDNISONE 20 MG/1
50 TABLET ORAL ONCE
Status: COMPLETED | OUTPATIENT
Start: 2024-04-11 | End: 2024-04-11

## 2024-04-11 RX ORDER — AZITHROMYCIN 250 MG/1
500 TABLET, FILM COATED ORAL ONCE
Status: COMPLETED | OUTPATIENT
Start: 2024-04-11 | End: 2024-04-11

## 2024-04-11 RX ORDER — PREDNISONE 20 MG/1
40 TABLET ORAL DAILY
Qty: 8 TABLET | Refills: 0 | Status: SHIPPED | OUTPATIENT
Start: 2024-04-12 | End: 2024-04-16

## 2024-04-11 RX ORDER — BENZONATATE 100 MG/1
100 CAPSULE ORAL ONCE
Status: COMPLETED | OUTPATIENT
Start: 2024-04-11 | End: 2024-04-11

## 2024-04-11 RX ORDER — CIPROFLOXACIN AND DEXAMETHASONE 3; 1 MG/ML; MG/ML
4 SUSPENSION/ DROPS AURICULAR (OTIC) 2 TIMES DAILY
Qty: 7.5 ML | Refills: 0 | Status: SHIPPED | OUTPATIENT
Start: 2024-04-11 | End: 2024-04-18

## 2024-04-11 RX ORDER — AZITHROMYCIN 250 MG/1
250 TABLET, FILM COATED ORAL DAILY
Qty: 4 TABLET | Refills: 0 | Status: SHIPPED | OUTPATIENT
Start: 2024-04-11 | End: 2024-04-15

## 2024-04-11 RX ORDER — OXYCODONE HYDROCHLORIDE AND ACETAMINOPHEN 5; 325 MG/1; MG/1
1 TABLET ORAL ONCE
Status: COMPLETED | OUTPATIENT
Start: 2024-04-11 | End: 2024-04-11

## 2024-04-11 RX ADMIN — BENZONATATE 100 MG: 100 CAPSULE ORAL at 17:58

## 2024-04-11 RX ADMIN — PREDNISONE 50 MG: 20 TABLET ORAL at 16:04

## 2024-04-11 RX ADMIN — OXYCODONE HYDROCHLORIDE AND ACETAMINOPHEN 1 TABLET: 5; 325 TABLET ORAL at 17:58

## 2024-04-11 RX ADMIN — AZITHROMYCIN DIHYDRATE 500 MG: 250 TABLET ORAL at 17:58

## 2024-04-11 ASSESSMENT — PAIN - FUNCTIONAL ASSESSMENT: PAIN_FUNCTIONAL_ASSESSMENT: 0-10

## 2024-04-11 ASSESSMENT — PAIN DESCRIPTION - LOCATION
LOCATION: BACK;HEAD
LOCATION: FLANK

## 2024-04-11 ASSESSMENT — PAIN SCALES - GENERAL
PAINLEVEL_OUTOF10: 7
PAINLEVEL_OUTOF10: 8

## 2024-04-11 NOTE — ED PROVIDER NOTES
eMERGENCY dEPARTMENT eNCOUnter   Independent Attestation     Pt Name: Leila Unger  MRN: [id number]  Birthdate 1975  Date of evaluation: 4/11/24     Leila Unger is a 49 y.o. female with CC: Nasal Congestion, Headache, and Back Pain      Based on the medical record the care appears appropriate.  I was personally available for consultation in the Emergency Department.    Josh Mcclellan MD  Attending Emergency Physician                 Josh Mcclellan MD  04/11/24 152    
that the status of her paternal grandfather is unknown. She indicated that the status of her maternal aunt is unknown. She indicated that the status of her maternal uncle is unknown. She indicated that the status of her paternal aunt is unknown. She indicated that the status of her paternal uncle is unknown.     SOCIAL HISTORY       Social History     Tobacco Use    Smoking status: Every Day     Current packs/day: 0.50     Average packs/day: 0.5 packs/day for 23.0 years (11.5 ttl pk-yrs)     Types: Cigarettes    Smokeless tobacco: Never   Vaping Use    Vaping Use: Never used   Substance Use Topics    Alcohol use: No     Alcohol/week: 0.0 standard drinks of alcohol    Drug use: No         Arabella Garnica PA-C, Arabella Marques PA  04/12/24 [id number]

## 2024-04-11 NOTE — DISCHARGE INSTRUCTIONS
Continue your duoneb treatments or albuterol as needed at home in addition to your new prescriptions. Follow up with your primary care physician tomorrow.

## 2024-04-11 NOTE — ED TRIAGE NOTES
Mode of arrival (squad #, walk in, police, etc) : walk in         Chief complaint(s): flank pain, body aches, congestion         Arrival Note (brief scenario, treatment PTA, etc).: pt states that 3 days ago she had a sore throat and a cough. Pt states that her doctor orders her visiting nurse to collect a urine sample from her but was lost so she never got the results. Pt thinks she may have a UTI. Pt is A&O x4.         C= \"Have you ever felt that you should Cut down on your drinking?\"  No  A= \"Have people Annoyed you by criticizing your drinking?\"  No  G= \"Have you ever felt bad or Guilty about your drinking?\"  No  E= \"Have you ever had a drink as an Eye-opener first thing in the morning to steady your nerves or to help a hangover?\"  No      Deferred []      Reason for deferring: N/A    *If yes to two or more: probable alcohol abuse.*

## 2024-09-16 ENCOUNTER — APPOINTMENT (OUTPATIENT)
Dept: CT IMAGING | Age: 49
End: 2024-09-16
Payer: COMMERCIAL

## 2024-09-16 ENCOUNTER — APPOINTMENT (OUTPATIENT)
Dept: GENERAL RADIOLOGY | Age: 49
End: 2024-09-16
Payer: COMMERCIAL

## 2024-09-16 ENCOUNTER — HOSPITAL ENCOUNTER (EMERGENCY)
Age: 49
Discharge: LEFT BEFORE DISCHARGE | End: 2024-09-16
Attending: EMERGENCY MEDICINE
Payer: COMMERCIAL

## 2024-09-16 VITALS
DIASTOLIC BLOOD PRESSURE: 99 MMHG | SYSTOLIC BLOOD PRESSURE: 155 MMHG | TEMPERATURE: 99 F | BODY MASS INDEX: 50.02 KG/M2 | WEIGHT: 293 LBS | RESPIRATION RATE: 18 BRPM | OXYGEN SATURATION: 94 % | HEIGHT: 64 IN | HEART RATE: 90 BPM

## 2024-09-16 DIAGNOSIS — Z53.21 ELOPED FROM EMERGENCY DEPARTMENT: Primary | ICD-10-CM

## 2024-09-16 LAB
ALBUMIN SERPL-MCNC: 3.7 G/DL (ref 3.5–5.2)
ALP SERPL-CCNC: 109 U/L (ref 35–104)
ALT SERPL-CCNC: 18 U/L (ref 5–33)
ANION GAP SERPL CALCULATED.3IONS-SCNC: 8 MMOL/L (ref 9–17)
AST SERPL-CCNC: 17 U/L
BASOPHILS # BLD: 0.1 K/UL (ref 0–0.2)
BASOPHILS NFR BLD: 1 % (ref 0–2)
BILIRUB SERPL-MCNC: 0.3 MG/DL (ref 0.3–1.2)
BNP SERPL-MCNC: 305 PG/ML
BUN SERPL-MCNC: 13 MG/DL (ref 6–20)
CALCIUM SERPL-MCNC: 9.4 MG/DL (ref 8.6–10.4)
CHLORIDE SERPL-SCNC: 100 MMOL/L (ref 98–107)
CO2 SERPL-SCNC: 31 MMOL/L (ref 20–31)
CREAT SERPL-MCNC: 0.7 MG/DL (ref 0.5–0.9)
EOSINOPHIL # BLD: 0.2 K/UL (ref 0–0.4)
EOSINOPHILS RELATIVE PERCENT: 2 % (ref 0–4)
ERYTHROCYTE [DISTWIDTH] IN BLOOD BY AUTOMATED COUNT: 13.8 % (ref 11.5–14.9)
FLUAV RNA RESP QL NAA+PROBE: NOT DETECTED
FLUBV RNA RESP QL NAA+PROBE: NOT DETECTED
GFR, ESTIMATED: >90 ML/MIN/1.73M2
GLUCOSE SERPL-MCNC: 102 MG/DL (ref 70–99)
HCT VFR BLD AUTO: 46.5 % (ref 36–46)
HGB BLD-MCNC: 15.4 G/DL (ref 12–16)
LYMPHOCYTES NFR BLD: 3.7 K/UL (ref 1–4.8)
LYMPHOCYTES RELATIVE PERCENT: 34 % (ref 24–44)
MCH RBC QN AUTO: 33.5 PG (ref 26–34)
MCHC RBC AUTO-ENTMCNC: 33.1 G/DL (ref 31–37)
MCV RBC AUTO: 101.1 FL (ref 80–100)
MONOCYTES NFR BLD: 0.6 K/UL (ref 0.1–1.3)
MONOCYTES NFR BLD: 5 % (ref 1–7)
NEUTROPHILS NFR BLD: 58 % (ref 36–66)
NEUTS SEG NFR BLD: 6.1 K/UL (ref 1.3–9.1)
PLATELET # BLD AUTO: 223 K/UL (ref 150–450)
PMV BLD AUTO: 8.8 FL (ref 6–12)
POTASSIUM SERPL-SCNC: 3.9 MMOL/L (ref 3.7–5.3)
PROT SERPL-MCNC: 6.7 G/DL (ref 6.4–8.3)
RBC # BLD AUTO: 4.6 M/UL (ref 4–5.2)
SARS-COV-2 RNA RESP QL NAA+PROBE: NOT DETECTED
SODIUM SERPL-SCNC: 139 MMOL/L (ref 135–144)
SOURCE: NORMAL
SPECIMEN DESCRIPTION: NORMAL
TROPONIN I SERPL HS-MCNC: 13 NG/L (ref 0–14)
WBC OTHER # BLD: 10.7 K/UL (ref 3.5–11)

## 2024-09-16 PROCEDURE — 87636 SARSCOV2 & INF A&B AMP PRB: CPT

## 2024-09-16 PROCEDURE — 84484 ASSAY OF TROPONIN QUANT: CPT

## 2024-09-16 PROCEDURE — 85025 COMPLETE CBC W/AUTO DIFF WBC: CPT

## 2024-09-16 PROCEDURE — 36415 COLL VENOUS BLD VENIPUNCTURE: CPT

## 2024-09-16 PROCEDURE — 83880 ASSAY OF NATRIURETIC PEPTIDE: CPT

## 2024-09-16 PROCEDURE — 70480 CT ORBIT/EAR/FOSSA W/O DYE: CPT

## 2024-09-16 PROCEDURE — 80053 COMPREHEN METABOLIC PANEL: CPT

## 2024-09-16 PROCEDURE — 99285 EMERGENCY DEPT VISIT HI MDM: CPT

## 2024-09-16 PROCEDURE — 71045 X-RAY EXAM CHEST 1 VIEW: CPT

## 2024-09-16 PROCEDURE — 93005 ELECTROCARDIOGRAM TRACING: CPT | Performed by: PHYSICIAN ASSISTANT

## 2024-09-16 RX ORDER — ACETAMINOPHEN 325 MG/1
650 TABLET ORAL ONCE
Status: DISCONTINUED | OUTPATIENT
Start: 2024-09-16 | End: 2024-09-16 | Stop reason: HOSPADM

## 2024-09-16 RX ORDER — CLINDAMYCIN HCL 300 MG
300 CAPSULE ORAL 3 TIMES DAILY
Qty: 30 CAPSULE | Refills: 0 | Status: SHIPPED | OUTPATIENT
Start: 2024-09-16 | End: 2024-09-26

## 2024-09-18 LAB
EKG ATRIAL RATE: 68 BPM
EKG P AXIS: 43 DEGREES
EKG P-R INTERVAL: 150 MS
EKG Q-T INTERVAL: 402 MS
EKG QRS DURATION: 84 MS
EKG QTC CALCULATION (BAZETT): 427 MS
EKG R AXIS: 40 DEGREES
EKG T AXIS: 39 DEGREES
EKG VENTRICULAR RATE: 68 BPM

## 2025-05-14 ENCOUNTER — HOSPITAL ENCOUNTER (OUTPATIENT)
Dept: MAMMOGRAPHY | Age: 50
Discharge: HOME | End: 2025-05-16
Attending: FAMILY MEDICINE
Payer: COMMERCIAL

## 2025-05-14 DIAGNOSIS — Z12.31 SCREENING MAMMOGRAM FOR BREAST CANCER: ICD-10-CM

## 2025-05-14 PROCEDURE — 77063 BREAST TOMOSYNTHESIS BI: CPT

## 2025-06-25 DIAGNOSIS — S86.011D PARTIAL TEAR OF RIGHT ACHILLES TENDON, SUBSEQUENT ENCOUNTER: ICD-10-CM

## (undated) DEVICE — Device: Brand: LEVEL 1

## (undated) DEVICE — CUP MED 1OZ CLR POLYPR FEED GRAD W/O LID

## (undated) DEVICE — FORCEPS BX L240CM WRK CHN 2.8MM STD CAP W/ NDL MIC MESH

## (undated) DEVICE — Device

## (undated) DEVICE — DRAPE,REIN 53X77,STERILE: Brand: MEDLINE

## (undated) DEVICE — 1200CC GUARDIAN II: Brand: GUARDIAN

## (undated) DEVICE — 1010 S-DRAPE TOWEL DRAPE 10/BX: Brand: STERI-DRAPE™

## (undated) DEVICE — GOWN,AURORA,NONREINFORCED,LARGE: Brand: MEDLINE

## (undated) DEVICE — SOLUTION IV IRRIG LACTATED RINGERS 3000ML 2B7487

## (undated) DEVICE — BLADE MYRINGOTOMY W/HANDLE (SPEAR)

## (undated) DEVICE — ST CHARLES BRONCHOSCOPY PACK: Brand: MEDLINE INDUSTRIES, INC.

## (undated) DEVICE — BANDAGE,SELF ADHRNT,COFLEX,4"X5YD,STRL: Brand: COLABEL

## (undated) DEVICE — POUCH INSTR W6.75XL11.5IN FRST 2 PKT ADH FOR ORTH AND

## (undated) DEVICE — SUTURE FIBERWIRE SZ 2 W/ TAPERED NEEDLE BLUE L38IN NONABSORB BLU L26.5MM 1/2 CIRCLE AR7200

## (undated) DEVICE — GOWN,SIRUS,NONRNF,SETINSLV,XL,20/CS: Brand: MEDLINE

## (undated) DEVICE — BITEBLOCK 54FR W/ DENT RIM BLOX

## (undated) DEVICE — CO2 CANNULA,SUPERSOFT, ADLT,7'O2,7'CO2: Brand: MEDLINE

## (undated) DEVICE — [ARTHROSCOPY PUMP,  DO NOT USE IF PACKAGE IS DAMAGED,  KEEP DRY,  KEEP AWAY FROM SUNLIGHT,  PROTECT FROM HEAT AND RADIOACTIVE SOURCES.]: Brand: FLOSTEADY

## (undated) DEVICE — GLOVE SURG 7.5 PF POLYMER WHT STRL SIGN LTX ESSENTIAL LTX

## (undated) DEVICE — GLOVE ORANGE PI 7 1/2   MSG9075

## (undated) DEVICE — GAUZE,SPONGE,4"X4",16PLY,XRAY,STRL,LF: Brand: MEDLINE

## (undated) DEVICE — TUBING, SUCTION, 1/4" X 12', STRAIGHT: Brand: MEDLINE

## (undated) DEVICE — NEEDLE SPNL 18GA L3.5IN W/ QNCKE SHARPER BVL DURA CLICK

## (undated) DEVICE — MEDI-VAC YANKAUER SUCTION HANDLE W/BULBOUS TIP: Brand: CARDINAL HEALTH

## (undated) DEVICE — TUBING, SUCTION, 3/16" X 10', STRAIGHT: Brand: MEDLINE

## (undated) DEVICE — JELLY,LUBE,STERILE,FLIP TOP,TUBE,2-OZ: Brand: MEDLINE

## (undated) DEVICE — KIT POS ULT SHLDR PT CARE REHAB SLNG

## (undated) DEVICE — KIT ELBW FOAM W/ DRP ARM HLDR DISP TRIMANO

## (undated) DEVICE — GAUZE,SPONGE,FLUFF,6"X6.75",STRL,5/TRAY: Brand: MEDLINE

## (undated) DEVICE — SPONGE GZ W4XL4IN RAYON POLY FILL CVR W/ NONWOVEN FAB

## (undated) DEVICE — SINGLE PORT MANIFOLD: Brand: NEPTUNE 2

## (undated) DEVICE — MEDI-VAC NON-CONDUCTIVE SUCTION TUBING: Brand: CARDINAL HEALTH

## (undated) DEVICE — DRESSING TRNSPAR W5XL4.5IN FLM SHT SEMIPERMEABLE WIND

## (undated) DEVICE — SHEET, ORTHO, SPLIT, STERILE: Brand: MEDLINE

## (undated) DEVICE — SYRINGE MED 50ML LUERLOCK TIP

## (undated) DEVICE — MERCY HEALTH ST CHARLES: Brand: MEDLINE INDUSTRIES, INC.

## (undated) DEVICE — IMMOBILIZER SHLDR AD XL L20IN D10IN BLK POLY COT CLSR ENV

## (undated) DEVICE — 90-S, SUCTION PROBE, NON-BENDABLE, MAX CUT LEVEL 11: Brand: SERFAS ENERGY

## (undated) DEVICE — BLOCK BITE 60FR RUBBER ADLT DENTAL

## (undated) DEVICE — Device: Brand: DEFENDO VALVE AND CONNECTOR KIT

## (undated) DEVICE — STERILE COTTON BALLS LARGE 5/P: Brand: MEDLINE

## (undated) DEVICE — SINGLE USE SUCTION VALVE MAJ-209: Brand: SINGLE USE SUCTION VALVE (STERILE)

## (undated) DEVICE — 3M™ STERI-DRAPE™ U-DRAPE 1015: Brand: STERI-DRAPE™

## (undated) DEVICE — DISC ABSRB YEL FLR POLYETH MGMT SUCT QUICKSUITE

## (undated) DEVICE — TOWEL,OR,DSP,ST,BLUE,STD,6/PK,12PK/CS: Brand: MEDLINE

## (undated) DEVICE — SUTURE PROL SZ 3-0 L18IN NONABSORBABLE BLU L30MM FS-1 3/8 8663G

## (undated) DEVICE — 4-PORT MANIFOLD: Brand: NEPTUNE 2

## (undated) DEVICE — COVER,MAYO STAND,XL,STERILE: Brand: MEDLINE

## (undated) DEVICE — GOWN,POLY REINFORCED,LG: Brand: MEDLINE

## (undated) DEVICE — GLOVE ORANGE PI 7   MSG9070

## (undated) DEVICE — SINGLE USE BIOPSY VALVE MAJ-210: Brand: SINGLE USE BIOPSY VALVE (STERILE)

## (undated) DEVICE — 3M™ WARMING BLANKET, LOWER BODY, 10 PER CASE, 42568: Brand: BAIR HUGGER™

## (undated) DEVICE — BLADE SAW RESECTOR CUT 4MM